# Patient Record
Sex: FEMALE | Race: WHITE | NOT HISPANIC OR LATINO | Employment: UNEMPLOYED | URBAN - METROPOLITAN AREA
[De-identification: names, ages, dates, MRNs, and addresses within clinical notes are randomized per-mention and may not be internally consistent; named-entity substitution may affect disease eponyms.]

---

## 2017-01-11 ENCOUNTER — GENERIC CONVERSION - ENCOUNTER (OUTPATIENT)
Dept: OTHER | Facility: OTHER | Age: 67
End: 2017-01-11

## 2017-01-27 ENCOUNTER — GENERIC CONVERSION - ENCOUNTER (OUTPATIENT)
Dept: OTHER | Facility: OTHER | Age: 67
End: 2017-01-27

## 2017-02-22 ENCOUNTER — LAB (OUTPATIENT)
Dept: LAB | Facility: HOSPITAL | Age: 67
End: 2017-02-22
Attending: INTERNAL MEDICINE
Payer: COMMERCIAL

## 2017-02-22 ENCOUNTER — TRANSCRIBE ORDERS (OUTPATIENT)
Dept: ADMINISTRATIVE | Facility: HOSPITAL | Age: 67
End: 2017-02-22

## 2017-02-22 ENCOUNTER — ALLSCRIPTS OFFICE VISIT (OUTPATIENT)
Dept: OTHER | Facility: OTHER | Age: 67
End: 2017-02-22

## 2017-02-22 DIAGNOSIS — R50.9 HYPERTHERMIA-INDUCED DEFECT: ICD-10-CM

## 2017-02-22 DIAGNOSIS — E51.9 OTHER AND UNSPECIFIED MANIFESTATIONS OF THIAMINE DEFICIENCY: ICD-10-CM

## 2017-02-22 DIAGNOSIS — E04.9 ENLARGEMENT OF THYROID: ICD-10-CM

## 2017-02-22 DIAGNOSIS — E78.5 HYPERLIPIDEMIA, UNSPECIFIED HYPERLIPIDEMIA TYPE: ICD-10-CM

## 2017-02-22 DIAGNOSIS — F11.20 UNCOMPLICATED OPIOID DEPENDENCE (HCC): ICD-10-CM

## 2017-02-22 DIAGNOSIS — E55.9 UNSPECIFIED VITAMIN D DEFICIENCY: Primary | ICD-10-CM

## 2017-02-22 DIAGNOSIS — D51.3 OTHER DIETARY VITAMIN B12 DEFICIENCY ANEMIA: ICD-10-CM

## 2017-02-22 DIAGNOSIS — R73.01 IMPAIRED FASTING GLUCOSE: ICD-10-CM

## 2017-02-22 DIAGNOSIS — D64.9 ANEMIA: ICD-10-CM

## 2017-02-22 DIAGNOSIS — G89.4 CHRONIC PAIN SYNDROME: ICD-10-CM

## 2017-02-22 DIAGNOSIS — D64.9 ANEMIA, UNSPECIFIED: ICD-10-CM

## 2017-02-22 DIAGNOSIS — R79.89 OTHER ABNORMAL BLOOD CHEMISTRY: ICD-10-CM

## 2017-02-22 DIAGNOSIS — E55.9 UNSPECIFIED VITAMIN D DEFICIENCY: ICD-10-CM

## 2017-02-22 DIAGNOSIS — R50.9 HYPERTHERMIA-INDUCED DEFECT: Primary | ICD-10-CM

## 2017-02-22 DIAGNOSIS — Z79.891 LONG TERM CURRENT USE OF OPIATE ANALGESIC: ICD-10-CM

## 2017-02-22 LAB
25(OH)D3 SERPL-MCNC: 29.5 NG/ML (ref 30–100)
ALBUMIN SERPL BCP-MCNC: 3.3 G/DL (ref 3.5–5)
ALP SERPL-CCNC: 96 U/L (ref 46–116)
ALT SERPL W P-5'-P-CCNC: 23 U/L (ref 12–78)
ANION GAP SERPL CALCULATED.3IONS-SCNC: 6 MMOL/L (ref 4–13)
AST SERPL W P-5'-P-CCNC: 19 U/L (ref 5–45)
BACTERIA UR QL AUTO: ABNORMAL /HPF
BASOPHILS # BLD AUTO: 0.02 THOUSANDS/ΜL (ref 0–0.1)
BASOPHILS NFR BLD AUTO: 0 % (ref 0–1)
BILIRUB DIRECT SERPL-MCNC: 0.05 MG/DL (ref 0–0.2)
BILIRUB SERPL-MCNC: 0.22 MG/DL (ref 0.2–1)
BILIRUB UR QL STRIP: NEGATIVE
BUN SERPL-MCNC: 15 MG/DL (ref 5–25)
CALCIUM SERPL-MCNC: 8.6 MG/DL (ref 8.3–10.1)
CAOX CRY URNS QL MICRO: ABNORMAL /HPF
CHLORIDE SERPL-SCNC: 106 MMOL/L (ref 100–108)
CHOLEST SERPL-MCNC: 193 MG/DL (ref 50–200)
CLARITY UR: CLEAR
CO2 SERPL-SCNC: 31 MMOL/L (ref 21–32)
COLOR UR: YELLOW
CREAT SERPL-MCNC: 0.84 MG/DL (ref 0.6–1.3)
CRP SERPL QL: <3 MG/L
EOSINOPHIL # BLD AUTO: 0.09 THOUSAND/ΜL (ref 0–0.61)
EOSINOPHIL NFR BLD AUTO: 2 % (ref 0–6)
ERYTHROCYTE [DISTWIDTH] IN BLOOD BY AUTOMATED COUNT: 16.1 % (ref 11.6–15.1)
ERYTHROCYTE [SEDIMENTATION RATE] IN BLOOD: 10 MM/HOUR (ref 0–20)
EST. AVERAGE GLUCOSE BLD GHB EST-MCNC: 108 MG/DL
FERRITIN SERPL-MCNC: 19 NG/ML (ref 8–388)
FOLATE SERPL-MCNC: 6.5 NG/ML (ref 3.1–17.5)
GFR SERPL CREATININE-BSD FRML MDRD: >60 ML/MIN/1.73SQ M
GLUCOSE SERPL-MCNC: 88 MG/DL (ref 65–140)
GLUCOSE UR STRIP-MCNC: NEGATIVE MG/DL
HBA1C MFR BLD: 5.4 % (ref 4.2–6.3)
HCT VFR BLD AUTO: 41.2 % (ref 34.8–46.1)
HDLC SERPL-MCNC: 93 MG/DL (ref 40–60)
HGB BLD-MCNC: 13.4 G/DL (ref 11.5–15.4)
HGB UR QL STRIP.AUTO: NORMAL
IRON SATN MFR SERPL: 18 %
IRON SERPL-MCNC: 67 UG/DL (ref 50–170)
KETONES UR STRIP-MCNC: NEGATIVE MG/DL
LDLC SERPL CALC-MCNC: 84 MG/DL (ref 0–100)
LEUKOCYTE ESTERASE UR QL STRIP: NEGATIVE
LYMPHOCYTES # BLD AUTO: 2.65 THOUSANDS/ΜL (ref 0.6–4.47)
LYMPHOCYTES NFR BLD AUTO: 48 % (ref 14–44)
MAGNESIUM SERPL-MCNC: 1.9 MG/DL (ref 1.6–2.6)
MCH RBC QN AUTO: 31.2 PG (ref 26.8–34.3)
MCHC RBC AUTO-ENTMCNC: 32.5 G/DL (ref 31.4–37.4)
MCV RBC AUTO: 96 FL (ref 82–98)
MONOCYTES # BLD AUTO: 0.41 THOUSAND/ΜL (ref 0.17–1.22)
MONOCYTES NFR BLD AUTO: 8 % (ref 4–12)
NEUTROPHILS # BLD AUTO: 2.32 THOUSANDS/ΜL (ref 1.85–7.62)
NEUTS SEG NFR BLD AUTO: 42 % (ref 43–75)
NITRITE UR QL STRIP: NEGATIVE
NON-SQ EPI CELLS URNS QL MICRO: ABNORMAL /HPF
NRBC BLD AUTO-RTO: 0 /100 WBCS
PH UR STRIP.AUTO: 5.5 [PH] (ref 4.5–8)
PLATELET # BLD AUTO: 258 THOUSANDS/UL (ref 149–390)
PMV BLD AUTO: 10.1 FL (ref 8.9–12.7)
POTASSIUM SERPL-SCNC: 4.2 MMOL/L (ref 3.5–5.3)
PROT SERPL-MCNC: 6.5 G/DL (ref 6.4–8.2)
PROT UR STRIP-MCNC: NEGATIVE MG/DL
RBC # BLD AUTO: 4.3 MILLION/UL (ref 3.81–5.12)
RBC #/AREA URNS AUTO: ABNORMAL /HPF
SODIUM SERPL-SCNC: 143 MMOL/L (ref 136–145)
SP GR UR STRIP.AUTO: >=1.03 (ref 1–1.03)
T4 FREE SERPL-MCNC: 0.95 NG/DL (ref 0.76–1.46)
TIBC SERPL-MCNC: 376 UG/DL (ref 250–450)
TRIGL SERPL-MCNC: 79 MG/DL
TSH SERPL DL<=0.05 MIU/L-ACNC: 5.04 UIU/ML (ref 0.36–3.74)
UROBILINOGEN UR QL STRIP.AUTO: 0.2 E.U./DL
VIT B12 SERPL-MCNC: 379 PG/ML (ref 100–900)
WBC # BLD AUTO: 5.49 THOUSAND/UL (ref 4.31–10.16)
WBC #/AREA URNS AUTO: ABNORMAL /HPF

## 2017-02-22 PROCEDURE — 87389 HIV-1 AG W/HIV-1&-2 AB AG IA: CPT

## 2017-02-22 PROCEDURE — 80061 LIPID PANEL: CPT

## 2017-02-22 PROCEDURE — 82607 VITAMIN B-12: CPT

## 2017-02-22 PROCEDURE — 83735 ASSAY OF MAGNESIUM: CPT

## 2017-02-22 PROCEDURE — 85652 RBC SED RATE AUTOMATED: CPT

## 2017-02-22 PROCEDURE — 82306 VITAMIN D 25 HYDROXY: CPT

## 2017-02-22 PROCEDURE — 86140 C-REACTIVE PROTEIN: CPT

## 2017-02-22 PROCEDURE — 82746 ASSAY OF FOLIC ACID SERUM: CPT

## 2017-02-22 PROCEDURE — 83540 ASSAY OF IRON: CPT

## 2017-02-22 PROCEDURE — 82728 ASSAY OF FERRITIN: CPT

## 2017-02-22 PROCEDURE — 84590 ASSAY OF VITAMIN A: CPT

## 2017-02-22 PROCEDURE — 84425 ASSAY OF VITAMIN B-1: CPT

## 2017-02-22 PROCEDURE — 83036 HEMOGLOBIN GLYCOSYLATED A1C: CPT

## 2017-02-22 PROCEDURE — 85025 COMPLETE CBC W/AUTO DIFF WBC: CPT

## 2017-02-22 PROCEDURE — 81001 URINALYSIS AUTO W/SCOPE: CPT | Performed by: INTERNAL MEDICINE

## 2017-02-22 PROCEDURE — 36415 COLL VENOUS BLD VENIPUNCTURE: CPT

## 2017-02-22 PROCEDURE — 84439 ASSAY OF FREE THYROXINE: CPT

## 2017-02-22 PROCEDURE — 83550 IRON BINDING TEST: CPT

## 2017-02-22 PROCEDURE — 84443 ASSAY THYROID STIM HORMONE: CPT

## 2017-02-22 PROCEDURE — 82248 BILIRUBIN DIRECT: CPT

## 2017-02-22 PROCEDURE — 86200 CCP ANTIBODY: CPT

## 2017-02-22 PROCEDURE — 80053 COMPREHEN METABOLIC PANEL: CPT

## 2017-02-24 LAB
CCP IGA+IGG SERPL IA-ACNC: 5 UNITS (ref 0–19)
HIV 1+2 AB+HIV1 P24 AG SERPL QL IA: NORMAL

## 2017-02-25 LAB
VIT A SERPL-MCNC: 73 UG/DL (ref 26–82)
VIT B1 BLD-SCNC: 105.4 NMOL/L (ref 66.5–200)

## 2017-03-01 ENCOUNTER — CONVERSION ENCOUNTER (OUTPATIENT)
Dept: MAMMOGRAPHY | Facility: CLINIC | Age: 67
End: 2017-03-01

## 2017-03-22 ENCOUNTER — GENERIC CONVERSION - ENCOUNTER (OUTPATIENT)
Dept: OTHER | Facility: OTHER | Age: 67
End: 2017-03-22

## 2017-04-10 ENCOUNTER — ALLSCRIPTS OFFICE VISIT (OUTPATIENT)
Dept: OTHER | Facility: OTHER | Age: 67
End: 2017-04-10

## 2017-04-14 ENCOUNTER — ALLSCRIPTS OFFICE VISIT (OUTPATIENT)
Dept: OTHER | Facility: OTHER | Age: 67
End: 2017-04-14

## 2017-04-14 DIAGNOSIS — F11.20 UNCOMPLICATED OPIOID DEPENDENCE (HCC): ICD-10-CM

## 2017-04-14 DIAGNOSIS — Z79.891 LONG TERM CURRENT USE OF OPIATE ANALGESIC: ICD-10-CM

## 2017-04-14 DIAGNOSIS — G89.4 CHRONIC PAIN SYNDROME: ICD-10-CM

## 2017-05-19 ENCOUNTER — ALLSCRIPTS OFFICE VISIT (OUTPATIENT)
Dept: OTHER | Facility: OTHER | Age: 67
End: 2017-05-19

## 2017-07-05 ENCOUNTER — ALLSCRIPTS OFFICE VISIT (OUTPATIENT)
Dept: OTHER | Facility: OTHER | Age: 67
End: 2017-07-05

## 2017-08-30 ENCOUNTER — ALLSCRIPTS OFFICE VISIT (OUTPATIENT)
Dept: OTHER | Facility: OTHER | Age: 67
End: 2017-08-30

## 2017-10-03 DIAGNOSIS — D50.9 IRON DEFICIENCY ANEMIA: ICD-10-CM

## 2017-10-25 ENCOUNTER — ALLSCRIPTS OFFICE VISIT (OUTPATIENT)
Dept: OTHER | Facility: OTHER | Age: 67
End: 2017-10-25

## 2017-10-25 NOTE — PROGRESS NOTES
Assessment  1  Pain syndrome, chronic (338 4) (G89 4)   2  Analgesic use (V58 69) (Z79 899)   3  Chronic cervical radiculopathy (723 4) (M54 12)   4  Chronic cervical pain (723 1,338 29) (M54 2,G89 29)   5  Chronic myofascial pain (729 1,338 29) (M79 1,G89 29)   6  Diabetic peripheral neuropathy (250 60,357 2) (E11 42)   7  Fibromyalgia (729 1) (M79 7)   8  Spondylosis of cervical region without myelopathy or radiculopathy (721 0) (M47 812)    Plan  Encounter for long-term opiate analgesic use, Opioid type dependence, continuous use,  Pain syndrome, chronic    · (1) DRUG ABUSE SCREEN, URINE ROUTINE; Status:Active; Requested for:94Ppv2304;    Perform:[facility]; Due:61Dyw6152; Ordered;For:Encounter for long-term opiate analgesic use, Opioid type dependence, continuous use, Pain syndrome, chronic; Ordered By:Maria Ines Soto;  Fibromyalgia    · Lyrica 100 MG Oral Capsule; TAKE 1 CAPSULE 3 TIMES DAILY   Rx By: Yanique Cruz; Dispense: 30 Days ; #:90 Capsule; Refill: 1;For: Fibromyalgia; LEXI = N; Print Rx  Other muscle spasm    · Baclofen 10 MG Oral Tablet; TAKE 1 TABLET BY MOUTH 3 TIMES A DAY   Rx By: Yanique Cruz; Dispense: 30 Days ; #:90 Tablet; Refill: 1;For: Other muscle spasm; LEXI = N; Verified Transmission to [id number]; Last Updated By: System, [employer]; 8/30/2017 9:29:46 AM  Pain syndrome, chronic    · Hydrocodone-Acetaminophen  MG Oral Tablet; TAKE 1 TABLET EVERY 4  TO 6 HOURS AS NEEDED FOR PAIN; Do Not Fill Before: 50XKO2274   Rx By: Yanique Cruz; Dispense: 30 Days ; #:130 Tablet; Refill: 0;For: Pain syndrome, chronic; LEXI = N; Print Rx   · Morphine Sulfate ER 15 MG Oral Tablet Extended Release; Take 1 tablet every  12 hours; Do Not Fill Before: 45PYP4846   Rx By: Yanique Cruz; Dispense: 30 Days ; #:60 Tablet Extended Release;  Refill: 0;For: Pain syndrome, chronic; LEXI = N; Print Rx   · Follow-up visit in 2 months Evaluation and Treatment  Follow-up with [facility] for med refills   Status: Hold For - Scheduling  Requested for: 42Knz3584   Ordered; For: Pain syndrome, chronic; Ordered By: Colleen Correa Performed:  Due: 35MNE0145    Discussion/Summary    The patient presents today for a follow-up office visit  The patient is currently being treated for cervicalgia, cervical spondylosis, chronic cervical radiculopathy, myofascial pain syndrome, diabetic peripheral neuropathy and fibromyalgia  The patient has been taking baclofen 10 mg 3 tablets daily, hydrocodone 10 mg she takes 4-5 tablets daily depending on her pain level, she also takes Lyrica 100 mg 3 tablets daily, and morphine extended release 15 mg 1 tablet twice per day  She states that the medications give her 50-60% relief of her pain symptoms and no side effects or issues  this time, I feel it is reasonable to continue her hydrocodone and her morphine for her today as her pain symptoms are stable with the medications  She was given a two-month supply of each medication with one month having a do not fill date of September 28, 2017  The patient did return to me a prescription for hydrocodone that was written on 5/19/2017 with a do not fill date of June 16, 2017  This was avoided and discarded in the office  The patient tells me that she would like to start weaning off of the morphine, she will try on her own to decrease to 1 tablet daily by her next office visit  I did tell her that if she has a prescription that she does not need to fill because she wasn't using the medication that she should return it to our office  The patient was agreeable verbalized understanding  will continue baclofen, and lyrica  Both were refilled today  PDMP was reviewed today and was appropriate  One of her morphine prescriptions from July 5, 2017 was not filled she tells me that this was a mistake she thought she had an extra and she handed the pharmacist to 1 that was written for her on May 19 instead of July 5  She threw the extra one in the garbage   I did discuss with her that from now on if she has an extra prescription she should bring her back to the office and I will discarded  Patient was agreeable verbalized understanding  urine drug screen was collected at today's office visit as part of our medication management protocol  The point of care testing results were appropriate for what was being prescribed  The specimen will be sent for confirmatory testing  The drug screen is medically necessary because the patient is either dependent on opioid medication or is being considered for opioid medication therapy and the results could impact ongoing or future treatment  The drug screen is to evaluate for the presence or absence of prescribed, non-prescribed, and/or illicit drugs/substances  will follow up in 2 months for medication refills  Patient is able to Self-Care  The treatment plan was reviewed with the patient/guardian  The patient/guardian understands and agrees with the treatment plan     There are risks associated with opiod medications, including dependence, addiction and tolerance  The patient understands and agrees to use these medications only as prescribed  Potential side effects of the medications include, but are not limited to, constipation, drowsiness, addiction, impaired judgment and risk of fatal overdose if not taken as prescribed  Sharing medications is a felony  At this point and time, the patient is showing no signs of addiction, abuse, diversion or suicidal ideation  Chief Complaint  1  Neck Pain  neck and right arm painknee and left hip pain      History of Present Illness  The patient presents today for a follow-up office visit  The patient is currently being treated for cervicalgia, cervical spondylosis, chronic cervical radiculopathy, myofascial pain syndrome, diabetic peripheral neuropathy and fibromyalgia   The patient has been taking baclofen 10 mg 3 tablets daily, hydrocodone 10 mg she takes 4-5 tablets daily depending on her pain level, she also takes Lyrica 100 mg 3 tablets daily, and morphine extended release 15 mg 1 tablet twice per day  She states that the medications give her 50-60% relief of her pain symptoms and no side effects or issues  the patient rates her pain 8/10, this is constant in nature most bothersome at night  She describes her pain as burning, sharp, throbbing, pressure like, shooting, and numbness  She localizes her pain to her neck and her right arm, she also has pain in her bilateral knees and feet  tells me that she did get aspiration pneumonia from her acid reflux she is on levothyroxine and ranitidine   have personally reviewed and/or updated the patient's past medical history, past surgical history, family history, social history, current medications, allergies, and vital signs today  Twin Lan presents with complaints of gradual onset of constant episodes of severe left posterior neck pain, described as sharp, burning and throbbing, radiating to the bilateral trapezius, right shoulder, right arm and right hand  On a scale of 1 to 10, the patient rates the pain as 8  Symptoms are unchanged  Review of Systems    Constitutional: no fever,-- no recent weight gain-- and-- no recent weight loss  Eyes: no double vision-- and-- no blurry vision  Cardiovascular: chest pain, but-- no palpitations-- and-- no lower extremity edema  Respiratory: no complaints of shortness of breath-- and-- no wheezing  Musculoskeletal: difficulty walking,-- joint stiffness,-- joint swelling hands, knees and feet  ,-- pain in extremity hand, feet and knees-- and-- decreased range of motion, but-- no muscle weakness-- and-- no limb swelling  Neurological: dizziness, but-- no difficulty swallowing,-- no memory loss,-- no loss of consciousness-- and-- no seizures  Gastrointestinal: nausea, but-- no vomiting,-- no constipation-- and-- no diarrhea     Genitourinary: no difficulty initiating urine stream,-- no genital pain-- and-- no frequent urination  Integumentary: no complaints of skin rash  Psychiatric: no depression  Endocrine: no excessive thirst,-- no adrenal disease,-- no hypothyroidism-- and-- no hyperthyroidism  Hematologic/Lymphatic: no tendency for easy bruising-- and-- no tendency for easy bleeding  Active Problems  1  Acute pain of left foot (729 5) (M79 672)   2  Analgesic use (V58 69) (Z79 899)   3  Anemia (285 9) (D64 9)   4  Anxiety disorder (300 00) (F41 9)   5  Chronic cervical pain (723 1,338 29) (M54 2,G89 29)   6  Chronic cervical radiculopathy (723 4) (M54 12)   7  Chronic myofascial pain (729 1,338 29) (M79 1,G89 29)   8  Diabetic peripheral neuropathy (250 60,357 2) (E11 42)   9  Encounter for long-term opiate analgesic use (V58 69) (Z79 891)   10  Fibromyalgia (729 1) (M79 7)   11  GERD (gastroesophageal reflux disease) (530 81) (K21 9)   12  H/O colonoscopy (V45 89) (Z98 890)   13  Headache (784 0) (R51)   14  Iron deficiency anemia (280 9) (D50 9)   15  Left hip pain (719 45) (M25 552)   16  Limb pain (729 5) (M79 609)   17  Lymphadenopathy (785 6) (R59 1)   18  MGUS (monoclonal gammopathy of unknown significance) (273 1) (D47 2)   19  Opioid type dependence, continuous use (304 01) (F11 20)   20  Other muscle spasm (728 85) (M62 838)   21  Pain syndrome, chronic (338 4) (G89 4)   22  Pain, upper back (724 5) (M54 9)   23  Right knee pain (719 46) (M25 561)   24  Spondylosis of cervical region without myelopathy or radiculopathy (721 0) (G31 146)    Past Medical History  1  History of Anxiety (300 00) (F41 9)   2  History of Arthritis (V13 4)   3  History of Asthma (493 90) (J45 909)   4  History of Depression (311) (F32 9)   5  History of Diabetes Mellitus (250 00)   6  History of endoscopy (V45 89) (Z98 890)   7  History of hyperlipidemia (V12 29) (Z86 39)   8  History of Malignant neoplasm without specification of site (199 1) (C80 1)    Surgical History  1   History of Gastric Bypass With Sarah-en-Y Short Limb    Family History  Mother    1  Family history of Hypertension (V17 49)  Father    2  Family history of Hypertension (V17 49)  Unknown    3  Family history of Hypertension (V17 49)  Family History    4  Family history of Diabetes Mellitus (V18 0)   5  Family history of Heart Disease (V17 49)   6  Family history of Hypertension (V17 49)   7  Family history of Stroke Syndrome (V17 1)   8  Family history of Thyroid Disorder (V18 19)    Social History   · Denied: History of Alcohol Use (History)   · Current Every Day Smoker (305 1)   · Tobacco use (305 1) (Z72 0)    Current Meds   1  Amitriptyline HCl - 10 MG Oral Tablet; Therapy: 40GWU0086 to Recorded   2  Baclofen 10 MG Oral Tablet; TAKE 1 TABLET BY MOUTH 3 TIMES A DAY; Therapy: 88LRJ1012 to (Evaluate:55Ieb9081)  Requested for: 92PMS4964; Last   Rx:86Egy6975 Ordered   3  Benadryl Allergy 25 MG Oral Tablet; Therapy: 75HDA4350 to Recorded   4  Hydrocodone-Acetaminophen  MG Oral Tablet; TAKE 1 TABLET EVERY 4 TO 6   HOURS AS NEEDED FOR PAIN; Last Rx:07Kgz3078 Ordered   5  Levothyroxine Sodium 150 MCG Oral Tablet; Therapy: (Recorded:87Kmy2963) to Recorded   6  Losartan Potassium 25 MG Oral Tablet; Therapy: 49SDH3660 to Recorded   7  Lyrica 100 MG Oral Capsule; TAKE 1 CAPSULE 3 TIMES DAILY; Therapy: 77EPQ3808 to (Evaluate:46Eex7385)  Requested for: 80KXF7041; Last   Rx:31Vjp4430 Ordered   8  Morphine Sulfate ER 15 MG Oral Tablet Extended Release; Take 1 tablet every 12 hours; Therapy: 13Kzq4816 to (Evaluate:99Frz8778); Last Rx:56Nan2192 Ordered   9  OneTouch Ultra Blue In [employer]; Therapy: 16UEV6718 to (Evaluate:13Jan2015) Recorded   10  Pantoprazole Sodium 40 MG Oral Tablet Delayed Release; Therapy: 64MVT2372 to (Evaluate:28Hyq8839) Recorded   11  RaNITidine HCl - 150 MG Oral Tablet; Therapy: 17YGS2041 to Recorded   12  Sucralfate 1 GM Oral Tablet;     Therapy: 47TPV2748 to Recorded   13  TraZODone HCl - 50 MG Oral Tablet; TAKE 1 TABLET AT BEDTIME AS NEEDED FOR    SLEEP; Therapy: 23TYO4432 to (Evaluate:04Aug2017) Recorded    Allergies  1  Cephalosporins   2  fentanyl   3  Paxil TABS   4  Penicillins   5  Statins   6  Sulfur   7  Wellbutrin TABS    Vitals  Vital Signs    Recorded: 30Aug2017 08:44AM   Heart Rate 52   Respiration 12   Systolic 814   Diastolic 58   Height 5 ft 4 in   Weight 154 lb    BMI Calculated 26 43   BSA Calculated 1 75   Pain Scale 8     Physical Exam    Constitutional   General appearance: Well developed, well nourished, alert, in no distress, non-toxic and no overt pain behavior  Eyes   Sclera: anicteric   HEENT   Hearing grossly intact  Pulmonary   Respiratory effort: Even and unlabored  Psychiatric   Mood and affect: Mood and affect appropriate  Neurologic   Cranial nerves: Cranial nerves II-XII grossly intact  Musculoskeletal   Gait and station: Abnormal  -- using single point cane to assist with ambulation  Cervical Spine examination demonstrates Cervical Spine:   Tenderness: right trapezius muscle-- and-- left trapezius muscle  Flexion was not restricted-- and-- was painless  Extension was restricted-- and-- was painful  Left lateral flexion was not restricted-- and-- was painless  Right lateral flexion was not restricted-- and-- was painless  Rotation to the left was not restricted-- and-- was painless  Rotation to the right was not restricted-- and-- was painless    hand strength was normal bilaterally  wrist strength was normal bilaterally  elbow strength was normal bilaterally  shoulder strength was normal bilaterally  Lumbar/Sacral Spine examination demonstrates Lumbosacral Spine: Foot and ankle strength was normal bilaterally  Knee strength was normal bilaterally  Hip strength was normal bilaterally  Evaluation of Muscle Stretch Reflexes on the right side demonstrates 1/4 Knee Jerk Reflex     Evaluation of Muscle Stretch Reflexes on the left side demonstrates 1/4 Knee Jerk Reflex  Results/Data  Procedure Flowsheet [id number] 09:10AM      Test Name Result Flag Reference   Urine Drug Screen Performed Date 30Aug2017       Results Free Text Form [facility]:   Results     Other  morophine and hydrocodone last taken 8/30 AM UDS today  Future Appointments    Date/Time Provider Specialty Site   10/17/2017 10:15 AM JASMIN Chau , DO, [facility] [address] [place] [address]     Signatures   Electronically signed by : Kimberly Brennan;  Aug 30 2017  9:39AM EST                       (Author)    Electronically signed by : Wero Berkowitz DO; Aug 30 2017 10:09AM EST

## 2017-10-26 NOTE — PROGRESS NOTES
Assessment  1  Pain syndrome, chronic (338 4) (G89 4)   2  Chronic cervical radiculopathy (723 4) (M54 12)   3  Chronic myofascial pain (729 1,338 29) (M79 1,G89 29)   4  Diabetic peripheral neuropathy (250 60,357 2) (E11 42)   5  Fibromyalgia (729 1) (M79 7)   6  Spondylosis of cervical region without myelopathy or radiculopathy (721 0) (M47 812)    Plan  Fibromyalgia    · Lyrica 100 MG Oral Capsule; TAKE 1 CAPSULE 3 TIMES DAILY   Rx By: Miah Ch; Dispense: 30 Days ; #:90 Capsule; Refill: 1;For: Fibromyalgia; LEXI = N; Print Rx  Other muscle spasm    · Baclofen 10 MG Oral Tablet; TAKE 1 TABLET BY MOUTH 3 TIMES A DAY   Rx By: Miah Ch; Dispense: 30 Days ; #:90 Tablet; Refill: 1;For: Other muscle spasm; LEXI = N; Verified Transmission to [id number]; Last Updated By: System, [employer]; 10/25/2017 10:26:19 AM  Pain syndrome, chronic    · Hydrocodone-Acetaminophen  MG Oral Tablet; TAKE 1 TABLET EVERY 4  TO 6 HOURS AS NEEDED FOR PAIN; Do Not Fill Before: [id number]   Rx By: Miah Ch; Dispense: 30 Days ; #:130 Tablet; Refill: 0;For: Pain syndrome, chronic; LEXI = N; Print Rx   · Morphine Sulfate ER 15 MG Oral Tablet Extended Release; Take 1 tablet every  12 hours; Do Not Fill Before: [id number]   Rx By: Miah Ch; Dispense: 30 Days ; #:60 Tablet Extended Release; Refill: 0;For: Pain syndrome, chronic; LEXI = N; Print Rx   · Follow-up visit in 2 months Evaluation and Treatment  Follow-up with ao for med refills   Status: Hold For - Scheduling  Requested for: 02JCV3130   Ordered; For: Pain syndrome, chronic; Ordered By: Miah Ch Performed:  Due: 65NPD0906    Discussion/Summary    The patient presents today for a follow-up office visit  The patient is currently being treated for cervicalgia, cervical spondylosis, chronic cervical radiculopathy, myofascial pain syndrome, diabetic peripheral neuropathy and fibromyalgia   The patient has been taking baclofen 10 mg 3 tablets daily, hydrocodone 10 mg she takes 4-5 tablets daily depending on her pain level, she also takes Lyrica 100 mg 3 tablets daily, and morphine extended release 15 mg 1 tablet twice per day  She has been trying to decrease to 1 morphine per day  She states that the medications give her 50-60% relief of her pain symptoms and no side effects or issues  this time, I feel it is reasonable to continue her hydrocodone and her morphine for her today as her pain symptoms are stable with the medications  She was given a two-month supply of each medication with one month having a do not fill date of November 23, 2017  PDMP was reviewed today and was appropriate  will follow up in 2 months for medication refills  Patient is able to Self-Care  The treatment plan was reviewed with the patient/guardian  The patient/guardian understands and agrees with the treatment plan     There are risks associated with opiod medications, including dependence, addiction and tolerance  The patient understands and agrees to use these medications only as prescribed  Potential side effects of the medications include, but are not limited to, constipation, drowsiness, addiction, impaired judgment and risk of fatal overdose if not taken as prescribed  Sharing medications is a felony  At this point and time, the patient is showing no signs of addiction, abuse, diversion or suicidal ideation  Chief Complaint  1  Pain  neck and right arm painknee and left hip pain      History of Present Illness  The patient presents today for a follow-up office visit  The patient is currently being treated for cervicalgia, cervical spondylosis, chronic cervical radiculopathy, myofascial pain syndrome, diabetic peripheral neuropathy and fibromyalgia   The patient has been taking baclofen 10 mg 3 tablets daily, hydrocodone 10 mg she takes 4-5 tablets daily depending on her pain level, she also takes Lyrica 100 mg 3 tablets daily, and morphine extended release 15 mg 1 tablet twice per day  She has been trying to decrease to 1 morphine per day  She states that the medications give her 50-60% relief of her pain symptoms and no side effects or issues  the patient rates her pain 7/10, this is constant in nature and described as burning, dull, aching, sharp, throbbing, shooting, numbness, and pins and needles to her hands bilaterally her knees bilaterally in her feet bilaterally  have personally reviewed and/or updated the patient's past medical history, past surgical history, family history, social history, current medications, allergies, and vital signs today  Dakota Mohamud presents with complaints of constant episodes of bilateral neck, bilateral shoulder, bilateral hand, bilateral knee, bilateral lower leg, bilateral ankle and bilateral foot pain, described as sharp, dull, aching and throbbing  On a scale of 1 to 10, the patient rates the pain as 7  Symptoms are unchanged  Review of Systems    Constitutional: no fever,-- no recent weight gain-- and-- no recent weight loss  Eyes: no double vision-- and-- no blurry vision  Cardiovascular: no chest pain,-- no palpitations-- and-- no lower extremity edema  Respiratory: shortness of breath, but-- no wheezing  Musculoskeletal: joint stiffness-- and-- decreased range of motion, but-- no difficulty walking,-- no muscle weakness,-- no joint swelling,-- no limb swelling-- and-- no pain in extremity  Neurological: no dizziness,-- no difficulty swallowing,-- no memory loss,-- no loss of consciousness-- and-- no seizures  Gastrointestinal: nausea, but-- no vomiting,-- no constipation-- and-- no diarrhea  Genitourinary: no difficulty initiating urine stream,-- no genital pain-- and-- no frequent urination  Integumentary: no complaints of skin rash  Psychiatric: no depression  Endocrine: no excessive thirst,-- no adrenal disease,-- no hypothyroidism-- and-- no hyperthyroidism     Hematologic/Lymphatic: no tendency for easy bruising-- and-- no tendency for easy bleeding  Active Problems  1  Acute pain of left foot (729 5) (M79 672)   2  Analgesic use (V58 69) (Z79 899)   3  Anemia (285 9) (D64 9)   4  Anxiety disorder (300 00) (F41 9)   5  Chronic cervical pain (723 1,338 29) (M54 2,G89 29)   6  Chronic cervical radiculopathy (723 4) (M54 12)   7  Chronic myofascial pain (729 1,338 29) (M79 1,G89 29)   8  Diabetic peripheral neuropathy (250 60,357 2) (E11 42)   9  Encounter for long-term opiate analgesic use (V58 69) (Z79 891)   10  Fibromyalgia (729 1) (M79 7)   11  GERD (gastroesophageal reflux disease) (530 81) (K21 9)   12  H/O colonoscopy (V45 89) (Z98 890)   13  Headache (784 0) (R51)   14  Iron deficiency anemia (280 9) (D50 9)   15  Left hip pain (719 45) (M25 552)   16  Limb pain (729 5) (M79 609)   17  Lymphadenopathy (785 6) (R59 1)   18  MGUS (monoclonal gammopathy of unknown significance) (273 1) (D47 2)   19  Opioid type dependence, continuous use (304 01) (F11 20)   20  Other muscle spasm (728 85) (M62 838)   21  Pain syndrome, chronic (338 4) (G89 4)   22  Pain, upper back (724 5) (M54 9)   23  Right knee pain (719 46) (M25 561)   24  Spondylosis of cervical region without myelopathy or radiculopathy (721 0) (K14 222)    Past Medical History  1  History of Anxiety (300 00) (F41 9)   2  History of Arthritis (V13 4)   3  History of Asthma (493 90) (J45 909)   4  History of Depression (311) (F32 9)   5  History of Diabetes Mellitus (250 00)   6  History of endoscopy (V45 89) (Z98 890)   7  History of hyperlipidemia (V12 29) (Z86 39)   8  History of Malignant neoplasm without specification of site (199 1) (C80 1)    Surgical History  1  History of Gastric Bypass With Sarah-en-Y Short Limb    Family History  Mother    1  Family history of Hypertension (V17 49)  Father    2  Family history of Hypertension (V17 49)  Unknown    3  Family history of Hypertension (V17 49)  Family History    4   Family history of Diabetes Mellitus (V18 0)   5  Family history of Heart Disease (V17 49)   6  Family history of Hypertension (V17 49)   7  Family history of Stroke Syndrome (V17 1)   8  Family history of Thyroid Disorder (V18 19)    Social History   · Denied: History of Alcohol Use (History)   · Current Every Day Smoker (305 1)   · Tobacco use (305 1) (Z72 0)    Current Meds   1  Amitriptyline HCl - 10 MG Oral Tablet; Therapy: 13SOV4287 to Recorded   2  Arnuity Ellipta 200 MCG/ACT Inhalation Aerosol Powder Breath Activated; Therapy: (Recorded:25Oct2017) to Recorded   3  Baclofen 10 MG Oral Tablet; TAKE 1 TABLET BY MOUTH 3 TIMES A DAY; Therapy: 49MWB7446 to [phone])  Requested for: 30Aug2017; Last   Rx:30Aug2017 Ordered   4  Benadryl Allergy 25 MG Oral Tablet; Therapy: 59IZU0302 to Recorded   5  Hydrocodone-Acetaminophen  MG Oral Tablet; TAKE 1 TABLET EVERY 4 TO 6   HOURS AS NEEDED FOR PAIN; Last Rx:30Aug2017 Ordered   6  Levothyroxine Sodium 150 MCG Oral Tablet; Therapy: (Recorded:34Mru9686) to Recorded   7  Losartan Potassium 25 MG Oral Tablet; Therapy: 16ITM2013 to Recorded   8  Lyrica 100 MG Oral Capsule; TAKE 1 CAPSULE 3 TIMES DAILY; Therapy: 20AXC1049 to [phone])  Requested for: 30Aug2017; Last   Rx:30Aug2017 Ordered   9  Morphine Sulfate ER 15 MG Oral Tablet Extended Release; Take 1 tablet every 12 hours; Therapy: 35Sij7791 to [phone]); Last Rx:30Aug2017 Ordered   10  OneTouch Ultra Blue In [employer]; Therapy: 90ODN0611 to (Evaluate:13Jan2015) Recorded   11  Pantoprazole Sodium 40 MG Oral Tablet Delayed Release; Therapy: 57RIZ1270 to (Evaluate:42Kzo2650) Recorded   12  RaNITidine HCl - 150 MG Oral Tablet; Therapy: 21JQH9424 to Recorded   13  Sucralfate 1 GM Oral Tablet; Therapy: 36VPH9979 to Recorded   14  TraZODone HCl - 50 MG Oral Tablet; TAKE 1 TABLET AT BEDTIME AS NEEDED FOR    SLEEP;     Therapy: 96UJR1725 to (Evaluate:54Fux7847) Recorded    Allergies  1  Cephalosporins   2  fentanyl   3  Paxil TABS   4  Penicillins   5  Statins   6  Sulfur   7  Wellbutrin TABS    Vitals  Vital Signs    Recorded: 92ZMN7659 10:03AM   Temperature 69 9 F   Systolic 300   Diastolic 60   Weight 988 lb 8 oz   BMI Calculated 26 86   BSA Calculated 1 76   Pain Scale 7     Physical Exam    Constitutional   General appearance: Well developed, well nourished, alert, in no distress, non-toxic and no overt pain behavior  Eyes   Sclera: anicteric   HEENT   Hearing grossly intact  Pulmonary   Respiratory effort: Even and unlabored  Psychiatric   Mood and affect: Mood and affect appropriate  Neurologic   Cranial nerves: Cranial nerves II-XII grossly intact  Musculoskeletal   Gait and station: Abnormal  -- using single point cane to assist with ambulation  Cervical Spine examination demonstrates Cervical Spine:   Tenderness: right trapezius muscle-- and-- left trapezius muscle  Flexion was not restricted-- and-- was painless  Extension was restricted-- and-- was painful  Left lateral flexion was not restricted-- and-- was painless  Right lateral flexion was not restricted-- and-- was painless  Rotation to the left was not restricted-- and-- was painless  Rotation to the right was not restricted-- and-- was painless    hand strength was normal bilaterally  wrist strength was normal bilaterally  elbow strength was normal bilaterally  shoulder strength was normal bilaterally  Lumbar/Sacral Spine examination demonstrates Lumbosacral Spine: Foot and ankle strength was normal bilaterally  Knee strength was normal bilaterally  Hip strength was normal bilaterally  Evaluation of Muscle Stretch Reflexes on the right side demonstrates 1/4 Knee Jerk Reflex  Evaluation of Muscle Stretch Reflexes on the left side demonstrates 1/4 Knee Jerk Reflex        Results/Data  * CT SOFT TISSUE NECK W CONTRAST 92Caa5383 12:21PM Carlos A Blanco     Test Name Result Flag Reference   CT SOFT TISSUE NECK W CONTRAST (Report)     CT NECK WITH CONTRAST     INDICATION: Fevers, headaches  Nodules on thyroid  Lungs  Evaluate lymphadenopathy  COMPARISON: None  TECHNIQUE: Contiguous 2 5 mm images were obtained through the neck after administration of intravenous contrast  In addition, sagittal and coronal reformatted images were submitted for interpretation  This examination, like all CT scans performed in    the [facility], was performed utilizing techniques to minimize radiation dose exposure, including the use of iterative reconstruction and automated exposure control  85 ml of iodinated contrast, Omnipaque 350, was injected    intravenously without immediate consequence  IMAGE QUALITY: Diagnostic  FINDINGS: Bilateral metallic markers are placed over the region of palpable concern in the submandibular regions bilaterally  No suspicious lymphadenopathy, mass or collection regions  The markers overlie normal appearing submandibular glands  VISUALIZED BRAIN PARENCHYMA: No acute intracranial pathology of the visualized brain parenchyma  VISUALIZED ORBITS AND PARANASAL SINUSES: Normal      NASAL CAVITY AND NASOPHARYNX: Normal      SUPRAHYOID NECK: Normal oral cavity, tongue base, tonsillar fossa and epiglottis  Prevertebral soft tissues are normal         INFRAHYOID NECK: Aryepiglottic folds, laryngeal tissues, and piriform sinuses are normal         THYROID GLAND: Heterogeneous thyroid gland with bilateral nodules  The largest nodule projects from the lower pole of the left thyroid gland medially, measuring approximately 1 5 x 1 4 cm on image 73, series 13     PAROTID AND SUBMANDIBULAR GLANDS: Normal      LYMPH NODES: No pathologic or enlarged adenopathy  VASCULAR STRUCTURES: There is a left-sided aortic arch with mild atherosclerotic basilar calcifications  There is an aberrant right subclavian artery, a developmental variant  Atherosclerotic calcifications noted  THORACIC INLET: Lung apices and upper mediastinum are unremarkable  BONY STRUCTURES: Mild spondylotic changes noted  IMPRESSION:       1  No suspicious mass, lymphadenopathy or collection adjacent to the metallic markers which overlie normal submandibular glands bilaterally  2  Heterogeneous thyroid gland with bilateral nodules including a 1 5 cm nodule projecting from the inferior margin of the left thyroid gland  Further characterization with thyroid ultrasound recommended if not recently performed  Workstation performed: [id number]     Signed by:    Zoë Francis MD   10/27/16     Future Appointments    Date/Time Provider Specialty Site   12/20/2017 09:00 AM [facility] 15     Signatures   Electronically signed by : [address]ly [address]; Oct 25 2017 10:30AM EST                       (Author)    Electronically signed by : Wayne Hayes DO; Oct 25 2017 10:36AM EST

## 2017-11-17 ENCOUNTER — GENERIC CONVERSION - ENCOUNTER (OUTPATIENT)
Dept: OTHER | Facility: OTHER | Age: 67
End: 2017-11-17

## 2017-12-20 ENCOUNTER — ALLSCRIPTS OFFICE VISIT (OUTPATIENT)
Dept: OTHER | Facility: OTHER | Age: 67
End: 2017-12-20

## 2017-12-21 NOTE — PROGRESS NOTES
Assessment   1  Pain syndrome, chronic (338 4) (G89 4)   2  Analgesic use (V58 69) (Z79 899)   3  Chronic cervical radiculopathy (723 4) (M54 12)   4  Chronic myofascial pain (729 1,338 29) (M79 1,G89 29)   5  Diabetic peripheral neuropathy (250 60,357 2) (E11 42)   6  Fibromyalgia (729 1) (M79 7)   7  Spondylosis of cervical region without myelopathy or radiculopathy (721 0) (M47 812)    Plan   Fibromyalgia    · Lyrica 100 MG Oral Capsule; TAKE 1 CAPSULE 3 TIMES DAILY   Rx By: Leann Hein; Dispense: 30 Days ; #:90 Capsule; Refill: 1;For: Fibromyalgia; LEXI = N; Print Rx  Other muscle spasm    · Baclofen 10 MG Oral Tablet; TAKE 1 TABLET BY MOUTH 3 TIMES A DAY   Rx By: Leann Hein; Dispense: 30 Days ; #:90 Tablet; Refill: 1;For: Other muscle spasm; LEXI = N; Verified Transmission to [id number]; Last Updated By: System, [employer]; 12/20/2017 9:15:27 AM  Pain syndrome, chronic    · Hydrocodone-Acetaminophen  MG Oral Tablet; TAKE 1 TABLET EVERY 4    TO 6 HOURS AS NEEDED FOR PAIN; Do Not Fill Before: 65TRQ9920   Rx By: Leann Hein; Dispense: 30 Days ; #:130 Tablet; Refill: 0;For: Pain syndrome, chronic; LEXI = N; Print Rx   · Morphine Sulfate ER 15 MG Oral Tablet Extended Release; take 1 tablet daily    prn; Do Not Fill Before: 41TLJ4200   Rx By: Leann Hein; Dispense: 30 Days ; #:30 Tablet Extended Release; Refill: 0;For: Pain syndrome, chronic; LEXI = N; Print Rx   · Follow-up visit in 2 months Evaluation and Treatment  Follow-up with [facility] for med refills     Status: Hold For - Scheduling  Requested for: 10Jzq0088   Ordered; For: Pain syndrome, chronic; Ordered By: Leann Hein Performed:  Due: 44HXP0557    Discussion/Summary      The patient presents today for a follow-up office visit  The patient is currently being treated for cervicalgia, cervical spondylosis, chronic cervical radiculopathy, myofascial pain syndrome, diabetic peripheral neuropathy and fibromyalgia   The patient has been taking baclofen 10 mg 3 tablets daily, hydrocodone 10 mg she takes 4-5 tablets daily depending on her pain level, she also takes Lyrica 100 mg 2- 3 tablets daily, and morphine extended release 15 mg, she has decreased to 1 tablet daily  She states that the medications give her 50% relief of her pain symptoms and no side effects or issues  this time, I feel it is reasonable to continue her hydrocodone and her morphine for her today as her pain symptoms are stable with the medications  She was given a two-month supply of each medication with one month having a do not fill date of January 18, 2018  Morphine was decreased to one tablet daily  PDMP was reviewed today and was appropriate   urine drug screen was collected at today's office visit as part of our medication management protocol  The point of care testing results were appropriate for what was being prescribed  The specimen will be sent for confirmatory testing  The drug screen is medically necessary because the patient is either dependent on opioid medication or is being considered for opioid medication therapy and the results could impact ongoing or future treatment  The drug screen is to evaluate for the presence or absence of prescribed, non-prescribed, and/or illicit drugs/substances  will follow up in 8 weeks for medication refills  Patient is able to Self-Care  The treatment plan was reviewed with the patient/guardian  The patient/guardian understands and agrees with the treatment plan         There are risks associated with opiod medications, including dependence, addiction and tolerance  The patient understands and agrees to use these medications only as prescribed  Potential side effects of the medications include, but are not limited to, constipation, drowsiness, addiction, impaired judgment and risk of fatal overdose if not taken as prescribed  Sharing medications is a felony   At this point and time, the patient is showing no signs of addiction, abuse, diversion or suicidal ideation  Chief Complaint   1  Pain  neck and right arm pain knee and left hip pain      History of Present Illness   The patient presents today for a follow-up office visit  The patient is currently being treated for cervicalgia, cervical spondylosis, chronic cervical radiculopathy, myofascial pain syndrome, diabetic peripheral neuropathy and fibromyalgia  The patient has been taking baclofen 10 mg 3 tablets daily, hydrocodone 10 mg she takes 4-5 tablets daily depending on her pain level, she also takes Lyrica 100 mg 2- 3 tablets daily, and morphine extended release 15 mg, she has decreased to 1 tablet daily  She states that the medications give her 50% relief of her pain symptoms and no side effects or issues  the patient rates her pain 7/10, this is constant in nature and described as burning, dull, aching, sharp, throbbing, cramping, pressure-like, shooting, numbness, and pins and needles  She localizes her pain to her head and neck she has radiation down her entire left arm  She also has pain in her bilateral knees and her bilateral feet  tells me that she recently tore her rotator cuff carrying some heavy bags from her car  Her family doctor is managing currently with exercises if the pain does not improved he will refer her to an orthopedic surgeon  have personally reviewed and/or updated the patient's past medical history, past surgical history, family history, social history, current medications, allergies, and vital signs today  Gonzales Bradley presents with complaints of constant episodes of moderate head, bilateral neck, bilateral upper back, left shoulder, left elbow, bilateral knee, right ankle and bilateral foot pain, described as sharp, dull, aching, burning, throbbing and cramping  On a scale of 1 to 10, the patient rates the pain as 7  Symptoms are unchanged        Review of Systems        Constitutional: no fever,-- no recent weight gain-- and-- no recent weight loss  Eyes: no double vision-- and-- no blurry vision  Cardiovascular: chest pain, but-- no palpitations-- and-- no lower extremity edema  Respiratory: shortness of breath, but-- no wheezing  Musculoskeletal: difficulty walking,-- joint stiffness,-- pain in extremity -- and-- decreased range of motion, but-- no muscle weakness,-- no joint swelling-- and-- no limb swelling  Neurological: dizziness, but-- no difficulty swallowing,-- no memory loss,-- no loss of consciousness-- and-- no seizures  Gastrointestinal: nausea, but-- no vomiting,-- no constipation-- and-- no diarrhea  Genitourinary: no difficulty initiating urine stream,-- no genital pain-- and-- no frequent urination  Integumentary: no complaints of skin rash  Psychiatric: no depression  Endocrine: no excessive thirst,-- no adrenal disease,-- no hypothyroidism-- and-- no hyperthyroidism  Hematologic/Lymphatic: no tendency for easy bruising-- and-- no tendency for easy bleeding  Active Problems   1  Acute pain of left foot (729 5) (M79 672)   2  Analgesic use (V58 69) (Z79 899)   3  Anemia (285 9) (D64 9)   4  Anxiety disorder (300 00) (F41 9)   5  Chronic cervical pain (723 1,338 29) (M54 2,G89 29)   6  Chronic cervical radiculopathy (723 4) (M54 12)   7  Chronic myofascial pain (729 1,338 29) (M79 1,G89 29)   8  Diabetic peripheral neuropathy (250 60,357 2) (E11 42)   9  Encounter for long-term opiate analgesic use (V58 69) (Z79 891)   10  Fibromyalgia (729 1) (M79 7)   11  GERD (gastroesophageal reflux disease) (530 81) (K21 9)   12  H/O colonoscopy (V45 89) (Z98 890)   13  Headache (784 0) (R51)   14  Iron deficiency anemia (280 9) (D50 9)   15  Left hip pain (719 45) (M25 552)   16  Limb pain (729 5) (M79 609)   17  Lymphadenopathy (785 6) (R59 1)   18  MGUS (monoclonal gammopathy of unknown significance) (273 1) (D47 2)   19   Opioid type dependence, continuous use (304 01) (F11 20)   20  Other muscle spasm (728 85) (M62 838)   21  Pain syndrome, chronic (338 4) (G89 4)   22  Pain, upper back (724 5) (M54 9)   23  Right knee pain (719 46) (M25 561)   24  Spondylosis of cervical region without myelopathy or radiculopathy (721 0) (C71 396)    Past Medical History   1  History of Anxiety (300 00) (F41 9)   2  History of Arthritis (V13 4)   3  History of Asthma (493 90) (J45 909)   4  History of Depression (311) (F32 9)   5  History of Diabetes Mellitus (250 00)   6  History of endoscopy (V45 89) (Z98 890)   7  History of hyperlipidemia (V12 29) (Z86 39)   8  History of Malignant neoplasm without specification of site (199 1) (C80 1)    Surgical History   1  History of Gastric Bypass With Sarah-en-Y Short Limb    Family History   Mother    1  Family history of Hypertension (V17 49)  Father    2  Family history of Hypertension (V17 49)  Unknown    3  Family history of Hypertension (V17 49)  Family History    4  Family history of Diabetes Mellitus (V18 0)   5  Family history of Heart Disease (V17 49)   6  Family history of Hypertension (V17 49)   7  Family history of Stroke Syndrome (V17 1)   8  Family history of Thyroid Disorder (V18 19)    Social History    · Denied: History of Alcohol Use (History)   · Current Every Day Smoker (305 1)   · Tobacco use (305 1) (Z72 0)    Current Meds    1  Amitriptyline HCl - 10 MG Oral Tablet; Therapy: 41LLD9888 to Recorded   2  Arnuity Ellipta 200 MCG/ACT Inhalation Aerosol Powder Breath Activated; Therapy: (Recorded:25Oct2017) to Recorded   3  Baclofen 10 MG Oral Tablet; TAKE 1 TABLET BY MOUTH 3 TIMES A DAY; Therapy: 65KIF0265 to (Evaluate:09Ymd5058)  Requested for: 25Oct2017; Last     PK:83EAI6928 Ordered   4  Benadryl Allergy 25 MG Oral Tablet; Therapy: 28SKU0658 to Recorded   5  Hydrocodone-Acetaminophen  MG Oral Tablet; TAKE 1 TABLET EVERY 4 TO 6     HOURS AS NEEDED FOR PAIN; Last Rx:25Oct2017 Ordered   6   Levothyroxine Sodium 150 MCG Oral Tablet; Therapy: (Recorded:39Ctm2851) to Recorded   7  Losartan Potassium 25 MG Oral Tablet; Therapy: 85PSH7515 to Recorded   8  Lyrica 100 MG Oral Capsule; TAKE 1 CAPSULE 3 TIMES DAILY; Therapy: 97AGQ3322 to (Evaluate:77Fjp8487)  Requested for: 25Oct2017; Last     Rx:25Oct2017 Ordered   9  Morphine Sulfate ER 15 MG Oral Tablet Extended Release; Take 1 tablet every 12 hours; Therapy: 71Qyr0766 to (Evaluate:62Mix7865); Last Rx:25Oct2017 Ordered   10  OneTouch Ultra Blue In [employer]; Therapy: 64LPV2068 to (Evaluate:13Jan2015) Recorded   11  Pantoprazole Sodium 40 MG Oral Tablet Delayed Release; Therapy: 92RZG6053 to (Evaluate:52Qdt0640) Recorded   12  RaNITidine HCl - 150 MG Oral Tablet; Therapy: 90ZPM9237 to Recorded   13  Sucralfate 1 GM Oral Tablet; Therapy: 70DWN7131 to Recorded   14  TraZODone HCl - 50 MG Oral Tablet; TAKE 1 TABLET AT BEDTIME AS NEEDED FOR      SLEEP; Therapy: 81KFX2638 to (Evaluate:74Jtd4465) Recorded   15  Ventolin 0 5 % NEBU; Therapy: (Recorded:42Vrd3446) to Recorded    Allergies   1  Cephalosporins   2  fentanyl   3  Paxil TABS   4  Penicillins   5  Statins   6  Sulfur   7  Wellbutrin TABS    Vitals   Vital Signs    Recorded: 20Dec2017 08:58AM   Temperature 98 6 F   Heart Rate 50   Systolic 349   Diastolic 62   Height 5 ft 4 in   Weight 152 lb    BMI Calculated 26 09   BSA Calculated 1 74   Pain Scale 7     Physical Exam        Constitutional      General appearance: Well developed, well nourished, alert, in no distress, non-toxic and no overt pain behavior  Eyes      Sclera: anicteric      HEENT      Hearing grossly intact  Pulmonary      Respiratory effort: Even and unlabored  Psychiatric      Mood and affect: Mood and affect appropriate  Neurologic      Cranial nerves: Cranial nerves II-XII grossly intact         Musculoskeletal      Gait and station: Abnormal  -- using single point cane to assist with ambulation  Cervical Spine examination demonstrates Cervical Spine:      Tenderness: right trapezius muscle-- and-- left trapezius muscle  Flexion was not restricted-- and-- was painless  Extension was restricted-- and-- was painful  Left lateral flexion was not restricted-- and-- was painless  Right lateral flexion was not restricted-- and-- was painless  Rotation to the left was not restricted-- and-- was painless  Rotation to the right was not restricted-- and-- was painless       hand strength was normal bilaterally  wrist strength was normal bilaterally  elbow strength was normal bilaterally  shoulder strength was normal bilaterally  Lumbar/Sacral Spine examination demonstrates Lumbosacral Spine: Foot and ankle strength was normal bilaterally  Knee strength was normal bilaterally  Hip strength was normal bilaterally  Evaluation of Muscle Stretch Reflexes on the right side demonstrates 1/4 Knee Jerk Reflex  Evaluation of Muscle Stretch Reflexes on the left side demonstrates 1/4 Knee Jerk Reflex  Results/Data   Results Free Text Form [facility]:    Results        Other  Sara- last night  - last night 12/19/2017 this am 12/20/2017        Signatures    Electronically signed by : Kimberly Gruber; Dec 20 2017  9:19AM EST                       (Author)     Electronically signed by : Vu Valdes DO; Dec 20 2017 11:34AM EST

## 2018-01-10 NOTE — MISCELLANEOUS
Message   Recorded as Task   Date: 11/28/2016 01:03 PM, Created By: Dejuan Rashid   Task Name: Call Back   Assigned To: Cassandra Richardson   Regarding Patient: Loreta Chavez, Status: In Progress   Comment:    Dejuan Rashid - 28 Nov 2016 1:03 PM     TASK CREATED  Caller: Self; [phone] (Home)  Pt calling for results of ct scans? Cassandra Richardson - 28 Nov 2016 1:12 PM     TASK IN PROGRESS   Cassandra Richardson - 28 Nov 2016 2:06 PM     TASK EDITED  ct scan results reviewed by Dr Kvng No  ct head normal  ct od abd requires surveillance in 3 months   message left for patient to call back to discuss results        Active Problems    1  Acute pain of left foot (729 5) (M79 672)   2  Analgesic use (V58 69) (Z79 899)   3  Anemia (285 9) (D64 9)   4  Anxiety disorder (300 00) (F41 9)   5  Chronic cervical pain (723 1,338 29) (M54 2,G89 29)   6  Chronic cervical radiculopathy (723 4) (M54 12)   7  Chronic myofascial pain (729 1,338 29) (M79 1,G89 29)   8  Diabetic peripheral neuropathy (250 60,357 2) (E11 42)   9  Encounter for long-term opiate analgesic use (V58 69) (Z79 891)   10  Fibromyalgia (729 1) (M79 7)   11  GERD (gastroesophageal reflux disease) (530 81) (K21 9)   12  H/O colonoscopy (V45 89) (Z98 890)   13  Headache (784 0) (R51)   14  Limb pain (729 5) (M79 609)   15  Lymphadenopathy (785 6) (R59 1)   16  MGUS (monoclonal gammopathy of unknown significance) (273 1) (D47 2)   17  Opioid type dependence, continuous use (304 01) (F11 20)   18  Other muscle spasm (728 85) (M62 838)   19  Pain syndrome, chronic (338 4) (G89 4)   20  Pain, upper back (724 5) (M54 9)   21  Spondylosis of cervical region without myelopathy or radiculopathy (721 0) (M47 812)    Current Meds   1  ALPRAZolam 0 5 MG Oral Tablet; Therapy: 27DVP9187 to (Evaluate:63Uum9865) Recorded   2  Baclofen 10 MG Oral Tablet; take 2 po hs  Requested for: 13VRV6194; Last   Rx:02Nov2016 Ordered   3  [employer] Vit D 5000 High-Potency 5000 UNIT CAPS;    Therapy: (Ani Vee) to Recorded   4  [employer] Vitamin B-12 100 MCG TABS; Therapy: (Ani Vee) to Recorded   5  Hydrocodone-Acetaminophen  MG Oral Tablet; TAKE 1 TABLET EVERY 6   HOURS AS NEEDED FOR PAIN; Last Rx:02Nov2016 Ordered   6  Ipratropium-Albuterol 0 5-2 5 (3) MG/3ML Inhalation Solution; Therapy: 73FYP6961 to (Evaluate:09Jul2015) Recorded   7  Levothyroxine Sodium 150 MCG Oral Tablet; Therapy: (Recorded:59Auf4174) to Recorded   8  Lyrica 75 MG Oral Capsule; TAKE 1 CAPSULE IN THE MORNING AND 2 BEFORE BED; Therapy: 50ANL0375 to (Evaluate:01Jan2017)  Requested for: 42YVA6872; Last   Rx:02Nov2016 Ordered   9  Morphine Sulfate ER 15 MG Oral Tablet Extended Release; Take 1 tablet every 12 hours; Therapy: 98Mln1317 to (Evaluate:01Jan2017); Last Rx:02Nov2016 Ordered   10  OneTouch Ultra Blue In [employer]; Therapy: 14RTU4890 to (Evaluate:13Jan2015) Recorded   11  Pantoprazole Sodium 40 MG Oral Tablet Delayed Release; Therapy: 05VAC8692 to (Evaluate:57Rgl4390) Recorded    Allergies    1  Cephalosporins   2  fentanyl   3  Paxil TABS   4  Penicillins   5  Statins   6  Sulfur   7   Wellbutrin TABS    Signatures   Electronically signed by : Rebekah Brewer, ; Nov 28 2016  2:06PM EST                       (Author)

## 2018-01-10 NOTE — PROGRESS NOTES
Assessment    1  Pain syndrome, chronic (338 4) (G89 4)   2  Chronic cervical radiculopathy (723 4) (M54 12)   3  Chronic cervical pain (723 1,338 29) (M54 2,G89 29)   4  Chronic myofascial pain (729 1,338 29) (M79 1,G89 29)   5  Diabetic peripheral neuropathy (250 60,357 2) (E11 42)   6  Fibromyalgia (729 1) (M79 7)   7  Spondylosis of cervical region without myelopathy or radiculopathy (721 0) (M47 812)    Plan   Fibromyalgia    · Lyrica 100 MG Oral Capsule; TAKE 1 CAPSULE 3 TIMES DAILY   Rx By: Kylah Singleton; Dispense: 30 Days ; #:90 Capsule; Refill: 1; For: Fibromyalgia; LEXI = N; Print Rx  Other muscle spasm    · Baclofen 10 MG Oral Tablet; TAKE 1 TABLET 3 TIMES DAILY   Rx By: Kylah Singleton; Dispense: 30 Days ; #:90 Tablet; Refill: 1; For: Other muscle spasm; LEXI = N; Verified Transmission to [id number]; Last Updated By: System, [employer]; 7/5/2017 9:56:41 AM  Pain syndrome, chronic    · Hydrocodone-Acetaminophen  MG Oral Tablet; TAKE 1 TABLET EVERY 4  TO 6 HOURS AS NEEDED FOR PAIN; Do Not Fill Before: 03Aug2017   Rx By: Kylah Singleton; Dispense: 30 Days ; #:130 Tablet; Refill: 0; For: Pain syndrome, chronic; LEXI = N; Print Rx   · Morphine Sulfate ER 15 MG Oral Tablet Extended Release; Take 1 tablet every  12 hours; Do Not Fill Before: 03Aug2017   Rx By: Kylah Singleton; Dispense: 30 Days ; #:60 Tablet Extended Release; Refill: 0; For: Pain syndrome, chronic; LEXI = N; Print Rx    Follow-up visit in 2 months Evaluation and Treatment  Follow-up with Ao for med refills  Status: Hold For - Scheduling  Requested for: 92XPC9192  Ordered; For: Pain syndrome, chronic;  Ordered By: Kylah Singleton  Performed:   Due: 31UAU1568     Discussion/Summary    The patient presents today for a follow-up office visit  The patient is currently being treated for cervicalgia, cervical spondylosis, chronic cervical radiculopathy, myofascial pain syndrome, diabetic peripheral neuropathy and fibromyalgia   The patient has been taking baclofen 10 mg 3 tablets daily, hydrocodone 10 mg she takes 4-5 tablets daily depending on her pain level, she also takes Lyrica 100 mg 3 tablets daily, and morphine extended release 15 mg 1 tablet twice per day  She states that the medications give her 50% relief of her pain symptoms and no side effects or issues  At this time, I feel it is reasonable to continue her hydrocodone and her morphine for her today as her pain symptoms are stable with the medications  She was given a two-month supply of each medication with one month having a do not fill date of August 3, 2017  We will continue baclofen, and lyrica  Both were refilled today  Karissa Carson was reviewed today and was appropriate    Patient will follow up in 2 months for medication refills  Patient is able to Self-Care  The treatment plan was reviewed with the patient/guardian  The patient/guardian understands and agrees with the treatment plan     There are risks associated with opiod medications, including dependence, addiction and tolerance  The patient understands and agrees to use these medications only as prescribed  Potential side effects of the medications include, but are not limited to, constipation, drowsiness, addiction, impaired judgment and risk of fatal overdose if not taken as prescribed  Sharing medications is a felony  At this point and time, the patient is showing no signs of addiction, abuse, diversion or suicidal ideation  Chief Complaint    1  Neck Pain  neck and right arm pain  right knee and left hip pain      History of Present Illness  The patient presents today for a follow-up office visit  The patient is currently being treated for cervicalgia, cervical spondylosis, chronic cervical radiculopathy, myofascial pain syndrome, diabetic peripheral neuropathy and fibromyalgia   The patient has been taking baclofen 10 mg 3 tablets daily, hydrocodone 10 mg she takes 4-5 tablets daily depending on her pain level, she also takes Lyrica 100 mg 3 tablets daily, and morphine extended release 15 mg 1 tablet twice per day  She states that the medications give her 50% relief of her pain symptoms and no side effects or issues  Currently the patient rates her pain 8/10, this is constant in nature and described as burning, sharp, throbbing, cramping, pressure like, shooting, numbness, and pins and needles  She localizes her pain to her neck and her right shoulder  She also has pain in her knees and her feet bilaterally    The patient tells me that she had a throat infection and she was placed on levothyroxine, and clindamycin which both are finished at this time  I have personally reviewed and/or updated the patient's past medical history, past surgical history, family history, social history, current medications, allergies, and vital signs today  Nan Paul presents with complaints of gradual onset of constant episodes of severe left posterior and left lateral neck pain, described as sharp, burning and throbbing, radiating to the bilateral trapezius, right shoulder, right arm and right hand  On a scale of 1 to 10, the patient rates the pain as 8  Symptoms are unchanged  Review of Systems    Constitutional: no fever, no recent weight gain and no recent weight loss  Eyes: no double vision and no blurry vision  Cardiovascular: no chest pain, no palpitations and no lower extremity edema  Respiratory: no complaints of shortness of breath and no wheezing  Musculoskeletal: difficulty walking, joint stiffness, joint swelling rightv knee fingers and ankles, limb swelling right thigh and pain in extremity fingers and feetand left hip, but no muscle weakness and no decreased range of motion  Neurological: dizziness, but no difficulty swallowing, no memory loss, no loss of consciousness and no seizures  Gastrointestinal: nausea and vomiting, but no constipation and no diarrhea     Genitourinary: no difficulty initiating urine stream, no genital pain and no frequent urination  Integumentary: no complaints of skin rash  Psychiatric: no depression  Endocrine: no excessive thirst, no adrenal disease, no hypothyroidism and no hyperthyroidism  Hematologic/Lymphatic: no tendency for easy bruising and no tendency for easy bleeding  Active Problems    1  Acute pain of left foot (729 5) (M79 672)   2  Analgesic use (V58 69) (Z79 899)   3  Anemia (285 9) (D64 9)   4  Anxiety disorder (300 00) (F41 9)   5  Chronic cervical pain (723 1,338 29) (M54 2,G89 29)   6  Chronic cervical radiculopathy (723 4) (M54 12)   7  Chronic myofascial pain (729 1,338 29) (M79 1,G89 29)   8  Diabetic peripheral neuropathy (250 60,357 2) (E11 42)   9  Encounter for long-term opiate analgesic use (V58 69) (Z79 891)   10  Fibromyalgia (729 1) (M79 7)   11  GERD (gastroesophageal reflux disease) (530 81) (K21 9)   12  H/O colonoscopy (V45 89) (Z98 890)   13  Headache (784 0) (R51)   14  Iron deficiency anemia (280 9) (D50 9)   15  Left hip pain (719 45) (M25 552)   16  Limb pain (729 5) (M79 609)   17  Lymphadenopathy (785 6) (R59 1)   18  MGUS (monoclonal gammopathy of unknown significance) (273 1) (D47 2)   19  Opioid type dependence, continuous use (304 01) (F11 20)   20  Other muscle spasm (728 85) (M62 838)   21  Pain syndrome, chronic (338 4) (G89 4)   22  Pain, upper back (724 5) (M54 9)   23  Right knee pain (719 46) (M25 561)   24  Spondylosis of cervical region without myelopathy or radiculopathy (721 0) (D41 532)    Past Medical History    1  History of Anxiety (300 00) (F41 9)   2  History of Arthritis (V13 4)   3  History of Asthma (493 90) (J45 909)   4  History of Depression (311) (F32 9)   5  History of Diabetes Mellitus (250 00)   6  History of endoscopy (V45 89) (Z98 890)   7  History of hyperlipidemia (V12 29) (Z86 39)   8   History of Malignant neoplasm without specification of site (199 1) (C80 1)    Surgical History    1  History of Gastric Bypass With Sarah-en-Y Short Limb    Family History  Mother    1  Family history of Hypertension (V17 49)  Father    2  Family history of Hypertension (V17 49)  Unknown    3  Family history of Hypertension (V17 49)  Family History    4  Family history of Diabetes Mellitus (V18 0)   5  Family history of Heart Disease (V17 49)   6  Family history of Hypertension (V17 49)   7  Family history of Stroke Syndrome (V17 1)   8  Family history of Thyroid Disorder (V18 19)    Social History    · Denied: History of Alcohol Use (History)   · Current Every Day Smoker (305 1)   · Tobacco use (305 1) (Z72 0)    Current Meds   1  Amitriptyline HCl - 10 MG Oral Tablet; Therapy: 58LZW9266 to Recorded   2  Baclofen 10 MG Oral Tablet; TAKE 1 TABLET 3 TIMES DAILY  Requested for: 81BJL1095;   Last Rx:02Cmb0053 Ordered   3  Benadryl Allergy 25 MG Oral Tablet; Therapy: 45JJG1189 to Recorded   4  Hydrocodone-Acetaminophen  MG Oral Tablet; TAKE 1 TABLET EVERY 4 TO 6   HOURS AS NEEDED FOR PAIN; Last Rx:69Lii9120 Ordered   5  Levothyroxine Sodium 150 MCG Oral Tablet; Therapy: (Recorded:92Haw7732) to Recorded   6  Losartan Potassium 25 MG Oral Tablet; Therapy: 20IGV8316 to Recorded   7  Lyrica 100 MG Oral Capsule; TAKE 1 CAPSULE 3 TIMES DAILY; Therapy: 88QTZ8799 to (Evaluate:95Gow0610)  Requested for: 99UAN2244; Last   Rx:12Tuz3630 Ordered   8  Morphine Sulfate ER 15 MG Oral Tablet Extended Release; Take 1 tablet every 12 hours; Therapy: 51Cmd0776 to (Evaluate:96Thi5411); Last Rx:74Ibj8433 Ordered   9  OneTouch Ultra Blue In [employer]; Therapy: 59UOL3829 to (Evaluate:13Jan2015) Recorded   10  Pantoprazole Sodium 40 MG Oral Tablet Delayed Release; Therapy: 96STU3438 to (Evaluate:03Xcg8034) Recorded   11  RaNITidine HCl - 150 MG Oral Tablet; Therapy: 78YEN4728 to Recorded   12  Sucralfate 1 GM Oral Tablet;     Therapy: 02PHP0072 to Recorded   13  TraZODone HCl - 50 MG Oral Tablet; TAKE 1 TABLET AT BEDTIME AS NEEDED FOR    SLEEP; Therapy: 72WTZ2153 to (Evaluate:64Vlu1988) Recorded    Allergies    1  Cephalosporins   2  fentanyl   3  Paxil TABS   4  Penicillins   5  Statins   6  Sulfur   7  Wellbutrin TABS    Vitals  Vital Signs    Recorded: 72ZQU2050 09:33AM   Heart Rate 64   Respiration 12   Systolic 703   Diastolic 62   Height 5 ft 4 in   Weight 151 lb    BMI Calculated 25 92   BSA Calculated 1 74   Pain Scale 8     Physical Exam    Constitutional   General appearance: Well developed, well nourished, alert, in no distress, non-toxic and no overt pain behavior  Eyes   Sclera: anicteric   HEENT   Hearing grossly intact  Pulmonary   Respiratory effort: Even and unlabored  Psychiatric   Mood and affect: Mood and affect appropriate  Neurologic   Cranial nerves: Cranial nerves II-XII grossly intact  Musculoskeletal   Gait and station: Abnormal   using single point cane to assist with ambulation  Cervical Spine examination demonstrates Cervical Spine:   Tenderness: right trapezius muscle and left trapezius muscle  Flexion was not restricted and was painless  Extension was restricted and was painful  Left lateral flexion was not restricted and was painless  Right lateral flexion was not restricted and was painless  Rotation to the left was not restricted and was painless  Rotation to the right was not restricted and was painless    hand strength was normal bilaterally  wrist strength was normal bilaterally  elbow strength was normal bilaterally  shoulder strength was normal bilaterally  Lumbar/Sacral Spine examination demonstrates Lumbosacral Spine: Foot and ankle strength was normal bilaterally  Knee strength was normal bilaterally  Hip strength was normal bilaterally  Evaluation of Muscle Stretch Reflexes on the right side demonstrates 1/4 Knee Jerk Reflex  Evaluation of Muscle Stretch Reflexes on the left side demonstrates 1/4 Knee Jerk Reflex  Results/Data  Results Free Text Form [facility]:   Results     Other  hydro codone last taken 7/04 PM   morphine last taken am today, 7/05        Future Appointments    Date/Time Provider Specialty Site   10/17/2017 10:15 AM JASMIN Lake , DO, [facility]   08/30/2017 08:30 AM CARTER [facility] [place] [id number]  Electronically signed by : Santos Cockayne, [address] ; Jul 5 2017  9:59AM EST                       (Author)

## 2018-01-10 NOTE — MISCELLANEOUS
Message   Recorded as Task   Date: 03/21/2017 08:59 AM, Created By: Mary Rodriguez   Task Name: Call Back   Assigned To: [id number] [address] clinical,Team   Regarding Patient: Amy Ayala, Status: Active   Carlo Nunez - 21 Mar 2017 8:59 AM     TASK CREATED  Please call Jewel Fennel from ([address] [place] [address]) [phone]  Stated she needs clarification on script that was written 02/22/17  Charlee Ramos - 21 Mar 2017 9:37 AM     TASK REPLIED TO: Previously Assigned To Esperanza Gómez  ****MICHAEL****    S/w Jewel Fennel at [facility] [employer]  Jewel Fennel states that the pt's Hydrocodone-Acetaminophen  mg written on 2/22/17 has a disp # of 130 tablets  However, the instructions state to take 1 tab Q6H PRN for pain  Jewel Fennel states that the pt  told her that AO knows that pt  sometimes takes up to 5 tabs daily depending on how severe her pain is, so she writes for a few extra pills  Did confirm w/ Jewel Fennel that it does states in AO's 2/22 OV note that the pt  sometimes takes up to 5 tabs daily depending on pain level  Sayra's concern is that since the instructions would cover up to 4 tabs in a 24 hour period, insurance may only cover 120 tabs  Jewel Fennel questioning if the instructions should be adjusted  Jewel Fennel was advised that pt  has been given the same rx instructions and disp # since November  Jewabebe Fenkiya confirmed that it does appear that pt  has been receiving the full 130 tablets, and states that at the last fill, the pharmacist made the rx for 32 days instead of 30  Jewel Fennel asked if it would be okay to adjust the number of days to 32 days so that pt  can get the full 130 tablets  Jewel Fennel was advised that should be fine, as long as pt  receives her 130 tablets   Jewel Fennel appreciative     ****   Esperanza Gómez - 21 Mar 2017 9:59 AM     TASK REASSIGNED: Previously Assigned To James Diaz - 21 Mar 2017 10:57 AM     TASK REASSIGNED: Previously Assigned To Dennis Chow - 21 Mar 2017 11:16 AM     TASK EDITED  Call received on WE triage line  Dominguez Moreau from [facility] stated that if they put in for 32 days so that the pt can receive the 130 tablets that the fill date per insurance can not be until 3/26  Dl Fair that I would try and contact the patient to see how many tablets she has left and go on from there  Attempted to reach out to pt on cell and home phone, c/b number and office hours provided  Pura Cueto - 22 Mar 2017 8:30 AM     TASK EDITED  Vahe received on WE triage line  Pt stated that she does have some hydrocodone-acetaminophen 10-325mg left, but will not have enough for a fill date of 3/26  Advised pt that I would speak with AO and  c/b with her recommendations and suggestions  ***May I contact the pharmacy at [facility] and change the instructions to one pill every 4-6 hours PRN for pain with a max of 5 pills per day? ***    **please advise thank you***   Pura Cueto - 22 Mar 2017 8:56 AM     TASK REASSIGNED: Previously Assigned To Zainab Yanes - 22 Mar 2017 9:09 AM     TASK EDITED  ***FYI***    S/w AO regarding above  Ok to change instructions to read as above  S/w Dominguez Moreau pharmacist at [facility] relayed new instructions for hydrocodone 10/325 one tab q4-6 hours prn pain with max of 5 pills per day  Dominguez Moreau stated that she will change the instructions and that she would inform the insurance company and that there should not be a problem with the patient getting the script filled before 3/26  Per Dominguez Moreau she would call us back if there is an issue  Jessica Soto - 22 Mar 2017 9:30 AM     TASK REPLIED TO: Previously Assigned To Colton Maurer  aware and agree ty        Active Problems    1  Acute pain of left foot (729 5) (M79 672)   2  Analgesic use (V58 69) (Z79 899)   3  Anemia (285 9) (D64 9)   4  Anxiety disorder (300 00) (F41 9)   5  Chronic cervical pain (723 1,338 29) (M54 2,G89 29)   6  Chronic cervical radiculopathy (723 4) (M54 12)   7   Chronic myofascial pain (729 1,338 29) (M79 1,G89 29)   8  Diabetic peripheral neuropathy (250 60,357 2) (E11 42)   9  Encounter for long-term opiate analgesic use (V58 69) (Z79 891)   10  Fibromyalgia (729 1) (M79 7)   11  GERD (gastroesophageal reflux disease) (530 81) (K21 9)   12  H/O colonoscopy (V45 89) (Z98 890)   13  Headache (784 0) (R51)   14  Left hip pain (719 45) (M25 552)   15  Limb pain (729 5) (M79 609)   16  Lymphadenopathy (785 6) (R59 1)   17  MGUS (monoclonal gammopathy of unknown significance) (273 1) (D47 2)   18  Opioid type dependence, continuous use (304 01) (F11 20)   19  Other muscle spasm (728 85) (M62 838)   20  Pain syndrome, chronic (338 4) (G89 4)   21  Pain, upper back (724 5) (M54 9)   22  Right knee pain (719 46) (M25 561)   23  Spondylosis of cervical region without myelopathy or radiculopathy (721 0) (M47 812)    Current Meds   1  Baclofen 10 MG Oral Tablet; TAKE 1 TABLET 3 TIMES DAILY  Requested for:   60Ecy8001; Last Rx:03Igx5769 Ordered   2  Hydrocodone-Acetaminophen  MG Oral Tablet; TAKE 1 TABLET EVERY 6   HOURS AS NEEDED FOR PAIN; Last Rx:20Qyc0512 Ordered   3  Levothyroxine Sodium 150 MCG Oral Tablet; Therapy: (Recorded:58Osd7349) to Recorded   4  Lyrica 100 MG Oral Capsule; TAKE 1 CAPSULE 3 TIMES DAILY; Therapy: 55BKD7302 to (Evaluate:12Dzi2113)  Requested for: 06Svi0476; Last   Rx:95Ffm6814 Ordered   5  Morphine Sulfate ER 15 MG Oral Tablet Extended Release; Take 1 tablet every 12 hours; Therapy: 48Gug5721 to (Evaluate:21Zje7837); Last Rx:73Nja6475 Ordered   6  OneTouch Ultra Blue In [employer]; Therapy: 19ZVT7445 to (Evaluate:13Jan2015) Recorded   7  Pantoprazole Sodium 40 MG Oral Tablet Delayed Release; Therapy: 06JHW6043 to (Evaluate:49Xab1570) Recorded    Allergies    1  Cephalosporins   2  fentanyl   3  Paxil TABS   4  Penicillins   5  Statins   6  Sulfur   7   Wellbutrin TABS    Signatures   Electronically signed by : Kingsley Lopez RN; Mar 22 2017  9:40AM EST (Author)

## 2018-01-11 NOTE — MISCELLANEOUS
Message   Recorded as Task   Date: 01/04/2017 10:20 AM, Created By: Leandra Narvaez   Task Name: Med Renewal Request   Assigned To: [id number] [address] clinical,Team   Regarding Patient: Lane Glass, Status: In Progress   Comment:    Pura Cueto - 04 Jan 2017 10:20 AM     TASK CREATED  Caller: Self; Renew Medication; [phone] (Home); [phone] (Work)  Call received on WE triage line  Pt stated at her last appt  on 12/30 with Gina Flores she was given scripts for her pain medication  Per pt 260Josefa Flores was going to send a rx for [facility]en [facility] [place], but when she went to pick it up it was not there  Advised pt that I would send this information to Gina Flores and the pharmacy should call her for  when ready  Advised pt that I would call back if there was an issue  Pleas advise than you  Pura Cueto - 04 Jan 2017 11:42 AM     TASK EDITED   Pura Cueto - 04 Jan 2017 11:47 AM     TASK EDITED  Vmmlom on WE triage line at [phone] on WE triage line  Pt stated that she called earlier about her baclofen rx to [facility] [employer]  Pt stated that she is calling now because she realized that she also wasn't given a script for her lyrica  Pt stated that she is usually handed this script, requesting that this also be [facility]  Please advise thank you  Belen Valentin - 04 Jan 2017 12:17 PM     TASK REPLIED TO: Previously Assigned To Belen Valentin                      yes, please call in refill of Lyrica to her pharmacy for same script and enough refills to last until her next appointment    thank you   Pura Cueto - 04 Jan 2017 4:01 PM     TASK EDITED  Called refill of lyrica 75mg tabs take one tab in AM and two before bed 30 days, 90 caps with [facility]  Pt f/u appt is on 2/22 with AO  Attempted to reach pt regarding same, lmom for c/b, ph # provided     Pura Cueto - 05 Jan 2017 1:41 PM     TASK EDITED  vmmlom at [facility] triage line at 1:34  Pt stated calling back from message she received on1/4  Attempted to call back on # provided as [phone] lvmm to call back  Pura Cueto - 05 Jan 2017 1:48 PM     TASK EDITED  [facility] who stated that they did receive the refill request called in on 1/4, however the pt pickled up her last rx on 12/23 so she can't pick this lyrica dose up until 1/19/17  Will inform pt of same  Pura Cueto - 06 Jan 2017 2:10 PM     TASK EDITED    ***FYI ***    Call received on WE triage line  Pt stated that she was calling back  Pt stated that she definately has enough lyrica left until she is supposed to  next script  Pt stated that she just usually leaves our office with a script for lyrica and was "nervous" when she didn't have one  Informed pt that she does have a script for lyrica with one refill available for her at her pharmacy and that will take her into March  Pt stated that her pharmacy also called to inform her of same  Pt aware that her f/u appt with AO is on 2/22 at [address] [place] [address] - 10 Robert 2017 12:59 PM     TASK REPLIED TO: Previously Assigned To Jessica Soto  aware [place]        Active Problems    1  Acute pain of left foot (729 5) (M79 672)   2  Analgesic use (V58 69) (Z79 899)   3  Anemia (285 9) (D64 9)   4  Anxiety disorder (300 00) (F41 9)   5  Chronic cervical pain (723 1,338 29) (M54 2,G89 29)   6  Chronic cervical radiculopathy (723 4) (M54 12)   7  Chronic myofascial pain (729 1,338 29) (M79 1,G89 29)   8  Diabetic peripheral neuropathy (250 60,357 2) (E11 42)   9  Encounter for long-term opiate analgesic use (V58 69) (Z79 891)   10  Fibromyalgia (729 1) (M79 7)   11  GERD (gastroesophageal reflux disease) (530 81) (K21 9)   12  H/O colonoscopy (V45 89) (Z98 890)   13  Headache (784 0) (R51)   14  Limb pain (729 5) (M79 609)   15  Lymphadenopathy (785 6) (R59 1)   16   MGUS (monoclonal gammopathy of unknown significance) (273 1) (D47 2)   17  Opioid type dependence, continuous use (304 01) (F11 20)   18  Other muscle spasm (728 85) (M62 838)   19  Pain syndrome, chronic (338 4) (G89 4)   20  Pain, upper back (724 5) (M54 9)   21  Spondylosis of cervical region without myelopathy or radiculopathy (721 0) (M47 812)    Current Meds   1  ALPRAZolam 0 5 MG Oral Tablet; Therapy: 29ABF5188 to (Evaluate:96Xuq1036) Recorded   2  Baclofen 10 MG Oral Tablet; take 2 po hs  Requested for: 65FIB0477; Last   RE:06IJE5648 Ordered   3  [facility] Vit D 5000 High-Potency 5000 UNIT CAPS; Therapy: (Harjinder Quesada) to Recorded   4  [facility] Vitamin B-12 100 MCG TABS; Therapy: (Harjinder Quesada) to Recorded   5  Hydrocodone-Acetaminophen  MG Oral Tablet; TAKE 1 TABLET EVERY 6   HOURS AS NEEDED FOR PAIN; Last Rx:46Obf9183 Ordered   6  Ipratropium-Albuterol 0 5-2 5 (3) MG/3ML Inhalation Solution; Therapy: 51PCM4556 to (Evaluate:63Jeq6968) Recorded   7  Levothyroxine Sodium 150 MCG Oral Tablet; Therapy: (Recorded:92Zzm8697) to Recorded   8  Lyrica 75 MG Oral Capsule; TAKE 1 CAPSULE IN THE MORNING AND 2 BEFORE BED; Therapy: 77NLY0027 to (Aleisha Jaquez)  Requested for: 71QFS4943 Recorded   9  Morphine Sulfate ER 15 MG Oral Tablet Extended Release; Take 1 tablet every 12 hours; Therapy: 55Rsc3984 to (Evaluate:31Yrb6663); Last Rx:86Bzx7997 Ordered   10  OneTouch Ultra Blue In [employer]; Therapy: 30INH5150 to (Evaluate:51Tve4723) Recorded   11  Pantoprazole Sodium 40 MG Oral Tablet Delayed Release; Therapy: 80BVC1122 to (Evaluate:94Dmp3550) Recorded    Allergies    1  Cephalosporins   2  fentanyl   3  Paxil TABS   4  Penicillins   5  Statins   6  Sulfur   7   Wellbutrin TABS    Signatures   Electronically signed by : Mario Matthews RN; Jan 11 2017 11:39AM EST                       (Author)

## 2018-01-11 NOTE — MISCELLANEOUS
Message   Recorded as Task   Date: 12/21/2016 01:26 PM, Created By: Wendy Hansen   Task Name: Med Renewal Request   Assigned To: [id number] [address] clinical,Team   Regarding Patient: Doe Luque, Status: In Progress   Comment:    Pura Cueto - 21 Dec 2016 1:26 PM     TASK CREATED  Caller: [facility] pharmacy, Care Coordinator; Renew Medication  VMMLOM on WE triage line from [facility] pharmacy requesting a refill for pt for baclofen 10mg 2 tabs at HS  Pt of AO  S/w pharmacy rep  from [facility] informed them that we do not accept refill requests from pharmacies and the pt must call to request the refill for themselves  Pharmacy rep verbalized understanding fo same and stated would call pt to inform them  Pura Cueto - 22 Dec 2016 1:08 PM     TASK IN PROGRESS        Active Problems    1  Acute pain of left foot (729 5) (M79 672)   2  Analgesic use (V58 69) (Z79 899)   3  Anemia (285 9) (D64 9)   4  Anxiety disorder (300 00) (F41 9)   5  Chronic cervical pain (723 1,338 29) (M54 2,G89 29)   6  Chronic cervical radiculopathy (723 4) (M54 12)   7  Chronic myofascial pain (729 1,338 29) (M79 1,G89 29)   8  Diabetic peripheral neuropathy (250 60,357 2) (E11 42)   9  Encounter for long-term opiate analgesic use (V58 69) (Z79 891)   10  Fibromyalgia (729 1) (M79 7)   11  GERD (gastroesophageal reflux disease) (530 81) (K21 9)   12  H/O colonoscopy (V45 89) (Z98 890)   13  Headache (784 0) (R51)   14  Limb pain (729 5) (M79 609)   15  Lymphadenopathy (785 6) (R59 1)   16  MGUS (monoclonal gammopathy of unknown significance) (273 1) (D47 2)   17  Opioid type dependence, continuous use (304 01) (F11 20)   18  Other muscle spasm (728 85) (M62 838)   19  Pain syndrome, chronic (338 4) (G89 4)   20  Pain, upper back (724 5) (M54 9)   21  Spondylosis of cervical region without myelopathy or radiculopathy (721 0) (M47 812)    Current Meds   1  ALPRAZolam 0 5 MG Oral Tablet; Therapy: 51TLF0869 to (Evaluate:89Xlb9973) Recorded   2   Baclofen 10 MG Oral Tablet; take 2 po hs  Requested for: 79FPJ2125; Last   Rx:02Nov2016 Ordered   3  [facility] Vit D 5000 High-Potency 5000 UNIT CAPS; Therapy: ([place]) to Recorded   4  [facility] Vitamin B-12 100 MCG TABS; Therapy: ([place]) to Recorded   5  Hydrocodone-Acetaminophen  MG Oral Tablet; TAKE 1 TABLET EVERY 6   HOURS AS NEEDED FOR PAIN; Last Rx:02Nov2016 Ordered   6  Ipratropium-Albuterol 0 5-2 5 (3) MG/3ML Inhalation Solution; Therapy: 38XGM8371 to (Evaluate:09Jul2015) Recorded   7  Levothyroxine Sodium 150 MCG Oral Tablet; Therapy: (Recorded:47Afo0817) to Recorded   8  Lyrica 75 MG Oral Capsule; TAKE 1 CAPSULE IN THE MORNING AND 2 BEFORE BED; Therapy: 48OGE4870 to (Evaluate:01Jan2017)  Requested for: 72BEF2548; Last   Rx:02Nov2016 Ordered   9  Morphine Sulfate ER 15 MG Oral Tablet Extended Release; Take 1 tablet every 12 hours; Therapy: 77Dhs5428 to (Evaluate:01Jan2017); Last Rx:02Nov2016 Ordered   10  OneTouch Ultra Blue In [employer]; Therapy: 66WOH4537 to (Evaluate:13Jan2015) Recorded   11  Pantoprazole Sodium 40 MG Oral Tablet Delayed Release; Therapy: 04GVB6393 to (Evaluate:19Jul2015) Recorded    Allergies    1  Cephalosporins   2  fentanyl   3  Paxil TABS   4  Penicillins   5  Statins   6  Sulfur   7   Wellbutrin TABS    Signatures   Electronically signed by : Cris Cueto RN; Dec 23 2016  3:32PM EST                       (Author)

## 2018-01-12 VITALS
DIASTOLIC BLOOD PRESSURE: 50 MMHG | HEIGHT: 64 IN | WEIGHT: 152 LBS | HEART RATE: 50 BPM | SYSTOLIC BLOOD PRESSURE: 110 MMHG | BODY MASS INDEX: 25.95 KG/M2 | RESPIRATION RATE: 12 BRPM

## 2018-01-12 VITALS — SYSTOLIC BLOOD PRESSURE: 122 MMHG | TEMPERATURE: 98.3 F | WEIGHT: 156.5 LBS | DIASTOLIC BLOOD PRESSURE: 60 MMHG

## 2018-01-13 VITALS
SYSTOLIC BLOOD PRESSURE: 118 MMHG | BODY MASS INDEX: 26.29 KG/M2 | WEIGHT: 154 LBS | DIASTOLIC BLOOD PRESSURE: 58 MMHG | RESPIRATION RATE: 12 BRPM | HEART RATE: 52 BPM | HEIGHT: 64 IN

## 2018-01-13 VITALS
HEIGHT: 64 IN | SYSTOLIC BLOOD PRESSURE: 104 MMHG | HEART RATE: 63 BPM | RESPIRATION RATE: 14 BRPM | BODY MASS INDEX: 24.92 KG/M2 | OXYGEN SATURATION: 94 % | DIASTOLIC BLOOD PRESSURE: 58 MMHG | TEMPERATURE: 96.9 F | WEIGHT: 146 LBS

## 2018-01-13 VITALS
RESPIRATION RATE: 12 BRPM | WEIGHT: 151 LBS | BODY MASS INDEX: 25.78 KG/M2 | SYSTOLIC BLOOD PRESSURE: 108 MMHG | HEART RATE: 64 BPM | HEIGHT: 64 IN | DIASTOLIC BLOOD PRESSURE: 62 MMHG

## 2018-01-14 VITALS
RESPIRATION RATE: 12 BRPM | DIASTOLIC BLOOD PRESSURE: 50 MMHG | SYSTOLIC BLOOD PRESSURE: 102 MMHG | HEART RATE: 48 BPM | HEIGHT: 64 IN | WEIGHT: 150 LBS | BODY MASS INDEX: 25.61 KG/M2

## 2018-01-14 VITALS
WEIGHT: 144 LBS | BODY MASS INDEX: 23.99 KG/M2 | SYSTOLIC BLOOD PRESSURE: 108 MMHG | HEART RATE: 56 BPM | HEIGHT: 65 IN | DIASTOLIC BLOOD PRESSURE: 42 MMHG

## 2018-01-15 NOTE — MISCELLANEOUS
Message   Recorded as Task   Date: 2017 09:40 AM, Created By: Arley Dillon   Task Name: Miscellaneous   Assigned To: [id number] [address] end clinical,Team   Regarding Patient: Anne Marie Nova, Status: Active   Comment:    Arley Dillon - 2017 9:40 AM     TASK CREATED  phone call from Elijah No with [facility] pharmacy questioning if she is able to fill her medication a day early since they are closed on   hydrocodone and morphine have a DNF date of 17  Please call Elijah No at [phone]  Irene Murillo - 2017 10:42 AM     TASK REASSIGNED: Previously Assigned To SPA Pierce Comber - 2017 10:47 AM     TASK EDITED  **WE pt **    Pt 's previous Vicodin and Morphine rx wrote on 10/25/17  Please advise  Masood Amaya - 2017 12:08 PM     TASK REPLIED TO: Previously Assigned To Ashley Oakland to be filled on  due to pharmacy closure  Please make sure Dr Jb Chance is aware of this after you speak to the pharmacy  Hannah Medina - 2017 1:42 PM     TASK EDITED  S/w Elijah No from [facility] in [employer] to change pt's Vicodin and Morphine DNF date to 17  Hannah Medina - 2017 1:45 PM     TASK EDITED  [facility] on home # listed to c/b, c/b# given  Oliva Keyes - 2017 3:30 PM     TASK EDITED  "Given to:             [address] [place] [address]    Reason: ROUTINE/OFFICE   Pt's Dr: Jocelyn Cm       For: OFFICE     2nd Call: NO        From: Norma Tolentino     Phone: [phone]   Ext:     Pt Name: Norma Tolentino    Pt : 1950     Message: Nikia Martinez - MAYBE [address]? "   Hannah Medina - 2017 3:34 PM     TASK EDITED  Informed pt  that her DNF was moved to  [facility]t [facility] being closed on  for the holiday  Pt  verbalzied undserstanding and appreciated the call  Active Problems    1  Acute pain of left foot (729 5) (M79 672)   2  Analgesic use (V58 69) (Z79 899)   3  Anemia (285 9) (D64 9)   4   Anxiety disorder (300 00) (F41 9)   5  Chronic cervical pain (723 1,338 29) (M54 2,G89 29)   6  Chronic cervical radiculopathy (723 4) (M54 12)   7  Chronic myofascial pain (729 1,338 29) (M79 1,G89 29)   8  Diabetic peripheral neuropathy (250 60,357 2) (E11 42)   9  Encounter for long-term opiate analgesic use (V58 69) (Z79 891)   10  Fibromyalgia (729 1) (M79 7)   11  GERD (gastroesophageal reflux disease) (530 81) (K21 9)   12  H/O colonoscopy (V45 89) (Z98 890)   13  Headache (784 0) (R51)   14  Iron deficiency anemia (280 9) (D50 9)   15  Left hip pain (719 45) (M25 552)   16  Limb pain (729 5) (M79 609)   17  Lymphadenopathy (785 6) (R59 1)   18  MGUS (monoclonal gammopathy of unknown significance) (273 1) (D47 2)   19  Opioid type dependence, continuous use (304 01) (F11 20)   20  Other muscle spasm (728 85) (M62 838)   21  Pain syndrome, chronic (338 4) (G89 4)   22  Pain, upper back (724 5) (M54 9)   23  Right knee pain (719 46) (M25 561)   24  Spondylosis of cervical region without myelopathy or radiculopathy (721 0) (M47 812)    Current Meds   1  Amitriptyline HCl - 10 MG Oral Tablet; Therapy: 21PXT1984 to Recorded   2  Arnuity Ellipta 200 MCG/ACT Inhalation Aerosol Powder Breath Activated; Therapy: (Recorded:2017) to Recorded   3  Baclofen 10 MG Oral Tablet; TAKE 1 TABLET BY MOUTH 3 TIMES A DAY; Therapy: 01UKA4815 to (Evaluate:77Yxt8577)  Requested for: 2017; Last   Y88AXS0641 Ordered   4  Benadryl Allergy 25 MG Oral Tablet; Therapy: 21YSG4891 to Recorded   5  Hydrocodone-Acetaminophen  MG Oral Tablet; TAKE 1 TABLET EVERY 4 TO 6   HOURS AS NEEDED FOR PAIN; Last Rx:2017 Ordered   6  Levothyroxine Sodium 150 MCG Oral Tablet; Therapy: (Recorded:92Ood7880) to Recorded   7  Losartan Potassium 25 MG Oral Tablet; Therapy: 04HVC9789 to Recorded   8  Lyrica 100 MG Oral Capsule; TAKE 1 CAPSULE 3 TIMES DAILY;    Therapy: 60ZKY9305 to (Evaluate:26Stf8382)  Requested for: 2017; Last Rx: 56NLD1964 Ordered   9  Morphine Sulfate ER 15 MG Oral Tablet Extended Release; Take 1 tablet every 12 hours; Therapy: 01Ulm7264 to (Evaluate:50Oed7630); Last Rx:25Oct2017 Ordered   10  OneTouch Ultra Blue In [employer]; Therapy: 97CEB2125 to (Evaluate:13Jan2015) Recorded   11  Pantoprazole Sodium 40 MG Oral Tablet Delayed Release; Therapy: 48JQJ2950 to (Evaluate:70Asd1746) Recorded   12  RaNITidine HCl - 150 MG Oral Tablet; Therapy: 12GJB0205 to Recorded   13  Sucralfate 1 GM Oral Tablet; Therapy: 16UQF7592 to Recorded   14  TraZODone HCl - 50 MG Oral Tablet; TAKE 1 TABLET AT BEDTIME AS NEEDED FOR    SLEEP; Therapy: 19PMD6490 to (Evaluate:83Uwg8987) Recorded    Allergies    1  Cephalosporins   2  fentanyl   3  Paxil TABS   4  Penicillins   5  Statins   6  Sulfur   7   Wellbutrin TABS    Signatures   Electronically signed by : Yamilka Freed, ; Nov 17 2017  3:34PM EST                       (Author)

## 2018-01-15 NOTE — MISCELLANEOUS
Message   Recorded as Task   Date: 12/21/2016 01:26 PM, Created By: Bharath Posada   Task Name: Med Renewal Request   Assigned To: [id number] [address] clinical,Team   Regarding Patient: Isra Byrne, Status: Active   Comment:    Pura Cueto - 21 Dec 2016 1:26 PM     TASK CREATED  Caller: [facility] pharmacy, Care Coordinator; Renew Medication  VMMLOM on WE triage line from [facility] pharmacy requesting a refill for pt for baclofen 10mg 2 tabs at HS  Pt of AO  S/w pharmacy rep  from [facility] informed them that we do not accept refill requests from pharmacies and the pt must call to request the refill for themselves  Pharmacy rep verbalized understanding fo same and stated would call pt to inform them  Active Problems    1  Acute pain of left foot (729 5) (M79 672)   2  Analgesic use (V58 69) (Z79 899)   3  Anemia (285 9) (D64 9)   4  Anxiety disorder (300 00) (F41 9)   5  Chronic cervical pain (723 1,338 29) (M54 2,G89 29)   6  Chronic cervical radiculopathy (723 4) (M54 12)   7  Chronic myofascial pain (729 1,338 29) (M79 1,G89 29)   8  Diabetic peripheral neuropathy (250 60,357 2) (E11 42)   9  Encounter for long-term opiate analgesic use (V58 69) (Z79 891)   10  Fibromyalgia (729 1) (M79 7)   11  GERD (gastroesophageal reflux disease) (530 81) (K21 9)   12  H/O colonoscopy (V45 89) (Z98 890)   13  Headache (784 0) (R51)   14  Limb pain (729 5) (M79 609)   15  Lymphadenopathy (785 6) (R59 1)   16  MGUS (monoclonal gammopathy of unknown significance) (273 1) (D47 2)   17  Opioid type dependence, continuous use (304 01) (F11 20)   18  Other muscle spasm (728 85) (M62 838)   19  Pain syndrome, chronic (338 4) (G89 4)   20  Pain, upper back (724 5) (M54 9)   21  Spondylosis of cervical region without myelopathy or radiculopathy (721 0) (M47 812)    Current Meds   1  ALPRAZolam 0 5 MG Oral Tablet; Therapy: 94JMF5458 to (Evaluate:87Unz1326) Recorded   2   Baclofen 10 MG Oral Tablet; take 2 po hs  Requested for: 94JES5556; Last   Rx:02Nov2016 Ordered   3  [facility]S Vit D 5000 High-Potency 5000 UNIT CAPS; Therapy: ([facility]) to Recorded   4  [facility]S Vitamin B-12 100 MCG TABS; Therapy: ([place]) to Recorded   5  Hydrocodone-Acetaminophen  MG Oral Tablet; TAKE 1 TABLET EVERY 6   HOURS AS NEEDED FOR PAIN; Last Rx:02Nov2016 Ordered   6  Ipratropium-Albuterol 0 5-2 5 (3) MG/3ML Inhalation Solution; Therapy: 08TAU1683 to (Evaluate:09Jul2015) Recorded   7  Levothyroxine Sodium 150 MCG Oral Tablet; Therapy: (Recorded:66Gsi4304) to Recorded   8  Lyrica 75 MG Oral Capsule; TAKE 1 CAPSULE IN THE MORNING AND 2 BEFORE BED; Therapy: 58BNL9572 to (Evaluate:01Jan2017)  Requested for: 28ANW0076; Last   Rx:02Nov2016 Ordered   9  Morphine Sulfate ER 15 MG Oral Tablet Extended Release; Take 1 tablet every 12 hours; Therapy: 75Sgq5336 to (Evaluate:01Jan2017); Last Rx:02Nov2016 Ordered   10  OneTouch Ultra Blue In [employer]; Therapy: 60PPA9423 to (Evaluate:13Jan2015) Recorded   11  Pantoprazole Sodium 40 MG Oral Tablet Delayed Release; Therapy: 11OTO2051 to (Evaluate:19Jul2015) Recorded    Allergies    1  Cephalosporins   2  fentanyl   3  Paxil TABS   4  Penicillins   5  Statins   6  Sulfur   7   Wellbutrin TABS    Signatures   Electronically signed by : Kristel James RN; Dec 22 2016  7:54AM EST                       (Author)

## 2018-01-16 NOTE — MISCELLANEOUS
Message   Recorded as Task   Date: 08/30/2016 08:52 AM, Created By: Natasha Lehman   Task Name: Medical Complaint Callback   Assigned To: [id number] [address] clinical,Team   Regarding Patient: Selin Stringer, Status: Active   Comment:    Natasha Lehman - 30 Aug 2016 8:52 AM     TASK CREATED  Caller: Self; Medical Complaint; [phone] (Home)  (WE pt)  FYI:Pt left vm at 8:10 that Ganesh told her if ever anything would change her pain then she should call and set up an appt  She has upcoming appt on 9/14, but will prob be having oral sx next wk for severe infection  S/W pt who believes her oral sx sched for 9/12 will prob be moved sooner to next wk but a date hasn't been set up possibly looking to do on 9/6/16  Pt's next ov is actual over due, her next med Rf is needed by 9/7/16, pt was given a 9 wk povs and not a 8 wk povs     I offered pt ov at We w/ DG for 9/7/16 atb 2:15  Pt asked for anything sooner w/ DG even willing to go to QT office to be seen  DG had no open ov at WE or QT sooner than 9/7/16  Told pt I would put a note in the computer to call if sooner appt became avail  Ganesh Escoto - 30 Aug 2016 10:19 AM     TASK REPLIED TO: Previously Assigned To [id number] [address] clinical,Team  Does Dr Saira Nathan have any f/u OV's sooner  I am swamped and can not keep double booking people  Other than 9/7/16 I am not sure what else I can do  Alanis Novak - 30 Aug 2016 3:34 PM     TASK EDITED  S/w pt, advised there is no sooner ov w/ DG in any office  pt's appt has been flagged to call if a sooner opening becomes available  Pt verbalized understanding and appreciation  Active Problems    1  Acute pain of left foot (729 5) (M79 672)   2  Analgesic use (V58 69) (Z79 899)   3  Anxiety disorder (300 00) (F41 9)   4  Chronic cervical pain (723 1,338 29) (M54 2,G89 29)   5  Chronic cervical radiculopathy (723 4) (M54 12)   6  Chronic myofascial pain (729 1,338 29) (M79 1,G38 29)   7   Diabetic peripheral neuropathy (250 60,357 2) (E11 42)   8  Fibromyalgia (729 1) (M79 7)   9  H/O colonoscopy (V45 89) (Z98 89)   10  Limb pain (729 5) (M79 609)   11  Opioid type dependence, continuous use (304 01) (F11 20)   12  Other muscle spasm (728 85) (M62 838)   13  Pain syndrome, chronic (338 4) (G89 4)   14  Pain, upper back (724 5) (M54 9)   15  Spondylosis of cervical region without myelopathy or radiculopathy (721 0) (M47 812)    Current Meds   1  ALPRAZolam 0 5 MG Oral Tablet; Therapy: 20AIM6049 to (Evaluate:10Yvp0168) Recorded   2  Baclofen 10 MG Oral Tablet; take 2 po hs  Requested for: 03NJI2986; Last Rx:07Lbk2236   Ordered   3  [facility]S Vit D 5000 High-Potency 5000 UNIT CAPS; Therapy: ([facility]abelle [facility]we) to Recorded   4  [facility]S Vitamin B-12 100 MCG TABS; Therapy: ([place]abelle Goad) to Recorded   5  Hydrocodone-Acetaminophen  MG Oral Tablet; TAKE 1 TABLET Every 6 hours   PRN pain; Last Rx:80Ovk9763 Ordered   6  Ipratropium-Albuterol 0 5-2 5 (3) MG/3ML Inhalation Solution; Therapy: 05QSQ8335 to (Evaluate:98Chp4642) Recorded   7  Lyrica 75 MG Oral Capsule; TAKE 1 CAPSULE IN THE MORNING AND 2 BEFORE BED; Therapy: 25STI0668 to (Evaluate:87Igl0133)  Requested for: 02AWK2194; Last   Rx:61Qth6822 Ordered   8  Morphine Sulfate ER 15 MG Oral Tablet Extended Release; Take 1 tablet every 12 hours; Therapy: 86Pyv0119 to (Evaluate:27Pbo1397); Last Rx:54Zfu3720 Ordered   9  OneTouch Ultra Blue In [employer]; Therapy: 26GRB1896 to (Evaluate:29Tqv6085) Recorded   10  Pantoprazole Sodium 40 MG Oral Tablet Delayed Release; Therapy: 68YOG7023 to (Evaluate:07Qaf8039) Recorded    Allergies    1  Cephalosporins   2  Paxil TABS   3  Statins   4  Sulfur   5   Wellbutrin TABS    Signatures   Electronically signed by : Marbella Aguilar, ; Aug 30 2016  3:34PM EST                       (Author)

## 2018-01-16 NOTE — MISCELLANEOUS
Message   Recorded as Task   Date: 01/27/2017 01:14 PM, Created By: Jacklyn Pretty   Task Name: Care Coordination   Assigned To: [id number] [address] end clinical,Team   Regarding Patient: Za Mas, Status: In Progress   Comment:    Pura Cueto - 27 Jan 2017 1:14 PM     TASK CREATED  Caller: Adele Pharmacist; Care Coordination  VMMLOM on WE triage line at 11:48 AM  Per Elba Wright they need a c/b clarifying how pt takes her hydrocodone  Pr Adele the pt stated that Diego Hussein told her she can take 4-5 tabs in a day  The reill date is not until 1/29, requesting a c/b to see if ok to refill today and if the pt is allowed to take 4-5 tabs a day to have that reflected on the next script  Pura Cueto - 27 Jan 2017 1:25 PM     TASK EDITED  Call back number for pharmacy left as [phone]   Pura Cueto - 27 Jan 2017 1:37 PM     TASK EDITED  ****pt of JE***      S/w pharmacist who was questioning if ok with JE that we refill the pt's hydrocodone two days before the fill date that the pharmacy has on file  RN stated that on our records the DNF date is for today  Per pharmacist he stated that yes besides the DNF date that is on the script that the Pharmacy also has their rules and the pt is totally out of medication today  Per pharmacist the pt stated that Diego Hussein told her that when she has severe pain she can sometimes take 4-5 times a day  Per pharmacist the pt has never given them problems with her medications before, she is a good customer who has always been compliant with the rules, but the pharmany has rules too  Per pharmacist they can't fill it today, but if JE or the covering physician gives the ok then at least this won't leave the pt without medication for two days  Per pharmacist if then after this time Diego Hussein could specify on the script that the pt can take 4-5 pills in a day the they would be able to fill early    RN avisegonzalez pharmacist that I would have to realy this information to FQ who is covering for Diego Hussein until he is back in the office on 2/1  Advised that I would c/b with suggestions for same  ***Pharmacist appreciative ****   Sander De La Paz - 27 Jan 2017 1:50 PM     TASK REPLIED TO: Previously Assigned To Sander He  ok to fill early   just forward to Dr Mitchel Ramirez to review on Wednesday   RomPura patel - 27 Jan 2017 2:22 PM     TASK EDITED  S/w Lonnie Bedoya, pharmacist, regarding above  Lonnie Bedoya stated that he would call the pt to let her know same, and just wanted to have JE be aware that if he  did tell the pt that she was able to take the tabs 4-5 a day that it should reflect that on the next script  RN advised Lonnie Bedoya that I would be relaying this information to Chet Angulo appreciative of same  Cece Funes - 27 Jan 2017 2:44 PM     TASK REPLIED TO: Previously Assigned To Cece Funes                      aware, but that's why we wrote her 130 tabs not 120  ok to refill early this time  ReyStephanie painter - 27 Jan 2017 3:05 PM     TASK IN PROGRESS        Active Problems    1  Acute pain of left foot (729 5) (M79 672)   2  Analgesic use (V58 69) (Z79 899)   3  Anemia (285 9) (D64 9)   4  Anxiety disorder (300 00) (F41 9)   5  Chronic cervical pain (723 1,338 29) (M54 2,G89 29)   6  Chronic cervical radiculopathy (723 4) (M54 12)   7  Chronic myofascial pain (729 1,338 29) (M79 1,G89 29)   8  Diabetic peripheral neuropathy (250 60,357 2) (E11 42)   9  Encounter for long-term opiate analgesic use (V58 69) (Z79 891)   10  Fibromyalgia (729 1) (M79 7)   11  GERD (gastroesophageal reflux disease) (530 81) (K21 9)   12  H/O colonoscopy (V45 89) (Z98 890)   13  Headache (784 0) (R51)   14  Limb pain (729 5) (M79 609)   15  Lymphadenopathy (785 6) (R59 1)   16  MGUS (monoclonal gammopathy of unknown significance) (273 1) (D47 2)   17  Opioid type dependence, continuous use (304 01) (F11 20)   18  Other muscle spasm (728 85) (M62 838)   19  Pain syndrome, chronic (338 4) (G89 4)   20  Pain, upper back (724 5) (M54 9)   21  Spondylosis of cervical region without myelopathy or radiculopathy (721 0) (M40 812)    Current Meds   1  ALPRAZolam 0 5 MG Oral Tablet; Therapy: 21LHM4006 to (Evaluate:91Fqv0104) Recorded   2  Baclofen 10 MG Oral Tablet; take 2 po hs  Requested for: 84VMG0057; Last   NM:86WSC9872 Ordered   3  [facility]S Vit D 5000 High-Potency 5000 UNIT CAPS; Therapy: ([facility]) to Recorded   4  [facility]S Vitamin B-12 100 MCG TABS; Therapy: ([facility]) to Recorded   5  Hydrocodone-Acetaminophen  MG Oral Tablet; TAKE 1 TABLET EVERY 6   HOURS AS NEEDED FOR PAIN; Last Rx:35Fll1809 Ordered   6  Ipratropium-Albuterol 0 5-2 5 (3) MG/3ML Inhalation Solution; Therapy: 33OQI3730 to (Evaluate:99Ygn2213) Recorded   7  Levothyroxine Sodium 150 MCG Oral Tablet; Therapy: (Recorded:08Lch0561) to Recorded   8  Lyrica 75 MG Oral Capsule; TAKE 1 CAPSULE IN THE MORNING AND 2 BEFORE BED; Therapy: 61PFL8384 to ([phone])  Requested for: 67UNG0157 Recorded   9  Morphine Sulfate ER 15 MG Oral Tablet Extended Release; Take 1 tablet every 12 hours; Therapy: 71Ltl6513 to (Evaluate:03Jqt9915); Last Rx:54Djd6918 Ordered   10  OneTouch Ultra Blue In [employer]; Therapy: 68YIY3248 to (Evaluate:13Jan2015) Recorded   11  Pantoprazole Sodium 40 MG Oral Tablet Delayed Release; Therapy: 32XCY4565 to (Evaluate:29Sef0770) Recorded    Allergies    1  Cephalosporins   2  fentanyl   3  Paxil TABS   4  Penicillins   5  Statins   6  Sulfur   7  Wellbutrin TABS    Signatures   Electronically signed by :  Meño Beaver, ; Jan 27 2017  3:05PM EST                       (Author)

## 2018-01-18 NOTE — MISCELLANEOUS
Message   Recorded as Task   Date: 10/03/2016 02:04 PM, Created By: Luis Soria   Task Name: Add Referring Physician   Assigned To: Hannah Contreras   Regarding Patient: Doe Luque, Status: Active   CommentOrlandjorge Velasco - 03 Oct 2016 2:04 PM     TASK CREATED  Please ad Dr Evelyne Hampton , ID at [facility] to the database and copy todays letter on this pt to him  VILMA Malone   FAXED 10/03/16 LETTER TO DR ELISABETH LABOY AND SENT REQUEST TO [facility] TO ADD  PHYSICIAN  Active Problems    1  Acute pain of left foot (729 5) (M79 672)   2  Analgesic use (V58 69) (Z79 899)   3  Anemia (285 9) (D64 9)   4  Anxiety disorder (300 00) (F41 9)   5  Chronic cervical pain (723 1,338 29) (M54 2,G89 29)   6  Chronic cervical radiculopathy (723 4) (M54 12)   7  Chronic myofascial pain (729 1,338 29) (M79 1,G89 29)   8  Diabetic peripheral neuropathy (250 60,357 2) (E11 42)   9  Encounter for long-term opiate analgesic use (V58 69) (Z79 891)   10  Fibromyalgia (729 1) (M79 7)   11  GERD (gastroesophageal reflux disease) (530 81) (K21 9)   12  H/O colonoscopy (V45 89) (Z98 890)   13  Limb pain (729 5) (M79 609)   14  Lymphadenopathy (785 6) (R59 1)   15  MGUS (monoclonal gammopathy of unknown significance) (273 1) (D47 2)   16  Opioid type dependence, continuous use (304 01) (F11 20)   17  Other muscle spasm (728 85) (M62 838)   18  Pain syndrome, chronic (338 4) (G89 4)   19  Pain, upper back (724 5) (M54 9)   20  Spondylosis of cervical region without myelopathy or radiculopathy (721 0) (M47 812)    Current Meds   1  ALPRAZolam 0 5 MG Oral Tablet; Therapy: 96DEP6687 to (Evaluate:52Htn7390) Recorded   2  Baclofen 10 MG Oral Tablet; take 2 po hs  Requested for: 21Ibi5689; Last   NQ:47LBS0258 Ordered   3  [facility]S Vit D 5000 High-Potency 5000 UNIT CAPS; Therapy: (Jaxson Boyd) to Recorded   4  [facility]S Vitamin B-12 100 MCG TABS; Therapy: (Jaxson Boyd) to Recorded   5  Hydrocodone-Acetaminophen  MG Oral Tablet;  Take 1 pill every 4-6 hours PRN   for pain, max of 5 per day; Last Rx:07Sep2016 Ordered   6  Ipratropium-Albuterol 0 5-2 5 (3) MG/3ML Inhalation Solution; Therapy: 02LHQ1962 to (Evaluate:09Jul2015) Recorded   7  Levothyroxine Sodium 150 MCG Oral Tablet; Therapy: (Recorded:07Sep2016) to Recorded   8  Lyrica 75 MG Oral Capsule; TAKE 1 CAPSULE IN THE MORNING AND 2 BEFORE BED; Therapy: 31PDR8519 to (Tye Gamino)  Requested for: 26SFU8551; Last   Rx:07Sep2016 Ordered   9  Morphine Sulfate ER 15 MG Oral Tablet Extended Release; Take 1 tablet every 12 hours; Therapy: 00Sgv5401 to (Evaluate:07Oct2016); Last Rx:07Sep2016 Ordered   10  OneTouch Ultra Blue In [employer]; Therapy: 44PAQ6013 to (Evaluate:13Jan2015) Recorded   11  Pantoprazole Sodium 40 MG Oral Tablet Delayed Release; Therapy: 05CDH4409 to (Evaluate:03Ubc3644) Recorded    Allergies    1  Cephalosporins   2  fentanyl   3  Paxil TABS   4  Penicillins   5  Statins   6  Sulfur   7   Wellbutrin TABS    Signatures   Electronically signed by : Juan Arroyo, ; Oct  4 2016  9:37AM EST                       (Author)

## 2018-01-18 NOTE — PROGRESS NOTES
Assessment    1  MGUS (monoclonal gammopathy of unknown significance) (273 1) (D47 2)   2  Anemia (285 9) (D64 9)   3  Headache (784 0) (R51)    Plan  Anemia    · Drink plenty of fluids ; Status:Complete;   Done: 81EUU6088   Ordered; Jaimie Logan; Ordered By:Theresa Mo;   · Follow-up visit in 3 months Evaluation and Treatment  Follow-up  Status: Hold For -  Scheduling  Requested for: 95WOI0979   Ordered; For: Anemia; Ordered By: Maia Cain Performed:  Due: 71XLU5847   · (1) CBC/PLT/DIFF; Status:Active; Requested for:2017;    Perform:[facility]; OUR:[id number]; Ordered; Jaimie Logan; Ordered By:Theresa Mo;   · (1) COMPREHENSIVE METABOLIC PANEL; Status:Active; Requested for:2017;    Perform:[facility]; [id number]; Ordered; Jaimie Logan; Ordered By:Theresa Mo;   · (1) FERRITIN; Status:Active; Requested for:2017;    Perform:[facility]; QVI:82HCW4106; Ordered; Jaimie Logan; Ordered By:Robert Mo;   · (1) IRON SATURATION %, TIBC; Status:Active; Requested for:2017;    Perform:[facility]; RNT:[id number]; Ordered; Jaimie Logan; Ordered By:Theresa Mo;  Headache    · CT HEAD W CONTRAST; Status:Need Information - Financial Authorization; Requested  for:2016;    Perform:[facility]; [id number];XNCZAXE;  For:Headache; Ordered By:Theresa Mo;  Pain syndrome, chronic    · CT CHEST W CONTRAST; Status:Need Information - Financial Authorization; Requested  for:2016;    Perform:[facility]; FHJ:[id number];[id number]; For:Pain syndrome, chronic; Ordered By:Theresa Mo;    Discussion/Summary  Discussion Summary:   In summary, this is a 17-year-old female history of anemia as well as multiple symptoms as outlined  Her line iron studies supported diagnosis of iron deficiency as a contributor to her anemia and partially some of her fatigue  This is attributable to  Status post bariatric surgery  Parenteral iron replacement is advised    I recommended Venofer 300 mg IV Ã3 doses  Follow-up in 3 months is requested  The etiology of the remainder of her symptoms is unclear  It's possible this represents some substrate deficiency is due to, status post bariatric surgery  Alternatively, one could consider the possibility of lymphoproliferative, autoimmune or other processes  She also has a history of MGUS  Free light chain ratio was only 1 8, near normal  I do not believe this represents sufficient evidence to suspect myeloma or other lymphoproliferative process  She was agreeable to a CAT scan of the head and chest to evaluate for further adenopathy or any explanation for her moderate to severe persistent and gradually worsening headaches  I reviewed the above with the patient  She voiced understanding and agreement  We made arrangements for parenteral iron  I asked her to call 2 or 3 days after she has a CAT scans done to review the results  The patient voiced understanding and agreement  Understands and agrees with treatment plan: The treatment plan was reviewed with the patient/guardian  The patient/guardian understands and agrees with the treatment plan   Counseling Documentation With Imm: The patient was counseled regarding diagnostic results, instructions for management, patient and family education, impressions  total time of encounter was 40 minutes  Chief Complaint  Chief Complaint Free Text Note Form: Evaluation regarding MGUS  History of Present Illness  HPI: November 2009-patient was evaluated regarding IgA monoclonal gammopathy  this was felt to represent IgA MGUS  2010- had gastric bypass  IgA level last measured was 2011 with a value of 191  Patient was lost to follow up thereafter  2015- started with low grade temps  Occasional spikes, with fatigue  Saw PCP , no obvious explanation  November Pneumonia, treated with abx at home  March 2016- [place] to have right otitis   Treated with abx  with improvement but recurred after abx complete  June 2016- started with headaches  Mostly cervical and occipital    Sent to ID  Repeat IgA stable  Slight depression of IgG at 521  Started to have some dental problems with breaking teeth  Dental XR's showed "infection," and was given abx  September 2016- had some teeth removed but didn't have any change in constitutional symptoms  Interval History: She is still having headaches everyday  She wakes up with a headache  She does get fevers almost everyday as well  No other complaints  Taking Ibuprofen 200-400mg with modest short lived benefit  Review of Systems  Complete-Female:   Constitutional: fever and recent 10-12 lbs over past 6 months  lb weight loss  Eyes: No complaints of eye pain, no red eyes, no eyesight problems, no discharge, no dry eyes, no itching of eyes  ENT: no complaints of earache, no loss of hearing, no nose bleeds, no nasal discharge, no sore throat, no hoarseness  Cardiovascular: No complaints of slow heart rate, no fast heart rate, no chest pain, no palpitations, no leg claudication, no lower extremity edema  Respiratory: No complaints of shortness of breath, no wheezing, no cough, no SOB on exertion, no orthopnea, no PND  Gastrointestinal: nausea and colonoscopy 2014- 2 polyps  2013- EGD ok by pt recollection  The patient presents with complaints of episodes of vomiting  Episodes have been occurring 1-3 times a week  Genitourinary: No complaints of dysuria, no incontinence, no pelvic pain, no dysmenorrhea, no vaginal discharge or bleeding  Musculoskeletal: No complaints of arthralgias, no myalgias, no joint swelling or stiffness, no limb pain or swelling  Integumentary: No complaints of skin rash or lesions, no itching, no skin wounds, no breast pain or lump  Neurological: No complaints of headache, no confusion, no convulsions, no numbness, no dizziness or fainting, no tingling, no limb weakness, no difficulty walking     Psychiatric: Not suicidal, no sleep disturbance, no anxiety or depression, no change in personality, no emotional problems  Endocrine: No complaints of proptosis, no hot flashes, no muscle weakness, no deepening of the voice, no feelings of weakness  The patient presents with complaints of a tendency for easy bruising years ago (mostly on the upper extremities  )  Active Problems    1  Acute pain of left foot (729 5) (M79 672)   2  Analgesic use (V58 69) (Z79 899)   3  Anemia (285 9) (D64 9)   4  Anxiety disorder (300 00) (F41 9)   5  Chronic cervical pain (723 1,338 29) (M54 2,G89 29)   6  Chronic cervical radiculopathy (723 4) (M54 12)   7  Chronic myofascial pain (729 1,338 29) (M79 1,G89 29)   8  Diabetic peripheral neuropathy (250 60,357 2) (E11 42)   9  Encounter for long-term opiate analgesic use (V58 69) (Z79 891)   10  Fibromyalgia (729 1) (M79 7)   11  GERD (gastroesophageal reflux disease) (530 81) (K21 9)   12  H/O colonoscopy (V45 89) (Z98 890)   13  Limb pain (729 5) (M79 609)   14  Lymphadenopathy (785 6) (R59 1)   15  MGUS (monoclonal gammopathy of unknown significance) (273 1) (D47 2)   16  Opioid type dependence, continuous use (304 01) (F11 20)   17  Other muscle spasm (728 85) (M62 838)   18  Pain syndrome, chronic (338 4) (G89 4)   19  Pain, upper back (724 5) (M54 9)   20  Spondylosis of cervical region without myelopathy or radiculopathy (721 0) (D13 773)    Past Medical History    1  History of Anxiety (300 00) (F41 9)   2  History of Arthritis (V13 4)   3  History of Asthma (493 90) (J45 909)   4  History of Depression (311) (F32 9)   5  History of Diabetes Mellitus (250 00)   6  History of endoscopy (V45 89) (Z98 890)   7  History of hyperlipidemia (V12 29) (Z86 39)   8  History of Malignant neoplasm without specification of site (199 1) (C80 1)    Surgical History    1  History of Gastric Bypass With Sarah-en-Y Short Limb  Surgical History Reviewed:    The surgical history was reviewed and updated today  Family History  Mother    1  Family history of Hypertension (V17 49)  Father    2  Family history of Hypertension (V17 49)  Unknown    3  Family history of Hypertension (V17 49)  Family History    4  Family history of Diabetes Mellitus (V18 0)   5  Family history of Heart Disease (V17 49)   6  Family history of Hypertension (V17 49)   7  Family history of Stroke Syndrome (V17 1)   8  Family history of Thyroid Disorder (V18 19)    Social History    · Denied: History of Alcohol Use (History)   · Current Every Day Smoker (305 1)   · Tobacco use (305 1) (Z72 0)  Social History Reviewed: The social history was reviewed and updated today  Current Meds   1  ALPRAZolam 0 5 MG Oral Tablet; Therapy: 24RWX3078 to (Evaluate:19Jul2015) Recorded   2  Baclofen 10 MG Oral Tablet; take 2 po hs  Requested for: 04ZQY5694; Last   Rx:02Nov2016 Ordered   3  [employer] Vit D 5000 High-Potency 5000 UNIT CAPS; Therapy: (0486 61 38 26) to Recorded   4  [employer] Vitamin B-12 100 MCG TABS; Therapy: (0486 61 38 26) to Recorded   5  Hydrocodone-Acetaminophen  MG Oral Tablet; TAKE 1 TABLET EVERY 6 HOURS   AS NEEDED FOR PAIN; Last Rx:02Nov2016 Ordered   6  Ipratropium-Albuterol 0 5-2 5 (3) MG/3ML Inhalation Solution; Therapy: 56KCZ4335 to (Evaluate:09Jul2015) Recorded   7  Levothyroxine Sodium 150 MCG Oral Tablet; Therapy: (Recorded:93Mth3643) to Recorded   8  Lyrica 75 MG Oral Capsule; TAKE 1 CAPSULE IN THE MORNING AND 2 BEFORE BED; Therapy: 19QNX3000 to (Evaluate:01Jan2017)  Requested for: 19GDL0439; Last   Rx:02Nov2016 Ordered   9  Morphine Sulfate ER 15 MG Oral Tablet Extended Release; Take 1 tablet every 12 hours; Therapy: 40Uso6384 to (Evaluate:01Jan2017); Last Rx:02Nov2016 Ordered   10  OneTouch Ultra Blue In [employer]; Therapy: 77CLG4456 to (Evaluate:13Jan2015) Recorded   11  Pantoprazole Sodium 40 MG Oral Tablet Delayed Release;     Therapy: 26WGB2090 to (Evaluate:19Jul2015) Recorded  Medication List Reviewed: The medication list was reviewed and updated today  Allergies    1  Cephalosporins   2  fentanyl   3  Paxil TABS   4  Penicillins   5  Statins   6  Sulfur   7  Wellbutrin TABS    Vitals  Vital Signs    Recorded: 41RZF0507 78:83OH   Systolic 658   Diastolic 68   Heart Rate 61   Respiration 14   Temperature 98 6 F   O2 Saturation 98   Height 5 ft 5 in   Weight 145 lb 2 oz   BMI Calculated 24 15   BSA Calculated 1 73     Physical Exam    Constitutional   General appearance: No acute distress, well appearing and well nourished  Eyes   Conjunctiva and lids: No swelling, erythema or discharge  Ears, Nose, Mouth, and Throat   External inspection of ears and nose: Normal     Oropharynx: Normal with no erythema, edema, exudate or lesions  Pulmonary   Auscultation of lungs: Clear to auscultation  Cardiovascular   Auscultation of heart: Normal rate and rhythm, normal S1 and S2, without murmurs  Examination of extremities for edema and/or varicosities: Normal     Abdomen   Abdomen: Non-tender, no masses  Liver and spleen: No hepatomegaly or splenomegaly  Lymphatic   Palpation of lymph nodes in neck: No lymphadenopathy  Musculoskeletal   Gait and station: Normal     Skin   Skin and subcutaneous tissue: Normal without rashes or lesions  Neurologic   Cranial nerves: Cranial nerves 2-12 intact  Psychiatric   Orientation to person, place, and time: Normal          Results/Data  * CT SOFT TISSUE NECK W CONTRAST 26Oct2016 12:21PM Latrice Greenlandic     Test Name Result Flag Reference   CT SOFT TISSUE NECK W CONTRAST (Report)     CT NECK WITH CONTRAST     INDICATION: Fevers, headaches  Nodules on thyroid  Lungs  Evaluate lymphadenopathy  COMPARISON: None  TECHNIQUE: Contiguous 2 5 mm images were obtained through the neck after administration of intravenous contrast  In addition, sagittal and coronal reformatted images were submitted for interpretation   This examination, like all CT scans performed in    the [facility], was performed utilizing techniques to minimize radiation dose exposure, including the use of iterative reconstruction and automated exposure control  85 ml of iodinated contrast, Omnipaque 350, was injected    intravenously without immediate consequence  IMAGE QUALITY: Diagnostic  FINDINGS: Bilateral metallic markers are placed over the region of palpable concern in the submandibular regions bilaterally  No suspicious lymphadenopathy, mass or collection regions  The markers overlie normal appearing submandibular glands  VISUALIZED BRAIN PARENCHYMA: No acute intracranial pathology of the visualized brain parenchyma  VISUALIZED ORBITS AND PARANASAL SINUSES: Normal      NASAL CAVITY AND NASOPHARYNX: Normal      SUPRAHYOID NECK: Normal oral cavity, tongue base, tonsillar fossa and epiglottis  Prevertebral soft tissues are normal         INFRAHYOID NECK: Aryepiglottic folds, laryngeal tissues, and piriform sinuses are normal         THYROID GLAND: Heterogeneous thyroid gland with bilateral nodules  The largest nodule projects from the lower pole of the left thyroid gland medially, measuring approximately 1 5 x 1 4 cm on image 73, series 13     PAROTID AND SUBMANDIBULAR GLANDS: Normal      LYMPH NODES: No pathologic or enlarged adenopathy  VASCULAR STRUCTURES: There is a left-sided aortic arch with mild atherosclerotic basilar calcifications  There is an aberrant right subclavian artery, a developmental variant  Atherosclerotic calcifications noted  THORACIC INLET: Lung apices and upper mediastinum are unremarkable  BONY STRUCTURES: Mild spondylotic changes noted  IMPRESSION:       1  No suspicious mass, lymphadenopathy or collection adjacent to the metallic markers which overlie normal submandibular glands bilaterally     2  Heterogeneous thyroid gland with bilateral nodules including a 1 5 cm nodule projecting from the inferior margin of the left thyroid gland  Further characterization with thyroid ultrasound recommended if not recently performed  Workstation performed: [id number]     Signed by: Caroline Lindsay MD   10/27/16     * CT ABDOMEN PELVIS W CONTRAST 26Oct2016 12:21PM Tl Xie     Test Name Result Flag Reference   CT ABDOMEN PELVIS W CONTRAST (Report)     CT ABDOMEN AND PELVIS WITH IV CONTRAST     INDICATION: Lymphadenopathy  Fevers, headaches  COMPARISON: CT scan of the abdomen and pelvis dated December 18, 2013  TECHNIQUE: CT examination of the abdomen and pelvis was performed after the administration of intravenous contrast  This examination, like all CT scans performed in the [facility], was performed utilizing techniques to minimize    radiation dose exposure, including the use of iterative reconstruction and automated exposure control  Axial, sagittal, and coronal reformatted images were submitted for interpretation  100 cc of intravenous Omnipaque 350 was administered for this    examination  Enteric contrast was not given  FINDINGS:     ABDOMEN     LOWER CHEST: No significant abnormalities identified in the lower chest      LIVER/BILIARY TREE: Unremarkable  GALLBLADDER: Surgically absent  SPLEEN: Unremarkable  PANCREAS: Unremarkable  ADRENAL GLANDS: Bilateral adrenal nodules are stable from multiple prior studies and consistent with adenomas  KIDNEYS/URETERS: There is a 7 mm exophytic cyst at the anterior mid to upper pole of the right kidney which is decreased in size from the prior exam  No hydronephrosis  STOMACH AND BOWEL: Postoperative changes of prior gastric bypass surgery are again noted  APPENDIX: No findings to suggest appendicitis  ABDOMINOPELVIC CAVITY: No ascites or free intraperitoneal air  No lymphadenopathy  VESSELS: Unremarkable for patient's age  PELVIS     REPRODUCTIVE ORGANS: Uterus is surgically absent   A 3 3 x 2 0 cm left ovarian cyst is stable from the prior exam      URINARY BLADDER: Unremarkable  ABDOMINAL WALL/INGUINAL REGIONS: Unremarkable  OSSEOUS STRUCTURES: No acute fracture or destructive osseous lesion  IMPRESSION:     No intra-abdominal or pelvic lymphadenopathy  No acute intra-abdominal abnormality  No free air or free fluid  Stable 3 3 x 2 0 cm left ovarian cyst        Workstation performed: [id number]     Signed by:   Rachna Quiroz MD   10/27/16     (1) CBC/PLT/DIFF 40IOI3969 10:49AM Alexis Rattler Order Number: [id number]     Test Name Result Flag Reference   WBC COUNT 5 96 Thousand/uL  4 31-10 16   RBC COUNT 3 69 Million/uL L 3 81-5 12   HEMOGLOBIN 9 9 g/dL L 11 5-15 4   HEMATOCRIT 31 9 % L 34 8-46  1   MCV 86 fL  82-98   MCH 26 8 pg  26 8-34 3   MCHC 31 0 g/dL L 31 4-37 4   RDW 17 1 % H 11 6-15 1   MPV 10 2 fL  8 9-12 7   PLATELET COUNT 131 Thousands/uL  149-390   nRBC AUTOMATED 0 /100 WBCs     NEUTROPHILS RELATIVE PERCENT 74 %  43-75   LYMPHOCYTES RELATIVE PERCENT 19 %  14-44   MONOCYTES RELATIVE PERCENT 6 %  4-12   EOSINOPHILS RELATIVE PERCENT 1 %  0-6   BASOPHILS RELATIVE PERCENT 0 %  0-1   NEUTROPHILS ABSOLUTE COUNT 4 38 Thousands/?L  1 85-7 62   LYMPHOCYTES ABSOLUTE COUNT 1 15 Thousands/?L  0 60-4 47   MONOCYTES ABSOLUTE COUNT 0 35 Thousand/?L  0 17-1 22   EOSINOPHILS ABSOLUTE COUNT 0 07 Thousand/?L  0 00-0 61   BASOPHILS ABSOLUTE COUNT 0 01 Thousands/?L  0 00-0 10   - Patient Instructions: This bloodwork is non-fasting  Please drink two glasses of water morning of bloodwork  - Patient Instructions: This bloodwork is non-fasting  Please drink two glasses of water morning of bloodwork  (1) LEONCIO SCREEN W/REFLEX TO TITER/PATTERN 24Oct2016 10:49AM Alexis Rattler Order Number: [id number]     Test Name Result Flag Reference   LEONCIO SCREEN   Negative  Negative     (1) C-REACTIVE PROTEIN 24Oct2016 10:49AM [facility] Order Number: [id number] Test Name Result Flag Reference   C-REACT PROTEIN 19 0 mg/L H <3 0   15     (1) SED RATE 60VQV3609 10:49AM Jessica Geneva Order Number: [id number]     Test Name Result Flag Reference   SED RATE 22 mm/hour H 0-20     (1) FREE LIGHT CHAINS, SERUM 24Oct2016 10:49AM Jessica Geneva Order Number: [id number]     Test Name Result Flag Reference   FREE KAPPA LIGHT CHAINS, SERUM 26 98 mg/L H 3 30 - 19 40   FREE LAMBDA LIGHT CHAINS, SERUM 14 45 mg/L  5 71 - 26 30   KAPPA/LAMBDA RATIO, SERUM 1 87 H 0 26 - 1 65   Performed at:  [address] [place] [address]  [id number]  : Isidra Gutiérrez MD, Phone:  [phone]     (1) CRYOGLOBULIN 52QZJ1067 10:49AM Jessica Geneva Order Number: [id number]     Test Name Result Flag Reference   CRYOGLOBULIN Comment  None detected   None Detected at 72 hours  This test was developed and its performance characteristics  determined by [facility]  It has not been cleared or approved  by the Food and Drug Administration  Performed at:  [address] [place] [address]  [id number]  : Isidra Gutiérrez MD, Phone:  [phone]     (1) [address] [place], [place] [id number] 10:49AM Jessica Geneva Order Number: [id number]     Test Name Result Flag Reference   COLD [address] [place] Negative  Neg <1:32   Performed at:  [facility] [address]  [id number]  : Isidra Gutiérrez MD, Phone:  [phone]     (1) IRON SATURATION %, TIBC 24Oct2016 10:49AM Jessica Geneva Order Number: [id number]     Test Name Result Flag Reference   IRON SATURATION 10 %     TOTAL IRON BINDING CAPACITY 365 ug/dL  250-450   IRON 35 ug/dL L    Patients treated with metal-binding drugs (ie  Deferoxamine) may have depressed iron values       (1) FERRITIN 24Oct2016 10:49AM Jessica Geneva Order Number: [id number]     Test Name Result Flag Reference   FERRITIN 8 ng/mL  5-352 (1) NEUTROPHIL CYTOPLASMIN ANTIBODY 92KVJ3227 10:49AM [facility] Order Number: [id number]     Test Name Result Flag Reference   CYTOPLAS-C ANCA <1:20 titer  Neg:<1:20   ATYPICAL ANCA <1:20 titer  Neg:<1:20   The atypical pANCA pattern has been observed in a significant  percentage of patients with ulcerative colitis, primary sclerosing  cholangitis and autoimmune hepatitis  MPO ANTIBODY <9 0 U/mL  0 0 - 9 0   HI-3 ANTIBODY <3 5 U/mL  0 0 - 3 5   P-ANCA <1:20 titer  Neg:<1:20   The presence of positive fluorescence exhibiting P-ANCA or C-ANCA  patterns alone is not specific for the diagnosis of Wegener's  Granulomatosis (WG) or microscopic polyangiitis  Decisions about  treatment should not be based solely on ANCA IFA results  The  International ANCA Group Consensus recommends follow up testing of  positive sera with both HI-3 and MPO-ANCA enzyme immunoassays  As  many as 5% serum samples are positive only by EIA  Ref  AM J Clin Pathol 1999;111:507-513      Performed at:  [address]  [id number]  : Gab Allen MD, Phone:  [phone]     Signatures   Electronically signed by : JASMIN Mirza DOM D ,DO; Nov 7 2016  3:19PM EST                       (Author)

## 2018-01-23 VITALS
SYSTOLIC BLOOD PRESSURE: 118 MMHG | HEART RATE: 50 BPM | BODY MASS INDEX: 25.95 KG/M2 | WEIGHT: 152 LBS | DIASTOLIC BLOOD PRESSURE: 62 MMHG | TEMPERATURE: 98.6 F | HEIGHT: 64 IN

## 2018-02-14 ENCOUNTER — TELEPHONE (OUTPATIENT)
Dept: PAIN MEDICINE | Facility: MEDICAL CENTER | Age: 68
End: 2018-02-14

## 2018-02-14 ENCOUNTER — OFFICE VISIT (OUTPATIENT)
Dept: PAIN MEDICINE | Facility: MEDICAL CENTER | Age: 68
End: 2018-02-14
Payer: COMMERCIAL

## 2018-02-14 VITALS
HEART RATE: 74 BPM | WEIGHT: 154.5 LBS | BODY MASS INDEX: 25.74 KG/M2 | SYSTOLIC BLOOD PRESSURE: 130 MMHG | HEIGHT: 65 IN | DIASTOLIC BLOOD PRESSURE: 64 MMHG

## 2018-02-14 DIAGNOSIS — M79.7 FIBROMYALGIA: ICD-10-CM

## 2018-02-14 DIAGNOSIS — E11.42 DIABETIC PERIPHERAL NEUROPATHY (HCC): ICD-10-CM

## 2018-02-14 DIAGNOSIS — G89.4 CHRONIC PAIN SYNDROME: Primary | ICD-10-CM

## 2018-02-14 DIAGNOSIS — M79.18 MYOFASCIAL PAIN SYNDROME: ICD-10-CM

## 2018-02-14 DIAGNOSIS — M47.812 SPONDYLOSIS OF CERVICAL REGION WITHOUT MYELOPATHY OR RADICULOPATHY: ICD-10-CM

## 2018-02-14 DIAGNOSIS — M54.12 CERVICAL RADICULOPATHY: ICD-10-CM

## 2018-02-14 PROCEDURE — 99214 OFFICE O/P EST MOD 30 MIN: CPT | Performed by: PHYSICAL MEDICINE & REHABILITATION

## 2018-02-14 RX ORDER — HYDROCODONE BITARTRATE AND ACETAMINOPHEN 10; 325 MG/1; MG/1
1 TABLET ORAL EVERY 6 HOURS PRN
Qty: 130 TABLET | Refills: 0 | Status: SHIPPED | OUTPATIENT
Start: 2018-03-12 | End: 2018-02-28 | Stop reason: SDUPTHER

## 2018-02-14 RX ORDER — HYDROCODONE BITARTRATE AND ACETAMINOPHEN 10; 325 MG/1; MG/1
1 TABLET ORAL EVERY 6 HOURS PRN
Qty: 130 TABLET | Refills: 0 | Status: SHIPPED | OUTPATIENT
Start: 2018-02-14 | End: 2018-02-14 | Stop reason: SDUPTHER

## 2018-02-14 RX ORDER — PREGABALIN 75 MG/1
75 CAPSULE ORAL 3 TIMES DAILY
Qty: 90 CAPSULE | Refills: 1 | Status: SHIPPED | OUTPATIENT
Start: 2018-02-14 | End: 2018-02-26 | Stop reason: SDUPTHER

## 2018-02-14 RX ORDER — BACLOFEN 10 MG/1
10 TABLET ORAL 3 TIMES DAILY
Qty: 90 TABLET | Refills: 1 | Status: SHIPPED | OUTPATIENT
Start: 2018-02-14 | End: 2018-04-09 | Stop reason: SDUPTHER

## 2018-02-14 NOTE — PROGRESS NOTES
Assessment:  1  Chronic pain syndrome    2  Cervical radiculopathy    3  Myofascial pain syndrome    4  Spondylosis of cervical region without myelopathy or radiculopathy    5  Fibromyalgia    6  Diabetic peripheral neuropathy ([facility] [address] )        Plan: At this time, the patient can continue with the hydrocodone and the Morphine as prescribed  She was given a 2 month supply of each medication with a do not fill date of March 12, 2018  Patient can also continue his Lyrica and baclofen as prescribed  Both of these medications were refilled today  While the patient was in the office today an opioid contract was thoroughly reviewed and signed by the patient  The patient was given adequate time to ask questions in regards to the contract and a signed copy was sent home for his/her records  There are risks associated with opioid medications, including dependence, addiction and tolerance  The patient understands and agrees to use these medications only as prescribed  Potential side effects of the medications include, but are not limited to, constipation, drowsiness, addiction, impaired judgment and risk of fatal overdose if not taken as prescribed  The patient was warned against driving while taking sedation medications  Sharing medications is a felony  At this point in time, the patient is showing no signs of addiction, abuse, diversion or suicidal ideation  [place] Prescription Drug Monitoring Program report was reviewed and was appropriate     Follow-up visit in 8 weeks for medication refills    My impressions and treatment recommendations were discussed in detail with the patient who verbalized understanding and had no further questions  Discharge instructions were provided  I personally saw and examined the patient and I agree with the above discussed plan of care  No orders of the defined types were placed in this encounter      New Medications Ordered This Visit   Medications    baclofen 10 mg tablet     Sig: Take 1 tablet (10 mg total) by mouth 3 (three) times a day     Dispense:  90 tablet     Refill:  1    HYDROcodone-acetaminophen (NORCO)  mg per tablet     Sig: Take 1 tablet by mouth every 6 (six) hours as needed for moderate pain Earliest Fill Date: 3/12/18 Max Daily Amount: 4 tablets     Dispense:  130 tablet     Refill:  0    Morphine Sulfate ER 15 MG T12A     Sig: Take 15 mg by mouth daily Earliest Fill Date: 3/12/18 Max Daily Amount: 15 mg     Dispense:  30 tablet     Refill:  0    pregabalin (LYRICA) 75 mg capsule     Sig: Take 1 capsule (75 mg total) by mouth 3 (three) times a day     Dispense:  90 capsule     Refill:  1       History of Present Illness:  Jose Hernandez is a 79 y o  female who presents for a follow up office visit in regards to Knee Pain (Hydrocodone this morning ); Leg Pain (Baclofen before bed ); Shoulder Pain (Morphine last night ); Hip Pain (Hydrocodone this morning ); Foot Pain (Baclofen last night before bed ); Headache (Ibuprofen ); and Neck Pain (Hydrocodone, baclofen, Morphine )  The patients current symptoms include neck and right arm pain, left low back pain bilateral knee and bilateral foot pain  She rates her pain 7/10, this is constant in nature most bothersome at night  She describes her pain as burning, sharp, throbbing, cramping, pressure-like, shooting, numbness, and pins and needles  Patient tells me that she recently took on a part-time job which is a mainly seated position, she does not have to lift anything  She has been working now for the past week    Current pain medications includes:  Hydrocodone 10 mg 4-5 tablets daily, morphine extended release 15 mg 1 tablet daily, Lyrica 100 mg 2-3 tablets daily, and baclofen 10 mg 3 tablets daily     The patient reports that this regimen is providing 40% pain relief  The patient is reporting no side effects from this pain medication regimen      Pain Contract Signed: February 14, 2018    Last UDS December 20, 2017     I have personally reviewed and/or updated the patient's past medical history, past surgical history, family history, social history, current medications, allergies, and vital signs today  Review of Systems   Respiratory: Negative for shortness of breath  Cardiovascular: Negative for chest pain  Gastrointestinal: Negative for constipation, diarrhea, nausea and vomiting  Musculoskeletal: Negative for arthralgias, gait problem, joint swelling and myalgias  Decreased ROM  Joint stiffness  Swelling in knees, hands and thighs  Pain in hands and feet     Skin: Negative for rash  Neurological: Negative for dizziness, seizures and weakness  Slight memory loss   All other systems reviewed and are negative        Patient Active Problem List   Diagnosis    Chronic pain syndrome    Cervical radiculopathy    Myofascial pain syndrome    Spondylosis of cervical region without myelopathy or radiculopathy    Fibromyalgia    Diabetic peripheral neuropathy ([facility] [address] )       Past Medical History:   Diagnosis Date    Anemia     Anxiety     hx of panic attacks (now under control)     Arthritis     Asthma     resolved (no problems in a decade)     Baker's cyst of knee     Bronchitis     Bunion, left foot     Cervical pain     Chronic GERD     Diabetic peripheral neuropathy ([facility] [address] )     Endometriosis     Fibromyalgia     Hyperlipidemia     Macular degeneration     Spondylosis        Past Surgical History:   Procedure Laterality Date    BACK SURGERY  1996    L5, S1- laser surgery fusion of c5 and c6     BREAST LUMPECTOMY Right     CHOLECYSTECTOMY  2004    FOOT SURGERY Left 2005    fusion     GASTRIC BYPASS  2010    Dr Lio Tavarez    just uterus     OVARIAN CYST REMOVAL  1975    PELVIC LAPAROSCOPY      ovaries (x10)     PLEURAL SCARIFICATION  1967    SHOULDER OPEN ROTATOR CUFF REPAIR Left 2008    TONSILLECTOMY      VAGINAL PROLAPSE REPAIR         Family History   Problem Relation Age of Onset    Hypertension Mother     Hypertension Father        Social History     Occupational History    Not on file  Social History Main Topics    Smoking status: Current Every Day Smoker     Packs/day: 1 50    Smokeless tobacco: Current User    Alcohol use 0 6 oz/week     1 Shots of liquor per week    Drug use: No    Sexual activity: Not on file       Current Outpatient Prescriptions on File Prior to Visit   Medication Sig    albuterol (PROVENTIL HFA,VENTOLIN HFA) 90 mcg/act inhaler Inhale 2 puffs every 6 (six) hours as needed for wheezing    ALPRAZolam (XANAX) 0 5 mg tablet Take 0 5 mg by mouth 2 (two) times a day as needed for anxiety    cyanocobalamin 100 MCG tablet Take 100 mcg by mouth daily    ergocalciferol (VITAMIN D2) 50,000 units Take 5,000 Units by mouth daily    levothyroxine 50 mcg tablet Take 50 mcg by mouth daily    pantoprazole (PROTONIX) 40 mg tablet Take 40 mg by mouth 2 (two) times a day    [DISCONTINUED] baclofen 10 mg tablet Take 10 mg by mouth 3 (three) times a day    [DISCONTINUED] HYDROcodone-acetaminophen (NORCO)  mg per tablet Take 1 tablet by mouth every 6 (six) hours as needed for moderate pain    [DISCONTINUED] MORPHINE SULFATE ER PO Take 15 mg by mouth    [DISCONTINUED] pregabalin (LYRICA) 75 mg capsule Take 75 mg by mouth 2 (two) times a day     No current facility-administered medications on file prior to visit          Allergies   Allergen Reactions    Cephalosporins Hives    Erythromycin     Fentanyl Itching     Occurred during surgery     Paxil [Paroxetine] Hives     After 2 weeks    Penicillins Itching    Pravastatin     Prednisolone     Rofecoxib Other (See Comments)     vioxx    Rosuvastatin     Statins Itching    Sulfa Antibiotics     Sulfacetamide     Sulfamethoxazole-Trimethoprim     Sulfur     Sulfur-Salicylic Acid [Salicylic Acid-Sulfur]      Unsure of reaction     Wellbutrin [Bupropion] Hives     After 2 weeks        Physical Exam:    /64   Pulse 74   Ht 5' 5" (1 651 m)   Wt 70 1 kg (154 lb 8 oz)   BMI 25 71 kg/m²     Constitutional:normal, well developed, well nourished, alert, in no distress and non-toxic and no overt pain behavior    Eyes:anicteric  HEENT:grossly intact  Neck:supple, symmetric, trachea midline and no masses   Pulmonary:even and unlabored  Cardiovascular:No edema or pitting edema present  Skin:Normal without rashes or lesions and well hydrated  Psychiatric:Mood and affect appropriate  Neurologic:Cranial Nerves II-XII grossly intact  Musculoskeletal:normal and ambulates with cane    Imaging

## 2018-02-22 ENCOUNTER — TELEPHONE (OUTPATIENT)
Dept: PAIN MEDICINE | Facility: MEDICAL CENTER | Age: 68
End: 2018-02-22

## 2018-02-22 NOTE — TELEPHONE ENCOUNTER
Troy Iglesias from [facility] called regarding pt's Lyrica 75mg and it's a decrease and pt was unaware  Please call [phone]

## 2018-02-22 NOTE — TELEPHONE ENCOUNTER
RN s/w Merlinda Blizzard the pharmacist from [facility]   Per Merlinda Blizzard the pt does have a current refill left on her old script of lyrica 100 mg one cap tid  Per pharmacist she will refill that script and destroy the script for 75 mg tid  RN advised Merlinda Blizzard that she would send this to AO who will be back in the office on 2/26 and can provide further recommendations      Merlinda Blizzard was appreciative of c/b and stated would call pt when ready for      -please advise on previous-

## 2018-02-22 NOTE — TELEPHONE ENCOUNTER
Pt of AO    -Please advise-    Per last sovs with AO, lyrica 75mg one cap tid was placed in chart, but lyrica 100mg one cap 2-3 times a day listed in AO's ov notes

## 2018-02-26 DIAGNOSIS — M54.12 CERVICAL RADICULOPATHY: ICD-10-CM

## 2018-02-26 RX ORDER — PREGABALIN 100 MG/1
100 CAPSULE ORAL 3 TIMES DAILY
Qty: 90 CAPSULE | Refills: 2 | Status: SHIPPED | OUTPATIENT
Start: 2018-02-26 | End: 2018-04-09 | Stop reason: SDUPTHER

## 2018-02-26 NOTE — TELEPHONE ENCOUNTER
LMOM on home # for pt to C/B, C/B # provided  Attempted to reach pt on mobile # and got message stating that # is not in service

## 2018-02-26 NOTE — TELEPHONE ENCOUNTER
Aware  No I will not rewrite the script  130 tablets is what she has been getting at every past appointment  She has 10 extra tablets for the month if she has to take 5 daily  That should only be if she is have Severe pain   Otherwise she should supplement with tylenol 1000mg TID or ibuprofen if she is able to take NSAID medication TID

## 2018-02-26 NOTE — TELEPHONE ENCOUNTER
Pt called stating she is aware that the office is holding the script for her until she can pick it up  Pt is also asking if a new script can be written for the Hydrocodone  States that it was written for 130 pills, 4x daily  Pt states that when she was in the office, she was told that she could take 4 or 5 a day  She is asking if the script can be rewritten to state that because if she ends up taking 5 a day, she will run our before the pharmacy will refill it  Pt is requesting a call back at [phone]

## 2018-02-26 NOTE — TELEPHONE ENCOUNTER
Aware   Patient can bring the script she has on Wednesday and I can void that one and write her one saying 4-5 daily

## 2018-02-26 NOTE — TELEPHONE ENCOUNTER
Aware, my fault, epic defaults to 75mg and I thought I changed that  It is now available to be picked up at the

## 2018-02-26 NOTE — TELEPHONE ENCOUNTER
S/W pt  Advised pt of the same  Pt stated last summer [facility] the [facility] and Bharti Mathews spoke to the office before  Pt stated her scripts are for 130 tablets and is not asking for any more pills  Pt stated the script needs to say to take 4 or 5 tablets daily as needed for a total of 130 pills  Pt stated it is the way the script is written and the march script would need to be fixed  Pt stated when it is written 4x daily as needed the pharmacy won't fill it to 2 1/2 days past the 30 days  When the script is written 4 or 5 tablets daily as needed the 10 extra pills are within the 30 day time frame  Please advise

## 2018-02-28 DIAGNOSIS — G89.4 CHRONIC PAIN SYNDROME: ICD-10-CM

## 2018-02-28 RX ORDER — HYDROCODONE BITARTRATE AND ACETAMINOPHEN 10; 325 MG/1; MG/1
TABLET ORAL
Qty: 130 TABLET | Refills: 0 | Status: SHIPPED | OUTPATIENT
Start: 2018-02-28 | End: 2018-04-09 | Stop reason: SDUPTHER

## 2018-02-28 NOTE — TELEPHONE ENCOUNTER
Patient's script will be at the front for pickup   Please make sure there is documentation of her returned RX

## 2018-03-01 NOTE — TELEPHONE ENCOUNTER
Late entry--    S/W Fernando Borges at  this am and made her aware pt needs to give [facility] her script to get the new one  She wrote a note with the new script

## 2018-03-08 ENCOUNTER — TELEPHONE (OUTPATIENT)
Dept: PAIN MEDICINE | Facility: CLINIC | Age: 68
End: 2018-03-08

## 2018-03-08 NOTE — TELEPHONE ENCOUNTER
Pt has not picked up script yet, script at  with note on it about pt bring other script to exchange it

## 2018-03-08 NOTE — TELEPHONE ENCOUNTER
RN attempted to reach pt on ph# provided  Per Ed who is listed on release of information in chart, pt is at work  Detailed message given to Ed stating that we will send this information to AO who will be in the office on 3/9 and we will get back to the pt or Ed at that time  Ed appreciative of c/b and stated will inform pt of same  ---please advise on above thank you--  Willing to let pt have new scripts without old scripts in return due to being able to track on PDMP?

## 2018-03-08 NOTE — TELEPHONE ENCOUNTER
---please advise thank you-- (pt of AO)  Would you be ok with pt picking up the new scripts without having the old scripts due to being able to track what is filled on the PDMP?

## 2018-03-08 NOTE — TELEPHONE ENCOUNTER
Pt received scripts from [place] on 2/14/18 - when she went to the pharmacy that stated the scripts were written wrong  Pt called in to explain what happened  She was informed that she has to turn the 2/14/18 scripts in so that new scripts can be written  She cannot find the 2/14/18 scripts anywhere  She believes they were thrown out  How do we procede?

## 2018-03-09 NOTE — TELEPHONE ENCOUNTER
Pt called stating that she found both scripts  The one for the Morphine is fine  She will take that to the pharmacy when she is due for a refill  However, the rx for the Hydrocodone was written incorrectly  It states max daily amount is 4 tablets, but it should read 4 to 5 tablets daily  Pt asking if a new rx can be sent to the pharmacy and she will drop the old one off  If not, pt is asking if she can drop off the script to the office this afternoon and  the new one  Please call pt back at work at [phone], and ask for John Wilder

## 2018-03-09 NOTE — TELEPHONE ENCOUNTER
2 open tasks for pt  Lm at work for pt to cb  There is a corrected script available for pickup in [place]  Pt needs to turn in incorrect script

## 2018-03-09 NOTE — TELEPHONE ENCOUNTER
Pt is calling back today saying that she did lose the scripts and would like a call back   Please call pt back at [phone]

## 2018-03-09 NOTE — TELEPHONE ENCOUNTER
I'm not really comfortable doing that only because she did know right away that we needed that script back  Per our opioid agreement we will not replace lost or stolen scripts  Since I was making an adjustment I would need that script returned

## 2018-03-12 ENCOUNTER — TELEPHONE (OUTPATIENT)
Dept: PAIN MEDICINE | Facility: MEDICAL CENTER | Age: 68
End: 2018-03-12

## 2018-03-12 NOTE — TELEPHONE ENCOUNTER
Jason high from [address] called questioning the hydrocodone script they received  Please call [phone]

## 2018-03-12 NOTE — TELEPHONE ENCOUNTER
--MICHAEL--    RN s/w Haroon Jerry from [facility], per Haroon Jerry she was aware that the pt had issues with her scipts that were first written and that AO wrote her a new script with the stipulation that she had to return her old scripts  RN let Haroon Jerry know that she may fill this script, she returned the original ones

## 2018-04-09 ENCOUNTER — OFFICE VISIT (OUTPATIENT)
Dept: PAIN MEDICINE | Facility: MEDICAL CENTER | Age: 68
End: 2018-04-09
Payer: COMMERCIAL

## 2018-04-09 VITALS — TEMPERATURE: 98.4 F | WEIGHT: 152 LBS | BODY MASS INDEX: 25.29 KG/M2

## 2018-04-09 DIAGNOSIS — G89.4 CHRONIC PAIN SYNDROME: ICD-10-CM

## 2018-04-09 DIAGNOSIS — M47.812 SPONDYLOSIS OF CERVICAL REGION WITHOUT MYELOPATHY OR RADICULOPATHY: ICD-10-CM

## 2018-04-09 DIAGNOSIS — M79.7 FIBROMYALGIA: ICD-10-CM

## 2018-04-09 DIAGNOSIS — M79.18 MYOFASCIAL PAIN SYNDROME: Primary | ICD-10-CM

## 2018-04-09 DIAGNOSIS — Z79.891 LONG-TERM CURRENT USE OF OPIATE ANALGESIC: ICD-10-CM

## 2018-04-09 DIAGNOSIS — M54.12 CERVICAL RADICULOPATHY: ICD-10-CM

## 2018-04-09 DIAGNOSIS — E11.42 DIABETIC PERIPHERAL NEUROPATHY (HCC): ICD-10-CM

## 2018-04-09 PROCEDURE — 99214 OFFICE O/P EST MOD 30 MIN: CPT | Performed by: NURSE PRACTITIONER

## 2018-04-09 RX ORDER — HYDROCODONE BITARTRATE AND ACETAMINOPHEN 10; 325 MG/1; MG/1
TABLET ORAL
Qty: 130 TABLET | Refills: 0 | Status: SHIPPED | OUTPATIENT
Start: 2018-05-07 | End: 2018-06-06 | Stop reason: SDUPTHER

## 2018-04-09 RX ORDER — HYDROCODONE BITARTRATE AND ACETAMINOPHEN 10; 325 MG/1; MG/1
TABLET ORAL
Qty: 130 TABLET | Refills: 0 | Status: SHIPPED | OUTPATIENT
Start: 2018-04-09 | End: 2018-04-09 | Stop reason: SDUPTHER

## 2018-04-09 RX ORDER — FOLIC ACID 1 MG/1
1 TABLET ORAL
COMMUNITY
End: 2018-06-06

## 2018-04-09 RX ORDER — DIPHENHYDRAMINE HCL 25 MG
50 CAPSULE ORAL DAILY
COMMUNITY
Start: 2017-07-05

## 2018-04-09 RX ORDER — AMITRIPTYLINE HYDROCHLORIDE 10 MG/1
TABLET, FILM COATED ORAL
COMMUNITY
Start: 2017-05-19 | End: 2018-06-06

## 2018-04-09 RX ORDER — AMLODIPINE BESYLATE 5 MG/1
TABLET ORAL
COMMUNITY
Start: 2017-02-14 | End: 2018-08-23 | Stop reason: SDUPTHER

## 2018-04-09 RX ORDER — AMLODIPINE BESYLATE 5 MG/1
5 TABLET ORAL DAILY
Refills: 1 | COMMUNITY
Start: 2018-04-04 | End: 2018-06-06

## 2018-04-09 RX ORDER — AMOXICILLIN 125 MG/5ML
POWDER, FOR SUSPENSION ORAL
COMMUNITY
Start: 2016-08-03 | End: 2018-06-06

## 2018-04-09 RX ORDER — BACLOFEN 10 MG/1
10 TABLET ORAL 3 TIMES DAILY
Qty: 90 TABLET | Refills: 0 | Status: SHIPPED | OUTPATIENT
Start: 2018-04-09 | End: 2018-06-06 | Stop reason: SDUPTHER

## 2018-04-09 RX ORDER — PREGABALIN 100 MG/1
100 CAPSULE ORAL 3 TIMES DAILY
Qty: 90 CAPSULE | Refills: 1 | Status: SHIPPED | OUTPATIENT
Start: 2018-04-09 | End: 2018-06-06 | Stop reason: SDUPTHER

## 2018-04-09 NOTE — PROGRESS NOTES
Pt is c/o shoulder pain  Assessment:  1  Myofascial pain syndrome    2  Cervical radiculopathy    3  Spondylosis of cervical region without myelopathy or radiculopathy    4  Chronic pain syndrome    5  Fibromyalgia    6  Diabetic peripheral neuropathy ([facility] [address] )    7  Long-term current use of opiate analgesic        Plan: At this time, the patient can continue with her current medication regimen  I will refill her hydrocodone for her today she was given a 2 month supply of the medication with 1 month having a do not fill date of May 7, 2018  With regards to the morphine extended release the patient tells me that she only takes this about 3 or 4 times a week  I did discuss with her that overall I feel would be the best option to get her off of this medication completely  Patient was agreeable and will begin to wean down to 2 tablets per week then 1 tablet then stop  This medication was not refilled for her today  There are risks associated with opioid medications, including dependence, addiction and tolerance  The patient understands and agrees to use these medications only as prescribed  Potential side effects of the medications include, but are not limited to, constipation, drowsiness, addiction, impaired judgment and risk of fatal overdose if not taken as prescribed  The patient was warned against driving while taking sedation medications  Sharing medications is a felony  At this point in time, the patient is showing no signs of addiction, abuse, diversion or suicidal ideation  A urine drug screen was collected at today's office visit as part of our medication management protocol  The point of care testing results were appropriate for what was being prescribed  The specimen will be sent for confirmatory testing   The drug screen is medically necessary because the patient is either dependent on opioid medication or is being considered for opioid medication therapy and the results could impact ongoing or future treatment  The drug screen is to evaluate for the presences or absence of prescribed, non-prescribed, and/or illicit drugs/substances  [place] Prescription Drug Monitoring Program report was reviewed and was appropriate       History of Present Illness: The patient is a 79 y o  female who presents for a follow up office visit in regards to Shoulder Pain  The patients current symptoms include neck and bilateral knee pain rated 7/10, this is constant in nature and described as burning, dull, aching, sharp, throbbing, shooting, and pins and needles  Current pain medications includes:  Hydrocodone 10 mg 4-5 tablets daily, morphine extended release 15 mg she takes 3 or 4 tablets per week she also uses Lyrica 100 mg 2-3 tablets daily, and baclofen 10 mg 3 times per day     The patient reports that this regimen is providing 40% pain relief  The patient is reporting no side effects from this pain medication regimen  I have personally reviewed and/or updated the patient's past medical history, past surgical history, family history, social history, current medications, allergies, and vital signs today  Review of Systems  Review of Systems   Respiratory: Negative for shortness of breath  Cardiovascular: Negative for chest pain  Gastrointestinal: Positive for nausea  Negative for constipation, diarrhea and vomiting  Genitourinary: Vaginal discharge: difficulty walking  Musculoskeletal: Negative for arthralgias, gait problem, joint swelling and myalgias  Decreased rom     Skin: Negative for rash  Neurological: Positive for dizziness  Negative for seizures and weakness  All other systems reviewed and are negative          Past Medical History:   Diagnosis Date    Anemia     Anxiety     hx of panic attacks (now under control)     Arthritis     Asthma     resolved (no problems in a decade)     Baker's cyst of knee     Bronchitis     Bunion, left foot     Cervical pain     Chronic GERD     Diabetic peripheral neuropathy (HCC)     Endometriosis     Fibromyalgia     Hyperlipidemia     Macular degeneration     Spondylosis        Past Surgical History:   Procedure Laterality Date    BACK SURGERY  1996    L5, S1- laser surgery fusion of c5 and c6     BREAST LUMPECTOMY Right     CHOLECYSTECTOMY  2004    FOOT SURGERY Left 2005    fusion    Leolaeddie Seller  2010    Dr Jazzmine Monaco    just uterus     OVARIAN CYST REMOVAL  1975    PELVIC LAPAROSCOPY      ovaries (x10)     PLEURAL SCARIFICATION  1967    SHOULDER OPEN ROTATOR CUFF REPAIR Left 2008    TONSILLECTOMY      VAGINAL PROLAPSE REPAIR         Family History   Problem Relation Age of Onset    Hypertension Mother     Hypertension Father        Social History     Occupational History    Not on file       Social History Main Topics    Smoking status: Current Every Day Smoker     Packs/day: 1 50    Smokeless tobacco: Current User    Alcohol use 0 6 oz/week     1 Shots of liquor per week    Drug use: No    Sexual activity: Not on file         Current Outpatient Prescriptions:     albuterol (PROVENTIL HFA,VENTOLIN HFA) 90 mcg/act inhaler, Inhale 2 puffs every 6 (six) hours as needed for wheezing, Disp: , Rfl:     amLODIPine (NORVASC) 5 mg tablet, TAKE 1 TABLET BY MOUTH EVERY DAY, Disp: , Rfl:     baclofen 10 mg tablet, Take 1 tablet (10 mg total) by mouth 3 (three) times a day, Disp: 90 tablet, Rfl: 0    cyanocobalamin 100 MCG tablet, Take 100 mcg by mouth daily, Disp: , Rfl:     diphenhydrAMINE (BENADRYL ALLERGY) 25 mg capsule, Take by mouth, Disp: , Rfl:     Fluticasone Furoate (ARNUITY ELLIPTA) 200 MCG/ACT AEPB, Inhale, Disp: , Rfl:     folic acid (FOLVITE) 1 mg tablet, Take 1 mg by mouth, Disp: , Rfl:     [START ON 5/7/2018] HYDROcodone-acetaminophen (NORCO)  mg per tablet, Take 4-5 tablets daily PRN, Disp: 130 tablet, Rfl: 0    levothyroxine 50 mcg tablet, Take 50 mcg by mouth daily, Disp: , Rfl:     pantoprazole (PROTONIX) 40 mg tablet, Take 40 mg by mouth 2 (two) times a day, Disp: , Rfl:     pregabalin (LYRICA) 100 mg capsule, Take 1 capsule (100 mg total) by mouth 3 (three) times a day, Disp: 90 capsule, Rfl: 1    ALPRAZolam (XANAX) 0 5 mg tablet, Take 0 5 mg by mouth 2 (two) times a day as needed for anxiety, Disp: , Rfl:     amitriptyline (ELAVIL) 10 mg tablet, Take by mouth, Disp: , Rfl:     amLODIPine (NORVASC) 5 mg tablet, Take 5 mg by mouth daily, Disp: , Rfl: 1    amoxicillin (AMOXIL) 125 mg/5 mL oral suspension, Reported on 3/1/2017, Disp: , Rfl:     Cyanocobalamin 1000 MCG/15ML LIQD, Take by mouth, Disp: , Rfl:     ergocalciferol (VITAMIN D2) 50,000 units, Take 5,000 Units by mouth daily, Disp: , Rfl:     Allergies   Allergen Reactions    Cephalosporins Hives    Erythromycin     Fentanyl Itching     Occurred during surgery     Paxil [Paroxetine] Hives     After 2 weeks    Penicillins Itching    Pravastatin     Prednisolone     Rofecoxib Other (See Comments)     vioxx    Rosuvastatin     Statins Itching    Sulfa Antibiotics     Sulfacetamide     Sulfamethoxazole-Trimethoprim     Sulfur     Sulfur-Salicylic Acid [Salicylic Acid-Sulfur]      Unsure of reaction     Wellbutrin [Bupropion] Hives     After 2 weeks        Physical Exam:    Temp 98 4 °F (36 9 °C)   Wt 68 9 kg (152 lb)   BMI 25 29 kg/m²     Constitutional:normal, well developed, well nourished, alert, in no distress and non-toxic and no overt pain behavior    Eyes:anicteric  HEENT:grossly intact  Neck:supple, symmetric, trachea midline and no masses   Pulmonary:even and unlabored  Cardiovascular:No edema or pitting edema present  Skin:Normal without rashes or lesions and well hydrated  Psychiatric:Mood and affect appropriate  Neurologic:Cranial Nerves II-XII grossly intact  Musculoskeletal:ambulates with cane    Imaging  No orders to display       No orders of the defined types were placed in this encounter

## 2018-04-27 LAB
25(OH)D3 SERPL-MCNC: 48 NG/ML (ref 30–100)
ALBUMIN SERPL-MCNC: 4.3 G/DL (ref 3.6–5.1)
ALBUMIN/GLOB SERPL: 1.9 (CALC) (ref 1–2.5)
ALP SERPL-CCNC: 112 U/L (ref 33–130)
ALT SERPL-CCNC: 9 U/L (ref 6–29)
APPEARANCE UR: ABNORMAL
AST SERPL-CCNC: 15 U/L (ref 10–35)
BACTERIA UR QL AUTO: ABNORMAL /HPF
BASOPHILS # BLD AUTO: 32 CELLS/UL (ref 0–200)
BASOPHILS NFR BLD AUTO: 0.5 %
BILIRUB DIRECT SERPL-MCNC: 0.1 MG/DL
BILIRUB INDIRECT SERPL-MCNC: 0.4 MG/DL (CALC) (ref 0.2–1.2)
BILIRUB SERPL-MCNC: 0.5 MG/DL (ref 0.2–1.2)
BILIRUB UR QL STRIP: NEGATIVE
BUN SERPL-MCNC: 13 MG/DL (ref 7–25)
BUN/CREAT SERPL: NORMAL (CALC) (ref 6–22)
CALCIUM SERPL-MCNC: 9.4 MG/DL (ref 8.6–10.4)
CHLORIDE SERPL-SCNC: 105 MMOL/L (ref 98–110)
CHOLEST SERPL-MCNC: 207 MG/DL
CHOLEST/HDLC SERPL: 2.6 (CALC)
CO2 SERPL-SCNC: 30 MMOL/L (ref 20–31)
COLOR UR: ABNORMAL
CREAT SERPL-MCNC: 0.9 MG/DL (ref 0.5–0.99)
EOSINOPHIL # BLD AUTO: 102 CELLS/UL (ref 15–500)
EOSINOPHIL NFR BLD AUTO: 1.6 %
ERYTHROCYTE [DISTWIDTH] IN BLOOD BY AUTOMATED COUNT: 13.2 % (ref 11–15)
GLOBULIN SER CALC-MCNC: 2.3 G/DL (CALC) (ref 1.9–3.7)
GLUCOSE SERPL-MCNC: 76 MG/DL (ref 65–99)
GLUCOSE UR QL STRIP: NEGATIVE
HBA1C MFR BLD: 5.1 % OF TOTAL HGB
HCT VFR BLD AUTO: 38.6 % (ref 35–45)
HDLC SERPL-MCNC: 81 MG/DL
HGB BLD-MCNC: 13 G/DL (ref 11.7–15.5)
HGB UR QL STRIP: ABNORMAL
HYALINE CASTS #/AREA URNS LPF: ABNORMAL /LPF
KETONES UR QL STRIP: NEGATIVE
LDLC SERPL CALC-MCNC: 108 MG/DL (CALC)
LEUKOCYTE ESTERASE UR QL STRIP: ABNORMAL
LYMPHOCYTES # BLD AUTO: 1702 CELLS/UL (ref 850–3900)
LYMPHOCYTES NFR BLD AUTO: 26.6 %
MAGNESIUM SERPL-MCNC: 2.2 MG/DL (ref 1.5–2.5)
MCH RBC QN AUTO: 33 PG (ref 27–33)
MCHC RBC AUTO-ENTMCNC: 33.7 G/DL (ref 32–36)
MCV RBC AUTO: 98 FL (ref 80–100)
MONOCYTES # BLD AUTO: 474 CELLS/UL (ref 200–950)
MONOCYTES NFR BLD AUTO: 7.4 %
NEUTROPHILS # BLD AUTO: 4090 CELLS/UL (ref 1500–7800)
NEUTROPHILS NFR BLD AUTO: 63.9 %
NITRITE UR QL STRIP: POSITIVE
NONHDLC SERPL-MCNC: 126 MG/DL (CALC)
PH UR STRIP: 5.5 [PH] (ref 5–8)
PLATELET # BLD AUTO: 273 THOUSAND/UL (ref 140–400)
PMV BLD REES-ECKER: 9.9 FL (ref 7.5–12.5)
POTASSIUM SERPL-SCNC: 4.2 MMOL/L (ref 3.5–5.3)
PROT SERPL-MCNC: 6.6 G/DL (ref 6.1–8.1)
PROT UR QL STRIP: ABNORMAL
RBC # BLD AUTO: 3.94 MILLION/UL (ref 3.8–5.1)
RBC #/AREA URNS HPF: ABNORMAL /HPF
SL AMB EGFR AFRICAN AMERICAN: 77 ML/MIN/1.73M2
SL AMB EGFR NON AFRICAN AMERICAN: 66 ML/MIN/1.73M2
SODIUM SERPL-SCNC: 141 MMOL/L (ref 135–146)
SP GR UR STRIP: 1.02 (ref 1–1.03)
SQUAMOUS #/AREA URNS HPF: ABNORMAL /HPF
T4 FREE SERPL-MCNC: 1.1 NG/DL (ref 0.8–1.8)
TRIGL SERPL-MCNC: 86 MG/DL
TSH SERPL-ACNC: 2.19 MIU/L (ref 0.4–4.5)
VIT B12 SERPL-MCNC: 179 PG/ML (ref 200–1100)
WBC # BLD AUTO: 6.4 THOUSAND/UL (ref 3.8–10.8)
WBC #/AREA URNS HPF: ABNORMAL /HPF

## 2018-05-29 DIAGNOSIS — M79.18 MYOFASCIAL PAIN SYNDROME: ICD-10-CM

## 2018-06-04 RX ORDER — BACLOFEN 10 MG/1
10 TABLET ORAL 3 TIMES DAILY
Qty: 90 TABLET | Refills: 0 | OUTPATIENT
Start: 2018-06-04

## 2018-06-06 ENCOUNTER — OFFICE VISIT (OUTPATIENT)
Dept: PAIN MEDICINE | Facility: MEDICAL CENTER | Age: 68
End: 2018-06-06
Payer: COMMERCIAL

## 2018-06-06 VITALS — SYSTOLIC BLOOD PRESSURE: 120 MMHG | BODY MASS INDEX: 25.63 KG/M2 | WEIGHT: 154 LBS | DIASTOLIC BLOOD PRESSURE: 70 MMHG

## 2018-06-06 DIAGNOSIS — M79.18 MYOFASCIAL PAIN SYNDROME: ICD-10-CM

## 2018-06-06 DIAGNOSIS — M47.812 SPONDYLOSIS OF CERVICAL REGION WITHOUT MYELOPATHY OR RADICULOPATHY: ICD-10-CM

## 2018-06-06 DIAGNOSIS — M54.12 CERVICAL RADICULOPATHY: Primary | ICD-10-CM

## 2018-06-06 DIAGNOSIS — M79.7 FIBROMYALGIA: ICD-10-CM

## 2018-06-06 DIAGNOSIS — G89.4 CHRONIC PAIN SYNDROME: ICD-10-CM

## 2018-06-06 PROCEDURE — 99214 OFFICE O/P EST MOD 30 MIN: CPT | Performed by: NURSE PRACTITIONER

## 2018-06-06 RX ORDER — BACLOFEN 10 MG/1
10 TABLET ORAL 3 TIMES DAILY
Qty: 90 TABLET | Refills: 1 | Status: SHIPPED | OUTPATIENT
Start: 2018-06-06 | End: 2018-07-30 | Stop reason: SDUPTHER

## 2018-06-06 RX ORDER — PREGABALIN 100 MG/1
100 CAPSULE ORAL 3 TIMES DAILY
Qty: 90 CAPSULE | Refills: 1 | Status: SHIPPED | OUTPATIENT
Start: 2018-06-06 | End: 2018-08-01 | Stop reason: SDUPTHER

## 2018-06-06 RX ORDER — HYDROCODONE BITARTRATE AND ACETAMINOPHEN 10; 325 MG/1; MG/1
TABLET ORAL
Qty: 130 TABLET | Refills: 0 | Status: SHIPPED | OUTPATIENT
Start: 2018-06-06 | End: 2018-06-06 | Stop reason: SDUPTHER

## 2018-06-06 RX ORDER — HYDROCODONE BITARTRATE AND ACETAMINOPHEN 10; 325 MG/1; MG/1
TABLET ORAL
Qty: 130 TABLET | Refills: 0 | Status: SHIPPED | OUTPATIENT
Start: 2018-07-04 | End: 2018-08-01 | Stop reason: SDUPTHER

## 2018-06-06 NOTE — PROGRESS NOTES
Pt is c/o generalized body pain  Assessment:  1  Cervical radiculopathy    2  Myofascial pain syndrome    3  Spondylosis of cervical region without myelopathy or radiculopathy    4  Chronic pain syndrome    5  Fibromyalgia        Plan:  Continue hydrocodone as prescribed she was given a 2 month supply with 1 month having a do not fill date of July 4, 2018  Continue with Lyrica as prescribed this was refilled today    Baclofen was also refilled today  Follow-up in 8 weeks for medication refills        There are risks associated with opioid medications, including dependence, addiction and tolerance  The patient understands and agrees to use these medications only as prescribed  Potential side effects of the medications include, but are not limited to, constipation, drowsiness, addiction, impaired judgment and risk of fatal overdose if not taken as prescribed  The patient was warned against driving while taking sedation medications  Sharing medications is a felony  At this point in time, the patient is showing no signs of addiction, abuse, diversion or suicidal ideation  [place] Pres[facility] Drug Monitoring Program report was reviewed and was appropriate       History of Present Illness: The patient is a 79 y o  female who presents for a follow up office visit in regards to Back Pain; Neck Pain; Knee Pain; and Foot Pain  The patients current symptoms include low back, bilateral knee and bilateral ankle pain  She rates her pain 7/10, this is constant in nature most bothersome at night  She describes her pain as burning, dull, aching, sharp, throbbing, pressure-like, shooting, numbness, and pins and needles  Current pain medications includes:  Hydrocodone 10/325 mg 1 tablet 4-5 times daily as needed  She also takes Lyrica 100 mg 2-3 tablets daily and baclofen 10 mg 3 times daily The patient reports that this regimen is providing 40-50 % pain relief    The patient is reporting no side effects from this pain medication regimen  Patient is currently taking levothyroxine seen and Prednisone for an upper respiratory a flare up after aspiration  I have personally reviewed and/or updated the patient's past medical history, past surgical history, family history, social history, current medications, allergies, and vital signs today  Review of Systems  Review of Systems   Respiratory: Negative for shortness of breath  Cardiovascular: Negative for chest pain  Gastrointestinal: Positive for nausea  Negative for constipation, diarrhea and vomiting  Musculoskeletal: Negative for arthralgias, gait problem, joint swelling and myalgias  Difficulty walking  Decreased rom  Joint stiffness   Skin: Negative for rash  Neurological: Positive for dizziness  Negative for seizures and weakness  All other systems reviewed and are negative          Past Medical History:   Diagnosis Date    Anemia     Anxiety     hx of panic attacks (now under control)     Arthritis     Asthma     resolved (no problems in a decade)     Baker's cyst of knee     Bronchitis     Bunion, left foot     Cervical pain     Chronic GERD     Diabetic peripheral neuropathy (HCC)     Endometriosis     Fibromyalgia     Hyperlipidemia     Macular degeneration     Spondylosis        Past Surgical History:   Procedure Laterality Date    BACK SURGERY  1996    L5, S1- laser surgery fusion of c5 and c6     BREAST LUMPECTOMY Right     CHOLECYSTECTOMY  2004    FOOT SURGERY Left 2005    fusion    Cheryle Mutmasood  2010    Dr Gan Real    just uterus     OVARIAN CYST REMOVAL  1975    PELVIC LAPAROSCOPY      ovaries (x10)     PLEURAL SCARIFICATION  1967    SHOULDER OPEN ROTATOR CUFF REPAIR Left 2008    TONSILLECTOMY      VAGINAL PROLAPSE REPAIR         Family History   Problem Relation Age of Onset    Hypertension Mother     Hypertension Father        Social History     Occupational History  Not on file       Social History Main Topics    Smoking status: Current Every Day Smoker     Packs/day: 1 50    Smokeless tobacco: Current User    Alcohol use 0 6 oz/week     1 Shots of liquor per week    Drug use: No    Sexual activity: Not on file         Current Outpatient Prescriptions:     albuterol (PROVENTIL HFA,VENTOLIN HFA) 90 mcg/act inhaler, Inhale 2 puffs every 6 (six) hours as needed for wheezing, Disp: , Rfl:     ALPRAZolam (XANAX) 0 5 mg tablet, Take 0 5 mg by mouth 2 (two) times a day as needed for anxiety, Disp: , Rfl:     amLODIPine (NORVASC) 5 mg tablet, TAKE 1 TABLET BY MOUTH EVERY DAY, Disp: , Rfl:     baclofen 10 mg tablet, Take 1 tablet (10 mg total) by mouth 3 (three) times a day, Disp: 90 tablet, Rfl: 1    cyanocobalamin 100 MCG tablet, Take 100 mcg by mouth daily, Disp: , Rfl:     Cyanocobalamin 1000 MCG/15ML LIQD, Take by mouth, Disp: , Rfl:     diphenhydrAMINE (BENADRYL ALLERGY) 25 mg capsule, Take by mouth, Disp: , Rfl:     Fluticasone Furoate (ARNUITY ELLIPTA) 200 MCG/ACT AEPB, Inhale, Disp: , Rfl:     [START ON 7/4/2018] HYDROcodone-acetaminophen (NORCO)  mg per tablet, Take 4-5 tablets daily PRN, Disp: 130 tablet, Rfl: 0    levothyroxine 50 mcg tablet, Take 50 mcg by mouth daily, Disp: , Rfl:     pantoprazole (PROTONIX) 40 mg tablet, Take 40 mg by mouth 2 (two) times a day, Disp: , Rfl:     pregabalin (LYRICA) 100 mg capsule, Take 1 capsule (100 mg total) by mouth 3 (three) times a day, Disp: 90 capsule, Rfl: 1    Allergies   Allergen Reactions    Cephalosporins Hives    Erythromycin     Fentanyl Itching     Occurred during surgery     Paxil [Paroxetine] Hives     After 2 weeks    Penicillins Itching    Pravastatin     Prednisolone     Rofecoxib Other (See Comments)     vioxx    Rosuvastatin     Statins Itching    Sulfa Antibiotics     Sulfacetamide     Sulfamethoxazole-Trimethoprim     Sulfur     Sulfur-Salicylic Acid [Salicylic Acid-Sulfur] Unsure of reaction     Wellbutrin [Bupropion] Hives     After 2 weeks        Physical Exam:    /70   Wt 69 9 kg (154 lb)   BMI 25 63 kg/m²     Constitutional:normal, well developed, well nourished, alert, in no distress and non-toxic and no overt pain behavior  Eyes:anicteric  HEENT:grossly intact  Neck:supple, symmetric, trachea midline and no masses   Pulmonary:even and unlabored  Cardiovascular:No edema or pitting edema present  Skin:Normal without rashes or lesions and well hydrated  Psychiatric:Mood and affect appropriate  Neurologic:Cranial Nerves II-XII grossly intact  Musculoskeletal:ambulates with cane    Imaging  No orders to display       No orders of the defined types were placed in this encounter

## 2018-06-07 ENCOUNTER — TELEPHONE (OUTPATIENT)
Dept: PAIN MEDICINE | Facility: MEDICAL CENTER | Age: 68
End: 2018-06-07

## 2018-06-07 NOTE — TELEPHONE ENCOUNTER
S/W Veronica Mcleod as [facility]   She wanted to clarify how far apart the pills of hydrocodone should be taken  The script states     take 4-5 tablets daily PRLIZA Mcleod is asking if it is okay to put on the script take 1 tablet 4 to 5 times a day as needed  Please advise

## 2018-06-07 NOTE — TELEPHONE ENCOUNTER
[facility] called regarding rx for Hydro[facility]  Instructions state to take 4 - 5 tablets daily as needed  They need a little more clarification on those instructions, such as how far apart they should be taken  Please call [facility] at [phone]

## 2018-06-07 NOTE — TELEPHONE ENCOUNTER
S/W Jolene Pinedo at [facility] and advised her of the same  She verbalized understanding

## 2018-06-07 NOTE — TELEPHONE ENCOUNTER
[facility] left voicemail at 10:50am regarding earlier phone call  States that they need clarification prior to being able to fill patient's medication  They requested a phone call back as soon as possible  Can be reached at [phone]

## 2018-06-12 LAB
APPEARANCE UR: CLEAR
BILIRUB UR QL STRIP: NEGATIVE
COLOR UR: YELLOW
FERRITIN SERPL-MCNC: 13 NG/ML (ref 20–288)
FOLATE SERPL-MCNC: 4.1 NG/ML
GLUCOSE UR QL STRIP: NEGATIVE
HGB UR QL STRIP: NEGATIVE
KETONES UR QL STRIP: NEGATIVE
LEUKOCYTE ESTERASE UR QL STRIP: NEGATIVE
NITRITE UR QL STRIP: NEGATIVE
PH UR STRIP: 6 [PH] (ref 5–8)
PROT UR QL STRIP: NEGATIVE
SP GR UR STRIP: 1.02 (ref 1–1.03)
VIT A SERPL-MCNC: 35 MCG/DL (ref 38–98)
VIT B1 BLD-SCNC: 86 NMOL/L (ref 78–185)
VIT B6 SERPL-MCNC: 2.9 NG/ML (ref 2.1–21.7)
ZINC SERPL-MCNC: 58 MCG/DL (ref 60–130)

## 2018-06-28 DIAGNOSIS — G47.00 INSOMNIA, UNSPECIFIED TYPE: Primary | ICD-10-CM

## 2018-06-28 RX ORDER — TRAZODONE HYDROCHLORIDE 50 MG/1
50 TABLET ORAL
COMMUNITY
Start: 2018-05-31 | End: 2018-07-12

## 2018-06-28 RX ORDER — TRAZODONE HYDROCHLORIDE 50 MG/1
50 TABLET ORAL
Qty: 30 TABLET | Refills: 1 | Status: SHIPPED | OUTPATIENT
Start: 2018-06-28 | End: 2018-09-26 | Stop reason: SDUPTHER

## 2018-06-30 DIAGNOSIS — K27.9 PUD (PEPTIC ULCER DISEASE): Primary | ICD-10-CM

## 2018-07-02 RX ORDER — SUCRALFATE 1 G/1
TABLET ORAL
Qty: 120 TABLET | Refills: 1 | Status: SHIPPED | OUTPATIENT
Start: 2018-07-02 | End: 2018-08-28 | Stop reason: SDUPTHER

## 2018-07-12 ENCOUNTER — OFFICE VISIT (OUTPATIENT)
Dept: FAMILY MEDICINE CLINIC | Facility: CLINIC | Age: 68
End: 2018-07-12
Payer: COMMERCIAL

## 2018-07-12 VITALS
HEIGHT: 66 IN | HEART RATE: 68 BPM | TEMPERATURE: 98.7 F | RESPIRATION RATE: 14 BRPM | DIASTOLIC BLOOD PRESSURE: 68 MMHG | BODY MASS INDEX: 25.26 KG/M2 | WEIGHT: 157.2 LBS | SYSTOLIC BLOOD PRESSURE: 126 MMHG | OXYGEN SATURATION: 96 %

## 2018-07-12 DIAGNOSIS — E16.2 HYPOGLYCEMIA: Primary | ICD-10-CM

## 2018-07-12 DIAGNOSIS — J43.9 PULMONARY EMPHYSEMA, UNSPECIFIED EMPHYSEMA TYPE (HCC): ICD-10-CM

## 2018-07-12 DIAGNOSIS — R79.89 LOW SERUM VITAMIN A: ICD-10-CM

## 2018-07-12 DIAGNOSIS — D51.8 VITAMIN B12 DEFICIENCY (DIETARY) ANEMIA: ICD-10-CM

## 2018-07-12 DIAGNOSIS — D50.8 OTHER IRON DEFICIENCY ANEMIA: ICD-10-CM

## 2018-07-12 DIAGNOSIS — E60 LOW ZINC LEVEL: ICD-10-CM

## 2018-07-12 DIAGNOSIS — E53.8 LOW FOLATE: ICD-10-CM

## 2018-07-12 DIAGNOSIS — R10.13 EPIGASTRIC PAIN: ICD-10-CM

## 2018-07-12 DIAGNOSIS — R00.2 PALPITATIONS: ICD-10-CM

## 2018-07-12 PROCEDURE — 99214 OFFICE O/P EST MOD 30 MIN: CPT | Performed by: INTERNAL MEDICINE

## 2018-07-12 PROCEDURE — 96372 THER/PROPH/DIAG INJ SC/IM: CPT | Performed by: INTERNAL MEDICINE

## 2018-07-12 RX ORDER — LANCING DEVICE/LANCETS
KIT MISCELLANEOUS 3 TIMES DAILY
COMMUNITY
Start: 2015-09-23

## 2018-07-12 RX ORDER — LOSARTAN POTASSIUM 25 MG/1
TABLET ORAL EVERY 24 HOURS
COMMUNITY
Start: 2018-02-20 | End: 2019-04-18 | Stop reason: SDUPTHER

## 2018-07-12 RX ORDER — ALBUTEROL SULFATE 2.5 MG/3ML
SOLUTION RESPIRATORY (INHALATION)
Refills: 1 | COMMUNITY
Start: 2018-06-08 | End: 2018-09-07

## 2018-07-12 RX ORDER — LANCETS
EACH MISCELLANEOUS
COMMUNITY

## 2018-07-12 RX ORDER — ALBUTEROL SULFATE 90 UG/1
AEROSOL, METERED RESPIRATORY (INHALATION)
COMMUNITY
Start: 2017-10-17

## 2018-07-12 RX ORDER — FEXOFENADINE HCL 180 MG/1
TABLET ORAL EVERY 24 HOURS
COMMUNITY
Start: 2017-08-04 | End: 2018-07-31 | Stop reason: ALTCHOICE

## 2018-07-12 RX ORDER — CYANOCOBALAMIN 1000 UG/ML
1000 INJECTION INTRAMUSCULAR; SUBCUTANEOUS
Status: SHIPPED | OUTPATIENT
Start: 2018-07-12 | End: 2019-07-07

## 2018-07-12 RX ADMIN — CYANOCOBALAMIN 1000 MCG: 1000 INJECTION INTRAMUSCULAR; SUBCUTANEOUS at 09:33

## 2018-07-12 NOTE — PROGRESS NOTES
Assessment/Plan:         Diagnoses and all orders for this visit:    Hypoglycemia : Cause ? ? :  Hypoglcemia Diet  US abd  Blood work  RTc in [facility] metabolic panel; Future  -     Hepatic function panel; Future  -     HEMOGLOBIN A1C W/ EAG ESTIMATION; Future  -     C-peptide; Future  -     Proinsulin; Future  -     Insulin, fasting; Future  -     Insulin antibody; Future  -     Lipase; Future    Palpitations : Most likely is due to Hypoglycemia ? ?  -     CBC and differential; Future    Pulmonary emphysema, unspecified emphysema type ([address] ): Stop smoking  Renew Inhalers : Breo 100 mcg and Spiriva    Epigastric pain : As Above  Blood work  RTc in [facility] with :  -     US abdomen complete; Future  -     Amylase; Future    Low serum vitamin A: Due to H/O gastric Bypass Surgery  Vit A Suplements    Low zinc level: As above  Zinc supplelments    Low folate: as above    Other iron deficiency anemia: Hematology consult for Iron Infussion    Vitamin B12 deficiency (dietary) anemia  -     cyanocobalamin injection 1,000 mcg; Inject 1 mL (1,000 mcg total) into a muscle every 30 (thirty) days     Other orders  -     albuterol (VENTOLIN HFA) 90 mcg/act inhaler; inhale 2 puff by inhalation route  every 4 - 6 hours as needed  -     Lancets Misc  (ACCU-CHEK FASTCLIX LANCET) KIT; 3 (three) times a day  -     albuterol (2 5 mg/3 mL) 0 083 % nebulizer solution; INHALE 1 VIAL VIA NEBULIZER 4 TIMES EVERY DAY  -     fexofenadine (ALLEGRA) 180 MG tablet; every 24 hours  -     losartan (COZAAR) 25 mg tablet; every 24 hours  -     MICROLET LANCETS MISC; Microlet Lancet  -     glucose blood test strip; test by Misc  (Non-Drug; Combo Route) route three times daily        Subjective:      Patient ID: Emmett Segal is a 76 y o  female  This is a [id number] [place] with multiple medical problems , is here today for regular Check Up and also Because of Episodes of HypoGlycemia and palpitations    Med List and recent Blood work reviewed w pt In Detail           The following portions of the patient's history were reviewed and updated as appropriate: allergies, current medications, past family history, past medical history, past social history, past surgical history and problem list     Review of Systems   Constitutional: Positive for fatigue  Negative for chills and fever  HENT: Negative for congestion, facial swelling, sore throat, trouble swallowing and voice change  Eyes: Negative for pain, discharge and visual disturbance  Respiratory: Negative for cough, shortness of breath and wheezing  Cardiovascular: Positive for palpitations  Negative for chest pain and leg swelling  Gastrointestinal: Positive for abdominal pain  Negative for blood in stool, constipation, diarrhea and nausea  Endocrine: Negative for polydipsia, polyphagia and polyuria  Hypoglycemia episodes      Genitourinary: Negative for difficulty urinating, hematuria and urgency  Musculoskeletal: Negative for arthralgias and myalgias  Skin: Negative for rash  Neurological: Negative for dizziness, tremors, weakness and headaches  Hematological: Negative for adenopathy  Does not bruise/bleed easily  Psychiatric/Behavioral: Negative for dysphoric mood, sleep disturbance and suicidal ideas  Objective:      /68 (BP Location: Left arm, Patient Position: Sitting, Cuff Size: Adult)   Pulse 68   Temp 98 7 °F (37 1 °C) (Oral)   Resp 14   Ht 5' 5 5" (1 664 m)   Wt 71 3 kg (157 lb 3 2 oz)   LMP  (LMP Unknown)   SpO2 96%   Breastfeeding? No   BMI 25 76 kg/m²          Physical Exam   Constitutional: She is oriented to person, place, and time  She appears well-nourished  No distress  HENT:   Head: Normocephalic  Mouth/Throat: Oropharynx is clear and moist  No oropharyngeal exudate  Eyes: Conjunctivae are normal  Pupils are equal, round, and reactive to light  No scleral icterus  Neck: Neck supple  No thyromegaly present     Cardiovascular: Normal rate, regular rhythm and normal heart sounds  No murmur heard  Pulmonary/Chest: Effort normal  No respiratory distress  She has wheezes  She has no rales  Abdominal: Soft  Bowel sounds are normal  She exhibits no distension  There is tenderness  There is no rebound and no guarding  Mild to Mod epigastric Tenderness   Musculoskeletal: She exhibits no edema  Lymphadenopathy:     She has no cervical adenopathy  Neurological: She is alert and oriented to person, place, and time  No cranial nerve deficit  Coordination normal    Pt uses a cane to support gait   Skin: No rash noted  No erythema  Psychiatric: She has a normal mood and affect

## 2018-07-17 ENCOUNTER — TELEPHONE (OUTPATIENT)
Dept: FAMILY MEDICINE CLINIC | Facility: CLINIC | Age: 68
End: 2018-07-17

## 2018-07-17 NOTE — TELEPHONE ENCOUNTER
L/m on patients cell regarding Chantix  Dr Martínez Alberto wants her to google it and read about the side effects first   Then she should decide if she wants this

## 2018-07-17 NOTE — TELEPHONE ENCOUNTER
L/m on patient's cell to google Chantix and see side effects per Dr Prince Hong  She should call back if she still wants this medicine

## 2018-07-19 DIAGNOSIS — K21.9 GASTROESOPHAGEAL REFLUX DISEASE WITHOUT ESOPHAGITIS: Primary | ICD-10-CM

## 2018-07-19 RX ORDER — RANITIDINE 150 MG/1
150 CAPSULE ORAL 2 TIMES DAILY
Qty: 60 CAPSULE | Refills: 3 | Status: SHIPPED | OUTPATIENT
Start: 2018-07-19 | End: 2018-12-11 | Stop reason: SDUPTHER

## 2018-07-19 RX ORDER — PANTOPRAZOLE SODIUM 40 MG/1
40 TABLET, DELAYED RELEASE ORAL DAILY
Qty: 30 TABLET | Refills: 2 | Status: SHIPPED | OUTPATIENT
Start: 2018-07-19 | End: 2019-01-22 | Stop reason: SDUPTHER

## 2018-07-30 DIAGNOSIS — M79.18 MYOFASCIAL PAIN SYNDROME: ICD-10-CM

## 2018-07-30 RX ORDER — BACLOFEN 10 MG/1
TABLET ORAL
Qty: 90 TABLET | Refills: 1 | Status: SHIPPED | OUTPATIENT
Start: 2018-07-30 | End: 2018-08-01 | Stop reason: SDUPTHER

## 2018-07-31 ENCOUNTER — OFFICE VISIT (OUTPATIENT)
Dept: HEMATOLOGY ONCOLOGY | Facility: CLINIC | Age: 68
End: 2018-07-31
Payer: COMMERCIAL

## 2018-07-31 VITALS
HEART RATE: 53 BPM | RESPIRATION RATE: 17 BRPM | TEMPERATURE: 96.9 F | BODY MASS INDEX: 25.79 KG/M2 | SYSTOLIC BLOOD PRESSURE: 128 MMHG | DIASTOLIC BLOOD PRESSURE: 64 MMHG | OXYGEN SATURATION: 96 % | HEIGHT: 65 IN | WEIGHT: 154.8 LBS

## 2018-07-31 DIAGNOSIS — K95.89 IRON DEFICIENCY ANEMIA FOLLOWING BARIATRIC SURGERY: Primary | ICD-10-CM

## 2018-07-31 DIAGNOSIS — D50.8 IRON DEFICIENCY ANEMIA FOLLOWING BARIATRIC SURGERY: Primary | ICD-10-CM

## 2018-07-31 DIAGNOSIS — D47.2 MGUS (MONOCLONAL GAMMOPATHY OF UNKNOWN SIGNIFICANCE): ICD-10-CM

## 2018-07-31 PROCEDURE — 99215 OFFICE O/P EST HI 40 MIN: CPT | Performed by: INTERNAL MEDICINE

## 2018-07-31 NOTE — LETTER
July 31, 2018     Mila Andrews, [address] [place] [address] [address] [address]    Patient: Jerardo Velásquez   YOB: 1950   Date of Visit: 7/31/2018       Dear Dr Oscar Blunt: Thank you for referring Karla Jeans to me for evaluation  Below are my notes for this consultation  If you have questions, please do not hesitate to call me  I look forward to following your patient along with you  Sincerely,        Shaka Parker DO        CC: No Recipients  Shaka Parker DO  7/31/2018  8:35 AM  Sign at close encounter  [address]  [address] [address] [phone]    Marlena Espino [id number]  07/31/18    Discussion:   In summary, this is a 80-year-old female history of iron deficiency anemia secondary to status post bariatric surgery  She also has B12 deficiency, currently under replacement by her PCP on a monthly basis  Parental iron replacement had been accomplished in November 2016  Subsequently, however, her values have declined  Ferritin may be an on reliable marker in her case as this could be artificially normalized as an acute phase reactant  She does have ongoing fevers of uncertain etiology  These have predominantly been attributed to recurrent intermittent sinusitis  She tells me that she has had several episodes of pneumonia last year  She will be getting a new bed in the near future which hopefully will help with her significant reflux symptoms  Fundoplication was discussed briefly  I proposed Feraheme 510 mg IV Q 3 months with monitoring of hemoglobin and iron status and adjustment accordingly  Lastly, she has a history of IgA MGUS  Repeat parameters will be requested prior to her next visit in 6 months  I discussed the above with the patient    The patient  voiced understanding and agreement   ______________________________________________________________________    Chief Complaint Patient presents with    Follow-up       HPI:   No history exists  Interval History:  Clinically stable  1 - Symptomatic but completely ambulatory    Review of Systems   Constitutional: Positive for fatigue  Negative for appetite change, diaphoresis and fever  HENT: Negative for sinus pain  Eyes: Negative for discharge  Respiratory: Positive for cough  Negative for shortness of breath  Cardiovascular: Negative for chest pain  Gastrointestinal: Negative for abdominal pain, constipation and diarrhea  Endocrine: Negative for cold intolerance  Genitourinary: Negative for difficulty urinating and hematuria  Musculoskeletal: Negative for joint swelling  Skin: Negative for rash  Allergic/Immunologic: Negative for environmental allergies  Neurological: Negative for dizziness and headaches  Hematological: Negative for adenopathy  Psychiatric/Behavioral: Negative for agitation         Past Medical History:   Diagnosis Date    Anemia     Anxiety     hx of panic attacks (now under control)     Arthritis     Asthma     resolved (no problems in a decade)     Baker's cyst of knee     Bronchitis     Bunion, left foot     Cervical pain     Chronic GERD     Diabetes mellitus, type 2 ([address] )     Diabetic peripheral neuropathy (HCC)     Endometriosis     Fibromyalgia     Hyperlipidemia     Hypertension     Lung nodules     Macular degeneration     Spondylosis      Patient Active Problem List   Diagnosis    Chronic pain syndrome    Cervical radiculopathy    Myofascial pain syndrome    Spondylosis of cervical region without myelopathy or radiculopathy    Fibromyalgia    Diabetic peripheral neuropathy (HCC)    Long-term current use of opiate analgesic    MGUS (monoclonal gammopathy of unknown significance)       Current Outpatient Prescriptions:     albuterol (2 5 mg/3 mL) 0 083 % nebulizer solution, INHALE 1 VIAL VIA NEBULIZER 4 TIMES EVERY DAY, Disp: , Rfl: 1    albuterol (PROVENTIL HFA,VENTOLIN HFA) 90 mcg/act inhaler, Inhale 2 puffs every 6 (six) hours as needed for wheezing, Disp: , Rfl:     albuterol (VENTOLIN HFA) 90 mcg/act inhaler, inhale 2 puff by inhalation route  every 4 - 6 hours as needed, Disp: , Rfl:     ALPRAZolam (XANAX) 0 5 mg tablet, Take 0 5 mg by mouth 2 (two) times a day as needed for anxiety, Disp: , Rfl:     amLODIPine (NORVASC) 5 mg tablet, TAKE 1 TABLET BY MOUTH EVERY DAY, Disp: , Rfl:     baclofen 10 mg tablet, TAKE 1 TABLET BY MOUTH THREE TIMES A DAY, Disp: 90 tablet, Rfl: 1    diphenhydrAMINE (BENADRYL ALLERGY) 25 mg capsule, Take by mouth, Disp: , Rfl:     glucose blood test strip, test by Misc  (Non-Drug; Combo Route) route three times daily, Disp: , Rfl:     HYDROcodone-acetaminophen (NORCO)  mg per tablet, Take 4-5 tablets daily PRN, Disp: 130 tablet, Rfl: 0    Lancets Misc  (ACCU-CHEK FASTCLIX LANCET) KIT, 3 (three) times a day, Disp: , Rfl:     levothyroxine 50 mcg tablet, Take 50 mcg by mouth daily, Disp: , Rfl:     losartan (COZAAR) 25 mg tablet, every 24 hours, Disp: , Rfl:     MICROLET LANCETS MISC, Microlet Lancet, Disp: , Rfl:     pantoprazole (PROTONIX) 40 mg tablet, Take 1 tablet (40 mg total) by mouth daily, Disp: 30 tablet, Rfl: 2    pregabalin (LYRICA) 100 mg capsule, Take 1 capsule (100 mg total) by mouth 3 (three) times a day, Disp: 90 capsule, Rfl: 1    ranitidine (ZANTAC) 150 MG capsule, Take 1 capsule (150 mg total) by mouth 2 (two) times a day, Disp: 60 capsule, Rfl: 3    sucralfate (CARAFATE) 1 g tablet, TAKE 1 TABLET BY ORAL ROUTE 4 TIMES EVERY DAY ON AN EMPTY STOMACH 1 HOUR BEFORE MEALS AND AT BEDTIME, Disp: 120 tablet, Rfl: 1    traZODone (DESYREL) 50 mg tablet, Take 1 tablet (50 mg total) by mouth daily at bedtime, Disp: 30 tablet, Rfl: 1    Current Facility-Administered Medications:     cyanocobalamin injection 1,000 mcg, 1,000 mcg, Intramuscular, Q30 Days, Khalida Schwarz MD, 1,000 mcg at 07/12/18 4022  Allergies   Allergen Reactions    Cephalosporins Hives    Erythromycin     Fentanyl Itching     Occurred during surgery     [place] Perforatum     Paxil [Paroxetine] Hives     After 2 weeks    Penicillins Itching    Pravastatin     Prednisolone     Rosuvastatin     Statins Itching    Sulfa Antibiotics     Sulfacetamide     Sulfamethoxazole-Trimethoprim     Sulfur     Sulfur-Salicylic Acid [Salicylic Acid-Sulfur]      Unsure of reaction     Wellbutrin [Bupropion] Hives     After 2 weeks      Past Surgical History:   Procedure Laterality Date    BACK SURGERY  1996    L5, S1- laser surgery fusion of c5 and c6     BREAST LUMPECTOMY Right     CHOLECYSTECTOMY  2004    FOOT SURGERY Left 2005    fusion     GASTRIC BYPASS  2010    Dr Edgar Araujo    just uterus     KNEE ARTHROSCOPY      LAMINECTOMY      OVARIAN CYST REMOVAL  1975    PELVIC LAPAROSCOPY      ovaries (x10)     PLEURAL SCARIFICATION  1967    SHOULDER OPEN ROTATOR CUFF REPAIR Left 2008    TONSILLECTOMY      VAGINAL PROLAPSE REPAIR       Social History     Objective:  Vitals:    07/31/18 0747   BP: 128/64   BP Location: Left arm   Patient Position: Sitting   Pulse: (!) 53   Resp: 17   Temp: (!) 96 9 °F (36 1 °C)   TempSrc: Tympanic   SpO2: 96%   Weight: 70 2 kg (154 lb 12 8 oz)   Height: 5' 5" (1 651 m)     Physical Exam   Constitutional: She is oriented to person, place, and time  She appears well-developed  HENT:   Head: Normocephalic  Eyes: Pupils are equal, round, and reactive to light  Neck: Neck supple  Cardiovascular: Normal rate  No murmur heard  Pulmonary/Chest: No respiratory distress  She has no wheezes  She has no rales  Abdominal: Soft  She exhibits no distension  There is no tenderness  There is no rebound  Musculoskeletal: She exhibits no edema  Lymphadenopathy:     She has no cervical adenopathy  Neurological: She is alert and oriented to person, place, and time   She displays normal reflexes  Skin: Skin is warm  No rash noted  Psychiatric: She has a normal mood and affect  Thought content normal          Labs: I personally reviewed the labs and imaging pertinent to this patient care  Magda Freed DO  7/31/2018  8:09 AM  Incomplete  [facility]  [address] [address] [phone]    Estuardo Nickerson [id number]  07/31/18    Discussion:   ***    I discussed the above with the patient  The patient *** voiced understanding and agreement   ______________________________________________________________________    Chief Complaint   Patient presents with    Follow-up       HPI:   No history exists         Interval History:  ***  {performance status:01192}    Review of Systems    Past Medical History:   Diagnosis Date    Anemia     Anxiety     hx of panic attacks (now under control)     Arthritis     Asthma     resolved (no problems in a decade)     Baker's cyst of knee     Bronchitis     Bunion, left foot     Cervical pain     Chronic GERD     Diabetes mellitus, type 2 (HCC)     Diabetic peripheral neuropathy (HCC)     Endometriosis     Fibromyalgia     Hyperlipidemia     Hypertension     Lung nodules     Macular degeneration     Spondylosis      Patient Active Problem List   Diagnosis    Chronic pain syndrome    Cervical radiculopathy    Myofascial pain syndrome    Spondylosis of cervical region without myelopathy or radiculopathy    Fibromyalgia    Diabetic peripheral neuropathy (HCC)    Long-term current use of opiate analgesic       Current Outpatient Prescriptions:     albuterol (2 5 mg/3 mL) 0 083 % nebulizer solution, INHALE 1 VIAL VIA NEBULIZER 4 TIMES EVERY DAY, Disp: , Rfl: 1    albuterol (PROVENTIL HFA,VENTOLIN HFA) 90 mcg/act inhaler, Inhale 2 puffs every 6 (six) hours as needed for wheezing, Disp: , Rfl:     albuterol (VENTOLIN HFA) 90 mcg/act inhaler, inhale 2 puff by inhalation route  every 4 - 6 hours as needed, Disp: , Rfl:     ALPRAZolam (XANAX) 0 5 mg tablet, Take 0 5 mg by mouth 2 (two) times a day as needed for anxiety, Disp: , Rfl:     amLODIPine (NORVASC) 5 mg tablet, TAKE 1 TABLET BY MOUTH EVERY DAY, Disp: , Rfl:     baclofen 10 mg tablet, TAKE 1 TABLET BY MOUTH THREE TIMES A DAY, Disp: 90 tablet, Rfl: 1    cyanocobalamin 100 MCG tablet, Take 100 mcg by mouth daily, Disp: , Rfl:     Cyanocobalamin 1000 MCG/15ML LIQD, Take by mouth, Disp: , Rfl:     diphenhydrAMINE (BENADRYL ALLERGY) 25 mg capsule, Take by mouth, Disp: , Rfl:     fexofenadine (ALLEGRA) 180 MG tablet, every 24 hours, Disp: , Rfl:     glucose blood test strip, test by Misc  (Non-Drug; Combo Route) route three times daily, Disp: , Rfl:     HYDROcodone-acetaminophen (NORCO)  mg per tablet, Take 4-5 tablets daily PRN, Disp: 130 tablet, Rfl: 0    Lancets Misc  (ACCU-CHEK FASTCLIX LANCET) KIT, 3 (three) times a day, Disp: , Rfl:     levothyroxine 50 mcg tablet, Take 50 mcg by mouth daily, Disp: , Rfl:     losartan (COZAAR) 25 mg tablet, every 24 hours, Disp: , Rfl:     MICROLET LANCETS MISC, Microlet Lancet, Disp: , Rfl:     pantoprazole (PROTONIX) 40 mg tablet, Take 1 tablet (40 mg total) by mouth daily, Disp: 30 tablet, Rfl: 2    pregabalin (LYRICA) 100 mg capsule, Take 1 capsule (100 mg total) by mouth 3 (three) times a day, Disp: 90 capsule, Rfl: 1    ranitidine (ZANTAC) 150 MG capsule, Take 1 capsule (150 mg total) by mouth 2 (two) times a day, Disp: 60 capsule, Rfl: 3    sucralfate (CARAFATE) 1 g tablet, TAKE 1 TABLET BY ORAL ROUTE 4 TIMES EVERY DAY ON AN EMPTY STOMACH 1 HOUR BEFORE MEALS AND AT BEDTIME, Disp: 120 tablet, Rfl: 1    traZODone (DESYREL) 50 mg tablet, Take 1 tablet (50 mg total) by mouth daily at bedtime, Disp: 30 tablet, Rfl: 1    Current Facility-Administered Medications:     cyanocobalamin injection 1,000 mcg, 1,000 mcg, Intramuscular, Q30 Days, Gianni Aguilar MD, 1,000 mcg at 07/12/18 9828  Allergies   Allergen Reactions    Cephalosporins Hives    Erythromycin     Fentanyl Itching     Occurred during surgery     [place] Perforatum     Paxil [Paroxetine] Hives     After 2 weeks    Penicillins Itching    Pravastatin     Prednisolone     Rosuvastatin     Statins Itching    Sulfa Antibiotics     Sulfacetamide     Sulfamethoxazole-Trimethoprim     Sulfur     Sulfur-Salicylic Acid [Salicylic Acid-Sulfur]      Unsure of reaction     Wellbutrin [Bupropion] Hives     After 2 weeks      Past Surgical History:   Procedure Laterality Date    BACK SURGERY  1996    L5, S1- laser surgery fusion of c5 and c6     BREAST LUMPECTOMY Right     CHOLECYSTECTOMY  2004    FOOT SURGERY Left 2005    fusion     GASTRIC BYPASS  2010    Dr Carlito Sears    just uterus     KNEE ARTHROSCOPY      LAMINECTOMY      OVARIAN CYST REMOVAL  1975    PELVIC LAPAROSCOPY      ovaries (x10)     PLEURAL SCARIFICATION  1967    SHOULDER OPEN ROTATOR CUFF REPAIR Left 2008    TONSILLECTOMY      VAGINAL PROLAPSE REPAIR       Social History     Objective:  Vitals:    07/31/18 0747   BP: 128/64   BP Location: Left arm   Patient Position: Sitting   Pulse: (!) 53   Resp: 17   Temp: (!) 96 9 °F (36 1 °C)   TempSrc: Tympanic   SpO2: 96%   Weight: 70 2 kg (154 lb 12 8 oz)   Height: 5' 5" (1 651 m)     Physical Exam      Labs: I personally reviewed the labs and imaging pertinent to this patient care

## 2018-07-31 NOTE — PROGRESS NOTES
[facility] HEMATOLOGY ONCOLOGY Gil Lowe  [address]  [address] [address] [phone]    Arthor Cranker [id number]  07/31/18    Discussion:   In summary, this is a 45-year-old female history of iron deficiency anemia secondary to status post bariatric surgery  She also has B12 deficiency, currently under replacement by her PCP on a monthly basis  Parental iron replacement had been accomplished in November 2016  Subsequently, however, her values have declined  Ferritin may be an on reliable marker in her case as this could be artificially normalized as an acute phase reactant  She does have ongoing fevers of uncertain etiology  These have predominantly been attributed to recurrent intermittent sinusitis  She tells me that she has had several episodes of pneumonia last year  She will be getting a new bed in the near future which hopefully will help with her significant reflux symptoms  Fundoplication was discussed briefly  I proposed Feraheme 510 mg IV Q 3 months with monitoring of hemoglobin and iron status and adjustment accordingly  Lastly, she has a history of IgA MGUS  Repeat parameters will be requested prior to her next visit in 6 months  I discussed the above with the patient  The patient  voiced understanding and agreement   ______________________________________________________________________    Chief Complaint   Patient presents with    Follow-up       HPI:   No history exists  Interval History:  Clinically stable  1 - Symptomatic but completely ambulatory    Review of Systems   Constitutional: Positive for fatigue  Negative for appetite change, diaphoresis and fever  HENT: Negative for sinus pain  Eyes: Negative for discharge  Respiratory: Positive for cough  Negative for shortness of breath  Cardiovascular: Negative for chest pain  Gastrointestinal: Negative for abdominal pain, constipation and diarrhea  Endocrine: Negative for cold intolerance  Genitourinary: Negative for difficulty urinating and hematuria  Musculoskeletal: Negative for joint swelling  Skin: Negative for rash  Allergic/Immunologic: Negative for environmental allergies  Neurological: Negative for dizziness and headaches  Hematological: Negative for adenopathy  Psychiatric/Behavioral: Negative for agitation         Past Medical History:   Diagnosis Date    Anemia     Anxiety     hx of panic attacks (now under control)     Arthritis     Asthma     resolved (no problems in a decade)     Baker's cyst of knee     Bronchitis     Bunion, left foot     Cervical pain     Chronic GERD     Diabetes mellitus, type 2 (HCC)     Diabetic peripheral neuropathy (HCC)     Endometriosis     Fibromyalgia     Hyperlipidemia     Hypertension     Lung nodules     Macular degeneration     Spondylosis      Patient Active Problem List   Diagnosis    Chronic pain syndrome    Cervical radiculopathy    Myofascial pain syndrome    Spondylosis of cervical region without myelopathy or radiculopathy    Fibromyalgia    Diabetic peripheral neuropathy (HCC)    Long-term current use of opiate analgesic    MGUS (monoclonal gammopathy of unknown significance)       Current Outpatient Prescriptions:     albuterol (2 5 mg/3 mL) 0 083 % nebulizer solution, INHALE 1 VIAL VIA NEBULIZER 4 TIMES EVERY DAY, Disp: , Rfl: 1    albuterol (PROVENTIL HFA,VENTOLIN HFA) 90 mcg/act inhaler, Inhale 2 puffs every 6 (six) hours as needed for wheezing, Disp: , Rfl:     albuterol (VENTOLIN HFA) 90 mcg/act inhaler, inhale 2 puff by inhalation route  every 4 - 6 hours as needed, Disp: , Rfl:     ALPRAZolam (XANAX) 0 5 mg tablet, Take 0 5 mg by mouth 2 (two) times a day as needed for anxiety, Disp: , Rfl:     amLODIPine (NORVASC) 5 mg tablet, TAKE 1 TABLET BY MOUTH EVERY DAY, Disp: , Rfl:     baclofen 10 mg tablet, TAKE 1 TABLET BY MOUTH THREE TIMES A DAY, Disp: 90 tablet, Rfl: 1    diphenhydrAMINE (BENADRYL ALLERGY) 25 mg capsule, Take by mouth, Disp: , Rfl:     glucose blood test strip, test by Misc  (Non-Drug; Combo Route) route three times daily, Disp: , Rfl:     HYDROcodone-acetaminophen (NORCO)  mg per tablet, Take 4-5 tablets daily PRN, Disp: 130 tablet, Rfl: 0    Lancets Misc  (ACCU-CHEK FASTCLIX LANCET) KIT, 3 (three) times a day, Disp: , Rfl:     levothyroxine 50 mcg tablet, Take 50 mcg by mouth daily, Disp: , Rfl:     losartan (COZAAR) 25 mg tablet, every 24 hours, Disp: , Rfl:     MICROLET LANCETS MISC, Microlet Lancet, Disp: , Rfl:     pantoprazole (PROTONIX) 40 mg tablet, Take 1 tablet (40 mg total) by mouth daily, Disp: 30 tablet, Rfl: 2    pregabalin (LYRICA) 100 mg capsule, Take 1 capsule (100 mg total) by mouth 3 (three) times a day, Disp: 90 capsule, Rfl: 1    ranitidine (ZANTAC) 150 MG capsule, Take 1 capsule (150 mg total) by mouth 2 (two) times a day, Disp: 60 capsule, Rfl: 3    sucralfate (CARAFATE) 1 g tablet, TAKE 1 TABLET BY ORAL ROUTE 4 TIMES EVERY DAY ON AN EMPTY STOMACH 1 HOUR BEFORE MEALS AND AT BEDTIME, Disp: 120 tablet, Rfl: 1    traZODone (DESYREL) 50 mg tablet, Take 1 tablet (50 mg total) by mouth daily at bedtime, Disp: 30 tablet, Rfl: 1    Current Facility-Administered Medications:     cyanocobalamin injection 1,000 mcg, 1,000 mcg, Intramuscular, Q30 Days, Aleksandar Edmondson MD, 1,000 mcg at 07/12/18 4999  Allergies   Allergen Reactions    Cephalosporins Hives    Erythromycin     Fentanyl Itching     Occurred during surgery     [place] Perforatum     Paxil [Paroxetine] Hives     After 2 weeks    Penicillins Itching    Pravastatin     Prednisolone     Rosuvastatin     Statins Itching    Sulfa Antibiotics     Sulfacetamide     Sulfamethoxazole-Trimethoprim     Sulfur     Sulfur-Salicylic Acid [Salicylic Acid-Sulfur]      Unsure of reaction     Wellbutrin [Bupropion] Hives     After 2 weeks      Past Surgical History:   Procedure Laterality Date    BACK SURGERY  1996    L5, S1- laser surgery fusion of c5 and c6     BREAST LUMPECTOMY Right     CHOLECYSTECTOMY  2004    FOOT SURGERY Left 2005    fusion     GASTRIC BYPASS  2010    Dr Leann jason uterus     KNEE ARTHROSCOPY      LAMINECTOMY      OVARIAN CYST REMOVAL  1975    PELVIC LAPAROSCOPY      ovaries (x10)     PLEURAL SCARIFICATION  1967    SHOULDER OPEN ROTATOR CUFF REPAIR Left 2008    TONSILLECTOMY      VAGINAL PROLAPSE REPAIR       Social History     Objective:  Vitals:    07/31/18 0747   BP: 128/64   BP Location: Left arm   Patient Position: Sitting   Pulse: (!) 53   Resp: 17   Temp: (!) 96 9 °F (36 1 °C)   TempSrc: Tympanic   SpO2: 96%   Weight: 70 2 kg (154 lb 12 8 oz)   Height: 5' 5" (1 651 m)     Physical Exam   Constitutional: She is oriented to person, place, and time  She appears well-developed  HENT:   Head: Normocephalic  Eyes: Pupils are equal, round, and reactive to light  Neck: Neck supple  Cardiovascular: Normal rate  No murmur heard  Pulmonary/Chest: No respiratory distress  She has no wheezes  She has no rales  Abdominal: Soft  She exhibits no distension  There is no tenderness  There is no rebound  Musculoskeletal: She exhibits no edema  Lymphadenopathy:     She has no cervical adenopathy  Neurological: She is alert and oriented to person, place, and time  She displays normal reflexes  Skin: Skin is warm  No rash noted  Psychiatric: She has a normal mood and affect  Thought content normal          Labs: I personally reviewed the labs and imaging pertinent to this patient care

## 2018-07-31 NOTE — LETTER
July 31, 2018     [facility], [address] [place] [address] [address] [address]    Patient: Arben Ordonez   YOB: 1950   Date of Visit: 7/31/2018       Dear Dr Mehnaz Calloway: Thank you for referring Scooter Rae to me for evaluation  Below are my notes for this consultation  If you have questions, please do not hesitate to call me  I look forward to following your patient along with you  Sincerely,        Bonnie Castaneda DO        CC: No Recipients  Bonnie Castaneda DO  7/31/2018  8:35 AM  Sign at close encounter  [address] [facility]  [address] [address] [phone]    [facility] [id number]  07/31/18    Discussion:   In summary, this is a 51-year-old female history of iron deficiency anemia secondary to status post bariatric surgery  She also has B12 deficiency, currently under replacement by her PCP on a monthly basis  Parental iron replacement had been accomplished in November 2016  Subsequently, however, her values have declined  Ferritin may be an on reliable marker in her case as this could be artificially normalized as an acute phase reactant  She does have ongoing fevers of uncertain etiology  These have predominantly been attributed to recurrent intermittent sinusitis  She tells me that she has had several episodes of pneumonia last year  She will be getting a new bed in the near future which hopefully will help with her significant reflux symptoms  Fundoplication was discussed briefly  I proposed Feraheme 510 mg IV Q 3 months with monitoring of hemoglobin and iron status and adjustment accordingly  Lastly, she has a history of IgA MGUS  Repeat parameters will be requested prior to her next visit in 6 months  I discussed the above with the patient    The patient  voiced understanding and agreement   ______________________________________________________________________    Chief Complaint Patient presents with    Follow-up       HPI:   No history exists  Interval History:  Clinically stable  1 - Symptomatic but completely ambulatory    Review of Systems   Constitutional: Positive for fatigue  Negative for appetite change, diaphoresis and fever  HENT: Negative for sinus pain  Eyes: Negative for discharge  Respiratory: Positive for cough  Negative for shortness of breath  Cardiovascular: Negative for chest pain  Gastrointestinal: Negative for abdominal pain, constipation and diarrhea  Endocrine: Negative for cold intolerance  Genitourinary: Negative for difficulty urinating and hematuria  Musculoskeletal: Negative for joint swelling  Skin: Negative for rash  Allergic/Immunologic: Negative for environmental allergies  Neurological: Negative for dizziness and headaches  Hematological: Negative for adenopathy  Psychiatric/Behavioral: Negative for agitation         Past Medical History:   Diagnosis Date    Anemia     Anxiety     hx of panic attacks (now under control)     Arthritis     Asthma     resolved (no problems in a decade)     Baker's cyst of knee     Bronchitis     Bunion, left foot     Cervical pain     Chronic GERD     Diabetes mellitus, type 2 ([address] )     Diabetic peripheral neuropathy (HCC)     Endometriosis     Fibromyalgia     Hyperlipidemia     Hypertension     Lung nodules     Macular degeneration     Spondylosis      Patient Active Problem List   Diagnosis    Chronic pain syndrome    Cervical radiculopathy    Myofascial pain syndrome    Spondylosis of cervical region without myelopathy or radiculopathy    Fibromyalgia    Diabetic peripheral neuropathy (HCC)    Long-term current use of opiate analgesic    MGUS (monoclonal gammopathy of unknown significance)       Current Outpatient Prescriptions:     albuterol (2 5 mg/3 mL) 0 083 % nebulizer solution, INHALE 1 VIAL VIA NEBULIZER 4 TIMES EVERY DAY, Disp: , Rfl: 1    albuterol (PROVENTIL HFA,VENTOLIN HFA) 90 mcg/act inhaler, Inhale 2 puffs every 6 (six) hours as needed for wheezing, Disp: , Rfl:     albuterol (VENTOLIN HFA) 90 mcg/act inhaler, inhale 2 puff by inhalation route  every 4 - 6 hours as needed, Disp: , Rfl:     ALPRAZolam (XANAX) 0 5 mg tablet, Take 0 5 mg by mouth 2 (two) times a day as needed for anxiety, Disp: , Rfl:     amLODIPine (NORVASC) 5 mg tablet, TAKE 1 TABLET BY MOUTH EVERY DAY, Disp: , Rfl:     baclofen 10 mg tablet, TAKE 1 TABLET BY MOUTH THREE TIMES A DAY, Disp: 90 tablet, Rfl: 1    diphenhydrAMINE (BENADRYL ALLERGY) 25 mg capsule, Take by mouth, Disp: , Rfl:     glucose blood test strip, test by Misc  (Non-Drug; Combo Route) route three times daily, Disp: , Rfl:     HYDROcodone-acetaminophen (NORCO)  mg per tablet, Take 4-5 tablets daily PRN, Disp: 130 tablet, Rfl: 0    Lancets Misc  (ACCU-CHEK FASTCLIX LANCET) KIT, 3 (three) times a day, Disp: , Rfl:     levothyroxine 50 mcg tablet, Take 50 mcg by mouth daily, Disp: , Rfl:     losartan (COZAAR) 25 mg tablet, every 24 hours, Disp: , Rfl:     MICROLET LANCETS MISC, Microlet Lancet, Disp: , Rfl:     pantoprazole (PROTONIX) 40 mg tablet, Take 1 tablet (40 mg total) by mouth daily, Disp: 30 tablet, Rfl: 2    pregabalin (LYRICA) 100 mg capsule, Take 1 capsule (100 mg total) by mouth 3 (three) times a day, Disp: 90 capsule, Rfl: 1    ranitidine (ZANTAC) 150 MG capsule, Take 1 capsule (150 mg total) by mouth 2 (two) times a day, Disp: 60 capsule, Rfl: 3    sucralfate (CARAFATE) 1 g tablet, TAKE 1 TABLET BY ORAL ROUTE 4 TIMES EVERY DAY ON AN EMPTY STOMACH 1 HOUR BEFORE MEALS AND AT BEDTIME, Disp: 120 tablet, Rfl: 1    traZODone (DESYREL) 50 mg tablet, Take 1 tablet (50 mg total) by mouth daily at bedtime, Disp: 30 tablet, Rfl: 1    Current Facility-Administered Medications:     cyanocobalamin injection 1,000 mcg, 1,000 mcg, Intramuscular, Q30 Days, Ermias Smalls MD, 1,000 mcg at 07/12/18 6429  Allergies   Allergen Reactions    Cephalosporins Hives    Erythromycin     Fentanyl Itching     Occurred during surgery     [place] Perforatum     Paxil [Paroxetine] Hives     After 2 weeks    Penicillins Itching    Pravastatin     Prednisolone     Rosuvastatin     Statins Itching    Sulfa Antibiotics     Sulfacetamide     Sulfamethoxazole-Trimethoprim     Sulfur     Sulfur-Salicylic Acid [Salicylic Acid-Sulfur]      Unsure of reaction     Wellbutrin [Bupropion] Hives     After 2 weeks      Past Surgical History:   Procedure Laterality Date    BACK SURGERY  1996    L5, S1- laser surgery fusion of c5 and c6     BREAST LUMPECTOMY Right     CHOLECYSTECTOMY  2004    FOOT SURGERY Left 2005    fusion     GASTRIC BYPASS  2010    Dr Vargas Dea    just uterus     KNEE ARTHROSCOPY      LAMINECTOMY      OVARIAN CYST REMOVAL  1975    PELVIC LAPAROSCOPY      ovaries (x10)     PLEURAL SCARIFICATION  1967    SHOULDER OPEN ROTATOR CUFF REPAIR Left 2008    TONSILLECTOMY      VAGINAL PROLAPSE REPAIR       Social History     Objective:  Vitals:    07/31/18 0747   BP: 128/64   BP Location: Left arm   Patient Position: Sitting   Pulse: (!) 53   Resp: 17   Temp: (!) 96 9 °F (36 1 °C)   TempSrc: Tympanic   SpO2: 96%   Weight: 70 2 kg (154 lb 12 8 oz)   Height: 5' 5" (1 651 m)     Physical Exam   Constitutional: She is oriented to person, place, and time  She appears well-developed  HENT:   Head: Normocephalic  Eyes: Pupils are equal, round, and reactive to light  Neck: Neck supple  Cardiovascular: Normal rate  No murmur heard  Pulmonary/Chest: No respiratory distress  She has no wheezes  She has no rales  Abdominal: Soft  She exhibits no distension  There is no tenderness  There is no rebound  Musculoskeletal: She exhibits no edema  Lymphadenopathy:     She has no cervical adenopathy  Neurological: She is alert and oriented to person, place, and time   She displays normal reflexes  Skin: Skin is warm  No rash noted  Psychiatric: She has a normal mood and affect  Thought content normal          Labs: I personally reviewed the labs and imaging pertinent to this patient care

## 2018-07-31 NOTE — LETTER
July 31, 2018     [facility], [address] [place] [address] [address] [address]    Patient: Nolan Knott   YOB: 1950   Date of Visit: 7/31/2018       Dear Dr oRsanne Matute: Thank you for referring Perla Villa to me for evaluation  Below are my notes for this consultation  If you have questions, please do not hesitate to call me  I look forward to following your patient along with you  Sincerely,        Terri Fairbanks DO        CC: No Recipients  Terri Fairbanks DO  7/31/2018  8:35 AM  Sign at close encounter  [address]  [address] [address] [phone]    [facility] [id number]  07/31/18    Discussion:   In summary, this is a 63-year-old female history of iron deficiency anemia secondary to status post bariatric surgery  She also has B12 deficiency, currently under replacement by her PCP on a monthly basis  Parental iron replacement had been accomplished in November 2016  Subsequently, however, her values have declined  Ferritin may be an on reliable marker in her case as this could be artificially normalized as an acute phase reactant  She does have ongoing fevers of uncertain etiology  These have predominantly been attributed to recurrent intermittent sinusitis  She tells me that she has had several episodes of pneumonia last year  She will be getting a new bed in the near future which hopefully will help with her significant reflux symptoms  Fundoplication was discussed briefly  I proposed Feraheme 510 mg IV Q 3 months with monitoring of hemoglobin and iron status and adjustment accordingly  Lastly, she has a history of IgA MGUS  Repeat parameters will be requested prior to her next visit in 6 months  I discussed the above with the patient    The patient  voiced understanding and agreement   ______________________________________________________________________    Chief Complaint Patient presents with    Follow-up       HPI:   No history exists  Interval History:  Clinically stable  1 - Symptomatic but completely ambulatory    Review of Systems   Constitutional: Positive for fatigue  Negative for appetite change, diaphoresis and fever  HENT: Negative for sinus pain  Eyes: Negative for discharge  Respiratory: Positive for cough  Negative for shortness of breath  Cardiovascular: Negative for chest pain  Gastrointestinal: Negative for abdominal pain, constipation and diarrhea  Endocrine: Negative for cold intolerance  Genitourinary: Negative for difficulty urinating and hematuria  Musculoskeletal: Negative for joint swelling  Skin: Negative for rash  Allergic/Immunologic: Negative for environmental allergies  Neurological: Negative for dizziness and headaches  Hematological: Negative for adenopathy  Psychiatric/Behavioral: Negative for agitation         Past Medical History:   Diagnosis Date    Anemia     Anxiety     hx of panic attacks (now under control)     Arthritis     Asthma     resolved (no problems in a decade)     Baker's cyst of knee     Bronchitis     Bunion, left foot     Cervical pain     Chronic GERD     Diabetes mellitus, type 2 ([address] )     Diabetic peripheral neuropathy (HCC)     Endometriosis     Fibromyalgia     Hyperlipidemia     Hypertension     Lung nodules     Macular degeneration     Spondylosis      Patient Active Problem List   Diagnosis    Chronic pain syndrome    Cervical radiculopathy    Myofascial pain syndrome    Spondylosis of cervical region without myelopathy or radiculopathy    Fibromyalgia    Diabetic peripheral neuropathy (HCC)    Long-term current use of opiate analgesic    MGUS (monoclonal gammopathy of unknown significance)       Current Outpatient Prescriptions:     albuterol (2 5 mg/3 mL) 0 083 % nebulizer solution, INHALE 1 VIAL VIA NEBULIZER 4 TIMES EVERY DAY, Disp: , Rfl: 1    albuterol (PROVENTIL HFA,VENTOLIN HFA) 90 mcg/act inhaler, Inhale 2 puffs every 6 (six) hours as needed for wheezing, Disp: , Rfl:     albuterol (VENTOLIN HFA) 90 mcg/act inhaler, inhale 2 puff by inhalation route  every 4 - 6 hours as needed, Disp: , Rfl:     ALPRAZolam (XANAX) 0 5 mg tablet, Take 0 5 mg by mouth 2 (two) times a day as needed for anxiety, Disp: , Rfl:     amLODIPine (NORVASC) 5 mg tablet, TAKE 1 TABLET BY MOUTH EVERY DAY, Disp: , Rfl:     baclofen 10 mg tablet, TAKE 1 TABLET BY MOUTH THREE TIMES A DAY, Disp: 90 tablet, Rfl: 1    diphenhydrAMINE (BENADRYL ALLERGY) 25 mg capsule, Take by mouth, Disp: , Rfl:     glucose blood test strip, test by Misc  (Non-Drug; Combo Route) route three times daily, Disp: , Rfl:     HYDROcodone-acetaminophen (NORCO)  mg per tablet, Take 4-5 tablets daily PRN, Disp: 130 tablet, Rfl: 0    Lancets Misc  (ACCU-CHEK FASTCLIX LANCET) KIT, 3 (three) times a day, Disp: , Rfl:     levothyroxine 50 mcg tablet, Take 50 mcg by mouth daily, Disp: , Rfl:     losartan (COZAAR) 25 mg tablet, every 24 hours, Disp: , Rfl:     MICROLET LANCETS MISC, Microlet Lancet, Disp: , Rfl:     pantoprazole (PROTONIX) 40 mg tablet, Take 1 tablet (40 mg total) by mouth daily, Disp: 30 tablet, Rfl: 2    pregabalin (LYRICA) 100 mg capsule, Take 1 capsule (100 mg total) by mouth 3 (three) times a day, Disp: 90 capsule, Rfl: 1    ranitidine (ZANTAC) 150 MG capsule, Take 1 capsule (150 mg total) by mouth 2 (two) times a day, Disp: 60 capsule, Rfl: 3    sucralfate (CARAFATE) 1 g tablet, TAKE 1 TABLET BY ORAL ROUTE 4 TIMES EVERY DAY ON AN EMPTY STOMACH 1 HOUR BEFORE MEALS AND AT BEDTIME, Disp: 120 tablet, Rfl: 1    traZODone (DESYREL) 50 mg tablet, Take 1 tablet (50 mg total) by mouth daily at bedtime, Disp: 30 tablet, Rfl: 1    Current Facility-Administered Medications:     cyanocobalamin injection 1,000 mcg, 1,000 mcg, Intramuscular, Q30 Days, Rossie Nyhan, MD, 1,000 mcg at 07/12/18 4318  Allergies   Allergen Reactions    Cephalosporins Hives    Erythromycin     Fentanyl Itching     Occurred during surgery     [place] Perforatum     Paxil [Paroxetine] Hives     After 2 weeks    Penicillins Itching    Pravastatin     Prednisolone     Rosuvastatin     Statins Itching    Sulfa Antibiotics     Sulfacetamide     Sulfamethoxazole-Trimethoprim     Sulfur     Sulfur-Salicylic Acid [Salicylic Acid-Sulfur]      Unsure of reaction     Wellbutrin [Bupropion] Hives     After 2 weeks      Past Surgical History:   Procedure Laterality Date    BACK SURGERY  1996    L5, S1- laser surgery fusion of c5 and c6     BREAST LUMPECTOMY Right     CHOLECYSTECTOMY  2004    FOOT SURGERY Left 2005    fusion     GASTRIC BYPASS  2010    Dr Laureano Meade    just uterus     KNEE ARTHROSCOPY      LAMINECTOMY      OVARIAN CYST REMOVAL  1975    PELVIC LAPAROSCOPY      ovaries (x10)     PLEURAL SCARIFICATION  1967    SHOULDER OPEN ROTATOR CUFF REPAIR Left 2008    TONSILLECTOMY      VAGINAL PROLAPSE REPAIR       Social History     Objective:  Vitals:    07/31/18 0747   BP: 128/64   BP Location: Left arm   Patient Position: Sitting   Pulse: (!) 53   Resp: 17   Temp: (!) 96 9 °F (36 1 °C)   TempSrc: Tympanic   SpO2: 96%   Weight: 70 2 kg (154 lb 12 8 oz)   Height: 5' 5" (1 651 m)     Physical Exam   Constitutional: She is oriented to person, place, and time  She appears well-developed  HENT:   Head: Normocephalic  Eyes: Pupils are equal, round, and reactive to light  Neck: Neck supple  Cardiovascular: Normal rate  No murmur heard  Pulmonary/Chest: No respiratory distress  She has no wheezes  She has no rales  Abdominal: Soft  She exhibits no distension  There is no tenderness  There is no rebound  Musculoskeletal: She exhibits no edema  Lymphadenopathy:     She has no cervical adenopathy  Neurological: She is alert and oriented to person, place, and time   She displays normal reflexes  Skin: Skin is warm  No rash noted  Psychiatric: She has a normal mood and affect  Thought content normal          Labs: I personally reviewed the labs and imaging pertinent to this patient care  Supa Spann DO  7/31/2018  8:09 AM  Incomplete  [facility] HEMATOLOGY ONCOLOGY Angelita Delvalle  [address] [place] [address] [address] [address] [phone]  [phone]    Awa Crocker [id number]  07/31/18    Discussion:   ***    I discussed the above with the patient  The patient *** voiced understanding and agreement   ______________________________________________________________________    Chief Complaint   Patient presents with    Follow-up       HPI:   No history exists         Interval History:  ***  {performance status:86625}    Review of Systems    Past Medical History:   Diagnosis Date    Anemia     Anxiety     hx of panic attacks (now under control)     Arthritis     Asthma     resolved (no problems in a decade)     Baker's cyst of knee     Bronchitis     Bunion, left foot     Cervical pain     Chronic GERD     Diabetes mellitus, type 2 (HCC)     Diabetic peripheral neuropathy (HCC)     Endometriosis     Fibromyalgia     Hyperlipidemia     Hypertension     Lung nodules     Macular degeneration     Spondylosis      Patient Active Problem List   Diagnosis    Chronic pain syndrome    Cervical radiculopathy    Myofascial pain syndrome    Spondylosis of cervical region without myelopathy or radiculopathy    Fibromyalgia    Diabetic peripheral neuropathy (HCC)    Long-term current use of opiate analgesic       Current Outpatient Prescriptions:     albuterol (2 5 mg/3 mL) 0 083 % nebulizer solution, INHALE 1 VIAL VIA NEBULIZER 4 TIMES EVERY DAY, Disp: , Rfl: 1    albuterol (PROVENTIL HFA,VENTOLIN HFA) 90 mcg/act inhaler, Inhale 2 puffs every 6 (six) hours as needed for wheezing, Disp: , Rfl:     albuterol (VENTOLIN HFA) 90 mcg/act inhaler, inhale 2 puff by inhalation route  every 4 - 6 hours as needed, Disp: , Rfl:     ALPRAZolam (XANAX) 0 5 mg tablet, Take 0 5 mg by mouth 2 (two) times a day as needed for anxiety, Disp: , Rfl:     amLODIPine (NORVASC) 5 mg tablet, TAKE 1 TABLET BY MOUTH EVERY DAY, Disp: , Rfl:     baclofen 10 mg tablet, TAKE 1 TABLET BY MOUTH THREE TIMES A DAY, Disp: 90 tablet, Rfl: 1    cyanocobalamin 100 MCG tablet, Take 100 mcg by mouth daily, Disp: , Rfl:     Cyanocobalamin 1000 MCG/15ML LIQD, Take by mouth, Disp: , Rfl:     diphenhydrAMINE (BENADRYL ALLERGY) 25 mg capsule, Take by mouth, Disp: , Rfl:     fexofenadine (ALLEGRA) 180 MG tablet, every 24 hours, Disp: , Rfl:     glucose blood test strip, test by Misc  (Non-Drug; Combo Route) route three times daily, Disp: , Rfl:     HYDROcodone-acetaminophen (NORCO)  mg per tablet, Take 4-5 tablets daily PRN, Disp: 130 tablet, Rfl: 0    Lancets Misc  (ACCU-CHEK FASTCLIX LANCET) KIT, 3 (three) times a day, Disp: , Rfl:     levothyroxine 50 mcg tablet, Take 50 mcg by mouth daily, Disp: , Rfl:     losartan (COZAAR) 25 mg tablet, every 24 hours, Disp: , Rfl:     MICROLET LANCETS MISC, Microlet Lancet, Disp: , Rfl:     pantoprazole (PROTONIX) 40 mg tablet, Take 1 tablet (40 mg total) by mouth daily, Disp: 30 tablet, Rfl: 2    pregabalin (LYRICA) 100 mg capsule, Take 1 capsule (100 mg total) by mouth 3 (three) times a day, Disp: 90 capsule, Rfl: 1    ranitidine (ZANTAC) 150 MG capsule, Take 1 capsule (150 mg total) by mouth 2 (two) times a day, Disp: 60 capsule, Rfl: 3    sucralfate (CARAFATE) 1 g tablet, TAKE 1 TABLET BY ORAL ROUTE 4 TIMES EVERY DAY ON AN EMPTY STOMACH 1 HOUR BEFORE MEALS AND AT BEDTIME, Disp: 120 tablet, Rfl: 1    traZODone (DESYREL) 50 mg tablet, Take 1 tablet (50 mg total) by mouth daily at bedtime, Disp: 30 tablet, Rfl: 1    Current Facility-Administered Medications:     cyanocobalamin injection 1,000 mcg, 1,000 mcg, Intramuscular, Q30 Days, Sammy Chang MD, 1,000 mcg at 07/12/18 0892  Allergies   Allergen Reactions    Cephalosporins Hives    Erythromycin     Fentanyl Itching     Occurred during surgery     [place] Perforatum     Paxil [Paroxetine] Hives     After 2 weeks    Penicillins Itching    Pravastatin     Prednisolone     Rosuvastatin     Statins Itching    Sulfa Antibiotics     Sulfacetamide     Sulfamethoxazole-Trimethoprim     Sulfur     Sulfur-Salicylic Acid [Salicylic Acid-Sulfur]      Unsure of reaction     Wellbutrin [Bupropion] Hives     After 2 weeks      Past Surgical History:   Procedure Laterality Date    BACK SURGERY  1996    L5, S1- laser surgery fusion of c5 and c6     BREAST LUMPECTOMY Right     CHOLECYSTECTOMY  2004    FOOT SURGERY Left 2005    fusion     GASTRIC BYPASS  2010    Dr Jason Borrero    just uterus     KNEE ARTHROSCOPY      LAMINECTOMY      OVARIAN CYST REMOVAL  1975    PELVIC LAPAROSCOPY      ovaries (x10)     PLEURAL SCARIFICATION  1967    SHOULDER OPEN ROTATOR CUFF REPAIR Left 2008    TONSILLECTOMY      VAGINAL PROLAPSE REPAIR       Social History     Objective:  Vitals:    07/31/18 0747   BP: 128/64   BP Location: Left arm   Patient Position: Sitting   Pulse: (!) 53   Resp: 17   Temp: (!) 96 9 °F (36 1 °C)   TempSrc: Tympanic   SpO2: 96%   Weight: 70 2 kg (154 lb 12 8 oz)   Height: 5' 5" (1 651 m)     Physical Exam      Labs: I personally reviewed the labs and imaging pertinent to this patient care

## 2018-08-01 ENCOUNTER — TELEPHONE (OUTPATIENT)
Dept: PAIN MEDICINE | Facility: MEDICAL CENTER | Age: 68
End: 2018-08-01

## 2018-08-01 ENCOUNTER — OFFICE VISIT (OUTPATIENT)
Dept: PAIN MEDICINE | Facility: MEDICAL CENTER | Age: 68
End: 2018-08-01
Payer: COMMERCIAL

## 2018-08-01 VITALS
HEART RATE: 64 BPM | HEIGHT: 65 IN | BODY MASS INDEX: 25.92 KG/M2 | WEIGHT: 155.6 LBS | SYSTOLIC BLOOD PRESSURE: 120 MMHG | RESPIRATION RATE: 12 BRPM | DIASTOLIC BLOOD PRESSURE: 50 MMHG

## 2018-08-01 DIAGNOSIS — M79.18 MYOFASCIAL PAIN SYNDROME: ICD-10-CM

## 2018-08-01 DIAGNOSIS — G89.4 CHRONIC PAIN SYNDROME: ICD-10-CM

## 2018-08-01 DIAGNOSIS — M47.812 SPONDYLOSIS OF CERVICAL REGION WITHOUT MYELOPATHY OR RADICULOPATHY: ICD-10-CM

## 2018-08-01 DIAGNOSIS — M54.12 CERVICAL RADICULOPATHY: Primary | ICD-10-CM

## 2018-08-01 DIAGNOSIS — M79.7 FIBROMYALGIA: ICD-10-CM

## 2018-08-01 PROCEDURE — 99214 OFFICE O/P EST MOD 30 MIN: CPT | Performed by: NURSE PRACTITIONER

## 2018-08-01 RX ORDER — PREGABALIN 100 MG/1
100 CAPSULE ORAL 3 TIMES DAILY
Qty: 90 CAPSULE | Refills: 1 | Status: SHIPPED | OUTPATIENT
Start: 2018-08-01 | End: 2018-09-26 | Stop reason: SDUPTHER

## 2018-08-01 RX ORDER — HYDROCODONE BITARTRATE AND ACETAMINOPHEN 10; 325 MG/1; MG/1
TABLET ORAL
Qty: 130 TABLET | Refills: 0 | Status: SHIPPED | OUTPATIENT
Start: 2018-08-01 | End: 2018-08-01 | Stop reason: SDUPTHER

## 2018-08-01 RX ORDER — HYDROCODONE BITARTRATE AND ACETAMINOPHEN 10; 325 MG/1; MG/1
TABLET ORAL
Qty: 130 TABLET | Refills: 0 | Status: SHIPPED | OUTPATIENT
Start: 2018-08-31 | End: 2018-09-26 | Stop reason: SDUPTHER

## 2018-08-01 RX ORDER — BACLOFEN 10 MG/1
10 TABLET ORAL 3 TIMES DAILY
Qty: 90 TABLET | Refills: 1 | Status: SHIPPED | OUTPATIENT
Start: 2018-08-01 | End: 2018-09-26 | Stop reason: SDUPTHER

## 2018-08-01 NOTE — PROGRESS NOTES
Assessment:  1  Cervical radiculopathy    2  Myofascial pain syndrome    3  Spondylosis of cervical region without myelopathy or radiculopathy    4  Chronic pain syndrome    5  Fibromyalgia        Plan:  Continue with hydrocodone as prescribed she was given a 2 month supply today with 1 month having a do not fill date of August 31, 2018  Lyrica and Baclofen were also refilled today  Follow-up in 8 weeks for medication refills      There are risks associated with opioid medications, including dependence, addiction and tolerance  The patient understands and agrees to use these medications only as prescribed  Potential side effects of the medications include, but are not limited to, constipation, drowsiness, addiction, impaired judgment and risk of fatal overdose if not taken as prescribed  The patient was warned against driving while taking sedation medications  Sharing medications is a felony  At this point in time, the patient is showing no signs of addiction, abuse, diversion or suicidal ideation  [place] Prescription Drug Monitoring Program report was reviewed and was appropriate         My impressions and treatment recommendations were discussed in detail with the patient who verbalized understanding and had no further questions  Discharge instructions were provided  I personally saw and examined the patient and I agree with the above discussed plan of care  No orders of the defined types were placed in this encounter      New Medications Ordered This Visit   Medications    baclofen 10 mg tablet     Sig: Take 1 tablet (10 mg total) by mouth 3 (three) times a day     Dispense:  90 tablet     Refill:  1    pregabalin (LYRICA) 100 mg capsule     Sig: Take 1 capsule (100 mg total) by mouth 3 (three) times a day     Dispense:  90 capsule     Refill:  1    HYDROcodone-acetaminophen (NORCO)  mg per tablet     Sig: Take 4-5 tablets daily PRN     Dispense:  130 tablet     Refill:  0 History of Present Illness:    Yoon Hinkle is a 76 y o  female who presents for a follow-up visit related to her neck and arm pain as well as her bilateral knee and leg pain  Today she rates her pain 7/10, this is constant in nature and described as burning, sharp, throbbing, cramping, pressure-like, shooting, numbness, and pins and needles  The patient takes hydrocodone 10/325 mg 1 tablet 4 times a day occasionally she does have to take 5 tablets daily that 50 tablets she takes in the middle of the night  She also takes Lyrica 100 mg 2 or 3 times a day as well as baclofen 10 mg 3 times a day  Her medication regimen provides her 40-50% relief and no side effects or issues  I have personally reviewed and/or updated the patient's past medical history, past surgical history, family history, social history, current medications, allergies, and vital signs today  Review of Systems:    Review of Systems   Respiratory: Negative for shortness of breath  Cardiovascular: Negative for chest pain  Gastrointestinal: Positive for nausea and vomiting  Negative for constipation and diarrhea  Musculoskeletal: Positive for back pain, gait problem, joint swelling and myalgias  Negative for arthralgias  Skin: Negative for rash  Neurological: Positive for dizziness and weakness  Negative for seizures  Psychiatric/Behavioral: Positive for decreased concentration  All other systems reviewed and are negative        Patient Active Problem List   Diagnosis    Chronic pain syndrome    Cervical radiculopathy    Myofascial pain syndrome    Spondylosis of cervical region without myelopathy or radiculopathy    Fibromyalgia    Diabetic peripheral neuropathy (HCC)    Long-term current use of opiate analgesic    MGUS (monoclonal gammopathy of unknown significance)    Iron deficiency anemia following bariatric surgery       Past Medical History:   Diagnosis Date    Anemia     Anxiety     hx of panic attacks (now under control)     Arthritis     Asthma     resolved (no problems in a decade)     Baker's cyst of knee     Bronchitis     Bunion, left foot     Cervical pain     Chronic GERD     Diabetes mellitus, type 2 (HCC)     Diabetic peripheral neuropathy (HCC)     Endometriosis     Fibromyalgia     Hyperlipidemia     Hypertension     Lung nodules     Macular degeneration     Spondylosis        Past Surgical History:   Procedure Laterality Date    BACK SURGERY  1996    L5, S1- laser surgery fusion of c5 and c6     BREAST LUMPECTOMY Right     CHOLECYSTECTOMY  2004    FOOT SURGERY Left 2005    fusion     GASTRIC BYPASS  2010    Dr Juan Jose Sylvester    just uterus     KNEE ARTHROSCOPY      LAMINECTOMY      OVARIAN CYST REMOVAL  1975    PELVIC LAPAROSCOPY      ovaries (x10)     PLEURAL SCARIFICATION  1967    SHOULDER OPEN ROTATOR CUFF REPAIR Left 2008    TONSILLECTOMY      VAGINAL PROLAPSE REPAIR         Family History   Problem Relation Age of Onset    Hypertension Mother     Lung cancer Mother     Hypertension Father     Heart disease Father     Heart attack Father        Social History     Occupational History    Not on file       Social History Main Topics    Smoking status: Current Every Day Smoker     Packs/day: 1 50     Years: 40 00    Smokeless tobacco: Current User    Alcohol use 0 6 oz/week     1 Shots of liquor per week    Drug use: No    Sexual activity: Not on file       Current Outpatient Prescriptions on File Prior to Visit   Medication Sig    albuterol (2 5 mg/3 mL) 0 083 % nebulizer solution INHALE 1 VIAL VIA NEBULIZER 4 TIMES EVERY DAY    albuterol (VENTOLIN HFA) 90 mcg/act inhaler inhale 2 puff by inhalation route  every 4 - 6 hours as needed    ALPRAZolam (XANAX) 0 5 mg tablet Take 0 5 mg by mouth 2 (two) times a day as needed for anxiety    amLODIPine (NORVASC) 5 mg tablet TAKE 1 TABLET BY MOUTH EVERY DAY    diphenhydrAMINE (BENADRYL ALLERGY) 25 mg capsule Take by mouth    levothyroxine 50 mcg tablet Take 50 mcg by mouth daily    losartan (COZAAR) 25 mg tablet every 24 hours    pantoprazole (PROTONIX) 40 mg tablet Take 1 tablet (40 mg total) by mouth daily    ranitidine (ZANTAC) 150 MG capsule Take 1 capsule (150 mg total) by mouth 2 (two) times a day    sucralfate (CARAFATE) 1 g tablet TAKE 1 TABLET BY ORAL ROUTE 4 TIMES EVERY DAY ON AN EMPTY STOMACH 1 HOUR BEFORE MEALS AND AT BEDTIME    traZODone (DESYREL) 50 mg tablet Take 1 tablet (50 mg total) by mouth daily at bedtime    [DISCONTINUED] baclofen 10 mg tablet TAKE 1 TABLET BY MOUTH THREE TIMES A DAY    [DISCONTINUED] HYDROcodone-acetaminophen (NORCO)  mg per tablet Take 4-5 tablets daily PRN    [DISCONTINUED] pregabalin (LYRICA) 100 mg capsule Take 1 capsule (100 mg total) by mouth 3 (three) times a day    glucose blood test strip test by Misc  (Non-Drug; Combo Route) route three times daily    Lancets Misc  (ACCU-CHEK FASTCLIX LANCET) KIT 3 (three) times a day    MICROLET LANCETS MISC Microlet Lancet    [DISCONTINUED] albuterol (PROVENTIL HFA,VENTOLIN HFA) 90 mcg/act inhaler Inhale 2 puffs every 6 (six) hours as needed for wheezing     Current Facility-Administered Medications on File Prior to Visit   Medication    cyanocobalamin injection 1,000 mcg       Allergies   Allergen Reactions    Cephalosporins Hives    Erythromycin     Fentanyl Itching     Occurred during surgery     [place] Perforatum     Paxil [Paroxetine] Hives     After 2 weeks    Penicillins Itching    Pravastatin     Prednisolone     Rosuvastatin     Statins Itching    Sulfa Antibiotics     Sulfacetamide     Sulfamethoxazole-Trimethoprim     Sulfur     Sulfur-Salicylic Acid [Salicylic Acid-Sulfur]      Unsure of reaction     Wellbutrin [Bupropion] Hives     After 2 weeks        Physical Exam:    /50   Pulse 64   Resp 12   Ht 5' 5" (1 651 m)   Wt 70 6 kg (155 lb 9 6 oz)   LMP (LMP Unknown)   BMI 25 89 kg/m²     Constitutional: normal, well developed, well nourished, alert, in no distress and non-toxic and no overt pain behavior    Eyes: anicteric  HEENT: grossly intact  Neck: supple, symmetric, trachea midline and no masses   Pulmonary:even and unlabored  Cardiovascular:No edema or pitting edema present  Skin:Normal without rashes or lesions and well hydrated  Psychiatric:Mood and affect appropriate  Neurologic:Cranial Nerves II-XII grossly intact  Musculoskeletal:ambulates with cane    Imaging

## 2018-08-01 NOTE — TELEPHONE ENCOUNTER
Aster Black from [facility] called stating they wont be able to fill Rx for Hydrocodone for patient until Friday (don't have it)   Stated patient would like to know is [id number]  C/b [phone] or c/b patient [phone]

## 2018-08-02 NOTE — TELEPHONE ENCOUNTER
Pt is calling asking if her script can be sent to [facility] in [place]   Please call pt back at [phone]

## 2018-08-02 NOTE — TELEPHONE ENCOUNTER
Pt is calling stating that nothing is wrong with the script it just needs to be faxed to a different pharmacy since the first pharmacy did not have it in stock   Pt would like the same script sent to [facility]

## 2018-08-02 NOTE — TELEPHONE ENCOUNTER
Patient called stating that the [facility] pharmacy is requesting a call back to make sure it is not being filled elsewhere

## 2018-08-02 NOTE — TELEPHONE ENCOUNTER
As per task from 6/7, [facility] called regarding the same  Is it okay if they put on the script to take 1 tablet 4 to 5 times a day as needed like before? Please advise

## 2018-08-02 NOTE — TELEPHONE ENCOUNTER
[facility] would like office to know that they now have e-acetaminophen ([facility] [facility]  mg per tablet in stock, they are about to fill rx and call pt   However, they need some details on Take 4-5 tablets daily PRN --  ie wait 1 hr in between, etc  Please call pharmacy ASAP

## 2018-08-02 NOTE — TELEPHONE ENCOUNTER
S/W Jolene Pinedo at [facility]  Advised her of previous tasks and that the script was only sent to them  She verbalized understanding

## 2018-08-02 NOTE — TELEPHONE ENCOUNTER
S/W Howard-pt's friend as per release of health information  Advised him [facility] will be filling [facility] and he will let the pt know

## 2018-08-02 NOTE — TELEPHONE ENCOUNTER
S/w the patient and she stated that she would like the script sent to [facility] in [facility]  When I checked the script, it looks like it would not be filled until 8/31? She stated she is out of medication and can't wait until tomorrow  She is aware that you are out of the office and JE cannot change or modify the order  Please advise   Thanks

## 2018-08-06 RX ORDER — SODIUM CHLORIDE 9 MG/ML
20 INJECTION, SOLUTION INTRAVENOUS ONCE
Status: COMPLETED | OUTPATIENT
Start: 2018-08-07 | End: 2018-08-07

## 2018-08-07 ENCOUNTER — HOSPITAL ENCOUNTER (OUTPATIENT)
Dept: INFUSION CENTER | Facility: HOSPITAL | Age: 68
Discharge: HOME | End: 2018-08-07
Payer: COMMERCIAL

## 2018-08-07 VITALS — RESPIRATION RATE: 18 BRPM | HEART RATE: 80 BPM | TEMPERATURE: 97.1 F

## 2018-08-07 PROCEDURE — 96365 THER/PROPH/DIAG IV INF INIT: CPT

## 2018-08-07 RX ADMIN — FERUMOXYTOL 510 MG: 510 INJECTION INTRAVENOUS at 08:10

## 2018-08-07 RX ADMIN — SODIUM CHLORIDE 20 ML/HR: 0.9 INJECTION, SOLUTION INTRAVENOUS at 07:51

## 2018-08-08 LAB
ALBUMIN SERPL-MCNC: 3.7 G/DL (ref 3.6–5.1)
ALBUMIN/GLOB SERPL: 1.6 (CALC) (ref 1–2.5)
ALP SERPL-CCNC: 112 U/L (ref 33–130)
ALT SERPL-CCNC: 11 U/L (ref 6–29)
AMYLASE SERPL-CCNC: 47 U/L (ref 21–101)
AST SERPL-CCNC: 19 U/L (ref 10–35)
BASOPHILS # BLD AUTO: 17 CELLS/UL (ref 0–200)
BASOPHILS NFR BLD AUTO: 0.3 %
BILIRUB DIRECT SERPL-MCNC: 0.1 MG/DL
BILIRUB INDIRECT SERPL-MCNC: 0.2 MG/DL (CALC) (ref 0.2–1.2)
BILIRUB SERPL-MCNC: 0.3 MG/DL (ref 0.2–1.2)
BUN SERPL-MCNC: 8 MG/DL (ref 7–25)
BUN/CREAT SERPL: NORMAL (CALC) (ref 6–22)
C PEPTIDE SERPL-MCNC: 1.54 NG/ML (ref 0.8–3.85)
CALCIUM SERPL-MCNC: 9.1 MG/DL (ref 8.6–10.4)
CHLORIDE SERPL-SCNC: 107 MMOL/L (ref 98–110)
CO2 SERPL-SCNC: 27 MMOL/L (ref 20–31)
CREAT SERPL-MCNC: 0.83 MG/DL (ref 0.5–0.99)
EOSINOPHIL # BLD AUTO: 87 CELLS/UL (ref 15–500)
EOSINOPHIL NFR BLD AUTO: 1.5 %
ERYTHROCYTE [DISTWIDTH] IN BLOOD BY AUTOMATED COUNT: 12.9 % (ref 11–15)
EST. AVERAGE GLUCOSE BLD GHB EST-MCNC: 103 (CALC)
EST. AVERAGE GLUCOSE BLD GHB EST-SCNC: 5.7 (CALC)
GLOBULIN SER CALC-MCNC: 2.3 G/DL (CALC) (ref 1.9–3.7)
GLUCOSE SERPL-MCNC: 71 MG/DL (ref 65–99)
HBA1C MFR BLD: 5.2 % OF TOTAL HGB
HCT VFR BLD AUTO: 38.3 % (ref 35–45)
HGB BLD-MCNC: 12.8 G/DL (ref 11.7–15.5)
INSULIN AB SER RIA-ACNC: <0.4 U/ML
INSULIN SERPL-ACNC: 4.7 UIU/ML (ref 2–19.6)
LIPASE SERPL-CCNC: 22 U/L (ref 7–60)
LYMPHOCYTES # BLD AUTO: 1554 CELLS/UL (ref 850–3900)
LYMPHOCYTES NFR BLD AUTO: 26.8 %
MCH RBC QN AUTO: 32.2 PG (ref 27–33)
MCHC RBC AUTO-ENTMCNC: 33.4 G/DL (ref 32–36)
MCV RBC AUTO: 96.2 FL (ref 80–100)
MONOCYTES # BLD AUTO: 435 CELLS/UL (ref 200–950)
MONOCYTES NFR BLD AUTO: 7.5 %
NEUTROPHILS # BLD AUTO: 3706 CELLS/UL (ref 1500–7800)
NEUTROPHILS NFR BLD AUTO: 63.9 %
PLATELET # BLD AUTO: 274 THOUSAND/UL (ref 140–400)
PMV BLD REES-ECKER: 9.6 FL (ref 7.5–12.5)
POTASSIUM SERPL-SCNC: 4.7 MMOL/L (ref 3.5–5.3)
PROINSULIN SERPL-SCNC: 16.1 PMOL/L
PROT SERPL-MCNC: 6 G/DL (ref 6.1–8.1)
RBC # BLD AUTO: 3.98 MILLION/UL (ref 3.8–5.1)
SL AMB EGFR AFRICAN AMERICAN: 84 ML/MIN/1.73M2
SL AMB EGFR NON AFRICAN AMERICAN: 72 ML/MIN/1.73M2
SODIUM SERPL-SCNC: 142 MMOL/L (ref 135–146)
WBC # BLD AUTO: 5.8 THOUSAND/UL (ref 3.8–10.8)

## 2018-08-21 DIAGNOSIS — E03.9 HYPOTHYROIDISM, UNSPECIFIED TYPE: Primary | ICD-10-CM

## 2018-08-21 RX ORDER — LEVOTHYROXINE SODIUM 0.03 MG/1
TABLET ORAL
Qty: 30 TABLET | Refills: 2 | Status: SHIPPED | OUTPATIENT
Start: 2018-08-21 | End: 2019-01-27 | Stop reason: SDUPTHER

## 2018-08-23 DIAGNOSIS — I10 ESSENTIAL HYPERTENSION: Primary | ICD-10-CM

## 2018-08-24 RX ORDER — AMLODIPINE BESYLATE 5 MG/1
TABLET ORAL
Qty: 90 TABLET | Refills: 1 | Status: SHIPPED | OUTPATIENT
Start: 2018-08-24 | End: 2018-11-21 | Stop reason: SDUPTHER

## 2018-08-27 ENCOUNTER — CLINICAL SUPPORT (OUTPATIENT)
Dept: FAMILY MEDICINE CLINIC | Facility: CLINIC | Age: 68
End: 2018-08-27
Payer: COMMERCIAL

## 2018-08-27 DIAGNOSIS — E53.8 VITAMIN B 12 DEFICIENCY: ICD-10-CM

## 2018-08-27 RX ADMIN — CYANOCOBALAMIN 1000 MCG: 1000 INJECTION INTRAMUSCULAR; SUBCUTANEOUS at 08:51

## 2018-08-28 DIAGNOSIS — K27.9 PUD (PEPTIC ULCER DISEASE): ICD-10-CM

## 2018-08-28 RX ORDER — SUCRALFATE 1 G/1
TABLET ORAL
Qty: 120 TABLET | Refills: 1 | Status: SHIPPED | OUTPATIENT
Start: 2018-08-28 | End: 2018-10-23 | Stop reason: SDUPTHER

## 2018-09-07 ENCOUNTER — OFFICE VISIT (OUTPATIENT)
Dept: FAMILY MEDICINE CLINIC | Facility: CLINIC | Age: 68
End: 2018-09-07
Payer: COMMERCIAL

## 2018-09-07 VITALS
SYSTOLIC BLOOD PRESSURE: 136 MMHG | HEIGHT: 64 IN | HEART RATE: 74 BPM | BODY MASS INDEX: 26.27 KG/M2 | DIASTOLIC BLOOD PRESSURE: 70 MMHG | OXYGEN SATURATION: 99 % | WEIGHT: 153.9 LBS | RESPIRATION RATE: 14 BRPM | TEMPERATURE: 97.9 F

## 2018-09-07 DIAGNOSIS — E03.9 HYPOTHYROIDISM, UNSPECIFIED TYPE: Primary | ICD-10-CM

## 2018-09-07 DIAGNOSIS — I10 ESSENTIAL HYPERTENSION: ICD-10-CM

## 2018-09-07 DIAGNOSIS — E53.8 LOW VITAMIN B12 LEVEL: ICD-10-CM

## 2018-09-07 PROCEDURE — 99214 OFFICE O/P EST MOD 30 MIN: CPT | Performed by: INTERNAL MEDICINE

## 2018-09-07 PROCEDURE — 3078F DIAST BP <80 MM HG: CPT | Performed by: INTERNAL MEDICINE

## 2018-09-07 PROCEDURE — 3008F BODY MASS INDEX DOCD: CPT | Performed by: INTERNAL MEDICINE

## 2018-09-07 PROCEDURE — 3075F SYST BP GE 130 - 139MM HG: CPT | Performed by: INTERNAL MEDICINE

## 2018-09-07 RX ORDER — ALBUTEROL SULFATE 90 UG/1
AEROSOL, METERED RESPIRATORY (INHALATION)
COMMUNITY
End: 2019-03-11 | Stop reason: ALTCHOICE

## 2018-09-07 NOTE — PROGRESS NOTES
Assessment/Plan:         Diagnoses and all orders for this visit:    Hypothyroidism, unspecified type: Stable Clinically  Continue same  RTC in 3mos w Blood work  -     Basic metabolic panel; Future  -     TSH, 3rd generation; Future  -     T4, free; Future    Essential hypertension : Stable  Continue same  RTC in 3mos  -     Basic metabolic panel; Future  -     TSH, 3rd generation; Future  -     T4, free; Future  -     Lipid panel; Future  -     CBC and differential; Future  -     Vitamin A; Future  -     Vitamin B1 (Thiamine), Serum/Plasma, LC/MS/MS; Future  -     Hepatic function panel; Future  -     UA (URINE) with reflex to Microscopic  -     Magnesium; Future    Low vitamin B12 level ; Stable  Continue same  RTC in 3mos w Blood work  -     Vitamin B1 (Thiamine), Serum/Plasma, LC/MS/MS; Future  -     Hepatic function panel; Future  -     Magnesium; Future    Other orders  -     albuterol (PROVENTIL HFA) 90 mcg/act inhaler; Ventolin HFA 90 mcg/actuation aerosol inhaler        Subjective:      Patient ID: Bethel Miner is a 76 y o  female  [address] 27 with H/o Hypothyroidism,HTN,  Is here for Regular check up    No new symptoms    Her fatigue is Improving after taking fe and Vit b12    Recent blood work and Med List reviewed  w pt in Detail            The following portions of the patient's history were reviewed and updated as appropriate: allergies, current medications, past family history, past medical history, past social history, past surgical history and problem list     Review of Systems   Constitutional: Positive for fatigue  Negative for chills and fever  HENT: Negative for congestion, facial swelling, sore throat, trouble swallowing and voice change  Eyes: Negative for pain, discharge and visual disturbance  Respiratory: Negative for cough, shortness of breath and wheezing  Cardiovascular: Negative for chest pain, palpitations and leg swelling     Gastrointestinal: Negative for abdominal pain, blood in stool, constipation, diarrhea and nausea  Endocrine: Negative for polydipsia, polyphagia and polyuria  Genitourinary: Negative for difficulty urinating, hematuria and urgency  Musculoskeletal: Negative for arthralgias and myalgias  Skin: Negative for rash  Neurological: Negative for dizziness, tremors, weakness and headaches  Hematological: Negative for adenopathy  Does not bruise/bleed easily  Psychiatric/Behavioral: Negative for dysphoric mood, sleep disturbance and suicidal ideas  Objective:      /70 (BP Location: Left arm, Patient Position: Sitting, Cuff Size: Standard)   Pulse 74   Temp 97 9 °F (36 6 °C) (Oral)   Resp 14   Ht 5' 4 2" (1 631 m)   Wt 69 8 kg (153 lb 14 4 oz)   LMP  (LMP Unknown)   SpO2 99%   BMI 26 25 kg/m²          Physical Exam   Constitutional: She is oriented to person, place, and time  She appears well-nourished  No distress  HENT:   Head: Normocephalic  Mouth/Throat: Oropharynx is clear and moist  No oropharyngeal exudate  Eyes: Conjunctivae are normal  Pupils are equal, round, and reactive to light  No scleral icterus  Neck: Neck supple  No thyromegaly present  Cardiovascular: Normal rate, regular rhythm and normal heart sounds  No murmur heard  Pulmonary/Chest: Effort normal and breath sounds normal  No respiratory distress  She has no wheezes  She has no rales  Abdominal: Soft  Bowel sounds are normal  She exhibits no distension  There is no tenderness  There is no rebound and no guarding  Musculoskeletal: She exhibits no edema or tenderness  Brace on Rt Knee   Lymphadenopathy:     She has no cervical adenopathy  Neurological: She is alert and oriented to person, place, and time  No cranial nerve deficit  Coordination normal    Pt uses a cane to support gait   Skin: No rash noted  No erythema  No pallor  Psychiatric: She has a normal mood and affect

## 2018-09-26 ENCOUNTER — OFFICE VISIT (OUTPATIENT)
Dept: PAIN MEDICINE | Facility: MEDICAL CENTER | Age: 68
End: 2018-09-26
Payer: COMMERCIAL

## 2018-09-26 VITALS
SYSTOLIC BLOOD PRESSURE: 128 MMHG | WEIGHT: 157 LBS | RESPIRATION RATE: 20 BRPM | BODY MASS INDEX: 26.8 KG/M2 | HEART RATE: 80 BPM | HEIGHT: 64 IN | DIASTOLIC BLOOD PRESSURE: 78 MMHG

## 2018-09-26 DIAGNOSIS — G47.00 INSOMNIA, UNSPECIFIED TYPE: ICD-10-CM

## 2018-09-26 DIAGNOSIS — M79.7 FIBROMYALGIA: ICD-10-CM

## 2018-09-26 DIAGNOSIS — M54.12 CERVICAL RADICULOPATHY: Primary | ICD-10-CM

## 2018-09-26 DIAGNOSIS — G89.4 CHRONIC PAIN SYNDROME: ICD-10-CM

## 2018-09-26 DIAGNOSIS — M47.812 SPONDYLOSIS OF CERVICAL REGION WITHOUT MYELOPATHY OR RADICULOPATHY: ICD-10-CM

## 2018-09-26 DIAGNOSIS — M79.18 MYOFASCIAL PAIN SYNDROME: ICD-10-CM

## 2018-09-26 PROCEDURE — 99214 OFFICE O/P EST MOD 30 MIN: CPT | Performed by: NURSE PRACTITIONER

## 2018-09-26 RX ORDER — PREGABALIN 100 MG/1
100 CAPSULE ORAL 3 TIMES DAILY
Qty: 90 CAPSULE | Refills: 1 | Status: SHIPPED | OUTPATIENT
Start: 2018-09-26 | End: 2018-11-19 | Stop reason: SDUPTHER

## 2018-09-26 RX ORDER — HYDROCODONE BITARTRATE AND ACETAMINOPHEN 10; 325 MG/1; MG/1
TABLET ORAL
Qty: 130 TABLET | Refills: 0 | Status: SHIPPED | OUTPATIENT
Start: 2018-09-26 | End: 2018-09-26 | Stop reason: SDUPTHER

## 2018-09-26 RX ORDER — BACLOFEN 10 MG/1
10 TABLET ORAL 3 TIMES DAILY
Qty: 90 TABLET | Refills: 1 | Status: SHIPPED | OUTPATIENT
Start: 2018-09-26 | End: 2018-11-19 | Stop reason: SDUPTHER

## 2018-09-26 RX ORDER — TRAZODONE HYDROCHLORIDE 50 MG/1
50 TABLET ORAL
Qty: 30 TABLET | Refills: 1 | Status: SHIPPED | OUTPATIENT
Start: 2018-09-26 | End: 2019-01-12 | Stop reason: SDUPTHER

## 2018-09-26 RX ORDER — HYDROCODONE BITARTRATE AND ACETAMINOPHEN 10; 325 MG/1; MG/1
TABLET ORAL
Qty: 130 TABLET | Refills: 0 | Status: SHIPPED | OUTPATIENT
Start: 2018-10-24 | End: 2018-11-19 | Stop reason: SDUPTHER

## 2018-09-26 NOTE — PROGRESS NOTES
Pt c/o back, foot and knee pain    Assessment:  1  Cervical radiculopathy    2  Myofascial pain syndrome    3  Spondylosis of cervical region without myelopathy or radiculopathy    4  Chronic pain syndrome    5  Fibromyalgia        Plan:  Continue with hydrocodone this was refilled today with a 2 month supply 1 month does have a do not fill date of October 24, 2018  Lyrica and Baclofen were also refilled  Follow-up in 8 weeks for medication refills        There are risks associated with opioid medications, including dependence, addiction and tolerance  The patient understands and agrees to use these medications only as prescribed  Potential side effects of the medications include, but are not limited to, constipation, drowsiness, addiction, impaired judgment and risk of fatal overdose if not taken as prescribed  The patient was warned against driving while taking sedation medications  Sharing medications is a felony  At this point in time, the patient is showing no signs of addiction, abuse, diversion or suicidal ideation  [place] Prescription Drug Monitoring Program report was reviewed and was appropriate         My impressions and treatment recommendations were discussed in detail with the patient who verbalized understanding and had no further questions  Discharge instructions were provided  I personally saw and examined the patient and I agree with the above discussed plan of care  No orders of the defined types were placed in this encounter      New Medications Ordered This Visit   Medications    baclofen 10 mg tablet     Sig: Take 1 tablet (10 mg total) by mouth 3 (three) times a day     Dispense:  90 tablet     Refill:  1    pregabalin (LYRICA) 100 mg capsule     Sig: Take 1 capsule (100 mg total) by mouth 3 (three) times a day     Dispense:  90 capsule     Refill:  1    HYDROcodone-acetaminophen (NORCO)  mg per tablet     Sig: Take 4-5 tablets daily PRN     Dispense:  130 tablet     Refill:  0       History of Present Illness:  Dimitrios Arias is a 76 y o  female who presents for a follow up office visit in regards to Back Pain; Foot Pain; and Knee Pain  The patients current symptoms include neck and low back pain as well as left knee and ankle pain rated 7/10 this is constant in nature and described as burning, dull, aching, sharp, cramping, numbness, and pins and needles  Patient tells me that she did have a fall about 3 weeks ago she missed a step and fell on to the sidewalk hitting her left knee and hip  Current pain medications includes:  Hydrocodone 10/325 mg 4-5 tablets daily, Lyrica 100 mg 3 times daily, and baclofen 10 mg 3 times daily  The patient reports that this regimen is providing 40-50% pain relief  The patient is reporting no side effects from this pain medication regimen  I have personally reviewed and/or updated the patient's past medical history, past surgical history, family history, social history, current medications, allergies, and vital signs today  Review of Systems   Respiratory: Negative for shortness of breath  Cardiovascular: Positive for chest pain  Gastrointestinal: Positive for nausea  Negative for constipation, diarrhea and vomiting  Musculoskeletal: Positive for gait problem and joint swelling  Negative for arthralgias and myalgias  Decreased ROM  Joint stiffness   Skin: Negative for rash  Neurological: Positive for dizziness  Negative for seizures and weakness  All other systems reviewed and are negative        Patient Active Problem List   Diagnosis    Chronic pain syndrome    Cervical radiculopathy    Myofascial pain syndrome    Spondylosis of cervical region without myelopathy or radiculopathy    Fibromyalgia    Diabetic peripheral neuropathy (HCC)    Long-term current use of opiate analgesic    MGUS (monoclonal gammopathy of unknown significance)    Iron deficiency anemia following bariatric surgery       Past Medical History:   Diagnosis Date    Anemia     Anxiety     hx of panic attacks (now under control)     Arthritis     Asthma     resolved (no problems in a decade)     Baker's cyst of knee     Bronchitis     Bunion, left foot     Cervical pain     Chronic GERD     Diabetes mellitus, type 2 (HCC)     Diabetic peripheral neuropathy (HCC)     Endometriosis     Fibromyalgia     Hyperlipidemia     Hypertension     Lung nodules     Macular degeneration     Spondylosis        Past Surgical History:   Procedure Laterality Date    BACK SURGERY  1996    L5, S1- laser surgery fusion of c5 and c6     BREAST LUMPECTOMY Right     CHOLECYSTECTOMY  2004    FOOT SURGERY Left 2005    fusion     GASTRIC BYPASS  2010    Dr Rossy jason uterus     KNEE ARTHROSCOPY      LAMINECTOMY      OVARIAN CYST REMOVAL  1975    PELVIC LAPAROSCOPY      ovaries (x10)     PLEURAL SCARIFICATION  1967    SHOULDER OPEN ROTATOR CUFF REPAIR Left 2008    TONSILLECTOMY      VAGINAL PROLAPSE REPAIR         Family History   Problem Relation Age of Onset    Hypertension Mother     Lung cancer Mother     Hypertension Father     Heart disease Father     Heart attack Father        Social History     Occupational History    Not on file       Social History Main Topics    Smoking status: Current Every Day Smoker     Packs/day: 1 50     Years: 40 00    Smokeless tobacco: Current User    Alcohol use 0 6 oz/week     1 Shots of liquor per week      Comment: rarely    Drug use: No    Sexual activity: Not on file       Current Outpatient Prescriptions on File Prior to Visit   Medication Sig    albuterol (PROVENTIL HFA) 90 mcg/act inhaler Ventolin HFA 90 mcg/actuation aerosol inhaler    albuterol (VENTOLIN HFA) 90 mcg/act inhaler inhale 2 puff by inhalation route  every 4 - 6 hours as needed    ALPRAZolam (XANAX) 0 5 mg tablet Take 0 5 mg by mouth 2 (two) times a day as needed for anxiety    amLODIPine (NORVASC) 5 mg tablet TAKE 1 TABLET BY ORAL ROUTE EVERY DAY    diphenhydrAMINE (BENADRYL ALLERGY) 25 mg capsule Take by mouth    glucose blood test strip test by Misc  (Non-Drug; Combo Route) route three times daily    Lancets [facility]  (ACCU-CHEK FASTCLIX LANCET) KIT 3 (three) times a day    levothyroxine 25 mcg tablet TAKE 1 TABLET BY MOUTH EVERY MORNING ON SATURDAY, SUNDAY, TUESDAY AND THURSDAY    levothyroxine 50 mcg tablet Take 50 mcg by mouth daily    losartan (COZAAR) 25 mg tablet every 24 hours    MICROLET LANCETS MIS Microlet Lancet    pantoprazole (PROTONIX) 40 mg tablet Take 1 tablet (40 mg total) by mouth daily    ranitidine (ZANTAC) 150 MG capsule Take 1 capsule (150 mg total) by mouth 2 (two) times a day    sucralfate (CARAFATE) 1 g tablet TAKE 1 TABLET BY ORAL ROUTE 4 TIMES EVERY DAY ON AN EMPTY STOMACH 1 HOUR BEFORE MEALS AND AT BEDTIME    [DISCONTINUED] baclofen 10 mg tablet Take 1 tablet (10 mg total) by mouth 3 (three) times a day    [DISCONTINUED] HYDROcodone-acetaminophen (NORCO)  mg per tablet Take 4-5 tablets daily PRN    [DISCONTINUED] pregabalin (LYRICA) 100 mg capsule Take 1 capsule (100 mg total) by mouth 3 (three) times a day    [DISCONTINUED] traZODone (DESYREL) 50 mg tablet Take 1 tablet (50 mg total) by mouth daily at bedtime     Current Facility-Administered Medications on File Prior to Visit   Medication    cyanocobalamin injection 1,000 mcg       Allergies   Allergen Reactions    Cephalosporins Hives    Erythromycin GI Intolerance    Fentanyl Itching     Occurred during surgery     Paxil [Paroxetine] Hives     After 2 weeks    Penicillins Itching    Pravastatin Myalgia    Prednisolone Itching    Rosuvastatin Myalgia    Statins Itching    Sulfa Antibiotics Diarrhea    Wellbutrin [Bupropion] Hives     After 2 weeks     [place] Perforatum Rash    Sulfamethoxazole-Trimethoprim Rash    Sulfur Rash    Sulfur-Salicylic Acid [Salicylic Acid-Sulfur] Rash       Physical Exam:    /78   Pulse 80   Resp 20   Ht 5' 4 2" (1 631 m)   Wt 71 2 kg (157 lb)   LMP  (LMP Unknown)   BMI 26 78 kg/m²     Constitutional:normal, well developed, well nourished, alert, in no distress and non-toxic and no overt pain behavior    Eyes:anicteric  HEENT:grossly intact  Neck:supple, symmetric, trachea midline and no masses   Pulmonary:even and unlabored  Cardiovascular:No edema or pitting edema present  Skin:Normal without rashes or lesions and well hydrated  Psychiatric:Mood and affect appropriate  Neurologic:Cranial Nerves II-XII grossly intact  Musculoskeletal:ambulates with cane    Imaging

## 2018-10-23 DIAGNOSIS — K27.9 PUD (PEPTIC ULCER DISEASE): ICD-10-CM

## 2018-10-23 RX ORDER — SUCRALFATE 1 G/1
TABLET ORAL
Qty: 120 TABLET | Refills: 1 | Status: SHIPPED | OUTPATIENT
Start: 2018-10-23 | End: 2018-12-17 | Stop reason: SDUPTHER

## 2018-11-07 ENCOUNTER — CLINICAL SUPPORT (OUTPATIENT)
Dept: FAMILY MEDICINE CLINIC | Facility: CLINIC | Age: 68
End: 2018-11-07
Payer: COMMERCIAL

## 2018-11-07 ENCOUNTER — TELEPHONE (OUTPATIENT)
Dept: HEMATOLOGY ONCOLOGY | Facility: CLINIC | Age: 68
End: 2018-11-07

## 2018-11-07 DIAGNOSIS — E53.9 VITAMIN B DEFICIENCY: ICD-10-CM

## 2018-11-07 PROCEDURE — 99212 OFFICE O/P EST SF 10 MIN: CPT

## 2018-11-07 PROCEDURE — 96372 THER/PROPH/DIAG INJ SC/IM: CPT

## 2018-11-07 RX ADMIN — CYANOCOBALAMIN 1000 MCG: 1000 INJECTION INTRAMUSCULAR; SUBCUTANEOUS at 09:14

## 2018-11-07 NOTE — TELEPHONE ENCOUNTER
[facility] called and got a bill for 80 00 for her iron Infusion  Can she get the other Infusion that she had before that was cheaper  Would she benefit the same from the other one  If need to have the same one she is getting now then she will continue  Please contact her [phone] cell   Her work number is [phone]

## 2018-11-19 ENCOUNTER — OFFICE VISIT (OUTPATIENT)
Dept: PAIN MEDICINE | Facility: MEDICAL CENTER | Age: 68
End: 2018-11-19
Payer: COMMERCIAL

## 2018-11-19 VITALS
HEART RATE: 60 BPM | BODY MASS INDEX: 26.8 KG/M2 | DIASTOLIC BLOOD PRESSURE: 50 MMHG | RESPIRATION RATE: 14 BRPM | WEIGHT: 157 LBS | HEIGHT: 64 IN | SYSTOLIC BLOOD PRESSURE: 108 MMHG

## 2018-11-19 DIAGNOSIS — Z79.891 LONG-TERM CURRENT USE OF OPIATE ANALGESIC: Primary | ICD-10-CM

## 2018-11-19 DIAGNOSIS — G89.4 CHRONIC PAIN SYNDROME: ICD-10-CM

## 2018-11-19 DIAGNOSIS — M47.812 SPONDYLOSIS OF CERVICAL REGION WITHOUT MYELOPATHY OR RADICULOPATHY: ICD-10-CM

## 2018-11-19 DIAGNOSIS — M54.12 CERVICAL RADICULOPATHY: ICD-10-CM

## 2018-11-19 DIAGNOSIS — F11.20 UNCOMPLICATED OPIOID DEPENDENCE (HCC): ICD-10-CM

## 2018-11-19 DIAGNOSIS — M79.18 MYOFASCIAL PAIN SYNDROME: ICD-10-CM

## 2018-11-19 PROCEDURE — 4040F PNEUMOC VAC/ADMIN/RCVD: CPT | Performed by: NURSE PRACTITIONER

## 2018-11-19 PROCEDURE — 80305 DRUG TEST PRSMV DIR OPT OBS: CPT | Performed by: NURSE PRACTITIONER

## 2018-11-19 PROCEDURE — 99214 OFFICE O/P EST MOD 30 MIN: CPT | Performed by: NURSE PRACTITIONER

## 2018-11-19 RX ORDER — BACLOFEN 10 MG/1
10 TABLET ORAL 3 TIMES DAILY
Qty: 90 TABLET | Refills: 1 | Status: SHIPPED | OUTPATIENT
Start: 2018-11-19 | End: 2019-01-14 | Stop reason: SDUPTHER

## 2018-11-19 RX ORDER — HYDROCODONE BITARTRATE AND ACETAMINOPHEN 10; 325 MG/1; MG/1
TABLET ORAL
Qty: 130 TABLET | Refills: 0 | Status: SHIPPED | OUTPATIENT
Start: 2018-12-17 | End: 2019-01-14 | Stop reason: SDUPTHER

## 2018-11-19 RX ORDER — HYDROCODONE BITARTRATE AND ACETAMINOPHEN 10; 325 MG/1; MG/1
TABLET ORAL
Qty: 130 TABLET | Refills: 0 | Status: SHIPPED | OUTPATIENT
Start: 2018-11-19 | End: 2018-11-19 | Stop reason: SDUPTHER

## 2018-11-19 RX ORDER — PREGABALIN 100 MG/1
100 CAPSULE ORAL 3 TIMES DAILY
Qty: 90 CAPSULE | Refills: 1 | Status: SHIPPED | OUTPATIENT
Start: 2018-11-19 | End: 2019-01-14 | Stop reason: SDUPTHER

## 2018-11-19 NOTE — PROGRESS NOTES
Assessment:  1  Long-term current use of opiate analgesic    2  Uncomplicated opioid dependence ([address] )    3  Chronic pain syndrome    4  Cervical radiculopathy    5  Myofascial pain syndrome    6  Spondylosis of cervical region without myelopathy or radiculopathy        Plan:  Continue with hydrocodone as prescribed this was refilled for the next 2 months  One month does have a do not fill date of December 17, 2018  Lyrica and Baclofen were also refilled today  Follow-up in 8 weeks for medication refills    There are risks associated with opioid medications, including dependence, addiction and tolerance  The patient understands and agrees to use these medications only as prescribed  Potential side effects of the medications include, but are not limited to, constipation, drowsiness, addiction, impaired judgment and risk of fatal overdose if not taken as prescribed  The patient was warned against driving while taking sedation medications  Sharing medications is a felony  At this point in time, the patient is showing no signs of addiction, abuse, diversion or suicidal ideation  A urine drug screen was collected at today's office visit as part of our medication management protocol  The point of care testing results were appropriate for what was being prescribed  The specimen will be sent for confirmatory testing  The drug screen is medically necessary because the patient is either dependent on opioid medication or is being considered for opioid medication therapy and the results could impact ongoing or future treatment  The drug screen is to evaluate for the presences or absence of prescribed, non-prescribed, and/or illicit drugs/substances  [place] Prescription Drug Monitoring Program report was reviewed and was appropriate         My impressions and treatment recommendations were discussed in detail with the patient who verbalized understanding and had no further questions    Discharge instructions were provided  I personally saw and examined the patient and I agree with the above discussed plan of care  Orders Placed This Encounter   Procedures    MM ALL_Prescribed Meds and Special Instructions     New Medications Ordered This Visit   Medications    baclofen 10 mg tablet     Sig: Take 1 tablet (10 mg total) by mouth 3 (three) times a day     Dispense:  90 tablet     Refill:  1    HYDROcodone-acetaminophen (NORCO)  mg per tablet     Sig: Take 4-5 tablets daily PRN     Dispense:  130 tablet     Refill:  0    pregabalin (LYRICA) 100 mg capsule     Sig: Take 1 capsule (100 mg total) by mouth 3 (three) times a day     Dispense:  90 capsule     Refill:  1       History of Present Illness:    Pierce Juarez is a 76 y o  female who presents for a follow-up related to her neck and arm pain as well as her bilateral knee pain  Today the patient rates her pain 7/10 this is constant in nature and described as burning, sharp, throbbing, cramping, shooting, numbness, and pins and needles  Patient continues to take hydrocodone 10/325 mg 4-5 tablets daily along with Lyrica 100 mg 3 times a day and baclofen 10 mg 3 times a day  She reports 40-50% relief without any side effects or issues  I have personally reviewed and/or updated the patient's past medical history, past surgical history, family history, social history, current medications, allergies, and vital signs today  Review of Systems:    Review of Systems   Respiratory: Negative for shortness of breath  Cardiovascular: Positive for leg swelling  Negative for chest pain  Gastrointestinal: Positive for nausea  Negative for constipation, diarrhea and vomiting  Musculoskeletal: Positive for arthralgias, gait problem and joint swelling  Negative for myalgias  Skin: Negative for rash  Neurological: Positive for dizziness  Negative for seizures and weakness  Psychiatric/Behavioral: Positive for decreased concentration     All other systems reviewed and are negative  Patient Active Problem List   Diagnosis    Chronic pain syndrome    Cervical radiculopathy    Myofascial pain syndrome    Spondylosis of cervical region without myelopathy or radiculopathy    Fibromyalgia    Diabetic peripheral neuropathy (HCC)    Long-term current use of opiate analgesic    MGUS (monoclonal gammopathy of unknown significance)    Iron deficiency anemia following bariatric surgery       Past Medical History:   Diagnosis Date    Anemia     Anxiety     hx of panic attacks (now under control)     Arthritis     Asthma     resolved (no problems in a decade)     Baker's cyst of knee     Bronchitis     Bunion, left foot     Cervical pain     Chronic GERD     Diabetes mellitus, type 2 (HCC)     Diabetic peripheral neuropathy (HCC)     Endometriosis     Fibromyalgia     Hyperlipidemia     Hypertension     Lung nodules     Macular degeneration     Spondylosis        Past Surgical History:   Procedure Laterality Date    BACK SURGERY  1996    L5, S1- laser surgery fusion of c5 and c6     BREAST LUMPECTOMY Right     CHOLECYSTECTOMY  2004    FOOT SURGERY Left 2005    fusion     GASTRIC BYPASS  2010    Dr Zimmerman [facility]    just uterus     KNEE ARTHROSCOPY      LAMINECTOMY      OVARIAN CYST REMOVAL  1975    PELVIC LAPAROSCOPY      ovaries (x10)     PLEURAL SCARIFICATION  1967    SHOULDER OPEN ROTATOR CUFF REPAIR Left 2008    TONSILLECTOMY      VAGINAL PROLAPSE REPAIR         Family History   Problem Relation Age of Onset    Hypertension Mother     Lung cancer Mother     Hypertension Father     Heart disease Father     Heart attack Father        Social History     Occupational History    Not on file       Social History Main Topics    Smoking status: Current Every Day Smoker     Packs/day: 1 50     Years: 40 00    Smokeless tobacco: Current User    Alcohol use 0 6 oz/week     1 Shots of liquor per week      Comment: rarely    Drug use: No    Sexual activity: Not on file       Current Outpatient Prescriptions on File Prior to Visit   Medication Sig    albuterol (PROVENTIL HFA) 90 mcg/act inhaler Ventolin HFA 90 mcg/actuation aerosol inhaler    albuterol (VENTOLIN HFA) 90 mcg/act inhaler inhale 2 puff by inhalation route  every 4 - 6 hours as needed    ALPRAZolam (XANAX) 0 5 mg tablet Take 0 5 mg by mouth 2 (two) times a day as needed for anxiety    amLODIPine (NORVASC) 5 mg tablet TAKE 1 TABLET BY ORAL ROUTE EVERY DAY    diphenhydrAMINE (BENADRYL ALLERGY) 25 mg capsule Take by mouth    levothyroxine 25 mcg tablet TAKE 1 TABLET BY MOUTH EVERY MORNING ON SATURDAY, SUNDAY, TUESDAY AND THURSDAY    levothyroxine 50 mcg tablet Take 50 mcg by mouth daily    losartan (COZAAR) 25 mg tablet every 24 hours    pantoprazole (PROTONIX) 40 mg tablet Take 1 tablet (40 mg total) by mouth daily    ranitidine (ZANTAC) 150 MG capsule Take 1 capsule (150 mg total) by mouth 2 (two) times a day    sucralfate (CARAFATE) 1 g tablet TAKE 1 TABLET BY ORAL ROUTE 4 TIMES EVERY DAY ON AN EMPTY STOMACH 1 HOUR BEFORE MEALS AND AT BEDTIME    traZODone (DESYREL) 50 mg tablet TAKE 1 TABLET (50 MG TOTAL) BY MOUTH DAILY AT BEDTIME    [DISCONTINUED] baclofen 10 mg tablet Take 1 tablet (10 mg total) by mouth 3 (three) times a day    [DISCONTINUED] HYDROcodone-acetaminophen (NORCO)  mg per tablet Take 4-5 tablets daily PRN    [DISCONTINUED] pregabalin (LYRICA) 100 mg capsule Take 1 capsule (100 mg total) by mouth 3 (three) times a day    glucose blood test strip test by Misc  (Non-Drug; Combo Route) route three times daily    Lancets Misc  (ACCU-CHEK FASTCLIX LANCET) KIT 3 (three) times a day    MICROLET LANCETS MISC Microlet Lancet     Current Facility-Administered Medications on File Prior to Visit   Medication    cyanocobalamin injection 1,000 mcg       Allergies   Allergen Reactions    Cephalosporins Hives    Erythromycin GI Intolerance  Fentanyl Itching     Occurred during surgery     Paxil [Paroxetine] Hives     After 2 weeks    Penicillins Itching    Pravastatin Myalgia    Prednisolone Itching    Statins Itching    Sulfa Antibiotics Diarrhea    Wellbutrin [Bupropion] Hives     After 2 weeks     Hypericum Perforatum Rash    Sulfur Rash       Physical Exam:    /50   Pulse 60   Resp 14   Ht 5' 4" (1 626 m)   Wt 71 2 kg (157 lb)   LMP  (LMP Unknown)   BMI 26 95 kg/m²     Constitutional: normal, well developed, well nourished, alert, in no distress and non-toxic and no overt pain behavior    Eyes: anicteric  HEENT: grossly intact  Neck: supple, symmetric, trachea midline and no masses   Pulmonary:even and unlabored  Cardiovascular:No edema or pitting edema present  Skin:Normal without rashes or lesions and well hydrated  Psychiatric:Mood and affect appropriate  Neurologic:Cranial Nerves II-XII grossly intact  Musculoskeletal:ambulates with cane              Hydrocodone  Last taken 11/19  AM   New UDS today          Imaging

## 2018-11-21 DIAGNOSIS — I10 ESSENTIAL HYPERTENSION: ICD-10-CM

## 2018-11-21 RX ORDER — AMLODIPINE BESYLATE 5 MG/1
5 TABLET ORAL DAILY
Qty: 90 TABLET | Refills: 1 | Status: SHIPPED | OUTPATIENT
Start: 2018-11-21 | End: 2018-11-26 | Stop reason: SDUPTHER

## 2018-11-26 DIAGNOSIS — I10 ESSENTIAL HYPERTENSION: ICD-10-CM

## 2018-11-26 RX ORDER — AMLODIPINE BESYLATE 5 MG/1
5 TABLET ORAL DAILY
Qty: 90 TABLET | Refills: 1 | Status: SHIPPED | OUTPATIENT
Start: 2018-11-26 | End: 2019-07-22 | Stop reason: SDUPTHER

## 2018-12-09 DIAGNOSIS — E03.9 HYPOTHYROIDISM, UNSPECIFIED TYPE: Primary | ICD-10-CM

## 2018-12-10 RX ORDER — LEVOTHYROXINE SODIUM 0.05 MG/1
TABLET ORAL
Qty: 30 TABLET | Refills: 2 | Status: SHIPPED | OUTPATIENT
Start: 2018-12-10 | End: 2019-06-14 | Stop reason: SDUPTHER

## 2018-12-11 DIAGNOSIS — K21.9 GASTROESOPHAGEAL REFLUX DISEASE WITHOUT ESOPHAGITIS: ICD-10-CM

## 2018-12-11 RX ORDER — RANITIDINE 150 MG/1
150 TABLET ORAL 2 TIMES DAILY
Qty: 60 TABLET | Refills: 3 | Status: SHIPPED | OUTPATIENT
Start: 2018-12-11 | End: 2019-04-25 | Stop reason: SDUPTHER

## 2018-12-17 DIAGNOSIS — K27.9 PUD (PEPTIC ULCER DISEASE): ICD-10-CM

## 2018-12-17 RX ORDER — SUCRALFATE 1 G/1
TABLET ORAL
Qty: 120 TABLET | Refills: 1 | Status: SHIPPED | OUTPATIENT
Start: 2018-12-17 | End: 2019-03-15 | Stop reason: SDUPTHER

## 2019-01-12 DIAGNOSIS — G47.00 INSOMNIA, UNSPECIFIED TYPE: ICD-10-CM

## 2019-01-14 ENCOUNTER — OFFICE VISIT (OUTPATIENT)
Dept: PAIN MEDICINE | Facility: MEDICAL CENTER | Age: 69
End: 2019-01-14
Payer: COMMERCIAL

## 2019-01-14 ENCOUNTER — TELEPHONE (OUTPATIENT)
Dept: PAIN MEDICINE | Facility: MEDICAL CENTER | Age: 69
End: 2019-01-14

## 2019-01-14 VITALS
HEART RATE: 52 BPM | DIASTOLIC BLOOD PRESSURE: 50 MMHG | RESPIRATION RATE: 16 BRPM | WEIGHT: 156.6 LBS | BODY MASS INDEX: 26.73 KG/M2 | SYSTOLIC BLOOD PRESSURE: 112 MMHG | HEIGHT: 64 IN

## 2019-01-14 DIAGNOSIS — G89.4 CHRONIC PAIN SYNDROME: Primary | ICD-10-CM

## 2019-01-14 DIAGNOSIS — M54.12 CERVICAL RADICULOPATHY: ICD-10-CM

## 2019-01-14 DIAGNOSIS — M47.812 SPONDYLOSIS OF CERVICAL REGION WITHOUT MYELOPATHY OR RADICULOPATHY: ICD-10-CM

## 2019-01-14 DIAGNOSIS — M79.18 MYOFASCIAL PAIN SYNDROME: ICD-10-CM

## 2019-01-14 PROCEDURE — 99214 OFFICE O/P EST MOD 30 MIN: CPT | Performed by: NURSE PRACTITIONER

## 2019-01-14 RX ORDER — BACLOFEN 10 MG/1
10 TABLET ORAL 3 TIMES DAILY
Qty: 90 TABLET | Refills: 1 | Status: SHIPPED | OUTPATIENT
Start: 2019-01-14 | End: 2019-03-11 | Stop reason: SDUPTHER

## 2019-01-14 RX ORDER — HYDROCODONE BITARTRATE AND ACETAMINOPHEN 10; 325 MG/1; MG/1
TABLET ORAL
Qty: 130 TABLET | Refills: 0 | Status: SHIPPED | OUTPATIENT
Start: 2019-01-14 | End: 2019-01-14 | Stop reason: SDUPTHER

## 2019-01-14 RX ORDER — PREGABALIN 100 MG/1
100 CAPSULE ORAL 3 TIMES DAILY
Qty: 90 CAPSULE | Refills: 1 | Status: SHIPPED | OUTPATIENT
Start: 2019-01-14 | End: 2019-03-11 | Stop reason: SDUPTHER

## 2019-01-14 RX ORDER — TRAZODONE HYDROCHLORIDE 50 MG/1
TABLET ORAL
Qty: 30 TABLET | Refills: 1 | Status: SHIPPED | OUTPATIENT
Start: 2019-01-14 | End: 2019-03-08 | Stop reason: SDUPTHER

## 2019-01-14 RX ORDER — HYDROCODONE BITARTRATE AND ACETAMINOPHEN 10; 325 MG/1; MG/1
TABLET ORAL
Qty: 130 TABLET | Refills: 0 | Status: SHIPPED | OUTPATIENT
Start: 2019-02-12 | End: 2019-03-11 | Stop reason: SDUPTHER

## 2019-01-14 NOTE — TELEPHONE ENCOUNTER
Script states Take 4-5 tablets daily PRN, [facility] may be asking to clarified the frequency  Please advise

## 2019-01-14 NOTE — TELEPHONE ENCOUNTER
S/W Carmen Shown at [id number] [facility] her of the same  Carmen Shown is asking if she can label the script as take 1 tablet 4-5 takes/day  S/W AO  Advised her of the above  She stated they can label it that way  S/W Carmen Shown and advised her of AO's response  She verbalized understanding

## 2019-01-14 NOTE — PROGRESS NOTES
Assessment:  1  Chronic pain syndrome    2  Spondylosis of cervical region without myelopathy or radiculopathy    3  Myofascial pain syndrome    4  Cervical radiculopathy        Plan:  Continue with hydrocodone as prescribed this was refilled for the next 8 weeks  One month does have a do not fill date of February 12, 2019  Continue with Lyrica and baclofen as prescribed both of these were refilled today  While the patient was in the office today an opioid contract was thoroughly reviewed and signed by the patient  The patient was given adequate time to ask questions in regards to the contract and a signed copy was sent home for his/her records  Follow-up visit in 8 weeks for medication refills      There are risks associated with opioid medications, including dependence, addiction and tolerance  The patient understands and agrees to use these medications only as prescribed  Potential side effects of the medications include, but are not limited to, constipation, drowsiness, addiction, impaired judgment and risk of fatal overdose if not taken as prescribed  The patient was warned against driving while taking sedation medications  Sharing medications is a felony  At this point in time, the patient is showing no signs of addiction, abuse, diversion or suicidal ideation  [place] Prescription Drug Monitoring Program report was reviewed and was appropriate         My impressions and treatment recommendations were discussed in detail with the patient who verbalized understanding and had no further questions  Discharge instructions were provided  I personally saw and examined the patient and I agree with the above discussed plan of care  No orders of the defined types were placed in this encounter      New Medications Ordered This Visit   Medications    baclofen 10 mg tablet     Sig: Take 1 tablet (10 mg total) by mouth 3 (three) times a day     Dispense:  90 tablet     Refill:  1    pregabalin (LYRICA) 100 mg capsule     Sig: Take 1 capsule (100 mg total) by mouth 3 (three) times a day     Dispense:  90 capsule     Refill:  1    HYDROcodone-acetaminophen (NORCO)  mg per tablet     Sig: Take 4-5 tablets daily PRN     Dispense:  130 tablet     Refill:  0       History of Present Illness:    Emanuel Paris is a 76 y o  female who presents for follow-up related to her chronic neck pain  Today she rates her pain 8/10 this is constant in nature and described as burning, sharp, throbbing, cramping, pressure-like, shooting, numbness, and pins and needles  Patient continues to use hydrocodone 10/325 mg 4-5 tablets daily, Lyrica 100 mg 3 times a day, and baclofen 10 mg 3 times a day with 40% relief and no side effects or issues  I have personally reviewed and/or updated the patient's past medical history, past surgical history, family history, social history, current medications, allergies, and vital signs today  Review of Systems:    Review of Systems   Respiratory: Negative for shortness of breath  Cardiovascular: Positive for chest pain  Gastrointestinal: Positive for nausea  Negative for constipation, diarrhea and vomiting  Musculoskeletal: Positive for gait problem, joint swelling and neck pain  Negative for arthralgias and myalgias  Skin: Negative for rash  Neurological: Positive for dizziness  Negative for seizures and weakness  Psychiatric/Behavioral: Positive for decreased concentration  All other systems reviewed and are negative        Patient Active Problem List   Diagnosis    Chronic pain syndrome    Cervical radiculopathy    Myofascial pain syndrome    Spondylosis of cervical region without myelopathy or radiculopathy    Fibromyalgia    Diabetic peripheral neuropathy (HCC)    Long-term current use of opiate analgesic    MGUS (monoclonal gammopathy of unknown significance)    Iron deficiency anemia following bariatric surgery       Past Medical History: Diagnosis Date    Anemia     Anxiety     hx of panic attacks (now under control)     Arthritis     Asthma     resolved (no problems in a decade)     Baker's cyst of knee     Bronchitis     Bunion, left foot     Cervical pain     Chronic GERD     Diabetes mellitus, type 2 (HCC)     Diabetic peripheral neuropathy (HCC)     Endometriosis     Fibromyalgia     Hyperlipidemia     Hypertension     Lung nodules     Macular degeneration     Spondylosis        Past Surgical History:   Procedure Laterality Date    BACK SURGERY  1996    L5, S1- laser surgery fusion of c5 and c6     BREAST LUMPECTOMY Right     CHOLECYSTECTOMY  2004    FOOT SURGERY Left 2005    fusion     GASTRIC BYPASS  2010    Dr Jael Ireland    just uterus     KNEE ARTHROSCOPY      LAMINECTOMY      OVARIAN CYST REMOVAL  1975    PELVIC LAPAROSCOPY      ovaries (x10)     PLEURAL SCARIFICATION  1967    SHOULDER OPEN ROTATOR CUFF REPAIR Left 2008    TONSILLECTOMY      VAGINAL PROLAPSE REPAIR         Family History   Problem Relation Age of Onset    Hypertension Mother     Lung cancer Mother     Hypertension Father     Heart disease Father     Heart attack Father        Social History     Occupational History    Not on file       Social History Main Topics    Smoking status: Current Every Day Smoker     Packs/day: 1 50     Years: 40 00    Smokeless tobacco: Current User    Alcohol use 0 6 oz/week     1 Shots of liquor per week      Comment: rarely    Drug use: No    Sexual activity: Not on file       Current Outpatient Prescriptions on File Prior to Visit   Medication Sig    albuterol (PROVENTIL HFA) 90 mcg/act inhaler Ventolin HFA 90 mcg/actuation aerosol inhaler    albuterol (VENTOLIN HFA) 90 mcg/act inhaler inhale 2 puff by inhalation route  every 4 - 6 hours as needed    ALPRAZolam (XANAX) 0 5 mg tablet Take 0 5 mg by mouth 2 (two) times a day as needed for anxiety    amLODIPine (NORVASC) 5 mg tablet Take 1 tablet (5 mg total) by mouth daily    diphenhydrAMINE (BENADRYL ALLERGY) 25 mg capsule Take by mouth    levothyroxine 25 mcg tablet TAKE 1 TABLET BY MOUTH EVERY MORNING ON SATURDAY, SUNDAY, TUESDAY AND THURSDAY    levothyroxine 50 mcg tablet TAKE 1 TABLET BY ORAL ROUTE EVERY MORNING ON MONDAY, WEDNESDAY AND FRIDAY    losartan (COZAAR) 25 mg tablet every 24 hours    pantoprazole (PROTONIX) 40 mg tablet Take 1 tablet (40 mg total) by mouth daily    ranitidine (ZANTAC) 150 mg tablet TAKE 1 CAPSULE (150 MG TOTAL) BY MOUTH 2 (TWO) TIMES A DAY    sucralfate (CARAFATE) 1 g tablet TAKE 1 TABLET BY ORAL ROUTE 4 TIMES EVERY DAY ON AN EMPTY STOMACH 1 HOUR BEFORE MEALS AND AT BEDTIME    traZODone (DESYREL) 50 mg tablet TAKE 1 TABLET (50 MG TOTAL) BY MOUTH DAILY AT BEDTIME    [DISCONTINUED] baclofen 10 mg tablet Take 1 tablet (10 mg total) by mouth 3 (three) times a day    [DISCONTINUED] HYDROcodone-acetaminophen (NORCO)  mg per tablet Take 4-5 tablets daily PRN    [DISCONTINUED] pregabalin (LYRICA) 100 mg capsule Take 1 capsule (100 mg total) by mouth 3 (three) times a day    glucose blood test strip test by Misc  (Non-Drug; Combo Route) route three times daily    Lancets Misc  (ACCU-CHEK FASTCLIX LANCET) KIT 3 (three) times a day    MICROLET LANCETS MISC Microlet Lancet     Current Facility-Administered Medications on File Prior to Visit   Medication    cyanocobalamin injection 1,000 mcg       Allergies   Allergen Reactions    Cephalosporins Hives    Erythromycin GI Intolerance    Fentanyl Itching     Occurred during surgery     Paxil [Paroxetine] Hives     After 2 weeks    Penicillins Itching    Pravastatin Myalgia    Prednisolone Itching    Statins Itching    Sulfa Antibiotics Diarrhea    Wellbutrin [Bupropion] Hives     After 2 weeks     Hypericum Perforatum Rash    Sulfur Rash           Hydrocodone last taken 1/14/19 AM             Physical Exam:    /50   Pulse (!) 52 Resp 16   Ht 5' 4" (1 626 m)   Wt 71 kg (156 lb 9 6 oz)   LMP  (LMP Unknown)   BMI 26 88 kg/m²     Constitutional: normal, well developed, well nourished, alert, in no distress and non-toxic and no overt pain behavior    Eyes: anicteric  HEENT: grossly intact  Neck: supple, symmetric, trachea midline and no masses   Pulmonary:even and unlabored  Cardiovascular:No edema or pitting edema present  Skin:Normal without rashes or lesions and well hydrated  Psychiatric:Mood and affect appropriate  Neurologic:Cranial Nerves II-XII grossly intact  Musculoskeletal:ambulates with cane    Imaging

## 2019-01-22 DIAGNOSIS — K21.9 GASTROESOPHAGEAL REFLUX DISEASE WITHOUT ESOPHAGITIS: ICD-10-CM

## 2019-01-22 RX ORDER — PANTOPRAZOLE SODIUM 40 MG/1
40 TABLET, DELAYED RELEASE ORAL DAILY
Qty: 30 TABLET | Refills: 3 | Status: SHIPPED | OUTPATIENT
Start: 2019-01-22 | End: 2019-04-15 | Stop reason: SDUPTHER

## 2019-01-24 ENCOUNTER — CLINICAL SUPPORT (OUTPATIENT)
Dept: FAMILY MEDICINE CLINIC | Facility: CLINIC | Age: 69
End: 2019-01-24
Payer: COMMERCIAL

## 2019-01-24 ENCOUNTER — TELEPHONE (OUTPATIENT)
Dept: HEMATOLOGY ONCOLOGY | Facility: CLINIC | Age: 69
End: 2019-01-24

## 2019-01-24 DIAGNOSIS — D51.3 OTHER DIETARY VITAMIN B12 DEFICIENCY ANEMIA: ICD-10-CM

## 2019-01-24 PROCEDURE — 96372 THER/PROPH/DIAG INJ SC/IM: CPT

## 2019-01-24 RX ADMIN — CYANOCOBALAMIN 1000 MCG: 1000 INJECTION INTRAMUSCULAR; SUBCUTANEOUS at 09:07

## 2019-01-24 NOTE — TELEPHONE ENCOUNTER
Patient states she received a call 2 days ago to remind her about infusion appointment, she thought these were going to be cancelled until she heard back about a change in the iron infusion she is getting due to a very high co pay  She states she spoke to someone a while ago, but hadn't heard anything back  She is scheduled for an appointment next week, and she would like to know if she needs to get another blood test done before then      Patient can be reached at [phone] or [phone]

## 2019-01-25 NOTE — TELEPHONE ENCOUNTER
Patient will be discussing future iron infusions with Dr Sebastián Vu at her appointment next week  She needs to see if Feraheme or Venofer will be prescribed  Patients out of pocket responsibility depends on drug and frequency    She will have lab work drawn prior to visit to check her iron stores

## 2019-01-27 DIAGNOSIS — E03.9 HYPOTHYROIDISM, UNSPECIFIED TYPE: ICD-10-CM

## 2019-01-27 LAB
BASOPHILS # BLD AUTO: 38 CELLS/UL (ref 0–200)
BASOPHILS NFR BLD AUTO: 0.8 %
EOSINOPHIL # BLD AUTO: 101 CELLS/UL (ref 15–500)
EOSINOPHIL NFR BLD AUTO: 2.1 %
ERYTHROCYTE [DISTWIDTH] IN BLOOD BY AUTOMATED COUNT: 12.4 % (ref 11–15)
HCT VFR BLD AUTO: 39.6 % (ref 35–45)
HGB BLD-MCNC: 13.2 G/DL (ref 11.7–15.5)
IRON SATN MFR SERPL: 35 % (CALC) (ref 11–50)
IRON SERPL-MCNC: 112 MCG/DL (ref 45–160)
LYMPHOCYTES # BLD AUTO: 1507 CELLS/UL (ref 850–3900)
LYMPHOCYTES NFR BLD AUTO: 31.4 %
MCH RBC QN AUTO: 32.7 PG (ref 27–33)
MCHC RBC AUTO-ENTMCNC: 33.3 G/DL (ref 32–36)
MCV RBC AUTO: 98 FL (ref 80–100)
MONOCYTES # BLD AUTO: 451 CELLS/UL (ref 200–950)
MONOCYTES NFR BLD AUTO: 9.4 %
NEUTROPHILS # BLD AUTO: 2702 CELLS/UL (ref 1500–7800)
NEUTROPHILS NFR BLD AUTO: 56.3 %
PLATELET # BLD AUTO: 230 THOUSAND/UL (ref 140–400)
PMV BLD REES-ECKER: 10 FL (ref 7.5–12.5)
RBC # BLD AUTO: 4.04 MILLION/UL (ref 3.8–5.1)
TIBC SERPL-MCNC: 320 MCG/DL (CALC) (ref 250–450)
WBC # BLD AUTO: 4.8 THOUSAND/UL (ref 3.8–10.8)

## 2019-01-28 RX ORDER — LEVOTHYROXINE SODIUM 0.03 MG/1
TABLET ORAL
Qty: 30 TABLET | Refills: 2 | Status: SHIPPED | OUTPATIENT
Start: 2019-01-28 | End: 2019-07-05 | Stop reason: SDUPTHER

## 2019-02-07 ENCOUNTER — OFFICE VISIT (OUTPATIENT)
Dept: FAMILY MEDICINE CLINIC | Facility: CLINIC | Age: 69
End: 2019-02-07
Payer: COMMERCIAL

## 2019-02-07 VITALS
WEIGHT: 157 LBS | OXYGEN SATURATION: 98 % | HEART RATE: 44 BPM | HEIGHT: 64 IN | RESPIRATION RATE: 14 BRPM | DIASTOLIC BLOOD PRESSURE: 60 MMHG | BODY MASS INDEX: 26.8 KG/M2 | SYSTOLIC BLOOD PRESSURE: 130 MMHG | TEMPERATURE: 98.2 F

## 2019-02-07 DIAGNOSIS — Z11.59 ENCOUNTER FOR HEPATITIS C SCREENING TEST FOR LOW RISK PATIENT: ICD-10-CM

## 2019-02-07 DIAGNOSIS — E11.9 DIABETIC EYE EXAM (HCC): ICD-10-CM

## 2019-02-07 DIAGNOSIS — Z23 NEED FOR PNEUMOCOCCAL VACCINATION: ICD-10-CM

## 2019-02-07 DIAGNOSIS — R00.1 SINUS BRADYCARDIA: ICD-10-CM

## 2019-02-07 DIAGNOSIS — R80.9 PROTEINURIA, UNSPECIFIED TYPE: ICD-10-CM

## 2019-02-07 DIAGNOSIS — J02.9 ACUTE PHARYNGITIS, UNSPECIFIED ETIOLOGY: ICD-10-CM

## 2019-02-07 DIAGNOSIS — I10 ESSENTIAL HYPERTENSION: ICD-10-CM

## 2019-02-07 DIAGNOSIS — E03.9 HYPOTHYROIDISM, UNSPECIFIED TYPE: ICD-10-CM

## 2019-02-07 DIAGNOSIS — Z23 NEED FOR ZOSTER VACCINATION: ICD-10-CM

## 2019-02-07 DIAGNOSIS — Z01.00 DIABETIC EYE EXAM (HCC): ICD-10-CM

## 2019-02-07 DIAGNOSIS — Z23 NEED FOR TDAP VACCINATION: ICD-10-CM

## 2019-02-07 DIAGNOSIS — Z00.01 ENCOUNTER FOR GENERAL ADULT MEDICAL EXAMINATION WITH ABNORMAL FINDINGS: Primary | ICD-10-CM

## 2019-02-07 DIAGNOSIS — R42 VERTIGO: ICD-10-CM

## 2019-02-07 DIAGNOSIS — Z12.11 SCREENING FOR COLON CANCER: ICD-10-CM

## 2019-02-07 DIAGNOSIS — R42 DIZZINESS: ICD-10-CM

## 2019-02-07 PROCEDURE — 96372 THER/PROPH/DIAG INJ SC/IM: CPT | Performed by: INTERNAL MEDICINE

## 2019-02-07 PROCEDURE — 99214 OFFICE O/P EST MOD 30 MIN: CPT | Performed by: INTERNAL MEDICINE

## 2019-02-07 PROCEDURE — 3078F DIAST BP <80 MM HG: CPT | Performed by: INTERNAL MEDICINE

## 2019-02-07 PROCEDURE — 93000 ELECTROCARDIOGRAM COMPLETE: CPT | Performed by: INTERNAL MEDICINE

## 2019-02-07 PROCEDURE — 3066F NEPHROPATHY DOC TX: CPT | Performed by: INTERNAL MEDICINE

## 2019-02-07 PROCEDURE — 1170F FXNL STATUS ASSESSED: CPT | Performed by: INTERNAL MEDICINE

## 2019-02-07 PROCEDURE — 90715 TDAP VACCINE 7 YRS/> IM: CPT

## 2019-02-07 PROCEDURE — 90471 IMMUNIZATION ADMIN: CPT

## 2019-02-07 PROCEDURE — 3075F SYST BP GE 130 - 139MM HG: CPT | Performed by: INTERNAL MEDICINE

## 2019-02-07 PROCEDURE — G0439 PPPS, SUBSEQ VISIT: HCPCS | Performed by: INTERNAL MEDICINE

## 2019-02-07 PROCEDURE — 90472 IMMUNIZATION ADMIN EACH ADD: CPT

## 2019-02-07 PROCEDURE — 1125F AMNT PAIN NOTED PAIN PRSNT: CPT | Performed by: INTERNAL MEDICINE

## 2019-02-07 PROCEDURE — 90750 HZV VACC RECOMBINANT IM: CPT

## 2019-02-07 RX ORDER — MECLIZINE HCL 12.5 MG/1
12.5 TABLET ORAL EVERY 12 HOURS PRN
Qty: 30 TABLET | Refills: 0 | Status: SHIPPED | OUTPATIENT
Start: 2019-02-07 | End: 2019-02-22 | Stop reason: SDUPTHER

## 2019-02-07 RX ORDER — LEVOFLOXACIN 500 MG/1
500 TABLET, FILM COATED ORAL EVERY 24 HOURS
Qty: 10 TABLET | Refills: 0 | Status: SHIPPED | OUTPATIENT
Start: 2019-02-07 | End: 2019-02-17

## 2019-02-07 NOTE — PROGRESS NOTES
Assessment and Plan:    Problem List Items Addressed This Visit     None      Visit Diagnoses     Screening for colon cancer    -  Primary    Encounter for hepatitis C screening test for low risk patient        Need for Tdap vaccination        Diabetic eye exam ([facility] [address] )        Proteinuria, unspecified type        Need for pneumococcal vaccination            Health Maintenance Due   Topic Date Due    Hepatitis C Screening  1950    Depression Screening PHQ  1950    Medicare Annual Wellness Visit (AWV)  1950    CRC Screening: Colonoscopy  1950    Diabetic Foot Exam  07/01/1960    DM Eye Exam  07/01/1960    URINE MICROALBUMIN  07/01/1960    DTaP,Tdap,and Td Vaccines (1 - Tdap) 07/01/1971    Lung Cancer Screening  07/01/2005    Fall Risk  07/01/2015    Urinary Incontinence Screening  07/01/2015    Pneumococcal PPSV23/PCV13 65+ Years / High and Highest Risk (2 of 2 - PPSV23) 01/24/2018    HEMOGLOBIN A1C  01/26/2019         HPI:  Darwin Rodriguez is a 76 y o  female here for her Subsequent Wellness Visit      Patient Active Problem List   Diagnosis    Chronic pain syndrome    Cervical radiculopathy    Myofascial pain syndrome    Spondylosis of cervical region without myelopathy or radiculopathy    Fibromyalgia    Diabetic peripheral neuropathy (HCC)    Long-term current use of opiate analgesic    MGUS (monoclonal gammopathy of unknown significance)    Iron deficiency anemia following bariatric surgery     Past Medical History:   Diagnosis Date    Anemia     Anxiety     hx of panic attacks (now under control)     Arthritis     Asthma     resolved (no problems in a decade)     Baker's cyst of knee     Bronchitis     Bunion, left foot     Cervical pain     Chronic GERD     Diabetes mellitus, type 2 ([facility] [address] )     Diabetic peripheral neuropathy ([facility] [address] )     Endometriosis     Fibromyalgia     Hyperlipidemia     Hypertension     Lung nodules     Macular degeneration     Spondylosis      Past Surgical History:   Procedure Laterality Date    BACK SURGERY  1996    L5, S1- laser surgery fusion of c5 and c6     BREAST LUMPECTOMY Right     CHOLECYSTECTOMY  2004    FOOT SURGERY Left 2005    fusion     GASTRIC BYPASS  2010    Dr Diana jason uterus     KNEE ARTHROSCOPY      LAMINECTOMY      OVARIAN CYST REMOVAL  1975    PELVIC LAPAROSCOPY      ovaries (x10)     PLEURAL SCARIFICATION  1967    SHOULDER OPEN ROTATOR CUFF REPAIR Left 2008    TONSILLECTOMY      VAGINAL PROLAPSE REPAIR       Family History   Problem Relation Age of Onset    Hypertension Mother     Lung cancer Mother     Hypertension Father     Heart disease Father     Heart attack Father      History   Smoking Status    Current Every Day Smoker    Packs/day: 1 50    Years: 40 00   Smokeless Tobacco    Current User     History   Alcohol Use    0 6 oz/week    1 Shots of liquor per week     Comment: rarely      History   Drug Use No       Current Outpatient Prescriptions   Medication Sig Dispense Refill    albuterol (PROVENTIL HFA) 90 mcg/act inhaler Ventolin HFA 90 mcg/actuation aerosol inhaler      albuterol (VENTOLIN HFA) 90 mcg/act inhaler inhale 2 puff by inhalation route  every 4 - 6 hours as needed      ALPRAZolam (XANAX) 0 5 mg tablet Take 0 5 mg by mouth 2 (two) times a day as needed for anxiety      amLODIPine (NORVASC) 5 mg tablet Take 1 tablet (5 mg total) by mouth daily 90 tablet 1    baclofen 10 mg tablet Take 1 tablet (10 mg total) by mouth 3 (three) times a day 90 tablet 1    diphenhydrAMINE (BENADRYL ALLERGY) 25 mg capsule Take by mouth      glucose blood test strip test by Misc  (Non-Drug; Combo Route) route three times daily      [START ON 2/12/2019] HYDROcodone-acetaminophen (NORCO)  mg per tablet Take 4-5 tablets daily  tablet 0    Lancets Misc  (ACCU-CHEK FASTCLIX LANCET) KIT 3 (three) times a day      levothyroxine 25 mcg tablet TAKE 1 TABLET BY MOUTH EVERY MORNING ON SATURDAY, SUNDAY, TUESDAY AND THURSDAY 30 tablet 2    levothyroxine 50 mcg tablet TAKE 1 TABLET BY ORAL ROUTE EVERY MORNING ON MONDAY, WEDNESDAY AND FRIDAY 30 tablet 2    losartan (COZAAR) 25 mg tablet every 24 hours      MICROLET LANCETS MISC Microlet Lancet      pantoprazole (PROTONIX) 40 mg tablet Take 1 tablet (40 mg total) by mouth daily 30 tablet 3    pregabalin (LYRICA) 100 mg capsule Take 1 capsule (100 mg total) by mouth 3 (three) times a day 90 capsule 1    ranitidine (ZANTAC) 150 mg tablet TAKE 1 CAPSULE (150 MG TOTAL) BY MOUTH 2 (TWO) TIMES A DAY 60 tablet 3    sucralfate (CARAFATE) 1 g tablet TAKE 1 TABLET BY ORAL ROUTE 4 TIMES EVERY DAY ON AN EMPTY STOMACH 1 HOUR BEFORE MEALS AND AT BEDTIME 120 tablet 1    traZODone (DESYREL) 50 mg tablet TAKE 1 TABLET BY ORAL ROUTE EVERY DAY AT BEDTIME AS NEEDED 30 tablet 1     Current Facility-Administered Medications   Medication Dose Route Frequency Provider Last Rate Last Dose    cyanocobalamin injection 1,000 mcg  1,000 mcg Intramuscular Q30 Days Paresh Gardiner MD   1,000 mcg at 01/24/19 8283     Allergies   Allergen Reactions    Cephalosporins Hives    Erythromycin GI Intolerance    Fentanyl Itching     Occurred during surgery     Paxil [Paroxetine] Hives     After 2 weeks    Penicillins Itching    Pravastatin Myalgia    Prednisolone Itching    Statins Itching    Sulfa Antibiotics Diarrhea    Wellbutrin [Bupropion] Hives     After 2 weeks     Hypericum Perforatum Rash    Sulfur Rash     Immunization History   Administered Date(s) Administered    Influenza 10/27/2018    Pneumococcal Conjugate 13-Valent 11/29/2017    Pneumococcal Polysaccharide PPV23 01/08/2013       Patient Care Team:  Paresh Gardiner MD as PCP - General (Internal Medicine)  Alexi Chávze, [address] [place] (Pain Medicine)  Zane Mendoza DO (Pain Medicine)  Abran Arguelles MD    Medicare Screening Tests and Risk Assessments:  Baylee Garner is here for her Subsequent Wellness visit  Health Risk Assessment:  Patient rates overall health as fair  Patient feels that their physical health rating is Same  Eyesight was rated as Slightly worse  Hearing was rated as Same  Patient feels that their emotional and mental health rating is Slightly worse  Pain experienced by patient in the last 7 days has been A lot  Patient's pain rating has been 8/10  Patient states that she has experienced no weight loss or gain in last 6 months  (Additional comments: Legs, feet, knees, arms, fingers, and neck pain  Arthritis )    Emotional/Mental Health:  Patient has been feeling nervous/anxious  PHQ-9 Depression Screening:    Frequency of the following problems over the past two weeks:      1  Little interest or pleasure in doing things: 2 - more than half the days      2  Feeling down, depressed, or hopeless: 2 - more than half the days      3  Trouble falling or staying asleep, or sleeping too much: 1 - several days      4  Feeling tired or having little energy: 1 - several days      5  Poor appetite or overeatin - several days      6  Feeling bad about yourself - or that you are a failure or have let yourself or your family down: 2 - more than half the days      7  Trouble concentrating on things, such as reading the newspaper or watching television: 1 - several days      8  Moving or speaking so slowly that other people could have noticed  Or the opposite - being so fidgety or restless that you have been moving around a lot more than usual: 0 - not at all      9  Thoughts that you would be better off dead, or of hurting yourself in some way: 0 - not at all  PHQ-2 Score: 4  PHQ-9 Score: 10    Broken Bones/Falls: Fall Risk Assessment:    In the past year, patient has experienced: No history of falling in past year          Bladder/Bowel:  Patient has leaked urine accidently in the last six months  Patient reports no loss of bowel control      Immunizations:  Patient has had a flu vaccination within the last year  Patient has received a pneumonia shot  Patient has not received a shingles shot  Patient has not received tetanus/diphtheria shot  Home Safety:  Patient has trouble with stairs inside or outside of their home  Patient currently reports that there are no safety hazards present in home, working smoke alarms, working carbon monoxide detectors  (Additional Comments: Because of arthritis sometimes patient have trouble with the stairs )    Preventative Screenings:   No breast cancer screening performed, no colon cancer screen completed, cholesterol screen completed, no glaucoma eye exam completed    Nutrition:  Current diet: Low Carb and Regular with servings of the following:    Medications:  Patient is currently taking over-the-counter supplements  List of OTC medications includes: Biotin and B12 sometimes  Patient is able to manage medications  Lifestyle Choices:  Patient reports current tobacco use  Patient reports no alcohol use  Patient drives a vehicle  Patient wears seat belt  Current level of exercise of physical activity described by patient as: active  Activities of Daily Living:  Can get out of bed by his or her self, able to dress self, able to make own meals, able to do own shopping, able to bathe self, can do own laundry/housekeeping, can manage own money, pay bills and track expenses    Previous Hospitalizations:  No hospitalization or ED visit in past 12 months        Advanced Directives:  Patient has not decided on power of   Patient has not completed advanced directive

## 2019-02-07 NOTE — PROGRESS NOTES
Assessment/Plan:         Diagnoses and all orders for this visit:    Encounter for general adult medical examination with abnormal findings ; done In detail  -     Mammo screening bilateral w cad; Future  -     Ambulatory referral to Gynecology; Future    Screening for colon cancer: Discussed w Pt    Encounter for hepatitis C screening test for low risk patient  -     Hepatitis panel, acute; Future    Need for Tdap vaccination  -     TDAP VACCINE GREATER THAN OR EQUAL TO 6YO IM      Proteinuria, unspecified type  -     Microalbumin / creatinine urine ratio    Need for pneumococcal vaccination: discussed w Pt    Essential hypertension: stable Continue same  RTC in 3mos wBlood work  -     POCT ECG    Hypothyroidism, unspecified type: Continue Synthroid  RTC in 3mos w Blood work    Vertigo: Cause ? ? R/O Cardiac Arrhythmia :  -     meclizine (ANTIVERT) 12 5 MG tablet; Take 1 tablet (12 5 mg total) by mouth every 12 (twelve) hours as needed for dizziness  -     Holter monitor - 48 hour; Future  -     Echo complete with contrast if indicated; Future  -     POCT ECG  Now : Sinus Bradycardia  -     Ambulatory referral to Cardiology; Future    Acute pharyngitis, unspecified etiology:  -     levofloxacin (LEVAQUIN) 500 mg tablet; Take 1 tablet (500 mg total) by mouth every 24 hours for 10 days    Need for zoster vaccination  -     Zoster Vaccine Recombinant IM    Sinus bradycardia  -     Ambulatory referral to Cardiology; Future    Other orders  -     Cancel: Ambulatory referral to Gastroenterology; Future  -     Cancel: TDAP VACCINE GREATER THAN OR EQUAL TO 6YO IM  -     Cancel: Ambulatory referral to Ophthalmology; Future  -     Cancel: PNEUMOCOCCAL POLYSACCHARIDE VACCINE 23-VALENT =>3YO SQ IM        Subjective:      Patient ID: Peg Can is a 76 y o  female  76 Y O lady is Here for AWV, and Regular check Up, she has few issues as per R O S  Med list reviewed w pt in detail    No new Blood work            The following portions of the patient's history were reviewed and updated as appropriate: allergies, current medications, past family history, past medical history, past social history, past surgical history and problem list     Review of Systems   Constitutional: Negative for chills, fatigue and fever  HENT: Positive for sinus pressure and sore throat  Negative for congestion, facial swelling, trouble swallowing and voice change  Eyes: Negative for pain, discharge and visual disturbance  Respiratory: Positive for cough  Negative for shortness of breath and wheezing  Cardiovascular: Negative for chest pain, palpitations and leg swelling  Gastrointestinal: Negative for abdominal pain, blood in stool, constipation, diarrhea and nausea  Endocrine: Negative for polydipsia, polyphagia and polyuria  Genitourinary: Negative for difficulty urinating, hematuria and urgency  Musculoskeletal: Negative for arthralgias and myalgias  Skin: Negative for rash  Neurological: Positive for dizziness  Negative for tremors, weakness and headaches  Hematological: Negative for adenopathy  Does not bruise/bleed easily  Psychiatric/Behavioral: Negative for dysphoric mood, sleep disturbance and suicidal ideas  Objective:      /60 (BP Location: Right arm, Patient Position: Standing, Cuff Size: Standard)   Pulse (!) 44   Temp 98 2 °F (36 8 °C) (Tympanic)   Resp 14   Ht 5' 4" (1 626 m)   Wt 71 2 kg (157 lb)   LMP  (LMP Unknown)   SpO2 98%   BMI 26 95 kg/m²          Physical Exam   Constitutional: She is oriented to person, place, and time  She appears well-nourished  No distress  HENT:   Head: Normocephalic  Mouth/Throat: Oropharynx is clear and moist  No oropharyngeal exudate  Acute sinusitis   Eyes: Pupils are equal, round, and reactive to light  Conjunctivae are normal  No scleral icterus  Neck: Neck supple  No thyromegaly present  Cardiovascular: Normal rate      Murmur heard   Pulmonary/Chest: Effort normal  No respiratory distress  She has wheezes  She has no rales  Abdominal: Soft  Bowel sounds are normal  She exhibits no distension  There is no tenderness  There is no rebound and no guarding  Musculoskeletal: She exhibits no edema or tenderness  Lymphadenopathy:     She has no cervical adenopathy  Neurological: She is alert and oriented to person, place, and time  No cranial nerve deficit  Skin: No rash noted  No erythema  Psychiatric: She has a normal mood and affect         Assessment and Plan:    Problem List Items Addressed This Visit     None      Visit Diagnoses     Encounter for general adult medical examination with abnormal findings    -  Primary    Relevant Orders    Mammo screening bilateral w cad    Ambulatory referral to Gynecology    Screening for colon cancer        Encounter for hepatitis C screening test for low risk patient        Relevant Orders    Hepatitis panel, acute    Need for Tdap vaccination        Relevant Orders    TDAP VACCINE GREATER THAN OR EQUAL TO 6YO IM (Completed)    Diabetic eye exam ([facility] [address] )        Proteinuria, unspecified type        Relevant Orders    Microalbumin / creatinine urine ratio    Need for pneumococcal vaccination        Essential hypertension        Relevant Orders    POCT ECG (Completed)    Hypothyroidism, unspecified type        Vertigo        Relevant Medications    meclizine (ANTIVERT) 12 5 MG tablet    Other Relevant Orders    Holter monitor - 48 hour    Echo complete with contrast if indicated    POCT ECG (Completed)    Dizziness        Relevant Orders    Holter monitor - 48 hour    Echo complete with contrast if indicated    POCT ECG (Completed)    Ambulatory referral to Cardiology    Acute pharyngitis, unspecified etiology        Relevant Medications    levofloxacin (LEVAQUIN) 500 mg tablet    Need for zoster vaccination        Relevant Orders    Zoster Vaccine Recombinant IM (Completed)    Sinus bradycardia Relevant Orders    Ambulatory referral to Cardiology        Health Maintenance Due   Topic Date Due    Hepatitis C Screening  1950    Medicare Annual Wellness Visit (AWV)  1950    CRC Screening: Colonoscopy  1950    Diabetic Foot Exam  07/01/1960    DM Eye Exam  07/01/1960    URINE MICROALBUMIN  07/01/1960    DTaP,Tdap,and Td Vaccines (1 - Tdap) 07/01/1971    Lung Cancer Screening  07/01/2005    Pneumococcal PPSV23/PCV13 65+ Years / High and Highest Risk (2 of 2 - PPSV23) 01/24/2018    HEMOGLOBIN A1C  01/26/2019         HPI:  Lety Stevens is a 76 y o  female here for her Subsequent Wellness Visit      Patient Active Problem List   Diagnosis    Chronic pain syndrome    Cervical radiculopathy    Myofascial pain syndrome    Spondylosis of cervical region without myelopathy or radiculopathy    Fibromyalgia    Diabetic peripheral neuropathy (HCC)    Long-term current use of opiate analgesic    MGUS (monoclonal gammopathy of unknown significance)    Iron deficiency anemia following bariatric surgery     Past Medical History:   Diagnosis Date    Anemia     Anxiety     hx of panic attacks (now under control)     Arthritis     Asthma     resolved (no problems in a decade)     Baker's cyst of knee     Bronchitis     Bunion, left foot     Cervical pain     Chronic GERD     Diabetes mellitus, type 2 ([address] )     Diabetic peripheral neuropathy ([address] )     Endometriosis     Fibromyalgia     Hyperlipidemia     Hypertension     Lung nodules     Macular degeneration     Spondylosis      Past Surgical History:   Procedure Laterality Date    BACK SURGERY  1996    L5, S1- laser surgery fusion of c5 and c6     BREAST LUMPECTOMY Right     CHOLECYSTECTOMY  2004    FOOT SURGERY Left 2005    fusion     GASTRIC BYPASS  2010    Dr Jael Ireland    just uterus     KNEE ARTHROSCOPY      LAMINECTOMY      OVARIAN CYST REMOVAL  1975    PELVIC LAPAROSCOPY ovaries (x10)     PLEURAL SCARIFICATION  1967    SHOULDER OPEN ROTATOR CUFF REPAIR Left 2008    TONSILLECTOMY      VAGINAL PROLAPSE REPAIR       Family History   Problem Relation Age of Onset    Hypertension Mother     Lung cancer Mother     Hypertension Father     Heart disease Father     Heart attack Father      History   Smoking Status    Current Every Day Smoker    Packs/day: 1 50    Years: 40 00   Smokeless Tobacco    Current User     History   Alcohol Use    0 6 oz/week    1 Shots of liquor per week     Comment: rarely      History   Drug Use No       Current Outpatient Prescriptions   Medication Sig Dispense Refill    albuterol (PROVENTIL HFA) 90 mcg/act inhaler Ventolin HFA 90 mcg/actuation aerosol inhaler      albuterol (VENTOLIN HFA) 90 mcg/act inhaler inhale 2 puff by inhalation route  every 4 - 6 hours as needed      ALPRAZolam (XANAX) 0 5 mg tablet Take 0 5 mg by mouth 2 (two) times a day as needed for anxiety      amLODIPine (NORVASC) 5 mg tablet Take 1 tablet (5 mg total) by mouth daily 90 tablet 1    baclofen 10 mg tablet Take 1 tablet (10 mg total) by mouth 3 (three) times a day 90 tablet 1    diphenhydrAMINE (BENADRYL ALLERGY) 25 mg capsule Take by mouth      glucose blood test strip test by Misc  (Non-Drug; Combo Route) route three times daily      [START ON 2/12/2019] HYDROcodone-acetaminophen (NORCO)  mg per tablet Take 4-5 tablets daily  tablet 0    Lancets Misc  (ACCU-CHEK FASTCLIX LANCET) KIT 3 (three) times a day      levofloxacin (LEVAQUIN) 500 mg tablet Take 1 tablet (500 mg total) by mouth every 24 hours for 10 days 10 tablet 0    levothyroxine 25 mcg tablet TAKE 1 TABLET BY MOUTH EVERY MORNING ON SATURDAY, SUNDAY, TUESDAY AND THURSDAY 30 tablet 2    levothyroxine 50 mcg tablet TAKE 1 TABLET BY ORAL ROUTE EVERY MORNING ON MONDAY, WEDNESDAY AND FRIDAY 30 tablet 2    losartan (COZAAR) 25 mg tablet every 24 hours      meclizine (ANTIVERT) 12 5 MG tablet Take 1 tablet (12 5 mg total) by mouth every 12 (twelve) hours as needed for dizziness 30 tablet 0    MICROLET LANCETS MISC Microlet Lancet      pantoprazole (PROTONIX) 40 mg tablet Take 1 tablet (40 mg total) by mouth daily 30 tablet 3    pregabalin (LYRICA) 100 mg capsule Take 1 capsule (100 mg total) by mouth 3 (three) times a day 90 capsule 1    ranitidine (ZANTAC) 150 mg tablet TAKE 1 CAPSULE (150 MG TOTAL) BY MOUTH 2 (TWO) TIMES A DAY 60 tablet 3    sucralfate (CARAFATE) 1 g tablet TAKE 1 TABLET BY ORAL ROUTE 4 TIMES EVERY DAY ON AN EMPTY STOMACH 1 HOUR BEFORE MEALS AND AT BEDTIME 120 tablet 1    traZODone (DESYREL) 50 mg tablet TAKE 1 TABLET BY ORAL ROUTE EVERY DAY AT BEDTIME AS NEEDED 30 tablet 1     Current Facility-Administered Medications   Medication Dose Route Frequency Provider Last Rate Last Dose    cyanocobalamin injection 1,000 mcg  1,000 mcg Intramuscular Q30 Days Natanael Watson MD   1,000 mcg at 01/24/19 7011     Allergies   Allergen Reactions    Cephalosporins Hives    Erythromycin GI Intolerance    Fentanyl Itching     Occurred during surgery     Paxil [Paroxetine] Hives     After 2 weeks    Penicillins Itching    Pravastatin Myalgia    Prednisolone Itching    Statins Itching    Sulfa Antibiotics Diarrhea    Wellbutrin [Bupropion] Hives     After 2 weeks     Hypericum Perforatum Rash    Sulfur Rash     Immunization History   Administered Date(s) Administered    Influenza 10/27/2018    Pneumococcal Conjugate 13-Valent 11/29/2017    Pneumococcal Polysaccharide PPV23 01/08/2013    Tdap 02/07/2019    Zoster Vaccine Recombinant 02/07/2019       Patient Care Team:  Natanael Watson MD as PCP - General (Internal Medicine)  Danyelle Ibrahim, [address] [place] (Pain Medicine)  Beverley Arteaga DO (Pain Medicine)  Jd Lassiter MD    Medicare Screening Tests and Risk Assessments:  Simran Lazo is here for her Subsequent Wellness visit    Last Medicare Wellness visit information reviewed, patient interviewed and updates made to the record today  Broken Bones/Falls: Fall Risk Assessment:    In the past year, patient has experienced: visual disturbance    Preventative Screening/Counseling:      Cardiovascular:      General: Risks and Benefits Discussed          Diabetes:      General: Risks and Benefits Discussed          Colorectal Cancer:      General: Risks and Benefits Discussed          Breast Cancer:      General: Risks and Benefits Discussed          Cervical Cancer:      General: Risks and Benefits Discussed          Osteoporosis:      General: Risks and Benefits Discussed          AAA:      General: Risks and Benefits Discussed          Glaucoma:      General: Risks and Benefits Discussed          HIV:      General: Risks and Benefits Discussed          Hepatitis C:      General: Risks and Benefits Discussed        Advanced Directives:   Patient has no living will for healthcare, does not have durable POA for healthcare, patient does not have an advanced directive  Information on ACP and/or AD not provided  No 5 wishes given  No end of life assessment reviewed with patient  Provider does not agree with end of life deisions  Immunizations:      Pneumococcal: Risks & Benefits Discussed      Shingrix: Risks & Benefits Discussed      TDAP: Risks & Benefits Discussed      Other Preventative Counseling (Non-Medicare):   Fall Prevention and Sunscreen use

## 2019-02-08 LAB
ALBUMIN SERPL-MCNC: 4.1 G/DL (ref 3.6–5.1)
ALBUMIN/GLOB SERPL: 2 (CALC) (ref 1–2.5)
ALP SERPL-CCNC: 87 U/L (ref 33–130)
ALT SERPL-CCNC: 11 U/L (ref 6–29)
AST SERPL-CCNC: 19 U/L (ref 10–35)
BASOPHILS # BLD AUTO: 21 CELLS/UL (ref 0–200)
BASOPHILS NFR BLD AUTO: 0.5 %
BILIRUB DIRECT SERPL-MCNC: 0.1 MG/DL
BILIRUB INDIRECT SERPL-MCNC: 0.3 MG/DL (CALC) (ref 0.2–1.2)
BILIRUB SERPL-MCNC: 0.4 MG/DL (ref 0.2–1.2)
BUN SERPL-MCNC: 11 MG/DL (ref 7–25)
BUN/CREAT SERPL: NORMAL (CALC) (ref 6–22)
CALCIUM SERPL-MCNC: 9 MG/DL (ref 8.6–10.4)
CHLORIDE SERPL-SCNC: 106 MMOL/L (ref 98–110)
CHOLEST SERPL-MCNC: 216 MG/DL
CHOLEST/HDLC SERPL: 2.6 (CALC)
CO2 SERPL-SCNC: 29 MMOL/L (ref 20–32)
CREAT SERPL-MCNC: 0.81 MG/DL (ref 0.5–0.99)
EOSINOPHIL # BLD AUTO: 80 CELLS/UL (ref 15–500)
EOSINOPHIL NFR BLD AUTO: 1.9 %
ERYTHROCYTE [DISTWIDTH] IN BLOOD BY AUTOMATED COUNT: 12.6 % (ref 11–15)
GLOBULIN SER CALC-MCNC: 2.1 G/DL (CALC) (ref 1.9–3.7)
GLUCOSE SERPL-MCNC: 74 MG/DL (ref 65–99)
HAV IGM SERPL QL IA: NORMAL
HBA1C MFR BLD: 5.1 % OF TOTAL HGB
HBV CORE IGM SERPL QL IA: NORMAL
HBV SURFACE AG SERPL QL IA: NORMAL
HCT VFR BLD AUTO: 39.8 % (ref 35–45)
HCV AB S/CO SERPL IA: 0.01
HCV AB SERPL QL IA: NORMAL
HDLC SERPL-MCNC: 84 MG/DL
HGB BLD-MCNC: 13.2 G/DL (ref 11.7–15.5)
LDLC SERPL CALC-MCNC: 111 MG/DL (CALC)
LYMPHOCYTES # BLD AUTO: 1281 CELLS/UL (ref 850–3900)
LYMPHOCYTES NFR BLD AUTO: 30.5 %
MAGNESIUM SERPL-MCNC: 2 MG/DL (ref 1.5–2.5)
MCH RBC QN AUTO: 32.4 PG (ref 27–33)
MCHC RBC AUTO-ENTMCNC: 33.2 G/DL (ref 32–36)
MCV RBC AUTO: 97.8 FL (ref 80–100)
MONOCYTES # BLD AUTO: 315 CELLS/UL (ref 200–950)
MONOCYTES NFR BLD AUTO: 7.5 %
NEUTROPHILS # BLD AUTO: 2503 CELLS/UL (ref 1500–7800)
NEUTROPHILS NFR BLD AUTO: 59.6 %
NONHDLC SERPL-MCNC: 132 MG/DL (CALC)
PLATELET # BLD AUTO: 223 THOUSAND/UL (ref 140–400)
PMV BLD REES-ECKER: 9.9 FL (ref 7.5–12.5)
POTASSIUM SERPL-SCNC: 4.1 MMOL/L (ref 3.5–5.3)
PROT SERPL-MCNC: 6.2 G/DL (ref 6.1–8.1)
RBC # BLD AUTO: 4.07 MILLION/UL (ref 3.8–5.1)
SL AMB EGFR AFRICAN AMERICAN: 86 ML/MIN/1.73M2
SL AMB EGFR NON AFRICAN AMERICAN: 75 ML/MIN/1.73M2
SODIUM SERPL-SCNC: 141 MMOL/L (ref 135–146)
T4 FREE SERPL-MCNC: 1.1 NG/DL (ref 0.8–1.8)
TRIGL SERPL-MCNC: 100 MG/DL
TSH SERPL-ACNC: 1.3 MIU/L (ref 0.4–4.5)
WBC # BLD AUTO: 4.2 THOUSAND/UL (ref 3.8–10.8)

## 2019-02-11 LAB
ALBUMIN SERPL ELPH-MCNC: 3.8 G/DL (ref 3.8–4.8)
ALPHA1 GLOB SERPL ELPH-MCNC: 0.3 G/DL (ref 0.2–0.3)
ALPHA2 GLOB SERPL ELPH-MCNC: 0.8 G/DL (ref 0.5–0.9)
BETA1 GLOB SERPL ELPH-MCNC: 0.4 G/DL (ref 0.4–0.6)
BETA2 GLOB SERPL ELPH-MCNC: 0.3 G/DL (ref 0.2–0.5)
GAMMA GLOB SERPL ELPH-MCNC: 0.7 G/DL (ref 0.8–1.7)
IGA SERPL-MCNC: 222 MG/DL (ref 81–463)
IGG SERPL-MCNC: 569 MG/DL (ref 694–1618)
IGM SERPL-MCNC: 128 MG/DL (ref 48–271)
KAPPA LC FREE SER-MCNC: 26.6 MG/L (ref 3.3–19.4)
KAPPA LC FREE/LAMBDA FREE SER: 1.72 {RATIO} (ref 0.26–1.65)
LAMBDA LC FREE SERPL-MCNC: 15.5 MG/L (ref 5.7–26.3)
M PROTEIN 1 SERPL ELPH-MCNC: 0.1 G/DL
PROT SERPL-MCNC: 6.3 G/DL (ref 6.1–8.1)

## 2019-02-11 NOTE — TELEPHONE ENCOUNTER
RN s w Mack Dubon from [facility] and gave AO's permission to fill Norco today  RN also left a detailed VMMOM as per REZA on pt's chart  C/b number office hours and location provided if needed

## 2019-02-11 NOTE — TELEPHONE ENCOUNTER
Patient  [phone]  Dr Taty Doty    Patient is calling in asking if you would approve for her medication to be filled today instead tomorrow, due to the weather  She is requesting HYDROcodone-acetaminophen (NORCO)  mg  She has 4 pills left  Please send or call pharmacy on file if this is ok for her to refill today 2/11/19  Patient does not want to go outside tomorrow if she doesn't have to she would like to stay safe

## 2019-02-19 ENCOUNTER — OFFICE VISIT (OUTPATIENT)
Dept: HEMATOLOGY ONCOLOGY | Facility: CLINIC | Age: 69
End: 2019-02-19
Payer: COMMERCIAL

## 2019-02-19 VITALS
WEIGHT: 160.8 LBS | SYSTOLIC BLOOD PRESSURE: 102 MMHG | TEMPERATURE: 97.1 F | OXYGEN SATURATION: 93 % | RESPIRATION RATE: 16 BRPM | HEART RATE: 47 BPM | HEIGHT: 64 IN | DIASTOLIC BLOOD PRESSURE: 62 MMHG | BODY MASS INDEX: 27.45 KG/M2

## 2019-02-19 DIAGNOSIS — D47.2 MGUS (MONOCLONAL GAMMOPATHY OF UNKNOWN SIGNIFICANCE): ICD-10-CM

## 2019-02-19 DIAGNOSIS — K91.2 POSTSURGICAL MALABSORPTION: ICD-10-CM

## 2019-02-19 DIAGNOSIS — D50.8 IRON DEFICIENCY ANEMIA FOLLOWING BARIATRIC SURGERY: Primary | ICD-10-CM

## 2019-02-19 DIAGNOSIS — K95.89 IRON DEFICIENCY ANEMIA FOLLOWING BARIATRIC SURGERY: Primary | ICD-10-CM

## 2019-02-19 PROCEDURE — 99215 OFFICE O/P EST HI 40 MIN: CPT | Performed by: PHYSICIAN ASSISTANT

## 2019-02-19 NOTE — PROGRESS NOTES
[facility][1] [facility]AT[facility] [place]  [address] [place] [address]  [address] [address] [phone]  Hematology Ambulatory [address] [facility], 1950, [id number]  2/19/2019    Assessment/Plan:  1  Iron deficiency anemia following bariatric surgery  Patient has had iron deficiency secondary to bariatric surgery  Patient has been on 2 different types of IV iron including Venofer and more recently Feraheme  Patient notes Hali Phelan is more expensive however her numbers seem to be better than what they were on the Venofer  At this time, the patient would like to continue with every 6 month IV Feraheme infusions  I believe this is acceptable given that the patient's iron saturation is 35%  Regimen:  Feraheme 510 mg IV, Day 1  Every 6 months    I have asked the patient to follow up in 6 months which will be approximately custodial before her next Feraheme dose  At that time we could see how well her iron is holding  If necessary, we could increased frequency to every 3 months  - Iron Saturation %; Future  - Ferritin; Future  - Iron; Future  - TIBC; Future  - Methylmalonic acid, serum; Future  - CBC and differential; Future  - Comprehensive metabolic panel; Future    2  Postsurgical malabsorption  Patient has a postsurgical malabsorption secondary to bariatric surgery  Patient has had iron issues in the past however I a m  Also going to check for B12 deficiency  This will be reviewed with patient at follow-up in 6 months  - Iron Saturation %; Future  - Methylmalonic acid, serum; Future    3  MGUS (monoclonal gammopathy of unknown significance)  I reviewed patient's blood work for MGUS  Patient undergoes blood work at a year interval   Free light chain ratio remains the same to improved from her last interval checked  Patient will follow up with blood work in a year as previously outlined by Dr Dajuan Allen    This will be ordered at her follow-up 6 month appointment for iron deficiency anemia  4   Shortness of breath on exertion, bradycardia  This is a known issue that the patient has sought attention through her primary care provider  I explained to the patient that if the shortness of breath worsen she is to go to the emergency room for further workup  I also advised the patient that with the symptoms she is experiencing to call the cardiologist see if her appointments can be moved up  Patient voiced agreement to the above recommendations  The patient is scheduled for follow-up in approximately 6 months with Dr Andry Espinal  Patient voiced agreement and understanding to the above  Patient knows to call the Hematology/Oncology office with any questions and concerns regarding the above  Carefully review your medication list in verify the list is accurate and up-to-date  Please call the hematologic/oncology office if there medications missing from the less, medications on the list that your not currently taking or if there is a dosage or instruction that is different from higher taking medication  Barrier(s) to care: None  The patient is able to self care   ------------------------------------------------------------------------------------------------------    Chief Complaint   Patient presents with    Follow-up     anemia     History of present illness: This is a 66-year-old female with past medical history of bariatric surgery and subsequent iron deficiency anemia, B12 deficiency and history of IgA MGUS  Patient presently was undergoing Feraheme infusions 4 times a year  However due to the patient's availability only went once in the last 6 months  Patient is now here to review blood work  Interval history:  Patient notes that overall she does not feel well  Patient is having issues with bradycardia  Patient is in the middle of having this workup completed as an outpatient  Patient has echocardiogram and Holter monitor scheduled    Patient notes that she has fatigue  Patient's hemoglobin is improved  And unlikely to be contributing to the above fatigue  Review of Systems   Constitutional: Positive for fatigue  Negative for appetite change, fever and unexpected weight change  HENT: Negative for nosebleeds  Respiratory: Positive for shortness of breath (With exertion)  Negative for cough and choking  Negative hemoptysis  Cardiovascular: Negative for chest pain, palpitations and leg swelling  Gastrointestinal: Negative  Negative for abdominal distention, abdominal pain, anal bleeding, blood in stool, constipation, diarrhea, nausea and vomiting  Endocrine: Negative  Negative for cold intolerance  Genitourinary: Negative  Negative for hematuria, menstrual problem, vaginal bleeding, vaginal discharge and vaginal pain  Musculoskeletal: Negative  Negative for arthralgias, myalgias, neck pain and neck stiffness  Skin: Negative  Negative for color change, pallor and rash  Allergic/Immunologic: Negative  Negative for immunocompromised state  Neurological: Negative  Negative for weakness and headaches  Hematological: Negative for adenopathy  Does not bruise/bleed easily  All other systems reviewed and are negative        Patient Active Problem List   Diagnosis    Chronic pain syndrome    Cervical radiculopathy    Myofascial pain syndrome    Spondylosis of cervical region without myelopathy or radiculopathy    Fibromyalgia    Diabetic peripheral neuropathy (HCC)    Long-term current use of opiate analgesic    MGUS (monoclonal gammopathy of unknown significance)    Iron deficiency anemia following bariatric surgery       Past Medical History:   Diagnosis Date    Anemia     Anxiety     hx of panic attacks (now under control)     Arthritis     Asthma     resolved (no problems in a decade)     Baker's cyst of knee     Bronchitis     Bunion, left foot     Cervical pain     Chronic GERD     Diabetes mellitus, type 2 ([facility] )     Diabetic peripheral neuropathy ([facility] )     Endometriosis     Fibromyalgia     Hyperlipidemia     Hypertension     Lung nodules     Macular degeneration     Spondylosis        Past Surgical History:   Procedure Laterality Date    BACK SURGERY  1996    L5, S1- laser surgery fusion of c5 and c6     BREAST LUMPECTOMY Right     CHOLECYSTECTOMY  2004    FOOT SURGERY Left 2005    fusion     GASTRIC BYPASS  2010    Dr Aida Lainez    just uterus     KNEE ARTHROSCOPY      LAMINECTOMY      OVARIAN CYST REMOVAL  1975    PELVIC LAPAROSCOPY      ovaries (x10)     PLEURAL SCARIFICATION  1967    SHOULDER OPEN ROTATOR CUFF REPAIR Left 2008    TONSILLECTOMY      VAGINAL PROLAPSE REPAIR         Family History   Problem Relation Age of Onset    Hypertension Mother     Lung cancer Mother     Hypertension Father     Heart disease Father     Heart attack Father        Social History     Socioeconomic History    Marital status:      Spouse name: Not on file    Number of children: Not on file    Years of education: Not on file    Highest education level: Not on file   Occupational History    Not on file   Social Needs    Financial resource strain: Not on file    Food insecurity:     Worry: Not on file     Inability: Not on file    Transportation needs:     Medical: Not on file     Non-medical: Not on file   Tobacco Use    Smoking status: Current Every Day Smoker     Packs/day: 1 50     Years: 40 00     Pack years: 60 00    Smokeless tobacco: Current User   Substance and Sexual Activity    Alcohol use:  Yes     Alcohol/week: 0 6 oz     Types: 1 Shots of liquor per week     Comment: rarely    Drug use: No    Sexual activity: Not on file   Lifestyle    Physical activity:     Days per week: Not on file     Minutes per session: Not on file    Stress: Not on file   Relationships    Social connections:     Talks on phone: Not on file     Gets together: Not on file Attends Synagogue service: Not on file     Active member of club or organization: Not on file     Attends meetings of clubs or organizations: Not on file     Relationship status: Not on file    Intimate partner violence:     Fear of current or ex partner: Not on file     Emotionally abused: Not on file     Physically abused: Not on file     Forced sexual activity: Not on file   Other Topics Concern    Not on file   Social History Narrative    Not on file         Current Outpatient Medications:     albuterol (PROVENTIL HFA) 90 mcg/act inhaler, Ventolin HFA 90 mcg/actuation aerosol inhaler, Disp: , Rfl:     albuterol (VENTOLIN HFA) 90 mcg/act inhaler, inhale 2 puff by inhalation route  every 4 - 6 hours as needed, Disp: , Rfl:     ALPRAZolam (XANAX) 0 5 mg tablet, Take 0 5 mg by mouth 2 (two) times a day as needed for anxiety, Disp: , Rfl:     amLODIPine (NORVASC) 5 mg tablet, Take 1 tablet (5 mg total) by mouth daily, Disp: 90 tablet, Rfl: 1    baclofen 10 mg tablet, Take 1 tablet (10 mg total) by mouth 3 (three) times a day, Disp: 90 tablet, Rfl: 1    diphenhydrAMINE (BENADRYL ALLERGY) 25 mg capsule, Take by mouth, Disp: , Rfl:     glucose blood test strip, test by Misc  (Non-Drug; Combo Route) route three times daily, Disp: , Rfl:     HYDROcodone-acetaminophen (NORCO)  mg per tablet, Take 4-5 tablets daily PRN, Disp: 130 tablet, Rfl: 0    Lancets Misc  (ACCU-CHEK FASTCLIX LANCET) KIT, 3 (three) times a day, Disp: , Rfl:     levothyroxine 25 mcg tablet, TAKE 1 TABLET BY MOUTH EVERY MORNING ON SATURDAY, SUNDAY, TUESDAY AND THURSDAY, Disp: 30 tablet, Rfl: 2    levothyroxine 50 mcg tablet, TAKE 1 TABLET BY ORAL ROUTE EVERY MORNING ON MONDAY, WEDNESDAY AND FRIDAY, Disp: 30 tablet, Rfl: 2    losartan (COZAAR) 25 mg tablet, every 24 hours, Disp: , Rfl:     meclizine (ANTIVERT) 12 5 MG tablet, Take 1 tablet (12 5 mg total) by mouth every 12 (twelve) hours as needed for dizziness, Disp: 30 tablet, Rfl: 0    MICROLET LANCETS MISC, Microlet Lancet, Disp: , Rfl:     pantoprazole (PROTONIX) 40 mg tablet, Take 1 tablet (40 mg total) by mouth daily, Disp: 30 tablet, Rfl: 3    pregabalin (LYRICA) 100 mg capsule, Take 1 capsule (100 mg total) by mouth 3 (three) times a day, Disp: 90 capsule, Rfl: 1    ranitidine (ZANTAC) 150 mg tablet, TAKE 1 CAPSULE (150 MG TOTAL) BY MOUTH 2 (TWO) TIMES A DAY, Disp: 60 tablet, Rfl: 3    sucralfate (CARAFATE) 1 g tablet, TAKE 1 TABLET BY ORAL ROUTE 4 TIMES EVERY DAY ON AN EMPTY STOMACH 1 HOUR BEFORE MEALS AND AT BEDTIME, Disp: 120 tablet, Rfl: 1    traZODone (DESYREL) 50 mg tablet, TAKE 1 TABLET BY ORAL ROUTE EVERY DAY AT BEDTIME AS NEEDED, Disp: 30 tablet, Rfl: 1    Current Facility-Administered Medications:     cyanocobalamin injection 1,000 mcg, 1,000 mcg, Intramuscular, Q30 Days, Lindsey Vela MD, 1,000 mcg at 01/24/19 0907    Allergies   Allergen Reactions    Cephalosporins Hives    Erythromycin GI Intolerance    Fentanyl Itching     Occurred during surgery     Paxil [Paroxetine] Hives     After 2 weeks    Penicillins Itching    Pravastatin Myalgia    Prednisolone Itching    Statins Itching    Sulfa Antibiotics Diarrhea    Wellbutrin [Bupropion] Hives     After 2 weeks     Hypericum Perforatum Rash    Sulfur Rash       Objective:  /62   Pulse (!) 47   Temp (!) 97 1 °F (36 2 °C) (Tympanic)   Resp 16   Ht 5' 4" (1 626 m)   Wt 72 9 kg (160 lb 12 8 oz)   LMP  (LMP Unknown)   SpO2 93%   BMI 27 60 kg/m²    Physical Exam   Constitutional: She is oriented to person, place, and time  She appears well-developed and well-nourished  No distress  HENT:   Head: Normocephalic and atraumatic  Eyes: Pupils are equal, round, and reactive to light  No scleral icterus  Cardiovascular: Bradycardia present  No murmur heard  Pulmonary/Chest: Effort normal  No respiratory distress  Musculoskeletal: She exhibits no edema     Neurological: She is alert and oriented to person, place, and time  Skin: Skin is warm  No rash noted  No pallor  Psychiatric: She has a normal mood and affect  Thought content normal        Result Review  Labs:  Orders Only on 02/07/2019   Component Date Value Ref Range Status    Total Cholesterol 02/07/2019 216* <200 mg/dL Final    HDL 02/07/2019 84  >50 mg/dL Final    Triglycerides 02/07/2019 100  <150 mg/dL Final    LDL Direct 02/07/2019 111* mg/dL (calc) Final    Comment: Reference range: <100     Desirable range <100 mg/dL for primary prevention;    <70 mg/dL for patients with CHD or diabetic patients   with > or = 2 CHD risk factors  LDL-C is now calculated using the Jacob-Ventura   calculation, which is a validated novel method providing   better accuracy than the Friedewald equation in the   estimation of LDL-C  Jovana Fierro  [place] [id number];329(86): [phone]   (http://Tagorize/faq/SQY303)      Chol HDLC Ratio 02/07/2019 2 6  <5 0 (calc) Final    Non-HDL Cholesterol 02/07/2019 132* <130 mg/dL (calc) Final    Comment: For patients with diabetes plus 1 major ASCVD risk   factor, treating to a non-HDL-C goal of <100 mg/dL   (LDL-C of <70 mg/dL) is considered a therapeutic   option   Magnesium, Serum 02/07/2019 2 0  1 5 - 2 5 mg/dL Final    Glucose, Random 02/07/2019 74  65 - 99 mg/dL Final    Comment:               Fasting reference interval         BUN 02/07/2019 11  7 - 25 mg/dL Final    Creatinine 02/07/2019 0 81  0 50 - 0 99 mg/dL Final    Comment: For patients >52years of age, the reference limit  for Creatinine is approximately 13% higher for people  identified as -American           eGFR Non  02/07/2019 75  > OR = 60 mL/min/1 73m2 Final    eGFR  02/07/2019 86  > OR = 60 mL/min/1 73m2 Final    SL AMB BUN/CREATININE RATIO 20/44/7232 NOT APPLICABLE  6 - 22 (calc) Final    Sodium 02/07/2019 141  135 - 146 mmol/L Final    Potassium 02/07/2019 4 1  3 5 - 5 3 mmol/L Final    Chloride 02/07/2019 106  98 - 110 mmol/L Final    CO2 02/07/2019 29  20 - 32 mmol/L Final    SL AMB CALCIUM 02/07/2019 9 0  8 6 - 10 4 mg/dL Final    Protein, Total 02/07/2019 6 2  6 1 - 8 1 g/dL Final    Albumin 02/07/2019 4 1  3 6 - 5 1 g/dL Final    Globulin 02/07/2019 2 1  1 9 - 3 7 g/dL (calc) Final    Albumin/Globulin Ratio 02/07/2019 2 0  1 0 - 2 5 (calc) Final    TOTAL BILIRUBIN 02/07/2019 0 4  0 2 - 1 2 mg/dL Final    Bilirubin, Direct 02/07/2019 0 1  < OR = 0 2 mg/dL Final    Bilirubin, Indirect 02/07/2019 0 3  0 2 - 1 2 mg/dL (calc) Final    Alkaline Phosphatase 02/07/2019 87  33 - 130 U/L Final    AST 02/07/2019 19  10 - 35 U/L Final    ALT 02/07/2019 11  6 - 29 U/L Final    White Blood Cell Count 02/07/2019 4 2  3 8 - 10 8 Thousand/uL Final    Red Blood Cell Count 02/07/2019 4 07  3 80 - 5 10 Million/uL Final    Hemoglobin 02/07/2019 13 2  11 7 - 15 5 g/dL Final    HCT 02/07/2019 39 8  35 0 - 45 0 % Final    MCV 02/07/2019 97 8  80 0 - 100 0 fL Final    MCH 02/07/2019 32 4  27 0 - 33 0 pg Final    MCHC 02/07/2019 33 2  32 0 - 36 0 g/dL Final    RDW 02/07/2019 12 6  11 0 - 15 0 % Final    Platelet Count 19/96/6622 223  140 - 400 Thousand/uL Final    SL AMB MPV 02/07/2019 9 9  7 5 - 12 5 fL Final    Neutrophils (Absolute) 02/07/2019 2,503  1,500 - 7,800 cells/uL Final    Lymphocytes (Absolute) 02/07/2019 1,281  850 - 3,900 cells/uL Final    Monocytes (Absolute) 02/07/2019 315  200 - 950 cells/uL Final    Eosinophils (Absolute) 02/07/2019 80  15 - 500 cells/uL Final    Basophils ABS 02/07/2019 21  0 - 200 cells/uL Final    Neutrophils 02/07/2019 59 6  % Final    Lymphocytes 02/07/2019 30 5  % Final    Monocytes 02/07/2019 7 5  % Final    Eosinophils 02/07/2019 1 9  % Final    Basophils PCT 02/07/2019 0 5  % Final    Hep A Ab, IgM 02/07/2019 NON-REACTIVE  NON-REACTIVE Final    HBsAg Screen 02/07/2019 NON-REACTIVE  NON-REACTIVE Final    Hep B Core Ab, IgM 02/07/2019 NON-REACTIVE  NON-REACTIVE Final    HEP C AB 02/07/2019 NON-REACTIVE  NON-REACTIVE Final    Signal to Cut-Off 02/07/2019 0 01  <1 00 Final    Free t4 02/07/2019 1 1  0 8 - 1 8 ng/dL Final    TSH 02/07/2019 1 30  0 40 - 4 50 mIU/L Final    Hemoglobin A1C 02/07/2019 5 1  <5 7 % of total Hgb Final    Comment: For the purpose of screening for the presence of  diabetes:     <5 7%       Consistent with the absence of diabetes  5 7-6 4%    Consistent with increased risk for diabetes              (prediabetes)  > or =6 5%  Consistent with diabetes     This assay result is consistent with a decreased risk  of diabetes  Currently, no consensus exists regarding use of  hemoglobin A1c for diagnosis of diabetes in children  According to American Diabetes Association (ADA)  guidelines, hemoglobin A1c <7 0% represents optimal  control in non-pregnant diabetic patients  Different  metrics may apply to specific patient populations  Standards of Medical Care in Diabetes(ADA)  Orders Only on 02/07/2019   Component Date Value Ref Range Status    Protein, Total 02/07/2019 6 3  6 1 - 8 1 g/dL Final    Albumin, Electrophoresis 02/07/2019 3 8  3 8 - 4 8 g/dL Final    Alpha-1 Globulin 02/07/2019 0 3  0 2 - 0 3 g/dL Final    Alpha-2 Globulin 02/07/2019 0 8  0 5 - 0 9 g/dL Final    Beta 1 Globulin 02/07/2019 0 4  0 4 - 0 6 g/dL Final    Beta 2 Globulin 02/07/2019 0 3  0 2 - 0 5 g/dL Final    Gamma Globulin 02/07/2019 0 7* 0 8 - 1 7 g/dL Final    Abnormal Protein Band 1 02/07/2019 0 1* NONE DETECTED g/dL Final    Interpretation 02/07/2019    Final    Comment:    Evaluation reveals a restricted band (M-spike)   migrating in the gamma globulin region  Consider   immunofixation analysis if indicated           Ig [place] Free Light Chain 02/07/2019 26 6* 3 3 - 19 4 mg/L Final    Ig Lambda Free Light Chain 02/07/2019 15 5  5 7 - 26 3 mg/L Final    Kappa/Lambda FluidC Ratio 02/07/2019 1 72* 0 26 - 1 65 Final    Comment: Free kappa/lambda ratio in serum of normal individuals  is 0 26-1 65  Excess production of free kappa or lambda  chains can alter this ratio  Monoclonal free light  chains are found in serum of patients with multiple  myeloma, Waldenstrom's macroglobulinemia, mu-heavy chain  disease, primary amyloidosis, light chain deposition  disease, monoclonal gammopathy of undetermined  significance, and lymphoproliferative disorders  Measurement of free light chain concentration in serum  is useful for diagnosis, prognosis, monitoring disease  activity and following response to therapy of these  disorders   IgA 02/07/2019 222  81 - 463 mg/dL Final    IgG 02/07/2019 569* 694 - 1,618 mg/dL Final    IGM 02/07/2019 128  48 - 271 mg/dL Final       Imaging:     Please note: This report has been generated by a voice recognition software system  Therefore there may be syntax, spelling, and/or grammatical errors  Please call if you have any questions

## 2019-02-22 DIAGNOSIS — R42 VERTIGO: ICD-10-CM

## 2019-02-22 RX ORDER — MECLIZINE HCL 12.5 MG/1
12.5 TABLET ORAL EVERY 12 HOURS PRN
Qty: 30 TABLET | Refills: 0 | Status: SHIPPED | OUTPATIENT
Start: 2019-02-22 | End: 2019-03-21 | Stop reason: SDUPTHER

## 2019-02-22 NOTE — TELEPHONE ENCOUNTER
Meclizine is helping pt stay in balance and reduced dizziness tremendously  Pt has been taking it twice a day  Needs a refill because she only got 30 pills enough for 14 days and is done with the las pill now

## 2019-02-26 ENCOUNTER — HOSPITAL ENCOUNTER (OUTPATIENT)
Dept: NON INVASIVE DIAGNOSTICS | Facility: HOSPITAL | Age: 69
Discharge: HOME | End: 2019-02-26
Payer: COMMERCIAL

## 2019-02-26 DIAGNOSIS — R42 VERTIGO: ICD-10-CM

## 2019-02-26 DIAGNOSIS — R42 DIZZINESS: ICD-10-CM

## 2019-02-26 PROCEDURE — 93306 TTE W/DOPPLER COMPLETE: CPT

## 2019-02-26 PROCEDURE — 93306 TTE W/DOPPLER COMPLETE: CPT | Performed by: INTERNAL MEDICINE

## 2019-02-26 PROCEDURE — 93226 XTRNL ECG REC<48 HR SCAN A/R: CPT

## 2019-02-26 PROCEDURE — 93225 XTRNL ECG REC<48 HRS REC: CPT

## 2019-03-05 PROCEDURE — 93227 XTRNL ECG REC<48 HR R&I: CPT | Performed by: INTERNAL MEDICINE

## 2019-03-07 ENCOUNTER — DOCUMENTATION (OUTPATIENT)
Dept: FAMILY MEDICINE CLINIC | Facility: CLINIC | Age: 69
End: 2019-03-07

## 2019-03-08 DIAGNOSIS — G47.00 INSOMNIA, UNSPECIFIED TYPE: ICD-10-CM

## 2019-03-08 RX ORDER — TRAZODONE HYDROCHLORIDE 50 MG/1
TABLET ORAL
Qty: 30 TABLET | Refills: 1 | Status: SHIPPED | OUTPATIENT
Start: 2019-03-08 | End: 2019-05-03 | Stop reason: SDUPTHER

## 2019-03-11 ENCOUNTER — OFFICE VISIT (OUTPATIENT)
Dept: PAIN MEDICINE | Facility: MEDICAL CENTER | Age: 69
End: 2019-03-11
Payer: COMMERCIAL

## 2019-03-11 VITALS
RESPIRATION RATE: 14 BRPM | DIASTOLIC BLOOD PRESSURE: 52 MMHG | HEART RATE: 64 BPM | BODY MASS INDEX: 27.31 KG/M2 | HEIGHT: 64 IN | WEIGHT: 160 LBS | SYSTOLIC BLOOD PRESSURE: 112 MMHG

## 2019-03-11 DIAGNOSIS — F11.20 UNCOMPLICATED OPIOID DEPENDENCE (HCC): Primary | ICD-10-CM

## 2019-03-11 DIAGNOSIS — M79.18 MYOFASCIAL PAIN SYNDROME: ICD-10-CM

## 2019-03-11 DIAGNOSIS — G89.4 CHRONIC PAIN SYNDROME: ICD-10-CM

## 2019-03-11 DIAGNOSIS — Z79.891 LONG-TERM CURRENT USE OF OPIATE ANALGESIC: ICD-10-CM

## 2019-03-11 DIAGNOSIS — M47.812 SPONDYLOSIS OF CERVICAL REGION WITHOUT MYELOPATHY OR RADICULOPATHY: ICD-10-CM

## 2019-03-11 DIAGNOSIS — M54.12 CERVICAL RADICULOPATHY: ICD-10-CM

## 2019-03-11 PROCEDURE — 99214 OFFICE O/P EST MOD 30 MIN: CPT | Performed by: NURSE PRACTITIONER

## 2019-03-11 PROCEDURE — 80305 DRUG TEST PRSMV DIR OPT OBS: CPT | Performed by: NURSE PRACTITIONER

## 2019-03-11 RX ORDER — HYDROCODONE BITARTRATE AND ACETAMINOPHEN 10; 325 MG/1; MG/1
TABLET ORAL
Qty: 130 TABLET | Refills: 0 | Status: SHIPPED | OUTPATIENT
Start: 2019-04-09 | End: 2019-05-06 | Stop reason: SDUPTHER

## 2019-03-11 RX ORDER — PREGABALIN 100 MG/1
100 CAPSULE ORAL 3 TIMES DAILY
Qty: 90 CAPSULE | Refills: 1 | Status: SHIPPED | OUTPATIENT
Start: 2019-03-11 | End: 2019-05-06 | Stop reason: SDUPTHER

## 2019-03-11 RX ORDER — BACLOFEN 10 MG/1
10 TABLET ORAL 3 TIMES DAILY
Qty: 90 TABLET | Refills: 1 | Status: SHIPPED | OUTPATIENT
Start: 2019-03-11 | End: 2019-05-06 | Stop reason: SDUPTHER

## 2019-03-11 RX ORDER — HYDROCODONE BITARTRATE AND ACETAMINOPHEN 10; 325 MG/1; MG/1
TABLET ORAL
Qty: 130 TABLET | Refills: 0 | Status: SHIPPED | OUTPATIENT
Start: 2019-03-11 | End: 2019-03-11 | Stop reason: SDUPTHER

## 2019-03-11 NOTE — PROGRESS NOTES
Assessment  1  Uncomplicated opioid dependence ([facility] [address] )    2  Long-term current use of opiate analgesic    3  Chronic pain syndrome    4  Spondylosis of cervical region without myelopathy or radiculopathy    5  Myofascial pain syndrome    6  Cervical radiculopathy        Plan  Continue hydrocodone as prescribed she was provided with a 2 month supply of the medication with 1 month having a do not fill date of April 9, 2019  Continue with Lyrica and Baclofen both these medications were refilled today  Follow-up visit in 8 weeks for medication refills        There are risks associated with opioid medications, including dependence, addiction and tolerance  The patient understands and agrees to use these medications only as prescribed  Potential side effects of the medications include, but are not limited to, constipation, drowsiness, addiction, impaired judgment and risk of fatal overdose if not taken as prescribed  The patient was warned against driving while taking sedation medications  Sharing medications is a felony  At this point in time, the patient is showing no signs of addiction, abuse, diversion or suicidal ideation  A urine drug screen was collected at today's office visit as part of our medication management protocol  The point of care testing results were appropriate for what was being prescribed  The specimen will be sent for confirmatory testing  The drug screen is medically necessary because the patient is either dependent on opioid medication or is being considered for opioid medication therapy and the results could impact ongoing or future treatment  The drug screen is to evaluate for the presences or absence of prescribed, non-prescribed, and/or illicit drugs/substances      [place] Prescription Drug Monitoring Program report was reviewed and was appropriate         My impressions and treatment recommendations were discussed in detail with the patient who verbalized understanding and had no further questions  Discharge instructions were provided  I personally saw and examined the patient and I agree with the above discussed plan of care  Orders Placed This Encounter   Procedures    MM ALL_Prescribed Meds and Special Instructions     New Medications Ordered This Visit   Medications    baclofen 10 mg tablet     Sig: Take 1 tablet (10 mg total) by mouth 3 (three) times a day     Dispense:  90 tablet     Refill:  1    pregabalin (LYRICA) 100 mg capsule     Sig: Take 1 capsule (100 mg total) by mouth 3 (three) times a day     Dispense:  90 capsule     Refill:  1    HYDROcodone-acetaminophen (NORCO)  mg per tablet     Sig: Take 4-5 tablets daily PRN     Dispense:  130 tablet     Refill:  0       History of Present Illness  Lety Stevens is a 76 y o  female presents for follow-up related to her chronic neck pain  Today she rates her pain 7/10 this is constant in nature and described as burning, dull, aching, sharp, throbbing, pressure-like, shooting, numbness, and pins and needles  Patient reports that the winter has increased her pain symptoms she is hoping that when the weather changes and becomes warmer her pain symptoms will improve as they did last summer  Patient continues to take hydrocodone 10/325 mg 4-5 tablets daily along with Lyrica 100 mg 3 times a day and baclofen 10 mg 3 times a day  Her medication regimen provides her 40% relief without any side effects or issues  Patient tells me that they are in the process of checking her for bradycardia  She states this is something she has had chronically  she does have a cardiology appointment at the end of March  I have personally reviewed and/or updated the patient's past medical history, past surgical history, family history, social history, current medications, allergies, and vital signs today  Review of Systems   Respiratory: Positive for shortness of breath  Cardiovascular: Positive for chest pain  Gastrointestinal: Positive for nausea  Negative for constipation, diarrhea and vomiting  Musculoskeletal: Positive for joint swelling (Knee, thigh and feet)  Negative for arthralgias, gait problem and myalgias  Skin: Negative for rash  Neurological: Positive for dizziness  Negative for seizures and weakness  Psychiatric/Behavioral: Positive for decreased concentration  All other systems reviewed and are negative        Patient Active Problem List   Diagnosis    Chronic pain syndrome    Cervical radiculopathy    Myofascial pain syndrome    Spondylosis of cervical region without myelopathy or radiculopathy    Fibromyalgia    Diabetic peripheral neuropathy (HCC)    Long-term current use of opiate analgesic    MGUS (monoclonal gammopathy of unknown significance)    Iron deficiency anemia following bariatric surgery       Past Medical History:   Diagnosis Date    Anemia     Anxiety     hx of panic attacks (now under control)     Arthritis     Asthma     resolved (no problems in a decade)     Baker's cyst of knee     Bronchitis     Bunion, left foot     Cervical pain     Chronic GERD     Diabetes mellitus, type 2 ([address] )     Diabetic peripheral neuropathy ([facility] [address] )     Endometriosis     Fibromyalgia     Hyperlipidemia     Hypertension     Lung nodules     Macular degeneration     Spondylosis        Past Surgical History:   Procedure Laterality Date    BACK SURGERY  1996    L5, S1- laser surgery fusion of c5 and c6     BREAST LUMPECTOMY Right     CHOLECYSTECTOMY  2004    FOOT SURGERY Left 2005    fusion     GASTRIC BYPASS  2010    Dr Aleisha Templeton    just uterus     KNEE ARTHROSCOPY      LAMINECTOMY      OVARIAN CYST REMOVAL  1975    PELVIC LAPAROSCOPY      ovaries (x10)     PLEURAL SCARIFICATION  1967    SHOULDER OPEN ROTATOR CUFF REPAIR Left 2008    TONSILLECTOMY      VAGINAL PROLAPSE REPAIR         Family History   Problem Relation Age of Onset    Hypertension Mother     Lung cancer Mother     Hypertension Father     Heart disease Father     Heart attack Father        Social History     Occupational History    Not on file   Tobacco Use    Smoking status: Current Every Day Smoker     Packs/day: 1 50     Years: 40 00     Pack years: 60 00    Smokeless tobacco: Current User   Substance and Sexual Activity    Alcohol use:  Yes     Alcohol/week: 0 6 oz     Types: 1 Shots of liquor per week     Comment: rarely    Drug use: No    Sexual activity: Not on file       Current Outpatient Medications on File Prior to Visit   Medication Sig    albuterol (VENTOLIN HFA) 90 mcg/act inhaler inhale 2 puff by inhalation route  every 4 - 6 hours as needed    ALPRAZolam (XANAX) 0 5 mg tablet Take 0 5 mg by mouth 2 (two) times a day as needed for anxiety    amLODIPine (NORVASC) 5 mg tablet Take 1 tablet (5 mg total) by mouth daily    diphenhydrAMINE (BENADRYL ALLERGY) 25 mg capsule Take by mouth    levothyroxine 25 mcg tablet TAKE 1 TABLET BY MOUTH EVERY MORNING ON SATURDAY, SUNDAY, TUESDAY AND THURSDAY    levothyroxine 50 mcg tablet TAKE 1 TABLET BY ORAL ROUTE EVERY MORNING ON MONDAY, WEDNESDAY AND FRIDAY    losartan (COZAAR) 25 mg tablet every 24 hours    meclizine (ANTIVERT) 12 5 MG tablet Take 1 tablet (12 5 mg total) by mouth every 12 (twelve) hours as needed for dizziness    pantoprazole (PROTONIX) 40 mg tablet Take 1 tablet (40 mg total) by mouth daily    ranitidine (ZANTAC) 150 mg tablet TAKE 1 CAPSULE (150 MG TOTAL) BY MOUTH 2 (TWO) TIMES A DAY    sucralfate (CARAFATE) 1 g tablet TAKE 1 TABLET BY ORAL ROUTE 4 TIMES EVERY DAY ON AN EMPTY STOMACH 1 HOUR BEFORE MEALS AND AT BEDTIME    traZODone (DESYREL) 50 mg tablet TAKE 1 TABLET BY ORAL ROUTE EVERY DAY AT BEDTIME AS NEEDED    [DISCONTINUED] baclofen 10 mg tablet Take 1 tablet (10 mg total) by mouth 3 (three) times a day    [DISCONTINUED] HYDROcodone-acetaminophen (NORCO)  mg per tablet Take 4-5 tablets daily PRN    [DISCONTINUED] pregabalin (LYRICA) 100 mg capsule Take 1 capsule (100 mg total) by mouth 3 (three) times a day    glucose blood test strip test by Misc  (Non-Drug; Combo Route) route three times daily    Lancets Misc  (ACCU-CHEK FASTCLIX LANCET) KIT 3 (three) times a day    MICROLET LANCETS MISC Microlet Lancet    [DISCONTINUED] albuterol (PROVENTIL HFA) 90 mcg/act inhaler Ventolin HFA 90 mcg/actuation aerosol inhaler     Current Facility-Administered Medications on File Prior to Visit   Medication    cyanocobalamin injection 1,000 mcg       Allergies   Allergen Reactions    Cephalosporins Hives    Erythromycin GI Intolerance    Fentanyl Itching     Occurred during surgery     Paxil [Paroxetine] Hives     After 2 weeks    Penicillins Itching    Pravastatin Myalgia    Prednisolone Itching    Statins Itching    Sulfa Antibiotics Diarrhea    Wellbutrin [Bupropion] Hives     After 2 weeks     Hypericum Perforatum Rash    Sulfur Rash           Hydrocodone last taken 3/11 AM   New UDS today  Physical Exam    /52   Pulse 64   Resp 14   Ht 5' 4" (1 626 m)   Wt 72 6 kg (160 lb)   LMP  (LMP Unknown)   BMI 27 46 kg/m²     Constitutional: normal, well developed, well nourished, alert, in no distress and non-toxic and no overt pain behavior    Eyes: anicteric  HEENT: grossly intact  Neck: supple, symmetric, trachea midline and no masses   Pulmonary:even and unlabored  Cardiovascular:No edema or pitting edema present  Skin:Normal without rashes or lesions and well hydrated  Psychiatric:Mood and affect appropriate  Neurologic:Cranial Nerves II-XII grossly intact  Musculoskeletal:ambulates with cane    Imaging

## 2019-03-15 DIAGNOSIS — K27.9 PUD (PEPTIC ULCER DISEASE): ICD-10-CM

## 2019-03-15 RX ORDER — SUCRALFATE 1 G/1
TABLET ORAL
Qty: 120 TABLET | Refills: 1 | Status: SHIPPED | OUTPATIENT
Start: 2019-03-15 | End: 2019-05-10 | Stop reason: SDUPTHER

## 2019-03-21 ENCOUNTER — CLINICAL SUPPORT (OUTPATIENT)
Dept: FAMILY MEDICINE CLINIC | Facility: CLINIC | Age: 69
End: 2019-03-21
Payer: COMMERCIAL

## 2019-03-21 ENCOUNTER — TELEPHONE (OUTPATIENT)
Dept: FAMILY MEDICINE CLINIC | Facility: CLINIC | Age: 69
End: 2019-03-21

## 2019-03-21 DIAGNOSIS — R42 VERTIGO: ICD-10-CM

## 2019-03-21 DIAGNOSIS — D51.3 OTHER DIETARY VITAMIN B12 DEFICIENCY ANEMIA: Primary | ICD-10-CM

## 2019-03-21 PROCEDURE — 96372 THER/PROPH/DIAG INJ SC/IM: CPT

## 2019-03-21 RX ORDER — CYANOCOBALAMIN 1000 UG/ML
1000 INJECTION INTRAMUSCULAR; SUBCUTANEOUS ONCE
Status: COMPLETED | OUTPATIENT
Start: 2019-03-21 | End: 2019-03-21

## 2019-03-21 RX ADMIN — CYANOCOBALAMIN 1000 MCG: 1000 INJECTION INTRAMUSCULAR; SUBCUTANEOUS at 16:42

## 2019-03-21 NOTE — TELEPHONE ENCOUNTER
You last gave her Levaquin 500 mg for 5 days, but her symptoms came back again, she needs a 10 day course of Levaquin    Her fever is back, swollen lymph nodes, all her usual symptoms came back even worse

## 2019-03-22 DIAGNOSIS — R42 VERTIGO: ICD-10-CM

## 2019-03-22 RX ORDER — MECLIZINE HCL 12.5 MG/1
12.5 TABLET ORAL EVERY 12 HOURS PRN
Qty: 30 TABLET | Refills: 1 | Status: SHIPPED | OUTPATIENT
Start: 2019-03-22 | End: 2019-04-23 | Stop reason: SDUPTHER

## 2019-03-22 RX ORDER — MECLIZINE HCL 12.5 MG/1
12.5 TABLET ORAL EVERY 12 HOURS PRN
Qty: 30 TABLET | Refills: 0 | Status: SHIPPED | OUTPATIENT
Start: 2019-03-22 | End: 2019-03-22 | Stop reason: SDUPTHER

## 2019-03-28 ENCOUNTER — CONSULT (OUTPATIENT)
Dept: CARDIOLOGY CLINIC | Facility: CLINIC | Age: 69
End: 2019-03-28
Payer: COMMERCIAL

## 2019-03-28 VITALS
DIASTOLIC BLOOD PRESSURE: 84 MMHG | WEIGHT: 158.2 LBS | SYSTOLIC BLOOD PRESSURE: 124 MMHG | HEART RATE: 52 BPM | BODY MASS INDEX: 27.01 KG/M2 | HEIGHT: 64 IN

## 2019-03-28 DIAGNOSIS — R42 DIZZINESS: ICD-10-CM

## 2019-03-28 DIAGNOSIS — R00.1 SINUS BRADYCARDIA: Primary | ICD-10-CM

## 2019-03-28 PROCEDURE — 99204 OFFICE O/P NEW MOD 45 MIN: CPT | Performed by: INTERNAL MEDICINE

## 2019-03-28 PROCEDURE — 93000 ELECTROCARDIOGRAM COMPLETE: CPT | Performed by: INTERNAL MEDICINE

## 2019-03-28 NOTE — PROGRESS NOTES
Cardiology Consultation     Mc Number  [id number]  1950  HEART & VASCULAR Charlene Mart  [facility]  [address] [place] [address] [address]    1  Sinus bradycardia  Ambulatory referral to Cardiology    POCT ECG   2   Dizziness  Ambulatory referral to Cardiology       History of present illness    The patient has been referred to me by [facility] for evaluation of bradycardia  In addition the patient complains of exertional intolerance, postural lightheadedness, and significant fatigue    Episodes have been going on for quite some time and at least worsening over the last 2 years  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[employer]  She has to work for 5 hours a day  Once she reaches home she feels so fatigued that she cannot do anything  She prepares food over 30 minutes and thereafter can hardly do anything else    She informs prior  physician office visit where her heart rate is in the 30s and 40s  She has curtailed her activity and does everything very slowly    The patient is not complaining of anginal like chest pain or chest pressure  There is no worsening orthopnea, paroxysmal nocturnal dyspnea  There is no leg swelling    Patient does not get palpitation   There is no history of presyncope or syncope  There is rare history of transient  lightheadedness  There is significant exertional intolerance    There is history of snoring at night  There is history of morning fatigability  There is occasional history of daytime sleepiness        Patient Active Problem List   Diagnosis    Chronic pain syndrome    Cervical radiculopathy    Myofascial pain syndrome    Spondylosis of cervical region without myelopathy or radiculopathy    Fibromyalgia    Diabetic peripheral neuropathy ([address] )    Long-term current use of opiate analgesic    MGUS (monoclonal gammopathy of unknown significance)    Iron deficiency anemia following bariatric surgery     Past Medical History:   Diagnosis Date    Anemia     Anxiety     hx of panic attacks (now under control)     Arthritis     Asthma     resolved (no problems in a decade)     Baker's cyst of knee     Bronchitis     Bunion, left foot     Cervical pain     Chronic GERD     Diabetes mellitus, type 2 ([facility] [address] )     Diabetic peripheral neuropathy ([facility] )     Endometriosis     Fibromyalgia     Hyperlipidemia     Hypertension     Lung nodules     Macular degeneration     Spondylosis      Social History     Socioeconomic History    Marital status:      Spouse name: Not on file    Number of children: Not on file    Years of education: Not on file    Highest education level: Not on file   Occupational History    Not on file   Social Needs    Financial resource strain: Not on file    Food insecurity:     Worry: Not on file     Inability: Not on file    Transportation needs:     Medical: Not on file     Non-medical: Not on file   Tobacco Use    Smoking status: Current Every Day Smoker     Packs/day: 1 50     Years: 40 00     Pack years: 60 00    Smokeless tobacco: Current User   Substance and Sexual Activity    Alcohol use:  Yes     Alcohol/week: 0 6 oz     Types: 1 Shots of liquor per week     Comment: rarely    Drug use: No    Sexual activity: Not on file   Lifestyle    Physical activity:     Days per week: Not on file     Minutes per session: Not on file    Stress: Not on file   Relationships    Social connections:     Talks on phone: Not on file     Gets together: Not on file     Attends M[employer] service: Not on file     Active member of club or organization: Not on file     Attends meetings of clubs or organizations: Not on file     Relationship status: Not on file    Intimate partner violence:     Fear of current or ex partner: Not on file     Emotionally abused: Not on file     Physically abused: Not on file     Forced sexual activity: Not on file   Other Topics Concern    Not on file Social History Narrative    Not on file      Family History   Problem Relation Age of Onset    Hypertension Mother     Lung cancer Mother     Hypertension Father     Heart disease Father     Heart attack Father      Past Surgical History:   Procedure Laterality Date    BACK SURGERY  1996    L5, S1- laser surgery fusion of c5 and c6     BREAST LUMPECTOMY Right     CHOLECYSTECTOMY  2004    FOOT SURGERY Left 2005    fusion     GASTRIC BYPASS  2010    Dr Aleisha Templeton    just uterus     KNEE ARTHROSCOPY      LAMINECTOMY      OVARIAN CYST REMOVAL  1975    PELVIC LAPAROSCOPY      ovaries (x10)     PLEURAL SCARIFICATION  1967    SHOULDER OPEN ROTATOR CUFF REPAIR Left 2008    TONSILLECTOMY      VAGINAL PROLAPSE REPAIR         Current Outpatient Medications:     albuterol (VENTOLIN HFA) 90 mcg/act inhaler, inhale 2 puff by inhalation route  every 4 - 6 hours as needed, Disp: , Rfl:     ALPRAZolam (XANAX) 0 5 mg tablet, Take 0 5 mg by mouth 2 (two) times a day as needed for anxiety, Disp: , Rfl:     amLODIPine (NORVASC) 5 mg tablet, Take 1 tablet (5 mg total) by mouth daily, Disp: 90 tablet, Rfl: 1    baclofen 10 mg tablet, Take 1 tablet (10 mg total) by mouth 3 (three) times a day, Disp: 90 tablet, Rfl: 1    diphenhydrAMINE (BENADRYL ALLERGY) 25 mg capsule, Take by mouth, Disp: , Rfl:     glucose blood test strip, test by Misc  (Non-Drug; Combo Route) route three times daily, Disp: , Rfl:     [START ON 4/9/2019] HYDROcodone-acetaminophen (NORCO)  mg per tablet, Take 4-5 tablets daily PRN, Disp: 130 tablet, Rfl: 0    Lancets Misc  (ACCU-CHEK FASTCLIX LANCET) KIT, 3 (three) times a day, Disp: , Rfl:     levothyroxine 25 mcg tablet, TAKE 1 TABLET BY MOUTH EVERY MORNING ON SATURDAY, SUNDAY, TUESDAY AND THURSDAY, Disp: 30 tablet, Rfl: 2    levothyroxine 50 mcg tablet, TAKE 1 TABLET BY ORAL ROUTE EVERY MORNING ON MONDAY, WEDNESDAY AND FRIDAY, Disp: 30 tablet, Rfl: 2   losartan (COZAAR) 25 mg tablet, every 24 hours, Disp: , Rfl:     meclizine (ANTIVERT) 12 5 MG tablet, Take 1 tablet (12 5 mg total) by mouth every 12 (twelve) hours as needed for dizziness, Disp: 30 tablet, Rfl: 1    MICROLET LANCETS MISC, Microlet Lancet, Disp: , Rfl:     pantoprazole (PROTONIX) 40 mg tablet, Take 1 tablet (40 mg total) by mouth daily, Disp: 30 tablet, Rfl: 3    pregabalin (LYRICA) 100 mg capsule, Take 1 capsule (100 mg total) by mouth 3 (three) times a day, Disp: 90 capsule, Rfl: 1    ranitidine (ZANTAC) 150 mg tablet, TAKE 1 CAPSULE (150 MG TOTAL) BY MOUTH 2 (TWO) TIMES A DAY, Disp: 60 tablet, Rfl: 3    sucralfate (CARAFATE) 1 g tablet, TAKE 1 TABLET BY ORAL ROUTE 4 TIMES EVERY DAY ON AN EMPTY STOMACH 1 HOUR BEFORE MEALS AND AT BEDTIME, Disp: 120 tablet, Rfl: 1    traZODone (DESYREL) 50 mg tablet, TAKE 1 TABLET BY ORAL ROUTE EVERY DAY AT BEDTIME AS NEEDED, Disp: 30 tablet, Rfl: 1    Current Facility-Administered Medications:     cyanocobalamin injection 1,000 mcg, 1,000 mcg, Intramuscular, Q30 Days, Paresh Gardiner MD, 1,000 mcg at 01/24/19 4956  Allergies   Allergen Reactions    Cephalosporins Hives    Erythromycin GI Intolerance    Fentanyl Itching     Occurred during surgery     Paxil [Paroxetine] Hives     After 2 weeks    Penicillins Itching    Pravastatin Myalgia    Prednisolone Itching    Statins Itching    Sulfa Antibiotics Diarrhea    Wellbutrin [Bupropion] Hives     After 2 weeks     Hypericum Perforatum Rash    Sulfur Rash     Vitals:    03/28/19 0816   BP: 124/84   BP Location: Left arm   Patient Position: Sitting   Cuff Size: Large   Pulse: (!) 52   Weight: 71 8 kg (158 lb 3 2 oz)   Height: 5' 4" (1 626 m)       Labs:  Lab Results   Component Value Date    K 4 1 02/07/2019     02/07/2019    CO2 29 02/07/2019    BUN 11 02/07/2019    CREATININE 0 84 02/22/2017    CALCIUM 9 0 02/07/2019     No results found for: CKTOTAL, CKMB, CKMBINDEX, TROPONINI  Lab Results Component Value Date    WBC 4 2 02/07/2019    WBC 5 49 02/22/2017    HGB 13 2 02/07/2019    HGB 13 4 02/22/2017    HCT 39 8 02/07/2019    HCT 41 2 02/22/2017    MCV 97 8 02/07/2019    MCV 96 02/22/2017     02/07/2019     02/22/2017     Lab Results   Component Value Date    TRIG 100 02/07/2019    HDL 84 02/07/2019     Imaging: No results found  Review of Systems:  Review of Systems   All other systems reviewed and are negative  as described in my history of present illness        Physical Exam:  Physical Exam   Constitutional: She is oriented to person, place, and time  She appears well-developed and well-nourished  No distress  Not in any distress at the current time   HENT:   Head: Normocephalic and atraumatic  Right Ear: External ear normal    Left Ear: External ear normal    Nose: Nose normal    Mouth/Throat: Uvula is midline and mucous membranes are normal    Eyes: Pupils are equal, round, and reactive to light  Conjunctivae, EOM and lids are normal  Left eye exhibits no discharge  No scleral icterus  No pallor  No cyanosis  No icterus   Neck: Trachea normal and normal range of motion  Neck supple  No JVD present  Carotid bruit is not present  No thyromegaly present  No jugular lymphadenopathy   Cardiovascular: Regular rhythm, S1 normal, S2 normal, normal heart sounds, intact distal pulses and normal pulses  Bradycardia present  PMI is not displaced  Exam reveals no gallop, no S3, no S4 and no friction rub  No murmur heard  Pulmonary/Chest: Effort normal and breath sounds normal  No accessory muscle usage  No respiratory distress  She has no decreased breath sounds  She has no wheezes  She has no rhonchi  She has no rales  She exhibits no tenderness  Abdominal: Soft  Normal appearance and bowel sounds are normal  She exhibits no distension and no mass  There is no splenomegaly or hepatomegaly  There is no tenderness  Musculoskeletal: Normal range of motion   She exhibits no edema, tenderness or deformity  Has a brace on the right leg  Has some swelling of the right ankle -chronic   Patient has screws  on the same ankle from an injury in 2005   Lymphadenopathy:     She has no cervical adenopathy  Neurological: She is alert and oriented to person, place, and time  Facial symmetry is retained  Extraocular movements are retained  Head neck tongue and palate movement are retained and symmetric   Skin: Skin is intact  No abrasion, no lesion and no rash noted  No erythema  Nails show no clubbing  Psychiatric: She has a normal mood and affect  Her speech is normal and behavior is normal  Judgment and thought content normal             Discussion/Summary:    1  Exertional intolerance  Dizziness  Orthostatic lightheadedness  Fatigue      The patient is complaining of symptoms for at least the last 2 years  There has been progressive decline in physical activity  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  After 5 hours of work when she is back home, she is so fatigued as unwilling to move around    She reports multiple hospital or physician office visits, where heart rate was in the 40s  She recently underwent a Holter, where  heart rate did not go beyond 60s even when working    Patient does show evidence of sinus node disease and chronotropic incompetence  However in addition she also has orthostatic lightheadedness      My recommendation for the patient are as follows:  - modified walking stress test, to look for chronotropic competence  The patient will need to use the hand rail during the same   A modified walking protocol to look for appropriate rise of sinus rate is needed      - tilt table study   To look for any significant vagal episode          2   Chest pain  The chest pain is not suggestive of angina  She has had it for close to 5 years  It is episode a week  There is no obvious precipitating factor  Patient can walk and the pain does not worsen  Pain usually is resolved by 5-10 minutes

## 2019-03-28 NOTE — LETTER
March 28, 2019     Micaela Gutierrez, [address]     Patient: Jose Hernandez   YOB: 1950   Date of Visit: 3/28/2019       Dear Dr Miguel Ángel Shell: Thank you for referring Ben Tucker to me for evaluation  Below are my notes for this consultation  If you have questions, please do not hesitate to call me  I look forward to following your patient along with you  Sincerely,        Cortes Goncalves MD        CC: Ishmael Ramirez MD  3/28/2019  9:13 AM  Sign at close encounter                                             Cardiology Consultation     Jose Hernandez  [id number]  1950  [address]    1  Sinus bradycardia  Ambulatory referral to Cardiology    POCT ECG   2   Dizziness  Ambulatory referral to Cardiology       History of present illness    The patient has been referred to me by [facility] for evaluation of bradycardia  In addition the patient complains of exertional intolerance, postural lightheadedness, and significant fatigue    Episodes have been going on for quite some time and at least worsening over the last 2 years  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[employer]  She has to work for 5 hours a day  Once she reaches home she feels so fatigued that she cannot do anything  She prepares food over 30 minutes and thereafter can hardly do anything else    She informs prior  physician office visit where her heart rate is in the 30s and 40s  She has curtailed her activity and does everything very slowly    The patient is not complaining of anginal like chest pain or chest pressure  There is no worsening orthopnea, paroxysmal nocturnal dyspnea  There is no leg swelling    Patient does not get palpitation   There is no history of presyncope or syncope  There is rare history of transient  lightheadedness  There is significant exertional intolerance    There is history of snoring at night  There is history of morning fatigability  There is occasional history of daytime sleepiness        Patient Active Problem List   Diagnosis    Chronic pain syndrome    Cervical radiculopathy    Myofascial pain syndrome    Spondylosis of cervical region without myelopathy or radiculopathy    Fibromyalgia    Diabetic peripheral neuropathy (HCC)    Long-term current use of opiate analgesic    MGUS (monoclonal gammopathy of unknown significance)    Iron deficiency anemia following bariatric surgery     Past Medical History:   Diagnosis Date    Anemia     Anxiety     hx of panic attacks (now under control)     Arthritis     Asthma     resolved (no problems in a decade)     Baker's cyst of knee     Bronchitis     Bunion, left foot     Cervical pain     Chronic GERD     Diabetes mellitus, type 2 ([facility] [address] [facility]     Diabetic peripheral neuropathy ([facility] [address] )     Endometriosis     Fibromyalgia     Hyperlipidemia     Hypertension     Lung nodules     Macular degeneration     Spondylosis      Social History     Socioeconomic History    Marital status:      Spouse name: Not on file    Number of children: Not on file    Years of education: Not on file    Highest education level: Not on file   Occupational History    Not on file   Social Needs    Financial resource strain: Not on file    Food insecurity:     Worry: Not on file     Inability: Not on file    Transportation needs:     Medical: Not on file     Non-medical: Not on file   Tobacco Use    Smoking status: Current Every Day Smoker     Packs/day: 1 50     Years: 40 00     Pack years: 60 00    Smokeless tobacco: Current User   Substance and Sexual Activity    Alcohol use:  Yes     Alcohol/week: 0 6 oz     Types: 1 Shots of liquor per week     Comment: rarely    Drug use: No    Sexual activity: Not on file   Lifestyle    Physical activity:     Days per week: Not on file     Minutes per session: Not on file    Stress: Not on file   Relationships    Social connections:     Talks on phone: Not on file     Gets together: Not on file     Attends Congregation service: Not on file     Active member of club or organization: Not on file     Attends meetings of clubs or organizations: Not on file     Relationship status: Not on file    Intimate partner violence:     Fear of current or ex partner: Not on file     Emotionally abused: Not on file     Physically abused: Not on file     Forced sexual activity: Not on file   Other Topics Concern    Not on file   Social History Narrative    Not on file      Family History   Problem Relation Age of Onset    Hypertension Mother     Lung cancer Mother     Hypertension Father     Heart disease Father     Heart attack Father      Past Surgical History:   Procedure Laterality Date    BACK SURGERY  1996    L5, S1- laser surgery fusion of c5 and c6     BREAST LUMPECTOMY Right     CHOLECYSTECTOMY  2004    FOOT SURGERY Left 2005    fusion     GASTRIC BYPASS  2010    Dr Katie James    just uterus     KNEE ARTHROSCOPY      LAMINECTOMY      OVARIAN CYST REMOVAL  1975    PELVIC LAPAROSCOPY      ovaries (x10)     PLEURAL [address] Left 2008    TONSILLECTOMY      VAGINAL PROLAPSE REPAIR         Current Outpatient Medications:     albuterol (VENTOLIN HFA) 90 mcg/act inhaler, inhale 2 puff by inhalation route  every 4 - 6 hours as needed, Disp: , Rfl:     ALPRAZolam (XANAX) 0 5 mg tablet, Take 0 5 mg by mouth 2 (two) times a day as needed for anxiety, Disp: , Rfl:     amLODIPine (NORVASC) 5 mg tablet, Take 1 tablet (5 mg total) by mouth daily, Disp: 90 tablet, Rfl: 1    baclofen 10 mg tablet, Take 1 tablet (10 mg total) by mouth 3 (three) times a day, Disp: 90 tablet, Rfl: 1    diphenhydrAMINE (BENADRYL ALLERGY) 25 mg capsule, Take by mouth, Disp: , Rfl:     glucose blood test strip, test by Misc  (Non-Drug; Combo Route) route three times daily, Disp: , Rfl:     [START ON 4/9/2019] HYDROcodone-acetaminophen (NORCO)  mg per tablet, Take 4-5 tablets daily PRN, Disp: 130 tablet, Rfl: 0    Lancets Misc  (ACCU-CHEK FASTCLIX LANCET) KIT, 3 (three) times a day, Disp: , Rfl:     levothyroxine 25 mcg tablet, TAKE 1 TABLET BY MOUTH EVERY MORNING ON SATURDAY, SUNDAY, TUESDAY AND THURSDAY, Disp: 30 tablet, Rfl: 2    levothyroxine 50 mcg tablet, TAKE 1 TABLET BY ORAL ROUTE EVERY MORNING ON MONDAY, WEDNESDAY AND FRIDAY, Disp: 30 tablet, Rfl: 2    losartan (COZAAR) 25 mg tablet, every 24 hours, Disp: , Rfl:     meclizine (ANTIVERT) 12 5 MG tablet, Take 1 tablet (12 5 mg total) by mouth every 12 (twelve) hours as needed for dizziness, Disp: 30 tablet, Rfl: 1    MICROLET LANCETS MISC, Microlet Lancet, Disp: , Rfl:     pantoprazole (PROTONIX) 40 mg tablet, Take 1 tablet (40 mg total) by mouth daily, Disp: 30 tablet, Rfl: 3    pregabalin (LYRICA) 100 mg capsule, Take 1 capsule (100 mg total) by mouth 3 (three) times a day, Disp: 90 capsule, Rfl: 1    ranitidine (ZANTAC) 150 mg tablet, TAKE 1 CAPSULE (150 MG TOTAL) BY MOUTH 2 (TWO) TIMES A DAY, Disp: 60 tablet, Rfl: 3    sucralfate (CARAFATE) 1 g tablet, TAKE 1 TABLET BY ORAL ROUTE 4 TIMES EVERY DAY ON AN EMPTY STOMACH 1 HOUR BEFORE MEALS AND AT BEDTIME, Disp: 120 tablet, Rfl: 1    traZODone (DESYREL) 50 mg tablet, TAKE 1 TABLET BY ORAL ROUTE EVERY DAY AT BEDTIME AS NEEDED, Disp: 30 tablet, Rfl: 1    Current Facility-Administered Medications:     cyanocobalamin injection 1,000 mcg, 1,000 mcg, Intramuscular, Q30 Days, Aleksandar Cornell MD, 1,000 mcg at 01/24/19 8809  Allergies   Allergen Reactions    Cephalosporins Hives    Erythromycin GI Intolerance    Fentanyl Itching     Occurred during surgery     Paxil [Paroxetine] Hives     After 2 weeks    Penicillins Itching    Pravastatin Myalgia    Prednisolone Itching    Statins Itching    Sulfa Antibiotics Diarrhea    Wellbutrin [Bupropion] Hives     After 2 weeks     Hypericum Perforatum Rash    Sulfur Rash     Vitals:    03/28/19 0816   BP: 124/84   BP Location: Left arm   Patient Position: Sitting   Cuff Size: Large   Pulse: (!) 52   Weight: 71 8 kg (158 lb 3 2 oz)   Height: 5' 4" (1 626 m)       Labs:  Lab Results   Component Value Date    K 4 1 02/07/2019     02/07/2019    CO2 29 02/07/2019    BUN 11 02/07/2019    CREATININE 0 84 02/22/2017    CALCIUM 9 0 02/07/2019     No results found for: CKTOTAL, CKMB, CKMBINDEX, TROPONINI  Lab Results   Component Value Date    WBC 4 2 02/07/2019    WBC 5 49 02/22/2017    HGB 13 2 02/07/2019    HGB 13 4 02/22/2017    HCT 39 8 02/07/2019    HCT 41 2 02/22/2017    MCV 97 8 02/07/2019    MCV 96 02/22/2017     02/07/2019     02/22/2017     Lab Results   Component Value Date    TRIG 100 02/07/2019    HDL 84 02/07/2019     Imaging: No results found  Review of Systems:  Review of Systems   All other systems reviewed and are negative  as described in my history of present illness        Physical Exam:  Physical Exam   Constitutional: She is oriented to person, place, and time  She appears well-developed and well-nourished  No distress  Not in any distress at the current time   HENT:   Head: Normocephalic and atraumatic  Right Ear: External ear normal    Left Ear: External ear normal    Nose: Nose normal    Mouth/Throat: Uvula is midline and mucous membranes are normal    Eyes: Pupils are equal, round, and reactive to light  Conjunctivae, EOM and lids are normal  Left eye exhibits no discharge  No scleral icterus  No pallor  No cyanosis  No icterus   Neck: Trachea normal and normal range of motion  Neck supple  No JVD present  Carotid bruit is not present  No thyromegaly present  No jugular lymphadenopathy   Cardiovascular: Regular rhythm, S1 normal, S2 normal, normal heart sounds, intact distal pulses and normal pulses  Bradycardia present  PMI is not displaced   Exam reveals no gallop, no S3, no S4 and no friction rub  No murmur heard  Pulmonary/Chest: Effort normal and breath sounds normal  No accessory muscle usage  No respiratory distress  She has no decreased breath sounds  She has no wheezes  She has no rhonchi  She has no rales  She exhibits no tenderness  Abdominal: Soft  Normal appearance and bowel sounds are normal  She exhibits no distension and no mass  There is no splenomegaly or hepatomegaly  There is no tenderness  Musculoskeletal: Normal range of motion  She exhibits no edema, tenderness or deformity  Has a brace on the right leg  Has some swelling of the right ankle -chronic   Patient has screws  on the same ankle from an injury in 2005   Lymphadenopathy:     She has no cervical adenopathy  Neurological: She is alert and oriented to person, place, and time  Facial symmetry is retained  Extraocular movements are retained  Head neck tongue and palate movement are retained and symmetric   Skin: Skin is intact  No abrasion, no lesion and no rash noted  No erythema  Nails show no clubbing  Psychiatric: She has a normal mood and affect  Her speech is normal and behavior is normal  Judgment and thought content normal             Discussion/Summary:    1   Exertional intolerance  Dizziness  Orthostatic lightheadedness  Fatigue      The patient is complaining of symptoms for at least the last 2 years  There has been progressive decline in physical activity  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  After 5 hours of work when she is back home, she is so fatigued as unwilling to move around    She reports multiple hospital or physician office visits, where heart rate was in the 40s  She recently underwent a Holter, where  heart rate did not go beyond 60s even when working    Patient does show evidence of sinus node disease and chronotropic incompetence  However in addition she also has orthostatic lightheadedness      My recommendation for the patient are as follows:  - modified walking stress test, to look for chronotropic competence  The patient will need to use the hand rail during the same   A modified walking protocol to look for appropriate rise of sinus rate is needed      - tilt table study   To look for any significant vagal episode          2   Chest pain  The chest pain is not suggestive of angina  She has had it for close to 5 years  It is episode a week  There is no obvious precipitating factor  Patient can walk and the pain does not worsen  Pain usually is resolved by 5-10 minutes

## 2019-04-09 ENCOUNTER — HOSPITAL ENCOUNTER (OUTPATIENT)
Dept: NON INVASIVE DIAGNOSTICS | Facility: HOSPITAL | Age: 69
Discharge: HOME | End: 2019-04-09
Payer: COMMERCIAL

## 2019-04-09 DIAGNOSIS — R42 DIZZINESS: ICD-10-CM

## 2019-04-09 DIAGNOSIS — R00.1 SINUS BRADYCARDIA: ICD-10-CM

## 2019-04-09 PROCEDURE — 93016 CV STRESS TEST SUPVJ ONLY: CPT | Performed by: INTERNAL MEDICINE

## 2019-04-09 PROCEDURE — 93017 CV STRESS TEST TRACING ONLY: CPT

## 2019-04-09 PROCEDURE — 93018 CV STRESS TEST I&R ONLY: CPT | Performed by: INTERNAL MEDICINE

## 2019-04-12 ENCOUNTER — TELEPHONE (OUTPATIENT)
Dept: CARDIOLOGY CLINIC | Facility: CLINIC | Age: 69
End: 2019-04-12

## 2019-04-12 LAB
CHEST PAIN STATEMENT: NORMAL
MAX DIASTOLIC BP: 50 MMHG
MAX HEART RATE: 118 BPM
MAX PREDICTED HEART RATE: 152 BPM
MAX. SYSTOLIC BP: 200 MMHG
PROTOCOL NAME: NORMAL
TARGET HR FORMULA: NORMAL
TIME IN EXERCISE PHASE: NORMAL

## 2019-04-15 DIAGNOSIS — K21.9 GASTROESOPHAGEAL REFLUX DISEASE WITHOUT ESOPHAGITIS: ICD-10-CM

## 2019-04-15 RX ORDER — PANTOPRAZOLE SODIUM 40 MG/1
40 TABLET, DELAYED RELEASE ORAL DAILY
Qty: 90 TABLET | Refills: 1 | Status: SHIPPED | OUTPATIENT
Start: 2019-04-15 | End: 2020-01-07

## 2019-04-17 ENCOUNTER — HOSPITAL ENCOUNTER (OUTPATIENT)
Dept: NON INVASIVE DIAGNOSTICS | Facility: HOSPITAL | Age: 69
Discharge: HOME | End: 2019-04-17
Payer: COMMERCIAL

## 2019-04-17 DIAGNOSIS — R00.1 SINUS BRADYCARDIA: ICD-10-CM

## 2019-04-17 DIAGNOSIS — R42 DIZZINESS: ICD-10-CM

## 2019-04-17 PROCEDURE — 93660 TILT TABLE EVALUATION: CPT

## 2019-04-17 RX ORDER — SODIUM CHLORIDE 9 MG/ML
20 INJECTION, SOLUTION INTRAVENOUS ONCE
Status: COMPLETED | OUTPATIENT
Start: 2019-04-18 | End: 2019-04-18

## 2019-04-18 ENCOUNTER — HOSPITAL ENCOUNTER (OUTPATIENT)
Dept: INFUSION CENTER | Facility: HOSPITAL | Age: 69
Discharge: HOME | End: 2019-04-18
Payer: COMMERCIAL

## 2019-04-18 VITALS
HEART RATE: 55 BPM | DIASTOLIC BLOOD PRESSURE: 61 MMHG | RESPIRATION RATE: 20 BRPM | TEMPERATURE: 97.9 F | SYSTOLIC BLOOD PRESSURE: 127 MMHG

## 2019-04-18 DIAGNOSIS — I10 ESSENTIAL HYPERTENSION: Primary | ICD-10-CM

## 2019-04-18 PROCEDURE — 96365 THER/PROPH/DIAG IV INF INIT: CPT

## 2019-04-18 RX ORDER — LOSARTAN POTASSIUM 25 MG/1
TABLET ORAL
Qty: 30 TABLET | Refills: 0 | Status: SHIPPED | OUTPATIENT
Start: 2019-04-18 | End: 2019-06-15 | Stop reason: SDUPTHER

## 2019-04-18 RX ADMIN — FERUMOXYTOL 510 MG: 510 INJECTION INTRAVENOUS at 09:17

## 2019-04-18 RX ADMIN — SODIUM CHLORIDE 20 ML/HR: 0.9 INJECTION, SOLUTION INTRAVENOUS at 08:35

## 2019-04-22 DIAGNOSIS — R42 VERTIGO: ICD-10-CM

## 2019-04-22 RX ORDER — MECLIZINE HCL 12.5 MG/1
12.5 TABLET ORAL EVERY 12 HOURS PRN
Qty: 30 TABLET | Refills: 0 | Status: SHIPPED | OUTPATIENT
Start: 2019-04-22 | End: 2019-07-11

## 2019-04-23 ENCOUNTER — TELEPHONE (OUTPATIENT)
Dept: CARDIOLOGY CLINIC | Facility: CLINIC | Age: 69
End: 2019-04-23

## 2019-04-23 DIAGNOSIS — R42 VERTIGO: ICD-10-CM

## 2019-04-23 RX ORDER — MECLIZINE HCL 12.5 MG/1
12.5 TABLET ORAL EVERY 12 HOURS PRN
Qty: 30 TABLET | Refills: 1 | Status: SHIPPED | OUTPATIENT
Start: 2019-04-23 | End: 2019-05-02 | Stop reason: SDUPTHER

## 2019-04-25 DIAGNOSIS — K21.9 GASTROESOPHAGEAL REFLUX DISEASE WITHOUT ESOPHAGITIS: ICD-10-CM

## 2019-04-25 RX ORDER — RANITIDINE 150 MG/1
150 TABLET ORAL 2 TIMES DAILY
Qty: 60 TABLET | Refills: 3 | Status: SHIPPED | OUTPATIENT
Start: 2019-04-25 | End: 2019-07-12 | Stop reason: SDUPTHER

## 2019-04-26 PROCEDURE — 93660 TILT TABLE EVALUATION: CPT | Performed by: INTERNAL MEDICINE

## 2019-05-02 ENCOUNTER — OFFICE VISIT (OUTPATIENT)
Dept: FAMILY MEDICINE CLINIC | Facility: CLINIC | Age: 69
End: 2019-05-02
Payer: COMMERCIAL

## 2019-05-02 VITALS
TEMPERATURE: 97.8 F | WEIGHT: 148.9 LBS | RESPIRATION RATE: 16 BRPM | SYSTOLIC BLOOD PRESSURE: 128 MMHG | HEIGHT: 64 IN | OXYGEN SATURATION: 98 % | BODY MASS INDEX: 25.42 KG/M2 | HEART RATE: 64 BPM | DIASTOLIC BLOOD PRESSURE: 64 MMHG

## 2019-05-02 DIAGNOSIS — K21.9 GASTROESOPHAGEAL REFLUX DISEASE WITHOUT ESOPHAGITIS: ICD-10-CM

## 2019-05-02 DIAGNOSIS — L98.9 SKIN LESIONS: ICD-10-CM

## 2019-05-02 DIAGNOSIS — E53.9 VITAMIN B DEFICIENCY: ICD-10-CM

## 2019-05-02 DIAGNOSIS — Z01.00 EYE EXAM, ROUTINE: ICD-10-CM

## 2019-05-02 DIAGNOSIS — F17.210 CIGARETTE SMOKER: ICD-10-CM

## 2019-05-02 DIAGNOSIS — Z12.9 CANCER SCREENING: ICD-10-CM

## 2019-05-02 DIAGNOSIS — Z00.00 NORMAL FOOT EXAM: ICD-10-CM

## 2019-05-02 DIAGNOSIS — Z12.31 SCREENING MAMMOGRAM, ENCOUNTER FOR: ICD-10-CM

## 2019-05-02 DIAGNOSIS — Z23 NEED FOR PNEUMOCOCCAL VACCINATION: ICD-10-CM

## 2019-05-02 DIAGNOSIS — Z12.11 SCREENING FOR COLON CANCER: ICD-10-CM

## 2019-05-02 DIAGNOSIS — R22.1 LUMP IN NECK: Primary | ICD-10-CM

## 2019-05-02 DIAGNOSIS — R53.83 FATIGUE, UNSPECIFIED TYPE: ICD-10-CM

## 2019-05-02 PROCEDURE — 96372 THER/PROPH/DIAG INJ SC/IM: CPT | Performed by: INTERNAL MEDICINE

## 2019-05-02 PROCEDURE — G0009 ADMIN PNEUMOCOCCAL VACCINE: HCPCS

## 2019-05-02 PROCEDURE — 99214 OFFICE O/P EST MOD 30 MIN: CPT | Performed by: INTERNAL MEDICINE

## 2019-05-02 PROCEDURE — 90732 PPSV23 VACC 2 YRS+ SUBQ/IM: CPT

## 2019-05-02 RX ORDER — CYANOCOBALAMIN 1000 UG/ML
1000 INJECTION INTRAMUSCULAR; SUBCUTANEOUS
Status: SHIPPED | OUTPATIENT
Start: 2019-05-02

## 2019-05-02 RX ADMIN — CYANOCOBALAMIN 1000 MCG: 1000 INJECTION INTRAMUSCULAR; SUBCUTANEOUS at 09:22

## 2019-05-03 DIAGNOSIS — G47.00 INSOMNIA, UNSPECIFIED TYPE: ICD-10-CM

## 2019-05-03 RX ORDER — TRAZODONE HYDROCHLORIDE 50 MG/1
TABLET ORAL
Qty: 30 TABLET | Refills: 1 | Status: SHIPPED | OUTPATIENT
Start: 2019-05-03 | End: 2019-06-25 | Stop reason: SDUPTHER

## 2019-05-06 ENCOUNTER — OFFICE VISIT (OUTPATIENT)
Dept: PAIN MEDICINE | Facility: MEDICAL CENTER | Age: 69
End: 2019-05-06
Payer: COMMERCIAL

## 2019-05-06 VITALS
RESPIRATION RATE: 14 BRPM | HEIGHT: 64 IN | DIASTOLIC BLOOD PRESSURE: 48 MMHG | SYSTOLIC BLOOD PRESSURE: 108 MMHG | WEIGHT: 158 LBS | BODY MASS INDEX: 26.98 KG/M2 | HEART RATE: 64 BPM

## 2019-05-06 DIAGNOSIS — Z79.891 LONG-TERM CURRENT USE OF OPIATE ANALGESIC: ICD-10-CM

## 2019-05-06 DIAGNOSIS — F11.20 UNCOMPLICATED OPIOID DEPENDENCE (HCC): Primary | ICD-10-CM

## 2019-05-06 DIAGNOSIS — M47.812 SPONDYLOSIS OF CERVICAL REGION WITHOUT MYELOPATHY OR RADICULOPATHY: ICD-10-CM

## 2019-05-06 DIAGNOSIS — M54.12 CERVICAL RADICULOPATHY: ICD-10-CM

## 2019-05-06 DIAGNOSIS — G89.4 CHRONIC PAIN SYNDROME: ICD-10-CM

## 2019-05-06 DIAGNOSIS — M79.18 MYOFASCIAL PAIN SYNDROME: ICD-10-CM

## 2019-05-06 PROCEDURE — 99214 OFFICE O/P EST MOD 30 MIN: CPT | Performed by: NURSE PRACTITIONER

## 2019-05-06 RX ORDER — PREGABALIN 100 MG/1
100 CAPSULE ORAL 3 TIMES DAILY
Qty: 90 CAPSULE | Refills: 1 | Status: SHIPPED | OUTPATIENT
Start: 2019-05-06 | End: 2019-07-01 | Stop reason: SDUPTHER

## 2019-05-06 RX ORDER — HYDROCODONE BITARTRATE AND ACETAMINOPHEN 10; 325 MG/1; MG/1
TABLET ORAL
Qty: 130 TABLET | Refills: 0 | Status: SHIPPED | OUTPATIENT
Start: 2019-06-04 | End: 2019-07-01 | Stop reason: SDUPTHER

## 2019-05-06 RX ORDER — HYDROCODONE BITARTRATE AND ACETAMINOPHEN 10; 325 MG/1; MG/1
TABLET ORAL
Qty: 130 TABLET | Refills: 0 | Status: SHIPPED | OUTPATIENT
Start: 2019-05-06 | End: 2019-05-06 | Stop reason: SDUPTHER

## 2019-05-06 RX ORDER — BACLOFEN 10 MG/1
10 TABLET ORAL 3 TIMES DAILY
Qty: 90 TABLET | Refills: 1 | Status: SHIPPED | OUTPATIENT
Start: 2019-05-06 | End: 2019-07-01 | Stop reason: SDUPTHER

## 2019-05-10 DIAGNOSIS — K27.9 PUD (PEPTIC ULCER DISEASE): ICD-10-CM

## 2019-05-10 RX ORDER — SUCRALFATE 1 G/1
TABLET ORAL
Qty: 120 TABLET | Refills: 1 | Status: SHIPPED | OUTPATIENT
Start: 2019-05-10 | End: 2019-07-08 | Stop reason: SDUPTHER

## 2019-05-20 DIAGNOSIS — R42 LIGHT HEADEDNESS: Primary | ICD-10-CM

## 2019-05-20 DIAGNOSIS — E16.2 HYPOGLYCEMIA: ICD-10-CM

## 2019-05-30 DIAGNOSIS — IMO0002 TYPE II DIABETES MELLITUS WITH MANIFESTATIONS, UNCONTROLLED: Primary | ICD-10-CM

## 2019-06-14 DIAGNOSIS — E03.9 HYPOTHYROIDISM, UNSPECIFIED TYPE: ICD-10-CM

## 2019-06-14 RX ORDER — LEVOTHYROXINE SODIUM 0.05 MG/1
TABLET ORAL
Qty: 30 TABLET | Refills: 2 | Status: SHIPPED | OUTPATIENT
Start: 2019-06-14 | End: 2019-12-23 | Stop reason: SDUPTHER

## 2019-06-15 DIAGNOSIS — I10 ESSENTIAL HYPERTENSION: ICD-10-CM

## 2019-06-16 PROCEDURE — 4010F ACE/ARB THERAPY RXD/TAKEN: CPT | Performed by: INTERNAL MEDICINE

## 2019-06-16 RX ORDER — LOSARTAN POTASSIUM 25 MG/1
TABLET ORAL
Qty: 30 TABLET | Refills: 0 | Status: SHIPPED | OUTPATIENT
Start: 2019-06-16 | End: 2019-07-08 | Stop reason: SDUPTHER

## 2019-06-19 ENCOUNTER — CLINICAL SUPPORT (OUTPATIENT)
Dept: FAMILY MEDICINE CLINIC | Facility: CLINIC | Age: 69
End: 2019-06-19
Payer: COMMERCIAL

## 2019-06-19 DIAGNOSIS — E53.9 VITAMIN B DEFICIENCY: ICD-10-CM

## 2019-06-19 PROCEDURE — 96372 THER/PROPH/DIAG INJ SC/IM: CPT

## 2019-06-19 RX ADMIN — CYANOCOBALAMIN 1000 MCG: 1000 INJECTION INTRAMUSCULAR; SUBCUTANEOUS at 08:44

## 2019-06-20 ENCOUNTER — OFFICE VISIT (OUTPATIENT)
Dept: FAMILY MEDICINE CLINIC | Facility: CLINIC | Age: 69
End: 2019-06-20
Payer: COMMERCIAL

## 2019-06-20 VITALS
TEMPERATURE: 98 F | OXYGEN SATURATION: 97 % | RESPIRATION RATE: 14 BRPM | WEIGHT: 159 LBS | DIASTOLIC BLOOD PRESSURE: 64 MMHG | HEART RATE: 66 BPM | BODY MASS INDEX: 27.14 KG/M2 | HEIGHT: 64 IN | SYSTOLIC BLOOD PRESSURE: 120 MMHG

## 2019-06-20 DIAGNOSIS — R32 URINARY INCONTINENCE, UNSPECIFIED TYPE: ICD-10-CM

## 2019-06-20 DIAGNOSIS — J06.9 ACUTE URI: ICD-10-CM

## 2019-06-20 DIAGNOSIS — R53.83 FATIGUE, UNSPECIFIED TYPE: ICD-10-CM

## 2019-06-20 DIAGNOSIS — M79.672 FOOT PAIN, BILATERAL: Primary | ICD-10-CM

## 2019-06-20 DIAGNOSIS — E66.9 OBESITY (BMI 30-39.9): ICD-10-CM

## 2019-06-20 DIAGNOSIS — M79.671 FOOT PAIN, BILATERAL: Primary | ICD-10-CM

## 2019-06-20 PROCEDURE — 99213 OFFICE O/P EST LOW 20 MIN: CPT | Performed by: INTERNAL MEDICINE

## 2019-06-20 RX ORDER — LEVOFLOXACIN 500 MG/1
500 TABLET, FILM COATED ORAL EVERY 24 HOURS
Qty: 10 TABLET | Refills: 0 | Status: SHIPPED | OUTPATIENT
Start: 2019-06-20 | End: 2019-06-30

## 2019-06-20 RX ORDER — BENZONATATE 100 MG/1
100 CAPSULE ORAL 3 TIMES DAILY PRN
Qty: 30 CAPSULE | Refills: 0 | Status: SHIPPED | OUTPATIENT
Start: 2019-06-20 | End: 2019-07-11 | Stop reason: SDUPTHER

## 2019-06-25 DIAGNOSIS — G47.00 INSOMNIA, UNSPECIFIED TYPE: ICD-10-CM

## 2019-06-25 RX ORDER — TRAZODONE HYDROCHLORIDE 50 MG/1
TABLET ORAL
Qty: 30 TABLET | Refills: 1 | Status: SHIPPED | OUTPATIENT
Start: 2019-06-25 | End: 2019-07-18 | Stop reason: SDUPTHER

## 2019-06-26 DIAGNOSIS — R42 VERTIGO: ICD-10-CM

## 2019-06-26 RX ORDER — MECLIZINE HCL 12.5 MG/1
12.5 TABLET ORAL EVERY 12 HOURS PRN
Qty: 30 TABLET | Refills: 1 | Status: SHIPPED | OUTPATIENT
Start: 2019-06-26 | End: 2019-09-18 | Stop reason: SDUPTHER

## 2019-07-01 ENCOUNTER — OFFICE VISIT (OUTPATIENT)
Dept: PAIN MEDICINE | Facility: MEDICAL CENTER | Age: 69
End: 2019-07-01
Payer: COMMERCIAL

## 2019-07-01 VITALS
WEIGHT: 159.8 LBS | HEART RATE: 57 BPM | SYSTOLIC BLOOD PRESSURE: 109 MMHG | BODY MASS INDEX: 26.62 KG/M2 | HEIGHT: 65 IN | DIASTOLIC BLOOD PRESSURE: 65 MMHG

## 2019-07-01 DIAGNOSIS — M47.812 SPONDYLOSIS OF CERVICAL REGION WITHOUT MYELOPATHY OR RADICULOPATHY: ICD-10-CM

## 2019-07-01 DIAGNOSIS — M79.18 MYOFASCIAL PAIN SYNDROME: ICD-10-CM

## 2019-07-01 DIAGNOSIS — G89.4 CHRONIC PAIN SYNDROME: ICD-10-CM

## 2019-07-01 DIAGNOSIS — Z79.891 LONG-TERM CURRENT USE OF OPIATE ANALGESIC: ICD-10-CM

## 2019-07-01 DIAGNOSIS — F11.20 UNCOMPLICATED OPIOID DEPENDENCE (HCC): ICD-10-CM

## 2019-07-01 DIAGNOSIS — M54.12 CERVICAL RADICULOPATHY: Primary | ICD-10-CM

## 2019-07-01 PROCEDURE — 80305 DRUG TEST PRSMV DIR OPT OBS: CPT | Performed by: NURSE PRACTITIONER

## 2019-07-01 PROCEDURE — 99214 OFFICE O/P EST MOD 30 MIN: CPT | Performed by: NURSE PRACTITIONER

## 2019-07-01 RX ORDER — PREGABALIN 100 MG/1
100 CAPSULE ORAL 3 TIMES DAILY
Qty: 90 CAPSULE | Refills: 1 | Status: SHIPPED | OUTPATIENT
Start: 2019-07-01 | End: 2019-08-26 | Stop reason: SDUPTHER

## 2019-07-01 RX ORDER — BACLOFEN 10 MG/1
10 TABLET ORAL 3 TIMES DAILY
Qty: 90 TABLET | Refills: 1 | Status: SHIPPED | OUTPATIENT
Start: 2019-07-01 | End: 2019-08-26 | Stop reason: SDUPTHER

## 2019-07-01 RX ORDER — HYDROCODONE BITARTRATE AND ACETAMINOPHEN 10; 325 MG/1; MG/1
TABLET ORAL
Qty: 130 TABLET | Refills: 0 | Status: SHIPPED | OUTPATIENT
Start: 2019-07-31 | End: 2019-07-29 | Stop reason: SDUPTHER

## 2019-07-01 RX ORDER — HYDROCODONE BITARTRATE AND ACETAMINOPHEN 10; 325 MG/1; MG/1
TABLET ORAL
Qty: 130 TABLET | Refills: 0 | Status: SHIPPED | OUTPATIENT
Start: 2019-07-01 | End: 2019-07-01 | Stop reason: SDUPTHER

## 2019-07-01 NOTE — PROGRESS NOTES
Assessment  1  Cervical radiculopathy    2  Myofascial pain syndrome    3  Spondylosis of cervical region without myelopathy or radiculopathy    4  Chronic pain syndrome        Plan  At this time we will continue the hydrocodone as prescribed she was given a 2 month supply of the medication with 1 month having a do not fill date of July 31, 2019  Continue with Lyrica and Baclofen these were also refilled  Follow-up visit in 8 weeks for medication refills        There are risks associated with opioid medications, including dependence, addiction and tolerance  The patient understands and agrees to use these medications only as prescribed  Potential side effects of the medications include, but are not limited to, constipation, drowsiness, addiction, impaired judgment and risk of fatal overdose if not taken as prescribed  The patient was warned against driving while taking sedation medications  Sharing medications is a felony  At this point in time, the patient is showing no signs of addiction, abuse, diversion or suicidal ideation  A urine drug screen was collected at today's office visit as part of our medication management protocol  The point of care testing results were appropriate for what was being prescribed  The specimen will be sent for confirmatory testing  The drug screen is medically necessary because the patient is either dependent on opioid medication or is being considered for opioid medication therapy and the results could impact ongoing or future treatment  The drug screen is to evaluate for the presences or absence of prescribed, non-prescribed, and/or illicit drugs/substances  [place] Prescription Drug Monitoring Program report was reviewed and was appropriate         My impressions and treatment recommendations were discussed in detail with the patient who verbalized understanding and had no further questions  Discharge instructions were provided   I personally saw and examined the patient and I agree with the above discussed plan of care  No orders of the defined types were placed in this encounter  New Medications Ordered This Visit   Medications    baclofen 10 mg tablet     Sig: Take 1 tablet (10 mg total) by mouth 3 (three) times a day     Dispense:  90 tablet     Refill:  1    pregabalin (LYRICA) 100 mg capsule     Sig: Take 1 capsule (100 mg total) by mouth 3 (three) times a day     Dispense:  90 capsule     Refill:  1    HYDROcodone-acetaminophen (NORCO)  mg per tablet     Sig: Take 4-5 tablets daily PRN     Dispense:  130 tablet     Refill:  0       History of Present Illness    Cristian Moreau is a 71 y o  female presents for follow-up related to her neck pain  Today she rates her pain symptoms 7/10 this is constant in nature and described as burning, dull, aching, sharp, throbbing, cramping, pressure-like, shooting, numbness, and pins and needles  Patient has been taking hydrocodone 10/325 mg she takes 4-5 tablets daily depending on the severity of her pain symptoms  She also takes Lyrica 100 mg 3 times a day and baclofen 10 mg 3 times a day  Her medications provide 40% relief without any side effects or issues  Patient is again experiencing swelling glands she has been started on benzonatate as well as levothyroxine  She has also had 2 Iron treatment since from January  Patient tells me she will be seeing Cardiology on Wednesday of this week July 3, 2019 for her low heart rate  I have personally reviewed and/or updated the patient's past medical history, past surgical history, family history, social history, current medications, allergies, and vital signs today  Review of Systems   Cardiovascular: Positive for chest pain  Musculoskeletal: Positive for gait problem, joint swelling and myalgias  Neurological: Positive for dizziness  Psychiatric/Behavioral: Positive for decreased concentration     All other systems reviewed and are negative        Patient Active Problem List   Diagnosis    Chronic pain syndrome    Cervical radiculopathy    Myofascial pain syndrome    Spondylosis of cervical region without myelopathy or radiculopathy    Fibromyalgia    Diabetic peripheral neuropathy (HCC)    Long-term current use of opiate analgesic    MGUS (monoclonal gammopathy of unknown significance)    Iron deficiency anemia following bariatric surgery    Light headedness       Past Medical History:   Diagnosis Date    Anemia     Anxiety     hx of panic attacks (now under control)     Arthritis     Asthma     resolved (no problems in a decade)     Baker's cyst of knee     Bronchitis     Bunion, left foot     Cervical pain     Chronic GERD     Diabetes mellitus, type 2 (HCC)     Diabetic peripheral neuropathy (HCC)     Endometriosis     Fibromyalgia     Hyperlipidemia     Hypertension     Lung nodules     Macular degeneration     Spondylosis        Past Surgical History:   Procedure Laterality Date    BACK SURGERY  1996    L5, S1- laser surgery fusion of c5 and c6     BREAST LUMPECTOMY Right     CHOLECYSTECTOMY  2004    FOOT SURGERY Left 2005    fusion     GASTRIC BYPASS  2010    Dr Lu Garza    just uterus     KNEE ARTHROSCOPY      LAMINECTOMY      OVARIAN CYST REMOVAL  1975    PELVIC LAPAROSCOPY      ovaries (x10)     PLEURAL SCARIFICATION  1967    SHOULDER OPEN ROTATOR CUFF REPAIR Left 2008    TONSILLECTOMY      VAGINAL PROLAPSE REPAIR         Family History   Problem Relation Age of Onset    Hypertension Mother     Lung cancer Mother     Hypertension Father     Heart disease Father     Heart attack Father        Social History     Occupational History    Not on file   Tobacco Use    Smoking status: Current Every Day Smoker     Packs/day: 0 50     Years: 40 00     Pack years: 20 00    Smokeless tobacco: Current User   Substance and Sexual Activity    Alcohol use: Not Currently Alcohol/week: 1 0 standard drinks     Types: 1 Shots of liquor per week     Comment: rarely    Drug use: No    Sexual activity: Not on file       Current Outpatient Medications on File Prior to Visit   Medication Sig    albuterol (VENTOLIN HFA) 90 mcg/act inhaler inhale 2 puff by inhalation route  every 4 - 6 hours as needed    ALPRAZolam (XANAX) 0 5 mg tablet Take 0 5 mg by mouth 2 (two) times a day as needed for anxiety    amLODIPine (NORVASC) 5 mg tablet Take 1 tablet (5 mg total) by mouth daily    benzonatate (TESSALON PERLES) 100 mg capsule Take 1 capsule (100 mg total) by mouth 3 (three) times a day as needed for cough With food    diphenhydrAMINE (BENADRYL ALLERGY) 25 mg capsule Take by mouth    glucose blood test strip 1 each by Other route daily Use as instructed  once daily only ( one touch ultra test strips)    Lancets Misc  (ACCU-CHEK FASTCLIX LANCET) KIT 3 (three) times a day    levothyroxine 25 mcg tablet TAKE 1 TABLET BY MOUTH EVERY MORNING ON SATURDAY, SUNDAY, TUESDAY AND THURSDAY    levothyroxine 50 mcg tablet TAKE 1 TABLET BY ORAL ROUTE EVERY MORNING ON MONDAY, WEDNESDAY AND FRIDAY    losartan (COZAAR) 25 mg tablet TAKE 1 TABLET BY MOUTH EVERY DAY    meclizine (ANTIVERT) 12 5 MG tablet TAKE 1 TABLET (12 5 MG TOTAL) BY MOUTH EVERY 12 (TWELVE) HOURS AS NEEDED FOR DIZZINESS    meclizine (ANTIVERT) 12 5 MG tablet TAKE 1 TABLET (12 5 MG TOTAL) BY MOUTH EVERY 12 (TWELVE) HOURS AS NEEDED FOR DIZZINESS    MICROLET LANCETS MISC Microlet Lancet    pantoprazole (PROTONIX) 40 mg tablet Take 1 tablet (40 mg total) by mouth daily    ranitidine (ZANTAC) 150 mg tablet TAKE 1 CAPSULE (150 MG TOTAL) BY MOUTH 2 (TWO) TIMES A DAY    sucralfate (CARAFATE) 1 g tablet TAKE 1 TABLET BY ORAL ROUTE 4 TIMES EVERY DAY ON AN EMPTY STOMACH 1 HOUR BEFORE MEALS AND AT BEDTIME    traZODone (DESYREL) 50 mg tablet TAKE 1 TABLET BY ORAL ROUTE EVERY DAY AT BEDTIME AS NEEDED    [DISCONTINUED] baclofen 10 mg tablet Take 1 tablet (10 mg total) by mouth 3 (three) times a day    [DISCONTINUED] HYDROcodone-acetaminophen (NORCO)  mg per tablet Take 4-5 tablets daily PRN    [DISCONTINUED] pregabalin (LYRICA) 100 mg capsule Take 1 capsule (100 mg total) by mouth 3 (three) times a day    [] levofloxacin (LEVAQUIN) 500 mg tablet Take 1 tablet (500 mg total) by mouth every 24 hours for 10 days With food     Current Facility-Administered Medications on File Prior to Visit   Medication    cyanocobalamin injection 1,000 mcg    cyanocobalamin injection 1,000 mcg       Allergies   Allergen Reactions    Cephalosporins Hives    Erythromycin GI Intolerance    Fentanyl Itching     Occurred during surgery     Paxil [Paroxetine] Hives     After 2 weeks    Penicillins Itching    Pravastatin Myalgia    Prednisolone Itching    Statins Itching    Sulfa Antibiotics Diarrhea    Wellbutrin [Bupropion] Hives     After 2 weeks     Hypericum Perforatum Rash    Sulfur Rash       Physical Exam    /65   Pulse 57   Ht 5' 5" (1 651 m)   Wt 72 5 kg (159 lb 12 8 oz)   LMP  (LMP Unknown)   BMI 26 59 kg/m²     Constitutional: normal, well developed, well nourished, alert, in no distress and non-toxic and no overt pain behavior    Eyes: anicteric  HEENT: grossly intact  Neck: supple, symmetric, trachea midline and no masses   Pulmonary:even and unlabored  Cardiovascular:No edema or pitting edema present  Skin:Normal without rashes or lesions and well hydrated  Psychiatric:Mood and affect appropriate  Neurologic:Cranial Nerves II-XII grossly intact  Musculoskeletal:normal    Imaging

## 2019-07-01 NOTE — PATIENT INSTRUCTIONS
Opioid Pain Management   AMBULATORY CARE:   An opioid  is a type of medicine used to treat pain  Examples of opioids are oxycodone, morphine, fentanyl, or codeine  Take opioid medicines as directed, for the condition it is prescribed:  Common problems that may occur when you do not take opioid medicines as directed include the following:  · Health problems  may occur  You may have trouble breathing  You may also develop liver or kidney damage, or stomach bleeding  Any of these health problems can become life-threatening  · Opioid dependence  means your body needs the opioid medicine to keep it from going through withdrawal      · Opioid tolerance  means the opioid does not control pain as well as it used to  You need higher doses of the opioid to get pain relief  · Opioid addiction  means you are not able to control the use of the opioid medicine  You use it when you do not have pain  You crave the opioid medicine  Call 911 or have someone call 911 for any of the following:   · You are breathing slower than normal, or you have trouble breathing  · You cannot be awakened  · You have a seizure  Seek care immediately if:   · Your heart is beating slower than usual     · Your heart feels like it is jumping or fluttering  · You are so sleepy that you cannot stay awake  · You have severe muscle pain or weakness  · You see or hear things that are not real   Contact your healthcare provider if:   · You are too dizzy to stand up  · Your pain gets worse or you have new pain  · You cannot do your usual activities because of side effects from the opioid  · You are constipated or have abdominal pain  · You have questions or concerns about your condition or care  Opioid safety measures:   · Take your medicine as directed  Ask if you need more information on how to take your medicine correctly  Follow up with your healthcare provider regularly  You may need to have your dose adjusted   Do not use opioid medicine if you are pregnant or breastfeeding  · Give your healthcare provider a list of all your medicines  Include any over-the-counter medicines, vitamins, and herbs  It can be dangerous to take opioids with certain other medicines, such as antihistamines  · Keep opioid medicine in a safe place  Store your opioid medicine in a locked cabinet to keep it away from children and others  · Do not drink alcohol while you use opioids  Alcohol use with an opioid medicine can make you sleepy and slow your breathing rate  You may stop breathing completely  · Do not drive or operate heavy machinery after you take opioid medicine  Opioid medicine can make you drowsy and make it hard to concentrate  You may injure yourself or others if you drive or operate heavy machinery while taking your medicine  · Drink fluids and eat high-fiber foods  Fluids and fiber will help prevent constipation  Ask your healthcare provider what fluids are right for you and how much you should drink  Also ask for a list of foods that contain fiber  Follow up with your healthcare provider as directed: You may need to have your dose adjusted  You may be referred to a pain specialist  Write down your questions so you remember to ask them during your visits  © 2017 [employer] Information is for End User's use only and may not be sold, redistributed or otherwise used for commercial purposes  All illustrations and images included in CareNotes® are the copyrighted property of [employer]  or Hipolito Joshi  The above information is an  only  It is not intended as medical advice for individual conditions or treatments  Talk to your doctor, nurse or pharmacist before following any medical regimen to see if it is safe and effective for you

## 2019-07-03 ENCOUNTER — OFFICE VISIT (OUTPATIENT)
Dept: CARDIOLOGY CLINIC | Facility: CLINIC | Age: 69
End: 2019-07-03
Payer: COMMERCIAL

## 2019-07-03 VITALS
SYSTOLIC BLOOD PRESSURE: 140 MMHG | WEIGHT: 161.4 LBS | HEIGHT: 64 IN | HEART RATE: 50 BPM | RESPIRATION RATE: 16 BRPM | DIASTOLIC BLOOD PRESSURE: 82 MMHG | BODY MASS INDEX: 27.55 KG/M2

## 2019-07-03 DIAGNOSIS — R00.1 SINUS BRADYCARDIA: Primary | ICD-10-CM

## 2019-07-03 PROCEDURE — 93000 ELECTROCARDIOGRAM COMPLETE: CPT | Performed by: INTERNAL MEDICINE

## 2019-07-03 PROCEDURE — 4040F PNEUMOC VAC/ADMIN/RCVD: CPT | Performed by: INTERNAL MEDICINE

## 2019-07-03 PROCEDURE — 99213 OFFICE O/P EST LOW 20 MIN: CPT | Performed by: INTERNAL MEDICINE

## 2019-07-03 NOTE — PROGRESS NOTES
Cardiology Follow Up    Cristian Moreau  1950  [id number]  [address]  [address] [facility]  [phone]    1  Sinus bradycardia  POCT ECG       HPI:   Cristian Moreau is a 71 y o  female who presents to the office today for a 3 month follow up  Patient has a history of Bradycardia   Today her rate is 50 bpm       Her symptoms are very similar to her last visit    Interval History:   The patient has been referred to me by [facility] for evaluation of bradycardia  In addition the patient complains of exertional intolerance, postural lightheadedness, and significant fatigue     Episodes have been going on for quite some time and at least worsening over the last 2 years  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[employer]  She has to work for 5 hours a day  Once she reaches home she feels so fatigued that she cannot do anything  She prepares food over 30 minutes and thereafter can hardly do anything else     She informs prior  physician office visit where her heart rate is in the 30s and 40s  She has curtailed her activity and does everything very slowly     The patient is not complaining of anginal like chest pain or chest pressure  There is no worsening orthopnea, paroxysmal nocturnal dyspnea  There is no leg swelling     Patient does not get palpitation   There is no history of presyncope or syncope  There is rare history of transient  lightheadedness  There is significant exertional intolerance     There is history of snoring at night  There is history of morning fatigability  There is occasional history of daytime sleepiness         Patient Active Problem List   Diagnosis    Chronic pain syndrome    Cervical radiculopathy    Myofascial pain syndrome    Spondylosis of cervical region without myelopathy or radiculopathy    Fibromyalgia    Diabetic peripheral neuropathy ([facility] [address] [facility]    Long-term current use of opiate analgesic    MGUS (monoclonal gammopathy of unknown significance)    Iron deficiency anemia following bariatric surgery    Light headedness    Uncomplicated opioid dependence ([facility] )     Past Medical History:   Diagnosis Date    Anemia     Anxiety     hx of panic attacks (now under control)     Arthritis     Asthma     resolved (no problems in a decade)     Baker's cyst of knee     Bronchitis     Bunion, left foot     Cervical pain     Chronic GERD     Diabetes mellitus, type 2 ([facility] )     Diabetic peripheral neuropathy ([facility] )     Endometriosis     Fibromyalgia     Hyperlipidemia     Hypertension     Lung nodules     Macular degeneration     Spondylosis      Social History     Socioeconomic History    Marital status:      Spouse name: Not on file    Number of children: Not on file    Years of education: Not on file    Highest education level: Not on file   Occupational History    Not on file   Social Needs    Financial resource strain: Not on file    Food insecurity:     Worry: Not on file     Inability: Not on file    Transportation needs:     Medical: Not on file     Non-medical: Not on file   Tobacco Use    Smoking status: Current Every Day Smoker     Packs/day: 0 50     Years: 40 00     Pack years: 20 00    Smokeless tobacco: Current User    Tobacco comment: e cigarette   Substance and Sexual Activity    Alcohol use: Not Currently     Alcohol/week: 1 0 standard drinks     Types: 1 Shots of liquor per week     Comment: rarely    Drug use: No    Sexual activity: Not on file   Lifestyle    Physical activity:     Days per week: Not on file     Minutes per session: Not on file    Stress: Not on file   Relationships    Social connections:     Talks on phone: Not on file     Gets together: Not on file     Attends Gnosticism service: Not on file     Active member of club or organization: Not on file     Attends meetings of clubs or organizations: Not on file Relationship status: Not on file    Intimate partner violence:     Fear of current or ex partner: Not on file     Emotionally abused: Not on file     Physically abused: Not on file     Forced sexual activity: Not on file   Other Topics Concern    Not on file   Social History Narrative    Not on file      Family History   Problem Relation Age of Onset    Hypertension Mother     Lung cancer Mother     Hypertension Father     Heart disease Father     Heart attack Father      Past Surgical History:   Procedure Laterality Date    BACK SURGERY  1996    L5, S1- laser surgery fusion of c5 and c6     BREAST LUMPECTOMY Right     CHOLECYSTECTOMY  2004    FOOT SURGERY Left 2005    fusion    Lawson Cogan  2010    Dr Herminio Chavarria    just uterus     KNEE ARTHROSCOPY      LAMINECTOMY      OVARIAN CYST REMOVAL  1975    PELVIC LAPAROSCOPY      ovaries (x10)     PLEURAL SCARIFICATION  1967    SHOULDER OPEN ROTATOR CUFF REPAIR Left 2008    TONSILLECTOMY      VAGINAL PROLAPSE REPAIR         Current Outpatient Medications:     albuterol (VENTOLIN HFA) 90 mcg/act inhaler, inhale 2 puff by inhalation route  every 4 - 6 hours as needed, Disp: , Rfl:     amLODIPine (NORVASC) 5 mg tablet, Take 1 tablet (5 mg total) by mouth daily, Disp: 90 tablet, Rfl: 1    baclofen 10 mg tablet, Take 1 tablet (10 mg total) by mouth 3 (three) times a day, Disp: 90 tablet, Rfl: 1    diphenhydrAMINE (BENADRYL ALLERGY) 25 mg capsule, Take by mouth, Disp: , Rfl:     glucose blood test strip, 1 each by Other route daily Use as instructed  once daily only ( one touch ultra test strips), Disp: 25 each, Rfl: 5    [START ON 7/31/2019] HYDROcodone-acetaminophen (NORCO)  mg per tablet, Take 4-5 tablets daily PRN, Disp: 130 tablet, Rfl: 0    Lancets Misc  (ACCU-CHEK FASTCLIX LANCET) KIT, 3 (three) times a day, Disp: , Rfl:     levothyroxine 25 mcg tablet, TAKE 1 TABLET BY MOUTH EVERY MORNING ON SATURDAY, SUNDAY, TUESDAY AND THURSDAY, Disp: 30 tablet, Rfl: 2    levothyroxine 50 mcg tablet, TAKE 1 TABLET BY ORAL ROUTE EVERY MORNING ON MONDAY, WEDNESDAY AND FRIDAY, Disp: 30 tablet, Rfl: 2    losartan (COZAAR) 25 mg tablet, TAKE 1 TABLET BY MOUTH EVERY DAY, Disp: 30 tablet, Rfl: 0    meclizine (ANTIVERT) 12 5 MG tablet, TAKE 1 TABLET (12 5 MG TOTAL) BY MOUTH EVERY 12 (TWELVE) HOURS AS NEEDED FOR DIZZINESS, Disp: 30 tablet, Rfl: 0    meclizine (ANTIVERT) 12 5 MG tablet, TAKE 1 TABLET (12 5 MG TOTAL) BY MOUTH EVERY 12 (TWELVE) HOURS AS NEEDED FOR DIZZINESS, Disp: 30 tablet, Rfl: 1    MICROLET LANCETS MISC, Microlet Lancet, Disp: , Rfl:     pantoprazole (PROTONIX) 40 mg tablet, Take 1 tablet (40 mg total) by mouth daily, Disp: 90 tablet, Rfl: 1    pregabalin (LYRICA) 100 mg capsule, Take 1 capsule (100 mg total) by mouth 3 (three) times a day, Disp: 90 capsule, Rfl: 1    ranitidine (ZANTAC) 150 mg tablet, TAKE 1 CAPSULE (150 MG TOTAL) BY MOUTH 2 (TWO) TIMES A DAY, Disp: 60 tablet, Rfl: 3    sucralfate (CARAFATE) 1 g tablet, TAKE 1 TABLET BY ORAL ROUTE 4 TIMES EVERY DAY ON AN EMPTY STOMACH 1 HOUR BEFORE MEALS AND AT BEDTIME, Disp: 120 tablet, Rfl: 1    traZODone (DESYREL) 50 mg tablet, TAKE 1 TABLET BY ORAL ROUTE EVERY DAY AT BEDTIME AS NEEDED, Disp: 30 tablet, Rfl: 1    ALPRAZolam (XANAX) 0 5 mg tablet, Take 0 5 mg by mouth 2 (two) times a day as needed for anxiety, Disp: , Rfl:     benzonatate (TESSALON PERLES) 100 mg capsule, Take 1 capsule (100 mg total) by mouth 3 (three) times a day as needed for cough With food (Patient not taking: Reported on 7/3/2019), Disp: 30 capsule, Rfl: 0    Current Facility-Administered Medications:     cyanocobalamin injection 1,000 mcg, 1,000 mcg, Intramuscular, Q30 Days, Aleksandar Edmondson MD, 1,000 mcg at 06/19/19 0844    cyanocobalamin injection 1,000 mcg, 1,000 mcg, Intramuscular, Q30 Days, Rossie Nyhan, MD, 1,000 mcg at 05/02/19 6279  Allergies   Allergen Reactions    Cephalosporins Hives    Erythromycin GI Intolerance    Fentanyl Itching     Occurred during surgery     Paxil [Paroxetine] Hives     After 2 weeks    Penicillins Itching    Pravastatin Myalgia    Prednisolone Itching    Statins Itching    Sulfa Antibiotics Diarrhea    Wellbutrin [Bupropion] Hives     After 2 weeks     Hypericum Perforatum Rash    Sulfur Rash       Labs:  Lab Results   Component Value Date    K 4 1 02/07/2019     02/07/2019    CO2 29 02/07/2019    BUN 11 02/07/2019    CREATININE 0 81 02/07/2019    CREATININE 0 84 02/22/2017    CALCIUM 9 0 02/07/2019     No results found for: CKTOTAL, CKMB, CKMBINDEX, TROPONINI  Lab Results   Component Value Date    WBC 4 2 02/07/2019    WBC 5 49 02/22/2017    HGB 13 2 02/07/2019    HGB 13 4 02/22/2017    HCT 39 8 02/07/2019    HCT 41 2 02/22/2017    MCV 97 8 02/07/2019    MCV 96 02/22/2017     02/07/2019     02/22/2017     Lab Results   Component Value Date    TRIG 100 02/07/2019    HDL 84 02/07/2019         Imaging: All the studies were reviewed by myself    Tilt table 4/17/2019  Interpretation  No POTS  No orthostatic hypotension        Stress test, Exercise 4/9/2019  IMPRESSIONS: 1  Submaximal stress test with maximum heart rate only 77% of predicted heart rate  This sub maximal heart rate reduces the sensitivity of this test   2  Hypertensive response to exercise  3  Negative for symptoms of exertional angina pectoris or electrocardiographic changes of ischemia at the heart rate achieved  Diagnostic sensitivity was limited by submaximal stress  48 HR Holter monitor 2/26/2019  IMPRESSION:  1  The patient had predominantly sinus rhythm  2  Heart rate varied from 38 bpm to 107 bpm   The patients average heart rate was 55 bpm     3  The patient had a holter monitor tracing done for 47 hours and 59 minutes  4  The patient had 7 single ventricular ectopic activity versus Vibha Phenomenon    5  The patient had 382 or 0 2% supraventricular ectopy  6  There were 5 atrial runs with the longest being 11 beats and the fastest being 135 beats per minute  7  There were 12 atrial pairs and 9 episodes of atrial bigeminy and 9 episodes of atrial trigeminy  8  The longest R/R interval was 2 0 seconds  9  No other major arrhythmia was noted  10  The diary made 3 comments of left arm pain and left-sided chest pain  During each episode there was a PAC but no ST change  There were other times in which patient had PACs and made no comment making it unlikely that there was a relationship between the chest pain and the PAC  Echo 2/26/2019  SUMMARY:  1  Normal sinus rhythm  2  Excellent technical quality     LEFT VENTRICLE:  Normal left ventricular systolic function, EF estimated 55-60 percent  Normal left ventricular cavity size  Normal left ventricular wall thickness  Normal left ventricular wall motion  Normal left ventricular diastolic function and filling  Pressures  Review of Systems:  Review of Systems   All other systems reviewed and are negative  As described in my history of present illness        Physical Exam:  Physical Exam   Constitutional: She is oriented to person, place, and time  She appears well-developed and well-nourished  No distress  Not in any distress at the current time   HENT:   Head: Normocephalic and atraumatic  Right Ear: External ear normal    Left Ear: External ear normal    Nose: Nose normal    Mouth/Throat: Uvula is midline and mucous membranes are normal    Posterior pharynx is crowded   Eyes: Pupils are equal, round, and reactive to light  Conjunctivae, EOM and lids are normal  No scleral icterus  No pallor  No cyanosis  No icterus   Neck: Trachea normal and normal range of motion  Neck supple  No JVD present  Carotid bruit is not present  No thyromegaly present     No jugular lymphadenopathy   Cardiovascular: Normal rate, regular rhythm, S1 normal, S2 normal, normal heart sounds, intact distal pulses and normal pulses  PMI is not displaced  Exam reveals no gallop, no S3, no S4 and no friction rub  No murmur heard  Pulmonary/Chest: Effort normal and breath sounds normal  No accessory muscle usage  No respiratory distress  She has no decreased breath sounds  She has no wheezes  She has no rhonchi  She has no rales  She exhibits no tenderness  Abdominal: Soft  Normal appearance and bowel sounds are normal  She exhibits no distension and no mass  There is no splenomegaly or hepatomegaly  There is no tenderness  Musculoskeletal: Normal range of motion  She exhibits no edema, tenderness or deformity  Lymphadenopathy:     She has no cervical adenopathy  Neurological: She is alert and oriented to person, place, and time  Facial symmetry is retained  Extraocular movements are retained  Head neck tongue and palate movement are retained and symmetric   Skin: Skin is intact  No abrasion, no lesion and no rash noted  No erythema  Nails show no clubbing  Psychiatric: She has a normal mood and affect  Her speech is normal and behavior is normal  Thought content normal    Vitals reviewed  Discussion/Summary:    1  Exertional intolerance  Dizziness  Orthostatic lightheadedness  Fatigue     The patient is complaining of symptoms for at least the last 2 years  There has been progressive decline in physical activity  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  After 5 hours of work when she is back home, she is so fatigued as unwilling to move around     She reports multiple hospital or physician office visits, where heart rate was in the 40s      A summary of her testing is as follows  Till study -  there is no evidence of POTS, vasovagal syncope  Holter study -  Average heart rate is 55, minimum heart rate during sleeping is 38, no pauses greater than 2 seconds    At the current time she does not meet criteria for a pacemaker  She is advised to follow up with any new worsening symptoms           2   Chest pain  The chest pain is not suggestive of angina  She has had it for close to 5 years  It is episode a week  There is no obvious precipitating factor  Patient can walk and the pain does not worsen  Pain usually is resolved by 5-10 minutes

## 2019-07-03 NOTE — LETTER
July 4, 2019     Olman Butler, [address] [facility] [address]    Patient: Chaparrita Funes   YOB: 1950   Date of Visit: 7/3/2019       Dear Dr Noble Medico: Thank you for referring Anurag Martell to me for evaluation  Below are my notes for this consultation  If you have questions, please do not hesitate to call me  I look forward to following your patient along with you  Sincerely,        Valentín Abel MD        CC: Enrique Templeton MD  7/4/2019  2:29 PM  Sign at close encounter                                             Cardiology Follow Up    Chaparrita Funes  1950  [id number]  [address]  [facility] [address]  [phone]  [phone]    5  Sinus bradycardia  POCT ECG       HPI:   Chaparrita Funes is a 71 y o  female who presents to the office today for a 3 month follow up  Patient has a history of Bradycardia   Today her rate is 50 bpm       Her symptoms are very similar to her last visit    Interval History:   The patient has been referred to me by [facility] for evaluation of bradycardia  In addition the patient complains of exertional intolerance, postural lightheadedness, and significant fatigue     Episodes have been going on for quite some time and at least worsening over the last 2 years  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[employer]  She has to work for 5 hours a day  Once she reaches home she feels so fatigued that she cannot do anything  She prepares food over 30 minutes and thereafter can hardly do anything else     She informs prior  physician office visit where her heart rate is in the 30s and 40s  She has curtailed her activity and does everything very slowly     The patient is not complaining of anginal like chest pain or chest pressure  There is no worsening orthopnea, paroxysmal nocturnal dyspnea  There is no leg swelling     Patient does not get palpitation   There is no history of presyncope or syncope  There is rare history of transient  lightheadedness  There is significant exertional intolerance     There is history of snoring at night  There is history of morning fatigability  There is occasional history of daytime sleepiness         Patient Active Problem List   Diagnosis    Chronic pain syndrome    Cervical radiculopathy    Myofascial pain syndrome    Spondylosis of cervical region without myelopathy or radiculopathy    Fibromyalgia    Diabetic peripheral neuropathy (HCC)    Long-term current use of opiate analgesic    MGUS (monoclonal gammopathy of unknown significance)    Iron deficiency anemia following bariatric surgery    Light headedness    Uncomplicated opioid dependence ([facility] )     Past Medical History:   Diagnosis Date    Anemia     Anxiety     hx of panic attacks (now under control)     Arthritis     Asthma     resolved (no problems in a decade)     Baker's cyst of knee     Bronchitis     Bunion, left foot     Cervical pain     Chronic GERD     Diabetes mellitus, type 2 ([facility] )     Diabetic peripheral neuropathy ([facility] )     Endometriosis     Fibromyalgia     Hyperlipidemia     Hypertension     Lung nodules     Macular degeneration     Spondylosis      Social History     Socioeconomic History    Marital status:      Spouse name: Not on file    Number of children: Not on file    Years of education: Not on file    Highest education level: Not on file   Occupational History    Not on file   Social Needs    Financial resource strain: Not on file    Food insecurity:     Worry: Not on file     Inability: Not on file    Transportation needs:     Medical: Not on file     Non-medical: Not on file   Tobacco Use    Smoking status: Current Every Day Smoker     Packs/day: 0 50     Years: 40 00     Pack years: 20 00    Smokeless tobacco: Current User    Tobacco comment: e cigarette   Substance and Sexual Activity    Alcohol use: Not Currently     Alcohol/week: 1 0 standard drinks     Types: 1 Shots of liquor per week     Comment: rarely    Drug use: No    Sexual activity: Not on file   Lifestyle    Physical activity:     Days per week: Not on file     Minutes per session: Not on file    Stress: Not on file   Relationships    Social connections:     Talks on phone: Not on file     Gets together: Not on file     Attends Mandaen service: Not on file     Active member of club or organization: Not on file     Attends meetings of clubs or organizations: Not on file     Relationship status: Not on file    Intimate partner violence:     Fear of current or ex partner: Not on file     Emotionally abused: Not on file     Physically abused: Not on file     Forced sexual activity: Not on file   Other Topics Concern    Not on file   Social History Narrative    Not on file      Family History   Problem Relation Age of Onset    Hypertension Mother     Lung cancer Mother     Hypertension Father     Heart disease Father     Heart attack Father      Past Surgical History:   Procedure Laterality Date    BACK SURGERY  1996    L5, S1- laser surgery fusion of c5 and c6     BREAST LUMPECTOMY Right     CHOLECYSTECTOMY  2004    FOOT SURGERY Left 2005    fusion     GASTRIC BYPASS  2010    Dr Jason Borrero    just uterus     KNEE ARTHROSCOPY      LAMINECTOMY      OVARIAN CYST REMOVAL  1975    PELVIC LAPAROSCOPY      ovaries (x10)     PLEURAL [address] Left 2008    TONSILLECTOMY      VAGINAL PROLAPSE REPAIR         Current Outpatient Medications:     albuterol (VENTOLIN HFA) 90 mcg/act inhaler, inhale 2 puff by inhalation route  every 4 - 6 hours as needed, Disp: , Rfl:     amLODIPine (NORVASC) 5 mg tablet, Take 1 tablet (5 mg total) by mouth daily, Disp: 90 tablet, Rfl: 1    baclofen 10 mg tablet, Take 1 tablet (10 mg total) by mouth 3 (three) times a day, Disp: 90 tablet, Rfl: 1    diphenhydrAMINE (BENADRYL ALLERGY) 25 mg capsule, Take by mouth, Disp: , Rfl:     glucose blood test strip, 1 each by Other route daily Use as instructed  once daily only ( one touch ultra test strips), Disp: 25 each, Rfl: 5    [START ON 7/31/2019] HYDROcodone-acetaminophen (NORCO)  mg per tablet, Take 4-5 tablets daily PRN, Disp: 130 tablet, Rfl: 0    Lancets Misc  (ACCU-CHEK FASTCLIX LANCET) KIT, 3 (three) times a day, Disp: , Rfl:     levothyroxine 25 mcg tablet, TAKE 1 TABLET BY MOUTH EVERY MORNING ON SATURDAY, SUNDAY, TUESDAY AND THURSDAY, Disp: 30 tablet, Rfl: 2    levothyroxine 50 mcg tablet, TAKE 1 TABLET BY ORAL ROUTE EVERY MORNING ON MONDAY, WEDNESDAY AND FRIDAY, Disp: 30 tablet, Rfl: 2    losartan (COZAAR) 25 mg tablet, TAKE 1 TABLET BY MOUTH EVERY DAY, Disp: 30 tablet, Rfl: 0    meclizine (ANTIVERT) 12 5 MG tablet, TAKE 1 TABLET (12 5 MG TOTAL) BY MOUTH EVERY 12 (TWELVE) HOURS AS NEEDED FOR DIZZINESS, Disp: 30 tablet, Rfl: 0    meclizine (ANTIVERT) 12 5 MG tablet, TAKE 1 TABLET (12 5 MG TOTAL) BY MOUTH EVERY 12 (TWELVE) HOURS AS NEEDED FOR DIZZINESS, Disp: 30 tablet, Rfl: 1    MICROLET LANCETS MISC, Microlet Lancet, Disp: , Rfl:     pantoprazole (PROTONIX) 40 mg tablet, Take 1 tablet (40 mg total) by mouth daily, Disp: 90 tablet, Rfl: 1    pregabalin (LYRICA) 100 mg capsule, Take 1 capsule (100 mg total) by mouth 3 (three) times a day, Disp: 90 capsule, Rfl: 1    ranitidine (ZANTAC) 150 mg tablet, TAKE 1 CAPSULE (150 MG TOTAL) BY MOUTH 2 (TWO) TIMES A DAY, Disp: 60 tablet, Rfl: 3    sucralfate (CARAFATE) 1 g tablet, TAKE 1 TABLET BY ORAL ROUTE 4 TIMES EVERY DAY ON AN EMPTY STOMACH 1 HOUR BEFORE MEALS AND AT BEDTIME, Disp: 120 tablet, Rfl: 1    traZODone (DESYREL) 50 mg tablet, TAKE 1 TABLET BY ORAL ROUTE EVERY DAY AT BEDTIME AS NEEDED, Disp: 30 tablet, Rfl: 1    ALPRAZolam (XANAX) 0 5 mg tablet, Take 0 5 mg by mouth 2 (two) times a day as needed for anxiety, Disp: , Rfl:     benzonatate (TESSALON PERLES) 100 mg capsule, Take 1 capsule (100 mg total) by mouth 3 (three) times a day as needed for cough With food (Patient not taking: Reported on 7/3/2019), Disp: 30 capsule, Rfl: 0    Current Facility-Administered Medications:     cyanocobalamin injection 1,000 mcg, 1,000 mcg, Intramuscular, Q30 Days, Aleksandar Edmondson MD, 1,000 mcg at 06/19/19 0844    cyanocobalamin injection 1,000 mcg, 1,000 mcg, Intramuscular, Q30 Days, Jarred Malloy MD, 1,000 mcg at 05/02/19 4976  Allergies   Allergen Reactions    Cephalosporins Hives    Erythromycin GI Intolerance    Fentanyl Itching     Occurred during surgery     Paxil [Paroxetine] Hives     After 2 weeks    Penicillins Itching    Pravastatin Myalgia    Prednisolone Itching    Statins Itching    Sulfa Antibiotics Diarrhea    Wellbutrin [Bupropion] Hives     After 2 weeks     Hypericum Perforatum Rash    Sulfur Rash       Labs:  Lab Results   Component Value Date    K 4 1 02/07/2019     02/07/2019    CO2 29 02/07/2019    BUN 11 02/07/2019    CREATININE 0 81 02/07/2019    CREATININE 0 84 02/22/2017    CALCIUM 9 0 02/07/2019     No results found for: CKTOTAL, CKMB, CKMBINDEX, TROPONINI  Lab Results   Component Value Date    WBC 4 2 02/07/2019    WBC 5 49 02/22/2017    HGB 13 2 02/07/2019    HGB 13 4 02/22/2017    HCT 39 8 02/07/2019    HCT 41 2 02/22/2017    MCV 97 8 02/07/2019    MCV 96 02/22/2017     02/07/2019     02/22/2017     Lab Results   Component Value Date    TRIG 100 02/07/2019    HDL 84 02/07/2019         Imaging: All the studies were reviewed by myself    Tilt table 4/17/2019  Interpretation  No POTS  No orthostatic hypotension        Stress test, Exercise 4/9/2019  IMPRESSIONS: 1  Submaximal stress test with maximum heart rate only 77% of predicted heart rate  This sub maximal heart rate reduces the sensitivity of this test   2  Hypertensive response to exercise  3   Negative for symptoms of exertional angina pectoris or electrocardiographic changes of ischemia at the heart rate achieved  Diagnostic sensitivity was limited by submaximal stress  48 HR Holter monitor 2/26/2019  IMPRESSION:  1  The patient had predominantly sinus rhythm  2  Heart rate varied from 38 bpm to 107 bpm   The patients average heart rate was 55 bpm     3  The patient had a holter monitor tracing done for 47 hours and 59 minutes  4  The patient had 7 single ventricular ectopic activity versus Vibha Phenomenon  5  The patient had 382 or 0 2% supraventricular ectopy  6  There were 5 atrial runs with the longest being 11 beats and the fastest being 135 beats per minute  7  There were 12 atrial pairs and 9 episodes of atrial bigeminy and 9 episodes of atrial trigeminy  8  The longest R/R interval was 2 0 seconds  9  No other major arrhythmia was noted  10  The diary made 3 comments of left arm pain and left-sided chest pain  During each episode there was a PAC but no ST change  There were other times in which patient had PACs and made no comment making it unlikely that there was a relationship between the chest pain and the PAC  Echo 2/26/2019  SUMMARY:  1  Normal sinus rhythm  2  Excellent technical quality     LEFT VENTRICLE:  Normal left ventricular systolic function, EF estimated 55-60 percent  Normal left ventricular cavity size  Normal left ventricular wall thickness  Normal left ventricular wall motion  Normal left ventricular diastolic function and filling  Pressures  Review of Systems:  Review of Systems   All other systems reviewed and are negative  As described in my history of present illness        Physical Exam:  Physical Exam   Constitutional: She is oriented to person, place, and time  She appears well-developed and well-nourished  No distress  Not in any distress at the current time   HENT:   Head: Normocephalic and atraumatic     Right Ear: External ear normal    Left Ear: External ear normal    Nose: Nose normal    Mouth/Throat: Uvula is midline and mucous membranes are normal    Posterior pharynx is crowded   Eyes: Pupils are equal, round, and reactive to light  Conjunctivae, EOM and lids are normal  No scleral icterus  No pallor  No cyanosis  No icterus   Neck: Trachea normal and normal range of motion  Neck supple  No JVD present  Carotid bruit is not present  No thyromegaly present  No jugular lymphadenopathy   Cardiovascular: Normal rate, regular rhythm, S1 normal, S2 normal, normal heart sounds, intact distal pulses and normal pulses  PMI is not displaced  Exam reveals no gallop, no S3, no S4 and no friction rub  No murmur heard  Pulmonary/Chest: Effort normal and breath sounds normal  No accessory muscle usage  No respiratory distress  She has no decreased breath sounds  She has no wheezes  She has no rhonchi  She has no rales  She exhibits no tenderness  Abdominal: Soft  Normal appearance and bowel sounds are normal  She exhibits no distension and no mass  There is no splenomegaly or hepatomegaly  There is no tenderness  Musculoskeletal: Normal range of motion  She exhibits no edema, tenderness or deformity  Lymphadenopathy:     She has no cervical adenopathy  Neurological: She is alert and oriented to person, place, and time  Facial symmetry is retained  Extraocular movements are retained  Head neck tongue and palate movement are retained and symmetric   Skin: Skin is intact  No abrasion, no lesion and no rash noted  No erythema  Nails show no clubbing  Psychiatric: She has a normal mood and affect  Her speech is normal and behavior is normal  Thought content normal    Vitals reviewed  Discussion/Summary:    1   Exertional intolerance  Dizziness  Orthostatic lightheadedness  Fatigue     The patient is complaining of symptoms for at least the last 2 years  There has been progressive decline in physical activity  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  After 5 hours of work when she is back home, she is so fatigued as unwilling to move around     She reports multiple hospital or physician office visits, where heart rate was in the 40s      A summary of her testing is as follows  Till study -  there is no evidence of POTS, vasovagal syncope  Holter study -  Average heart rate is 55, minimum heart rate during sleeping is 38, no pauses greater than 2 seconds    At the current time she does not meet criteria for a pacemaker  She is advised to follow up with any new worsening symptoms           2   Chest pain  The chest pain is not suggestive of angina  She has had it for close to 5 years  It is episode a week  There is no obvious precipitating factor  Patient can walk and the pain does not worsen  Pain usually is resolved by 5-10 minutes

## 2019-07-05 ENCOUNTER — TELEPHONE (OUTPATIENT)
Dept: PAIN MEDICINE | Facility: MEDICAL CENTER | Age: 69
End: 2019-07-05

## 2019-07-05 DIAGNOSIS — E03.9 HYPOTHYROIDISM, UNSPECIFIED TYPE: ICD-10-CM

## 2019-07-05 RX ORDER — LEVOTHYROXINE SODIUM 0.03 MG/1
TABLET ORAL
Qty: 30 TABLET | Refills: 2 | Status: SHIPPED | OUTPATIENT
Start: 2019-07-05 | End: 2019-12-09 | Stop reason: SDUPTHER

## 2019-07-08 ENCOUNTER — TELEPHONE (OUTPATIENT)
Dept: RADIOLOGY | Facility: MEDICAL CENTER | Age: 69
End: 2019-07-08

## 2019-07-08 ENCOUNTER — TELEPHONE (OUTPATIENT)
Dept: PAIN MEDICINE | Facility: MEDICAL CENTER | Age: 69
End: 2019-07-08

## 2019-07-08 DIAGNOSIS — K27.9 PUD (PEPTIC ULCER DISEASE): ICD-10-CM

## 2019-07-08 DIAGNOSIS — I10 ESSENTIAL HYPERTENSION: ICD-10-CM

## 2019-07-08 NOTE — TELEPHONE ENCOUNTER
Attempted to reach patient on home/cell  LVMOM with CB#, [place]          ----- Message from [address] sent at 7/8/2019  8:13 AM EDT -----  Please let patient know that her UDS was positive for alcohol   Kindly remind her that per her opioid contract she can not consume alcohol with her hydrocodone TY

## 2019-07-08 NOTE — TELEPHONE ENCOUNTER
Two task open on patient  SW patient, reviewed results of UDS,  along with opioid contract   Patient verbalizes understanding  Patient states she did have a glass of wine the night before

## 2019-07-09 RX ORDER — SUCRALFATE 1 G/1
TABLET ORAL
Qty: 120 TABLET | Refills: 1 | Status: SHIPPED | OUTPATIENT
Start: 2019-07-09 | End: 2019-08-01 | Stop reason: SDUPTHER

## 2019-07-09 RX ORDER — LOSARTAN POTASSIUM 25 MG/1
TABLET ORAL
Qty: 30 TABLET | Refills: 0 | Status: SHIPPED | OUTPATIENT
Start: 2019-07-09 | End: 2019-07-22 | Stop reason: SDUPTHER

## 2019-07-11 ENCOUNTER — OFFICE VISIT (OUTPATIENT)
Dept: FAMILY MEDICINE CLINIC | Facility: CLINIC | Age: 69
End: 2019-07-11
Payer: COMMERCIAL

## 2019-07-11 VITALS
WEIGHT: 161.2 LBS | DIASTOLIC BLOOD PRESSURE: 84 MMHG | HEIGHT: 64 IN | RESPIRATION RATE: 15 BRPM | TEMPERATURE: 97.9 F | SYSTOLIC BLOOD PRESSURE: 136 MMHG | HEART RATE: 64 BPM | OXYGEN SATURATION: 97 % | BODY MASS INDEX: 27.52 KG/M2

## 2019-07-11 DIAGNOSIS — E11.42 DIABETIC PERIPHERAL NEUROPATHY (HCC): ICD-10-CM

## 2019-07-11 DIAGNOSIS — K21.9 GASTROESOPHAGEAL REFLUX DISEASE WITHOUT ESOPHAGITIS: ICD-10-CM

## 2019-07-11 DIAGNOSIS — E66.9 OBESITY (BMI 30-39.9): ICD-10-CM

## 2019-07-11 DIAGNOSIS — I10 ESSENTIAL HYPERTENSION: Primary | ICD-10-CM

## 2019-07-11 DIAGNOSIS — E03.9 HYPOTHYROIDISM, UNSPECIFIED TYPE: ICD-10-CM

## 2019-07-11 LAB — SL AMB POCT HEMOGLOBIN AIC: 5.3 (ref ?–6.5)

## 2019-07-11 PROCEDURE — 99213 OFFICE O/P EST LOW 20 MIN: CPT | Performed by: INTERNAL MEDICINE

## 2019-07-11 PROCEDURE — 3075F SYST BP GE 130 - 139MM HG: CPT | Performed by: INTERNAL MEDICINE

## 2019-07-11 PROCEDURE — 83036 HEMOGLOBIN GLYCOSYLATED A1C: CPT | Performed by: INTERNAL MEDICINE

## 2019-07-11 NOTE — PROGRESS NOTES
Assessment/Plan:         Diagnoses and all orders for this visit:    Essential hypertension; stable  Continue same  RTC in 2-3 mos w Blood work    Diabetic peripheral neuropathy ([address] )  -     POCT hemoglobin A1c    Hypothyroidism, unspecified type; Continue same  RTC in 2-3 mos w Blood work    Gastroesophageal reflux disease without esophagitis; Life style mod  Continue same  RTC in 2-3 mos w Blood work    Obesity (BMI 30-39 9); life style mod        Subjective:      Patient ID: Chaparrita Funes is a 71 y o  female  [address] is here for regular check up, she has NOT done any tests Since last Visit ? ? She still smokes, No New Symptoms, Med List reviewed w pt in detail    The following portions of the patient's history were reviewed and updated as appropriate: allergies, current medications, past family history, past medical history, past social history, past surgical history and problem list     Review of Systems   Constitutional: Negative for chills, fatigue and fever  HENT: Negative for congestion, facial swelling, sore throat, trouble swallowing and voice change  Eyes: Negative for pain, discharge and visual disturbance  Respiratory: Negative for cough, shortness of breath and wheezing  Cardiovascular: Negative for chest pain, palpitations and leg swelling  Gastrointestinal: Negative for abdominal pain, blood in stool, constipation, diarrhea and nausea  Endocrine: Negative for polydipsia, polyphagia and polyuria  Genitourinary: Negative for difficulty urinating, hematuria and urgency  Musculoskeletal: Negative for arthralgias and myalgias  Skin: Negative for rash  Neurological: Negative for dizziness, tremors, weakness and headaches  Hematological: Negative for adenopathy  Does not bruise/bleed easily  Psychiatric/Behavioral: Negative for dysphoric mood, sleep disturbance and suicidal ideas           Objective:      /84 (BP Location: Left arm, Patient Position: Sitting, Cuff Size: Standard)   Pulse 64   Temp 97 9 °F (36 6 °C) (Oral)   Resp 15   Ht 5' 4" (1 626 m)   Wt 73 1 kg (161 lb 3 2 oz)   LMP  (LMP Unknown)   SpO2 97%   BMI 27 67 kg/m²          Physical Exam   Constitutional: She is oriented to person, place, and time  She appears well-nourished  No distress  HENT:   Head: Normocephalic  Mouth/Throat: Oropharynx is clear and moist  No oropharyngeal exudate  Eyes: Pupils are equal, round, and reactive to light  Conjunctivae are normal  No scleral icterus  Neck: Neck supple  No thyromegaly present  Cardiovascular: Normal rate and regular rhythm  Murmur heard  Pulmonary/Chest: Effort normal and breath sounds normal  No respiratory distress  She has no wheezes  She has no rales  Abdominal: Soft  Bowel sounds are normal  She exhibits no distension  There is no tenderness  There is no rebound and no guarding  Musculoskeletal: She exhibits tenderness  She exhibits no edema  Lymphadenopathy:     She has no cervical adenopathy  Neurological: She is alert and oriented to person, place, and time  Pt uses a cane to support gait   Skin: No erythema  Psychiatric: She has a normal mood and affect

## 2019-07-12 DIAGNOSIS — K21.9 GASTROESOPHAGEAL REFLUX DISEASE WITHOUT ESOPHAGITIS: ICD-10-CM

## 2019-07-12 RX ORDER — RANITIDINE 150 MG/1
150 TABLET ORAL 2 TIMES DAILY
Qty: 60 TABLET | Refills: 3 | Status: SHIPPED | OUTPATIENT
Start: 2019-07-12 | End: 2019-12-08 | Stop reason: SDUPTHER

## 2019-07-18 ENCOUNTER — TELEPHONE (OUTPATIENT)
Dept: HEMATOLOGY ONCOLOGY | Facility: CLINIC | Age: 69
End: 2019-07-18

## 2019-07-18 DIAGNOSIS — G47.00 INSOMNIA, UNSPECIFIED TYPE: ICD-10-CM

## 2019-07-18 RX ORDER — TRAZODONE HYDROCHLORIDE 50 MG/1
TABLET ORAL
Qty: 30 TABLET | Refills: 1 | Status: SHIPPED | OUTPATIENT
Start: 2019-07-18 | End: 2019-08-11 | Stop reason: SDUPTHER

## 2019-07-22 DIAGNOSIS — I10 ESSENTIAL HYPERTENSION: ICD-10-CM

## 2019-07-22 RX ORDER — LOSARTAN POTASSIUM 25 MG/1
TABLET ORAL
Qty: 30 TABLET | Refills: 0 | Status: SHIPPED | OUTPATIENT
Start: 2019-07-22 | End: 2019-07-23 | Stop reason: SDUPTHER

## 2019-07-22 RX ORDER — AMLODIPINE BESYLATE 5 MG/1
5 TABLET ORAL DAILY
Qty: 90 TABLET | Refills: 1 | Status: SHIPPED | OUTPATIENT
Start: 2019-07-22 | End: 2020-09-19

## 2019-07-23 DIAGNOSIS — I10 ESSENTIAL HYPERTENSION: ICD-10-CM

## 2019-07-23 PROCEDURE — 4010F ACE/ARB THERAPY RXD/TAKEN: CPT | Performed by: INTERNAL MEDICINE

## 2019-07-23 RX ORDER — LOSARTAN POTASSIUM 25 MG/1
25 TABLET ORAL DAILY
Qty: 30 TABLET | Refills: 3 | Status: SHIPPED | OUTPATIENT
Start: 2019-07-23 | End: 2019-11-02 | Stop reason: SDUPTHER

## 2019-07-24 NOTE — TELEPHONE ENCOUNTER
Pharmacy called stating that they spoke to pt and she was asking when her script for Norco would be filled  Pharmacist advise Fill date of 7/31 and pt seemed confused with this information   pharmacist ask if a call can be given to pt to see if all her questions about script would be taken care of      Pt can be reached at [phone]

## 2019-07-26 NOTE — TELEPHONE ENCOUNTER
RN attempted to reach pt regarding previous  Male answered phone and took message to call back between 8-4 Monday thru Friday and ph# provided

## 2019-07-29 DIAGNOSIS — G89.4 CHRONIC PAIN SYNDROME: ICD-10-CM

## 2019-07-29 RX ORDER — HYDROCODONE BITARTRATE AND ACETAMINOPHEN 10; 325 MG/1; MG/1
TABLET ORAL
Qty: 130 TABLET | Refills: 0 | Status: SHIPPED | OUTPATIENT
Start: 2019-07-29 | End: 2019-08-26 | Stop reason: SDUPTHER

## 2019-07-29 NOTE — TELEPHONE ENCOUNTER
RN s/w Lyubov Quintero pharmacist at [address] [place] [address]   Per Lyubov Quintero she judge received the script and cx the old script and will call the pt in approx 20 minutes to let her know she can pick it up

## 2019-07-29 NOTE — TELEPHONE ENCOUNTER
RN s/w Genita Grief from [facility] in [place]  Per Genita Grief can AO send a new script with fill date of today since the previous one has a DNF date of 7/31 Genita Grief cannot even bring it up in the system  --please advise--  Re send script for Norco with fill date of today? When she receives new script she can cx other script

## 2019-07-29 NOTE — TELEPHONE ENCOUNTER
S/w pt, she said she is usually given a Norco rx every 28 days  She said she is not able to get next rx filled until 7/31 and she is leaving town tomorrow afternoon for her sister's sx so she is asking if she can get it filled one day early  RN informed pt that Eva Arevalo would have to agree and RN would have to contact pharmacy to advise early fill   PMED checked, last rx filled 7/1 #130

## 2019-08-01 DIAGNOSIS — K27.9 PUD (PEPTIC ULCER DISEASE): ICD-10-CM

## 2019-08-02 RX ORDER — SUCRALFATE 1 G/1
TABLET ORAL
Qty: 120 TABLET | Refills: 1 | Status: SHIPPED | OUTPATIENT
Start: 2019-08-02 | End: 2019-09-01 | Stop reason: SDUPTHER

## 2019-08-11 DIAGNOSIS — G47.00 INSOMNIA, UNSPECIFIED TYPE: ICD-10-CM

## 2019-08-12 RX ORDER — TRAZODONE HYDROCHLORIDE 50 MG/1
TABLET ORAL
Qty: 30 TABLET | Refills: 1 | Status: SHIPPED | OUTPATIENT
Start: 2019-08-12 | End: 2019-12-06 | Stop reason: SDUPTHER

## 2019-08-26 ENCOUNTER — OFFICE VISIT (OUTPATIENT)
Dept: PAIN MEDICINE | Facility: MEDICAL CENTER | Age: 69
End: 2019-08-26
Payer: COMMERCIAL

## 2019-08-26 VITALS
HEART RATE: 60 BPM | DIASTOLIC BLOOD PRESSURE: 56 MMHG | BODY MASS INDEX: 26.99 KG/M2 | RESPIRATION RATE: 14 BRPM | WEIGHT: 162 LBS | SYSTOLIC BLOOD PRESSURE: 120 MMHG | HEIGHT: 65 IN

## 2019-08-26 DIAGNOSIS — M54.12 CERVICAL RADICULOPATHY: Primary | ICD-10-CM

## 2019-08-26 DIAGNOSIS — M79.18 MYOFASCIAL PAIN SYNDROME: ICD-10-CM

## 2019-08-26 DIAGNOSIS — G89.4 CHRONIC PAIN SYNDROME: ICD-10-CM

## 2019-08-26 DIAGNOSIS — M47.812 SPONDYLOSIS OF CERVICAL REGION WITHOUT MYELOPATHY OR RADICULOPATHY: ICD-10-CM

## 2019-08-26 PROCEDURE — 99214 OFFICE O/P EST MOD 30 MIN: CPT | Performed by: NURSE PRACTITIONER

## 2019-08-26 RX ORDER — HYDROCODONE BITARTRATE AND ACETAMINOPHEN 10; 325 MG/1; MG/1
TABLET ORAL
Qty: 130 TABLET | Refills: 0 | Status: SHIPPED | OUTPATIENT
Start: 2019-09-24 | End: 2019-09-23 | Stop reason: SDUPTHER

## 2019-08-26 RX ORDER — HYDROCODONE BITARTRATE AND ACETAMINOPHEN 10; 325 MG/1; MG/1
TABLET ORAL
Qty: 130 TABLET | Refills: 0 | Status: SHIPPED | OUTPATIENT
Start: 2019-08-26 | End: 2019-08-26 | Stop reason: SDUPTHER

## 2019-08-26 RX ORDER — PREGABALIN 100 MG/1
100 CAPSULE ORAL 3 TIMES DAILY
Qty: 90 CAPSULE | Refills: 1 | Status: SHIPPED | OUTPATIENT
Start: 2019-08-26 | End: 2019-10-21 | Stop reason: SDUPTHER

## 2019-08-26 RX ORDER — BACLOFEN 10 MG/1
10 TABLET ORAL 3 TIMES DAILY
Qty: 90 TABLET | Refills: 1 | Status: SHIPPED | OUTPATIENT
Start: 2019-08-26 | End: 2019-10-21 | Stop reason: SDUPTHER

## 2019-08-26 NOTE — PATIENT INSTRUCTIONS
Opioid Pain Management   AMBULATORY CARE:   An opioid  is a type of medicine used to treat pain  Examples of opioids are oxycodone, morphine, fentanyl, or codeine  Take opioid medicines as directed, for the condition it is prescribed:  Common problems that may occur when you do not take opioid medicines as directed include the following:  · Health problems  may occur  You may have trouble breathing  You may also develop liver or kidney damage, or stomach bleeding  Any of these health problems can become life-threatening  · Opioid dependence  means your body needs the opioid medicine to keep it from going through withdrawal      · Opioid tolerance  means the opioid does not control pain as well as it used to  You need higher doses of the opioid to get pain relief  · Opioid addiction  means you are not able to control the use of the opioid medicine  You use it when you do not have pain  You crave the opioid medicine  Call 911 or have someone call 911 for any of the following:   · You are breathing slower than normal, or you have trouble breathing  · You cannot be awakened  · You have a seizure  Seek care immediately if:   · Your heart is beating slower than usual     · Your heart feels like it is jumping or fluttering  · You are so sleepy that you cannot stay awake  · You have severe muscle pain or weakness  · You see or hear things that are not real   Contact your healthcare provider if:   · You are too dizzy to stand up  · Your pain gets worse or you have new pain  · You cannot do your usual activities because of side effects from the opioid  · You are constipated or have abdominal pain  · You have questions or concerns about your condition or care  Opioid safety measures:   · Take your medicine as directed  Ask if you need more information on how to take your medicine correctly  Follow up with your healthcare provider regularly  You may need to have your dose adjusted   Do not use opioid medicine if you are pregnant or breastfeeding  · Give your healthcare provider a list of all your medicines  Include any over-the-counter medicines, vitamins, and herbs  It can be dangerous to take opioids with certain other medicines, such as antihistamines  · Keep opioid medicine in a safe place  Store your opioid medicine in a locked cabinet to keep it away from children and others  · Do not drink alcohol while you use opioids  Alcohol use with an opioid medicine can make you sleepy and slow your breathing rate  You may stop breathing completely  · Do not drive or operate heavy machinery after you take opioid medicine  Opioid medicine can make you drowsy and make it hard to concentrate  You may injure yourself or others if you drive or operate heavy machinery while taking your medicine  · Drink fluids and eat high-fiber foods  Fluids and fiber will help prevent constipation  Ask your healthcare provider what fluids are right for you and how much you should drink  Also ask for a list of foods that contain fiber  Follow up with your healthcare provider as directed: You may need to have your dose adjusted  You may be referred to a pain specialist  Write down your questions so you remember to ask them during your visits  © 2017 [employer] Information is for End User's use only and may not be sold, redistributed or otherwise used for commercial purposes  All illustrations and images included in CareNotes® are the copyrighted property of [employer]  or Hipolito Joshi  The above information is an  only  It is not intended as medical advice for individual conditions or treatments  Talk to your doctor, nurse or pharmacist before following any medical regimen to see if it is safe and effective for you

## 2019-08-26 NOTE — PROGRESS NOTES
Assessment  1  Cervical radiculopathy    2  Myofascial pain syndrome    3  Spondylosis of cervical region without myelopathy or radiculopathy    4  Chronic pain syndrome        Plan  At this time we will continue with the hydrocodone as prescribed she was given a 2 month supply the medication with 1 month having a do not fill date of September 24, 2019  Continue with Lyrica and Baclofen, both of these were refilled today  Patient to follow up in 8 weeks for medication refills      There are risks associated with opioid medications, including dependence, addiction and tolerance  The patient understands and agrees to use these medications only as prescribed  Potential side effects of the medications include, but are not limited to, constipation, drowsiness, addiction, impaired judgment and risk of fatal overdose if not taken as prescribed  The patient was warned against driving while taking sedation medications  Sharing medications is a felony  At this point in time, the patient is showing no signs of addiction, abuse, diversion or suicidal ideation  [facility] Prescription Drug Monitoring Program report was reviewed and was appropriate         My impressions and treatment recommendations were discussed in detail with the patient who verbalized understanding and had no further questions  Discharge instructions were provided  I personally saw and examined the patient and I agree with the above discussed plan of care  No orders of the defined types were placed in this encounter      New Medications Ordered This Visit   Medications    baclofen 10 mg tablet     Sig: Take 1 tablet (10 mg total) by mouth 3 (three) times a day     Dispense:  90 tablet     Refill:  1    pregabalin (LYRICA) 100 mg capsule     Sig: Take 1 capsule (100 mg total) by mouth 3 (three) times a day     Dispense:  90 capsule     Refill:  1    HYDROcodone-acetaminophen (NORCO)  mg per tablet     Sig: Take 4-5 tablets daily PRN     Dispense:  130 tablet     Refill:  0           Oxy codone last taken 8/26 AM  Last UDS 7/01        History of Present Illness    Mario Borrego is a 71 y o  female presents for follow-up related to her chronic neck and left arm pain as well as her low back and bilateral leg pain  Today she rates her pain 7/10 this is constant in nature and described as burning, dull, aching to sharp, throbbing, pressure-like, shooting, numbness, and pins and needles  Patient continues to take hydrocodone 10/325 mg 4-5 tablets daily, Lyrica 100 mg 3 times daily, and baclofen 10 mg 3 times per day  Her medication regimen provides 40% relief without any side effects or issues  I have personally reviewed and/or updated the patient's past medical history, past surgical history, family history, social history, current medications, allergies, and vital signs today  Review of Systems   Respiratory: Negative for shortness of breath  Cardiovascular: Positive for chest pain  Gastrointestinal: Positive for nausea  Negative for constipation, diarrhea and vomiting  Musculoskeletal: Positive for back pain (left hip), gait problem, joint swelling (hands , ankles, knees and thighs) and neck pain (left lateral)  Negative for arthralgias and myalgias  Skin: Negative for rash  Neurological: Positive for dizziness  Negative for seizures and weakness  All other systems reviewed and are negative        Patient Active Problem List   Diagnosis    Chronic pain syndrome    Cervical radiculopathy    Myofascial pain syndrome    Spondylosis of cervical region without myelopathy or radiculopathy    Fibromyalgia    Diabetic peripheral neuropathy (HCC)    Long-term current use of opiate analgesic    MGUS (monoclonal gammopathy of unknown significance)    Iron deficiency anemia following bariatric surgery    Light headedness    Uncomplicated opioid dependence ([facility] [address] )       Past Medical History:   Diagnosis Date    Anemia  Anxiety     hx of panic attacks (now under control)     Arthritis     Asthma     resolved (no problems in a decade)     Baker's cyst of knee     Bronchitis     Bunion, left foot     Cervical pain     Chronic GERD     Diabetes mellitus, type 2 (HCC)     Diabetic peripheral neuropathy (HCC)     Endometriosis     Fibromyalgia     Hyperlipidemia     Hypertension     Lung nodules     Macular degeneration     Spondylosis        Past Surgical History:   Procedure Laterality Date    BACK SURGERY  1996    L5, S1- laser surgery fusion of c5 and c6     BREAST LUMPECTOMY Right     CHOLECYSTECTOMY  2004    FOOT SURGERY Left 2005    fusion     GASTRIC BYPASS  2010    Dr Sherron Hanan    just uterus     KNEE ARTHROSCOPY      LAMINECTOMY      OVARIAN CYST REMOVAL  1975    PELVIC LAPAROSCOPY      ovaries (x10)     PLEURAL SCARIFICATION  1967    SHOULDER OPEN ROTATOR CUFF REPAIR Left 2008    TONSILLECTOMY      VAGINAL PROLAPSE REPAIR         Family History   Problem Relation Age of Onset    Hypertension Mother     Lung cancer Mother     Hypertension Father     Heart disease Father     Heart attack Father        Social History     Occupational History    Not on file   Tobacco Use    Smoking status: Current Every Day Smoker     Packs/day: 0 50     Years: 40 00     Pack years: 20 00    Smokeless tobacco: Current User   Substance and Sexual Activity    Alcohol use: Not Currently     Alcohol/week: 1 0 standard drinks     Types: 1 Shots of liquor per week     Comment: rarely    Drug use: No    Sexual activity: Not on file       Current Outpatient Medications on File Prior to Visit   Medication Sig    albuterol (VENTOLIN HFA) 90 mcg/act inhaler inhale 2 puff by inhalation route  every 4 - 6 hours as needed    amLODIPine (NORVASC) 5 mg tablet Take 1 tablet (5 mg total) by mouth daily    diphenhydrAMINE (BENADRYL ALLERGY) 25 mg capsule Take by mouth    levothyroxine 25 mcg tablet TAKE 1 TABLET BY MOUTH EVERY MORNING ON SATURDAY, SUNDAY, TUESDAY AND THURSDAY    levothyroxine 50 mcg tablet TAKE 1 TABLET BY ORAL ROUTE EVERY MORNING ON MONDAY, WEDNESDAY AND FRIDAY    losartan (COZAAR) 25 mg tablet Take 1 tablet (25 mg total) by mouth daily    meclizine (ANTIVERT) 12 5 MG tablet TAKE 1 TABLET (12 5 MG TOTAL) BY MOUTH EVERY 12 (TWELVE) HOURS AS NEEDED FOR DIZZINESS    pantoprazole (PROTONIX) 40 mg tablet Take 1 tablet (40 mg total) by mouth daily    ranitidine (ZANTAC) 150 mg tablet TAKE 1 CAPSULE (150 MG TOTAL) BY MOUTH 2 (TWO) TIMES A DAY    sucralfate (CARAFATE) 1 g tablet TAKE 1 TABLET BY ORAL ROUTE 4 TIMES EVERY DAY ON AN EMPTY STOMACH 1 HOUR BEFORE MEALS AND AT BEDTIME    traZODone (DESYREL) 50 mg tablet TAKE 1 TABLET BY ORAL ROUTE EVERY DAY AT BEDTIME AS NEEDED    [DISCONTINUED] baclofen 10 mg tablet Take 1 tablet (10 mg total) by mouth 3 (three) times a day    [DISCONTINUED] HYDROcodone-acetaminophen (NORCO)  mg per tablet Take 4-5 tablets daily PRN    [DISCONTINUED] pregabalin (LYRICA) 100 mg capsule Take 1 capsule (100 mg total) by mouth 3 (three) times a day    glucose blood test strip 1 each by Other route daily Use as instructed  once daily only ( one touch ultra test strips) (Patient not taking: Reported on 8/26/2019)    Lancets Misc  (ACCU-CHEK FASTCLIX LANCET) KIT 3 (three) times a day    MICROLET LANCETS MISC Microlet Lancet    [DISCONTINUED] ALPRAZolam (XANAX) 0 5 mg tablet Take 0 5 mg by mouth 2 (two) times a day as needed for anxiety     Current Facility-Administered Medications on File Prior to Visit   Medication    cyanocobalamin injection 1,000 mcg       Allergies   Allergen Reactions    Cephalosporins Hives    Erythromycin GI Intolerance    Fentanyl Itching     Occurred during surgery     Paxil [Paroxetine] Hives     After 2 weeks    Penicillins Itching    Pravastatin Myalgia    Prednisolone Itching    Statins Itching    Sulfa Antibiotics Diarrhea    Wellbutrin [Bupropion] Hives     After 2 weeks     Hypericum Perforatum Rash    Sulfur Rash       Physical Exam    /56   Pulse 60   Resp 14   Ht 5' 5" (1 651 m)   Wt 73 5 kg (162 lb)   LMP  (LMP Unknown)   BMI 26 96 kg/m²     Constitutional: normal, well developed, well nourished, alert, in no distress and non-toxic and no overt pain behavior    Eyes: anicteric  HEENT: grossly intact  Neck: supple, symmetric, trachea midline and no masses   Pulmonary:even and unlabored  Cardiovascular:No edema or pitting edema present  Skin:Normal without rashes or lesions and well hydrated  Psychiatric:Mood and affect appropriate  Neurologic:Cranial Nerves II-XII grossly intact  Musculoskeletal:ambulates with cane    Imaging

## 2019-09-01 DIAGNOSIS — K27.9 PUD (PEPTIC ULCER DISEASE): ICD-10-CM

## 2019-09-02 RX ORDER — SUCRALFATE 1 G/1
TABLET ORAL
Qty: 120 TABLET | Refills: 1 | Status: SHIPPED | OUTPATIENT
Start: 2019-09-02 | End: 2019-09-27 | Stop reason: SDUPTHER

## 2019-09-10 ENCOUNTER — TRANSCRIBE ORDERS (OUTPATIENT)
Dept: ADMINISTRATIVE | Facility: HOSPITAL | Age: 69
End: 2019-09-10

## 2019-09-10 DIAGNOSIS — F17.200 SMOKER: ICD-10-CM

## 2019-09-10 DIAGNOSIS — R10.9 ABDOMINAL PAIN, UNSPECIFIED ABDOMINAL LOCATION: Primary | ICD-10-CM

## 2019-09-10 DIAGNOSIS — R22.1 LOCALIZED SWELLING, MASS AND LUMP, NECK: ICD-10-CM

## 2019-09-13 ENCOUNTER — HOSPITAL ENCOUNTER (OUTPATIENT)
Dept: ULTRASOUND IMAGING | Facility: HOSPITAL | Age: 69
Discharge: HOME | End: 2019-09-13
Payer: COMMERCIAL

## 2019-09-13 DIAGNOSIS — R10.13 EPIGASTRIC PAIN: ICD-10-CM

## 2019-09-13 LAB
ALBUMIN SERPL-MCNC: 3.7 G/DL (ref 3.6–5.1)
ALBUMIN SERPL-MCNC: 3.7 G/DL (ref 3.6–5.1)
ALBUMIN/GLOB SERPL: 1.5 (CALC) (ref 1–2.5)
ALBUMIN/GLOB SERPL: 1.6 (CALC) (ref 1–2.5)
ALP SERPL-CCNC: 89 U/L (ref 33–130)
ALP SERPL-CCNC: 91 U/L (ref 33–130)
ALT SERPL-CCNC: 8 U/L (ref 6–29)
ALT SERPL-CCNC: 9 U/L (ref 6–29)
APPEARANCE UR: ABNORMAL
AST SERPL-CCNC: 14 U/L (ref 10–35)
AST SERPL-CCNC: 15 U/L (ref 10–35)
BACTERIA UR QL AUTO: ABNORMAL /HPF
BASOPHILS # BLD AUTO: 20 CELLS/UL (ref 0–200)
BASOPHILS NFR BLD AUTO: 0.5 %
BILIRUB DIRECT SERPL-MCNC: 0.1 MG/DL
BILIRUB INDIRECT SERPL-MCNC: 0.3 MG/DL (CALC) (ref 0.2–1.2)
BILIRUB SERPL-MCNC: 0.3 MG/DL (ref 0.2–1.2)
BILIRUB SERPL-MCNC: 0.4 MG/DL (ref 0.2–1.2)
BILIRUB UR QL STRIP: NEGATIVE
BUN SERPL-MCNC: 9 MG/DL (ref 7–25)
BUN/CREAT SERPL: NORMAL (CALC) (ref 6–22)
CALCIUM SERPL-MCNC: 8.9 MG/DL (ref 8.6–10.4)
CAOX CRY #/AREA URNS HPF: ABNORMAL /HPF
CHLORIDE SERPL-SCNC: 108 MMOL/L (ref 98–110)
CHOLEST SERPL-MCNC: 213 MG/DL
CHOLEST/HDLC SERPL: 3.1 (CALC)
CO2 SERPL-SCNC: 28 MMOL/L (ref 20–32)
COLOR UR: ABNORMAL
CREAT SERPL-MCNC: 0.92 MG/DL (ref 0.5–0.99)
EOSINOPHIL # BLD AUTO: 90 CELLS/UL (ref 15–500)
EOSINOPHIL NFR BLD AUTO: 2.3 %
ERYTHROCYTE [DISTWIDTH] IN BLOOD BY AUTOMATED COUNT: 12.6 % (ref 11–15)
FERRITIN SERPL-MCNC: 70 NG/ML (ref 16–288)
GLOBULIN SER CALC-MCNC: 2.3 G/DL (CALC) (ref 1.9–3.7)
GLOBULIN SER CALC-MCNC: 2.4 G/DL (CALC) (ref 1.9–3.7)
GLUCOSE SERPL-MCNC: 69 MG/DL (ref 65–99)
GLUCOSE UR QL STRIP: NEGATIVE
HBA1C MFR BLD: 5.1 % OF TOTAL HGB
HCT VFR BLD AUTO: 39 % (ref 35–45)
HDLC SERPL-MCNC: 68 MG/DL
HGB BLD-MCNC: 12.9 G/DL (ref 11.7–15.5)
HGB UR QL STRIP: NEGATIVE
HYALINE CASTS #/AREA URNS LPF: ABNORMAL /LPF
IRON SATN MFR SERPL: 37 % (CALC) (ref 16–45)
IRON SERPL-MCNC: 102 MCG/DL (ref 45–160)
KETONES UR QL STRIP: NEGATIVE
LDLC SERPL CALC-MCNC: 124 MG/DL (CALC)
LEUKOCYTE ESTERASE UR QL STRIP: ABNORMAL
LYMPHOCYTES # BLD AUTO: 932 CELLS/UL (ref 850–3900)
LYMPHOCYTES NFR BLD AUTO: 23.9 %
MCH RBC QN AUTO: 32.8 PG (ref 27–33)
MCHC RBC AUTO-ENTMCNC: 33.1 G/DL (ref 32–36)
MCV RBC AUTO: 99.2 FL (ref 80–100)
METHYLMALONATE SERPL-SCNC: 116 NMOL/L (ref 87–318)
MONOCYTES # BLD AUTO: 316 CELLS/UL (ref 200–950)
MONOCYTES NFR BLD AUTO: 8.1 %
NEUTROPHILS # BLD AUTO: 2543 CELLS/UL (ref 1500–7800)
NEUTROPHILS NFR BLD AUTO: 65.2 %
NITRITE UR QL STRIP: NEGATIVE
NONHDLC SERPL-MCNC: 145 MG/DL (CALC)
PH UR STRIP: 6 [PH] (ref 5–8)
PLATELET # BLD AUTO: 221 THOUSAND/UL (ref 140–400)
PMV BLD REES-ECKER: 9.6 FL (ref 7.5–12.5)
POTASSIUM SERPL-SCNC: 3.8 MMOL/L (ref 3.5–5.3)
PROT SERPL-MCNC: 6 G/DL (ref 6.1–8.1)
PROT SERPL-MCNC: 6.1 G/DL (ref 6.1–8.1)
PROT UR QL STRIP: NEGATIVE
RBC # BLD AUTO: 3.93 MILLION/UL (ref 3.8–5.1)
RBC #/AREA URNS HPF: ABNORMAL /HPF
SL AMB EGFR AFRICAN AMERICAN: 74 ML/MIN/1.73M2
SL AMB EGFR NON AFRICAN AMERICAN: 64 ML/MIN/1.73M2
SODIUM SERPL-SCNC: 141 MMOL/L (ref 135–146)
SP GR UR STRIP: 1.01 (ref 1–1.03)
SQUAMOUS #/AREA URNS HPF: ABNORMAL /HPF
T4 FREE SERPL-MCNC: 1 NG/DL (ref 0.8–1.8)
THYROGLOB AB SERPL-ACNC: <1 IU/ML
THYROPEROXIDASE AB SERPL-ACNC: <1 IU/ML
TIBC SERPL-MCNC: 275 MCG/DL (CALC) (ref 250–450)
TRIGL SERPL-MCNC: 99 MG/DL
TSH SERPL-ACNC: 2.5 MIU/L (ref 0.4–4.5)
VIT C SERPL-MCNC: <0.1 MG/DL (ref 0.3–2.7)
WBC # BLD AUTO: 3.9 THOUSAND/UL (ref 3.8–10.8)
WBC #/AREA URNS HPF: ABNORMAL /HPF
ZINC SERPL-MCNC: 52 MCG/DL (ref 60–130)

## 2019-09-13 PROCEDURE — 76700 US EXAM ABDOM COMPLETE: CPT

## 2019-09-18 DIAGNOSIS — R42 VERTIGO: ICD-10-CM

## 2019-09-18 RX ORDER — MECLIZINE HCL 12.5 MG/1
12.5 TABLET ORAL EVERY 12 HOURS PRN
Qty: 30 TABLET | Refills: 1 | Status: SHIPPED | OUTPATIENT
Start: 2019-09-18 | End: 2019-11-19 | Stop reason: SDUPTHER

## 2019-09-20 ENCOUNTER — TELEPHONE (OUTPATIENT)
Dept: HEMATOLOGY ONCOLOGY | Facility: CLINIC | Age: 69
End: 2019-09-20

## 2019-09-20 NOTE — TELEPHONE ENCOUNTER
Tej called in to [address] [place] [address] appointment for 09/26/2019, I verified date,time and location

## 2019-09-23 ENCOUNTER — TELEPHONE (OUTPATIENT)
Dept: PAIN MEDICINE | Facility: MEDICAL CENTER | Age: 69
End: 2019-09-23

## 2019-09-23 DIAGNOSIS — G89.4 CHRONIC PAIN SYNDROME: ICD-10-CM

## 2019-09-23 RX ORDER — HYDROCODONE BITARTRATE AND ACETAMINOPHEN 10; 325 MG/1; MG/1
TABLET ORAL
Qty: 130 TABLET | Refills: 0 | Status: SHIPPED | OUTPATIENT
Start: 2019-09-23 | End: 2019-10-21 | Stop reason: SDUPTHER

## 2019-09-23 NOTE — TELEPHONE ENCOUNTER
[facility] called and said the pt would like her norco 10/325 mg today instead of tomorrow  The pharmacy needs a new script sent in for that and pharmacy said they will destroy the tomorrow script  Needs the ok for this      [facility] can be reached at [phone]

## 2019-09-23 NOTE — TELEPHONE ENCOUNTER
S/W Jose Simmons at [id number] [facility] her of the same and she stated she will destroy the other script  LMOM for pt to C/B, C/B # provided

## 2019-09-26 ENCOUNTER — OFFICE VISIT (OUTPATIENT)
Dept: HEMATOLOGY ONCOLOGY | Facility: CLINIC | Age: 69
End: 2019-09-26
Payer: COMMERCIAL

## 2019-09-26 VITALS
SYSTOLIC BLOOD PRESSURE: 120 MMHG | BODY MASS INDEX: 27.16 KG/M2 | HEIGHT: 65 IN | TEMPERATURE: 98.7 F | HEART RATE: 68 BPM | RESPIRATION RATE: 14 BRPM | DIASTOLIC BLOOD PRESSURE: 60 MMHG | WEIGHT: 163 LBS

## 2019-09-26 DIAGNOSIS — D50.8 IRON DEFICIENCY ANEMIA FOLLOWING BARIATRIC SURGERY: Primary | ICD-10-CM

## 2019-09-26 DIAGNOSIS — K91.2 POSTSURGICAL MALABSORPTION: ICD-10-CM

## 2019-09-26 DIAGNOSIS — K95.89 IRON DEFICIENCY ANEMIA FOLLOWING BARIATRIC SURGERY: Primary | ICD-10-CM

## 2019-09-26 DIAGNOSIS — D47.2 MGUS (MONOCLONAL GAMMOPATHY OF UNKNOWN SIGNIFICANCE): ICD-10-CM

## 2019-09-26 DIAGNOSIS — M06.9 RHEUMATOID ARTHRITIS OF HAND, UNSPECIFIED LATERALITY, UNSPECIFIED RHEUMATOID FACTOR PRESENCE: ICD-10-CM

## 2019-09-26 DIAGNOSIS — Z12.31 SCREENING MAMMOGRAM, ENCOUNTER FOR: ICD-10-CM

## 2019-09-26 PROCEDURE — 99215 OFFICE O/P EST HI 40 MIN: CPT | Performed by: PHYSICIAN ASSISTANT

## 2019-09-26 RX ORDER — SODIUM CHLORIDE 9 MG/ML
20 INJECTION, SOLUTION INTRAVENOUS ONCE
Status: CANCELLED | OUTPATIENT
Start: 2019-10-17

## 2019-09-26 NOTE — PROGRESS NOTES
[address] [facility] HEMATOLOGY ONCOLOGY SPECIALISTS [facility]  [address] [address]  [facility] [address] [phone]  Hematology Ambulatory [address], 1950, [id number]  9/26/2019    Assessment/Plan:    1  Iron deficiency anemia following bariatric surgery  This is a 66-year-old female with past medical history of iron deficiency secondary to bariatric surgery  Patient's iron saturation is presently at 37% with a robust ferritin over 100 ng/dL  Patient will continue with the regimen outlined below  Next infusion would be due in October 2019  Regimen:  Feraheme 510 mg IV, day 1  Cycle length = 168 days    - CBC and differential; Future  - Comprehensive metabolic panel; Future  - Iron Saturation %; Future  - Ferritin; Future  - TIBC; Future  - Iron; Future    2  Postsurgical malabsorption  Patient had gastric bypass surgery in 2010  Patient patient has been receiving B12 injections every month at the primary care office  She will continue this practice  MMA is acceptable  - CBC and differential; Future  - Comprehensive metabolic panel; Future  - Iron Saturation %; Future  - Ferritin; Future  - TIBC; Future  - Iron; Future  - Methylmalonic acid, serum; Future    3  MGUS (monoclonal gammopathy of unknown significance)  IgA MGUS was found in November 2009  Patient has been under observation since  Patient has blood work completed every year  Blood work was ordered for next follow-up in 6 months which would be her yearly check in     - IgG, IgA, IgM; Future  - Immunoglobulin free LT chains blood; Future  - Protein electrophoresis, serum; Future    4  Screening mammogram, encounter for  Patient has family history of breast cancer including male breast cancer  Patient's last screening mammography was approximately 2 years ago  I have placed an order for the patient to go now  - Mammo screening bilateral w cad; Future    5   Rheumatoid arthritis of hand, unspecified laterality, unspecified rheumatoid factor presence ([facility] [address] )  In 2015 patient started with low-grade temperatures  This has been ongoing for the past several years  Interestingly, patient was diagnosed in the past with rheumatoid arthritis  Unspecified rheumatoid factor presents however patient recalls that more recent workup was negative  Considering the patient has not followed up with Rheumatology, I made recommendation for consultation  We discussed disease processes such as familial Mediterranean fever  Rheumatology is the best place for evaluations like this   (patient's ancestry is quite complex and very broad  However she does have relatives from [facility]  )    - Ambulatory referral to Rheumatology; Future      The patient is scheduled for follow-up in approximately 6 months with Dr Gerry Means  Patient voiced agreement and understanding to the above  Patient knows to call the Hematology/Oncology office with any questions and concerns regarding the above  Carefully review your medication list in verify the list is accurate and up-to-date  Please call the hematologic/oncology office if there medications missing from the less, medications on the list that your not currently taking or if there is a dosage or instruction that is different from higher taking medication  I have spent 30 minutes with Patient and family today in which greater than 50% of this time was spent in counseling/coordination of care regarding Diagnostic results, Prognosis, Risks and benefits of tx options and Impressions  Barrier(s) to care: None  The patient is able to self care   ------------------------------------------------------------------------------------------------------    Chief Complaint   Patient presents with    Follow-up     History of present illness: This is a 63-year-old female with past medical history of bariatric surgery and subsequent iron deficiency anemia, B12 deficiency and history of IgA MGUS diagnosed in 2019     MGUS history:  Patient's last IgA measured level was in 2011 and was lost to follow-up until October 2016  IgA level 2/2019 = 222  FLC ratio=1 72    MARIJA history:  Patient was undergoing Feraheme infusions 4 times a year in 2016  However due to the patient's availability only went once in the last 6 months  Since transtion to 6 month intervals, iron has been stable  Patient returns for routine follow-up  Interval history:  Patient tells me that overall she feels okay  Patient does state that she continues to have fever issues which had originated in 2015  Patient has normal temperature runs a bit low so patient notes that once a month she ends up with a temperature that is slightly higher than normal with headaches  This last about 10 days before completely resolving  Patient also notes that occasionally she has throat pain associated with this  Patient has CT scan scheduled for soft tissues of the neck to investigate next flare  Review of Systems   Constitutional: Negative for appetite change, fatigue, fever and unexpected weight change  HENT: Negative for nosebleeds  Respiratory: Negative for cough, choking and shortness of breath  Negative hemoptysis  Cardiovascular: Negative for chest pain, palpitations and leg swelling  Gastrointestinal: Negative  Negative for abdominal distention, abdominal pain, anal bleeding, blood in stool, constipation, diarrhea, nausea and vomiting  Endocrine: Negative  Negative for cold intolerance  Genitourinary: Negative  Negative for hematuria, menstrual problem, vaginal bleeding, vaginal discharge and vaginal pain  Musculoskeletal: Negative  Negative for arthralgias, myalgias, neck pain and neck stiffness  Skin: Negative  Negative for color change, pallor and rash  Allergic/Immunologic: Negative  Negative for immunocompromised state  Neurological: Negative  Negative for weakness and headaches     Hematological: Negative for adenopathy  Does not bruise/bleed easily  All other systems reviewed and are negative        Patient Active Problem List   Diagnosis    Chronic pain syndrome    Cervical radiculopathy    Myofascial pain syndrome    Spondylosis of cervical region without myelopathy or radiculopathy    Fibromyalgia    Diabetic peripheral neuropathy (HCC)    Long-term current use of opiate analgesic    MGUS (monoclonal gammopathy of unknown significance)    Iron deficiency anemia following bariatric surgery    Light headedness    Uncomplicated opioid dependence ([facility] [address] )       Past Medical History:   Diagnosis Date    Anemia     Anxiety     hx of panic attacks (now under control)     Arthritis     Asthma     resolved (no problems in a decade)     Baker's cyst of knee     Bronchitis     Bunion, left foot     Cervical pain     Chronic GERD     Diabetes mellitus, type 2 (HCC)     Diabetic peripheral neuropathy (HCC)     Endometriosis     Fibromyalgia     Hyperlipidemia     Hypertension     Lung nodules     Macular degeneration     Spondylosis        Past Surgical History:   Procedure Laterality Date    BACK SURGERY  1996    L5, S1- laser surgery fusion of c5 and c6     BREAST LUMPECTOMY Right     CHOLECYSTECTOMY  2004    FOOT SURGERY Left 2005    fusion     GASTRIC BYPASS  2010    Dr Bonnita Ganser    just uterus     KNEE ARTHROSCOPY      LAMINECTOMY      OVARIAN CYST REMOVAL  1975    PELVIC LAPAROSCOPY      ovaries (x10)     PLEURAL SCARIFICATION  1967    SHOULDER OPEN ROTATOR CUFF REPAIR Left 2008    TONSILLECTOMY      VAGINAL PROLAPSE REPAIR         Family History   Problem Relation Age of Onset    Hypertension Mother     Lung cancer Mother     Hypertension Father     Heart disease Father     Heart attack Father        Social History     Socioeconomic History    Marital status:      Spouse name: None    Number of children: None    Years of education: None    Highest education level: None   Occupational History    None   Social Needs    Financial resource strain: None    Food insecurity:     Worry: None     Inability: None    Transportation needs:     Medical: None     Non-medical: None   Tobacco Use    Smoking status: Current Every Day Smoker     Packs/day: 0 50     Years: 40 00     Pack years: 20 00    Smokeless tobacco: Current User   Substance and Sexual Activity    Alcohol use: Not Currently     Alcohol/week: 1 0 standard drinks     Types: 1 Shots of liquor per week     Comment: rarely    Drug use: No    Sexual activity: None   Lifestyle    Physical activity:     Days per week: None     Minutes per session: None    Stress: None   Relationships    Social connections:     Talks on phone: None     Gets together: None     Attends Muslim service: None     Active member of club or organization: None     Attends meetings of clubs or organizations: None     Relationship status: None    Intimate partner violence:     Fear of current or ex partner: None     Emotionally abused: None     Physically abused: None     Forced sexual activity: None   Other Topics Concern    None   Social History Narrative    None         Current Outpatient Medications:     albuterol (VENTOLIN HFA) 90 mcg/act inhaler, inhale 2 puff by inhalation route  every 4 - 6 hours as needed, Disp: , Rfl:     amLODIPine (NORVASC) 5 mg tablet, Take 1 tablet (5 mg total) by mouth daily, Disp: 90 tablet, Rfl: 1    baclofen 10 mg tablet, Take 1 tablet (10 mg total) by mouth 3 (three) times a day, Disp: 90 tablet, Rfl: 1    diphenhydrAMINE (BENADRYL ALLERGY) 25 mg capsule, Take by mouth, Disp: , Rfl:     glucose blood test strip, 1 each by Other route daily Use as instructed  once daily only ( one touch ultra test strips) (Patient not taking: Reported on 8/26/2019), Disp: 25 each, Rfl: 5    HYDROcodone-acetaminophen (NORCO)  mg per tablet, Take 4-5 tablets daily PRN, Disp: 130 tablet, Rfl: 0    Lancets Misc  (ACCU-CHEK FASTCLIX LANCET) KIT, 3 (three) times a day, Disp: , Rfl:     levothyroxine 25 mcg tablet, TAKE 1 TABLET BY MOUTH EVERY MORNING ON SATURDAY, SUNDAY, TUESDAY AND THURSDAY, Disp: 30 tablet, Rfl: 2    levothyroxine 50 mcg tablet, TAKE 1 TABLET BY ORAL ROUTE EVERY MORNING ON MONDAY, WEDNESDAY AND FRIDAY, Disp: 30 tablet, Rfl: 2    losartan (COZAAR) 25 mg tablet, Take 1 tablet (25 mg total) by mouth daily, Disp: 30 tablet, Rfl: 3    meclizine (ANTIVERT) 12 5 MG tablet, Take 1 tablet (12 5 mg total) by mouth every 12 (twelve) hours as needed for dizziness, Disp: 30 tablet, Rfl: 1    MICROLET LANCETS MISC, Microlet Lancet, Disp: , Rfl:     pantoprazole (PROTONIX) 40 mg tablet, Take 1 tablet (40 mg total) by mouth daily, Disp: 90 tablet, Rfl: 1    pregabalin (LYRICA) 100 mg capsule, Take 1 capsule (100 mg total) by mouth 3 (three) times a day, Disp: 90 capsule, Rfl: 1    ranitidine (ZANTAC) 150 mg tablet, TAKE 1 CAPSULE (150 MG TOTAL) BY MOUTH 2 (TWO) TIMES A DAY, Disp: 60 tablet, Rfl: 3    sucralfate (CARAFATE) 1 g tablet, TAKE 1 TABLET BY ORAL ROUTE 4 TIMES EVERY DAY ON AN EMPTY STOMACH 1 HOUR BEFORE MEALS AND AT BEDTIME, Disp: 120 tablet, Rfl: 1    traZODone (DESYREL) 50 mg tablet, TAKE 1 TABLET BY ORAL ROUTE EVERY DAY AT BEDTIME AS NEEDED, Disp: 30 tablet, Rfl: 1    Current Facility-Administered Medications:     cyanocobalamin injection 1,000 mcg, 1,000 mcg, Intramuscular, Q30 Days, Khalida Schwarz MD, 1,000 mcg at 05/02/19 4431    Allergies   Allergen Reactions    Cephalosporins Hives    Erythromycin GI Intolerance    Fentanyl Itching     Occurred during surgery     Paxil [Paroxetine] Hives     After 2 weeks    Penicillins Itching    Pravastatin Myalgia    Prednisolone Itching    Statins Itching    Sulfa Antibiotics Diarrhea    Wellbutrin [Bupropion] Hives     After 2 weeks     Hypericum Perforatum Rash    Sulfur Rash       Objective:  /60 (BP Location: Left arm, Patient Position: Sitting, Cuff Size: Standard)   Pulse 68   Temp 98 7 °F (37 1 °C)   Resp 14   Ht 5' 5" (1 651 m)   Wt 73 9 kg (163 lb)   LMP  (LMP Unknown)   BMI 27 12 kg/m²    Physical Exam   Constitutional: She is oriented to person, place, and time  She appears well-developed and well-nourished  No distress  HENT:   Head: Normocephalic and atraumatic  Mouth/Throat: Oropharynx is clear and moist  No oropharyngeal exudate  Upper and lower dentures   Eyes: Pupils are equal, round, and reactive to light  EOM are normal  No scleral icterus  Neck: Normal range of motion  Cardiovascular: Normal rate and regular rhythm  No murmur heard  Pulmonary/Chest: Effort normal and breath sounds normal  No respiratory distress  Abdominal: Soft  Bowel sounds are normal  She exhibits no distension  There is no tenderness  Musculoskeletal: Normal range of motion  She exhibits no edema  Lymphadenopathy:     She has no cervical adenopathy  Neurological: She is alert and oriented to person, place, and time  No cranial nerve deficit  Skin: Skin is warm  No rash noted  No pallor  Psychiatric: She has a normal mood and affect  Thought content normal        Result Review  Labs:  Orders Only on 09/10/2019   Component Date Value Ref Range Status    Total Cholesterol 09/10/2019 213* <200 mg/dL Final    HDL 09/10/2019 68  >50 mg/dL Final    Triglycerides 09/10/2019 99  <150 mg/dL Final    LDL Direct 09/10/2019 124* mg/dL (calc) Final    Comment: Reference range: <100     Desirable range <100 mg/dL for primary prevention;    <70 mg/dL for patients with CHD or diabetic patients   with > or = 2 CHD risk factors  LDL-C is now calculated using the Jacob-Ventura   calculation, which is a validated novel method providing   better accuracy than the Friedewald equation in the   estimation of LDL-C  Stan Reiding  1046;847(05): 3855-4820   (http://[employer]/faq/NZR089)     Sandeep Casey Chol HDLC Ratio 09/10/2019 3 1  <5 0 (calc) Final    Non-HDL Cholesterol 09/10/2019 145* <130 mg/dL (calc) Final    Comment: For patients with diabetes plus 1 major ASCVD risk   factor, treating to a non-HDL-C goal of <100 mg/dL   (LDL-C of <70 mg/dL) is considered a therapeutic   option   Thyroglobulin Ab 09/10/2019 <1  < or = 1 IU/mL Final    Thyroid Peroxidase Antibodies 09/10/2019 <1  <9 IU/mL Final    Protein, Total 09/10/2019 6 0* 6 1 - 8 1 g/dL Final    Albumin 09/10/2019 3 7  3 6 - 5 1 g/dL Final    Globulin 09/10/2019 2 3  1 9 - 3 7 g/dL (calc) Final    Albumin/Globulin Ratio 09/10/2019 1 6  1 0 - 2 5 (calc) Final    TOTAL BILIRUBIN 09/10/2019 0 4  0 2 - 1 2 mg/dL Final    Bilirubin, Direct 09/10/2019 0 1  < OR = 0 2 mg/dL Final    Bilirubin, Indirect 09/10/2019 0 3  0 2 - 1 2 mg/dL (calc) Final    Alkaline Phosphatase 09/10/2019 89  33 - 130 U/L Final    AST 09/10/2019 14  10 - 35 U/L Final    ALT 09/10/2019 8  6 - 29 U/L Final    Zinc 09/10/2019 52* 60 - 130 mcg/dL Final    Comment: This test was developed and its analytical performance  characteristics have been determined by [address] [place] [address], [address] [place] [address]  It has  not been cleared or approved by the [place]  Food and Drug  Administration  This assay has been validated pursuant  to the CLIA regulations and is used for clinical  purposes   Free t4 09/10/2019 1 0  0 8 - 1 8 ng/dL Final    TSH 09/10/2019 2 50  0 40 - 4 50 mIU/L Final    Hemoglobin A1C 09/10/2019 5 1  <5 7 % of total Hgb Final    Comment: For the purpose of screening for the presence of  diabetes:     <5 7%       Consistent with the absence of diabetes  5 7-6 4%    Consistent with increased risk for diabetes              (prediabetes)  > or =6 5%  Consistent with diabetes     This assay result is consistent with a decreased risk  of diabetes       Currently, no consensus exists regarding use of  hemoglobin A1c for diagnosis of diabetes in children  According to American Diabetes Association (ADA)  guidelines, hemoglobin A1c <7 0% represents optimal  control in non-pregnant diabetic patients  Different  metrics may apply to specific patient populations  Standards of Medical Care in Diabetes(ADA)   Vitamin C 09/10/2019 <0 1* 0 3 - 2 7 mg/dL Final    Comment: This test was developed and its analytical performance  characteristics have been determined by [facility]  It has not been cleared or approved by the [facility] and Drug Administration  This assay has been validated   pursuant to the CLIA regulations and is used for clinical   purposes        Color UA 09/10/2019 DARK YELLOW  YELLOW Final    Urine Appearance 09/10/2019 CLOUDY* CLEAR Final    Specific Gravity 09/10/2019 1 015  1 001 - 1 035 Final    Ph 09/10/2019 6 0  5 0 - 8 0 Final    Glucose, Urine 09/10/2019 NEGATIVE  NEGATIVE Final    Bilirubin, Urine 09/10/2019 NEGATIVE  NEGATIVE Final    Ketone, Urine 09/10/2019 NEGATIVE  NEGATIVE Final    Blood, Urine 09/10/2019 NEGATIVE  NEGATIVE Final    Protein, Urine 09/10/2019 NEGATIVE  NEGATIVE Final    SL AMB NITRITES URINE, QUAL  09/10/2019 NEGATIVE  NEGATIVE Final    Leukocyte Esterase 09/10/2019 1+* NEGATIVE Final    SL AMB WBC, URINE 09/10/2019 10-20* < OR = 5 /HPF Final    RBC, Urine 09/10/2019 3-10* < OR = 2 /HPF Final    Squamous Epithelial Cells 09/10/2019 10-20* < OR = 5 /HPF Final    Bacteria, UA 09/10/2019 FEW* NONE SEEN /HPF Final    Calcium Oxalate Crystals 09/10/2019 FEW  NONE OR FEW /HPF Final    Hyaline Casts 09/10/2019 NONE SEEN  NONE SEEN /LPF Final   Orders Only on 09/10/2019   Component Date Value Ref Range Status    Iron, Serum 09/10/2019 102  45 - 160 mcg/dL Final    Total Iron Binding Capacity (TIBC) 09/10/2019 275  250 - 450 mcg/dL (calc) Final    Iron Saturation 09/10/2019 37  16 - 45 % (calc) Final    Glucose, Random 09/10/2019 69  65 - 99 mg/dL Final Comment:               Fasting reference interval         BUN 09/10/2019 9  7 - 25 mg/dL Final    Creatinine 09/10/2019 0 92  0 50 - 0 99 mg/dL Final    Comment: For patients >52years of age, the reference limit  for Creatinine is approximately 13% higher for people  identified as -American   eGFR Non  09/10/2019 64  > OR = 60 mL/min/1 73m2 Final    eGFR  09/10/2019 74  > OR = 60 mL/min/1 73m2 Final    SL AMB BUN/CREATININE RATIO 76/07/9912 NOT APPLICABLE  6 - 22 (calc) Final    Sodium 09/10/2019 141  135 - 146 mmol/L Final    Potassium 09/10/2019 3 8  3 5 - 5 3 mmol/L Final    Chloride 09/10/2019 108  98 - 110 mmol/L Final    CO2 09/10/2019 28  20 - 32 mmol/L Final    SL AMB CALCIUM 09/10/2019 8 9  8 6 - 10 4 mg/dL Final    Protein, Total 09/10/2019 6 1  6 1 - 8 1 g/dL Final    Albumin 09/10/2019 3 7  3 6 - 5 1 g/dL Final    Globulin 09/10/2019 2 4  1 9 - 3 7 g/dL (calc) Final    Albumin/Globulin Ratio 09/10/2019 1 5  1 0 - 2 5 (calc) Final    TOTAL BILIRUBIN 09/10/2019 0 3  0 2 - 1 2 mg/dL Final    Alkaline Phosphatase 09/10/2019 91  33 - 130 U/L Final    AST 09/10/2019 15  10 - 35 U/L Final    ALT 09/10/2019 9  6 - 29 U/L Final    Methylmalonic Acid, S 09/10/2019 116  87 - 318 nmol/L Final    Comment: This test was developed and its analytical performance  characteristics have been determined by [address] [place] [address], [address] [place] [address]  It has  not been cleared or approved by the [place]  Food and Drug  Administration  This assay has been validated pursuant  to the CLIA regulations and is used for clinical  purposes           White Blood Cell Count 09/10/2019 3 9  3 8 - 10 8 Thousand/uL Final    Red Blood Cell Count 09/10/2019 3 93  3 80 - 5 10 Million/uL Final    Hemoglobin 09/10/2019 12 9  11 7 - 15 5 g/dL Final    HCT 09/10/2019 39 0  35 0 - 45 0 % Final    MCV 09/10/2019 99 2  80 0 - 100 0 fL Final    MCH 09/10/2019 32 8  27 0 - 33 0 pg Final    MCHC 09/10/2019 33 1  32 0 - 36 0 g/dL Final    RDW 09/10/2019 12 6  11 0 - 15 0 % Final    Platelet Count 67/87/4448 221  140 - 400 Thousand/uL Final    SL AMB MPV 09/10/2019 9 6  7 5 - 12 5 fL Final    Neutrophils (Absolute) 09/10/2019 2,543  1,500 - 7,800 cells/uL Final    Lymphocytes (Absolute) 09/10/2019 932  850 - 3,900 cells/uL Final    Monocytes (Absolute) 09/10/2019 316  200 - 950 cells/uL Final    Eosinophils (Absolute) 09/10/2019 90  15 - 500 cells/uL Final    Basophils ABS 09/10/2019 20  0 - 200 cells/uL Final    Neutrophils 09/10/2019 65 2  % Final    Lymphocytes 09/10/2019 23 9  % Final    Monocytes 09/10/2019 8 1  % Final    Eosinophils 09/10/2019 2 3  % Final    Basophils PCT 09/10/2019 0 5  % Final    Ferritin 09/10/2019 70  16 - 288 ng/mL Final     Please note: This report has been generated by a voice recognition software system  Therefore there may be syntax, spelling, and/or grammatical errors  Please call if you have any questions

## 2019-09-26 NOTE — LETTER
September 26, 2019     Hugo Latham, [address] [facility] [address]    Patient: Mindi Chavez   YOB: 1950   Date of Visit: 9/26/2019       Dear Dr Miguel Kasper: Thank you for referring Art Stephan to me for evaluation  Below are my notes for this consultation  If you have questions, please do not hesitate to call me  I look forward to following your patient along with you  Sincerely,        James Greene PA-C        CC: No Recipients  James Greene PA-C  9/26/2019 10:01 AM  Sign at close encounter  Kaylin [address]pp[address]   [phone]  Hematology Ambulatory [address] [facility], 1950, [id number]  9/26/2019    Assessment/Plan:    1  Iron deficiency anemia following bariatric surgery  This is a 22-year-old female with past medical history of iron deficiency secondary to bariatric surgery  Patient's iron saturation is presently at 37% with a robust ferritin over 100 ng/dL  Patient will continue with the regimen outlined below  Next infusion would be due in October 2019  Regimen:  Feraheme 510 mg IV, day 1  Cycle length = 168 days    - CBC and differential; Future  - Comprehensive metabolic panel; Future  - Iron Saturation %; Future  - Ferritin; Future  - TIBC; Future  - Iron; Future    2  Postsurgical malabsorption  Patient had gastric bypass surgery in 2010  Patient patient has been receiving B12 injections every month at the primary care office  She will continue this practice  MMA is acceptable  - CBC and differential; Future  - Comprehensive metabolic panel; Future  - Iron Saturation %; Future  - Ferritin; Future  - TIBC; Future  - Iron; Future  - Methylmalonic acid, serum; Future    3  MGUS (monoclonal gammopathy of unknown significance)  IgA MGUS was found in November 2009  Patient has been under observation since  Patient has blood work completed every year  Blood work was ordered for next follow-up in 6 months which would be her yearly check in     - IgG, IgA, IgM; Future  - Immunoglobulin free LT chains blood; Future  - Protein electrophoresis, serum; Future    4  Screening mammogram, encounter for  Patient has family history of breast cancer including male breast cancer  Patient's last screening mammography was approximately 2 years ago  I have placed an order for the patient to go now  - Mammo screening bilateral w cad; Future    5  Rheumatoid arthritis of hand, unspecified laterality, unspecified rheumatoid factor presence ([facility] )  In 2015 patient started with low-grade temperatures  This has been ongoing for the past several years  Interestingly, patient was diagnosed in the past with rheumatoid arthritis  Unspecified rheumatoid factor presents however patient recalls that more recent workup was negative  Considering the patient has not followed up with Rheumatology, I made recommendation for consultation  We discussed disease processes such as familial Mediterranean fever  Rheumatology is the best place for evaluations like this   (patient's ancestry is quite complex and very broad  However she does have relatives from [facility]  )    - Ambulatory referral to Rheumatology; Future      The patient is scheduled for follow-up in approximately 6 months with Dr Gerry Means  Patient voiced agreement and understanding to the above  Patient knows to call the Hematology/Oncology office with any questions and concerns regarding the above  Carefully review your medication list in verify the list is accurate and up-to-date  Please call the hematologic/oncology office if there medications missing from the less, medications on the list that your not currently taking or if there is a dosage or instruction that is different from higher taking medication      I have spent 30 minutes with Patient and family today in which greater than 50% of this time was spent in counseling/coordination of care regarding Diagnostic results, Prognosis, Risks and benefits of tx options and Impressions  Barrier(s) to care: None  The patient is able to self care   ------------------------------------------------------------------------------------------------------    Chief Complaint   Patient presents with    Follow-up     History of present illness: This is a 68-year-old female with past medical history of bariatric surgery and subsequent iron deficiency anemia, B12 deficiency and history of IgA MGUS diagnosed in 2019       MGUS history:  Patient's last IgA measured level was in 2011 and was lost to follow-up until October 2016  IgA level 2/2019 = 222  FLC ratio=1 72    MARIJA history:  Patient was undergoing Feraheme infusions 4 times a year in 2016  However due to the patient's availability only went once in the last 6 months  Since transtion to 6 month intervals, iron has been stable  Patient returns for routine follow-up  Interval history:  Patient tells me that overall she feels okay  Patient does state that she continues to have fever issues which had originated in 2015  Patient has normal temperature runs a bit low so patient notes that once a month she ends up with a temperature that is slightly higher than normal with headaches  This last about 10 days before completely resolving  Patient also notes that occasionally she has throat pain associated with this  Patient has CT scan scheduled for soft tissues of the neck to investigate next flare  Review of Systems   Constitutional: Negative for appetite change, fatigue, fever and unexpected weight change  HENT: Negative for nosebleeds  Respiratory: Negative for cough, choking and shortness of breath  Negative hemoptysis  Cardiovascular: Negative for chest pain, palpitations and leg swelling  Gastrointestinal: Negative    Negative for abdominal distention, abdominal pain, anal bleeding, blood in stool, constipation, diarrhea, nausea and vomiting  Endocrine: Negative  Negative for cold intolerance  Genitourinary: Negative  Negative for hematuria, menstrual problem, vaginal bleeding, vaginal discharge and vaginal pain  Musculoskeletal: Negative  Negative for arthralgias, myalgias, neck pain and neck stiffness  Skin: Negative  Negative for color change, pallor and rash  Allergic/Immunologic: Negative  Negative for immunocompromised state  Neurological: Negative  Negative for weakness and headaches  Hematological: Negative for adenopathy  Does not bruise/bleed easily  All other systems reviewed and are negative        Patient Active Problem List   Diagnosis    Chronic pain syndrome    Cervical radiculopathy    Myofascial pain syndrome    Spondylosis of cervical region without myelopathy or radiculopathy    Fibromyalgia    Diabetic peripheral neuropathy (HCC)    Long-term current use of opiate analgesic    MGUS (monoclonal gammopathy of unknown significance)    Iron deficiency anemia following bariatric surgery    Light headedness    Uncomplicated opioid dependence ([facility] [address] )       Past Medical History:   Diagnosis Date    Anemia     Anxiety     hx of panic attacks (now under control)     Arthritis     Asthma     resolved (no problems in a decade)     Baker's cyst of knee     Bronchitis     Bunion, left foot     Cervical pain     Chronic GERD     Diabetes mellitus, type 2 ([address] )     Diabetic peripheral neuropathy ([address] )     Endometriosis     Fibromyalgia     Hyperlipidemia     Hypertension     Lung nodules     Macular degeneration     Spondylosis        Past Surgical History:   Procedure Laterality Date    BACK SURGERY  1996    L5, S1- laser surgery fusion of c5 and c6     BREAST LUMPECTOMY Right     CHOLECYSTECTOMY  2004    FOOT SURGERY Left 2005    fusion     GASTRIC BYPASS  2010    Dr Debo Tan    just uterus     KNEE ARTHROSCOPY      LAMINECTOMY      OVARIAN CYST REMOVAL  1975    PELVIC LAPAROSCOPY      ovaries (x10)     PLEURAL SCARIFICATION  1967    SHOULDER OPEN ROTATOR CUFF REPAIR Left 2008    TONSILLECTOMY      VAGINAL PROLAPSE REPAIR         Family History   Problem Relation Age of Onset    Hypertension Mother     Lung cancer Mother     Hypertension Father     Heart disease Father     Heart attack Father        Social History     Socioeconomic History    Marital status:      Spouse name: None    Number of children: None    Years of education: None    Highest education level: None   Occupational History    None   Social Needs    Financial resource strain: None    Food insecurity:     Worry: None     Inability: None    Transportation needs:     Medical: None     Non-medical: None   Tobacco Use    Smoking status: Current Every Day Smoker     Packs/day: 0 50     Years: 40 00     Pack years: 20 00    Smokeless tobacco: Current User   Substance and Sexual Activity    Alcohol use: Not Currently     Alcohol/week: 1 0 standard drinks     Types: 1 Shots of liquor per week     Comment: rarely    Drug use: No    Sexual activity: None   Lifestyle    Physical activity:     Days per week: None     Minutes per session: None    Stress: None   Relationships    Social connections:     Talks on phone: None     Gets together: None     Attends [employer]al service: None     Active member of club or organization: None     Attends meetings of clubs or organizations: None     Relationship status: None    Intimate partner violence:     Fear of current or ex partner: None     Emotionally abused: None     Physically abused: None     Forced sexual activity: None   Other Topics Concern    None   Social History Narrative    None         Current Outpatient Medications:     albuterol (VENTOLIN HFA) 90 mcg/act inhaler, inhale 2 puff by inhalation route  every 4 - 6 hours as needed, Disp: , Rfl:     amLODIPine (NORVASC) 5 mg tablet, Take 1 tablet (5 mg total) by mouth daily, Disp: 90 tablet, Rfl: 1    baclofen 10 mg tablet, Take 1 tablet (10 mg total) by mouth 3 (three) times a day, Disp: 90 tablet, Rfl: 1    diphenhydrAMINE (BENADRYL ALLERGY) 25 mg capsule, Take by mouth, Disp: , Rfl:     glucose blood test strip, 1 each by Other route daily Use as instructed  once daily only ( one touch ultra test strips) (Patient not taking: Reported on 8/26/2019), Disp: 25 each, Rfl: 5    HYDROcodone-acetaminophen (NORCO)  mg per tablet, Take 4-5 tablets daily PRN, Disp: 130 tablet, Rfl: 0    Lancets Misc  (ACCU-CHEK FASTCLIX LANCET) KIT, 3 (three) times a day, Disp: , Rfl:     levothyroxine 25 mcg tablet, TAKE 1 TABLET BY MOUTH EVERY MORNING ON SATURDAY, SUNDAY, TUESDAY AND THURSDAY, Disp: 30 tablet, Rfl: 2    levothyroxine 50 mcg tablet, TAKE 1 TABLET BY ORAL ROUTE EVERY MORNING ON MONDAY, WEDNESDAY AND FRIDAY, Disp: 30 tablet, Rfl: 2    losartan (COZAAR) 25 mg tablet, Take 1 tablet (25 mg total) by mouth daily, Disp: 30 tablet, Rfl: 3    meclizine (ANTIVERT) 12 5 MG tablet, Take 1 tablet (12 5 mg total) by mouth every 12 (twelve) hours as needed for dizziness, Disp: 30 tablet, Rfl: 1    MICROLET LANCETS MISC, Microlet Lancet, Disp: , Rfl:     pantoprazole (PROTONIX) 40 mg tablet, Take 1 tablet (40 mg total) by mouth daily, Disp: 90 tablet, Rfl: 1    pregabalin (LYRICA) 100 mg capsule, Take 1 capsule (100 mg total) by mouth 3 (three) times a day, Disp: 90 capsule, Rfl: 1    ranitidine (ZANTAC) 150 mg tablet, TAKE 1 CAPSULE (150 MG TOTAL) BY MOUTH 2 (TWO) TIMES A DAY, Disp: 60 tablet, Rfl: 3    sucralfate (CARAFATE) 1 g tablet, TAKE 1 TABLET BY ORAL ROUTE 4 TIMES EVERY DAY ON AN EMPTY STOMACH 1 HOUR BEFORE MEALS AND AT BEDTIME, Disp: 120 tablet, Rfl: 1    traZODone (DESYREL) 50 mg tablet, TAKE 1 TABLET BY ORAL ROUTE EVERY DAY AT BEDTIME AS NEEDED, Disp: 30 tablet, Rfl: 1    Current Facility-Administered Medications:     cyanocobalamin injection 1,000 mcg, 1,000 mcg, Intramuscular, Q30 Days, Daisy Moran MD, 1,000 mcg at 05/02/19 6738    Allergies   Allergen Reactions    Cephalosporins Hives    Erythromycin GI Intolerance    Fentanyl Itching     Occurred during surgery     Paxil [Paroxetine] Hives     After 2 weeks    Penicillins Itching    Pravastatin Myalgia    Prednisolone Itching    Statins Itching    Sulfa Antibiotics Diarrhea    Wellbutrin [Bupropion] Hives     After 2 weeks     Hypericum Perforatum Rash    Sulfur Rash       Objective:  /60 (BP Location: Left arm, Patient Position: Sitting, Cuff Size: Standard)   Pulse 68   Temp 98 7 °F (37 1 °C)   Resp 14   Ht 5' 5" (1 651 m)   Wt 73 9 kg (163 lb)   LMP  (LMP Unknown)   BMI 27 12 kg/m²     Physical Exam   Constitutional: She is oriented to person, place, and time  She appears well-developed and well-nourished  No distress  HENT:   Head: Normocephalic and atraumatic  Mouth/Throat: Oropharynx is clear and moist  No oropharyngeal exudate  Upper and lower dentures   Eyes: Pupils are equal, round, and reactive to light  EOM are normal  No scleral icterus  Neck: Normal range of motion  Cardiovascular: Normal rate and regular rhythm  No murmur heard  Pulmonary/Chest: Effort normal and breath sounds normal  No respiratory distress  Abdominal: Soft  Bowel sounds are normal  She exhibits no distension  There is no tenderness  Musculoskeletal: Normal range of motion  She exhibits no edema  Lymphadenopathy:     She has no cervical adenopathy  Neurological: She is alert and oriented to person, place, and time  No cranial nerve deficit  Skin: Skin is warm  No rash noted  No pallor  Psychiatric: She has a normal mood and affect   Thought content normal        Result Review  Labs:  Orders Only on 09/10/2019   Component Date Value Ref Range Status    Total Cholesterol 09/10/2019 213* <200 mg/dL Final    HDL 09/10/2019 68  >50 mg/dL Final    Triglycerides 09/10/2019 99  <150 mg/dL Final    LDL Direct 09/10/2019 124* mg/dL (calc) Final    Comment: Reference range: <100     Desirable range <100 mg/dL for primary prevention;    <70 mg/dL for patients with CHD or diabetic patients   with > or = 2 CHD risk factors  LDL-C is now calculated using the Jacob-Ventura   calculation, which is a validated novel method providing   better accuracy than the Friedewald equation in the   estimation of LDL-C  Inderjit Cantrell  [place] [id number];276(54): [phone]   (http://[employer]/faq/AYX730)      Chol HDLC Ratio 09/10/2019 3 1  <5 0 (calc) Final    Non-HDL Cholesterol 09/10/2019 145* <130 mg/dL (calc) Final    Comment: For patients with diabetes plus 1 major ASCVD risk   factor, treating to a non-HDL-C goal of <100 mg/dL   (LDL-C of <70 mg/dL) is considered a therapeutic   option   Thyroglobulin Ab 09/10/2019 <1  < or = 1 IU/mL Final    Thyroid Peroxidase Antibodies 09/10/2019 <1  <9 IU/mL Final    Protein, Total 09/10/2019 6 0* 6 1 - 8 1 g/dL Final    Albumin 09/10/2019 3 7  3 6 - 5 1 g/dL Final    Globulin 09/10/2019 2 3  1 9 - 3 7 g/dL (calc) Final    Albumin/Globulin Ratio 09/10/2019 1 6  1 0 - 2 5 (calc) Final    TOTAL BILIRUBIN 09/10/2019 0 4  0 2 - 1 2 mg/dL Final    Bilirubin, Direct 09/10/2019 0 1  < OR = 0 2 mg/dL Final    Bilirubin, Indirect 09/10/2019 0 3  0 2 - 1 2 mg/dL (calc) Final    Alkaline Phosphatase 09/10/2019 89  33 - 130 U/L Final    AST 09/10/2019 14  10 - 35 U/L Final    ALT 09/10/2019 8  6 - 29 U/L Final    Zinc 09/10/2019 52* 60 - 130 mcg/dL Final    Comment: This test was developed and its analytical performance  characteristics have been determined by [address] [place] [address]  It has  not been cleared or approved by the [place]  Food and Drug  Administration  This assay has been validated pursuant  to the CLIA regulations and is used for clinical  purposes           Free t4 09/10/2019 1 0  0 8 - 1 8 ng/dL Final    TSH 09/10/2019 2 50  0 40 - 4 50 mIU/L Final    Hemoglobin A1C 09/10/2019 5 1  <5 7 % of total Hgb Final    Comment: For the purpose of screening for the presence of  diabetes:     <5 7%       Consistent with the absence of diabetes  5 7-6 4%    Consistent with increased risk for diabetes              (prediabetes)  > or =6 5%  Consistent with diabetes     This assay result is consistent with a decreased risk  of diabetes  Currently, no consensus exists regarding use of  hemoglobin A1c for diagnosis of diabetes in children  According to American Diabetes Association (ADA)  guidelines, hemoglobin A1c <7 0% represents optimal  control in non-pregnant diabetic patients  Different  metrics may apply to specific patient populations  Standards of Medical Care in Diabetes(ADA)   Vitamin C 09/10/2019 <0 1* 0 3 - 2 7 mg/dL Final    Comment: This test was developed and its analytical performance  characteristics have been determined by [facility]  It has not been cleared or approved by the [employer] and Drug Administration  This assay has been validated   pursuant to the CLIA regulations and is used for clinical   purposes        Color UA 09/10/2019 DARK YELLOW  YELLOW Final    Urine Appearance 09/10/2019 CLOUDY* CLEAR Final    Specific Gravity 09/10/2019 1 015  1 001 - 1 035 Final    Ph 09/10/2019 6 0  5 0 - 8 0 Final    Glucose, Urine 09/10/2019 NEGATIVE  NEGATIVE Final    Bilirubin, Urine 09/10/2019 NEGATIVE  NEGATIVE Final    Ketone, Urine 09/10/2019 NEGATIVE  NEGATIVE Final    Blood, Urine 09/10/2019 NEGATIVE  NEGATIVE Final    Protein, Urine 09/10/2019 NEGATIVE  NEGATIVE Final    SL AMB NITRITES URINE, QUAL  09/10/2019 NEGATIVE  NEGATIVE Final    Leukocyte Esterase 09/10/2019 1+* NEGATIVE Final    SL AMB WBC, URINE 09/10/2019 10-20* < OR = 5 /HPF Final    RBC, Urine 09/10/2019 3-10* < OR = 2 /HPF Final    Squamous Epithelial Cells 09/10/2019 10-20* < OR = 5 /HPF Final    Bacteria, UA 09/10/2019 FEW* NONE SEEN /HPF Final    Calcium Oxalate Crystals 09/10/2019 FEW  NONE OR FEW /HPF Final    Hyaline Casts 09/10/2019 NONE SEEN  NONE SEEN /LPF Final   Orders Only on 09/10/2019   Component Date Value Ref Range Status    Iron, Serum 09/10/2019 102  45 - 160 mcg/dL Final    Total Iron Binding Capacity (TIBC) 09/10/2019 275  250 - 450 mcg/dL (calc) Final    Iron Saturation 09/10/2019 37  16 - 45 % (calc) Final    Glucose, Random 09/10/2019 69  65 - 99 mg/dL Final    Comment:               Fasting reference interval         BUN 09/10/2019 9  7 - 25 mg/dL Final    Creatinine 09/10/2019 0 92  0 50 - 0 99 mg/dL Final    Comment: For patients >52years of age, the reference limit  for Creatinine is approximately 13% higher for people  identified as -American   eGFR Non  09/10/2019 64  > OR = 60 mL/min/1 73m2 Final    eGFR  09/10/2019 74  > OR = 60 mL/min/1 73m2 Final    SL AMB BUN/CREATININE RATIO 68/78/1710 NOT APPLICABLE  6 - 22 (calc) Final    Sodium 09/10/2019 141  135 - 146 mmol/L Final    Potassium 09/10/2019 3 8  3 5 - 5 3 mmol/L Final    Chloride 09/10/2019 108  98 - 110 mmol/L Final    CO2 09/10/2019 28  20 - 32 mmol/L Final    SL AMB CALCIUM 09/10/2019 8 9  8 6 - 10 4 mg/dL Final    Protein, Total 09/10/2019 6 1  6 1 - 8 1 g/dL Final    Albumin 09/10/2019 3 7  3 6 - 5 1 g/dL Final    Globulin 09/10/2019 2 4  1 9 - 3 7 g/dL (calc) Final    Albumin/Globulin Ratio 09/10/2019 1 5  1 0 - 2 5 (calc) Final    TOTAL BILIRUBIN 09/10/2019 0 3  0 2 - 1 2 mg/dL Final    Alkaline Phosphatase 09/10/2019 91  33 - 130 U/L Final    AST 09/10/2019 15  10 - 35 U/L Final    ALT 09/10/2019 9  6 - 29 U/L Final    Methylmalonic Acid, S 09/10/2019 116  87 - 318 nmol/L Final    Comment:  This test was developed and its analytical performance  characteristics have been determined by [employer] [address], [place]  It has  not been cleared or approved by the [place]  Food and Drug  Administration  This assay has been validated pursuant  to the CLIA regulations and is used for clinical  purposes   White Blood Cell Count 09/10/2019 3 9  3 8 - 10 8 Thousand/uL Final    Red Blood Cell Count 09/10/2019 3 93  3 80 - 5 10 Million/uL Final    Hemoglobin 09/10/2019 12 9  11 7 - 15 5 g/dL Final    HCT 09/10/2019 39 0  35 0 - 45 0 % Final    MCV 09/10/2019 99 2  80 0 - 100 0 fL Final    MCH 09/10/2019 32 8  27 0 - 33 0 pg Final    MCHC 09/10/2019 33 1  32 0 - 36 0 g/dL Final    RDW 09/10/2019 12 6  11 0 - 15 0 % Final    Platelet Count 66/94/6433 221  140 - 400 Thousand/uL Final    SL AMB MPV 09/10/2019 9 6  7 5 - 12 5 fL Final    Neutrophils (Absolute) 09/10/2019 2,543  1,500 - 7,800 cells/uL Final    Lymphocytes (Absolute) 09/10/2019 932  850 - 3,900 cells/uL Final    Monocytes (Absolute) 09/10/2019 316  200 - 950 cells/uL Final    Eosinophils (Absolute) 09/10/2019 90  15 - 500 cells/uL Final    Basophils ABS 09/10/2019 20  0 - 200 cells/uL Final    Neutrophils 09/10/2019 65 2  % Final    Lymphocytes 09/10/2019 23 9  % Final    Monocytes 09/10/2019 8 1  % Final    Eosinophils 09/10/2019 2 3  % Final    Basophils PCT 09/10/2019 0 5  % Final    Ferritin 09/10/2019 70  16 - 288 ng/mL Final     Please note: This report has been generated by a voice recognition software system  Therefore there may be syntax, spelling, and/or grammatical errors  Please call if you have any questions

## 2019-09-27 DIAGNOSIS — K27.9 PUD (PEPTIC ULCER DISEASE): ICD-10-CM

## 2019-09-29 RX ORDER — SUCRALFATE 1 G/1
TABLET ORAL
Qty: 120 TABLET | Refills: 1 | Status: SHIPPED | OUTPATIENT
Start: 2019-09-29 | End: 2019-11-08 | Stop reason: SDUPTHER

## 2019-10-11 ENCOUNTER — HOSPITAL ENCOUNTER (OUTPATIENT)
Dept: CT IMAGING | Facility: HOSPITAL | Age: 69
Discharge: HOME | End: 2019-10-11
Payer: COMMERCIAL

## 2019-10-11 DIAGNOSIS — R22.1 LOCALIZED SWELLING, MASS AND LUMP, NECK: ICD-10-CM

## 2019-10-11 DIAGNOSIS — F17.200 SMOKER: ICD-10-CM

## 2019-10-11 PROCEDURE — 70491 CT SOFT TISSUE NECK W/DYE: CPT

## 2019-10-11 PROCEDURE — G0297 LDCT FOR LUNG CA SCREEN: HCPCS

## 2019-10-11 RX ADMIN — IOHEXOL 85 ML: 350 INJECTION, SOLUTION INTRAVENOUS at 15:24

## 2019-10-12 ENCOUNTER — TELEPHONE (OUTPATIENT)
Dept: OTHER | Facility: OTHER | Age: 69
End: 2019-10-12

## 2019-10-12 NOTE — TELEPHONE ENCOUNTER
Chace Helms 1950  CONFIDENTIALTY NOTICE: This fax transmission is intended only for the addressee  It contains information that is legally privileged,  confidential or otherwise protected from use or disclosure  If you are not the intended recipient, you are strictly prohibited from reviewing,  disclosing, copying using or disseminating any of this information or taking any action in reliance on or regarding this information  If you have  received this fax in error, please notify us immediately by telephone so that we can arrange for its return to us  Page:   Call Id: [id number]  Health Call  Standard Call Report  Health Call  Patient Name: Chace Helms  Gender: Female  : 1950  Age: 71 Y 3 M 11 D  Return Phone  Number: [phone] (Home)  Address: [address] [place] [address]  City/State/Zip: [address] [place] [address]  Practice Name: [address] [place] [address] Charged:  Physician:  [address] [place] [address] Name:  Relationship To  Patient: Self  Return Phone Number: [phone] (Home)  Presenting Problem: "I think I have a UTI "  Service Type: Triage  Charged Service 1: Ramona Almendarez U  38  Name and  Number:  Nurse Assessment  Nurse: Shira Brown RN, Danielle Date/Time: 10/12/2019 11:28:52  AM  Type of assessment required:  ---General (Adult or Child)  Duration of Current S/S  ---started last evening  Location/Radiation  ---  Temperature (F) and route:  ---97 9- forehead  Symptom Specific Meds (Dose/Time):  ---None  Other S/S  ---urinary burning, frequency,urgency, large amounts of urine, No blood noted, lower  pelvic discomfort, no back pain  Pain Scale on scale of 1-10, 10 being the worst:  ---slight discomfort  Symptom progression:  ---worse  Intake and Output  ---WNL/frequency  Last Exam/Treatment:  Chace Helms 1950  CONFIDENTIALTY NOTICE: This fax transmission is intended only for the addressee  It contains information that is legally privileged,  confidential or otherwise protected from use or disclosure   If you are not the intended recipient, you are strictly prohibited from reviewing,  disclosing, copying using or disseminating any of this information or taking any action in reliance on or regarding this information  If you have  received this fax in error, please notify us immediately by telephone so that we can arrange for its return to us  Page: 2 of 2  Call Id: [id number]  Nurse Assessment  ---9/26/2019 - office visit with Hemotogist  Protocols  Protocol Title Nurse Date/Time  Urination Pain - Female Shira Brown RN, Rufus Murrell 10/12/2019 11:35:26 AM  Question Caller Affirmed  Disp  Time Disposition Final User  10/12/2019 11:37:52 AM See Physician within [facility], Rufus Murrell  10/12/2019 11:38:07 AM RN Triaged Yes Shira Brown RN, [address] Advice Given Per Protocol  SEE PHYSICIAN WITHIN 24 HOURS: * IF OFFICE WILL BE OPEN: You need to be seen within the next 24 hours  Call your doctor  when the office opens, and make an appointment  REASSURANCE: This could be an urinary tract infection  You should see your PCP  to be examined and tested  FLUIDS: Drink extra fluids  Drink 8-10 glasses of liquids a day (Reason: to produce a dilute, non-irritating  urine)  CAUTION - FLUIDS: * Increased fluid intake may be contraindicated in adults with renal failure or heart failure  * You can  discuss this with your doctor  CRANBERRY JUICE: * Some people think that drinking cranberry juice may help in fighting urinary  tract infections  However, there is no good research that has ever proved this  * Dosage Cranberry Juice Cocktail: 8 oz (240 ml) twice  a day  * Dosage 100% Cranberry Juice: 1 oz (30 ml) twice a day  CAUTION - CRANBERRY JUICE: * Do not drink more than 16 oz  (480 ml) of cranberry juice cocktail per day (Reason: too much cranberry juice can also be irritating to the bladder)  * There have been  a couple cases reported of interactions between cranberry juice and Coumadin (warfarin)   In these cases the INR level increased for a  period of days while the person was drinking cranberry juice  The INR is a test that is used to determine if a person is taking the right  amount of Coumadin  At higher INR levels there is an increased risk of bleeding  * Remember, antibiotics are needed to treat a urine  infection! CARE ADVICE given per Urination Pain - Female (Adult) guideline  CALL BACK IF: * Fever or back pain occurs * You  become worse  WARM SALINE SITZ BATHS TO REDUCE PAIN: Sit in a warm saline bath for 20 minutes to cleanse the area and to  reduce pain  Add 2 oz  of table salt or baking soda to a tub of water  Caller Understands: Yes  Caller Disagree/Comply: Comply  PreDisposition: Unsure  Comments  User: Opal Lim RN Date/Time: 10/12/2019 11:40:27 AM  I explained to this patient that she needs to be seen first, before any antibiotic is started  I referred her to [facility] on [address] [place] [address]

## 2019-10-15 DIAGNOSIS — N39.0 URINARY TRACT INFECTION WITHOUT HEMATURIA, SITE UNSPECIFIED: Primary | ICD-10-CM

## 2019-10-15 DIAGNOSIS — E04.1 THYROID NODULE: ICD-10-CM

## 2019-10-15 DIAGNOSIS — R93.89 ABNORMAL CT SCAN, NECK: Primary | ICD-10-CM

## 2019-10-15 RX ORDER — LEVOFLOXACIN 250 MG/1
250 TABLET ORAL DAILY
Qty: 7 TABLET | Refills: 0 | Status: SHIPPED | OUTPATIENT
Start: 2019-10-15 | End: 2019-10-22

## 2019-10-21 ENCOUNTER — OFFICE VISIT (OUTPATIENT)
Dept: PAIN MEDICINE | Facility: MEDICAL CENTER | Age: 69
End: 2019-10-21
Payer: COMMERCIAL

## 2019-10-21 ENCOUNTER — TELEPHONE (OUTPATIENT)
Dept: FAMILY MEDICINE CLINIC | Facility: CLINIC | Age: 69
End: 2019-10-21

## 2019-10-21 VITALS
WEIGHT: 162.8 LBS | SYSTOLIC BLOOD PRESSURE: 110 MMHG | RESPIRATION RATE: 16 BRPM | HEART RATE: 68 BPM | DIASTOLIC BLOOD PRESSURE: 54 MMHG | HEIGHT: 65 IN | BODY MASS INDEX: 27.12 KG/M2

## 2019-10-21 DIAGNOSIS — M79.18 MYOFASCIAL PAIN SYNDROME: ICD-10-CM

## 2019-10-21 DIAGNOSIS — M47.812 SPONDYLOSIS OF CERVICAL REGION WITHOUT MYELOPATHY OR RADICULOPATHY: ICD-10-CM

## 2019-10-21 DIAGNOSIS — Z79.891 LONG-TERM CURRENT USE OF OPIATE ANALGESIC: Primary | ICD-10-CM

## 2019-10-21 DIAGNOSIS — M54.12 CERVICAL RADICULOPATHY: ICD-10-CM

## 2019-10-21 DIAGNOSIS — G89.4 CHRONIC PAIN SYNDROME: ICD-10-CM

## 2019-10-21 DIAGNOSIS — F11.20 UNCOMPLICATED OPIOID DEPENDENCE (HCC): ICD-10-CM

## 2019-10-21 PROCEDURE — 99214 OFFICE O/P EST MOD 30 MIN: CPT | Performed by: NURSE PRACTITIONER

## 2019-10-21 RX ORDER — BACLOFEN 10 MG/1
10 TABLET ORAL 3 TIMES DAILY
Qty: 90 TABLET | Refills: 0 | Status: SHIPPED | OUTPATIENT
Start: 2019-10-21 | End: 2019-12-16 | Stop reason: SDUPTHER

## 2019-10-21 RX ORDER — HYDROCODONE BITARTRATE AND ACETAMINOPHEN 10; 325 MG/1; MG/1
TABLET ORAL
Qty: 130 TABLET | Refills: 0 | Status: SHIPPED | OUTPATIENT
Start: 2019-11-19 | End: 2019-11-18 | Stop reason: SDUPTHER

## 2019-10-21 RX ORDER — PREGABALIN 100 MG/1
100 CAPSULE ORAL 3 TIMES DAILY
Qty: 90 CAPSULE | Refills: 0 | Status: SHIPPED | OUTPATIENT
Start: 2019-10-21 | End: 2019-12-16 | Stop reason: SDUPTHER

## 2019-10-21 RX ORDER — HYDROCODONE BITARTRATE AND ACETAMINOPHEN 10; 325 MG/1; MG/1
TABLET ORAL
Qty: 130 TABLET | Refills: 0 | Status: SHIPPED | OUTPATIENT
Start: 2019-10-21 | End: 2019-10-21 | Stop reason: SDUPTHER

## 2019-10-21 NOTE — PATIENT INSTRUCTIONS
Opioid Pain Management   AMBULATORY CARE:   An opioid  is a type of medicine used to treat pain  Examples of opioids are oxycodone, morphine, fentanyl, or codeine  Take opioid medicines as directed, for the condition it is prescribed:  Common problems that may occur when you do not take opioid medicines as directed include the following:  · Health problems  may occur  You may have trouble breathing  You may also develop liver or kidney damage, or stomach bleeding  Any of these health problems can become life-threatening  · Opioid dependence  means your body needs the opioid medicine to keep it from going through withdrawal      · Opioid tolerance  means the opioid does not control pain as well as it used to  You need higher doses of the opioid to get pain relief  · Opioid addiction  means you are not able to control the use of the opioid medicine  You use it when you do not have pain  You crave the opioid medicine  Call 911 or have someone call 911 for any of the following:   · You are breathing slower than normal, or you have trouble breathing  · You cannot be awakened  · You have a seizure  Seek care immediately if:   · Your heart is beating slower than usual     · Your heart feels like it is jumping or fluttering  · You are so sleepy that you cannot stay awake  · You have severe muscle pain or weakness  · You see or hear things that are not real   Contact your healthcare provider if:   · You are too dizzy to stand up  · Your pain gets worse or you have new pain  · You cannot do your usual activities because of side effects from the opioid  · You are constipated or have abdominal pain  · You have questions or concerns about your condition or care  Opioid safety measures:   · Take your medicine as directed  Ask if you need more information on how to take your medicine correctly  Follow up with your healthcare provider regularly  You may need to have your dose adjusted   Do not use opioid medicine if you are pregnant or breastfeeding  · Give your healthcare provider a list of all your medicines  Include any over-the-counter medicines, vitamins, and herbs  It can be dangerous to take opioids with certain other medicines, such as antihistamines  · Keep opioid medicine in a safe place  Store your opioid medicine in a locked cabinet to keep it away from children and others  · Do not drink alcohol while you use opioids  Alcohol use with an opioid medicine can make you sleepy and slow your breathing rate  You may stop breathing completely  · Do not drive or operate heavy machinery after you take opioid medicine  Opioid medicine can make you drowsy and make it hard to concentrate  You may injure yourself or others if you drive or operate heavy machinery while taking your medicine  · Drink fluids and eat high-fiber foods  Fluids and fiber will help prevent constipation  Ask your healthcare provider what fluids are right for you and how much you should drink  Also ask for a list of foods that contain fiber  Follow up with your healthcare provider as directed: You may need to have your dose adjusted  You may be referred to a pain specialist  Write down your questions so you remember to ask them during your visits  © 2017 [employer] Information is for End User's use only and may not be sold, redistributed or otherwise used for commercial purposes  All illustrations and images included in CareNotes® are the copyrighted property of [employer]  or Hipolito Joshi  The above information is an  only  It is not intended as medical advice for individual conditions or treatments  Talk to your doctor, nurse or pharmacist before following any medical regimen to see if it is safe and effective for you

## 2019-10-21 NOTE — PROGRESS NOTES
Assessment  1  Long-term current use of opiate analgesic    2  Uncomplicated opioid dependence ([address] )    3  Chronic pain syndrome    4  Cervical radiculopathy    5  Myofascial pain syndrome    6  Spondylosis of cervical region without myelopathy or radiculopathy        Plan  Continue with hydrocodone as prescribed she was given a 2 month supply of medication with 1 month having a do not fill date of November 19, 2019  Continue with Lyrica and baclofen, both were refilled  Follow-up visit in 8 weeks for medication refills    An oral drug screen swab was collected at today's office visit  The swab will be sent for confirmatory testing  The drug screen is medically necessary because the patient is either dependent on opioid medication or is being considered for opioid medication therapy and the results could impact ongoing or future treatment  The drug screen is to evaluate for the presences or absence of prescribed, non-prescribed, and/or illicit drugs/substances  There are risks associated with opioid medications, including dependence, addiction and tolerance  The patient understands and agrees to use these medications only as prescribed  Potential side effects of the medications include, but are not limited to, constipation, drowsiness, addiction, impaired judgment and risk of fatal overdose if not taken as prescribed  The patient was warned against driving while taking sedation medications  Sharing medications is a felony  At this point in time, the patient is showing no signs of addiction, abuse, diversion or suicidal ideation  [place] Prescription Drug Monitoring Program report was reviewed and was appropriate         My impressions and treatment recommendations were discussed in detail with the patient who verbalized understanding and had no further questions  Discharge instructions were provided   I personally saw and examined the patient and I agree with the above discussed plan of care     Orders Placed This Encounter   Procedures    MM ALL_Prescribed Meds and Special Instructions     New Medications Ordered This Visit   Medications    baclofen 10 mg tablet     Sig: Take 1 tablet (10 mg total) by mouth 3 (three) times a day     Dispense:  90 tablet     Refill:  0    pregabalin (LYRICA) 100 mg capsule     Sig: Take 1 capsule (100 mg total) by mouth 3 (three) times a day     Dispense:  90 capsule     Refill:  0    HYDROcodone-acetaminophen (NORCO)  mg per tablet     Sig: Take 4-5 tablets daily PRN     Dispense:  130 tablet     Refill:  0           Hydrocodone last taken 10/21 AM  New ODS today  History of Present Illness    Dimitrios Quigley is a 71 y o  female presents for follow-up related to her chronic neck and arm pain as well as her low back and leg pain  Today she rates her pain 7/10 this is constant described as burning, dull, aching, sharp, throbbing, cramping, shooting, numbness, and pins and needles  Patient continues to take hydrocodone 10/325 mg 1 tablet 4-5 times daily along with Lyrica 100 mg 3 times daily, and baclofen 10 mg 3 times daily  Her medication regimen provides 40% relief without any side effects or issues  Patient is currently taking antibiotics for a urinary tract infection  I have personally reviewed and/or updated the patient's past medical history, past surgical history, family history, social history, current medications, allergies, and vital signs today  Review of Systems   Respiratory: Negative for shortness of breath  Cardiovascular: Negative for chest pain  Gastrointestinal: Positive for nausea  Negative for constipation, diarrhea and vomiting  Musculoskeletal: Positive for gait problem, joint swelling (knees), myalgias (hands and feet) and neck pain  Negative for arthralgias  Skin: Negative for rash  Neurological: Positive for dizziness  Negative for seizures and weakness     All other systems reviewed and are negative        Patient Active Problem List   Diagnosis    Chronic pain syndrome    Cervical radiculopathy    Myofascial pain syndrome    Spondylosis of cervical region without myelopathy or radiculopathy    Fibromyalgia    Diabetic peripheral neuropathy (HCC)    Long-term current use of opiate analgesic    MGUS (monoclonal gammopathy of unknown significance)    Iron deficiency anemia following bariatric surgery    Light headedness    Uncomplicated opioid dependence ([facility] [address] )       Past Medical History:   Diagnosis Date    Anemia     Anxiety     hx of panic attacks (now under control)     Arthritis     Asthma     resolved (no problems in a decade)     Baker's cyst of knee     Bronchitis     Bunion, left foot     Cervical pain     Chronic GERD     Diabetes mellitus, type 2 (HCC)     Diabetic peripheral neuropathy (HCC)     Endometriosis     Fibromyalgia     Hyperlipidemia     Hypertension     Lung nodules     Macular degeneration     Spondylosis        Past Surgical History:   Procedure Laterality Date    BACK SURGERY  1996    L5, S1- laser surgery fusion of c5 and c6     BREAST LUMPECTOMY Right     CHOLECYSTECTOMY  2004    FOOT SURGERY Left 2005    fusion     GASTRIC BYPASS  2010    Dr Arellano Marking    just uterus     KNEE ARTHROSCOPY      LAMINECTOMY      OVARIAN CYST REMOVAL  1975    PELVIC LAPAROSCOPY      ovaries (x10)     PLEURAL SCARIFICATION  1967    SHOULDER OPEN ROTATOR CUFF REPAIR Left 2008    TONSILLECTOMY      VAGINAL PROLAPSE REPAIR         Family History   Problem Relation Age of Onset    Hypertension Mother     Lung cancer Mother     Hypertension Father     Heart disease Father     Heart attack Father        Social History     Occupational History    Not on file   Tobacco Use    Smoking status: Current Every Day Smoker     Packs/day: 0 50     Years: 40 00     Pack years: 20 00    Smokeless tobacco: Current User   Substance and Sexual Activity    Alcohol use: Not Currently     Alcohol/week: 1 0 standard drinks     Types: 1 Shots of liquor per week     Comment: rarely    Drug use: No    Sexual activity: Not on file       Current Outpatient Medications on File Prior to Visit   Medication Sig    albuterol (VENTOLIN HFA) 90 mcg/act inhaler inhale 2 puff by inhalation route  every 4 - 6 hours as needed    amLODIPine (NORVASC) 5 mg tablet Take 1 tablet (5 mg total) by mouth daily    diphenhydrAMINE (BENADRYL ALLERGY) 25 mg capsule Take by mouth    levofloxacin (LEVAQUIN) 250 mg tablet Take 1 tablet (250 mg total) by mouth daily for 7 days With food/Lunch    levothyroxine 25 mcg tablet TAKE 1 TABLET BY MOUTH EVERY MORNING ON SATURDAY, SUNDAY, TUESDAY AND THURSDAY    levothyroxine 50 mcg tablet TAKE 1 TABLET BY ORAL ROUTE EVERY MORNING ON MONDAY, WEDNESDAY AND FRIDAY    losartan (COZAAR) 25 mg tablet Take 1 tablet (25 mg total) by mouth daily    meclizine (ANTIVERT) 12 5 MG tablet Take 1 tablet (12 5 mg total) by mouth every 12 (twelve) hours as needed for dizziness    pantoprazole (PROTONIX) 40 mg tablet Take 1 tablet (40 mg total) by mouth daily    ranitidine (ZANTAC) 150 mg tablet TAKE 1 CAPSULE (150 MG TOTAL) BY MOUTH 2 (TWO) TIMES A DAY    sucralfate (CARAFATE) 1 g tablet TAKE 1 TABLET BY ORAL ROUTE 4 TIMES EVERY DAY ON AN EMPTY STOMACH 1 HOUR BEFORE MEALS AND AT BEDTIME    traZODone (DESYREL) 50 mg tablet TAKE 1 TABLET BY ORAL ROUTE EVERY DAY AT BEDTIME AS NEEDED    [DISCONTINUED] baclofen 10 mg tablet Take 1 tablet (10 mg total) by mouth 3 (three) times a day    [DISCONTINUED] HYDROcodone-acetaminophen (NORCO)  mg per tablet Take 4-5 tablets daily PRN    [DISCONTINUED] pregabalin (LYRICA) 100 mg capsule Take 1 capsule (100 mg total) by mouth 3 (three) times a day    glucose blood test strip 1 each by Other route daily Use as instructed  once daily only ( one touch ultra test strips) (Patient not taking: Reported on 8/26/2019)    Lancets Misc  (ACCU-CHEK FASTCLIX LANCET) KIT 3 (three) times a day    MICROLET LANCETS MISC Microlet Lancet     Current Facility-Administered Medications on File Prior to Visit   Medication    cyanocobalamin injection 1,000 mcg       Allergies   Allergen Reactions    Cephalosporins Hives    Erythromycin GI Intolerance    Fentanyl Itching     Occurred during surgery     Paxil [Paroxetine] Hives     After 2 weeks    Penicillins Itching    Pravastatin Myalgia    Prednisolone Itching    Statins Itching    Sulfa Antibiotics Diarrhea    Wellbutrin [Bupropion] Hives     After 2 weeks     Hypericum Perforatum Rash       Physical Exam    /54   Pulse 68   Resp 16   Ht 5' 5" (1 651 m)   Wt 73 8 kg (162 lb 12 8 oz)   LMP  (LMP Unknown)   BMI 27 09 kg/m²     Constitutional: normal, well developed, well nourished, alert, in no distress and non-toxic and no overt pain behavior    Eyes: anicteric  HEENT: grossly intact  Neck: supple, symmetric, trachea midline and no masses   Pulmonary:even and unlabored  Cardiovascular:No edema or pitting edema present  Skin:Normal without rashes or lesions and well hydrated  Psychiatric:Mood and affect appropriate  Neurologic:Cranial Nerves II-XII grossly intact  Musculoskeletal:ambulates with cane    Imaging

## 2019-10-24 ENCOUNTER — HOSPITAL ENCOUNTER (OUTPATIENT)
Dept: ULTRASOUND IMAGING | Facility: HOSPITAL | Age: 69
Discharge: HOME | End: 2019-10-24
Payer: COMMERCIAL

## 2019-10-24 DIAGNOSIS — E04.1 THYROID NODULE: ICD-10-CM

## 2019-10-24 PROCEDURE — 76536 US EXAM OF HEAD AND NECK: CPT

## 2019-10-31 ENCOUNTER — TELEPHONE (OUTPATIENT)
Dept: FAMILY MEDICINE CLINIC | Facility: CLINIC | Age: 69
End: 2019-10-31

## 2019-10-31 DIAGNOSIS — K21.9 GASTROESOPHAGEAL REFLUX DISEASE WITHOUT ESOPHAGITIS: ICD-10-CM

## 2019-10-31 RX ORDER — PANTOPRAZOLE SODIUM 40 MG/1
40 TABLET, DELAYED RELEASE ORAL DAILY
Qty: 90 TABLET | Refills: 1 | Status: CANCELLED | OUTPATIENT
Start: 2019-10-31

## 2019-11-02 DIAGNOSIS — I10 ESSENTIAL HYPERTENSION: ICD-10-CM

## 2019-11-03 RX ORDER — LOSARTAN POTASSIUM 25 MG/1
TABLET ORAL
Qty: 90 TABLET | Refills: 1 | Status: SHIPPED | OUTPATIENT
Start: 2019-11-03 | End: 2020-03-15

## 2019-11-08 DIAGNOSIS — K27.9 PUD (PEPTIC ULCER DISEASE): ICD-10-CM

## 2019-11-09 RX ORDER — SUCRALFATE 1 G/1
TABLET ORAL
Qty: 120 TABLET | Refills: 1 | Status: SHIPPED | OUTPATIENT
Start: 2019-11-09 | End: 2020-01-13

## 2019-11-18 ENCOUNTER — TELEPHONE (OUTPATIENT)
Dept: PAIN MEDICINE | Facility: MEDICAL CENTER | Age: 69
End: 2019-11-18

## 2019-11-18 DIAGNOSIS — G89.4 CHRONIC PAIN SYNDROME: ICD-10-CM

## 2019-11-18 RX ORDER — HYDROCODONE BITARTRATE AND ACETAMINOPHEN 10; 325 MG/1; MG/1
TABLET ORAL
Qty: 130 TABLET | Refills: 0 | Status: SHIPPED | OUTPATIENT
Start: 2019-11-19 | End: 2019-12-16 | Stop reason: SDUPTHER

## 2019-11-18 NOTE — TELEPHONE ENCOUNTER
S/w pharmacist at [facility], she said that the pt is requesting her Norco to be filled today, due tomorrow  She said pt last filled 10/21/19 but if pt is taking 5 times a day would only have a 26 day supply  She said if we are going to ok an early fill of today they would need a brand new rx  Will call pt

## 2019-11-18 NOTE — TELEPHONE ENCOUNTER
S/w pt, she said she has been taking the Norco 5 times a day consistently and has been only lasting 28 days with her rx  Pt said she only has 2 tabs left and was hoping to pick it up on the way home from work today at 1  **would need a whole new rx

## 2019-11-18 NOTE — TELEPHONE ENCOUNTER
Pt called and stated her pharmacy will not release her medication until tomorrow  c  Pt would like to know if it can be dated for today so she can pick it up and not have to run late tomorrow    HYDROcodone-acetaminophen (NORCO)  mg per tablet [    The date states 11/19/2019      Pt can be reached at [phone]

## 2019-11-19 DIAGNOSIS — R42 VERTIGO: ICD-10-CM

## 2019-11-19 RX ORDER — MECLIZINE HCL 12.5 MG/1
12.5 TABLET ORAL EVERY 12 HOURS PRN
Qty: 30 TABLET | Refills: 1 | Status: SHIPPED | OUTPATIENT
Start: 2019-11-19 | End: 2019-12-18 | Stop reason: SDUPTHER

## 2019-11-21 ENCOUNTER — CLINICAL SUPPORT (OUTPATIENT)
Dept: FAMILY MEDICINE CLINIC | Facility: CLINIC | Age: 69
End: 2019-11-21
Payer: COMMERCIAL

## 2019-11-21 DIAGNOSIS — Z01.00 DIABETIC EYE EXAM (HCC): Primary | ICD-10-CM

## 2019-11-21 DIAGNOSIS — E53.9 VITAMIN B DEFICIENCY: ICD-10-CM

## 2019-11-21 DIAGNOSIS — E11.9 DIABETIC EYE EXAM (HCC): Primary | ICD-10-CM

## 2019-11-21 PROCEDURE — 96372 THER/PROPH/DIAG INJ SC/IM: CPT

## 2019-11-21 RX ADMIN — CYANOCOBALAMIN 1000 MCG: 1000 INJECTION INTRAMUSCULAR; SUBCUTANEOUS at 08:33

## 2019-12-06 DIAGNOSIS — G47.00 INSOMNIA, UNSPECIFIED TYPE: ICD-10-CM

## 2019-12-06 RX ORDER — TRAZODONE HYDROCHLORIDE 50 MG/1
TABLET ORAL
Qty: 30 TABLET | Refills: 1 | Status: SHIPPED | OUTPATIENT
Start: 2019-12-06 | End: 2020-01-28

## 2019-12-08 DIAGNOSIS — K21.9 GASTROESOPHAGEAL REFLUX DISEASE WITHOUT ESOPHAGITIS: ICD-10-CM

## 2019-12-09 DIAGNOSIS — E03.9 HYPOTHYROIDISM, UNSPECIFIED TYPE: ICD-10-CM

## 2019-12-09 RX ORDER — LEVOTHYROXINE SODIUM 0.03 MG/1
TABLET ORAL
Qty: 30 TABLET | Refills: 2 | Status: SHIPPED | OUTPATIENT
Start: 2019-12-09 | End: 2020-05-06

## 2019-12-09 RX ORDER — RANITIDINE 150 MG/1
150 TABLET ORAL 2 TIMES DAILY
Qty: 180 TABLET | Refills: 1 | Status: SHIPPED | OUTPATIENT
Start: 2019-12-09 | End: 2019-12-18

## 2019-12-11 NOTE — MISCELLANEOUS
Message   Recorded as Task   Date: 10/03/2016 02:22 PM, Created By: Linette Schneider   Task Name: Call Back   Assigned To: Roxy Obando   Regarding Patient: Laurence Soni, Status: Active   [facility] 03 Oct 2016 2:22 PM     TASK CREATED  Caller: Self; Other; [phone] (Home); [phone] (Work)  PT STATED THAT HER PCP WANTED TO DO A BX OF THE NODULES ON HER THYROID BUT THE INSURANCE REFUSED TO COVER IT  THE PT WANTED TO KNOW IF DR Bales 38 THAT SHE GET IT DONE    reviewed that per Dr Maldonado Durand patient can hold on having biopsy and wait to see what CT scan shows - reviewed with patient      Active Problems    1  Acute pain of left foot (729 5) (M79 672)   2  Analgesic use (V58 69) (Z79 899)   3  Anemia (285 9) (D64 9)   4  Anxiety disorder (300 00) (F41 9)   5  Chronic cervical pain (723 1,338 29) (M54 2,G89 29)   6  Chronic cervical radiculopathy (723 4) (M54 12)   7  Chronic myofascial pain (729 1,338 29) (M79 1,G89 29)   8  Diabetic peripheral neuropathy (250 60,357 2) (E11 42)   9  Encounter for long-term opiate analgesic use (V58 69) (Z79 891)   10  Fibromyalgia (729 1) (M79 7)   11  GERD (gastroesophageal reflux disease) (530 81) (K21 9)   12  H/O colonoscopy (V45 89) (Z98 890)   13  Limb pain (729 5) (M79 609)   14  Lymphadenopathy (785 6) (R59 1)   15  MGUS (monoclonal gammopathy of unknown significance) (273 1) (D47 2)   16  Opioid type dependence, continuous use (304 01) (F11 20)   17  Other muscle spasm (728 85) (M62 838)   18  Pain syndrome, chronic (338 4) (G89 4)   19  Pain, upper back (724 5) (M54 9)   20  Spondylosis of cervical region without myelopathy or radiculopathy (721 0) (M47 812)    Current Meds   1  ALPRAZolam 0 5 MG Oral Tablet; Therapy: 19BCF2671 to (Evaluate:57Emh0425) Recorded   2  Baclofen 10 MG Oral Tablet; take 2 po hs  Requested for: 47Meu4805; Last   XE:[id number] Ordered   3   [employer] Vit D 5000 High-Potency 5000 UNIT CAPS; Therapy: (Heide Dee) to Recorded   4  [employer] Vitamin B-12 100 MCG TABS; Therapy: (Heide Dee) to Recorded   5  Hydrocodone-Acetaminophen  MG Oral Tablet; Take 1 pill every 4-6 hours PRN   for pain, max of 5 per day; Last Rx:07Sep2016 Ordered   6  Ipratropium-Albuterol 0 5-2 5 (3) MG/3ML Inhalation Solution; Therapy: 44PDS5342 to (Evaluate:09Jul2015) Recorded   7  Levothyroxine Sodium 150 MCG Oral Tablet; Therapy: (Recorded:07Sep2016) to Recorded   8  Lyrica 75 MG Oral Capsule; TAKE 1 CAPSULE IN THE MORNING AND 2 BEFORE BED; Therapy: 29KAC0528 to (Stephanie Cotter)  Requested for: 82QFA4737; Last   Rx:07Sep2016 Ordered   9  Morphine Sulfate ER 15 MG Oral Tablet Extended Release; Take 1 tablet every 12 hours; Therapy: 97Efa7534 to (Evaluate:07Oct2016); Last Rx:07Sep2016 Ordered   10  OneTouch Ultra Blue In [employer]; Therapy: 59HBD7843 to (Evaluate:13Jan2015) Recorded   11  Pantoprazole Sodium 40 MG Oral Tablet Delayed Release; Therapy: 53CGI7786 to (Evaluate:19Jul2015) Recorded    Allergies    1  Cephalosporins   2  fentanyl   3  Paxil TABS   4  Penicillins   5  Statins   6  Sulfur   7   Wellbutrin TABS    Signatures   Electronically signed by : Nara Randhawa, ; Oct  3 2016  4:01PM EST                       (Author) Full Thickness Lip Wedge Repair (Flap) Text: Given the location of the defect and the proximity to free margins a full thickness wedge repair was deemed most appropriate.  Using a sterile surgical marker, the appropriate repair was drawn incorporating the defect and placing the expected incisions perpendicular to the vermilion border.  The vermilion border was also meticulously outlined to ensure appropriate reapproximation during the repair.  The area thus outlined was incised through and through with a #15 scalpel blade.  The muscularis and dermis were reaproximated with deep sutures following hemostasis. Care was taken to realign the vermilion border before proceeding with the superficial closure.  Once the vermilion was realigned the superfical and mucosal closure was finished.

## 2019-12-16 ENCOUNTER — OFFICE VISIT (OUTPATIENT)
Dept: PAIN MEDICINE | Facility: MEDICAL CENTER | Age: 69
End: 2019-12-16
Payer: COMMERCIAL

## 2019-12-16 VITALS
HEART RATE: 64 BPM | DIASTOLIC BLOOD PRESSURE: 62 MMHG | HEIGHT: 65 IN | BODY MASS INDEX: 27.82 KG/M2 | RESPIRATION RATE: 14 BRPM | SYSTOLIC BLOOD PRESSURE: 138 MMHG | WEIGHT: 167 LBS

## 2019-12-16 DIAGNOSIS — G89.4 CHRONIC PAIN SYNDROME: ICD-10-CM

## 2019-12-16 DIAGNOSIS — M54.12 CERVICAL RADICULOPATHY: ICD-10-CM

## 2019-12-16 DIAGNOSIS — M47.812 SPONDYLOSIS OF CERVICAL REGION WITHOUT MYELOPATHY OR RADICULOPATHY: Primary | ICD-10-CM

## 2019-12-16 DIAGNOSIS — M79.18 MYOFASCIAL PAIN SYNDROME: ICD-10-CM

## 2019-12-16 PROCEDURE — 99214 OFFICE O/P EST MOD 30 MIN: CPT | Performed by: NURSE PRACTITIONER

## 2019-12-16 RX ORDER — PREGABALIN 100 MG/1
100 CAPSULE ORAL 3 TIMES DAILY
Qty: 90 CAPSULE | Refills: 1 | Status: SHIPPED | OUTPATIENT
Start: 2019-12-16 | End: 2020-01-27 | Stop reason: SDUPTHER

## 2019-12-16 RX ORDER — HYDROCODONE BITARTRATE AND ACETAMINOPHEN 10; 325 MG/1; MG/1
TABLET ORAL
Qty: 130 TABLET | Refills: 0 | Status: SHIPPED | OUTPATIENT
Start: 2020-01-13 | End: 2020-02-10 | Stop reason: SDUPTHER

## 2019-12-16 RX ORDER — HYDROCODONE BITARTRATE AND ACETAMINOPHEN 10; 325 MG/1; MG/1
TABLET ORAL
Qty: 130 TABLET | Refills: 0 | Status: SHIPPED | OUTPATIENT
Start: 2019-12-16 | End: 2019-12-16 | Stop reason: SDUPTHER

## 2019-12-16 RX ORDER — BACLOFEN 10 MG/1
10 TABLET ORAL 3 TIMES DAILY
Qty: 90 TABLET | Refills: 1 | Status: SHIPPED | OUTPATIENT
Start: 2019-12-16 | End: 2020-02-10 | Stop reason: SDUPTHER

## 2019-12-16 NOTE — PATIENT INSTRUCTIONS
Opioid Pain Management   AMBULATORY CARE:   An opioid  is a type of medicine used to treat pain  Examples of opioids are oxycodone, morphine, fentanyl, or codeine  Take opioid medicines as directed, for the condition it is prescribed:  Common problems that may occur when you do not take opioid medicines as directed include the following:  · Health problems  may occur  You may have trouble breathing  You may also develop liver or kidney damage, or stomach bleeding  Any of these health problems can become life-threatening  · Opioid dependence  means your body needs the opioid medicine to keep it from going through withdrawal      · Opioid tolerance  means the opioid does not control pain as well as it used to  You need higher doses of the opioid to get pain relief  · Opioid addiction  means you are not able to control the use of the opioid medicine  You use it when you do not have pain  You crave the opioid medicine  Call 911 or have someone call 911 for any of the following:   · You are breathing slower than normal, or you have trouble breathing  · You cannot be awakened  · You have a seizure  Seek care immediately if:   · Your heart is beating slower than usual     · Your heart feels like it is jumping or fluttering  · You are so sleepy that you cannot stay awake  · You have severe muscle pain or weakness  · You see or hear things that are not real   Contact your healthcare provider if:   · You are too dizzy to stand up  · Your pain gets worse or you have new pain  · You cannot do your usual activities because of side effects from the opioid  · You are constipated or have abdominal pain  · You have questions or concerns about your condition or care  Opioid safety measures:   · Take your medicine as directed  Ask if you need more information on how to take your medicine correctly  Follow up with your healthcare provider regularly  You may need to have your dose adjusted   Do not use opioid medicine if you are pregnant or breastfeeding  · Give your healthcare provider a list of all your medicines  Include any over-the-counter medicines, vitamins, and herbs  It can be dangerous to take opioids with certain other medicines, such as antihistamines  · Keep opioid medicine in a safe place  Store your opioid medicine in a locked cabinet to keep it away from children and others  · Do not drink alcohol while you use opioids  Alcohol use with an opioid medicine can make you sleepy and slow your breathing rate  You may stop breathing completely  · Do not drive or operate heavy machinery after you take opioid medicine  Opioid medicine can make you drowsy and make it hard to concentrate  You may injure yourself or others if you drive or operate heavy machinery while taking your medicine  · Drink fluids and eat high-fiber foods  Fluids and fiber will help prevent constipation  Ask your healthcare provider what fluids are right for you and how much you should drink  Also ask for a list of foods that contain fiber  Follow up with your healthcare provider as directed: You may need to have your dose adjusted  You may be referred to a pain specialist  Write down your questions so you remember to ask them during your visits  © 2017 [employer] Information is for End User's use only and may not be sold, redistributed or otherwise used for commercial purposes  All illustrations and images included in CareNotes® are the copyrighted property of [employer]  or Hipolito Joshi  The above information is an  only  It is not intended as medical advice for individual conditions or treatments  Talk to your doctor, nurse or pharmacist before following any medical regimen to see if it is safe and effective for you

## 2019-12-16 NOTE — PROGRESS NOTES
Assessment  1  Spondylosis of cervical region without myelopathy or radiculopathy    2  Myofascial pain syndrome    3  Cervical radiculopathy    4  Chronic pain syndrome        Plan  At this time we will continue with the patient's hydrocodone as prescribed  She was given a 2 month supply of the medication with 1 month having a do not fill date of January 13, 2020  Continue with Lyrica and Baclofen these were also refilled today  Follow-up visit in 8 weeks for medication refills      There are risks associated with opioid medications, including dependence, addiction and tolerance  The patient understands and agrees to use these medications only as prescribed  Potential side effects of the medications include, but are not limited to, constipation, drowsiness, addiction, impaired judgment and risk of fatal overdose if not taken as prescribed  The patient was warned against driving while taking sedation medications  Sharing medications is a felony  At this point in time, the patient is showing no signs of addiction, abuse, diversion or suicidal ideation  [facility] Prescription Drug Monitoring Program report was reviewed and was appropriate         My impressions and treatment recommendations were discussed in detail with the patient who verbalized understanding and had no further questions  Discharge instructions were provided  I personally saw and examined the patient and I agree with the above discussed plan of care  No orders of the defined types were placed in this encounter      New Medications Ordered This Visit   Medications    baclofen 10 mg tablet     Sig: Take 1 tablet (10 mg total) by mouth 3 (three) times a day     Dispense:  90 tablet     Refill:  1    pregabalin (LYRICA) 100 mg capsule     Sig: Take 1 capsule (100 mg total) by mouth 3 (three) times a day     Dispense:  90 capsule     Refill:  1    HYDROcodone-acetaminophen (NORCO)  mg per tablet     Sig: Take 4-5 tablets daily PRN     Dispense:  130 tablet     Refill:  0     Hydrocodone acetaminophen last taken 12/16 AM        History of Present Illness    Ardien Valdes is a 71 y o  female presents for follow-up related to her chronic neck and arm pain as well as her low back and bilateral leg pains  Today she rates her pain 7/10 this is constant and bothersome throughout the day  She describes the pain as burning, dull, aching, sharp, throbbing, and cramping  Patient is also experiencing a lot of knee pain she does see an orthopedic for this already she is putting off having to do a total knee replacement  Patient takes hydrocodone 10/325 mg 4-5 tablets daily along with Lyrica 100 mg 3 times a day and baclofen 10 mg 3 times per day  Her medications provide 40% relief without any side effects or issues  I have personally reviewed and/or updated the patient's past medical history, past surgical history, family history, social history, current medications, allergies, and vital signs today  Review of Systems   Respiratory: Negative for shortness of breath  Cardiovascular: Positive for chest pain  Gastrointestinal: Positive for nausea  Negative for constipation, diarrhea and vomiting  Musculoskeletal: Positive for gait problem, joint swelling (hand knee and thigh, feet and ankles) and myalgias (fingers and feet)  Negative for arthralgias  Skin: Negative for rash  Neurological: Positive for dizziness  Negative for seizures and weakness  All other systems reviewed and are negative        Patient Active Problem List   Diagnosis    Chronic pain syndrome    Cervical radiculopathy    Myofascial pain syndrome    Spondylosis of cervical region without myelopathy or radiculopathy    Fibromyalgia    Diabetic peripheral neuropathy (HCC)    Long-term current use of opiate analgesic    MGUS (monoclonal gammopathy of unknown significance)    Iron deficiency anemia following bariatric surgery    Light headedness  Uncomplicated opioid dependence ([facility] [address] )       Past Medical History:   Diagnosis Date    Anemia     Anxiety     hx of panic attacks (now under control)     Arthritis     Asthma     resolved (no problems in a decade)     Baker's cyst of knee     Bronchitis     Bunion, left foot     Cervical pain     Chronic GERD     Diabetes mellitus, type 2 (HCC)     Diabetic peripheral neuropathy (HCC)     Endometriosis     Fibromyalgia     Hyperlipidemia     Hypertension     Lung nodules     Macular degeneration     Spondylosis        Past Surgical History:   Procedure Laterality Date    BACK SURGERY  1996    L5, S1- laser surgery fusion of c5 and c6     BREAST LUMPECTOMY Right     CHOLECYSTECTOMY  2004    FOOT SURGERY Left 2005    fusion     GASTRIC BYPASS  2010    Dr Alon Guerrero    just uterus     KNEE ARTHROSCOPY      LAMINECTOMY      OVARIAN CYST REMOVAL  1975    PELVIC LAPAROSCOPY      ovaries (x10)     PLEURAL SCARIFICATION  1967    SHOULDER OPEN ROTATOR CUFF REPAIR Left 2008    TONSILLECTOMY      VAGINAL PROLAPSE REPAIR         Family History   Problem Relation Age of Onset    Hypertension Mother     Lung cancer Mother     Hypertension Father     Heart disease Father     Heart attack Father        Social History     Occupational History    Not on file   Tobacco Use    Smoking status: Current Every Day Smoker     Packs/day: 0 50     Years: 40 00     Pack years: 20 00    Smokeless tobacco: Current User   Substance and Sexual Activity    Alcohol use: Not Currently     Alcohol/week: 1 0 standard drinks     Types: 1 Shots of liquor per week     Comment: rarely    Drug use: No    Sexual activity: Not on file       Current Outpatient Medications on File Prior to Visit   Medication Sig    albuterol (VENTOLIN HFA) 90 mcg/act inhaler inhale 2 puff by inhalation route  every 4 - 6 hours as needed    amLODIPine (NORVASC) 5 mg tablet Take 1 tablet (5 mg total) by mouth daily    diphenhydrAMINE (BENADRYL ALLERGY) 25 mg capsule Take by mouth    levothyroxine 25 mcg tablet TAKE 1 TABLET BY MOUTH EVERY MORNING ON SATURDAY, SUNDAY, TUESDAY AND THURSDAY    levothyroxine 50 mcg tablet TAKE 1 TABLET BY ORAL ROUTE EVERY MORNING ON MONDAY, WEDNESDAY AND FRIDAY    losartan (COZAAR) 25 mg tablet TAKE 1 TABLET BY MOUTH EVERY DAY    meclizine (ANTIVERT) 12 5 MG tablet Take 1 tablet (12 5 mg total) by mouth every 12 (twelve) hours as needed for dizziness    pantoprazole (PROTONIX) 40 mg tablet Take 1 tablet (40 mg total) by mouth daily (Patient taking differently: Take 40 mg by mouth 2 (two) times a day )    ranitidine (ZANTAC) 150 mg tablet TAKE 1 CAPSULE (150 MG TOTAL) BY MOUTH 2 (TWO) TIMES A DAY    sucralfate (CARAFATE) 1 g tablet TAKE 1 TABLET BY ORAL ROUTE 4 TIMES EVERY DAY ON AN EMPTY STOMACH 1 HOUR BEFORE MEALS AND AT BEDTIME    traZODone (DESYREL) 50 mg tablet TAKE 1 TABLET BY ORAL ROUTE EVERY DAY AT BEDTIME AS NEEDED    [DISCONTINUED] baclofen 10 mg tablet Take 1 tablet (10 mg total) by mouth 3 (three) times a day    [DISCONTINUED] HYDROcodone-acetaminophen (NORCO)  mg per tablet Take 4-5 tablets daily PRN    [DISCONTINUED] pregabalin (LYRICA) 100 mg capsule Take 1 capsule (100 mg total) by mouth 3 (three) times a day    Lancets Misc  (ACCU-CHEK FASTCLIX LANCET) KIT 3 (three) times a day    MICROLET LANCETS MISC Microlet Lancet     Current Facility-Administered Medications on File Prior to Visit   Medication    cyanocobalamin injection 1,000 mcg       Allergies   Allergen Reactions    Cephalosporins Hives    Erythromycin GI Intolerance    Fentanyl Itching     Occurred during surgery     Paxil [Paroxetine] Hives     After 2 weeks    Penicillins Itching    Pravastatin Myalgia    Prednisolone Itching    Statins Itching    Sulfa Antibiotics Diarrhea    Wellbutrin [Bupropion] Hives     After 2 weeks     Marzena's Wort Rash       Physical Exam    /62 Pulse 64   Resp 14   Ht 5' 5" (1 651 m)   Wt 75 8 kg (167 lb)   LMP  (LMP Unknown)   BMI 27 79 kg/m²     Constitutional: normal, well developed, well nourished, alert, in no distress and non-toxic and no overt pain behavior    Eyes: anicteric  HEENT: grossly intact  Neck: supple, symmetric, trachea midline and no masses   Pulmonary:even and unlabored  Cardiovascular:No edema or pitting edema present  Skin:Normal without rashes or lesions and well hydrated  Psychiatric:Mood and affect appropriate  Neurologic:Cranial Nerves II-XII grossly intact  Musculoskeletal:ambulates with cane    Imaging

## 2019-12-18 DIAGNOSIS — R42 VERTIGO: ICD-10-CM

## 2019-12-18 DIAGNOSIS — K21.9 GASTROESOPHAGEAL REFLUX DISEASE WITHOUT ESOPHAGITIS: ICD-10-CM

## 2019-12-18 RX ORDER — MECLIZINE HCL 12.5 MG/1
12.5 TABLET ORAL EVERY 12 HOURS PRN
Qty: 30 TABLET | Refills: 1 | Status: SHIPPED | OUTPATIENT
Start: 2019-12-18 | End: 2020-01-07 | Stop reason: SDUPTHER

## 2019-12-18 RX ORDER — RANITIDINE 150 MG/1
150 TABLET ORAL 2 TIMES DAILY
Qty: 180 TABLET | Refills: 1 | Status: SHIPPED | OUTPATIENT
Start: 2019-12-18 | End: 2020-03-31

## 2019-12-19 ENCOUNTER — CLINICAL SUPPORT (OUTPATIENT)
Dept: FAMILY MEDICINE CLINIC | Facility: CLINIC | Age: 69
End: 2019-12-19
Payer: COMMERCIAL

## 2019-12-19 DIAGNOSIS — E53.9 VITAMIN B DEFICIENCY: ICD-10-CM

## 2019-12-19 PROCEDURE — 96372 THER/PROPH/DIAG INJ SC/IM: CPT

## 2019-12-19 RX ADMIN — CYANOCOBALAMIN 1000 MCG: 1000 INJECTION INTRAMUSCULAR; SUBCUTANEOUS at 09:25

## 2019-12-23 DIAGNOSIS — E03.9 HYPOTHYROIDISM, UNSPECIFIED TYPE: ICD-10-CM

## 2019-12-23 RX ORDER — LEVOTHYROXINE SODIUM 0.05 MG/1
TABLET ORAL
Qty: 30 TABLET | Refills: 2 | Status: SHIPPED | OUTPATIENT
Start: 2019-12-23 | End: 2020-07-19

## 2020-01-07 DIAGNOSIS — K21.9 GASTROESOPHAGEAL REFLUX DISEASE WITHOUT ESOPHAGITIS: ICD-10-CM

## 2020-01-07 DIAGNOSIS — R42 VERTIGO: ICD-10-CM

## 2020-01-07 DIAGNOSIS — M79.18 MYOFASCIAL PAIN SYNDROME: ICD-10-CM

## 2020-01-07 NOTE — TELEPHONE ENCOUNTER
Patient states per Dr Zainab Sims patient is supposed to be taking 40mg twice a day, and they did the prior auth because her insurance paid for it before

## 2020-01-08 RX ORDER — MECLIZINE HCL 12.5 MG/1
12.5 TABLET ORAL EVERY 12 HOURS PRN
Qty: 30 TABLET | Refills: 1 | Status: SHIPPED | OUTPATIENT
Start: 2020-01-08 | End: 2020-08-27 | Stop reason: HOSPADM

## 2020-01-08 RX ORDER — PANTOPRAZOLE SODIUM 40 MG/1
40 TABLET, DELAYED RELEASE ORAL 2 TIMES DAILY
Qty: 60 TABLET | Refills: 3 | Status: SHIPPED | OUTPATIENT
Start: 2020-01-08 | End: 2020-04-27

## 2020-01-08 RX ORDER — BACLOFEN 10 MG/1
TABLET ORAL
Qty: 90 TABLET | Refills: 0 | OUTPATIENT
Start: 2020-01-08

## 2020-01-13 ENCOUNTER — TELEPHONE (OUTPATIENT)
Dept: PAIN MEDICINE | Facility: MEDICAL CENTER | Age: 70
End: 2020-01-13

## 2020-01-13 DIAGNOSIS — K27.9 PUD (PEPTIC ULCER DISEASE): ICD-10-CM

## 2020-01-13 RX ORDER — SUCRALFATE 1 G/1
TABLET ORAL
Qty: 120 TABLET | Refills: 0 | Status: SHIPPED | OUTPATIENT
Start: 2020-01-13 | End: 2020-02-12

## 2020-01-13 NOTE — TELEPHONE ENCOUNTER
Pharmacy is calling requesting a prior auth for pt's Hydrocodone      Insurance company:[employer]  Member [address] phone: [phone]

## 2020-01-16 ENCOUNTER — OFFICE VISIT (OUTPATIENT)
Dept: FAMILY MEDICINE CLINIC | Facility: CLINIC | Age: 70
End: 2020-01-16
Payer: COMMERCIAL

## 2020-01-16 VITALS
HEIGHT: 65 IN | SYSTOLIC BLOOD PRESSURE: 138 MMHG | RESPIRATION RATE: 14 BRPM | WEIGHT: 165.6 LBS | BODY MASS INDEX: 27.59 KG/M2 | TEMPERATURE: 98.2 F | DIASTOLIC BLOOD PRESSURE: 72 MMHG | HEART RATE: 58 BPM | OXYGEN SATURATION: 96 %

## 2020-01-16 DIAGNOSIS — E11.42 TYPE 2 DIABETES MELLITUS WITH DIABETIC POLYNEUROPATHY, WITHOUT LONG-TERM CURRENT USE OF INSULIN (HCC): ICD-10-CM

## 2020-01-16 DIAGNOSIS — J43.9 PULMONARY EMPHYSEMA, UNSPECIFIED EMPHYSEMA TYPE (HCC): ICD-10-CM

## 2020-01-16 DIAGNOSIS — E78.5 HYPERLIPIDEMIA ASSOCIATED WITH TYPE 2 DIABETES MELLITUS (HCC): ICD-10-CM

## 2020-01-16 DIAGNOSIS — Z12.11 SCREENING FOR COLON CANCER: ICD-10-CM

## 2020-01-16 DIAGNOSIS — F11.20 UNCOMPLICATED OPIOID DEPENDENCE (HCC): ICD-10-CM

## 2020-01-16 DIAGNOSIS — K21.00 GASTROESOPHAGEAL REFLUX DISEASE WITH ESOPHAGITIS: ICD-10-CM

## 2020-01-16 DIAGNOSIS — Z98.84 BARIATRIC SURGERY STATUS: ICD-10-CM

## 2020-01-16 DIAGNOSIS — Z12.31 SCREENING MAMMOGRAM, ENCOUNTER FOR: ICD-10-CM

## 2020-01-16 DIAGNOSIS — E04.2 MULTIPLE THYROID NODULES: ICD-10-CM

## 2020-01-16 DIAGNOSIS — F17.210 CIGARETTE SMOKER: ICD-10-CM

## 2020-01-16 DIAGNOSIS — E11.69 HYPERLIPIDEMIA ASSOCIATED WITH TYPE 2 DIABETES MELLITUS (HCC): ICD-10-CM

## 2020-01-16 DIAGNOSIS — M06.9 RHEUMATOID ARTHRITIS OF HAND, UNSPECIFIED LATERALITY, UNSPECIFIED RHEUMATOID FACTOR PRESENCE: ICD-10-CM

## 2020-01-16 DIAGNOSIS — R91.8 LUNG NODULES: Primary | ICD-10-CM

## 2020-01-16 PROCEDURE — 99214 OFFICE O/P EST MOD 30 MIN: CPT | Performed by: INTERNAL MEDICINE

## 2020-01-16 PROCEDURE — 3008F BODY MASS INDEX DOCD: CPT | Performed by: INTERNAL MEDICINE

## 2020-01-16 PROCEDURE — 96372 THER/PROPH/DIAG INJ SC/IM: CPT | Performed by: INTERNAL MEDICINE

## 2020-01-16 RX ORDER — CYANOCOBALAMIN 1000 UG/ML
1000 INJECTION INTRAMUSCULAR; SUBCUTANEOUS
Status: SHIPPED | OUTPATIENT
Start: 2020-01-16

## 2020-01-16 RX ADMIN — CYANOCOBALAMIN 1000 MCG: 1000 INJECTION INTRAMUSCULAR; SUBCUTANEOUS at 09:16

## 2020-01-16 NOTE — PROGRESS NOTES
Assessment/Plan:         Diagnoses and all orders for this visit:    Lung nodules; stop smoking  Repeat low Dose Ct Lungs Yearly    Multiple thyroid nodules; continue Synthroid  Repeat US Thyroid in 1 year  RTC in 3mos w ;  -     Thyroid Antibodies Panel; Future  -     T4, free; Future  -     TSH, 3rd generation; Future    Screening mammogram, encounter for  -     Mammo screening bilateral w 3d & cad; Future    Gastroesophageal reflux disease with esophagitis;her GERD is Not well controlled with protonix BID and Zantac BID and Carafate  -     Ambulatory referral to Gastroenterology; Future    Screening for colon cancer  -     Ambulatory referral to Gastroenterology; Future    Cigarette smoker; advised to quit      Pulmonary emphysema, unspecified emphysema type ([facility] [address] ); stop smoking  Use Inhalers    Type 2 diabetes mellitus with diabetic polyneuropathy, without long-term current use of insulin ([facility] ); Life style Mod  RTC in 3mos w :  -     Comprehensive metabolic panel; Future  -     CBC and differential; Future  -     Magnesium; Future  -     UA (URINE) with reflex to Scope    Hyperlipidemia associated with type 2 diabetes mellitus ([email or web address] ); Life style Mod  RTC in 3mos w :  -     Lipid panel; Future    Bariatric surgery status; RTC in 3mos w :-     cyanocobalamin injection 1,000 mcg  -     Vitamin A; Future  -     Vitamin E; Future  -     Vitamin B12; Future  -     Vitamin B6; Future  -     Vitamin B1 (Thiamine), Serum/Plasma, LC/MS/MS; Future                        Subjective:      Patient ID: Adrien Valdes is a 71 y o  female  71 y o lady is here for regular check up, she feels Ok, No new symptoms, No recent Blood work, med List reviewed w pt in Detail          The following portions of the patient's history were reviewed and updated as appropriate: allergies, current medications, past family history, past medical history, past social history, past surgical history and problem list     Review of Systems Constitutional: Positive for fatigue  Negative for chills and fever  HENT: Negative for congestion, facial swelling, sore throat, trouble swallowing and voice change  Eyes: Negative for pain, discharge and visual disturbance  Respiratory: Positive for cough  Negative for shortness of breath and wheezing  Cardiovascular: Negative for chest pain, palpitations and leg swelling  Gastrointestinal: Negative for abdominal pain, blood in stool, constipation, diarrhea and nausea  Endocrine: Negative for polydipsia, polyphagia and polyuria  Genitourinary: Negative for difficulty urinating, hematuria and urgency  Musculoskeletal: Negative for arthralgias and myalgias  Skin: Negative for rash  Neurological: Negative for dizziness, tremors, weakness and headaches  Hematological: Negative for adenopathy  Does not bruise/bleed easily  Psychiatric/Behavioral: Negative for dysphoric mood, sleep disturbance and suicidal ideas  Objective:      /72 (BP Location: Left arm, Patient Position: Sitting, Cuff Size: Standard)   Pulse 58   Temp 98 2 °F (36 8 °C) (Tympanic)   Resp 14   Ht 5' 5" (1 651 m)   Wt 75 1 kg (165 lb 9 6 oz)   LMP  (LMP Unknown)   SpO2 96%   BMI 27 56 kg/m²          Physical Exam   Constitutional: She is oriented to person, place, and time  She appears well-nourished  No distress  HENT:   Head: Normocephalic  Mouth/Throat: Oropharynx is clear and moist  No oropharyngeal exudate  Eyes: Pupils are equal, round, and reactive to light  Conjunctivae are normal  No scleral icterus  Neck: Neck supple  No thyromegaly present  Cardiovascular: Normal rate and regular rhythm  Murmur heard  Pulmonary/Chest: Effort normal and breath sounds normal  No respiratory distress  She has no wheezes  She has no rales  Abdominal: Soft  Bowel sounds are normal  She exhibits no distension  There is no tenderness  There is no rebound and no guarding     Musculoskeletal: She exhibits no edema or tenderness  Lymphadenopathy:     She has no cervical adenopathy  Neurological: She is alert and oriented to person, place, and time  No cranial nerve deficit  Coordination normal    Pt uses a cane to support gait      Skin: No rash noted  No erythema  Psychiatric: She has a normal mood and affect

## 2020-01-23 ENCOUNTER — TELEPHONE (OUTPATIENT)
Dept: PAIN MEDICINE | Facility: MEDICAL CENTER | Age: 70
End: 2020-01-23

## 2020-01-23 NOTE — TELEPHONE ENCOUNTER
Laz Argue from [facility] says that pt states Lyrica generic wasn't working for her, pt wants to go back to the Lyrica brand name   Her secondary insurance will cover it but it requires prior auth and MAGAL[employer]    Auth Ph# [id number]  [employer] ID# [id number]

## 2020-01-27 DIAGNOSIS — M54.12 CERVICAL RADICULOPATHY: ICD-10-CM

## 2020-01-27 RX ORDER — PREGABALIN 100 MG/1
100 CAPSULE ORAL 3 TIMES DAILY
Qty: 90 CAPSULE | Refills: 1 | Status: SHIPPED | OUTPATIENT
Start: 2020-01-27 | End: 2020-02-10 | Stop reason: SDUPTHER

## 2020-01-27 NOTE — TELEPHONE ENCOUNTER
Medication approved through [employer] until 1/2021   Pharmacist said a new prescription must be sent stating brand necessary

## 2020-01-28 DIAGNOSIS — G47.00 INSOMNIA, UNSPECIFIED TYPE: ICD-10-CM

## 2020-01-28 RX ORDER — TRAZODONE HYDROCHLORIDE 50 MG/1
TABLET ORAL
Qty: 30 TABLET | Refills: 0 | Status: SHIPPED | OUTPATIENT
Start: 2020-01-28 | End: 2020-02-24

## 2020-02-10 ENCOUNTER — TELEPHONE (OUTPATIENT)
Dept: PAIN MEDICINE | Facility: MEDICAL CENTER | Age: 70
End: 2020-02-10

## 2020-02-10 ENCOUNTER — OFFICE VISIT (OUTPATIENT)
Dept: PAIN MEDICINE | Facility: MEDICAL CENTER | Age: 70
End: 2020-02-10
Payer: COMMERCIAL

## 2020-02-10 VITALS
DIASTOLIC BLOOD PRESSURE: 56 MMHG | HEIGHT: 65 IN | RESPIRATION RATE: 16 BRPM | HEART RATE: 64 BPM | SYSTOLIC BLOOD PRESSURE: 122 MMHG | BODY MASS INDEX: 27.66 KG/M2 | WEIGHT: 166 LBS

## 2020-02-10 DIAGNOSIS — G89.4 CHRONIC PAIN SYNDROME: Primary | ICD-10-CM

## 2020-02-10 DIAGNOSIS — F11.20 UNCOMPLICATED OPIOID DEPENDENCE (HCC): ICD-10-CM

## 2020-02-10 DIAGNOSIS — M47.812 SPONDYLOSIS OF CERVICAL REGION WITHOUT MYELOPATHY OR RADICULOPATHY: ICD-10-CM

## 2020-02-10 DIAGNOSIS — M79.18 MYOFASCIAL PAIN SYNDROME: ICD-10-CM

## 2020-02-10 DIAGNOSIS — M54.12 CERVICAL RADICULOPATHY: ICD-10-CM

## 2020-02-10 DIAGNOSIS — Z79.891 LONG-TERM CURRENT USE OF OPIATE ANALGESIC: ICD-10-CM

## 2020-02-10 PROCEDURE — 1160F RVW MEDS BY RX/DR IN RCRD: CPT | Performed by: NURSE PRACTITIONER

## 2020-02-10 PROCEDURE — 3074F SYST BP LT 130 MM HG: CPT | Performed by: NURSE PRACTITIONER

## 2020-02-10 PROCEDURE — 4004F PT TOBACCO SCREEN RCVD TLK: CPT | Performed by: NURSE PRACTITIONER

## 2020-02-10 PROCEDURE — 3008F BODY MASS INDEX DOCD: CPT | Performed by: NURSE PRACTITIONER

## 2020-02-10 PROCEDURE — 99214 OFFICE O/P EST MOD 30 MIN: CPT | Performed by: NURSE PRACTITIONER

## 2020-02-10 PROCEDURE — 80305 DRUG TEST PRSMV DIR OPT OBS: CPT | Performed by: NURSE PRACTITIONER

## 2020-02-10 PROCEDURE — 4040F PNEUMOC VAC/ADMIN/RCVD: CPT | Performed by: NURSE PRACTITIONER

## 2020-02-10 PROCEDURE — 3078F DIAST BP <80 MM HG: CPT | Performed by: NURSE PRACTITIONER

## 2020-02-10 RX ORDER — PREGABALIN 100 MG/1
100 CAPSULE ORAL 3 TIMES DAILY
Qty: 90 CAPSULE | Refills: 1 | Status: SHIPPED | OUTPATIENT
Start: 2020-02-10 | End: 2020-04-06 | Stop reason: SDUPTHER

## 2020-02-10 RX ORDER — HYDROCODONE BITARTRATE AND ACETAMINOPHEN 10; 325 MG/1; MG/1
TABLET ORAL
Qty: 130 TABLET | Refills: 0 | Status: SHIPPED | OUTPATIENT
Start: 2020-02-10 | End: 2020-02-19 | Stop reason: SDUPTHER

## 2020-02-10 RX ORDER — HYDROCODONE BITARTRATE AND ACETAMINOPHEN 10; 325 MG/1; MG/1
TABLET ORAL
Qty: 130 TABLET | Refills: 0 | Status: SHIPPED | OUTPATIENT
Start: 2020-03-08 | End: 2020-02-10 | Stop reason: SDUPTHER

## 2020-02-10 RX ORDER — BACLOFEN 10 MG/1
10 TABLET ORAL 3 TIMES DAILY
Qty: 90 TABLET | Refills: 1 | Status: SHIPPED | OUTPATIENT
Start: 2020-02-10 | End: 2020-02-10 | Stop reason: SDUPTHER

## 2020-02-10 RX ORDER — BACLOFEN 10 MG/1
10 TABLET ORAL 3 TIMES DAILY
Qty: 90 TABLET | Refills: 1 | Status: SHIPPED | OUTPATIENT
Start: 2020-02-10 | End: 2020-04-30 | Stop reason: SDUPTHER

## 2020-02-10 RX ORDER — HYDROCODONE BITARTRATE AND ACETAMINOPHEN 10; 325 MG/1; MG/1
TABLET ORAL
Qty: 130 TABLET | Refills: 0 | Status: SHIPPED | OUTPATIENT
Start: 2020-02-10 | End: 2020-02-10 | Stop reason: SDUPTHER

## 2020-02-10 NOTE — PATIENT INSTRUCTIONS
Opioid Pain Management   AMBULATORY CARE:   An opioid  is a type of medicine used to treat pain  Examples of opioids are oxycodone, morphine, fentanyl, or codeine  Take opioid medicines as directed, for the condition it is prescribed:  Common problems that may occur when you do not take opioid medicines as directed include the following:  · Health problems  may occur  You may have trouble breathing  You may also develop liver or kidney damage, or stomach bleeding  Any of these health problems can become life-threatening  · Opioid dependence  means your body needs the opioid medicine to keep it from going through withdrawal      · Opioid tolerance  means the opioid does not control pain as well as it used to  You need higher doses of the opioid to get pain relief  · Opioid addiction  means you are not able to control the use of the opioid medicine  You use it when you do not have pain  You crave the opioid medicine  Call 911 or have someone call 911 for any of the following:   · You are breathing slower than normal, or you have trouble breathing  · You cannot be awakened  · You have a seizure  Seek care immediately if:   · Your heart is beating slower than usual     · Your heart feels like it is jumping or fluttering  · You are so sleepy that you cannot stay awake  · You have severe muscle pain or weakness  · You see or hear things that are not real   Contact your healthcare provider if:   · You are too dizzy to stand up  · Your pain gets worse or you have new pain  · You cannot do your usual activities because of side effects from the opioid  · You are constipated or have abdominal pain  · You have questions or concerns about your condition or care  Opioid safety measures:   · Take your medicine as directed  Ask if you need more information on how to take your medicine correctly  Follow up with your healthcare provider regularly  You may need to have your dose adjusted   Do not use opioid medicine if you are pregnant or breastfeeding  · Give your healthcare provider a list of all your medicines  Include any over-the-counter medicines, vitamins, and herbs  It can be dangerous to take opioids with certain other medicines, such as antihistamines  · Keep opioid medicine in a safe place  Store your opioid medicine in a locked cabinet to keep it away from children and others  · Do not drink alcohol while you use opioids  Alcohol use with an opioid medicine can make you sleepy and slow your breathing rate  You may stop breathing completely  · Do not drive or operate heavy machinery after you take opioid medicine  Opioid medicine can make you drowsy and make it hard to concentrate  You may injure yourself or others if you drive or operate heavy machinery while taking your medicine  · Drink fluids and eat high-fiber foods  Fluids and fiber will help prevent constipation  Ask your healthcare provider what fluids are right for you and how much you should drink  Also ask for a list of foods that contain fiber  Follow up with your healthcare provider as directed: You may need to have your dose adjusted  You may be referred to a pain specialist  Write down your questions so you remember to ask them during your visits  © 2017 [employer] Information is for End User's use only and may not be sold, redistributed or otherwise used for commercial purposes  All illustrations and images included in CareNotes® are the copyrighted property of [employer] A [employer] , Inc  or Hipolito Joshi  The above information is an  only  It is not intended as medical advice for individual conditions or treatments  Talk to your doctor, nurse or pharmacist before following any medical regimen to see if it is safe and effective for you

## 2020-02-10 NOTE — TELEPHONE ENCOUNTER
[facility] says [facility]  Is this what AO wants her to take or patches?  [phone]

## 2020-02-10 NOTE — TELEPHONE ENCOUNTER
RN s/w pt regarding previous  Per pt she went to [facility] after seeing AO today and they did give her a script for steroid pills and wanted to know if this is what AO meant  RN advised that she should get script filled and take them for the five days that are prescribed and to not take any nsaids while taking the steroids      Per pt she only takes the medication that AO prescribes for her for pain     --FYI--  RN told pt to start the steroids that were prescribed by [facility]

## 2020-02-10 NOTE — PROGRESS NOTES
Assessment  1  Chronic pain syndrome    2  Long-term current use of opiate analgesic    3  Uncomplicated opioid dependence (HCC)    4  Cervical radiculopathy    5  Myofascial pain syndrome    6  Spondylosis of cervical region without myelopathy or radiculopathy        Plan  At this time we will continue with the patient's hydrocodone as prescribed  She was given a 2 month supply of the medication with one month having a do not fill date of march 8, 2020  Continue with baclofen and lyrica, both were refilled today  While the patient was in the office today an opioid contract was thoroughly reviewed and signed by the patient  The patient was given adequate time to ask questions in regards to the contract and a signed copy was sent home for his/her records  Follow up in 8 weeks for medication refills  There are risks associated with opioid medications, including dependence, addiction and tolerance  The patient understands and agrees to use these medications only as prescribed  Potential side effects of the medications include, but are not limited to, constipation, drowsiness, addiction, impaired judgment and risk of fatal overdose if not taken as prescribed  The patient was warned against driving while taking sedation medications  Sharing medications is a felony  At this point in time, the patient is showing no signs of addiction, abuse, diversion or suicidal ideation  A urine drug screen was collected at today's office visit as part of our medication management protocol  The point of care testing results were appropriate for what was being prescribed  The specimen will be sent for confirmatory testing  The drug screen is medically necessary because the patient is either dependent on opioid medication or is being considered for opioid medication therapy and the results could impact ongoing or future treatment   The drug screen is to evaluate for the presences or absence of prescribed, non-prescribed, and/or illicit drugs/substances  [place] Prescription Drug Monitoring Program report was reviewed and was appropriate         My impressions and treatment recommendations were discussed in detail with the patient who verbalized understanding and had no further questions  Discharge instructions were provided  I personally saw and examined the patient and I agree with the above discussed plan of care      Orders Placed This Encounter   Procedures    MM ALL_Prescribed Meds and Special Instructions    MM DT_Alprazolam Definitive Test    MM DT_Amphetamine Definitive Test    MM DT_Aripiprazole Definitive Test    MM DT_Bath Salts Definitive Test    MM DT_Buprenorphine Definitive Test    MM DT_Butalbital Definitive Test    MM DT_Clonazepam Definitive Test    MM DT_Clozapine Definitive Test    MM DT_Cocaine Definitive Test    MM DT_Codeine Definitive Test    MM DT_Desipramine Definitive Test    MM DT_Dextromethorphan Definitive Test    MM Diazepam Definitive Test    MM DT_Ethyl Glucuronide/Ethyl Sulfate Definitive Test    MM DT_Fentanyl Definitive Test    MM DT_Heroin Definitive Test    MM DT_Hydrocodone Definitive Test    MM DT_Hydromorphone Definitive Test    MM DT_Kratom Definitive Test    MM DT_Levorphanol Definitive Test    MM Lorazepam Definitive Test    MM DT_MDMA Definitive Test    MM DT_Meperidine Definitive Test    MM DT_Methadone Definitive Test    MM DT_Methamphetamine Definitive Test    MM DT_Methylphenidate Definitive Test    MM DT_Morphine Definitive Test    MM DT_Olanzapine Definitive Test    MM DT_Oxazepam Definitive Test    MM DT_Oxycodone Definitive Test    MM DT_Oxymorphone Definitive Test    MM DT_Phenobarbital Definitive Test    MM DT_Phentermine Definitive Test    MM DT_Secobarbital Definitive Test    MM DT_Spice Definitive Test    MM DT_Tapentadol Definitive Test    MM DT_Temazapam Definitive Test    MM DT_THC Definitive Test    MM DT_Tramadol Definitive Test    MM DT_Perform Validity Testing     New Medications Ordered This Visit   Medications    pregabalin (LYRICA) 100 mg capsule     Sig: Take 1 capsule (100 mg total) by mouth 3 (three) times a day     Dispense:  90 capsule     Refill:  1     Brand necessary, no generic   baclofen 10 mg tablet     Sig: Take 1 tablet (10 mg total) by mouth 3 (three) times a day     Dispense:  90 tablet     Refill:  1    HYDROcodone-acetaminophen (NORCO)  mg per tablet     Sig: Take 4-5 tablets daily PRN     Dispense:  130 tablet     Refill:  0     Hydrocodone last taken 2/10/2020  New UDS and Agreement today        History of Present Illness    Nelsy Garcia is a 71 y o  female presents for follow up related to her chronic neck and low back pain  Today she rates her pain 9/10  This is constant and described as burning, dull, aching, sharp, throbbing, cramping, pressure-like, shooting, numbness, and pins and needles  Patient continues to take Lyrica 100 mg 3 times a day along with hydrocodone 10/325 mg 4-5 times daily as needed and baclofen 10 mg 3 times per day  She does report 30% relief without any side effects or issues from her medication regimen  Patient did have a fall at work on December 20, 2019 she tells me that they were under construction and she tripped over a loose tile  Ever since this visit she has been having an increase in her low back and bilateral knee pain  She has also been having worsening bilateral shoulder pain with the right side being the worst   This is a workmen's comp case so she has been having to see their providers until 3 months has past   She tells me that they have not really done anything for her except send her to physical therapy  She does feel that she has a new radiating right leg pain from her low back to her foot she also feels tingling  She tells me that no imaging has been completed yet      Patient has been using a TENS unit which she got from physical therapy  I have personally reviewed and/or updated the patient's past medical history, past surgical history, family history, social history, current medications, allergies, and vital signs today  Review of Systems   Respiratory: Negative for shortness of breath  Cardiovascular: Negative for chest pain  Gastrointestinal: Negative for constipation, diarrhea, nausea and vomiting  Musculoskeletal: Positive for back pain (hands and feet), gait problem, joint swelling (knees, thighs and ankles), myalgias, neck pain and neck stiffness  Negative for arthralgias  Skin: Negative for rash  Neurological: Negative for dizziness, seizures and weakness  All other systems reviewed and are negative        Patient Active Problem List   Diagnosis    Chronic pain syndrome    Cervical radiculopathy    Myofascial pain syndrome    Spondylosis of cervical region without myelopathy or radiculopathy    Fibromyalgia    Diabetic peripheral neuropathy (HCC)    Long-term current use of opiate analgesic    MGUS (monoclonal gammopathy of unknown significance)    Iron deficiency anemia following bariatric surgery    Light headedness    Uncomplicated opioid dependence ([address] )    Rheumatoid arthritis of hand ([address] )    Pulmonary emphysema ([address] )       Past Medical History:   Diagnosis Date    Anemia     Anxiety     hx of panic attacks (now under control)     Arthritis     Asthma     resolved (no problems in a decade)     Baker's cyst of knee     Bronchitis     Bunion, left foot     Cervical pain     Chronic GERD     Chronic pain disorder     Depression     Diabetes mellitus, type 2 (HCC)     Diabetic peripheral neuropathy (HCC)     Endometriosis     Fibromyalgia     Hyperlipidemia     Hypertension     Joint pain     Low back pain     Lung nodules     Macular degeneration     Pulmonary emphysema ([address] ) 1/16/2020    Spondylosis        Past Surgical History:   Procedure Laterality Date    BACK SURGERY 1996    L5, S1- laser surgery fusion of c5 and c6     BREAST LUMPECTOMY Right     CHOLECYSTECTOMY  2004    FOOT SURGERY Left 2005    fusion     GASTRIC BYPASS  2010    Dr Arellano Marking    just uterus     KNEE ARTHROSCOPY      LAMINECTOMY      OVARIAN CYST REMOVAL  1975    PELVIC LAPAROSCOPY      ovaries (x10)     PLEURAL SCARIFICATION  1967    SHOULDER OPEN ROTATOR CUFF REPAIR Left 2008    TONSILLECTOMY      VAGINAL PROLAPSE REPAIR         Family History   Problem Relation Age of Onset    Hypertension Mother     Lung cancer Mother     Hypertension Father     Heart disease Father     Heart attack Father        Social History     Occupational History    Not on file   Tobacco Use    Smoking status: Current Every Day Smoker     Packs/day: 0 50     Years: 40 00     Pack years: 20 00    Smokeless tobacco: Current User   Substance and Sexual Activity    Alcohol use: Not Currently     Alcohol/week: 1 0 standard drinks     Types: 1 Shots of liquor per week     Comment: rarely    Drug use: No    Sexual activity: Not Currently       Current Outpatient Medications on File Prior to Visit   Medication Sig    albuterol (VENTOLIN HFA) 90 mcg/act inhaler inhale 2 puff by inhalation route  every 4 - 6 hours as needed    amLODIPine (NORVASC) 5 mg tablet Take 1 tablet (5 mg total) by mouth daily    diphenhydrAMINE (BENADRYL ALLERGY) 25 mg capsule Take by mouth    levothyroxine 25 mcg tablet TAKE 1 TABLET BY MOUTH EVERY MORNING ON SATURDAY, SUNDAY, TUESDAY AND THURSDAY    levothyroxine 50 mcg tablet TAKE 1 TABLET BY ORAL ROUTE EVERY MORNING ON MONDAY, WEDNESDAY AND FRIDAY    losartan (COZAAR) 25 mg tablet TAKE 1 TABLET BY MOUTH EVERY DAY    meclizine (ANTIVERT) 12 5 MG tablet Take 1 tablet (12 5 mg total) by mouth every 12 (twelve) hours as needed for dizziness    pantoprazole (PROTONIX) 40 mg tablet Take 1 tablet (40 mg total) by mouth 2 (two) times a day    ranitidine (ZANTAC) 150 mg tablet Take 1 tablet (150 mg total) by mouth 2 (two) times a day    sucralfate (CARAFATE) 1 g tablet TAKE 1 TABLET BY ORAL ROUTE 4 TIMES EVERY DAY ON AN EMPTY STOMACH 1 HOUR BEFORE MEALS AND AT BEDTIME    traZODone (DESYREL) 50 mg tablet TAKE 1 TABLET BY ORAL ROUTE EVERY DAY AT BEDTIME AS NEEDED    [DISCONTINUED] baclofen 10 mg tablet Take 1 tablet (10 mg total) by mouth 3 (three) times a day    [DISCONTINUED] HYDROcodone-acetaminophen (NORCO)  mg per tablet Take 4-5 tablets daily PRN    [DISCONTINUED] pregabalin (LYRICA) 100 mg capsule Take 1 capsule (100 mg total) by mouth 3 (three) times a day    Lancets Misc  (ACCU-CHEK FASTCLIX LANCET) KIT 3 (three) times a day    MICROLET LANCETS MISC Microlet Lancet     Current Facility-Administered Medications on File Prior to Visit   Medication    cyanocobalamin injection 1,000 mcg    cyanocobalamin injection 1,000 mcg       Allergies   Allergen Reactions    Cephalosporins Hives    Erythromycin GI Intolerance    Fentanyl Itching     Occurred during surgery     Paxil [Paroxetine] Hives     After 2 weeks    Penicillins Itching    Pravastatin Myalgia    Prednisolone Itching    Statins Itching    Sulfa Antibiotics Diarrhea    Wellbutrin [Bupropion] Hives     After 2 weeks     Marznea's Wort Rash       Physical Exam    /56   Pulse 64   Resp 16   Ht 5' 5" (1 651 m)   Wt 75 3 kg (166 lb)   LMP  (LMP Unknown)   BMI 27 62 kg/m²     Constitutional: normal, well developed, well nourished, alert, in no distress and non-toxic and no overt pain behavior    Eyes: anicteric  HEENT: grossly intact  Neck: supple, symmetric, trachea midline and no masses   Pulmonary:even and unlabored  Cardiovascular:No edema or pitting edema present  Skin:Normal without rashes or lesions and well hydrated  Psychiatric:Mood and affect appropriate  Neurologic:Cranial Nerves II-XII grossly intact  Musculoskeletal:ambulates with cane    Imaging

## 2020-02-12 DIAGNOSIS — K27.9 PUD (PEPTIC ULCER DISEASE): ICD-10-CM

## 2020-02-12 RX ORDER — SUCRALFATE 1 G/1
TABLET ORAL
Qty: 120 TABLET | Refills: 0 | Status: SHIPPED | OUTPATIENT
Start: 2020-02-12 | End: 2020-03-09

## 2020-02-13 ENCOUNTER — OFFICE VISIT (OUTPATIENT)
Dept: FAMILY MEDICINE CLINIC | Facility: CLINIC | Age: 70
End: 2020-02-13
Payer: OTHER MISCELLANEOUS

## 2020-02-13 VITALS
DIASTOLIC BLOOD PRESSURE: 54 MMHG | HEIGHT: 65 IN | WEIGHT: 165.6 LBS | SYSTOLIC BLOOD PRESSURE: 124 MMHG | RESPIRATION RATE: 14 BRPM | OXYGEN SATURATION: 98 % | TEMPERATURE: 98.2 F | HEART RATE: 68 BPM | BODY MASS INDEX: 27.59 KG/M2

## 2020-02-13 DIAGNOSIS — M25.562 ACUTE PAIN OF BOTH KNEES: ICD-10-CM

## 2020-02-13 DIAGNOSIS — M25.561 ACUTE PAIN OF BOTH KNEES: ICD-10-CM

## 2020-02-13 DIAGNOSIS — M25.511 ACUTE PAIN OF BOTH SHOULDERS: Primary | ICD-10-CM

## 2020-02-13 DIAGNOSIS — M54.16 LUMBAR RADICULOPATHY: ICD-10-CM

## 2020-02-13 DIAGNOSIS — M25.512 ACUTE PAIN OF BOTH SHOULDERS: Primary | ICD-10-CM

## 2020-02-13 DIAGNOSIS — M54.41 ACUTE MIDLINE LOW BACK PAIN WITH RIGHT-SIDED SCIATICA: ICD-10-CM

## 2020-02-13 DIAGNOSIS — G57.91 NEUROPATHY OF RIGHT LOWER EXTREMITY: ICD-10-CM
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
## 2020-02-13 DIAGNOSIS — M54.6 ACUTE MIDLINE THORACIC BACK PAIN: ICD-10-CM

## 2020-02-13 PROCEDURE — 3008F BODY MASS INDEX DOCD: CPT | Performed by: INTERNAL MEDICINE

## 2020-02-13 PROCEDURE — 4040F PNEUMOC VAC/ADMIN/RCVD: CPT | Performed by: INTERNAL MEDICINE

## 2020-02-13 PROCEDURE — 4004F PT TOBACCO SCREEN RCVD TLK: CPT | Performed by: INTERNAL MEDICINE

## 2020-02-13 PROCEDURE — 1160F RVW MEDS BY RX/DR IN RCRD: CPT | Performed by: INTERNAL MEDICINE

## 2020-02-13 PROCEDURE — 3074F SYST BP LT 130 MM HG: CPT | Performed by: INTERNAL MEDICINE

## 2020-02-13 PROCEDURE — 99214 OFFICE O/P EST MOD 30 MIN: CPT | Performed by: INTERNAL MEDICINE

## 2020-02-13 PROCEDURE — 3078F DIAST BP <80 MM HG: CPT | Performed by: INTERNAL MEDICINE

## 2020-02-13 NOTE — PROGRESS NOTES
Assessment/Plan:         Diagnoses and all orders for this visit:    Acute pain of both shoulders; Due to work Injury  Stay off work  STAT ;  -     XR shoulder 2+ vw right; Future  -     XR shoulder 2+ vw left; Future  Continue [place] and Lyrica  RTC in 4-6 weeks    Acute pain of both knees; due to work injury  Stay off work  STAt ;  -     XR knee 4+ vw left injury; Future  -     XR knee 4+ vw right injury; Future    Acute midline thoracic back pain; Due to work Injury  As above    Iron Rahman -     XR spine thoracic 3 vw; Future    Acute midline low back pain with right-sided sciatica; due to work injury  -     XR spine lumbar 2 or 3 views injury; Future  -     XR hip/pelv 2-3 vws left if performed; Future    Lumbar radiculopathy; has gotten a lot worse Due to work injury  As above     -     XR spine lumbar 2 or 3 views injury; Future  -     EMG 2 limb lower extremity; Future    Neuropathy of right lower extremity; Due to work injury  -     EMG 2 limb lower extremity; Future  RTc in 4-6 weeks    Work related injury; which has caused the above Issues    Subjective:      Patient ID: Thor Randall is a 71 y o  female  71 Y O lady is here for FU on Work injury, since the accident at work she has been suffering of Multiple issues, as per R O S ,   She was seen by Urgent care yet no Improvements,    The following portions of the patient's history were reviewed and updated as appropriate: allergies, current medications, past family history, past medical history, past social history, past surgical history and problem list     Review of Systems   Constitutional: Negative for chills, fatigue and fever  HENT: Negative for congestion, facial swelling, sore throat, trouble swallowing and voice change  Eyes: Negative for pain, discharge and visual disturbance  Respiratory: Negative for cough, shortness of breath and wheezing  Cardiovascular: Negative for chest pain, palpitations and leg swelling  Gastrointestinal: Negative for abdominal pain, blood in stool, constipation, diarrhea and nausea  Endocrine: Negative for polydipsia, polyphagia and polyuria  Genitourinary: Negative for difficulty urinating, hematuria and urgency  Musculoskeletal: Positive for back pain and neck pain  Negative for arthralgias and myalgias  Skin: Negative for rash  Neurological: Positive for numbness  Negative for dizziness, tremors, weakness and headaches  Hematological: Negative for adenopathy  Does not bruise/bleed easily  Psychiatric/Behavioral: Negative for dysphoric mood, sleep disturbance and suicidal ideas  Objective:      /54 (BP Location: Left arm, Patient Position: Sitting, Cuff Size: Standard)   Pulse 68   Temp 98 2 °F (36 8 °C) (Probe)   Resp 14   Ht 5' 5" (1 651 m)   Wt 75 1 kg (165 lb 9 6 oz)   LMP  (LMP Unknown)   SpO2 98%   BMI 27 56 kg/m²          Physical Exam   Constitutional: She is oriented to person, place, and time  She appears well-nourished  No distress  HENT:   Head: Normocephalic  Mouth/Throat: Oropharynx is clear and moist  No oropharyngeal exudate  Eyes: Pupils are equal, round, and reactive to light  Conjunctivae are normal  No scleral icterus  Neck: Neck supple  No thyromegaly present  Cardiovascular: Normal rate, regular rhythm and normal heart sounds  No murmur heard  Pulmonary/Chest: Effort normal and breath sounds normal  No respiratory distress  She has no wheezes  She has no rales  Abdominal: Soft  Bowel sounds are normal  She exhibits no distension  There is no tenderness  There is no rebound and no guarding  Musculoskeletal: She exhibits tenderness  She exhibits no edema  Increased Bilt knee pain and Bilt shoulder pain   Lymphadenopathy:     She has no cervical adenopathy  Neurological: She is alert and oriented to person, place, and time     Increased lower and Mid Back pain  Increased Lumbar radiculopathy and Right lower ext Neuropathy  Increased Mid back pain and Muscle spasm     Psychiatric: She has a normal mood and affect

## 2020-02-13 NOTE — LETTER
February 13, 2020     Patient: Matty Wade   YOB: 1950   Date of Visit: 2/13/2020       To Whom it May Concern:    Julisa Clark is under my professional care  She was seen in my office on 2/13/2020  She is to be off work pending re-evaluation on 03/30/2020 at 9:90 am      If you have any questions or concerns, please don't hesitate to call           Sincerely,          Ej Hernandez MD        CC:

## 2020-02-14 ENCOUNTER — DOCUMENTATION (OUTPATIENT)
Dept: FAMILY MEDICINE CLINIC | Facility: CLINIC | Age: 70
End: 2020-02-14

## 2020-02-17 ENCOUNTER — TELEPHONE (OUTPATIENT)
Dept: PAIN MEDICINE | Facility: MEDICAL CENTER | Age: 70
End: 2020-02-17

## 2020-02-17 NOTE — TELEPHONE ENCOUNTER
Pharmacy called to update correct [facility] [address], [place]     Please update      Best contact # for [phone]

## 2020-02-18 ENCOUNTER — TELEPHONE (OUTPATIENT)
Dept: HEMATOLOGY ONCOLOGY | Facility: CLINIC | Age: 70
End: 2020-02-18

## 2020-02-18 NOTE — TELEPHONE ENCOUNTER
LVM informing Beatriz Rachel that her appt on 3/26/2020 with MONSTER Palacios at the [facility] location needs to be r/s due to [address] [place] [address] not seeing patients at the [facility] location for the time being  Beatriz Ramos new appt is set for 3/27/2020 at 1:30pm with MONSTER Castellanos Cera at the [facility] location  If Beatriz Ramos should call back and this new appt date and time does not work for her schedule, please r/s her to a more accommodating appt  Please and thank you

## 2020-02-19 ENCOUNTER — TRANSCRIBE ORDERS (OUTPATIENT)
Dept: ADMINISTRATIVE | Facility: HOSPITAL | Age: 70
End: 2020-02-19

## 2020-02-19 ENCOUNTER — HOSPITAL ENCOUNTER (OUTPATIENT)
Dept: RADIOLOGY | Facility: HOSPITAL | Age: 70
Discharge: HOME | End: 2020-02-19
Payer: OTHER MISCELLANEOUS

## 2020-02-19 DIAGNOSIS — M25.511 ACUTE PAIN OF BOTH SHOULDERS: ICD-10-CM

## 2020-02-19 DIAGNOSIS — M25.512 ACUTE PAIN OF BOTH SHOULDERS: ICD-10-CM

## 2020-02-19 DIAGNOSIS — M54.41 ACUTE MIDLINE LOW BACK PAIN WITH RIGHT-SIDED SCIATICA: ICD-10-CM

## 2020-02-19 DIAGNOSIS — G89.4 CHRONIC PAIN SYNDROME: ICD-10-CM

## 2020-02-19 DIAGNOSIS — M25.562 ACUTE PAIN OF BOTH KNEES: ICD-10-CM

## 2020-02-19 DIAGNOSIS — G89.4 CHRONIC PAIN SYNDROME: Primary | ICD-10-CM

## 2020-02-19 DIAGNOSIS — M25.562 LEFT KNEE PAIN, UNSPECIFIED CHRONICITY: Primary | ICD-10-CM

## 2020-02-19 DIAGNOSIS — M25.562 LEFT KNEE PAIN, UNSPECIFIED CHRONICITY: ICD-10-CM

## 2020-02-19 DIAGNOSIS — M54.16 LUMBAR RADICULOPATHY: ICD-10-CM

## 2020-02-19 DIAGNOSIS — M54.6 ACUTE MIDLINE THORACIC BACK PAIN: ICD-10-CM

## 2020-02-19 DIAGNOSIS — M25.561 ACUTE PAIN OF BOTH KNEES: ICD-10-CM

## 2020-02-19 LAB
6MAM UR QL CFM: NEGATIVE NG/ML
7AMINOCLONAZEPAM UR QL CFM: NEGATIVE NG/ML
A-OH ALPRAZ UR QL CFM: NEGATIVE NG/ML
ACCEPTABLE CREAT UR QL: NORMAL MG/DL
ACCEPTIBLE SP GR UR QL: NORMAL
AMITRIP UR QL CFM: ABNORMAL NG/ML
AMPHET UR QL CFM: NEGATIVE NG/ML
BUPRENORPHINE UR QL CFM: NEGATIVE NG/ML
BUTALBITAL UR QL CFM: NEGATIVE NG/ML
BZE UR QL CFM: NEGATIVE NG/ML
CARISOPRODOL UR QL CFM: NEGATIVE NG/ML
CODEINE UR QL CFM: NEGATIVE NG/ML
DESIPRAMINE UR QL CFM: NEGATIVE NG/ML
EDDP UR QL CFM: NEGATIVE NG/ML
ETHYL GLUCURONIDE UR QL CFM: NEGATIVE NG/ML
ETHYL SULFATE UR QL SCN: NEGATIVE NG/ML
FENTANYL UR QL CFM: NEGATIVE NG/ML
GLIADIN IGG SER IA-ACNC: NEGATIVE NG/ML
HYDROCODONE UR CFM-MCNC: 2025.78 NG/ML
HYDROMORPHONE UR CFM-MCNC: 92.14 NG/ML
IMIPRAMINE UR QL CFM: NEGATIVE NG/ML
LORAZEPAM UR QL CFM: NEGATIVE NG/ML
MDMA UR QL CFM: NEGATIVE NG/ML
ME-PHENIDATE UR QL CFM: NEGATIVE NG/ML
MEPROBAMATE UR QL CFM: NEGATIVE NG/ML
METHADONE UR QL CFM: NEGATIVE NG/ML
METHAMPHET UR QL CFM: NEGATIVE NG/ML
MORPHINE UR QL CFM: NEGATIVE NG/ML
NITRITE UR QL: NORMAL UG/ML
NORBUPRENORPHINE UR QL CFM: NEGATIVE NG/ML
NORDIAZEPAM UR QL CFM: NEGATIVE NG/ML
NORFENTANYL UR QL CFM: NEGATIVE NG/ML
NORHYDROCODONE UR CFM-MCNC: 1615.27 NG/ML
NOROXYCODONE UR QL CFM: NEGATIVE NG/ML
NORTRIP UR QL CFM: ABNORMAL NG/ML
OXAZEPAM UR QL CFM: NEGATIVE NG/ML
OXYCODONE UR QL CFM: NEGATIVE NG/ML
OXYMORPHONE UR QL CFM: NEGATIVE NG/ML
PCP UR QL CFM: NEGATIVE NG/ML
PHENOBARB UR QL CFM: NEGATIVE NG/ML
SECOBARBITAL UR QL CFM: NEGATIVE NG/ML
SL AMB 3-METHYL-FENTANYL QUANTIFICATION: NORMAL NG/ML
SL AMB 4-ANPP QUANTIFICATION: NORMAL NG/ML
SL AMB 4-FIBF QUANTIFICATION: NORMAL NG/ML
SL AMB 7-OH-MITRAGYNINE (KRATOM ALKALOID) QUANTIFICATION: NEGATIVE NG/ML
SL AMB ACETYL FENTANYL QUANTIFICATION: NORMAL NG/ML
SL AMB ACETYL NORFENTANYL QUANTIFICATION: NORMAL NG/ML
SL AMB ACRYL FENTANYL QUANTIFICATION: NORMAL NG/ML
SL AMB BUTRYL FENTANYL QUANTIFICATION: NORMAL NG/ML
SL AMB CARFENTANIL QUANTIFICATION: NORMAL NG/ML
SL AMB CTHC (MARIJUANA METABOLITE) QUANTIFICATION: NEGATIVE NG/ML
SL AMB CYCLOPROPYL FENTANYL QUANTIFICATION: NORMAL NG/ML
SL AMB FURANYL FENTANYL QUANTIFICATION: NORMAL NG/ML
SL AMB METHOXYACETYL FENTANYL QUANTIFICATION: NORMAL NG/ML
SL AMB N-DESMETHYL U-47700 QUANTIFICATION: NORMAL NG/ML
SL AMB N-DESMETHYL-TRAMADOL QUANTIFICATION: NEGATIVE NG/ML
SL AMB RITALINIC ACID QUANTIFICATION: NEGATIVE NG/ML
SL AMB U-47700 QUANTIFICATION: NORMAL NG/ML
SPECIMEN PH ACCEPTABLE UR: NORMAL
TAPENTADOL UR QL CFM: NEGATIVE NG/ML
TEMAZEPAM UR QL CFM: NEGATIVE NG/ML
TRAMADOL UR QL CFM: NEGATIVE NG/ML
URATE/CREAT 24H UR: NEGATIVE NG/ML

## 2020-02-19 PROCEDURE — 72100 X-RAY EXAM L-S SPINE 2/3 VWS: CPT

## 2020-02-19 PROCEDURE — 72072 X-RAY EXAM THORAC SPINE 3VWS: CPT

## 2020-02-19 PROCEDURE — 73030 X-RAY EXAM OF SHOULDER: CPT

## 2020-02-19 PROCEDURE — 73502 X-RAY EXAM HIP UNI 2-3 VIEWS: CPT

## 2020-02-19 PROCEDURE — 73564 X-RAY EXAM KNEE 4 OR MORE: CPT

## 2020-02-19 RX ORDER — HYDROCODONE BITARTRATE AND ACETAMINOPHEN 10; 325 MG/1; MG/1
TABLET ORAL
Qty: 130 TABLET | Refills: 0 | Status: SHIPPED | OUTPATIENT
Start: 2020-03-08 | End: 2020-04-06 | Stop reason: SDUPTHER

## 2020-02-19 NOTE — TELEPHONE ENCOUNTER
RN attempted to reach pt regarding previous  Marsa Daub, man who answered pt's phone took a message for pt to call back when she arrives home

## 2020-02-19 NOTE — TELEPHONE ENCOUNTER
[facility] Brennon called stating that the script for [facility] for next month need to be sent to [facility] on [address] [facility] [address] [facility] [address] [facility] and does not want to go there next month for her script

## 2020-02-24 DIAGNOSIS — G47.00 INSOMNIA, UNSPECIFIED TYPE: ICD-10-CM

## 2020-02-24 RX ORDER — TRAZODONE HYDROCHLORIDE 50 MG/1
TABLET ORAL
Qty: 30 TABLET | Refills: 0 | Status: SHIPPED | OUTPATIENT
Start: 2020-02-24 | End: 2020-03-15

## 2020-03-02 ENCOUNTER — TELEPHONE (OUTPATIENT)
Dept: PAIN MEDICINE | Facility: MEDICAL CENTER | Age: 70
End: 2020-03-02

## 2020-03-02 NOTE — TELEPHONE ENCOUNTER
[address] [facility]  [place]     Patient is calling in stating that the pregabalin (LYRICA) 100 mg capsule  Is requesting for a prior auth  Also Osvaldo Robert will fax over the paperwork for this        Medicare parts D: [phone]

## 2020-03-05 NOTE — TELEPHONE ENCOUNTER
Roxy from [employer] called with update on medication Lyrica prior authorization  She states it has been aproved valid from 1/1/20 - 3/3/21  Any questions, call her at # [phone]

## 2020-03-09 DIAGNOSIS — K27.9 PUD (PEPTIC ULCER DISEASE): ICD-10-CM
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
## 2020-03-09 DIAGNOSIS — M25.561 ACUTE PAIN OF RIGHT KNEE: Primary | ICD-10-CM

## 2020-03-09 RX ORDER — SUCRALFATE 1 G/1
TABLET ORAL
Qty: 120 TABLET | Refills: 0 | Status: SHIPPED | OUTPATIENT
Start: 2020-03-09 | End: 2020-04-07

## 2020-03-09 NOTE — TELEPHONE ENCOUNTER
Call from [facility] [employer]  Prior auth needed for HYDROcodone-acetaminophen [facility])  mg per tablet   Needs medical exception from provider      [facility] insurance member ID# [id number]  PHONE # [phone]

## 2020-03-10 ENCOUNTER — HOSPITAL ENCOUNTER (OUTPATIENT)
Dept: RADIOLOGY | Facility: HOSPITAL | Age: 70
Discharge: HOME | End: 2020-03-10
Payer: OTHER MISCELLANEOUS

## 2020-03-10 ENCOUNTER — TELEPHONE (OUTPATIENT)
Dept: FAMILY MEDICINE CLINIC | Facility: CLINIC | Age: 70
End: 2020-03-10

## 2020-03-10 DIAGNOSIS — M25.561 ACUTE PAIN OF RIGHT KNEE: ICD-10-CM
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
## 2020-03-10 PROCEDURE — 73502 X-RAY EXAM HIP UNI 2-3 VIEWS: CPT

## 2020-03-10 PROCEDURE — 73564 X-RAY EXAM KNEE 4 OR MORE: CPT

## 2020-03-10 PROCEDURE — 73552 X-RAY EXAM OF FEMUR 2/>: CPT

## 2020-03-10 NOTE — TELEPHONE ENCOUNTER
I spoke with [employer]  There is already an authorization approved through July 2020   They did say they have a maximum of 100 pills per 30 days with approval unless patient had a specific underlying condition where then we would be able to do the medical exception  I informed the pharmacist she said she would contact the patient

## 2020-03-14 DIAGNOSIS — G47.00 INSOMNIA, UNSPECIFIED TYPE: ICD-10-CM

## 2020-03-14 DIAGNOSIS — I10 ESSENTIAL HYPERTENSION: ICD-10-CM

## 2020-03-15 PROCEDURE — 4010F ACE/ARB THERAPY RXD/TAKEN: CPT | Performed by: INTERNAL MEDICINE

## 2020-03-15 RX ORDER — LOSARTAN POTASSIUM 25 MG/1
TABLET ORAL
Qty: 90 TABLET | Refills: 1 | Status: SHIPPED | OUTPATIENT
Start: 2020-03-15 | End: 2020-10-09

## 2020-03-15 RX ORDER — TRAZODONE HYDROCHLORIDE 50 MG/1
TABLET ORAL
Qty: 30 TABLET | Refills: 0 | Status: SHIPPED | OUTPATIENT
Start: 2020-03-15 | End: 2020-04-11

## 2020-03-26 ENCOUNTER — TELEPHONE (OUTPATIENT)
Dept: HEMATOLOGY ONCOLOGY | Facility: MEDICAL CENTER | Age: 70
End: 2020-03-26

## 2020-03-26 ENCOUNTER — TELEPHONE (OUTPATIENT)
Dept: HEMATOLOGY ONCOLOGY | Facility: CLINIC | Age: 70
End: 2020-03-26

## 2020-03-26 NOTE — TELEPHONE ENCOUNTER
Inbound Calls to Reschedule/Cancel Appointments   Patient Details:  Ines Marc  1950  [id number]     Who is calling? patient    Date of original appointment 03/27/2020   Visit Type Follow up    Who is the Provider and Location? [facility]   Is the patient on active treatment? Chemo? Radiation? No    Was the patient Rescheduled or Canceled? Cancelling due to patient not going to lab    Reason for rescheduling or cancelation? Please see above    Next Steps:    [facility]: After completing the above information, please route to the appropriate Care Team for review  Care Team: Please review and reach out to the patient if you have any changes

## 2020-03-26 NOTE — TELEPHONE ENCOUNTER
Called patient and left  regarding her appt with MONSTER Edward on 3/27/2020 at 1:30pm  Called to see if patient could get her lab work done before her appt tomorrow  Asked for her to give us a call back at [phone]

## 2020-03-26 NOTE — TELEPHONE ENCOUNTER
Called patient and left vm to see if she would like to r/s her appt on 3/27  Stated to give us a call back at [phone]

## 2020-03-31 ENCOUNTER — OFFICE VISIT (OUTPATIENT)
Dept: FAMILY MEDICINE CLINIC | Facility: CLINIC | Age: 70
End: 2020-03-31
Payer: COMMERCIAL

## 2020-03-31 ENCOUNTER — OFFICE VISIT (OUTPATIENT)
Dept: FAMILY MEDICINE CLINIC | Facility: CLINIC | Age: 70
End: 2020-03-31
Payer: OTHER MISCELLANEOUS

## 2020-03-31 VITALS
BODY MASS INDEX: 27.32 KG/M2 | DIASTOLIC BLOOD PRESSURE: 72 MMHG | SYSTOLIC BLOOD PRESSURE: 136 MMHG | OXYGEN SATURATION: 98 % | HEIGHT: 65 IN | HEART RATE: 84 BPM | RESPIRATION RATE: 14 BRPM | WEIGHT: 164 LBS | TEMPERATURE: 97.6 F

## 2020-03-31 VITALS
DIASTOLIC BLOOD PRESSURE: 72 MMHG | HEART RATE: 84 BPM | RESPIRATION RATE: 14 BRPM | SYSTOLIC BLOOD PRESSURE: 136 MMHG | OXYGEN SATURATION: 98 % | HEIGHT: 65 IN | TEMPERATURE: 97.6 F | BODY MASS INDEX: 27.32 KG/M2 | WEIGHT: 164 LBS
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
## 2020-03-31 DIAGNOSIS — K21.9 GASTROESOPHAGEAL REFLUX DISEASE WITHOUT ESOPHAGITIS: ICD-10-CM

## 2020-03-31 DIAGNOSIS — E06.3 HYPOTHYROIDISM DUE TO HASHIMOTO'S THYROIDITIS: ICD-10-CM

## 2020-03-31 DIAGNOSIS — F17.210 CIGARETTE SMOKER: ICD-10-CM

## 2020-03-31 DIAGNOSIS — E03.8 HYPOTHYROIDISM DUE TO HASHIMOTO'S THYROIDITIS: ICD-10-CM

## 2020-03-31 DIAGNOSIS — Z00.01 ENCOUNTER FOR GENERAL ADULT MEDICAL EXAMINATION WITH ABNORMAL FINDINGS: Primary | ICD-10-CM

## 2020-03-31 DIAGNOSIS — M25.561 ACUTE PAIN OF RIGHT KNEE: ICD-10-CM

## 2020-03-31 DIAGNOSIS — IMO0002 TYPE II DIABETES MELLITUS WITH MANIFESTATIONS, UNCONTROLLED: ICD-10-CM

## 2020-03-31 DIAGNOSIS — M54.12 CERVICAL RADICULOPATHY: Primary | ICD-10-CM

## 2020-03-31 DIAGNOSIS — M54.16 LUMBAR RADICULOPATHY: ICD-10-CM

## 2020-03-31 DIAGNOSIS — D35.00 ADRENAL ADENOMA, UNSPECIFIED LATERALITY: ICD-10-CM

## 2020-03-31 PROCEDURE — 3008F BODY MASS INDEX DOCD: CPT | Performed by: INTERNAL MEDICINE

## 2020-03-31 PROCEDURE — 1170F FXNL STATUS ASSESSED: CPT | Performed by: INTERNAL MEDICINE

## 2020-03-31 PROCEDURE — 1160F RVW MEDS BY RX/DR IN RCRD: CPT | Performed by: INTERNAL MEDICINE

## 2020-03-31 PROCEDURE — 4004F PT TOBACCO SCREEN RCVD TLK: CPT | Performed by: INTERNAL MEDICINE

## 2020-03-31 PROCEDURE — 99214 OFFICE O/P EST MOD 30 MIN: CPT | Performed by: INTERNAL MEDICINE

## 2020-03-31 PROCEDURE — 3078F DIAST BP <80 MM HG: CPT | Performed by: INTERNAL MEDICINE

## 2020-03-31 PROCEDURE — 3075F SYST BP GE 130 - 139MM HG: CPT | Performed by: INTERNAL MEDICINE

## 2020-03-31 PROCEDURE — 4040F PNEUMOC VAC/ADMIN/RCVD: CPT | Performed by: INTERNAL MEDICINE

## 2020-03-31 PROCEDURE — 1125F AMNT PAIN NOTED PAIN PRSNT: CPT | Performed by: INTERNAL MEDICINE

## 2020-03-31 PROCEDURE — G0439 PPPS, SUBSEQ VISIT: HCPCS | Performed by: INTERNAL MEDICINE

## 2020-03-31 RX ORDER — FAMOTIDINE 20 MG/1
20 TABLET, FILM COATED ORAL 2 TIMES DAILY
Qty: 60 TABLET | Refills: 3 | Status: SHIPPED | OUTPATIENT
Start: 2020-03-31 | End: 2020-04-21

## 2020-03-31 NOTE — PATIENT INSTRUCTIONS
Medicare Preventive Visit Patient Instructions  Thank you for completing your Welcome to Medicare Visit or Medicare Annual Wellness Visit today  Your next wellness visit will be due in one year (3/31/2021)  The screening/preventive services that you may require over the next 5-10 years are detailed below  Some tests may not apply to you based off risk factors and/or age  Screening tests ordered at today's visit but not completed yet may show as past due  Also, please note that scanned in results may not display below  Preventive Screenings:  Service Recommendations Previous Testing/Comments   Colorectal Cancer Screening  * Colonoscopy    * Fecal Occult Blood Test (FOBT)/Fecal Immunochemical Test (FIT)  * Fecal DNA/Cologuard Test  * Flexible Sigmoidoscopy Age: 54-65 years old   Colonoscopy: every 10 years (may be performed more frequently if at higher risk)  OR  FOBT/FIT: every 1 year  OR  Cologuard: every 3 years  OR  Sigmoidoscopy: every 5 years  Screening may be recommended earlier than age 48 if at higher risk for colorectal cancer  Also, an individualized decision between you and your healthcare provider will decide whether screening between the ages of 74-80 would be appropriate  Colonoscopy: 06/11/2014  FOBT/FIT: Not on file  Cologuard: Not on file  Sigmoidoscopy: Not on file    Screening Current     Breast Cancer Screening Age: 36 years old  Frequency: every 1-2 years  Not required if history of left and right mastectomy Mammogram: 03/01/2017       Cervical Cancer Screening Between the ages of 21-29, pap smear recommended once every 3 years  Between the ages of 33-67, can perform pap smear with HPV co-testing every 5 years     Recommendations may differ for women with a history of total hysterectomy, cervical cancer, or abnormal pap smears in past  Pap Smear: Not on file    Screening Not Indicated   Hepatitis C Screening Once for adults born between 1945 and 1965  More frequently in patients at high risk for Hepatitis C Hep C Antibody: 02/07/2019    Screening Current   Diabetes Screening 1-2 times per year if you're at risk for diabetes or have pre-diabetes Fasting glucose: No results in last 5 years   A1C: 5 1 % of total Hgb    Screening Not Indicated  History Diabetes   Cholesterol Screening Once every 5 years if you don't have a lipid disorder  May order more often based on risk factors  Lipid panel: 09/10/2019    Screening Current     Other Preventive Screenings Covered by Medicare:  1  Abdominal Aortic Aneurysm (AAA) Screening: covered once if your at risk  You're considered to be at risk if you have a family history of AAA  2  Lung Cancer Screening: covers low dose CT scan once per year if you meet all of the following conditions: (1) Age 50-69; (2) No signs or symptoms of lung cancer; (3) Current smoker or have quit smoking within the last 15 years; (4) You have a tobacco smoking history of at least 30 pack years (packs per day multiplied by number of years you smoked); (5) You get a written order from a healthcare provider  3  Glaucoma Screening: covered annually if you're considered high risk: (1) You have diabetes OR (2) Family history of glaucoma OR (3)  aged 48 and older OR (3)  American aged 72 and older  3  Osteoporosis Screening: covered every 2 years if you meet one of the following conditions: (1) You're estrogen deficient and at risk for osteoporosis based off medical history and other findings; (2) Have a vertebral abnormality; (3) On glucocorticoid therapy for more than 3 months; (4) Have primary hyperparathyroidism; (5) On osteoporosis medications and need to assess response to drug therapy  · Last bone density test (DXA Scan): Not on file  5  HIV Screening: covered annually if you're between the age of 12-76  Also covered annually if you are younger than 13 and older than 72 with risk factors for HIV infection   For pregnant patients, it is covered up to 3 times per pregnancy  Immunizations:  Immunization Recommendations   Influenza Vaccine Annual influenza vaccination during flu season is recommended for all persons aged >= 6 months who do not have contraindications   Pneumococcal Vaccine (Prevnar and Pneumovax)  * Prevnar = PCV13  * Pneumovax = PPSV23   Adults 25-60 years old: 1-3 doses may be recommended based on certain risk factors  Adults 72 years old: Prevnar (PCV13) vaccine recommended followed by Pneumovax (PPSV23) vaccine  If already received PPSV23 since turning 65, then PCV13 recommended at least one year after PPSV23 dose  Hepatitis B Vaccine 3 dose series if at intermediate or high risk (ex: diabetes, end stage renal disease, liver disease)   Tetanus (Td) Vaccine - COST NOT COVERED BY MEDICARE PART B Following completion of primary series, a booster dose should be given every 10 years to maintain immunity against tetanus  Td may also be given as tetanus wound prophylaxis  Tdap Vaccine - COST NOT COVERED BY MEDICARE PART B Recommended at least once for all adults  For pregnant patients, recommended with each pregnancy  Shingles Vaccine (Shingrix) - COST NOT COVERED BY MEDICARE PART B  2 shot series recommended in those aged 48 and above     Health Maintenance Due:      Topic Date Due    CRC Screening: Colonoscopy  06/11/2024    Hepatitis C Screening  Completed     Immunizations Due:  There are no preventive care reminders to display for this patient  Advance Directives   What are advance directives? Advance directives are legal documents that state your wishes and plans for medical care  These plans are made ahead of time in case you lose your ability to make decisions for yourself  Advance directives can apply to any medical decision, such as the treatments you want, and if you want to donate organs  What are the types of advance directives? There are many types of advance directives, and each state has rules about how to use them   You may choose a combination of any of the following:  · Living will: This is a written record of the treatment you want  You can also choose which treatments you do not want, which to limit, and which to stop at a certain time  This includes surgery, medicine, IV fluid, and tube feedings  · Durable power of  for healthcare [facility]): This is a written record that states who you want to make healthcare choices for you when you are unable to make them for yourself  This person, called a proxy, is usually a family member or a friend  You may choose more than 1 proxy  · Do not resuscitate (DNR) order:  A DNR order is used in case your heart stops beating or you stop breathing  It is a request not to have certain forms of treatment, such as CPR  A DNR order may be included in other types of advance directives  · Medical directive: This covers the care that you want if you are in a coma, near death, or unable to make decisions for yourself  You can list the treatments you want for each condition  Treatment may include pain medicine, surgery, blood transfusions, dialysis, IV or tube feedings, and a ventilator (breathing machine)  · Values history: This document has questions about your views, beliefs, and how you feel and think about life  This information can help others choose the care that you would choose  Why are advance directives important? An advance directive helps you control your care  Although spoken wishes may be used, it is better to have your wishes written down  Spoken wishes can be misunderstood, or not followed  Treatments may be given even if you do not want them  An advance directive may make it easier for your family to make difficult choices about your care  Cigarette Smoking and Your Health   Risks to your health if you smoke:  Nicotine and other chemicals found in tobacco damage every cell in your body   Even if you are a light smoker, you have an increased risk for cancer, heart disease, and lung disease  If you are pregnant or have diabetes, smoking increases your risk for complications  Benefits to your health if you stop smoking:   · You decrease respiratory symptoms such as coughing, wheezing, and shortness of breath  · You reduce your risk for cancers of the lung, mouth, throat, kidney, bladder, pancreas, stomach, and cervix  If you already have cancer, you increase the benefits of chemotherapy  You also reduce your risk for cancer returning or a second cancer from developing  · You reduce your risk for heart disease, blood clots, heart attack, and stroke  · You reduce your risk for lung infections, and diseases such as pneumonia, asthma, chronic bronchitis, and emphysema  · Your circulation improves  More oxygen can be delivered to your body  If you have diabetes, you lower your risk for complications, such as kidney, artery, and eye diseases  You also lower your risk for nerve damage  Nerve damage can lead to amputations, poor vision, and blindness  · You improve your body's ability to heal and to fight infections  For more information and support to stop smoking:   · [place]  Phone: 9- 664 - 230-5891  Web Address: [employer]  How to Quit Using Smokeless Tobacco   Why it is important to stop using smokeless tobacco:  Smokeless tobacco comes in many forms  Examples include chew, snuff, dip, dissolvable tobacco, and snus  All smokeless tobacco products contain nicotine and may contain as much nicotine as 3 cigarettes  You may be physically dependent on nicotine  You may also be emotionally addicted to it  The cravings can be strong, but it is important to quit using smokeless tobacco  You will improve your health and decrease your cancer, stroke, and heart attack risk  Mouth sores and tooth problems will also improve when you quit   You can benefit from quitting no matter how long you have used smokeless tobacco    Prepare to stop using smokeless tobacco:  Nicotine is a highly addictive drug  Withdrawal symptoms can happen when you stop and make it hard to quit  The following can help keep you on track:  · Set a quit date  · Tell friends, family, and coworkers that you plan to quit  · Remove all smokeless tobacco products from your home, car, and workplace  Manage weight gain after you quit:  Nicotine can affect your metabolism  You may gain a few pounds after you quit  The following can help you control your weight:  · Eat healthy foods  · Drink water before, during, and between meals  · Exercise as directed  Weight Management   Why it is important to manage your weight:  Being overweight increases your risk of health conditions such as heart disease, high blood pressure, type 2 diabetes, and certain types of cancer  It can also increase your risk for osteoarthritis, sleep apnea, and other respiratory problems  Aim for a slow, steady weight loss  Even a small amount of weight loss can lower your risk of health problems  How to lose weight safely:  A safe and healthy way to lose weight is to eat fewer calories and get regular exercise  You can lose up about 1 pound a week by decreasing the number of calories you eat by 500 calories each day  Healthy meal plan for weight management:  A healthy meal plan includes a variety of foods, contains fewer calories, and helps you stay healthy  A healthy meal plan includes the following:  · Eat whole-grain foods more often  A healthy meal plan should contain fiber  Fiber is the part of grains, fruits, and vegetables that is not broken down by your body  Whole-grain foods are healthy and provide extra fiber in your diet  Some examples of whole-grain foods are whole-wheat breads and pastas, oatmeal, brown rice, and bulgur  · Eat a variety of vegetables every day  Include dark, leafy greens such as spinach, kale, pat greens, and mustard greens   Eat yellow and orange vegetables such as carrots, sweet potatoes, and winter squash  · Eat a variety of fruits every day  Choose fresh or canned fruit (canned in its own juice or light syrup) instead of juice  Fruit juice has very little or no fiber  · Eat low-fat dairy foods  Drink fat-free (skim) milk or 1% milk  Eat fat-free yogurt and low-fat cottage cheese  Try low-fat cheeses such as mozzarella and other reduced-fat cheeses  · Choose meat and other protein foods that are low in fat  Choose beans or other legumes such as split peas or lentils  Choose fish, skinless poultry (chicken or turkey), or lean cuts of red meat (beef or pork)  Before you cook meat or poultry, cut off any visible fat  · Use less fat and oil  Try baking foods instead of frying them  Add less fat, such as margarine, sour cream, regular salad dressing and mayonnaise to foods  Eat fewer high-fat foods  Some examples of high-fat foods include french fries, doughnuts, ice cream, and cakes  · Eat fewer sweets  Limit foods and drinks that are high in sugar  This includes candy, cookies, regular soda, and sweetened drinks  Exercise:  Exercise at least 30 minutes per day on most days of the week  Some examples of exercise include walking, biking, dancing, and swimming  You can also fit in more physical activity by taking the stairs instead of the elevator or parking farther away from stores  Ask your healthcare provider about the best exercise plan for you  © Copyright [employer] 2018 Information is for End User's use only and may not be sold, redistributed or otherwise used for commercial purposes   All illustrations and images included in CareNotes® are the copyrighted property of A D A M , Inc  or [address] [place] [address] [employer]

## 2020-03-31 NOTE — LETTER
March 31, 2020     Patient: Molly Fernández   YOB: 1950   Date of Visit: 3/31/2020       To Whom it May Concern:    Carly Prado is under my professional care  She was seen in my office on 3/31/2020  She should remain off work for six weeks pending re-evaluation on Wednesday, May 6, 2020  If you have any questions or concerns, please don't hesitate to call           Sincerely,                Arcadio Medina MD        CC: No Recipients

## 2020-03-31 NOTE — PROGRESS NOTES
Assessment/Plan:         Diagnoses and all orders for this visit:    Cervical radiculopathy; stay off work  Continue Lyrica  Continue Hydrocodone  FU w Pain mangt  RTc in 6 weeks    Lumbar radiculopathy; stay off work  As above     RTC in 6 weeks    Acute pain of right knee; as above  MRI right knee  consider Orthopedic consult    Work related injury; which caused the above issues to get worse    Other orders  -     Cancel: POCT hemoglobin A1c        Subjective:      Patient ID: Bruno Murillo is a 71 y o  female  71 Y O lady is here for FU on Work injury, right knee and Cevical and Lumbar radiculopathy got worse since the work injury         The following portions of the patient's history were reviewed and updated as appropriate: allergies, current medications, past family history, past medical history, past social history, past surgical history and problem list     Review of Systems   Constitutional: Negative for chills, fatigue and fever  HENT: Negative for congestion, facial swelling, sore throat, trouble swallowing and voice change  Eyes: Negative for pain, discharge and visual disturbance  Respiratory: Negative for cough, shortness of breath and wheezing  Cardiovascular: Negative for chest pain, palpitations and leg swelling  Gastrointestinal: Negative for abdominal pain, blood in stool, constipation, diarrhea and nausea  Endocrine: Negative for polydipsia, polyphagia and polyuria  Genitourinary: Negative for difficulty urinating, hematuria and urgency  Musculoskeletal: Positive for back pain, joint swelling, neck pain and neck stiffness  Negative for arthralgias and myalgias  Skin: Negative for rash  Neurological: Positive for numbness  Negative for dizziness, tremors, weakness and headaches  Hematological: Negative for adenopathy  Does not bruise/bleed easily  Psychiatric/Behavioral: Negative for dysphoric mood, sleep disturbance and suicidal ideas           Objective:      BP 136/72 (BP Location: Left arm, Patient Position: Sitting, Cuff Size: Standard)   Pulse 84   Temp 97 6 °F (36 4 °C) (Tympanic)   Resp 14   Ht 5' 5" (1 651 m)   Wt 74 4 kg (164 lb)   LMP  (LMP Unknown)   SpO2 98%   BMI 27 29 kg/m²          Physical Exam   Constitutional: She is oriented to person, place, and time  She appears well-nourished  No distress  HENT:   Head: Normocephalic  Mouth/Throat: Oropharynx is clear and moist  No oropharyngeal exudate  Eyes: Pupils are equal, round, and reactive to light  Conjunctivae are normal  No scleral icterus  Neck: Neck supple  No thyromegaly present  Cardiovascular: Normal rate, regular rhythm and normal heart sounds  No murmur heard  Pulmonary/Chest: Effort normal and breath sounds normal  No respiratory distress  She has no wheezes  She has no rales  Abdominal: Soft  Bowel sounds are normal  She exhibits no distension  There is no tenderness  There is no rebound and no guarding  Musculoskeletal: She exhibits tenderness  She exhibits no edema  Right knee pain swelling and weakness since work injury   Lymphadenopathy:     She has no cervical adenopathy  Neurological: She is alert and oriented to person, place, and time  No cranial nerve deficit  Cervical radiculopathy  Lumbar radiculopathy   Skin: No rash noted  No erythema  Psychiatric: She has a normal mood and affect

## 2020-03-31 NOTE — PROGRESS NOTES
Assessment/Plan:         Diagnoses and all orders for this visit:    Encounter for general adult medical examination with abnormal findings : Done in detail    Gastroesophageal reflux disease without esophagitis; life style mod, stop zantac, Start :  -     famotidine (PEPCID) 20 mg tablet; Take 1 tablet (20 mg total) by mouth 2 (two) times a day  Continue protonix  RTC in 3mos w Blood  Work    Type II diabetes mellitus with manifestations, uncontrolled ([facility] [address] ); Life style mod  RTC in 3mos w Blood work    Hypothyroidism due to Hashimoto's thyroiditis; continue Synthroid  RTC in 3 mos w blood  work    Cigarette smoker; advised to quit smoking  Yearly Low dose ct chest    Adrenal adenoma, unspecified laterality; stable  benign        Subjective:      Patient ID: Casper Xiong is a 71 y o  female  71 Y O lady is here for AWV and Regular check up, she still smokes, she feels ok except her knee and back and neck pain due to work injury, recent blood work and med list reviewed w pt in detail    The following portions of the patient's history were reviewed and updated as appropriate: allergies, current medications, past family history, past social history, past surgical history and problem list     Review of Systems   Constitutional: Negative for chills, fatigue and fever  HENT: Negative for congestion, facial swelling, sore throat, trouble swallowing and voice change  Eyes: Negative for pain, discharge and visual disturbance  Respiratory: Negative for cough, shortness of breath and wheezing  Cardiovascular: Negative for chest pain, palpitations and leg swelling  Gastrointestinal: Negative for abdominal pain, blood in stool, constipation, diarrhea and nausea  Endocrine: Negative for polydipsia, polyphagia and polyuria  Genitourinary: Negative for difficulty urinating, hematuria and urgency  Musculoskeletal: Positive for back pain  Negative for arthralgias and myalgias     Skin: Negative for rash    Neurological: Positive for numbness  Negative for dizziness, tremors, weakness and headaches  Hematological: Negative for adenopathy  Does not bruise/bleed easily  Psychiatric/Behavioral: Negative for dysphoric mood, sleep disturbance and suicidal ideas  Objective:      /72 (BP Location: Left arm, Patient Position: Sitting, Cuff Size: Standard)   Pulse 84   Temp 97 6 °F (36 4 °C) (Tympanic)   Resp 14   Ht 5' 5" (1 651 m)   Wt 74 4 kg (164 lb)   LMP  (LMP Unknown)   SpO2 98%   BMI 27 29 kg/m²          Physical Exam   Constitutional: She is oriented to person, place, and time  She appears well-nourished  No distress  HENT:   Head: Normocephalic  Mouth/Throat: Oropharynx is clear and moist  No oropharyngeal exudate  Eyes: Pupils are equal, round, and reactive to light  Conjunctivae are normal  No scleral icterus  Neck: Neck supple  Thyromegaly present  Cardiovascular: Normal rate and regular rhythm  Murmur heard  Pulmonary/Chest: Effort normal and breath sounds normal  No respiratory distress  She has no wheezes  She has no rales  Abdominal: Soft  Bowel sounds are normal  She exhibits no distension  There is no tenderness  There is no rebound and no guarding  Musculoskeletal: She exhibits no edema  Lymphadenopathy:     She has no cervical adenopathy  Neurological: She is alert and oriented to person, place, and time  No cranial nerve deficit  Skin: No rash noted  No erythema  Psychiatric: She has a normal mood and affect

## 2020-03-31 NOTE — PROGRESS NOTES
Assessment and Plan:     Problem List Items Addressed This Visit     None           Preventive health issues were discussed with patient, and age appropriate screening tests were ordered as noted in patient's After Visit Summary  Personalized health advice and appropriate referrals for health education or preventive services given if needed, as noted in patient's After Visit Summary       History of Present Illness:     Patient presents for Medicare Annual Wellness visit    Patient Care Team:  Madhavi Crocker MD as PCP - General (Internal Medicine)  DO Rasta Fagan (Pain Medicine)  Mel Gonzalez DO (Pain Medicine)  Penelope Barajas MD     Problem List:     Patient Active Problem List   Diagnosis    Chronic pain syndrome    Cervical radiculopathy    Myofascial pain syndrome    Spondylosis of cervical region without myelopathy or radiculopathy    Fibromyalgia    Diabetic peripheral neuropathy ([address] )    Long-term current use of opiate analgesic    MGUS (monoclonal gammopathy of unknown significance)    Iron deficiency anemia following bariatric surgery    Light headedness    Uncomplicated opioid dependence ([address] )    Rheumatoid arthritis of hand ([address] )    Pulmonary emphysema ([address] )      Past Medical and Surgical History:     Past Medical History:   Diagnosis Date    Anemia     Anxiety     hx of panic attacks (now under control)     Arthritis     Asthma     resolved (no problems in a decade)     Baker's cyst of knee     Bronchitis     Bunion, left foot     Cervical pain     Chronic GERD     Chronic pain disorder     Depression     Diabetes mellitus, type 2 ([address] )     Diabetic peripheral neuropathy ([address] )     Endometriosis     Fibromyalgia     Hyperlipidemia     Hypertension     Joint pain     Low back pain     Lung nodules     Macular degeneration     Pulmonary emphysema ([address] ) 1/16/2020    Spondylosis      Past Surgical History:   Procedure Laterality Date    BACK SURGERY 1996    L5, S1- laser surgery fusion of c5 and c6     BREAST LUMPECTOMY Right     CHOLECYSTECTOMY  2004    FOOT SURGERY Left 2005    fusion     GASTRIC BYPASS  2010    Dr Loyda Leiva    just uterus     KNEE ARTHROSCOPY      LAMINECTOMY      OVARIAN CYST REMOVAL  1975    PELVIC LAPAROSCOPY      ovaries (x10)     PLEURAL SCARIFICATION  1967    SHOULDER OPEN ROTATOR CUFF REPAIR Left 2008    TONSILLECTOMY      VAGINAL PROLAPSE REPAIR        Family History:     Family History   Problem Relation Age of Onset    Hypertension Mother     Lung cancer Mother     Hypertension Father     Heart disease Father     Heart attack Father       Social History:        Social History     Socioeconomic History    Marital status:      Spouse name: None    Number of children: None    Years of education: None    Highest education level: None   Occupational History    None   Social Needs    Financial resource strain: None    Food insecurity:     Worry: None     Inability: None    Transportation needs:     Medical: None     Non-medical: None   Tobacco Use    Smoking status: Current Every Day Smoker     Packs/day: 0 50     Years: 40 00     Pack years: 20 00    Smokeless tobacco: Current User   Substance and Sexual Activity    Alcohol use: Not Currently     Alcohol/week: 1 0 standard drinks     Types: 1 Shots of liquor per week     Comment: rarely    Drug use: No    Sexual activity: Not Currently   Lifestyle    Physical activity:     Days per week: None     Minutes per session: None    Stress: None   Relationships    Social connections:     Talks on phone: None     Gets together: None     Attends Mosque service: None     Active member of club or organization: None     Attends meetings of clubs or organizations: None     Relationship status: None    Intimate partner violence:     Fear of current or ex partner: None     Emotionally abused: None     Physically abused: None     Forced sexual activity: None   Other Topics Concern    None   Social History Narrative    None      Medications and Allergies:     Current Outpatient Medications   Medication Sig Dispense Refill    albuterol (VENTOLIN HFA) 90 mcg/act inhaler inhale 2 puff by inhalation route  every 4 - 6 hours as needed      amLODIPine (NORVASC) 5 mg tablet Take 1 tablet (5 mg total) by mouth daily 90 tablet 1    baclofen 10 mg tablet Take 1 tablet (10 mg total) by mouth 3 (three) times a day 90 tablet 1    diphenhydrAMINE (BENADRYL ALLERGY) 25 mg capsule Take by mouth      HYDROcodone-acetaminophen (NORCO)  mg per tablet Take 4-5 tablets daily  tablet 0    Lancets Misc  (ACCU-CHEK FASTCLIX LANCET) KIT 3 (three) times a day      levothyroxine 25 mcg tablet TAKE 1 TABLET BY MOUTH EVERY MORNING ON SATURDAY, SUNDAY, TUESDAY AND THURSDAY 30 tablet 2    levothyroxine 50 mcg tablet TAKE 1 TABLET BY ORAL ROUTE EVERY MORNING ON MONDAY, WEDNESDAY AND FRIDAY 30 tablet 2    losartan (COZAAR) 25 mg tablet TAKE 1 TABLET BY MOUTH EVERY DAY 90 tablet 1    meclizine (ANTIVERT) 12 5 MG tablet Take 1 tablet (12 5 mg total) by mouth every 12 (twelve) hours as needed for dizziness 30 tablet 1    MICROLET LANCETS MISC Microlet Lancet      pantoprazole (PROTONIX) 40 mg tablet Take 1 tablet (40 mg total) by mouth 2 (two) times a day 60 tablet 3    pregabalin (LYRICA) 100 mg capsule Take 1 capsule (100 mg total) by mouth 3 (three) times a day 90 capsule 1    sucralfate (CARAFATE) 1 g tablet TAKE 1 TABLET BY MOUTH 4 TIMES EVERY DAY ON AN EMPTY STOMACH 1 HOUR BEFORE MEALS AND AT BEDTIME 120 tablet 0    traZODone (DESYREL) 50 mg tablet TAKE 1 TABLET BY MOUTH EVERY DAY AT BEDTIME AS NEEDED 30 tablet 0     Current Facility-Administered Medications   Medication Dose Route Frequency Provider Last Rate Last Dose    cyanocobalamin injection 1,000 mcg  1,000 mcg Intramuscular Q30 Days Nga Cheng MD   1,000 mcg at 12/19/19 3534    cyanocobalamin injection 1,000 mcg  1,000 mcg Intramuscular Q30 Days Neeru Mcmahon MD   1,000 mcg at 01/16/20 8437     Allergies   Allergen Reactions    Cephalosporins Hives    Erythromycin GI Intolerance    Fentanyl Itching     Occurred during surgery     Paxil [Paroxetine] Hives     After 2 weeks    Penicillins Itching    Pravastatin Myalgia    Prednisolone Itching    Statins Itching    Sulfa Antibiotics Diarrhea    Wellbutrin [Bupropion] Hives     After 2 weeks     Marzena's Wort Rash      Immunizations:     Immunization History   Administered Date(s) Administered    INFLUENZA 10/27/2018    Influenza Split High Dose Preservative Free IM 10/27/2018, 11/11/2019    Pneumococcal Conjugate 13-Valent 11/29/2017    Pneumococcal Polysaccharide PPV23 01/08/2013, 05/02/2019    Tdap 02/07/2019    Zoster Vaccine Recombinant 02/07/2019      Health Maintenance:         Topic Date Due    CRC Screening: Colonoscopy  06/11/2024    Hepatitis C Screening  Completed     There are no preventive care reminders to display for this patient  Medicare Health Risk Assessment:     /72 (BP Location: Left arm, Patient Position: Sitting, Cuff Size: Standard)   Pulse 84   Temp 97 6 °F (36 4 °C) (Tympanic)   Resp 14   Ht 5' 5" (1 651 m)   Wt 74 4 kg (164 lb)   LMP  (LMP Unknown)   SpO2 98%   BMI 27 29 kg/m²      Melissa Johnson is here for her Subsequent Wellness visit  Last Medicare Wellness visit information reviewed, patient interviewed, no change since last AWV  Health Risk Assessment:   Patient rates overall health as fair  Patient feels that their physical health rating is same  Eyesight was rated as same  Hearing was rated as same  Patient feels that their emotional and mental health rating is same  Pain experienced in the last 7 days has been some  Patient's pain rating has been 4/10  Patient states that she has experienced no weight loss or gain in last 6 months  Fall Risk Screening:    In the past year, patient has experienced: no history of falling in past year      Urinary Incontinence Screening:   Patient has not leaked urine accidently in the last six months  Home Safety:  Patient does not have trouble with stairs inside or outside of their home  Patient has working smoke alarms and has working carbon monoxide detector  Home safety hazards include: none  Nutrition:   Current diet is Regular  Medications:   Patient is currently taking over-the-counter supplements  OTC medications include: see medication list  Patient is able to manage medications  Activities of Daily Living (ADLs)/Instrumental Activities of Daily Living (IADLs):   Walk and transfer into and out of bed and chair?: Yes  Dress and groom yourself?: Yes    Bathe or shower yourself?: Yes    Feed yourself?  Yes  Do your laundry/housekeeping?: Yes  Manage your money, pay your bills and track your expenses?: Yes  Make your own meals?: Yes    Do your own shopping?: Yes    Previous Hospitalizations:   Any hospitalizations or ED visits within the last 12 months?: No      Advance Care Planning:   Living will: No    Durable POA for healthcare: No    Advanced directive counseling given: No    Five wishes given: Yes    End of Life Decisions reviewed with patient: No      PREVENTIVE SCREENINGS      Cardiovascular Screening:    General: Screening Current and Risks and Benefits Discussed      Diabetes Screening:     General: Screening Not Indicated, History Diabetes, Risks and Benefits Discussed and Screening Current      Colorectal Cancer Screening:     General: Screening Current      Breast Cancer Screening:     General: Risks and Benefits Discussed and Screening Current      Cervical Cancer Screening:    General: Screening Not Indicated and Risks and Benefits Discussed      Osteoporosis Screening:    General: Risks and Benefits Discussed      Abdominal Aortic Aneurysm (AAA) Screening:        General: Risks and Benefits Discussed      Lung Cancer Screening:     General: Screening Not Indicated, Risks and Benefits Discussed and Screening Current      Hepatitis C Screening:    General: Screening Current and Risks and Benefits Discussed    Other Counseling Topics:   Car/seat belt/driving safety, skin self-exam and calcium and vitamin D intake and regular weightbearing exercise         Teddy Centeno MD

## 2020-04-06 ENCOUNTER — TELEMEDICINE (OUTPATIENT)
Dept: PAIN MEDICINE | Facility: MEDICAL CENTER | Age: 70
End: 2020-04-06
Payer: COMMERCIAL

## 2020-04-06 DIAGNOSIS — F11.20 UNCOMPLICATED OPIOID DEPENDENCE (HCC): ICD-10-CM

## 2020-04-06 DIAGNOSIS — M54.41 CHRONIC BILATERAL LOW BACK PAIN WITH RIGHT-SIDED SCIATICA: ICD-10-CM

## 2020-04-06 DIAGNOSIS — M54.12 CERVICAL RADICULOPATHY: ICD-10-CM

## 2020-04-06 DIAGNOSIS — Z79.891 LONG-TERM CURRENT USE OF OPIATE ANALGESIC: ICD-10-CM

## 2020-04-06 DIAGNOSIS — M47.812 SPONDYLOSIS OF CERVICAL REGION WITHOUT MYELOPATHY OR RADICULOPATHY: ICD-10-CM

## 2020-04-06 DIAGNOSIS — M79.18 MYOFASCIAL PAIN SYNDROME: ICD-10-CM

## 2020-04-06 DIAGNOSIS — G89.29 CHRONIC BILATERAL LOW BACK PAIN WITH RIGHT-SIDED SCIATICA: ICD-10-CM

## 2020-04-06 DIAGNOSIS — G89.4 CHRONIC PAIN SYNDROME: Primary | ICD-10-CM

## 2020-04-06 PROCEDURE — 99212 OFFICE O/P EST SF 10 MIN: CPT | Performed by: PHYSICAL MEDICINE & REHABILITATION

## 2020-04-06 RX ORDER — PREGABALIN 100 MG/1
100 CAPSULE ORAL 3 TIMES DAILY
Qty: 90 CAPSULE | Refills: 2 | Status: SHIPPED | OUTPATIENT
Start: 2020-04-06 | End: 2020-04-30 | Stop reason: SDUPTHER

## 2020-04-06 RX ORDER — HYDROCODONE BITARTRATE AND ACETAMINOPHEN 10; 325 MG/1; MG/1
TABLET ORAL
Qty: 130 TABLET | Refills: 0 | Status: SHIPPED | OUTPATIENT
Start: 2020-04-06 | End: 2020-04-30

## 2020-04-07 DIAGNOSIS — K27.9 PUD (PEPTIC ULCER DISEASE): ICD-10-CM

## 2020-04-07 RX ORDER — SUCRALFATE 1 G/1
TABLET ORAL
Qty: 120 TABLET | Refills: 0 | Status: SHIPPED | OUTPATIENT
Start: 2020-04-07 | End: 2020-05-07

## 2020-04-11 DIAGNOSIS — G47.00 INSOMNIA, UNSPECIFIED TYPE: ICD-10-CM

## 2020-04-11 RX ORDER — TRAZODONE HYDROCHLORIDE 50 MG/1
TABLET ORAL
Qty: 30 TABLET | Refills: 0 | Status: SHIPPED | OUTPATIENT
Start: 2020-04-11 | End: 2020-05-07

## 2020-04-21 ENCOUNTER — TELEPHONE (OUTPATIENT)
Dept: FAMILY MEDICINE CLINIC | Facility: CLINIC | Age: 70
End: 2020-04-21

## 2020-04-21 DIAGNOSIS — M25.561 ACUTE PAIN OF RIGHT KNEE: Primary | ICD-10-CM
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
## 2020-04-21 DIAGNOSIS — K27.9 PUD (PEPTIC ULCER DISEASE): Primary | ICD-10-CM

## 2020-04-21 RX ORDER — CIMETIDINE 400 MG/1
400 TABLET, FILM COATED ORAL 2 TIMES DAILY
Qty: 60 TABLET | Refills: 2 | Status: SHIPPED | OUTPATIENT
Start: 2020-04-21 | End: 2020-05-06

## 2020-04-23 ENCOUNTER — TELEPHONE (OUTPATIENT)
Dept: FAMILY MEDICINE CLINIC | Facility: CLINIC | Age: 70
End: 2020-04-23

## 2020-04-27 DIAGNOSIS — K21.9 GASTROESOPHAGEAL REFLUX DISEASE WITHOUT ESOPHAGITIS: ICD-10-CM

## 2020-04-27 RX ORDER — PANTOPRAZOLE SODIUM 40 MG/1
TABLET, DELAYED RELEASE ORAL
Qty: 180 TABLET | Refills: 1 | Status: SHIPPED | OUTPATIENT
Start: 2020-04-27 | End: 2020-09-08 | Stop reason: SDUPTHER

## 2020-04-30 ENCOUNTER — TELEPHONE (OUTPATIENT)
Dept: FAMILY MEDICINE CLINIC | Facility: CLINIC | Age: 70
End: 2020-04-30

## 2020-04-30 ENCOUNTER — TELEMEDICINE (OUTPATIENT)
Dept: PAIN MEDICINE | Facility: MEDICAL CENTER | Age: 70
End: 2020-04-30
Payer: OTHER MISCELLANEOUS

## 2020-04-30 VITALS — BODY MASS INDEX: 27.32 KG/M2 | WEIGHT: 164 LBS | HEIGHT: 65 IN

## 2020-04-30 DIAGNOSIS — M79.18 MYOFASCIAL PAIN SYNDROME: ICD-10-CM

## 2020-04-30 DIAGNOSIS — M79.7 FIBROMYALGIA: ICD-10-CM

## 2020-04-30 DIAGNOSIS — M54.16 LUMBAR RADICULITIS: Primary | ICD-10-CM

## 2020-04-30 DIAGNOSIS — M54.12 CERVICAL RADICULOPATHY: ICD-10-CM

## 2020-04-30 DIAGNOSIS — M54.41 CHRONIC BILATERAL LOW BACK PAIN WITH RIGHT-SIDED SCIATICA: ICD-10-CM

## 2020-04-30 DIAGNOSIS — G89.4 CHRONIC PAIN SYNDROME: ICD-10-CM

## 2020-04-30 DIAGNOSIS — G89.29 CHRONIC BILATERAL LOW BACK PAIN WITH RIGHT-SIDED SCIATICA: ICD-10-CM

## 2020-04-30 DIAGNOSIS — F11.20 UNCOMPLICATED OPIOID DEPENDENCE (HCC): ICD-10-CM

## 2020-04-30 DIAGNOSIS — Z79.891 LONG-TERM CURRENT USE OF OPIATE ANALGESIC: ICD-10-CM

## 2020-04-30 PROCEDURE — 1160F RVW MEDS BY RX/DR IN RCRD: CPT | Performed by: PHYSICAL MEDICINE & REHABILITATION

## 2020-04-30 PROCEDURE — 99214 OFFICE O/P EST MOD 30 MIN: CPT | Performed by: PHYSICAL MEDICINE & REHABILITATION

## 2020-04-30 RX ORDER — PREGABALIN 100 MG/1
100 CAPSULE ORAL 3 TIMES DAILY
Qty: 90 CAPSULE | Refills: 2 | Status: SHIPPED | OUTPATIENT
Start: 2020-04-30 | End: 2020-05-28 | Stop reason: SDUPTHER

## 2020-04-30 RX ORDER — HYDROCODONE BITARTRATE AND ACETAMINOPHEN 10; 325 MG/1; MG/1
TABLET ORAL
Qty: 130 TABLET | Refills: 0 | Status: SHIPPED | OUTPATIENT
Start: 2020-04-30 | End: 2020-05-28 | Stop reason: SDUPTHER

## 2020-04-30 RX ORDER — BACLOFEN 10 MG/1
10 TABLET ORAL 3 TIMES DAILY
Qty: 90 TABLET | Refills: 1 | Status: SHIPPED | OUTPATIENT
Start: 2020-04-30 | End: 2020-05-29 | Stop reason: SDUPTHER

## 2020-05-06 ENCOUNTER — OFFICE VISIT (OUTPATIENT)
Dept: FAMILY MEDICINE CLINIC | Facility: CLINIC | Age: 70
End: 2020-05-06
Payer: OTHER MISCELLANEOUS

## 2020-05-06 ENCOUNTER — OFFICE VISIT (OUTPATIENT)
Dept: FAMILY MEDICINE CLINIC | Facility: CLINIC | Age: 70
End: 2020-05-06
Payer: COMMERCIAL

## 2020-05-06 VITALS
HEIGHT: 65 IN | OXYGEN SATURATION: 98 % | BODY MASS INDEX: 27.66 KG/M2 | DIASTOLIC BLOOD PRESSURE: 70 MMHG | SYSTOLIC BLOOD PRESSURE: 134 MMHG | HEART RATE: 80 BPM | RESPIRATION RATE: 14 BRPM | WEIGHT: 166 LBS | TEMPERATURE: 98.2 F

## 2020-05-06 VITALS
HEART RATE: 80 BPM | BODY MASS INDEX: 27.66 KG/M2 | HEIGHT: 65 IN | RESPIRATION RATE: 14 BRPM | DIASTOLIC BLOOD PRESSURE: 70 MMHG | SYSTOLIC BLOOD PRESSURE: 134 MMHG | WEIGHT: 166 LBS | OXYGEN SATURATION: 98 % | TEMPERATURE: 98.2 F

## 2020-05-06 DIAGNOSIS — E78.5 HYPERLIPIDEMIA ASSOCIATED WITH TYPE 2 DIABETES MELLITUS (HCC): ICD-10-CM

## 2020-05-06 DIAGNOSIS — J30.9 ALLERGIC RHINITIS, UNSPECIFIED SEASONALITY, UNSPECIFIED TRIGGER: ICD-10-CM

## 2020-05-06 DIAGNOSIS — E11.69 HYPERLIPIDEMIA ASSOCIATED WITH TYPE 2 DIABETES MELLITUS (HCC): ICD-10-CM

## 2020-05-06 DIAGNOSIS — IMO0002 TYPE II DIABETES MELLITUS WITH MANIFESTATIONS, UNCONTROLLED: Primary | ICD-10-CM

## 2020-05-06 DIAGNOSIS — E03.9 HYPOTHYROIDISM, UNSPECIFIED TYPE: ICD-10-CM

## 2020-05-06 DIAGNOSIS — M25.512 ACUTE PAIN OF LEFT SHOULDER: Primary | ICD-10-CM

## 2020-05-06 DIAGNOSIS — Z12.31 SCREENING MAMMOGRAM, ENCOUNTER FOR: ICD-10-CM

## 2020-05-06 DIAGNOSIS — E06.3 HYPOTHYROIDISM DUE TO HASHIMOTO'S THYROIDITIS: ICD-10-CM

## 2020-05-06 DIAGNOSIS — M54.16 LUMBAR RADICULOPATHY: ICD-10-CM
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
## 2020-05-06 DIAGNOSIS — E03.8 HYPOTHYROIDISM DUE TO HASHIMOTO'S THYROIDITIS: ICD-10-CM

## 2020-05-06 DIAGNOSIS — K21.9 GASTROESOPHAGEAL REFLUX DISEASE WITHOUT ESOPHAGITIS: ICD-10-CM

## 2020-05-06 DIAGNOSIS — S82.101D CLOSED FRACTURE OF PROXIMAL END OF RIGHT TIBIA WITH ROUTINE HEALING, UNSPECIFIED FRACTURE MORPHOLOGY, SUBSEQUENT ENCOUNTER: ICD-10-CM

## 2020-05-06 DIAGNOSIS — M54.12 CERVICAL RADICULOPATHY: ICD-10-CM

## 2020-05-06 PROCEDURE — 99214 OFFICE O/P EST MOD 30 MIN: CPT | Performed by: INTERNAL MEDICINE

## 2020-05-06 PROCEDURE — 4004F PT TOBACCO SCREEN RCVD TLK: CPT | Performed by: INTERNAL MEDICINE

## 2020-05-06 PROCEDURE — 3008F BODY MASS INDEX DOCD: CPT | Performed by: INTERNAL MEDICINE

## 2020-05-06 PROCEDURE — 1160F RVW MEDS BY RX/DR IN RCRD: CPT | Performed by: INTERNAL MEDICINE

## 2020-05-06 PROCEDURE — 4040F PNEUMOC VAC/ADMIN/RCVD: CPT | Performed by: INTERNAL MEDICINE

## 2020-05-06 PROCEDURE — 3075F SYST BP GE 130 - 139MM HG: CPT | Performed by: INTERNAL MEDICINE

## 2020-05-06 PROCEDURE — 3078F DIAST BP <80 MM HG: CPT | Performed by: INTERNAL MEDICINE

## 2020-05-06 RX ORDER — CETIRIZINE HYDROCHLORIDE 10 MG/1
10 TABLET, CHEWABLE ORAL DAILY
Qty: 30 TABLET | Refills: 3 | Status: SHIPPED | OUTPATIENT
Start: 2020-05-06 | End: 2020-05-06

## 2020-05-06 RX ORDER — LEVOTHYROXINE SODIUM 0.03 MG/1
TABLET ORAL
Qty: 30 TABLET | Refills: 2 | Status: SHIPPED | OUTPATIENT
Start: 2020-05-06 | End: 2020-10-19

## 2020-05-06 RX ORDER — CETIRIZINE HYDROCHLORIDE 10 MG/1
10 TABLET, CHEWABLE ORAL DAILY
Qty: 30 TABLET | Refills: 3 | Status: SHIPPED | OUTPATIENT
Start: 2020-05-06 | End: 2020-05-07

## 2020-05-07 ENCOUNTER — TELEPHONE (OUTPATIENT)
Dept: PAIN MEDICINE | Facility: MEDICAL CENTER | Age: 70
End: 2020-05-07

## 2020-05-07 DIAGNOSIS — J30.9 ALLERGIC RHINITIS, UNSPECIFIED SEASONALITY, UNSPECIFIED TRIGGER: Primary | ICD-10-CM

## 2020-05-07 DIAGNOSIS — G47.00 INSOMNIA, UNSPECIFIED TYPE: ICD-10-CM

## 2020-05-07 DIAGNOSIS — K27.9 PUD (PEPTIC ULCER DISEASE): ICD-10-CM

## 2020-05-07 RX ORDER — CETIRIZINE HYDROCHLORIDE 10 MG/1
5 TABLET ORAL DAILY
Qty: 30 TABLET | Refills: 3 | Status: SHIPPED | OUTPATIENT
Start: 2020-05-07 | End: 2020-10-30

## 2020-05-07 RX ORDER — TRAZODONE HYDROCHLORIDE 50 MG/1
TABLET ORAL
Qty: 30 TABLET | Refills: 0 | Status: SHIPPED | OUTPATIENT
Start: 2020-05-07 | End: 2020-05-30

## 2020-05-07 RX ORDER — SUCRALFATE 1 G/1
TABLET ORAL
Qty: 120 TABLET | Refills: 0 | Status: SHIPPED | OUTPATIENT
Start: 2020-05-07 | End: 2020-05-31

## 2020-05-08 ENCOUNTER — TELEPHONE (OUTPATIENT)
Dept: FAMILY MEDICINE CLINIC | Facility: CLINIC | Age: 70
End: 2020-05-08

## 2020-05-11 ENCOUNTER — OFFICE VISIT (OUTPATIENT)
Dept: OBGYN CLINIC | Facility: OTHER | Age: 70
End: 2020-05-11
Payer: OTHER MISCELLANEOUS

## 2020-05-11 VITALS
SYSTOLIC BLOOD PRESSURE: 128 MMHG | HEART RATE: 48 BPM | DIASTOLIC BLOOD PRESSURE: 80 MMHG | WEIGHT: 163 LBS | BODY MASS INDEX: 27.12 KG/M2

## 2020-05-11 DIAGNOSIS — G89.29 CHRONIC LEFT SHOULDER PAIN: ICD-10-CM

## 2020-05-11 DIAGNOSIS — M25.512 CHRONIC LEFT SHOULDER PAIN: ICD-10-CM

## 2020-05-11 DIAGNOSIS — G89.29 CHRONIC MIDLINE LOW BACK PAIN WITH RIGHT-SIDED SCIATICA: ICD-10-CM

## 2020-05-11 DIAGNOSIS — M11.262 PSEUDOGOUT OF LEFT KNEE: ICD-10-CM

## 2020-05-11 DIAGNOSIS — M54.41 CHRONIC MIDLINE LOW BACK PAIN WITH RIGHT-SIDED SCIATICA: ICD-10-CM

## 2020-05-11 DIAGNOSIS — M17.11 PRIMARY OSTEOARTHRITIS OF RIGHT KNEE: Primary | ICD-10-CM

## 2020-05-11 PROCEDURE — 99244 OFF/OP CNSLTJ NEW/EST MOD 40: CPT | Performed by: ORTHOPAEDIC SURGERY

## 2020-05-11 PROCEDURE — 20610 DRAIN/INJ JOINT/BURSA W/O US: CPT | Performed by: ORTHOPAEDIC SURGERY

## 2020-05-11 RX ORDER — TRIAMCINOLONE ACETONIDE 40 MG/ML
20 INJECTION, SUSPENSION INTRA-ARTICULAR; INTRAMUSCULAR
Status: COMPLETED | OUTPATIENT
Start: 2020-05-11 | End: 2020-05-11

## 2020-05-11 RX ORDER — LIDOCAINE HYDROCHLORIDE 10 MG/ML
4 INJECTION, SOLUTION INFILTRATION; PERINEURAL
Status: COMPLETED | OUTPATIENT
Start: 2020-05-11 | End: 2020-05-11

## 2020-05-11 RX ADMIN — LIDOCAINE HYDROCHLORIDE 4 ML: 10 INJECTION, SOLUTION INFILTRATION; PERINEURAL at 14:26

## 2020-05-11 RX ADMIN — TRIAMCINOLONE ACETONIDE 20 MG: 40 INJECTION, SUSPENSION INTRA-ARTICULAR; INTRAMUSCULAR at 14:26

## 2020-05-12 ENCOUNTER — HOSPITAL ENCOUNTER (OUTPATIENT)
Dept: RADIOLOGY | Age: 70
Discharge: HOME | End: 2020-05-12
Payer: OTHER MISCELLANEOUS

## 2020-05-12 ENCOUNTER — TELEPHONE (OUTPATIENT)
Dept: OBGYN CLINIC | Facility: HOSPITAL | Age: 70
End: 2020-05-12

## 2020-05-12 DIAGNOSIS — M54.16 LUMBAR RADICULITIS: ICD-10-CM

## 2020-05-12 PROCEDURE — 72148 MRI LUMBAR SPINE W/O DYE: CPT

## 2020-05-14 ENCOUNTER — TELEPHONE (OUTPATIENT)
Dept: PAIN MEDICINE | Facility: CLINIC | Age: 70
End: 2020-05-14

## 2020-05-27 ENCOUNTER — TELEPHONE (OUTPATIENT)
Dept: PAIN MEDICINE | Facility: MEDICAL CENTER | Age: 70
End: 2020-05-27

## 2020-05-28 ENCOUNTER — OFFICE VISIT (OUTPATIENT)
Dept: PAIN MEDICINE | Facility: MEDICAL CENTER | Age: 70
End: 2020-05-28
Payer: COMMERCIAL

## 2020-05-28 VITALS
RESPIRATION RATE: 14 BRPM | WEIGHT: 14 LBS | HEIGHT: 65 IN | DIASTOLIC BLOOD PRESSURE: 64 MMHG | SYSTOLIC BLOOD PRESSURE: 108 MMHG | HEART RATE: 54 BPM | TEMPERATURE: 98.4 F | BODY MASS INDEX: 2.33 KG/M2

## 2020-05-28 DIAGNOSIS — M54.41 CHRONIC BILATERAL LOW BACK PAIN WITH RIGHT-SIDED SCIATICA: ICD-10-CM

## 2020-05-28 DIAGNOSIS — E11.42 DIABETIC PERIPHERAL NEUROPATHY (HCC): ICD-10-CM

## 2020-05-28 DIAGNOSIS — F11.20 UNCOMPLICATED OPIOID DEPENDENCE (HCC): ICD-10-CM

## 2020-05-28 DIAGNOSIS — G89.4 CHRONIC PAIN SYNDROME: ICD-10-CM

## 2020-05-28 DIAGNOSIS — G89.29 CHRONIC BILATERAL LOW BACK PAIN WITH RIGHT-SIDED SCIATICA: ICD-10-CM

## 2020-05-28 DIAGNOSIS — Z79.891 LONG-TERM CURRENT USE OF OPIATE ANALGESIC: Primary | ICD-10-CM

## 2020-05-28 DIAGNOSIS — M54.12 CERVICAL RADICULOPATHY: ICD-10-CM

## 2020-05-28 PROCEDURE — 4004F PT TOBACCO SCREEN RCVD TLK: CPT | Performed by: PHYSICAL MEDICINE & REHABILITATION

## 2020-05-28 PROCEDURE — 99214 OFFICE O/P EST MOD 30 MIN: CPT | Performed by: PHYSICAL MEDICINE & REHABILITATION

## 2020-05-28 PROCEDURE — 3008F BODY MASS INDEX DOCD: CPT | Performed by: PHYSICAL MEDICINE & REHABILITATION

## 2020-05-28 PROCEDURE — 4040F PNEUMOC VAC/ADMIN/RCVD: CPT | Performed by: PHYSICAL MEDICINE & REHABILITATION

## 2020-05-28 PROCEDURE — 1160F RVW MEDS BY RX/DR IN RCRD: CPT | Performed by: PHYSICAL MEDICINE & REHABILITATION

## 2020-05-28 PROCEDURE — 80305 DRUG TEST PRSMV DIR OPT OBS: CPT | Performed by: PHYSICAL MEDICINE & REHABILITATION

## 2020-05-28 RX ORDER — HYDROCODONE BITARTRATE AND ACETAMINOPHEN 10; 325 MG/1; MG/1
TABLET ORAL
Qty: 130 TABLET | Refills: 0 | Status: SHIPPED | OUTPATIENT
Start: 2020-06-25 | End: 2020-07-22 | Stop reason: SDUPTHER

## 2020-05-28 RX ORDER — PREGABALIN 100 MG/1
100 CAPSULE ORAL 3 TIMES DAILY
Qty: 270 CAPSULE | Refills: 0 | Status: SHIPPED | OUTPATIENT
Start: 2020-05-28 | End: 2020-06-29 | Stop reason: SDUPTHER

## 2020-05-28 RX ORDER — HYDROCODONE BITARTRATE AND ACETAMINOPHEN 10; 325 MG/1; MG/1
TABLET ORAL
Qty: 130 TABLET | Refills: 0 | Status: SHIPPED | OUTPATIENT
Start: 2020-05-28 | End: 2020-05-28 | Stop reason: SDUPTHER

## 2020-05-29 ENCOUNTER — OFFICE VISIT (OUTPATIENT)
Dept: PAIN MEDICINE | Facility: MEDICAL CENTER | Age: 70
End: 2020-05-29
Payer: OTHER MISCELLANEOUS

## 2020-05-29 ENCOUNTER — TELEPHONE (OUTPATIENT)
Dept: PAIN MEDICINE | Facility: MEDICAL CENTER | Age: 70
End: 2020-05-29

## 2020-05-29 VITALS
DIASTOLIC BLOOD PRESSURE: 66 MMHG | RESPIRATION RATE: 14 BRPM | BODY MASS INDEX: 27.32 KG/M2 | WEIGHT: 164 LBS | TEMPERATURE: 98.5 F | HEART RATE: 58 BPM | HEIGHT: 65 IN | SYSTOLIC BLOOD PRESSURE: 129 MMHG

## 2020-05-29 DIAGNOSIS — M54.16 LUMBAR RADICULITIS: ICD-10-CM

## 2020-05-29 DIAGNOSIS — M48.061 SPINAL STENOSIS OF LUMBAR REGION WITHOUT NEUROGENIC CLAUDICATION: ICD-10-CM

## 2020-05-29 DIAGNOSIS — S16.1XXD STRAIN OF NECK MUSCLE, SUBSEQUENT ENCOUNTER: ICD-10-CM

## 2020-05-29 DIAGNOSIS — M54.12 CERVICAL RADICULOPATHY: Primary | ICD-10-CM

## 2020-05-29 DIAGNOSIS — M79.18 MYOFASCIAL PAIN SYNDROME: ICD-10-CM

## 2020-05-29 PROCEDURE — 4004F PT TOBACCO SCREEN RCVD TLK: CPT | Performed by: PHYSICAL MEDICINE & REHABILITATION

## 2020-05-29 PROCEDURE — 3008F BODY MASS INDEX DOCD: CPT | Performed by: PHYSICAL MEDICINE & REHABILITATION

## 2020-05-29 PROCEDURE — 4040F PNEUMOC VAC/ADMIN/RCVD: CPT | Performed by: PHYSICAL MEDICINE & REHABILITATION

## 2020-05-29 PROCEDURE — 99214 OFFICE O/P EST MOD 30 MIN: CPT | Performed by: PHYSICAL MEDICINE & REHABILITATION

## 2020-05-29 PROCEDURE — 1160F RVW MEDS BY RX/DR IN RCRD: CPT | Performed by: PHYSICAL MEDICINE & REHABILITATION

## 2020-05-29 RX ORDER — BACLOFEN 10 MG/1
10 TABLET ORAL 3 TIMES DAILY
Qty: 270 TABLET | Refills: 0 | Status: SHIPPED | OUTPATIENT
Start: 2020-05-29 | End: 2020-06-29 | Stop reason: SDUPTHER

## 2020-05-30 DIAGNOSIS — G47.00 INSOMNIA, UNSPECIFIED TYPE: ICD-10-CM

## 2020-05-30 DIAGNOSIS — K27.9 PUD (PEPTIC ULCER DISEASE): ICD-10-CM

## 2020-05-30 LAB
6MAM UR QL CFM: NEGATIVE NG/ML
7AMINOCLONAZEPAM UR QL CFM: NEGATIVE NG/ML
A-OH ALPRAZ UR QL CFM: NEGATIVE NG/ML
ACCEPTABLE CREAT UR QL: NORMAL MG/DL
ACCEPTIBLE SP GR UR QL: NORMAL
AMPHET UR QL CFM: NEGATIVE NG/ML
AMPHET UR QL CFM: NEGATIVE NG/ML
BUPRENORPHINE UR QL CFM: NEGATIVE NG/ML
BUTALBITAL UR QL CFM: NEGATIVE NG/ML
BZE UR QL CFM: NEGATIVE NG/ML
CODEINE UR QL CFM: NEGATIVE NG/ML
DESIPRAMINE UR QL CFM: NEGATIVE NG/ML
EDDP UR QL CFM: NEGATIVE NG/ML
ETHYL GLUCURONIDE UR QL CFM: NEGATIVE NG/ML
ETHYL SULFATE UR QL SCN: NEGATIVE NG/ML
FENTANYL UR QL CFM: NEGATIVE NG/ML
GLIADIN IGG SER IA-ACNC: NEGATIVE NG/ML
GLUCOSE 30M P 50 G LAC PO SERPL-MCNC: NEGATIVE NG/ML
HYDROCODONE UR CFM-MCNC: 1647.5 NG/ML
HYDROCODONE UR CFM-MCNC: 1647.5 NG/ML
HYDROMORPHONE UR CFM-MCNC: 181.84 NG/ML
LORAZEPAM UR QL CFM: NEGATIVE NG/ML
MDMA UR QL CFM: NEGATIVE NG/ML
ME-PHENIDATE UR QL CFM: NEGATIVE NG/ML
MEPERIDINE UR QL CFM: NEGATIVE NG/ML
MEPHEDRONE UR QL CFM: NEGATIVE NG/ML
METHADONE UR QL CFM: NEGATIVE NG/ML
METHAMPHET UR QL CFM: NEGATIVE NG/ML
MORPHINE UR QL CFM: NEGATIVE NG/ML
MORPHINE UR QL CFM: NEGATIVE NG/ML
NITRITE UR QL: NORMAL UG/ML
NORBUPRENORPHINE UR QL CFM: NEGATIVE NG/ML
NORDIAZEPAM UR QL CFM: NEGATIVE NG/ML
NORFENTANYL UR QL CFM: NEGATIVE NG/ML
NORHYDROCODONE UR CFM-MCNC: 2547.63 NG/ML
NORHYDROCODONE UR CFM-MCNC: 2547.63 NG/ML
NORMEPERIDINE UR QL CFM: NEGATIVE NG/ML
NOROXYCODONE UR QL CFM: NEGATIVE NG/ML
OLANZAPINE QUANTIFICATION: NEGATIVE NG/ML
OPC-3373 QUANTIFICATION: NEGATIVE
OXAZEPAM UR QL CFM: NEGATIVE NG/ML
OXYCODONE UR QL CFM: NEGATIVE NG/ML
OXYMORPHONE UR QL CFM: NEGATIVE NG/ML
OXYMORPHONE UR QL CFM: NEGATIVE NG/ML
PHENOBARB UR QL CFM: NEGATIVE NG/ML
RESULT ALL_PRESCRIBED MEDS AND SPECIAL INSTRUCTIONS: NORMAL
SECOBARBITAL UR QL CFM: NEGATIVE NG/ML
SL AMB 3-METHYL-FENTANYL QUANTIFICATION: NORMAL NG/ML
SL AMB 4-ANPP QUANTIFICATION: NORMAL NG/ML
SL AMB 4-FIBF QUANTIFICATION: NORMAL NG/ML
SL AMB 5F-ADB-M7 METABOLITE QUANTIFICATION: NEGATIVE NG/ML
SL AMB 7-OH-MITRAGYNINE (KRATOM ALKALOID) QUANTIFICATION: NEGATIVE NG/ML
SL AMB AB-FUBINACA-M3 METABOLITE QUANTIFICATION: NEGATIVE NG/ML
SL AMB ACETYL FENTANYL QUANTIFICATION: NORMAL NG/ML
SL AMB ACETYL NORFENTANYL QUANTIFICATION: NORMAL NG/ML
SL AMB ACRYL FENTANYL QUANTIFICATION: NORMAL NG/ML
SL AMB BATH SALTS: NEGATIVE NG/ML
SL AMB BUTRYL FENTANYL QUANTIFICATION: NORMAL NG/ML
SL AMB CARFENTANIL QUANTIFICATION: NORMAL NG/ML
SL AMB CLOZAPINE QUANTIFICATION: NEGATIVE NG/ML
SL AMB CTHC (MARIJUANA METABOLITE) QUANTIFICATION: NEGATIVE NG/ML
SL AMB CYCLOPROPYL FENTANYL QUANTIFICATION: NORMAL NG/ML
SL AMB DEXTROMETHORPHAN QUANTIFICATION: NEGATIVE NG/ML
SL AMB DEXTRORPHAN (DEXTROMETHORPHAN METABOLITE) QUANT: NEGATIVE NG/ML
SL AMB DEXTRORPHAN (DEXTROMETHORPHAN METABOLITE) QUANT: NEGATIVE NG/ML
SL AMB FURANYL FENTANYL QUANTIFICATION: NORMAL NG/ML
SL AMB JWH018 METABOLITE QUANTIFICATION: NEGATIVE NG/ML
SL AMB JWH073 METABOLITE QUANTIFICATION: NEGATIVE NG/ML
SL AMB MDMB-FUBINACA-M1 METABOLITE QUANTIFICATION: NEGATIVE NG/ML
SL AMB METHOXYACETYL FENTANYL QUANTIFICATION: NORMAL NG/ML
SL AMB METHYLONE QUANTIFICATION: NEGATIVE NG/ML
SL AMB N-DESMETHYL U-47700 QUANTIFICATION: NORMAL NG/ML
SL AMB N-DESMETHYL-TRAMADOL QUANTIFICATION: NEGATIVE NG/ML
SL AMB N-DESMETHYLCLOZAPINE QUANTIFICATION: NEGATIVE NG/ML
SL AMB PHENTERMINE QUANTIFICATION: NEGATIVE NG/ML
SL AMB RCS4 METABOLITE QUANTIFICATION: NEGATIVE NG/ML
SL AMB RITALINIC ACID QUANTIFICATION: NEGATIVE NG/ML
SL AMB U-47700 QUANTIFICATION: NORMAL NG/ML
SPECIMEN DRAWN SERPL: NEGATIVE NG/ML
SPECIMEN PH ACCEPTABLE UR: NORMAL
TAPENTADOL UR QL CFM: NEGATIVE NG/ML
TEMAZEPAM UR QL CFM: NEGATIVE NG/ML
TEMAZEPAM UR QL CFM: NEGATIVE NG/ML
TRAMADOL UR QL CFM: NEGATIVE NG/ML
URATE/CREAT 24H UR: NEGATIVE NG/ML

## 2020-05-30 RX ORDER — TRAZODONE HYDROCHLORIDE 50 MG/1
TABLET ORAL
Qty: 30 TABLET | Refills: 0 | Status: SHIPPED | OUTPATIENT
Start: 2020-05-30 | End: 2020-06-10

## 2020-05-31 RX ORDER — SUCRALFATE 1 G/1
TABLET ORAL
Qty: 120 TABLET | Refills: 0 | Status: SHIPPED | OUTPATIENT
Start: 2020-05-31 | End: 2020-06-10

## 2020-06-01 ENCOUNTER — HOSPITAL ENCOUNTER (OUTPATIENT)
Dept: RADIOLOGY | Facility: MEDICAL CENTER | Age: 70
Discharge: HOME | End: 2020-06-01
Attending: PHYSICAL MEDICINE & REHABILITATION | Admitting: PHYSICAL MEDICINE & REHABILITATION
Payer: OTHER MISCELLANEOUS

## 2020-06-01 VITALS
RESPIRATION RATE: 20 BRPM | OXYGEN SATURATION: 97 % | HEART RATE: 51 BPM | SYSTOLIC BLOOD PRESSURE: 155 MMHG | TEMPERATURE: 98.5 F | DIASTOLIC BLOOD PRESSURE: 70 MMHG

## 2020-06-01 DIAGNOSIS — M54.16 LUMBAR RADICULITIS: ICD-10-CM

## 2020-06-01 PROCEDURE — 64483 NJX AA&/STRD TFRM EPI L/S 1: CPT | Performed by: PHYSICAL MEDICINE & REHABILITATION

## 2020-06-01 RX ORDER — PAPAVERINE HCL 150 MG
20 CAPSULE, EXTENDED RELEASE ORAL ONCE
Status: COMPLETED | OUTPATIENT
Start: 2020-06-01 | End: 2020-06-01

## 2020-06-01 RX ORDER — LIDOCAINE HYDROCHLORIDE 10 MG/ML
5 INJECTION, SOLUTION EPIDURAL; INFILTRATION; INTRACAUDAL; PERINEURAL ONCE
Status: COMPLETED | OUTPATIENT
Start: 2020-06-01 | End: 2020-06-01

## 2020-06-01 RX ADMIN — LIDOCAINE HYDROCHLORIDE 2.5 ML: 10 INJECTION, SOLUTION EPIDURAL; INFILTRATION; INTRACAUDAL; PERINEURAL at 11:55

## 2020-06-01 RX ADMIN — IOHEXOL 2 ML: 300 INJECTION, SOLUTION INTRAVENOUS at 11:57

## 2020-06-01 RX ADMIN — DEXAMETHASONE SODIUM PHOSPHATE 15 MG: 10 INJECTION, SOLUTION INTRAMUSCULAR; INTRAVENOUS at 11:58

## 2020-06-08 ENCOUNTER — TELEPHONE (OUTPATIENT)
Dept: PAIN MEDICINE | Facility: MEDICAL CENTER | Age: 70
End: 2020-06-08

## 2020-06-08 ENCOUNTER — TELEPHONE (OUTPATIENT)
Dept: PAIN MEDICINE | Facility: CLINIC | Age: 70
End: 2020-06-08

## 2020-06-08 NOTE — TELEPHONE ENCOUNTER
She reports 20% improvement post inj   Pain level 4/10 (sitting) 7/10 (walking)   She said she felt really bad after the injection  Patient said the pain comes and goes depends on what she's doing   Aware takes up to 2wks for full effect

## 2020-06-10 DIAGNOSIS — G47.00 INSOMNIA, UNSPECIFIED TYPE: ICD-10-CM

## 2020-06-10 DIAGNOSIS — K27.9 PUD (PEPTIC ULCER DISEASE): ICD-10-CM

## 2020-06-10 RX ORDER — TRAZODONE HYDROCHLORIDE 50 MG/1
TABLET ORAL
Qty: 90 TABLET | Refills: 1 | Status: SHIPPED | OUTPATIENT
Start: 2020-06-10 | End: 2020-07-17

## 2020-06-10 RX ORDER — SUCRALFATE 1 G/1
TABLET ORAL
Qty: 360 TABLET | Refills: 1 | Status: SHIPPED | OUTPATIENT
Start: 2020-06-10 | End: 2020-07-16

## 2020-06-29 ENCOUNTER — OFFICE VISIT (OUTPATIENT)
Dept: PAIN MEDICINE | Facility: MEDICAL CENTER | Age: 70
End: 2020-06-29
Payer: OTHER MISCELLANEOUS

## 2020-06-29 VITALS
RESPIRATION RATE: 16 BRPM | TEMPERATURE: 98.3 F | BODY MASS INDEX: 27.32 KG/M2 | HEIGHT: 65 IN | DIASTOLIC BLOOD PRESSURE: 70 MMHG | WEIGHT: 164 LBS | HEART RATE: 60 BPM | SYSTOLIC BLOOD PRESSURE: 122 MMHG

## 2020-06-29 DIAGNOSIS — G89.29 CHRONIC BILATERAL LOW BACK PAIN WITH RIGHT-SIDED SCIATICA: ICD-10-CM

## 2020-06-29 DIAGNOSIS — M54.42 CHRONIC BILATERAL LOW BACK PAIN WITH BILATERAL SCIATICA: Primary | ICD-10-CM

## 2020-06-29 DIAGNOSIS — M54.16 LUMBAR RADICULITIS: ICD-10-CM

## 2020-06-29 DIAGNOSIS — M79.18 MYOFASCIAL PAIN SYNDROME: ICD-10-CM

## 2020-06-29 DIAGNOSIS — M54.41 CHRONIC BILATERAL LOW BACK PAIN WITH RIGHT-SIDED SCIATICA: ICD-10-CM

## 2020-06-29 DIAGNOSIS — G89.29 CHRONIC BILATERAL LOW BACK PAIN WITH BILATERAL SCIATICA: Primary | ICD-10-CM

## 2020-06-29 DIAGNOSIS — M54.41 CHRONIC BILATERAL LOW BACK PAIN WITH BILATERAL SCIATICA: Primary | ICD-10-CM

## 2020-06-29 PROCEDURE — 4004F PT TOBACCO SCREEN RCVD TLK: CPT | Performed by: NURSE PRACTITIONER

## 2020-06-29 PROCEDURE — 1160F RVW MEDS BY RX/DR IN RCRD: CPT | Performed by: NURSE PRACTITIONER

## 2020-06-29 PROCEDURE — 4040F PNEUMOC VAC/ADMIN/RCVD: CPT | Performed by: NURSE PRACTITIONER

## 2020-06-29 PROCEDURE — 99213 OFFICE O/P EST LOW 20 MIN: CPT | Performed by: NURSE PRACTITIONER

## 2020-06-29 PROCEDURE — 3008F BODY MASS INDEX DOCD: CPT | Performed by: NURSE PRACTITIONER

## 2020-06-29 RX ORDER — METHYLPREDNISOLONE 4 MG/1
TABLET ORAL
Qty: 1 EACH | Refills: 0 | Status: SHIPPED | OUTPATIENT
Start: 2020-06-29 | End: 2020-07-22

## 2020-06-29 RX ORDER — BLOOD SUGAR DIAGNOSTIC
STRIP MISCELLANEOUS
COMMUNITY
Start: 2020-05-26 | End: 2021-03-03

## 2020-06-29 RX ORDER — BACLOFEN 10 MG/1
10 TABLET ORAL 3 TIMES DAILY
Qty: 270 TABLET | Refills: 0 | Status: SHIPPED | OUTPATIENT
Start: 2020-06-29 | End: 2020-08-12 | Stop reason: SDUPTHER

## 2020-06-29 RX ORDER — PREGABALIN 150 MG/1
150 CAPSULE ORAL 3 TIMES DAILY
Qty: 270 CAPSULE | Refills: 0 | Status: SHIPPED | OUTPATIENT
Start: 2020-06-29 | End: 2020-08-12 | Stop reason: SDUPTHER

## 2020-06-29 NOTE — PROGRESS NOTES
Assessment:       1  Chronic pain of right knee    2  Primary osteoarthritis of right knee          Plan:        Clinical and radiographic imaging discussed with patient today - right knee pain attributed to primary osteoarthritis and meniscal injury with a concurrent work related injury in December, 2019  At this time, patient has experienced limited relief from recent cortisone injection  I offered viscosupplementation injection under ultrasound guidance as another option  I also counseled to consider knee replacement at this point given the degree of osteoarthritis and suspected meniscal injury, but she declined  In addition, I have ordered a replacement lateral  knee brace and she will follow up after viscosupplementation approval             Subjective:     Patient ID: Hui Castro is a 71 y o  female  Chief Complaint:  Follow up right knee pain    HPI  55-year-old female here today in regards to internal referral from orthopedic surgeon, Dr Yann Ledezma, for follow-up evaluation of bilateral knee pain, right greater than left  Patient reports she has had right knee pain for years that worsened after a work related injury on 12/20/2019 where she tripped and impacted her left knee while twisting her right knee  She was previously seen by Dr Yann Ledezma on 05/11/2020 in which she had a cortisone injection of her right knee  She reports experiencing relief for about 1 month before returning  Pain located in her general right knee  Described as sharp/aching, 7-9/10, without radiation to toes  She uses a right lateral knee  brace she has had since 2010 as her knee 'willy out" if she does not wear it  Pain is present at night which sometimes wakes her up  Right knee radiographs reviewed from 03/10/2020 which note moderate tricompartmental osteoarthritis without acute osseous abnormality       Social History     Occupational History    Not on file   Tobacco Use    Smoking status: Current Every Day Smoker     Packs/day: 0 50     Years: 40 00     Pack years: 20 00    Smokeless tobacco: Current User   Substance and Sexual Activity    Alcohol use: Not Currently     Alcohol/week: 1 0 standard drinks     Types: 1 Shots of liquor per week     Comment: rarely    Drug use: No    Sexual activity: Not Currently     Partners: Male      Review of Systems   Constitutional: Negative for chills, fatigue, fever and unexpected weight change  HENT: Negative for hearing loss, nosebleeds and sore throat  Eyes: Negative for pain, redness and visual disturbance  Respiratory: Negative for cough, shortness of breath and wheezing  Cardiovascular: Negative for chest pain, palpitations and leg swelling  Gastrointestinal: Negative for abdominal pain, nausea and vomiting  Musculoskeletal:        As per HPI   Skin: Negative for rash and wound  Neurological:        As per HPI           Objective:     Ortho ExamPhysical Exam   Constitutional: She appears well-developed and well-nourished  No distress  HENT:   Head: Normocephalic and atraumatic  Right Ear: External ear normal    Left Ear: External ear normal    Eyes: Conjunctivae are normal    Neck: Neck supple  Cardiovascular: Normal rate  Pulmonary/Chest: Effort normal and breath sounds normal  No respiratory distress  Abdominal: Soft  Neurological: She is alert  Skin: Skin is warm and dry  Psychiatric: Her behavior is normal    Nursing note and vitals reviewed  Right Knee Exam:  On inspection, there is no joint effusion, erythema  She has generalized tenderness along tibiofemoral joint line  In regards range of motion, extension -10 degrees, flexion 90  Sensation intact  Flexion and extension strength 5/5  There is slight varus deformity of knee during active knee extension with patellar crepitus  I have personally reviewed pertinent films in PACS  I interviewed, took the history and examined the patient    I discussed the case with the Resident and reviewed the Residents note , prescribed medications, and orders placed  I supervised the Resident and I agree with the Resident management plan as it was presented to me  I was present in the clinic and examined the patient      Daiana Dumont MD 07/08/20

## 2020-06-30 ENCOUNTER — OFFICE VISIT (OUTPATIENT)
Dept: OBGYN CLINIC | Facility: OTHER | Age: 70
End: 2020-06-30
Payer: OTHER MISCELLANEOUS

## 2020-06-30 VITALS — WEIGHT: 166 LBS | BODY MASS INDEX: 27.66 KG/M2 | HEIGHT: 65 IN

## 2020-06-30 DIAGNOSIS — M17.11 PRIMARY OSTEOARTHRITIS OF RIGHT KNEE: ICD-10-CM

## 2020-06-30 DIAGNOSIS — G89.29 CHRONIC PAIN OF RIGHT KNEE: Primary | ICD-10-CM

## 2020-06-30 DIAGNOSIS — M25.561 CHRONIC PAIN OF RIGHT KNEE: Primary | ICD-10-CM

## 2020-06-30 PROCEDURE — 4040F PNEUMOC VAC/ADMIN/RCVD: CPT | Performed by: ORTHOPAEDIC SURGERY

## 2020-06-30 PROCEDURE — 3008F BODY MASS INDEX DOCD: CPT | Performed by: ORTHOPAEDIC SURGERY

## 2020-06-30 PROCEDURE — 99213 OFFICE O/P EST LOW 20 MIN: CPT | Performed by: ORTHOPAEDIC SURGERY

## 2020-06-30 PROCEDURE — 4004F PT TOBACCO SCREEN RCVD TLK: CPT | Performed by: ORTHOPAEDIC SURGERY

## 2020-06-30 PROCEDURE — 1160F RVW MEDS BY RX/DR IN RCRD: CPT | Performed by: ORTHOPAEDIC SURGERY

## 2020-07-02 ENCOUNTER — TELEPHONE (OUTPATIENT)
Dept: PAIN MEDICINE | Facility: MEDICAL CENTER | Age: 70
End: 2020-07-02

## 2020-07-02 NOTE — TELEPHONE ENCOUNTER
RN s/w pt regarding previous and to David juarez from [facility]  Per [place] the pt will have the lyrica sent through her personal insurance this time and at her next appt on 7/29 the pt will have [facility] send her office note to MARY/Mirella  Pt aware of same  Pt has two appts one on 7/22 and one on 7/29

## 2020-07-02 NOTE — TELEPHONE ENCOUNTER
Patient   [phone]  Reina Li    Patient is calling in stating that her [facility] denied her medication  They said that they do not have information as to why it should go through [facility]  Please send correct information to them as to why she needs the medication  Which is because she has a pinched nerve        Alie Manus: [phone] (worker comp rep)

## 2020-07-08 ENCOUNTER — OFFICE VISIT (OUTPATIENT)
Dept: FAMILY MEDICINE CLINIC | Facility: CLINIC | Age: 70
End: 2020-07-08
Payer: OTHER MISCELLANEOUS

## 2020-07-08 ENCOUNTER — OFFICE VISIT (OUTPATIENT)
Dept: FAMILY MEDICINE CLINIC | Facility: CLINIC | Age: 70
End: 2020-07-08
Payer: COMMERCIAL

## 2020-07-08 VITALS
BODY MASS INDEX: 27.52 KG/M2 | RESPIRATION RATE: 14 BRPM | DIASTOLIC BLOOD PRESSURE: 78 MMHG | OXYGEN SATURATION: 98 % | HEIGHT: 65 IN | SYSTOLIC BLOOD PRESSURE: 130 MMHG | TEMPERATURE: 98.2 F | HEART RATE: 58 BPM | WEIGHT: 165.2 LBS

## 2020-07-08 VITALS
DIASTOLIC BLOOD PRESSURE: 78 MMHG | SYSTOLIC BLOOD PRESSURE: 130 MMHG | HEART RATE: 58 BPM | BODY MASS INDEX: 27.49 KG/M2 | RESPIRATION RATE: 14 BRPM | TEMPERATURE: 98.2 F | WEIGHT: 165 LBS | OXYGEN SATURATION: 98 % | HEIGHT: 65 IN

## 2020-07-08 DIAGNOSIS — M54.16 LUMBAR RADICULITIS: ICD-10-CM

## 2020-07-08 DIAGNOSIS — E03.8 HYPOTHYROIDISM DUE TO HASHIMOTO'S THYROIDITIS: ICD-10-CM

## 2020-07-08 DIAGNOSIS — E11.69 HYPERLIPIDEMIA ASSOCIATED WITH TYPE 2 DIABETES MELLITUS (HCC): ICD-10-CM

## 2020-07-08 DIAGNOSIS — E78.5 HYPERLIPIDEMIA ASSOCIATED WITH TYPE 2 DIABETES MELLITUS (HCC): ICD-10-CM
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
## 2020-07-08 DIAGNOSIS — IMO0002 TYPE II DIABETES MELLITUS WITH MANIFESTATIONS, UNCONTROLLED: Primary | ICD-10-CM

## 2020-07-08 DIAGNOSIS — M54.41 CHRONIC BILATERAL LOW BACK PAIN WITH BILATERAL SCIATICA: Primary | ICD-10-CM

## 2020-07-08 DIAGNOSIS — G89.29 CHRONIC BILATERAL LOW BACK PAIN WITH BILATERAL SCIATICA: Primary | ICD-10-CM

## 2020-07-08 DIAGNOSIS — M25.561 ACUTE PAIN OF RIGHT KNEE: ICD-10-CM

## 2020-07-08 DIAGNOSIS — M54.42 CHRONIC BILATERAL LOW BACK PAIN WITH BILATERAL SCIATICA: Primary | ICD-10-CM

## 2020-07-08 DIAGNOSIS — E06.3 HYPOTHYROIDISM DUE TO HASHIMOTO'S THYROIDITIS: ICD-10-CM

## 2020-07-08 DIAGNOSIS — E53.9 VITAMIN B DEFICIENCY: ICD-10-CM

## 2020-07-08 PROCEDURE — 4004F PT TOBACCO SCREEN RCVD TLK: CPT | Performed by: INTERNAL MEDICINE

## 2020-07-08 PROCEDURE — 1160F RVW MEDS BY RX/DR IN RCRD: CPT | Performed by: INTERNAL MEDICINE

## 2020-07-08 PROCEDURE — 1100F PTFALLS ASSESS-DOCD GE2>/YR: CPT | Performed by: INTERNAL MEDICINE

## 2020-07-08 PROCEDURE — 3288F FALL RISK ASSESSMENT DOCD: CPT | Performed by: INTERNAL MEDICINE

## 2020-07-08 PROCEDURE — 99214 OFFICE O/P EST MOD 30 MIN: CPT | Performed by: INTERNAL MEDICINE

## 2020-07-08 PROCEDURE — 3075F SYST BP GE 130 - 139MM HG: CPT | Performed by: INTERNAL MEDICINE

## 2020-07-08 PROCEDURE — 3008F BODY MASS INDEX DOCD: CPT | Performed by: INTERNAL MEDICINE

## 2020-07-08 PROCEDURE — 3078F DIAST BP <80 MM HG: CPT | Performed by: INTERNAL MEDICINE

## 2020-07-08 PROCEDURE — 4040F PNEUMOC VAC/ADMIN/RCVD: CPT | Performed by: INTERNAL MEDICINE

## 2020-07-08 PROCEDURE — 96372 THER/PROPH/DIAG INJ SC/IM: CPT | Performed by: INTERNAL MEDICINE

## 2020-07-08 RX ADMIN — CYANOCOBALAMIN 1000 MCG: 1000 INJECTION INTRAMUSCULAR; SUBCUTANEOUS at 10:36

## 2020-07-08 NOTE — PROGRESS NOTES
Assessment/Plan:         Diagnoses and all orders for this visit:    Type II diabetes mellitus with manifestations, uncontrolled ([facility] [address] ); life style mod  RTC in 1-2 mos w Blood work    Vitamin B deficiency; vit B 15 Im Now    Hypothyroidism due to Hashimoto's thyroiditis; continue synthroid 50/25 mcg  RTC in 1-2 mos w Blood work    Hyperlipidemia associated with type 2 diabetes mellitus ([facility] [address] ); Life style mod  RTc in 1-2 mos w Blood work    Other orders  -     Cancel: POCT hemoglobin A1c  -     LYRICA 100 MG capsule; Take 100 mg by mouth 3 (three) times a day        Subjective:      Patient ID: Jose Coker is a 79 y o  female  79 Y O lady is here for Regular check up, she has No blood work for a long time, med list reviewed  w pt in detail  She still smokes,  The following portions of the patient's history were reviewed and updated as appropriate: allergies, current medications, past family history, past social history, past surgical history and problem list     Review of Systems   Constitutional: Negative for chills, fatigue and fever  HENT: Negative for congestion, facial swelling, sore throat, trouble swallowing and voice change  Eyes: Negative for pain, discharge and visual disturbance  Respiratory: Negative for cough, shortness of breath and wheezing  Cardiovascular: Negative for chest pain, palpitations and leg swelling  Gastrointestinal: Negative for abdominal pain, blood in stool, constipation, diarrhea and nausea  Endocrine: Negative for polydipsia, polyphagia and polyuria  Genitourinary: Negative for difficulty urinating, hematuria and urgency  Musculoskeletal: Negative for arthralgias and myalgias  Skin: Negative for rash  Neurological: Negative for dizziness, tremors, weakness and headaches  Hematological: Negative for adenopathy  Does not bruise/bleed easily  Psychiatric/Behavioral: Negative for dysphoric mood, sleep disturbance and suicidal ideas  Objective:      /78 (BP Location: Left arm, Patient Position: Sitting, Cuff Size: Standard)   Pulse 58   Temp 98 2 °F (36 8 °C) (Probe)   Resp 14   Ht 5' 5" (1 651 m)   Wt 74 9 kg (165 lb 3 2 oz)   LMP  (LMP Unknown)   SpO2 98%   BMI 27 49 kg/m²          Physical Exam   Constitutional: She is oriented to person, place, and time  She appears well-nourished  No distress  HENT:   Head: Normocephalic  Mouth/Throat: Oropharynx is clear and moist  No oropharyngeal exudate  Eyes: Pupils are equal, round, and reactive to light  Conjunctivae are normal  No scleral icterus  Neck: Neck supple  Thyromegaly present  Cardiovascular: Normal rate and regular rhythm  Murmur heard  Pulmonary/Chest: Effort normal and breath sounds normal  No respiratory distress  She has no wheezes  She has no rales  Abdominal: Soft  Bowel sounds are normal  She exhibits no distension  There is no tenderness  There is no rebound and no guarding  Musculoskeletal: She exhibits tenderness  She exhibits no edema  Lymphadenopathy:     She has no cervical adenopathy  Neurological: She is alert and oriented to person, place, and time  No cranial nerve deficit  Pt uses a cane to support gait   Skin: No rash noted  No erythema  Psychiatric: She has a normal mood and affect  BMI Counseling: Body mass index is 27 49 kg/m²  The BMI is above normal  Nutrition recommendations include reducing portion sizes and decreasing overall calorie intake

## 2020-07-08 NOTE — LETTER
July 8, 2020     Patient: Brianda Goldberg   YOB: 1950   Date of Visit: 7/8/2020       To Whom it May Concern:    Shannon Lisa is under my professional care  She was seen in my office on 7/8/2020  She is to be off work pending re: evaluation on 09/08/2020  If you have any questions or concerns, please don't hesitate to call           Sincerely,          Mireya Barakat MD        CC:

## 2020-07-08 NOTE — PROGRESS NOTES
Assessment/Plan:         Diagnoses and all orders for this visit:    Chronic bilateral low back pain with bilateral sciatic worseing due to work injury  Continue same  Stay off work  FU w pain mangt  RTc in 2mos    Lumbar radiculitis; due to work injury  As above    Acute pain of right knee; due to work injury  FU w Orthopedic  Continue same  Stay off work  RTC in [employer]    Work related injury; which caused multiple injuries    Subjective:      Patient ID: Emanuel gale a 79 y o  female  79 Y O lady is here for Re check on work injury, she has been FU w pain Medicine receiving nerve block, lumbar and Cervical areas, also seeing orthopedic re right knee,   The following portions of the patient's history were reviewed and updated as appropriate: allergies, current medications, past family history, past medical history, past social history, past surgical history and problem list     Review of Systems   Constitutional: Negative for chills, fatigue and fever  HENT: Negative for congestion, facial swelling, sore throat, trouble swallowing and voice change  Eyes: Negative for pain, discharge and visual disturbance  Respiratory: Negative for cough, shortness of breath and wheezing  Cardiovascular: Negative for chest pain, palpitations and leg swelling  Gastrointestinal: Negative for abdominal pain, blood in stool, constipation, diarrhea and nausea  Endocrine: Negative for polydipsia, polyphagia and polyuria  Genitourinary: Negative for difficulty urinating, hematuria and urgency  Musculoskeletal: Positive for back pain and joint swelling  Negative for arthralgias and myalgias  Skin: Negative for rash  Neurological: Negative for dizziness, tremors, weakness and headaches  Hematological: Negative for adenopathy  Does not bruise/bleed easily  Psychiatric/Behavioral: Negative for dysphoric mood, sleep disturbance and suicidal ideas           Objective:      /78 (BP Location: Left arm, Patient Position: Sitting, Cuff Size: Standard)   Pulse 58   Temp 98 2 °F (36 8 °C) (Probe)   Resp 14   Ht 5' 5" (1 651 m)   Wt 74 8 kg (165 lb)   LMP  (LMP Unknown)   SpO2 98%   BMI 27 46 kg/m²          Physical Exam   Constitutional: She is oriented to person, place, and time  She appears well-nourished  No distress  HENT:   Head: Normocephalic  Mouth/Throat: Oropharynx is clear and moist  No oropharyngeal exudate  Eyes: Pupils are equal, round, and reactive to light  Conjunctivae are normal  No scleral icterus  Neck: Neck supple  No thyromegaly present  Cardiovascular: Normal rate, regular rhythm and normal heart sounds  No murmur heard  Pulmonary/Chest: Effort normal and breath sounds normal  No respiratory distress  She has no wheezes  She has no rales  Abdominal: Soft  Bowel sounds are normal  She exhibits no distension  There is no tenderness  There is no rebound and no guarding  Musculoskeletal: She exhibits tenderness  She exhibits no edema  Swelling right knee   Lymphadenopathy:     She has no cervical adenopathy  Neurological: She is alert and oriented to person, place, and time  No cranial nerve deficit  Skin: No rash noted  No erythema  Psychiatric: She has a normal mood and affect

## 2020-07-08 NOTE — PATIENT INSTRUCTIONS
Low Fat Diet   AMBULATORY CARE:   A low-fat diet  is an eating plan that is low in total fat, unhealthy fat, and cholesterol  You may need to follow a low-fat diet if you have trouble digesting or absorbing fat  You may also need to follow this diet if you have high cholesterol  You can also lower your cholesterol by increasing the amount of fiber in your diet  Soluble fiber is a type of fiber that helps to decrease cholesterol levels  Different types of fat in food:   · Limit unhealthy fats  A diet that is high in cholesterol, saturated fat, and trans fat may cause unhealthy cholesterol levels  Unhealthy cholesterol levels increase your risk of heart disease  ¨ Cholesterol:  Limit intake of cholesterol to less than 200 mg per day  Cholesterol is found in meat, eggs, and dairy  ¨ Saturated fat:  Limit saturated fat to less than 7% of your total daily calories  Ask your dietitian how many calories you need each day  Saturated fat is found in butter, cheese, ice cream, whole milk, and palm oil  Saturated fat is also found in meat, such as beef, pork, chicken skin, and processed meats  Processed meats include sausage, hot dogs, and bologna  ¨ Trans fat:  Avoid trans fat as much as possible  Trans fat is used in fried and baked foods  Foods that say trans fat free on the label may still have up to 0 5 grams of trans fat per serving  · Include healthy fats  Replace foods that are high in saturated and trans fat with foods high in healthy fats  This may help to decrease high cholesterol levels  ¨ Monounsaturated fats: These are found in avocados, nuts, and vegetable oils, such as olive, canola, and sunflower oil  ¨ Polyunsaturated fats: These can be found in vegetable oils, such as soybean or corn oil  Omega-3 fats can help to decrease the risk of heart disease  Omega-3 fats are found in fish, such as salmon, herring, trout, and tuna   Omega-3 fats can also be found in plant foods, such as walnuts, flaxseed, soybeans, and canola oil    Foods to limit or avoid:   · Grains:      ¨ Snacks that are made with partially hydrogenated oils, such as chips, regular crackers, and butter-flavored popcorn    ¨ High-fat baked goods, such as biscuits, croissants, doughnuts, pies, cookies, and pastries    · Dairy:      ¨ Whole milk, 2% milk, and yogurt and ice cream made with whole milk    ¨ Half and half creamer, heavy cream, and whipping cream    ¨ Cheese, cream cheese, and sour cream    · Meats and proteins:      ¨ High-fat cuts of meat (T-bone steak, regular hamburger, and ribs)    ¨ Fried meat, poultry (turkey and chicken), and fish    ¨ Poultry (chicken and turkey) with skin    ¨ Cold cuts (salami or bologna), hot dogs, casas, and sausage    ¨ Whole eggs and egg yolks    · Vegetables and fruits with added fat:      ¨ Fried vegetables or vegetables in butter or high-fat sauces, such as cream or cheese sauces    ¨ Fried fruit or fruit served with butter or cream    · Fats:      ¨ Butter, stick margarine, and shortening    ¨ Coconut, palm oil, and palm kernel oil  Foods to include:   · Grains:      ¨ Whole-grain breads, cereals, pasta, and brown rice    ¨ Low-fat crackers and pretzels    · Vegetables and fruits:      ¨ Fresh, frozen, or canned vegetables (no salt or low-sodium)    ¨ Fresh, frozen, dried, or canned fruit (canned in light syrup or fruit juice)    ¨ Avocado    · Low-fat dairy products:      ¨ Nonfat (skim) or 1% milk    ¨ Nonfat or low-fat cheese, yogurt, and cottage cheese    · Meats and proteins:      ¨ Chicken or turkey with no skin    ¨ Baked or broiled fish    ¨ Lean beef and pork (loin, round, extra lean hamburger)    ¨ Beans and peas, unsalted nuts, soy products    ¨ Egg whites and substitutes    ¨ Seeds and nuts    · Fats:      ¨ Unsaturated oil, such as canola, olive, peanut, soybean, or sunflower oil    ¨ Soft or liquid margarine and vegetable oil spread    ¨ Low-fat salad dressing  Other ways to decrease fat:   · Read food labels before you buy foods  Choose foods that have less than 30% of calories from fat  Choose low-fat or fat-free dairy products  Remember that fat free does not mean calorie free  These foods still contain calories, and too many calories can lead to weight gain  · Trim fat from meat and avoid fried food  Trim all visible fat from meat before you cook it  Remove the skin from poultry  Do not yip meat, fish, or poultry  Bake, roast, boil, or broil these foods instead  Avoid fried foods  Eat a baked potato instead of [place] fries  Steam vegetables instead of sautéing them in butter  · Add less fat to foods  Use imitation casas bits on salads and baked potatoes instead of regular casas bits  Use fat-free or low-fat salad dressings instead of regular dressings  Use low-fat or nonfat butter-flavored topping instead of regular butter or margarine on popcorn and other foods  Ways to decrease fat in recipes:  Replace high-fat ingredients with low-fat or nonfat ones  This may cause baked goods to be drier than usual  You may need to use nonfat cooking spray on pans to prevent food from sticking  You also may need to change the amount of other ingredients, such as water, in the recipe  Try the following:  · Use low-fat or light margarine instead of regular margarine or shortening  · Use lean ground turkey breast or chicken, or lean ground beef (less than 5% fat) instead of hamburger  · Add 1 teaspoon of canola oil to 8 ounces of skim milk instead of using cream or half and half  · Use grated zucchini, carrots, or apples in breads instead of coconut  · Use blenderized, low-fat cottage cheese, plain tofu, or low-fat ricotta cheese instead of cream cheese  · Use 1 egg white and 1 teaspoon of canola oil, or use ¼ cup (2 ounces) of fat-free egg substitute instead of a whole egg       · Replace half of the oil that is called for in a recipe with applesauce when you bake  Use 3 tablespoons of cocoa powder and 1 tablespoon of canola oil instead of a square of baking chocolate  How to increase fiber:  Eat enough high-fiber foods to get 20 to 30 grams of fiber every day  Slowly increase your fiber intake to avoid stomach cramps, gas, and other problems  · Eat 3 ounces of whole-grain foods each day  An ounce is about 1 slice of bread  Eat whole-grain breads, such as whole-wheat bread  Whole wheat, whole-wheat flour, or other whole grains should be listed as the first ingredient on the food label  Replace white flour with whole-grain flour or use half of each in recipes  Whole-grain flour is heavier than white flour, so you may have to add more yeast or baking powder  · Eat a high-fiber cereal for breakfast   Oatmeal is a good source of soluble fiber  Look for cereals that have bran or fiber in the name  Choose whole-grain products, such as brown rice, barley, and whole-wheat pasta  · Eat more beans, peas, and lentils  For example, add beans to soups or salads  Eat at least 5 cups of fruits and vegetables each day  Eat fruits and vegetables with the peel because the peel is high in fiber  © 2017 [employer] Information is for End User's use only and may not be sold, redistributed or otherwise used for commercial purposes  All illustrations and images included in CareNotes® are the copyrighted property of A D A M [employer] Inc  or Hipolito Joshi  The above information is an  only  It is not intended as medical advice for individual conditions or treatments  Talk to your doctor, nurse or pharmacist before following any medical regimen to see if it is safe and effective for you  Heart Healthy Diet   AMBULATORY CARE:   A heart healthy diet  is an eating plan low in total fat, unhealthy fats, and sodium (salt)  A heart healthy diet helps decrease your risk for heart disease and stroke   Limit the amount of fat you eat to 25% to 35% of your total daily calories  Limit sodium to less than 2,300 mg each day  Healthy fats:  Healthy fats can help improve cholesterol levels  The risk for heart disease is decreased when cholesterol levels are normal  Choose healthy fats, such as the following:  · Unsaturated fat  is found in foods such as soybean, canola, olive, corn, and safflower oils  It is also found in soft tub margarine that is made with liquid vegetable oil  · Omega-3 fat  is found in certain fish, such as salmon, tuna, and trout, and in walnuts and flaxseed  Unhealthy fats:  Unhealthy fats can cause unhealthy cholesterol levels in your blood and increase your risk of heart disease  Limit unhealthy fats, such as the following:  · Cholesterol  is found in animal foods, such as eggs and lobster, and in dairy products made from whole milk  Limit cholesterol to less than 300 milligrams (mg) each day  You may need to limit cholesterol to 200 mg each day if you have heart disease  · Saturated fat  is found in meats, such as casas and hamburger  It is also found in chicken or turkey skin, whole milk, and butter  Limit saturated fat to less than 7% of your total daily calories  Limit saturated fat to less than 6% if you have heart disease or are at increased risk for it  · Trans fat  is found in packaged foods, such as potato chips and cookies  It is also in hard margarine, some fried foods, and shortening  Avoid trans fats as much as possible    Heart healthy foods and drinks to include:  Ask your dietitian or healthcare provider how many servings to have from each of the following food groups:  · Grains:      ¨ Whole-wheat breads, cereals, and pastas, and brown rice    ¨ Low-fat, low-sodium crackers and chips    · Vegetables:      ¨ Broccoli, green beans, green peas, and spinach    ¨ Collards, kale, and lima beans    ¨ Carrots, sweet potatoes, tomatoes, and peppers    ¨ Canned vegetables with no salt added    · Fruits:      ¨ Bananas, peaches, pears, and pineapple    ¨ Grapes, raisins, and dates    ¨ Oranges, tangerines, grapefruit, orange juice, and grapefruit juice    ¨ Apricots, mangoes, melons, and papaya    ¨ Raspberries and strawberries    ¨ Canned fruit with no added sugar    · Low-fat dairy products:      ¨ Nonfat (skim) milk, 1% milk, and low-fat almond, cashew, or soy milks fortified with calcium    ¨ Low-fat cheese, regular or frozen yogurt, and cottage cheese    · Meats and proteins , such as lean cuts of beef and pork (loin, leg, round), skinless chicken and turkey, legumes, soy products, egg whites, and nuts  Foods and drinks to limit or avoid:  Ask your dietitian or healthcare provider about these and other foods that are high in unhealthy fat, sodium, and sugar:  · Snack or packaged foods , such as frozen dinners, cookies, macaroni and cheese, and cereals with more than 300 mg of sodium per serving    · Canned or dry mixes  for cakes, soups, sauces, or gravies    · Vegetables with added sodium , such as instant potatoes, vegetables with added sauces, or regular canned vegetables    · Other foods high in sodium , such as ketchup, barbecue sauce, salad dressing, pickles, olives, soy sauce, and miso    · High-fat dairy foods  such as whole or 2% milk, cream cheese, or sour cream, and cheeses     · High-fat protein foods  such as high-fat cuts of beef (T-bone steaks, ribs), chicken or turkey with skin, and organ meats, such as liver    · Cured or smoked meats , such as hot dogs, casas, and sausage    · Unhealthy fats and oils , such as butter, stick margarine, shortening, and cooking oils such as coconut or palm oil    · Food and drinks high in sugar , such as soft drinks (soda), sports drinks, sweetened tea, candy, cake, cookies, pies, and doughnuts  Other diet guidelines to follow:   · Eat more foods containing omega-3 fats  Eat fish high in omega-3 fats at least 2 times a week  · Limit alcohol    Too much alcohol can damage your heart and raise your blood pressure  Women should limit alcohol to 1 drink a day  Men should limit alcohol to 2 drinks a day  A drink of alcohol is 12 ounces of beer, 5 ounces of wine, or 1½ ounces of liquor  · Choose low-sodium foods  High-sodium foods can lead to high blood pressure  Add little or no salt to food you prepare  Use herbs and spices in place of salt  · Eat more fiber  to help lower cholesterol levels  Eat at least 5 servings of fruits and vegetables each day  Eat 3 ounces of whole-grain foods each day  Legumes (beans) are also a good source of fiber  Lifestyle guidelines:   · Do not smoke  Nicotine and other chemicals in cigarettes and cigars can cause lung and heart damage  Ask your healthcare provider for information if you currently smoke and need help to quit  E-cigarettes or smokeless tobacco still contain nicotine  Talk to your healthcare provider before you use these products  · Exercise regularly  to help you maintain a healthy weight and improve your blood pressure and cholesterol levels  Ask your healthcare provider about the best exercise plan for you  Do not start an exercise program without asking your healthcare provider  Follow up with your healthcare provider as directed:  Write down your questions so you remember to ask them during your visits  © 2017 [facility] Information is for End User's use only and may not be sold, redistributed or otherwise used for commercial purposes  All illustrations and images included in CareNotes® are the copyrighted property of [employer]  or Hipolito Joshi  The above information is an  only  It is not intended as medical advice for individual conditions or treatments  Talk to your doctor, nurse or pharmacist before following any medical regimen to see if it is safe and effective for you  Calorie Counting Diet   WHAT YOU NEED TO KNOW:   What is a calorie counting diet?   It is a meal plan based on counting calories each day to reach a healthy body weight  You will need to eat fewer calories if you are trying to lose weight  Weight loss may decrease your risk for certain health problems or improve your health if you have health problems  Some of these health problems include heart disease, high blood pressure, and diabetes  What foods should I avoid? Your dietitian will tell you if you need to avoid certain foods based on your body weight and health condition  You may need to avoid high-fat foods if you are at risk for or have heart disease  You may need to eat fewer foods from the breads and starches food group if you have diabetes  How many calories are in foods? The following is a list of foods and drinks with the approximate number of calories in each  Check the food label to find the exact number of calories  A dietitian can tell you how many calories you should have from each food group each day    · Carbohydrate:      ¨ ½ of a 3-inch bagel, 1 slice of bread, or ½ of a hamburger bun or hot dog bun (80)    ¨ 1 (8-inch) flour tortilla or ½ cup of cooked rice (100)    ¨ 1 (6-inch) corn tortilla (80)    ¨ 1 (6-inch) pancake or 1 cup of bran flakes cereal (110)    ¨ ½ cup of cooked cereal (80)    ¨ ½ cup of cooked pasta (85)    ¨ 1 ounce of pretzels (100)    ¨ 3 cups of air-popped popcorn without butter or oil (80)    · Dairy:      ¨ 1 cup of skim or 1% milk (90)    ¨ 1 cup of 2% milk (120)    ¨ 1 cup of whole milk (160)    ¨ 1 cup of 2% chocolate milk (220)    ¨ 1 ounce of low-fat cheese with 3 grams of fat per ounce (70)    ¨ 1 ounce of cheddar cheese (114)    ¨ ½ cup of 1% fat cottage cheese (80)    ¨ 1 cup of plain or sugar-free, fat-free yogurt (90)    · Protein foods:      ¨ 3 ounces of fish (not breaded or fried) (95)    ¨ 3 ounces of breaded, fried fish (195)    ¨ ¾ cup of tuna canned in water (105)    ¨ 3 ounces of chicken breast without skin (105)    ¨ 1 fried chicken breast with skin (350)    ¨ ¼ cup of fat free egg substitute (40)    ¨ 1 large egg (75)    ¨ 3 ounces of lean beef or pork (165)    ¨ 3 ounces of fried pork chop or ham (185)    ¨ ½ cup of cooked dried beans, such as kidney, anderson, lentils, or navy (115)    ¨ 3 ounces of bologna or lunch meat (225)    ¨ 2 links of breakfast sausage (140)    · Vegetables:      ¨ ½ cup of sliced mushrooms (10)    ¨ 1 cup of salad greens, such as lettuce, spinach, or abram (15)    ¨ ½ cup of steamed asparagus (20)    ¨ ½ cup of cooked summer squash, zucchini squash, or green or wax beans (25)    ¨ 1 cup of broccoli or cauliflower florets, or 1 medium tomato (25)    ¨ 1 large raw carrot or ½ cup of cooked carrots (40)    ¨ ? of a medium cucumber or 1 stalk of celery (5)    ¨ 1 small baked potato (160)    ¨ 1 cup of breaded, fried vegetables (230)    · Fruit:      ¨ 1 (6-inch) banana (55)     ¨ ½ of a 4-inch grapefruit (55)    ¨ 15 grapes (60)    ¨ 1 medium orange or apple (70)    ¨ 1 large peach (65)    ¨ 1 cup of fresh pineapple chunks (75)    ¨ 1 cup of melon cubes (50)    ¨ 1¼ cups of whole strawberries (45)    ¨ ½ cup of fruit canned in juice (55)    ¨ ½ cup of fruit canned in heavy syrup (110)    ¨ ?  cup of raisins (130)    ¨ ½ cup of unsweetened fruit juice (60)    ¨ ½ cup of grape, cranberry, or prune juice (90)    · Fat:      ¨ 10 peanuts or 2 teaspoons of peanut butter (55)    ¨ 2 tablespoons of avocado or 1 tablespoon of regular salad dressing (45)    ¨ 2 slices of casas (90)    ¨ 1 teaspoon of oil, such as safflower, canola, corn, or olive oil (45)    ¨ 2 teaspoons of low-fat margarine, or 1 tablespoon of low-fat mayonnaise (50)    ¨ 1 teaspoon of regular margarine (40)    ¨ 1 tablespoon of regular mayonnaise (135)    ¨ 1 tablespoon of cream cheese or 2 tablespoons of low-fat cream cheese (45)    ¨ 2 tablespoons of vegetable shortening (215)    · Dessert and sweets:      ¨ 8 animal crackers or 5 vanilla wafers (80)    ¨ 1 frozen fruit juice bar (80)    ¨ ½ cup of ice milk or low-fat frozen yogurt (90)    ¨ ½ cup of sherbet or sorbet (125)    ¨ ½ cup of sugar-free pudding or custard (60)    ¨ ½ cup of ice cream (140)    ¨ ½ cup of pudding or custard (175)    ¨ 1 (2-inch) square chocolate brownie (185)    · Combination foods:      ¨ Bean burrito made with an 8-inch tortilla, without cheese (275)    ¨ Chicken breast sandwich with lettuce and tomato (325)    ¨ 1 cup of chicken noodle soup (60)    ¨ 1 beef taco (175)    ¨ Regular hamburger with lettuce and tomato (310)    ¨ Regular cheeseburger with lettuce and tomato (410)     ¨ ¼ of a 12-inch cheese pizza (280)    ¨ Fried fish sandwich with lettuce and tomato (425)    ¨ Hot dog and bun (275)    ¨ 1½ cups of macaroni and cheese (310)    ¨ Taco salad with a fried tortilla shell (870)    · Low-calorie foods:      ¨ 1 tablespoon of ketchup or 1 tablespoon of fat free sour cream (15)    ¨ 1 teaspoon of mustard (5)    ¨ ¼ cup of salsa (20)    ¨ 1 large dill pickle (15)    ¨ 1 tablespoon of fat free salad dressing (10)    ¨ 2 teaspoons of low-sugar, light jam or jelly, or 1 tablespoon of sugar-free syrup (15)    ¨ 1 sugar-free popsicle (15)    ¨ 1 cup of club soda, seltzer water, or diet soda (0)  CARE AGREEMENT:   You have the right to help plan your care  Discuss treatment options with your caregivers to decide what care you want to receive  You always have the right to refuse treatment  The above information is an  only  It is not intended as medical advice for individual conditions or treatments  Talk to your doctor, nurse or pharmacist before following any medical regimen to see if it is safe and effective for you  © 2017 [facility] Information is for End User's use only and may not be sold, redistributed or otherwise used for commercial purposes   All illustrations and images included in CareNotes® are the copyrighted property of [employer]  or [employer]

## 2020-07-10 ENCOUNTER — TELEPHONE (OUTPATIENT)
Dept: OBGYN CLINIC | Facility: HOSPITAL | Age: 70
End: 2020-07-10

## 2020-07-10 NOTE — TELEPHONE ENCOUNTER
Kindly follow-up with this patient with regards to the status for  knee brace and her gel injections

## 2020-07-10 NOTE — TELEPHONE ENCOUNTER
Patient Dr Jhoan Sanford  Re: Gel injections + knee brace  # [phone]    Patient would like to know the next steps in order for her to obtain knee brace ordered by Dr Jhoan Sanford     ----------------------------------------------  (MSG Sent in referall)Patient called for status update on injections   Patient was educated on the process and would like a call once denied/approved to discuss delivery dates

## 2020-07-13 ENCOUNTER — TELEPHONE (OUTPATIENT)
Dept: OBGYN CLINIC | Facility: HOSPITAL | Age: 70
End: 2020-07-13

## 2020-07-13 NOTE — TELEPHONE ENCOUNTER
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 and after 90 days the patient does not need Prior auth any longer  This medications can be B&B and they do not have any preferred mediations    Called and LVM for pt      TO BE COMPLETED BY CENTRAL AUTH TEAM:     Physician: Dr Eduardo Mercado    Medication: Euflexxa     Number of Injections in Series (Appointments scheduled 1 week apart from one another): 3    Schedule after this date: upon availability     Billing Info: Buy and Bill/Specialty Pharmacy-Patient Supply<<<<BUY AND BILL     Appointment Message Line: RIGHT KNEE [facility] #1,2,3 [address]   (please copy and paste appointment message line when scheduling appointment)

## 2020-07-13 NOTE — TELEPHONE ENCOUNTER
Called and left a message for Kyra at [employer] which is the [employer]       Called and LVM for patient with status

## 2020-07-15 ENCOUNTER — TELEPHONE (OUTPATIENT)
Dept: PAIN MEDICINE | Facility: MEDICAL CENTER | Age: 70
End: 2020-07-15

## 2020-07-15 ENCOUNTER — TELEPHONE (OUTPATIENT)
Dept: OBGYN CLINIC | Facility: HOSPITAL | Age: 70
End: 2020-07-15

## 2020-07-15 NOTE — TELEPHONE ENCOUNTER
Patient sees Dr Juana Talbert     Patient called in to  her injections and she stated that she was to get a call regarding her measurements for a new brace        [phone]

## 2020-07-15 NOTE — TELEPHONE ENCOUNTER
Patient is calling to make you aware she found a doctor who will do EMG she has a scheduled appt for the end of July they need the following:    Dr Elba Vázquez  [address]   [facility] [place] [address]    Phone #[phone]  Fax #[phone]    He needs script for EMG   Office notes and demographics faxed over please  Thank you

## 2020-07-16 DIAGNOSIS — K27.9 PUD (PEPTIC ULCER DISEASE): ICD-10-CM

## 2020-07-16 RX ORDER — SUCRALFATE 1 G/1
TABLET ORAL
Qty: 120 TABLET | Refills: 0 | Status: SHIPPED | OUTPATIENT
Start: 2020-07-16 | End: 2020-08-13

## 2020-07-16 NOTE — TELEPHONE ENCOUNTER
Informed pharmacist I was not aware that she needed a prior authorization  Prior auth was sent Urgent

## 2020-07-16 NOTE — TELEPHONE ENCOUNTER
[facility] called stating they have not heard anything about the pt Lyrica for the prior auth  Please call [facility] and give them a up date      [facility] -[phone]

## 2020-07-16 NOTE — TELEPHONE ENCOUNTER
Lm for patient  Pharmacist said patient is unaware that the medication is suppose to go through her regular insurance  I was calling to let patient know what previous message stated  I did sent the prior auth through [employer] and [employer]

## 2020-07-16 NOTE — TELEPHONE ENCOUNTER
L/M for return call  Mailed release for signature and will forward when received  Anel Currie, can you take care of the script ?

## 2020-07-17 ENCOUNTER — TELEPHONE (OUTPATIENT)
Dept: PAIN MEDICINE | Facility: CLINIC | Age: 70
End: 2020-07-17

## 2020-07-17 DIAGNOSIS — G47.00 INSOMNIA, UNSPECIFIED TYPE: ICD-10-CM

## 2020-07-17 RX ORDER — TRAZODONE HYDROCHLORIDE 50 MG/1
TABLET ORAL
Qty: 30 TABLET | Refills: 0 | Status: SHIPPED | OUTPATIENT
Start: 2020-07-17 | End: 2020-08-17

## 2020-07-19 DIAGNOSIS — E03.9 HYPOTHYROIDISM, UNSPECIFIED TYPE: ICD-10-CM

## 2020-07-19 RX ORDER — LEVOTHYROXINE SODIUM 0.05 MG/1
TABLET ORAL
Qty: 30 TABLET | Refills: 2 | Status: SHIPPED | OUTPATIENT
Start: 2020-07-19 | End: 2021-06-06

## 2020-07-20 NOTE — PROGRESS NOTES
No diagnosis found  No orders of the defined types were placed in this encounter  Imaging Studies (I personally reviewed images in PACS and report):    IMPRESSION:  ***      Repeat X-ray next visit:   ***      No follow-ups on file  There are no Patient Instructions on file for this visit          CHIEF COMPLAINT:  ***    HPI:  Augustine Araujo is a 79 y o  female  who presents for       Visit 7/20/2020 :  ***          Review of Systems      Following history reviewed and update:    Past Medical History:   Diagnosis Date    Anemia     Anxiety     hx of panic attacks (now under control)     Arthritis     Asthma     resolved (no problems in a decade)     Baker's cyst of knee     Bronchitis     Bunion, left foot     Cervical pain     Chronic GERD     Chronic pain disorder     Depression     Diabetes mellitus, type 2 [facility] [address] [facility]     Diabetic peripheral neuropathy ([facility] [address] [facility]     Endometriosis     Fibromyalgia     Hyperlipidemia     Hypertension     Joint pain     Low back pain     Lung nodules     Macular degeneration     Pulmonary emphysema ([facility] [address] ) 1/16/2020    Spondylosis      Past Surgical History:   Procedure Laterality Date    BACK SURGERY  1996    L5, S1- laser surgery fusion of c5 and c6     BREAST LUMPECTOMY Right     CHOLECYSTECTOMY  2004    FOOT SURGERY Left 2005    fusion     GASTRIC BYPASS  2010    Dr Sherene Baumgarten    just uterus     KNEE ARTHROSCOPY      LAMINECTOMY      ORTHOPEDIC SURGERY      OVARIAN CYST REMOVAL  1975    PELVIC LAPAROSCOPY      ovaries (x10)     PLEURAL SCARIFICATION  1967    SHOULDER OPEN ROTATOR CUFF REPAIR Left 2008    TONSILLECTOMY      VAGINAL PROLAPSE REPAIR       Social History   Social History     Substance and Sexual Activity   Alcohol Use Not Currently    Alcohol/week: 1 0 standard drinks    Types: 1 Shots of liquor per week    Comment: rarely     Social History     Substance and Sexual Activity   Drug Use No     Social History     Tobacco Use   Smoking Status Current Every Day Smoker    Packs/day: 0 50    Years: 40 00    Pack years: 20 00   Smokeless Tobacco Current User     Family History   Problem Relation Age of Onset    Hypertension Mother     Lung cancer Mother     Hypertension Father     Heart disease Father     Heart attack Father      Allergies   Allergen Reactions    Cephalosporins Hives    Erythromycin GI Intolerance    Fentanyl Itching     Occurred during surgery     Paxil [Paroxetine] Hives     After 2 weeks    Penicillins Itching    Pravastatin Myalgia    Prednisolone Itching    Statins Itching    Sulfa Antibiotics Diarrhea    Wellbutrin [Bupropion] Hives     After 2 weeks     Marzena's Wort Rash          Physical Exam  LMP  (LMP Unknown)     Constitutional:  see vital signs  Gen: well-developed, normocephalic/atraumatic, well-groomed  Eyes: No inflammation or discharge of conjunctiva or lids; sclera clear   Pharynx: no inflammation, lesion, or mass of lips  Neck: supple, no masses, non-distended  MSK: no inflammation, lesion, mass, or clubbing of nails and digits except for other than mentioned below  SKIN: no visible rashes or skin lesions  Pulmonary/Chest: Effort normal  No respiratory distress     NEURO: cranial nerves grossly intact  PSYCH:  Alert and oriented to person, place, and time; recent and remote memory intact; mood normal, no depression, anxiety, or agitation, judgment and insight good and intact     Ortho Exam      Procedures

## 2020-07-20 NOTE — TELEPHONE ENCOUNTER
Patient returned my call   She said Luwanna Najjar has to go through W/C   She said all I need to do is send the last office note to [place] at fax number provided below

## 2020-07-21 ENCOUNTER — OFFICE VISIT (OUTPATIENT)
Dept: OBGYN CLINIC | Facility: OTHER | Age: 70
End: 2020-07-21
Payer: OTHER MISCELLANEOUS

## 2020-07-21 VITALS — HEIGHT: 65 IN | WEIGHT: 165 LBS | BODY MASS INDEX: 27.49 KG/M2

## 2020-07-21 DIAGNOSIS — M17.11 PRIMARY OSTEOARTHRITIS OF RIGHT KNEE: ICD-10-CM

## 2020-07-21 DIAGNOSIS — M25.512 LEFT SHOULDER PAIN, UNSPECIFIED CHRONICITY: Primary | ICD-10-CM

## 2020-07-21 DIAGNOSIS — M75.102 ROTATOR CUFF SYNDROME, LEFT: ICD-10-CM

## 2020-07-21 PROCEDURE — 4004F PT TOBACCO SCREEN RCVD TLK: CPT | Performed by: ORTHOPAEDIC SURGERY

## 2020-07-21 PROCEDURE — 99214 OFFICE O/P EST MOD 30 MIN: CPT | Performed by: ORTHOPAEDIC SURGERY

## 2020-07-21 PROCEDURE — 3008F BODY MASS INDEX DOCD: CPT | Performed by: ORTHOPAEDIC SURGERY

## 2020-07-21 PROCEDURE — 4040F PNEUMOC VAC/ADMIN/RCVD: CPT | Performed by: ORTHOPAEDIC SURGERY

## 2020-07-21 PROCEDURE — 20610 DRAIN/INJ JOINT/BURSA W/O US: CPT | Performed by: ORTHOPAEDIC SURGERY

## 2020-07-21 PROCEDURE — 1160F RVW MEDS BY RX/DR IN RCRD: CPT | Performed by: ORTHOPAEDIC SURGERY

## 2020-07-21 RX ORDER — HYALURONATE SODIUM 10 MG/ML
20 SYRINGE (ML) INTRAARTICULAR
Status: COMPLETED | OUTPATIENT
Start: 2020-07-21 | End: 2020-07-21

## 2020-07-21 RX ADMIN — Medication 20 MG: at 11:15

## 2020-07-21 NOTE — PROGRESS NOTES
Assessment:       1  Left shoulder pain, unspecified chronicity    2  Rotator cuff syndrome, left    3  Primary osteoarthritis of right knee          Plan:        I explained my current clinical findings and reviewed radiological findings with John Wilder today with regards to her left shoulder  Per the patient, her left shoulder pain and weakness where sudden after her work related injury in December  She has already had a history of previous left rotator cuff surgery and clinical exam today reveals weakness of the left rotator cuff along with pain  Hence, I will request MRI of the left shoulder to exclude any retear of her left rotator cuff  I will see her back in the office following her left shoulder MRI  With regards to her right knee osteoarthritis she received her 1st dose of viscosupplementation injection and will be reviewed in 1 week for her 2nd does in this regard  Subjective:     Patient ID: Reyes Galindo is a 79 y o  female  Chief Complaint:  Right knee viscosupplementation injection and left shoulder pain    HPI    John Wilder is a 44-year-old lady who is here today to receive the 1st dose of right knee intra-articular Euflexxa viscosupplementation injection  She has a history of right knee osteoarthritis  However, she also complains of left shoulder pain today  She has a background history of left shoulder rotator cuff surgery done several years ago  However she reports that her left shoulder pain started after a work related injury sustained in December 2019 when she tripped and fell down  Today she reports that during the fall she tried to break the fall with her left hand which caused sudden pain of her left shoulder  Prior to this instance she reports doing well with regards to her left shoulder    She did have a plain radiograph of the left shoulder performed on 2/19/2020 which did not reveal any acute osseous injury but did reveal some mild osteoarthritis of the glenohumeral joint and anchoring screws along the humeral head  She describes her left shoulder pain is aching of moderate severity and mostly present on the lateral aspect and made worse with forward flexion, abduction or reaching behind her back as well as sleeping on the affected side  She also reports some generalized weakness of her left shoulder secondary to pain  Denies any tingling or numbness down her left upper extremity at this time  Social History     Occupational History    Not on file   Tobacco Use    Smoking status: Current Every Day Smoker     Packs/day: 0 50     Years: 40 00     Pack years: 20 00    Smokeless tobacco: Current User   Substance and Sexual Activity    Alcohol use: Not Currently     Alcohol/week: 1 0 standard drinks     Types: 1 Shots of liquor per week     Comment: rarely    Drug use: No    Sexual activity: Not Currently     Partners: Male      Review of Systems   Constitutional: Negative  HENT: Negative  Eyes: Negative  Respiratory: Negative  Cardiovascular: Negative  Gastrointestinal: Negative  Endocrine: Negative  Genitourinary: Negative  Skin: Negative  Allergic/Immunologic: Negative  Neurological: Negative  Hematological: Negative  Psychiatric/Behavioral: Negative  Objective:     Ortho ExamPhysical Exam   Constitutional: She is oriented to person, place, and time  She appears well-developed and well-nourished  HENT:   Head: Normocephalic and atraumatic  Eyes: Pupils are equal, round, and reactive to light  Conjunctivae are normal    Cardiovascular: Normal rate and regular rhythm  Pulmonary/Chest: Effort normal  No respiratory distress  Neurological: She is alert and oriented to person, place, and time  No cranial nerve deficit  Skin: Skin is warm and dry  No erythema  Psychiatric: She has a normal mood and affect  Her behavior is normal  Judgment and thought content normal    Nursing note and vitals reviewed          Left shoulder exam:  No shoulder asymmetry or atrophy noted  There is some tenderness to palpation over the subacromial bursa and the supraspinatus  Active range of motion is forward flexion to 70 degrees, abduction to 70 degrees, internal rotation is at the level of L3-L4 and external rotation in abduction is 30 degrees compared to 70 degrees on the contralateral side  Normal cross adduction  Rotator cuff strength is subscapularis 5/5, supraspinatus 4/5 with pain, infraspinatus 4/5, teres minor 5/5  Equivocal Reeves's, negative speed's and negative Yergason's  No anterior apprehension  Positive empty can test   Positive Hernandez  Positive Neer's  I have personally reviewed pertinent films in PACS and my interpretation is As noted in the HPI      Large joint arthrocentesis: R knee  Date/Time: 7/21/2020 11:15 AM  Consent given by: patient  Site marked: site marked  Timeout: Immediately prior to procedure a time out was called to verify the correct patient, procedure, equipment, support staff and site/side marked as required   Supporting Documentation  Indications: pain   Procedure Details  Location: knee - R knee  Preparation: Patient was prepped and draped in the usual sterile fashion  Needle size: 22 G  Ultrasound guidance: no  Approach: anterolateral  Medications administered: 20 mg Sodium Hyaluronate 20 MG/2ML    Patient tolerance: patient tolerated the procedure well with no immediate complications  Dressing:  Sterile dressing applied

## 2020-07-22 ENCOUNTER — OFFICE VISIT (OUTPATIENT)
Dept: PAIN MEDICINE | Facility: MEDICAL CENTER | Age: 70
End: 2020-07-22
Payer: COMMERCIAL

## 2020-07-22 VITALS
RESPIRATION RATE: 18 BRPM | HEIGHT: 65 IN | WEIGHT: 166 LBS | SYSTOLIC BLOOD PRESSURE: 151 MMHG | DIASTOLIC BLOOD PRESSURE: 70 MMHG | BODY MASS INDEX: 27.66 KG/M2 | TEMPERATURE: 98.7 F | HEART RATE: 57 BPM

## 2020-07-22 DIAGNOSIS — G89.4 CHRONIC PAIN SYNDROME: ICD-10-CM

## 2020-07-22 DIAGNOSIS — M47.812 SPONDYLOSIS OF CERVICAL REGION WITHOUT MYELOPATHY OR RADICULOPATHY: ICD-10-CM

## 2020-07-22 DIAGNOSIS — M54.12 CERVICAL RADICULOPATHY: Primary | ICD-10-CM

## 2020-07-22 PROCEDURE — 4040F PNEUMOC VAC/ADMIN/RCVD: CPT | Performed by: NURSE PRACTITIONER

## 2020-07-22 PROCEDURE — 4004F PT TOBACCO SCREEN RCVD TLK: CPT | Performed by: NURSE PRACTITIONER

## 2020-07-22 PROCEDURE — 99214 OFFICE O/P EST MOD 30 MIN: CPT | Performed by: NURSE PRACTITIONER

## 2020-07-22 PROCEDURE — 1160F RVW MEDS BY RX/DR IN RCRD: CPT | Performed by: NURSE PRACTITIONER

## 2020-07-22 PROCEDURE — 3077F SYST BP >= 140 MM HG: CPT | Performed by: NURSE PRACTITIONER

## 2020-07-22 PROCEDURE — 3008F BODY MASS INDEX DOCD: CPT | Performed by: NURSE PRACTITIONER

## 2020-07-22 PROCEDURE — 3078F DIAST BP <80 MM HG: CPT | Performed by: NURSE PRACTITIONER

## 2020-07-22 RX ORDER — HYDROCODONE BITARTRATE AND ACETAMINOPHEN 10; 325 MG/1; MG/1
TABLET ORAL
Qty: 130 TABLET | Refills: 0 | Status: SHIPPED | OUTPATIENT
Start: 2020-08-20 | End: 2020-09-16 | Stop reason: SDUPTHER

## 2020-07-22 RX ORDER — HYDROCODONE BITARTRATE AND ACETAMINOPHEN 10; 325 MG/1; MG/1
TABLET ORAL
Qty: 130 TABLET | Refills: 0 | Status: SHIPPED | OUTPATIENT
Start: 2020-07-22 | End: 2020-07-22 | Stop reason: SDUPTHER

## 2020-07-22 NOTE — PROGRESS NOTES
Assessment:  1  Cervical radiculopathy    2  Spondylosis of cervical region without myelopathy or radiculopathy    3  Chronic pain syndrome        Plan:  Continue with hydrocodone as prescribed she was given a 2 month supply the medication with 1 month having a do not fill date of August 20, 2020  Continue with baclofen she does not require refill  Patient will return in 8 weeks for further medication refills   [address] Monitoring Program report was reviewed and was appropriate         There are risks associated with opioid medications, including dependence, addiction and tolerance  The patient understands and agrees to use these medications only as prescribed  Potential side effects of the medications include, but are not limited to, constipation, drowsiness, addiction, impaired judgment and risk of fatal overdose if not taken as prescribed  The patient was warned against driving while taking sedation medications  Sharing medications is a felony  At this point in time, the patient is showing no signs of addiction, abuse, diversion or suicidal ideation  Hydrocodone last taken 7/22 AM        History of Present Illness: The patient is a 79 y o  female last seen on May 28, 2020 who presents for a follow up office visit in regards to chronic pain secondary to cervical radiculopathy  The patient currently reports neck and arm pain rated 7/10 this is constant described as burning, sharp, throbbing, pressure-like, shooting, numbness, and pins and needles  Patient continues to take baclofen along with hydrocodone 10/325 mg 1 tablet 4-5 times daily    She reports 40% relief and no side effects or issues      Current Facility-Administered Medications:  cyanocobalamin 1,000 mcg Intramuscular Q30 Days Kristina Velazquez MD   cyanocobalamin 1,000 mcg Intramuscular Q30 Days Kristina Velazquez MD     Pain Contract Signed: 2/10/2020  Last Urine Drug Screen: 5/30/2020    I have personally reviewed and/or updated the patient's past medical history, past surgical history, family history, social history, current medications, allergies, and vital signs today  Review of Systems:    Review of Systems   Respiratory: Negative for shortness of breath  Cardiovascular: Negative for chest pain  Gastrointestinal: Negative for constipation, diarrhea, nausea and vomiting  Musculoskeletal: Positive for back pain (fingers, feet and ankles), gait problem, joint swelling and neck pain (to the fingers)  Negative for arthralgias and myalgias  Skin: Negative for rash  Neurological: Positive for numbness (fingers)  Negative for dizziness, seizures and weakness  All other systems reviewed and are negative          Past Medical History:   Diagnosis Date    Anemia     Anxiety     hx of panic attacks (now under control)     Arthritis     Asthma     resolved (no problems in a decade)     Baker's cyst of knee     Bronchitis     Bunion, left foot     Cervical pain     Chronic GERD     Chronic pain disorder     Depression     Diabetes mellitus, type 2 ([facility])     Di[facility] peripheral neuropathy ([facility] [address] [facility]     Endometriosis     Fibromyalgia     Hyperlipidemia     Hypertension     Joint pain     Low back pain     Lung nodules     Macular degeneration     Pulmonary emphysema ([facility] [address] ) 1/16/2020    Spondylosis        Past Surgical History:   Procedure Laterality Date    BACK SURGERY  1996    L5, S1- laser surgery fusion of c5 and c6     BREAST LUMPECTOMY Right     CHOLECYSTECTOMY  2004    FOOT SURGERY Left 2005    fusion     GASTRIC BYPASS  2010    Dr Zimmerman Neighbours    just uterus     KNEE ARTHROSCOPY      LAMINECTOMY      ORTHOPEDIC SURGERY      OVARIAN CYST REMOVAL  1975    PELVIC LAPAROSCOPY      ovaries (x10)     PLEURAL SCARIFICATION  1967    SHOULDER OPEN ROTATOR CUFF REPAIR Left 2008    TONSILLECTOMY      VAGINAL PROLAPSE REPAIR         Family History   Problem Relation Age of Onset    Hypertension Mother     Lung cancer Mother     Hypertension Father     Heart disease Father     Heart attack Father        Social History     Occupational History    Not on file   Tobacco Use    Smoking status: Current Every Day Smoker     Packs/day: 0 50     Years: 40 00     Pack years: 20 00    Smokeless tobacco: Current User   Substance and Sexual Activity    Alcohol use: Not Currently     Alcohol/week: 1 0 standard drinks     Types: 1 Shots of liquor per week     Comment: rarely    Drug use: No    Sexual activity: Not Currently     Partners: Male         Current Outpatient Medications:     albuterol (VENTOLIN HFA) 90 mcg/act inhaler, inhale 2 puff by inhalation route  every 4 - 6 hours as needed, Disp: , Rfl:     ALPRAZolam (XANAX PO), Xanax, Disp: , Rfl:     amLODIPine (NORVASC) 5 mg tablet, Take 1 tablet (5 mg total) by mouth daily, Disp: 90 tablet, Rfl: 1    baclofen 10 mg tablet, Take 1 tablet (10 mg total) by mouth 3 (three) times a day, Disp: 270 tablet, Rfl: 0    cetirizine (ZyrTEC) 10 mg tablet, Take 0 5 tablets (5 mg total) by mouth daily, Disp: 30 tablet, Rfl: 3    cimetidine (TAGAMET) 200 mg tablet, Take 1 tablet (200 mg total) by mouth 2 (two) times a day, Disp: 60 tablet, Rfl: 3    diphenhydrAMINE (BENADRYL ALLERGY) 25 mg capsule, Take by mouth, Disp: , Rfl:     [START ON 8/20/2020] HYDROcodone-acetaminophen (NORCO)  mg per tablet, Take 4-5 tablets daily PRN, Disp: 130 tablet, Rfl: 0    levothyroxine 25 mcg tablet, TAKE 1 TABLET BY MOUTH EVERY MORNING ON SATURDAY, SUNDAY, TUESDAY AND THURSDAY, Disp: 30 tablet, Rfl: 2    levothyroxine 50 mcg tablet, TAKE 1 TABLET BY ORAL ROUTE EVERY MORNING ON MONDAY, WEDNESDAY AND FRIDAY, Disp: 30 tablet, Rfl: 2    losartan (COZAAR) 25 mg tablet, TAKE 1 TABLET BY MOUTH EVERY DAY, Disp: 90 tablet, Rfl: 1    pantoprazole (PROTONIX) 40 mg tablet, TAKE 1 TABLET BY MOUTH TWICE A DAY, Disp: 180 tablet, Rfl: 1   pregabalin (LYRICA) 150 mg capsule, Take 1 capsule (150 mg total) by mouth 3 (three) times a day, Disp: 270 capsule, Rfl: 0    sucralfate (CARAFATE) 1 g tablet, TAKE 1 TABLET BY MOUTH 4 TIMES EVERY DAY ON AN EMPTY STOMACH 1 HOUR BEFORE MEALS AND AT BEDTIME, Disp: 120 tablet, Rfl: 0    traZODone (DESYREL) 50 mg tablet, TAKE 1 TABLET BY MOUTH EVERY DAY AT BEDTIME AS NEEDED, Disp: 30 tablet, Rfl: 0    Lancets Misc  (ACCU-CHEK FASTCLIX LANCET) KIT, 3 (three) times a day, Disp: , Rfl:     meclizine (ANTIVERT) 12 5 MG tablet, Take 1 tablet (12 5 mg total) by mouth every 12 (twelve) hours as needed for dizziness (Patient not taking: Reported on 7/22/2020), Disp: 30 tablet, Rfl: 1    MICROLET LANCETS MISC, Microlet Lancet, Disp: , Rfl:     ONETOUCH VERIO test strip, TEST TWICE A DAY, Disp: , Rfl:     Current Facility-Administered Medications:     cyanocobalamin injection 1,000 mcg, 1,000 mcg, Intramuscular, Q30 Days, Aleksandar Edmondson MD, 1,000 mcg at 12/19/19 3798    cyanocobalamin injection 1,000 mcg, 1,000 mcg, Intramuscular, Q30 Days, Marielle Mario MD, 1,000 mcg at 07/08/20 1036    Allergies   Allergen Reactions    Cephalosporins Hives    Erythromycin GI Intolerance    Fentanyl Itching     Occurred during surgery     Paxil [Paroxetine] Hives     After 2 weeks    Penicillins Itching    Pravastatin Myalgia    Prednisolone Itching    Statins Itching    Sulfa Antibiotics Diarrhea    Wellbutrin [Bupropion] Hives     After 2 weeks     Marzena's Wort Rash       Physical Exam:    /70   Pulse 57   Temp 98 7 °F (37 1 °C)   Resp 18   Ht 5' 5" (1 651 m)   Wt 75 3 kg (166 lb)   LMP  (LMP Unknown)   BMI 27 62 kg/m²     Constitutional:normal, well developed, well nourished, alert, in no distress and non-toxic and no overt pain behavior    Eyes:anicteric  HEENT:grossly intact  Neck:supple, symmetric, trachea midline and no masses   Pulmonary:even and unlabored  Cardiovascular:No edema or pitting edema present  Skin:Normal without rashes or lesions and well hydrated  Psychiatric:Mood and affect appropriate  Neurologic:Cranial Nerves II-XII grossly intact  Musculoskeletal:ambulates with cane      Imaging  No orders to display         No orders of the defined types were placed in this encounter

## 2020-07-23 ENCOUNTER — TRANSCRIBE ORDERS (OUTPATIENT)
Dept: ADMINISTRATIVE | Facility: HOSPITAL | Age: 70
End: 2020-07-23

## 2020-07-23 ENCOUNTER — TELEPHONE (OUTPATIENT)
Dept: PAIN MEDICINE | Facility: MEDICAL CENTER | Age: 70
End: 2020-07-23

## 2020-07-23 DIAGNOSIS — G56.03 CARPAL TUNNEL SYNDROME, BILATERAL UPPER LIMBS: Primary | ICD-10-CM

## 2020-07-23 NOTE — TELEPHONE ENCOUNTER
Pt called stating that she would like to know if the lyrica was approved by her workmans comp     Pt can be reached at [phone]

## 2020-07-24 ENCOUNTER — TELEPHONE (OUTPATIENT)
Dept: PAIN MEDICINE | Facility: MEDICAL CENTER | Age: 70
End: 2020-07-24

## 2020-07-24 ENCOUNTER — TELEPHONE (OUTPATIENT)
Dept: FAMILY MEDICINE CLINIC | Facility: CLINIC | Age: 70
End: 2020-07-24

## 2020-07-24 NOTE — TELEPHONE ENCOUNTER
Patient   [phone]  Dr Korey Fagan     Patient is calling in because she would like to know if she shoul go to the Neurosurg  For her back She said that she called her pcp and was told to call and ask Dr Korey Fagan       Please follow up with patient once he gives an answer

## 2020-07-24 NOTE — TELEPHONE ENCOUNTER
Angelina Bernstein left a    Patient aware medication is not covered under w/c and is ok with getting prescription under regular insurance until they find out if they are going to cover it next month

## 2020-07-27 NOTE — TELEPHONE ENCOUNTER
Yolanda [address]  [address]  called to confirm our fax # so she can fax over results of EMG  She received a failures  I confirmed  I gave her Fax# [phone] and  [phone]    Thank you

## 2020-07-27 NOTE — TELEPHONE ENCOUNTER
Jess Escobar from Pharmacy and advised of the prior note for the medication Lyrica for the pt  Please be advise thank you      Pharmacy  call back # [phone]

## 2020-07-27 NOTE — TELEPHONE ENCOUNTER
She can certainly go for an evaluation to see what they say given her epidural injection increased her pain   We can discuss this at her next OV coming up and a referral can be given at that time

## 2020-07-28 ENCOUNTER — OFFICE VISIT (OUTPATIENT)
Dept: OBGYN CLINIC | Facility: OTHER | Age: 70
End: 2020-07-28
Payer: OTHER MISCELLANEOUS

## 2020-07-28 VITALS
DIASTOLIC BLOOD PRESSURE: 74 MMHG | SYSTOLIC BLOOD PRESSURE: 124 MMHG | HEART RATE: 51 BPM | WEIGHT: 166 LBS | BODY MASS INDEX: 27.66 KG/M2 | HEIGHT: 65 IN

## 2020-07-28 DIAGNOSIS — M17.11 PRIMARY OSTEOARTHRITIS OF RIGHT KNEE: Primary | ICD-10-CM

## 2020-07-28 PROCEDURE — 20610 DRAIN/INJ JOINT/BURSA W/O US: CPT | Performed by: ORTHOPAEDIC SURGERY

## 2020-07-28 RX ORDER — HYALURONATE SODIUM 10 MG/ML
20 SYRINGE (ML) INTRAARTICULAR
Status: COMPLETED | OUTPATIENT
Start: 2020-07-28 | End: 2020-07-28

## 2020-07-28 RX ADMIN — Medication 20 MG: at 11:20

## 2020-07-28 NOTE — PROGRESS NOTES
Large joint arthrocentesis: R knee  Date/Time: 7/28/2020 11:20 AM  Consent given by: patient  Site marked: site marked  Timeout: Immediately prior to procedure a time out was called to verify the correct patient, procedure, equipment, support staff and site/side marked as required   Supporting Documentation  Indications: pain   Procedure Details  Location: knee - R knee  Preparation: Patient was prepped and draped in the usual sterile fashion  Needle size: 22 G  Ultrasound guidance: no  Approach: anterolateral  Medications administered: 20 mg Sodium Hyaluronate 20 MG/2ML    Aspirate amount: 0 mL    Patient tolerance: patient tolerated the procedure well with no immediate complications  Dressing:  Sterile dressing applied

## 2020-07-29 ENCOUNTER — APPOINTMENT (OUTPATIENT)
Dept: LAB | Facility: HOSPITAL | Age: 70
End: 2020-07-29
Payer: COMMERCIAL

## 2020-07-29 ENCOUNTER — TRANSCRIBE ORDERS (OUTPATIENT)
Dept: LAB | Facility: HOSPITAL | Age: 70
End: 2020-07-29

## 2020-07-29 DIAGNOSIS — K95.89 IRON DEFICIENCY ANEMIA FOLLOWING BARIATRIC SURGERY: ICD-10-CM

## 2020-07-29 DIAGNOSIS — K21.9 GASTROESOPHAGEAL REFLUX DISEASE WITHOUT ESOPHAGITIS: ICD-10-CM

## 2020-07-29 DIAGNOSIS — D50.8 IRON DEFICIENCY ANEMIA FOLLOWING BARIATRIC SURGERY: ICD-10-CM

## 2020-07-29 DIAGNOSIS — D47.2 MGUS (MONOCLONAL GAMMOPATHY OF UNKNOWN SIGNIFICANCE): ICD-10-CM

## 2020-07-29 DIAGNOSIS — K91.2 POSTSURGICAL MALABSORPTION: ICD-10-CM

## 2020-07-29 DIAGNOSIS — E78.5 HYPERLIPIDEMIA ASSOCIATED WITH TYPE 2 DIABETES MELLITUS (HCC): ICD-10-CM

## 2020-07-29 DIAGNOSIS — E11.42 TYPE 2 DIABETES MELLITUS WITH DIABETIC POLYNEUROPATHY, WITHOUT LONG-TERM CURRENT USE OF INSULIN (HCC): ICD-10-CM

## 2020-07-29 DIAGNOSIS — E11.69 HYPERLIPIDEMIA ASSOCIATED WITH TYPE 2 DIABETES MELLITUS (HCC): ICD-10-CM

## 2020-07-29 DIAGNOSIS — Z98.84 BARIATRIC SURGERY STATUS: ICD-10-CM

## 2020-07-29 DIAGNOSIS — E03.8 HYPOTHYROIDISM DUE TO HASHIMOTO'S THYROIDITIS: ICD-10-CM

## 2020-07-29 DIAGNOSIS — E04.2 MULTIPLE THYROID NODULES: ICD-10-CM

## 2020-07-29 DIAGNOSIS — IMO0002 TYPE II DIABETES MELLITUS WITH MANIFESTATIONS, UNCONTROLLED: ICD-10-CM

## 2020-07-29 DIAGNOSIS — E06.3 HYPOTHYROIDISM DUE TO HASHIMOTO'S THYROIDITIS: ICD-10-CM

## 2020-07-29 LAB
25(OH)D3 SERPL-MCNC: 17.4 NG/ML (ref 30–100)
ALBUMIN SERPL BCP-MCNC: 3.6 G/DL (ref 3–5.2)
ALP SERPL-CCNC: 84 U/L (ref 43–122)
ALT SERPL W P-5'-P-CCNC: 20 U/L (ref 9–52)
ANION GAP SERPL CALCULATED.3IONS-SCNC: 5 MMOL/L (ref 5–14)
AST SERPL W P-5'-P-CCNC: 31 U/L (ref 14–36)
BASOPHILS # BLD AUTO: 0 THOUSANDS/ΜL (ref 0–0.1)
BASOPHILS NFR BLD AUTO: 1 % (ref 0–1)
BILIRUB SERPL-MCNC: 0.4 MG/DL
BUN SERPL-MCNC: 14 MG/DL (ref 5–25)
CALCIUM SERPL-MCNC: 9.1 MG/DL (ref 8.4–10.2)
CHLORIDE SERPL-SCNC: 109 MMOL/L (ref 97–108)
CHOLEST SERPL-MCNC: 203 MG/DL
CO2 SERPL-SCNC: 26 MMOL/L (ref 22–30)
CREAT SERPL-MCNC: 0.69 MG/DL (ref 0.6–1.2)
EOSINOPHIL # BLD AUTO: 0.1 THOUSAND/ΜL (ref 0–0.4)
EOSINOPHIL NFR BLD AUTO: 3 % (ref 0–6)
ERYTHROCYTE [DISTWIDTH] IN BLOOD BY AUTOMATED COUNT: 13.5 %
EST. AVERAGE GLUCOSE BLD GHB EST-MCNC: 103 MG/DL
FERRITIN SERPL-MCNC: 19 NG/ML (ref 8–388)
GFR SERPL CREATININE-BSD FRML MDRD: 88 ML/MIN/1.73SQ M
GLUCOSE P FAST SERPL-MCNC: 84 MG/DL (ref 70–99)
HBA1C MFR BLD: 5.2 %
HCT VFR BLD AUTO: 37.1 % (ref 36–46)
HDLC SERPL-MCNC: 64 MG/DL
HGB BLD-MCNC: 12.7 G/DL (ref 12–16)
IGA SERPL-MCNC: 236 MG/DL (ref 70–400)
IGG SERPL-MCNC: 580 MG/DL (ref 700–1600)
IGM SERPL-MCNC: 92 MG/DL (ref 40–230)
IRON SATN MFR SERPL: 21 %
IRON SERPL-MCNC: 66 UG/DL (ref 50–170)
LDLC SERPL CALC-MCNC: 123 MG/DL
LYMPHOCYTES # BLD AUTO: 0.8 THOUSANDS/ΜL (ref 0.5–4)
LYMPHOCYTES NFR BLD AUTO: 24 % (ref 25–45)
MAGNESIUM SERPL-MCNC: 2.2 MG/DL (ref 1.6–2.3)
MCH RBC QN AUTO: 33.3 PG (ref 26–34)
MCHC RBC AUTO-ENTMCNC: 34.2 G/DL (ref 31–36)
MCV RBC AUTO: 97 FL (ref 80–100)
MONOCYTES # BLD AUTO: 0.3 THOUSAND/ΜL (ref 0.2–0.9)
MONOCYTES NFR BLD AUTO: 7 % (ref 1–10)
NEUTROPHILS # BLD AUTO: 2.4 THOUSANDS/ΜL (ref 1.8–7.8)
NEUTS SEG NFR BLD AUTO: 66 % (ref 45–65)
NONHDLC SERPL-MCNC: 139 MG/DL
PLATELET # BLD AUTO: 294 THOUSANDS/UL (ref 150–450)
PMV BLD AUTO: 7.8 FL (ref 8.9–12.7)
POTASSIUM SERPL-SCNC: 3.2 MMOL/L (ref 3.6–5)
PROT SERPL-MCNC: 6.5 G/DL (ref 5.9–8.4)
RBC # BLD AUTO: 3.82 MILLION/UL (ref 4–5.2)
SODIUM SERPL-SCNC: 140 MMOL/L (ref 137–147)
T4 FREE SERPL-MCNC: 1 NG/DL (ref 0.76–1.46)
TIBC SERPL-MCNC: 312 UG/DL (ref 250–450)
TRIGL SERPL-MCNC: 81 MG/DL
TSH SERPL DL<=0.05 MIU/L-ACNC: 1.99 UIU/ML (ref 0.47–4.68)
VIT B12 SERPL-MCNC: 323 PG/ML (ref 100–900)
WBC # BLD AUTO: 3.6 THOUSAND/UL (ref 4.5–11)

## 2020-07-29 PROCEDURE — 3044F HG A1C LEVEL LT 7.0%: CPT | Performed by: INTERNAL MEDICINE

## 2020-07-29 PROCEDURE — 83540 ASSAY OF IRON: CPT

## 2020-07-29 PROCEDURE — 84165 PROTEIN E-PHORESIS SERUM: CPT | Performed by: PATHOLOGY

## 2020-07-29 PROCEDURE — 83036 HEMOGLOBIN GLYCOSYLATED A1C: CPT

## 2020-07-29 PROCEDURE — 82306 VITAMIN D 25 HYDROXY: CPT

## 2020-07-29 PROCEDURE — 84446 ASSAY OF VITAMIN E: CPT

## 2020-07-29 PROCEDURE — 84165 PROTEIN E-PHORESIS SERUM: CPT

## 2020-07-29 PROCEDURE — 84590 ASSAY OF VITAMIN A: CPT

## 2020-07-29 PROCEDURE — 84439 ASSAY OF FREE THYROXINE: CPT

## 2020-07-29 PROCEDURE — 86334 IMMUNOFIX E-PHORESIS SERUM: CPT

## 2020-07-29 PROCEDURE — 86376 MICROSOMAL ANTIBODY EACH: CPT

## 2020-07-29 PROCEDURE — 86334 IMMUNOFIX E-PHORESIS SERUM: CPT | Performed by: PATHOLOGY

## 2020-07-29 PROCEDURE — 82784 ASSAY IGA/IGD/IGG/IGM EACH: CPT

## 2020-07-29 PROCEDURE — 85025 COMPLETE CBC W/AUTO DIFF WBC: CPT

## 2020-07-29 PROCEDURE — 80061 LIPID PANEL: CPT

## 2020-07-29 PROCEDURE — 36415 COLL VENOUS BLD VENIPUNCTURE: CPT

## 2020-07-29 PROCEDURE — 86800 THYROGLOBULIN ANTIBODY: CPT

## 2020-07-29 PROCEDURE — 83735 ASSAY OF MAGNESIUM: CPT

## 2020-07-29 PROCEDURE — 83550 IRON BINDING TEST: CPT

## 2020-07-29 PROCEDURE — 84207 ASSAY OF VITAMIN B-6: CPT

## 2020-07-29 PROCEDURE — 80053 COMPREHEN METABOLIC PANEL: CPT

## 2020-07-29 PROCEDURE — 82607 VITAMIN B-12: CPT

## 2020-07-29 PROCEDURE — 84443 ASSAY THYROID STIM HORMONE: CPT

## 2020-07-29 PROCEDURE — 83918 ORGANIC ACIDS TOTAL QUANT: CPT

## 2020-07-29 PROCEDURE — 82728 ASSAY OF FERRITIN: CPT

## 2020-07-29 PROCEDURE — 83883 ASSAY NEPHELOMETRY NOT SPEC: CPT

## 2020-07-30 LAB
ALBUMIN SERPL ELPH-MCNC: 3.5 G/DL (ref 3.5–5)
ALBUMIN SERPL ELPH-MCNC: 57.4 % (ref 52–65)
ALPHA1 GLOB SERPL ELPH-MCNC: 0.39 G/DL (ref 0.1–0.4)
ALPHA1 GLOB SERPL ELPH-MCNC: 6.4 % (ref 2.5–5)
ALPHA2 GLOB SERPL ELPH-MCNC: 0.74 G/DL (ref 0.4–1.2)
ALPHA2 GLOB SERPL ELPH-MCNC: 12.1 % (ref 7–13)
BETA GLOB ABNORMAL SERPL ELPH-MCNC: 0.44 G/DL (ref 0.4–0.8)
BETA1 GLOB SERPL ELPH-MCNC: 7.2 % (ref 5–13)
BETA2 GLOB SERPL ELPH-MCNC: 4.9 % (ref 2–8)
BETA2+GAMMA GLOB SERPL ELPH-MCNC: 0.3 G/DL (ref 0.2–0.5)
GAMMA GLOB ABNORMAL SERPL ELPH-MCNC: 0.73 G/DL (ref 0.5–1.6)
GAMMA GLOB SERPL ELPH-MCNC: 12 % (ref 12–22)
IGG/ALB SER: 1.35 {RATIO} (ref 1.1–1.8)
INTERPRETATION UR IFE-IMP: NORMAL
KAPPA LC FREE SER-MCNC: 38.5 MG/L (ref 3.3–19.4)
KAPPA LC FREE/LAMBDA FREE SER: 1.94 {RATIO} (ref 0.26–1.65)
LAMBDA LC FREE SERPL-MCNC: 19.8 MG/L (ref 5.7–26.3)
M PROTEIN 1 MFR SERPL ELPH: 2.2 %
M PROTEIN 1 SERPL ELPH-MCNC: 0.13 G/DL
PROT PATTERN SERPL ELPH-IMP: ABNORMAL
PROT SERPL-MCNC: 6.1 G/DL (ref 6.4–8.2)
THYROGLOB AB SERPL-ACNC: <1 IU/ML (ref 0–0.9)
THYROPEROXIDASE AB SERPL-ACNC: <9 IU/ML (ref 0–34)

## 2020-07-31 LAB
METHYLMALONATE SERPL-SCNC: 159 NMOL/L (ref 0–378)
SL AMB DISCLAIMER: NORMAL

## 2020-08-01 LAB
A-TOCOPHEROL VIT E SERPL-MCNC: 12.1 MG/L (ref 9–29)
GAMMA TOCOPHEROL SERPL-MCNC: 1.8 MG/L (ref 0.5–4.9)
VIT A SERPL-MCNC: 14.9 UG/DL (ref 22–69.5)
VIT B6 SERPL-MCNC: 3.8 UG/L (ref 2–32.8)

## 2020-08-03 NOTE — PROGRESS NOTES
Large joint arthrocentesis: R knee  Date/Time: 8/4/2020 10:58 AM  Consent given by: patient  Site marked: site marked  Timeout: Immediately prior to procedure a time out was called to verify the correct patient, procedure, equipment, support staff and site/side marked as required   Supporting Documentation  Indications: pain   Procedure Details  Location: knee - R knee  Preparation: Patient was prepped and draped in the usual sterile fashion  Needle size: 22 G  Ultrasound guidance: no  Approach: anterolateral  Medications administered: 20 mg Sodium Hyaluronate 20 MG/2ML    Aspirate amount: 0 mL    Patient tolerance: patient tolerated the procedure well with no immediate complications  Dressing:  Sterile dressing applied

## 2020-08-04 ENCOUNTER — OFFICE VISIT (OUTPATIENT)
Dept: OBGYN CLINIC | Facility: OTHER | Age: 70
End: 2020-08-04
Payer: OTHER MISCELLANEOUS

## 2020-08-04 VITALS — HEIGHT: 65 IN | BODY MASS INDEX: 27.66 KG/M2 | WEIGHT: 166 LBS

## 2020-08-04 DIAGNOSIS — M17.11 PRIMARY OSTEOARTHRITIS OF RIGHT KNEE: Primary | ICD-10-CM

## 2020-08-04 PROCEDURE — 4040F PNEUMOC VAC/ADMIN/RCVD: CPT | Performed by: ORTHOPAEDIC SURGERY

## 2020-08-04 PROCEDURE — 3008F BODY MASS INDEX DOCD: CPT | Performed by: ORTHOPAEDIC SURGERY

## 2020-08-04 PROCEDURE — 4004F PT TOBACCO SCREEN RCVD TLK: CPT | Performed by: ORTHOPAEDIC SURGERY

## 2020-08-04 PROCEDURE — 20610 DRAIN/INJ JOINT/BURSA W/O US: CPT | Performed by: ORTHOPAEDIC SURGERY

## 2020-08-04 PROCEDURE — 1160F RVW MEDS BY RX/DR IN RCRD: CPT | Performed by: ORTHOPAEDIC SURGERY

## 2020-08-04 RX ORDER — HYALURONATE SODIUM 10 MG/ML
20 SYRINGE (ML) INTRAARTICULAR
Status: COMPLETED | OUTPATIENT
Start: 2020-08-04 | End: 2020-08-04

## 2020-08-04 RX ADMIN — Medication 20 MG: at 10:58

## 2020-08-05 ENCOUNTER — HOSPITAL ENCOUNTER (OUTPATIENT)
Dept: RADIOLOGY | Age: 70
Discharge: HOME | End: 2020-08-05
Payer: OTHER MISCELLANEOUS

## 2020-08-05 DIAGNOSIS — M75.102 ROTATOR CUFF SYNDROME, LEFT: ICD-10-CM

## 2020-08-05 DIAGNOSIS — M25.512 LEFT SHOULDER PAIN, UNSPECIFIED CHRONICITY: ICD-10-CM

## 2020-08-05 PROCEDURE — 73221 MRI JOINT UPR EXTREM W/O DYE: CPT

## 2020-08-06 ENCOUNTER — HOSPITAL ENCOUNTER (OUTPATIENT)
Dept: RADIOLOGY | Facility: HOSPITAL | Age: 70
Discharge: HOME | End: 2020-08-06
Payer: COMMERCIAL

## 2020-08-06 DIAGNOSIS — G56.03 CARPAL TUNNEL SYNDROME, BILATERAL UPPER LIMBS: ICD-10-CM

## 2020-08-06 PROCEDURE — 76882 US LMTD JT/FCL EVL NVASC XTR: CPT

## 2020-08-11 ENCOUNTER — OFFICE VISIT (OUTPATIENT)
Dept: OBGYN CLINIC | Facility: OTHER | Age: 70
End: 2020-08-11
Payer: OTHER MISCELLANEOUS

## 2020-08-11 VITALS
WEIGHT: 163 LBS | HEIGHT: 65 IN | HEART RATE: 49 BPM | DIASTOLIC BLOOD PRESSURE: 66 MMHG | SYSTOLIC BLOOD PRESSURE: 118 MMHG | BODY MASS INDEX: 27.16 KG/M2 | TEMPERATURE: 97.4 F

## 2020-08-11 DIAGNOSIS — M17.11 PRIMARY OSTEOARTHRITIS OF RIGHT KNEE: Primary | ICD-10-CM

## 2020-08-11 DIAGNOSIS — M75.102 ROTATOR CUFF SYNDROME, LEFT: ICD-10-CM

## 2020-08-11 DIAGNOSIS — M19.012 ARTHRITIS OF LEFT GLENOHUMERAL JOINT: ICD-10-CM

## 2020-08-11 DIAGNOSIS — M75.52 SUBACROMIAL BURSITIS OF LEFT SHOULDER JOINT: ICD-10-CM

## 2020-08-11 PROCEDURE — 20610 DRAIN/INJ JOINT/BURSA W/O US: CPT | Performed by: ORTHOPAEDIC SURGERY

## 2020-08-11 PROCEDURE — 99214 OFFICE O/P EST MOD 30 MIN: CPT | Performed by: ORTHOPAEDIC SURGERY

## 2020-08-11 RX ADMIN — TRIAMCINOLONE ACETONIDE 40 MG: 40 INJECTION, SUSPENSION INTRA-ARTICULAR; INTRAMUSCULAR at 10:40

## 2020-08-11 RX ADMIN — LIDOCAINE HYDROCHLORIDE 4 ML: 10 INJECTION, SOLUTION INFILTRATION; PERINEURAL at 10:45

## 2020-08-11 RX ADMIN — LIDOCAINE HYDROCHLORIDE 4 ML: 10 INJECTION, SOLUTION INFILTRATION; PERINEURAL at 10:40

## 2020-08-11 RX ADMIN — TRIAMCINOLONE ACETONIDE 40 MG: 40 INJECTION, SUSPENSION INTRA-ARTICULAR; INTRAMUSCULAR at 10:45

## 2020-08-11 NOTE — PROGRESS NOTES
1  Primary osteoarthritis of right knee  Ambulatory referral to Physical Therapy   2  Rotator cuff syndrome, left  Ambulatory referral to Physical Therapy   3  Subacromial bursitis of left shoulder joint     4  Arthritis of left glenohumeral joint       Orders Placed This Encounter   Procedures    Large joint arthrocentesis: L glenohumeral    Large joint arthrocentesis: L subacromial bursa    Ambulatory referral to Physical Therapy        Imaging Studies (I personally reviewed images in PACS and report):    MRI left shoulder 8/5/2020  Evidence of left biceps tendonitis, subacromial bursitis  Past diagnostics  8/5/2020  IMPRESSION:     1  Postsurgical changes within the rotator cuff without evidence of re-tear  2  Mild subacromion subdeltoid bursitis  3  Mild long head biceps tendinosis and tenosynovitis  4  Moderate glenohumeral chondrosis    ASSESSMENT/PLAN:  Explained my current clinical findings and reviewed MRI findings of the left shoulder with Reinaldo Pink  She was agreeable to and received a left glenohumeral as well as subacromial cortisone injection for symptomatic relief  Will also suggest her to initiate physical therapy rehabilitation with regards to her left shoulder pain  Follow-up in about 10 weeks for clinical reassessment  Return in about 10 weeks (around 10/20/2020)  _____________________________________________________________  CHIEF COMPLAINT:  Chronic left shoulder pain    HPI:  Jenna Salamanca is a 79 y o  female with an extensive PMH including diabetes mellitus type 2, HTN, emphysema, fibromyalgia who presents for chronic left shoulder pain  Visit 8/10/2020 : Today, she reports continued pain of her left shoulder which is aching, of moderate intensity, primarily on the lateral aspect and made worse with forward flexion, abduction or reaching behind her back  This is associated with some generalized sense of weakness of the left shoulder        Review of Systems Constitutional: Negative  HENT: Negative  Eyes: Negative  Respiratory: Negative  Cardiovascular: Negative  Gastrointestinal: Negative  Endocrine: Negative  Genitourinary: Negative  Skin: Negative  Allergic/Immunologic: Negative  Neurological: Negative  Hematological: Negative  Psychiatric/Behavioral: Negative          Following history reviewed and update:    Past Medical History:   Diagnosis Date    Anemia     Anxiety     hx of panic attacks (now under control)     Arthritis     Asthma     resolved (no problems in a decade)     Baker's cyst of knee     Bronchitis     Bunion, left foot     Cervical pain     Chronic GERD     Chronic pain disorder     Depression     Diabetes mellitus, type 2 (HCC)     Diabetic peripheral neuropathy ([address] )     Endometriosis     Fibromyalgia     Hyperlipidemia     Hypertension     Joint pain     Low back pain     Lung nodules     Macular degeneration     Pulmonary emphysema ([facility] [address] ) 1/16/2020    Spondylosis      Past Surgical History:   Procedure Laterality Date    BACK SURGERY  1996    L5, S1- laser surgery fusion of c5 and c6     BREAST LUMPECTOMY Right     CHOLECYSTECTOMY  2004    FOOT SURGERY Left 2005    fusion     GASTRIC BYPASS  2010    Dr Aleisha Templeton    just uterus     KNEE ARTHROSCOPY      LAMINECTOMY      ORTHOPEDIC SURGERY      OVARIAN CYST REMOVAL  1975    PELVIC LAPAROSCOPY      ovaries (x10)     PLEURAL SCARIFICATION  1967    SHOULDER OPEN ROTATOR CUFF REPAIR Left 2008    TONSILLECTOMY      VAGINAL PROLAPSE REPAIR       Social History   Social History     Substance and Sexual Activity   Alcohol Use Not Currently    Alcohol/week: 1 0 standard drinks    Types: 1 Shots of liquor per week    Comment: rarely     Social History     Substance and Sexual Activity   Drug Use No     Social History     Tobacco Use   Smoking Status Current Every Day Smoker    Packs/day: 0 50    Years: 40 00  Pack years: 20 00   Smokeless Tobacco Current User     Family History   Problem Relation Age of Onset    Hypertension Mother     Lung cancer Mother     Hypertension Father     Heart disease Father     Heart attack Father      Allergies   Allergen Reactions    Cephalosporins Hives    Erythromycin GI Intolerance    Fentanyl Itching     Occurred during surgery     Paxil [Paroxetine] Hives     After 2 weeks    Penicillins Itching    Pravastatin Myalgia    Prednisolone Itching    Statins Itching    Sulfa Antibiotics Diarrhea    Wellbutrin [Bupropion] Hives     After 2 weeks     Marzena's Wort Rash          Physical Exam  Vitals signs and nursing note reviewed  /66 (BP Location: Right arm, Patient Position: Sitting, Cuff Size: Standard)   Pulse (!) 49   Temp (!) 97 4 °F (36 3 °C)   Ht 5' 5" (1 651 m)   Wt 73 9 kg (163 lb)   LMP  (LMP Unknown)   BMI 27 12 kg/m²     Ortho Exam    Left shoulder exam:  No significant atrophy or deformity noted  There is tenderness to palpation over the [facility] joint as well as the subacromial bursa and the posterior glenohumeral joint area  There is no tenderness over the bicipital groove  Active range of motion is forward flexion to 60, abduction is 70 degree, internal rotation is at the level of lower lumbar spine, external rotation in adduction is 30°  Normal cross adduction  Rotator cuff strength is good except grade 4/5 with supraspinatus and infraspinatus  Positive subacromial impingement signs      Large joint arthrocentesis: L glenohumeral  Date/Time: 8/11/2020 10:40 AM  Consent given by: patient  Site marked: site marked  Timeout: Immediately prior to procedure a time out was called to verify the correct patient, procedure, equipment, support staff and site/side marked as required   Supporting Documentation  Indications: pain   Procedure Details  Location: shoulder - L glenohumeral  Preparation: Patient was prepped and draped in the usual sterile fashion  Needle size: 22 G  Ultrasound guidance: no  Approach: posterior  Medications administered: 4 mL lidocaine 1 %; 40 mg triamcinolone acetonide 40 mg/mL    Patient tolerance: patient tolerated the procedure well with no immediate complications  Dressing:  Sterile dressing applied    Large joint arthrocentesis: L subacromial bursa  Date/Time: 8/11/2020 10:45 AM  Consent given by: patient  Site marked: site marked  Timeout: Immediately prior to procedure a time out was called to verify the correct patient, procedure, equipment, support staff and site/side marked as required   Supporting Documentation  Indications: pain   Procedure Details  Location: shoulder - L subacromial bursa  Preparation: Patient was prepped and draped in the usual sterile fashion  Needle size: 22 G  Ultrasound guidance: no  Approach: lateral  Medications administered: 4 mL lidocaine 1 %; 40 mg triamcinolone acetonide 40 mg/mL    Patient tolerance: patient tolerated the procedure well with no immediate complications  Dressing:  Sterile dressing applied          Portions of the record may have been created with voice recognition software  Occasional wrong word or "sound alike" substitutions may have occurred due to the inherent limitations of voice recognition software  Please review the chart carefully and recognize, using context, where substitutions/typographical errors may have occurred

## 2020-08-12 ENCOUNTER — OFFICE VISIT (OUTPATIENT)
Dept: PAIN MEDICINE | Facility: MEDICAL CENTER | Age: 70
End: 2020-08-12
Payer: OTHER MISCELLANEOUS

## 2020-08-12 VITALS
HEIGHT: 65 IN | SYSTOLIC BLOOD PRESSURE: 154 MMHG | DIASTOLIC BLOOD PRESSURE: 73 MMHG | HEART RATE: 51 BPM | WEIGHT: 167 LBS | BODY MASS INDEX: 27.82 KG/M2

## 2020-08-12 DIAGNOSIS — M54.41 CHRONIC BILATERAL LOW BACK PAIN WITH BILATERAL SCIATICA: ICD-10-CM

## 2020-08-12 DIAGNOSIS — G89.29 CHRONIC BILATERAL LOW BACK PAIN WITH RIGHT-SIDED SCIATICA: ICD-10-CM

## 2020-08-12 DIAGNOSIS — Z01.812 PRE-PROCEDURAL LABORATORY EXAMINATION: ICD-10-CM

## 2020-08-12 DIAGNOSIS — M54.41 CHRONIC BILATERAL LOW BACK PAIN WITH RIGHT-SIDED SCIATICA: ICD-10-CM

## 2020-08-12 DIAGNOSIS — M79.18 MYOFASCIAL PAIN SYNDROME: ICD-10-CM

## 2020-08-12 DIAGNOSIS — M54.16 LUMBAR RADICULITIS: ICD-10-CM

## 2020-08-12 DIAGNOSIS — G89.29 CHRONIC BILATERAL LOW BACK PAIN WITH BILATERAL SCIATICA: ICD-10-CM

## 2020-08-12 DIAGNOSIS — G89.29 CHRONIC RIGHT-SIDED LOW BACK PAIN WITH RIGHT-SIDED SCIATICA: Primary | ICD-10-CM

## 2020-08-12 DIAGNOSIS — M54.42 CHRONIC BILATERAL LOW BACK PAIN WITH BILATERAL SCIATICA: ICD-10-CM

## 2020-08-12 DIAGNOSIS — M54.12 CERVICAL RADICULOPATHY: ICD-10-CM

## 2020-08-12 DIAGNOSIS — M54.2 NECK PAIN: ICD-10-CM

## 2020-08-12 DIAGNOSIS — M54.41 CHRONIC RIGHT-SIDED LOW BACK PAIN WITH RIGHT-SIDED SCIATICA: Primary | ICD-10-CM

## 2020-08-12 PROCEDURE — 99213 OFFICE O/P EST LOW 20 MIN: CPT | Performed by: NURSE PRACTITIONER

## 2020-08-12 PROCEDURE — 4004F PT TOBACCO SCREEN RCVD TLK: CPT | Performed by: NURSE PRACTITIONER

## 2020-08-12 PROCEDURE — 3008F BODY MASS INDEX DOCD: CPT | Performed by: NURSE PRACTITIONER

## 2020-08-12 PROCEDURE — 1160F RVW MEDS BY RX/DR IN RCRD: CPT | Performed by: NURSE PRACTITIONER

## 2020-08-12 PROCEDURE — 4040F PNEUMOC VAC/ADMIN/RCVD: CPT | Performed by: NURSE PRACTITIONER

## 2020-08-12 RX ORDER — BACLOFEN 10 MG/1
10 TABLET ORAL 3 TIMES DAILY
Qty: 270 TABLET | Refills: 0 | Status: SHIPPED | OUTPATIENT
Start: 2020-08-12 | End: 2020-09-16 | Stop reason: SDUPTHER

## 2020-08-12 RX ORDER — PREGABALIN 150 MG/1
150 CAPSULE ORAL 3 TIMES DAILY
Qty: 270 CAPSULE | Refills: 0 | Status: SHIPPED | OUTPATIENT
Start: 2020-08-12 | End: 2020-09-16 | Stop reason: SDUPTHER

## 2020-08-12 NOTE — PROGRESS NOTES
Assessment  1  Chronic right-sided low back pain with right-sided sciatica - Bilateral    2  Myofascial pain syndrome    3  Chronic bilateral low back pain with right-sided sciatica - Bilateral    4  Chronic bilateral low back pain with bilateral sciatica    5  Lumbar radiculitis    6  Cervical radiculopathy    7  Neck pain    8  Pre-procedural laboratory examination        Plan  Continue with baclofen and Lyrica these were both refilled today  Patient is requesting a surgical evaluation since she did not get any relief from her bilateral L4-5 transforaminal epidural steroid injection  I will provide her with a referral to see Dr Michael Domínguez  We will try and obtain a cervical spine MRI with and without contrast as she has had previous spinal surgery in her neck  After reviewing the EMG results there is no sign of a cervical radiculopathy however they have asked us to consider a cervical MRI due to her worsening right-sided neck, and shoulder pain  Will follow up in 3 months for medication refills        My impressions and treatment recommendations were discussed in detail with the patient who verbalized understanding and had no further questions  Discharge instructions were provided  I personally saw and examined the patient and I agree with the above discussed plan of care  Orders Placed This Encounter   Procedures    MRI cervical spine w wo contrast     Standing Status:   Future     Standing Expiration Date:   8/12/2024     Scheduling Instructions: There is no preparation for this test  Please leave your jewelry and valuables at home, wedding rings are the exception  Magnetic nail polish must be removed prior to arrival for your test  Please bring your insurance cards, a form of photo ID and a list of your medications with you  Arrive 15 minutes prior to your appointment time in order to register   Please bring any prior CT or MRI studies of this area that were not performed at [facility] facility  To schedule this appointment, please contact Central Scheduling at [phone]  Prior to your appointment, please make sure you complete the MRI Screening Form when you e-Check in for your appointment  This will be available starting 7 days before your appointment in [address]  You may receive an e-mail with an activation code if you do not have a [employer] account  If you do not have access to a device, we will complete your screening at your appointment  Order Specific Question:   What is the patient's sedation requirement? Answer:   No Sedation    Creatinine, serum     Standing Status:   Future     Standing Expiration Date:   8/12/2021    BUN     Standing Status:   Future     Standing Expiration Date:   8/12/2021    Ambulatory referral to Neurosurgery     Standing Status:   Future     Standing Expiration Date:   8/12/2021     Referral Priority:   Routine     Referral Type:   Consult - AMB     Referral Reason:   Specialty Services Required     Referred to Provider:   Max Mireles MD     Requested Specialty:   Neurosurgery     Number of Visits Requested:   1     Expiration Date:   8/12/2021     New Medications Ordered This Visit   Medications    baclofen 10 mg tablet     Sig: Take 1 tablet (10 mg total) by mouth 3 (three) times a day     Dispense:  270 tablet     Refill:  0    pregabalin (LYRICA) 150 mg capsule     Sig: Take 1 capsule (150 mg total) by mouth 3 (three) times a day     Dispense:  270 capsule     Refill:  0     Brand necessary, no generic  History of Present Illness    Layton Greer is a 79 y o  female presents for follow-up related to her chronic low back and bilateral leg pain as well as her right-sided neck and shoulder pain following an injury at work  Today she rates her pain 7/10 this is constant described as dull, aching, sharp, throbbing, pressure-like, numbness, and pins and needles      Patient was participating in aqua therapy however she tells me that this is on hold due to the patient making no improvement  She continues with baclofen and Lyrica which is providing 20% relief and no side effects or issues  Patient continues to experience worsening pain despite a bilateral L4-5 transforaminal epidural steroid injection  She tells me that the horrible burning pain has subsided in her right leg however she still experiences a lot of pain and she feels the injection may the pressure in her back worse  I have personally reviewed and/or updated the patient's past medical history, past surgical history, family history, social history, current medications, allergies, and vital signs today  Review of Systems   Constitutional: Negative for fever and unexpected weight change  HENT: Negative for trouble swallowing  Eyes: Negative for visual disturbance  Respiratory: Negative for shortness of breath and wheezing  Cardiovascular: Negative for chest pain and palpitations  Gastrointestinal: Negative for constipation, diarrhea, nausea and vomiting  Endocrine: Negative for cold intolerance, heat intolerance and polydipsia  Genitourinary: Negative for difficulty urinating and frequency  Musculoskeletal: Positive for gait problem  Negative for arthralgias, joint swelling and myalgias  Pain in extremity   Skin: Negative for rash  Neurological: Negative for dizziness, seizures, syncope, weakness and headaches  Hematological: Does not bruise/bleed easily  Psychiatric/Behavioral: Negative for dysphoric mood  All other systems reviewed and are negative        Patient Active Problem List   Diagnosis    Chronic pain syndrome    Cervical radiculopathy    Myofascial pain syndrome    Spondylosis of cervical region without myelopathy or radiculopathy    Fibromyalgia    Diabetic peripheral neuropathy (HCC)    Long-term current use of opiate analgesic    MGUS (monoclonal gammopathy of unknown significance)    Iron deficiency anemia following bariatric surgery    Light headedness    Uncomplicated opioid dependence (HCC)    Rheumatoid arthritis of hand ([address] )    Pulmonary emphysema (HCC)    Primary osteoarthritis of right knee    Pseudogout of left knee    Lumbar radiculitis    Chronic bilateral low back pain with bilateral sciatica    Rotator cuff syndrome, left    Left shoulder pain       Past Medical History:   Diagnosis Date    Anemia     Anxiety     hx of panic attacks (now under control)     Arthritis     Asthma     resolved (no problems in a decade)     Baker's cyst of knee     Bronchitis     Bunion, left foot     Cervical pain     Chronic GERD     Chronic pain disorder     Depression     Diabetes mellitus, type 2 (HCC)     Diabetic peripheral neuropathy (HCC)     Endometriosis     Fibromyalgia     Hyperlipidemia     Hypertension     Joint pain     Low back pain     Lung nodules     Macular degeneration     Pulmonary emphysema ([address] ) 1/16/2020    Spondylosis        Past Surgical History:   Procedure Laterality Date    BACK SURGERY  1996    L5, S1- laser surgery fusion of c5 and c6     BREAST LUMPECTOMY Right     CHOLECYSTECTOMY  2004    FOOT SURGERY Left 2005    fusion     GASTRIC BYPASS  2010    Dr Joanna Gross    just uterus     KNEE ARTHROSCOPY      LAMINECTOMY      ORTHOPEDIC SURGERY      OVARIAN CYST REMOVAL  1975    PELVIC LAPAROSCOPY      ovaries (x10)     PLEURAL SCARIFICATION  1967    SHOULDER OPEN ROTATOR CUFF REPAIR Left 2008    TONSILLECTOMY      VAGINAL PROLAPSE REPAIR         Family History   Problem Relation Age of Onset    Hypertension Mother     Lung cancer Mother     Hypertension Father     Heart disease Father     Heart attack Father        Social History     Occupational History    Not on file   Tobacco Use    Smoking status: Current Every Day Smoker     Packs/day: 0 50     Years: 40 00     Pack years: 20 00    Smokeless tobacco: Current User   Substance and Sexual Activity    Alcohol use: Not Currently     Alcohol/week: 1 0 standard drinks     Types: 1 Shots of liquor per week     Comment: rarely    Drug use: No    Sexual activity: Not Currently     Partners: Male       Current Outpatient Medications on File Prior to Visit   Medication Sig    albuterol (VENTOLIN HFA) 90 mcg/act inhaler inhale 2 puff by inhalation route  every 4 - 6 hours as needed    ALPRAZolam (XANAX PO) Xanax    amLODIPine (NORVASC) 5 mg tablet Take 1 tablet (5 mg total) by mouth daily    cetirizine (ZyrTEC) 10 mg tablet Take 0 5 tablets (5 mg total) by mouth daily    cimetidine (TAGAMET) 200 mg tablet Take 1 tablet (200 mg total) by mouth 2 (two) times a day    diphenhydrAMINE (BENADRYL ALLERGY) 25 mg capsule Take by mouth    [START ON 8/20/2020] HYDROcodone-acetaminophen (NORCO)  mg per tablet Take 4-5 tablets daily PRN    Lancets Misc  (ACCU-CHEK FASTCLIX LANCET) KIT 3 (three) times a day    levothyroxine 25 mcg tablet TAKE 1 TABLET BY MOUTH EVERY MORNING ON SATURDAY, SUNDAY, TUESDAY AND THURSDAY    levothyroxine 50 mcg tablet TAKE 1 TABLET BY ORAL ROUTE EVERY MORNING ON MONDAY, WEDNESDAY AND FRIDAY    losartan (COZAAR) 25 mg tablet TAKE 1 TABLET BY MOUTH EVERY DAY    meclizine (ANTIVERT) 12 5 MG tablet Take 1 tablet (12 5 mg total) by mouth every 12 (twelve) hours as needed for dizziness    MICROLET LANCETS MISC Microlet Lancet    ONETOUCH VERIO test strip TEST TWICE A DAY    pantoprazole (PROTONIX) 40 mg tablet TAKE 1 TABLET BY MOUTH TWICE A DAY    sucralfate (CARAFATE) 1 g tablet TAKE 1 TABLET BY MOUTH 4 TIMES EVERY DAY ON AN EMPTY STOMACH 1 HOUR BEFORE MEALS AND AT BEDTIME    traZODone (DESYREL) 50 mg tablet TAKE 1 TABLET BY MOUTH EVERY DAY AT BEDTIME AS NEEDED    [DISCONTINUED] baclofen 10 mg tablet Take 1 tablet (10 mg total) by mouth 3 (three) times a day    [DISCONTINUED] pregabalin (LYRICA) 150 mg capsule Take 1 capsule (150 mg total) by mouth 3 (three) times a day     Current Facility-Administered Medications on File Prior to Visit   Medication    cyanocobalamin injection 1,000 mcg    cyanocobalamin injection 1,000 mcg       Allergies   Allergen Reactions    Cephalosporins Hives    Erythromycin GI Intolerance    Fentanyl Itching     Occurred during surgery     Paxil [Paroxetine] Hives     After 2 weeks    Penicillins Itching    Pravastatin Myalgia    Prednisolone Itching    Statins Itching    Sulfa Antibiotics Diarrhea    Wellbutrin [Bupropion] Hives     After 2 weeks     Marzena's Wort Rash       Hydrocodone last taken: 08/12/20  UDS last taken: 05/30/20  Physical Exam    /73   Pulse (!) 51   Ht 5' 5" (1 651 m)   Wt 75 8 kg (167 lb)   LMP  (LMP Unknown)   BMI 27 79 kg/m²     Constitutional: normal, well developed, well nourished, alert, in no distress and non-toxic and no overt pain behavior    Eyes: anicteric  HEENT: grossly intact  Neck: supple, symmetric, trachea midline and no masses   Pulmonary:even and unlabored  Cardiovascular:No edema or pitting edema present  Skin:Normal without rashes or lesions and well hydrated  Psychiatric:Mood and affect appropriate  Neurologic:Cranial Nerves II-XII grossly intact  Musculoskeletal:ambulates with cane    Imaging

## 2020-08-13 DIAGNOSIS — K27.9 PUD (PEPTIC ULCER DISEASE): ICD-10-CM

## 2020-08-13 RX ORDER — SUCRALFATE 1 G/1
TABLET ORAL
Qty: 120 TABLET | Refills: 0 | Status: SHIPPED | OUTPATIENT
Start: 2020-08-13 | End: 2020-09-17

## 2020-08-16 DIAGNOSIS — G47.00 INSOMNIA, UNSPECIFIED TYPE: ICD-10-CM

## 2020-08-17 RX ORDER — TRAZODONE HYDROCHLORIDE 50 MG/1
TABLET ORAL
Qty: 30 TABLET | Refills: 0 | Status: SHIPPED | OUTPATIENT
Start: 2020-08-17 | End: 2020-09-10

## 2020-08-26 ENCOUNTER — OFFICE VISIT (OUTPATIENT)
Dept: CARDIOLOGY CLINIC | Facility: CLINIC | Age: 70
End: 2020-08-26
Payer: COMMERCIAL

## 2020-08-26 ENCOUNTER — TELEPHONE (OUTPATIENT)
Dept: HEMATOLOGY ONCOLOGY | Facility: CLINIC | Age: 70
End: 2020-08-26

## 2020-08-26 VITALS
WEIGHT: 165.2 LBS | SYSTOLIC BLOOD PRESSURE: 130 MMHG | DIASTOLIC BLOOD PRESSURE: 60 MMHG | BODY MASS INDEX: 27.49 KG/M2 | TEMPERATURE: 98.4 F

## 2020-08-26 DIAGNOSIS — R42 DIZZINESS: ICD-10-CM

## 2020-08-26 DIAGNOSIS — R53.83 OTHER FATIGUE: ICD-10-CM

## 2020-08-26 DIAGNOSIS — R00.1 SINUS BRADYCARDIA: Primary | ICD-10-CM

## 2020-08-26 PROCEDURE — 3044F HG A1C LEVEL LT 7.0%: CPT | Performed by: INTERNAL MEDICINE

## 2020-08-26 PROCEDURE — 4040F PNEUMOC VAC/ADMIN/RCVD: CPT | Performed by: INTERNAL MEDICINE

## 2020-08-26 PROCEDURE — 3077F SYST BP >= 140 MM HG: CPT | Performed by: INTERNAL MEDICINE

## 2020-08-26 PROCEDURE — 93000 ELECTROCARDIOGRAM COMPLETE: CPT | Performed by: INTERNAL MEDICINE

## 2020-08-26 PROCEDURE — 4004F PT TOBACCO SCREEN RCVD TLK: CPT | Performed by: INTERNAL MEDICINE

## 2020-08-26 PROCEDURE — 3079F DIAST BP 80-89 MM HG: CPT | Performed by: INTERNAL MEDICINE

## 2020-08-26 PROCEDURE — 1160F RVW MEDS BY RX/DR IN RCRD: CPT | Performed by: INTERNAL MEDICINE

## 2020-08-26 PROCEDURE — 99213 OFFICE O/P EST LOW 20 MIN: CPT | Performed by: INTERNAL MEDICINE

## 2020-08-26 NOTE — PROGRESS NOTES
Cardiology Follow Up    Joaquin Luong  1950  [id number]  [address]  [address]  [phone]    1  Sinus bradycardia  POCT ECG   2  Other fatigue     3   Dizziness         Interval History:   The patient has a history of mechanical fall  She did have injury to her back, shoulder, right knee  She is slowly recovering with physical therapy, but it is incomplete      She was originally seen for bradycardia  She has not had any episode of presyncope or syncope since then     Interval History:   The patient has been referred to me by [facility] JOVAN VIS evaluation of bradycardia  In addition the patient complains of exertional intolerance, postural lightheadedness, and significant fatigue     Episodes have been going on for quite some time and at least worsening over the last 2 years  Patient is in some training program at [employer]  She has to work for 5 hours a day  Once she reaches home she feels so fatigued that she cannot do anything  She prepares food over 30 minutes and thereafter can hardly do anything else     She informs prior [address] [place] [address] office visit where her heart rate is in the 30s and 40s  She has curtailed her activity and does everything very slowly     The patient is not complaining of anginal like chest pain or chest pressure  There is no worsening orthopnea, paroxysmal nocturnal dyspnea  There is no leg swelling     Patient does not get palpitation   There is no history of presyncope or syncope  There is rare history of transient  lightheadedness  There is significant exertional intolerance     There is history of snoring at night  There is history of morning fatigability  There is occasional history of daytime sleepiness       Patient Active Problem List   Diagnosis    Chronic pain syndrome    Cervical radiculopathy    Myofascial pain syndrome    Spondylosis of cervical region without myelopathy or radiculopathy    Fibromyalgia    Diabetic peripheral neuropathy (HCC)    Long-term current use of opiate analgesic    MGUS (monoclonal gammopathy of unknown significance)    Iron deficiency anemia following bariatric surgery    Light headedness    Uncomplicated opioid dependence (HCC)    Rheumatoid arthritis of hand ([facility] [address] )    Pulmonary emphysema (HCC)    Primary osteoarthritis of right knee    Pseudogout of left knee    Lumbar radiculitis    Chronic bilateral low back pain with bilateral sciatica    Rotator cuff syndrome, left    Left shoulder pain    Dizziness    Other fatigue     Past Medical History:   Diagnosis Date    Anemia     Anxiety     hx of panic attacks (now under control)     Arthritis     Asthma     resolved (no problems in a decade)     Baker's cyst of knee     Bronchitis     Bunion, left foot     Cervical pain     Chronic GERD     Chronic pain disorder     Depression     Diabetes mellitus, type 2 (HCC)     Diabetic peripheral neuropathy (HCC)     Endometriosis     Fibromyalgia     Hyperlipidemia     Hypertension     Joint pain     Low back pain     Lung nodules     Macular degeneration     Pulmonary emphysema ([facility] [address] ) 1/16/2020    Spondylosis      Social History     Socioeconomic History    Marital status:      Spouse name: Not on file    Number of children: Not on file    Years of education: Not on file    Highest education level: Not on file   Occupational History    Not on file   Social Needs    Financial resource strain: Not hard at all   [employer] insecurity     Worry: Never true     Inability: Never true   [employer] needs     Medical: No     Non-medical: No   Tobacco Use    Smoking status: Current Every Day Smoker     Packs/day: 0 50     Years: 40 00     Pack years: 20 00    Smokeless tobacco: Current User   Substance and Sexual Activity    Alcohol use: Not Currently     Alcohol/week: 1 0 standard drinks     Types: 1 Shots of liquor per week     Comment: rarely    Drug use: No    Sexual activity: Not Currently     Partners: Male   Lifestyle    Physical activity     Days per week: 0 days     Minutes per session: 0 min    Stress: Not at all   Relationships    Social connections     Talks on phone: Patient refused     Gets together: Patient refused     Attends Hoahaoism service: Patient refused     Active member of club or organization: Patient refused     Attends meetings of clubs or organizations: Patient refused     Relationship status: Patient refused    Intimate partner violence     Fear of current or ex partner: No     Emotionally abused: No     Physically abused: No     Forced sexual activity: No   Other Topics Concern    Not on file   Social History Narrative    Not on file      Family History   Problem Relation Age of Onset    Hypertension Mother     Lung cancer Mother     Hypertension Father     Heart disease Father     Heart attack Father      Past Surgical History:   Procedure Laterality Date    BACK SURGERY  1996    L5, S1- laser surgery fusion of c5 and c6     BREAST LUMPECTOMY Right     CHOLECYSTECTOMY  2004    FOOT SURGERY Left 2005    fusion     GASTRIC BYPASS  2010    Dr Elsie Perez    just uterus     KNEE ARTHROSCOPY      LAMINECTOMY      ORTHOPEDIC SURGERY      OVARIAN CYST REMOVAL  1975    PELVIC LAPAROSCOPY      ovaries (x10)     PLEURAL [address] Left 2008    TONSILLECTOMY      VAGINAL PROLAPSE REPAIR         Current Outpatient Medications:     albuterol (VENTOLIN HFA) 90 mcg/act inhaler, inhale 2 puff by inhalation route  every 4 - 6 hours as needed, Disp: , Rfl:     ALPRAZolam (XANAX PO), Xanax, Disp: , Rfl:     amLODIPine (NORVASC) 5 mg tablet, Take 1 tablet (5 mg total) by mouth daily, Disp: 90 tablet, Rfl: 1    baclofen 10 mg tablet, Take 1 tablet (10 mg total) by mouth 3 (three) times a day, Disp: 270 tablet, Rfl: 0    cetirizine (ZyrTEC) 10 mg tablet, Take 0 5 tablets (5 mg total) by mouth daily, Disp: 30 tablet, Rfl: 3    cimetidine (TAGAMET) 200 mg tablet, Take 1 tablet (200 mg total) by mouth 2 (two) times a day, Disp: 60 tablet, Rfl: 3    diphenhydrAMINE (BENADRYL ALLERGY) 25 mg capsule, Take by mouth, Disp: , Rfl:     HYDROcodone-acetaminophen (NORCO)  mg per tablet, Take 4-5 tablets daily PRN, Disp: 130 tablet, Rfl: 0    Lancets Misc  (ACCU-CHEK FASTCLIX LANCET) KIT, 3 (three) times a day, Disp: , Rfl:     levothyroxine 25 mcg tablet, TAKE 1 TABLET BY MOUTH EVERY MORNING ON SATURDAY, SUNDAY, TUESDAY AND THURSDAY, Disp: 30 tablet, Rfl: 2    levothyroxine 50 mcg tablet, TAKE 1 TABLET BY ORAL ROUTE EVERY MORNING ON MONDAY, WEDNESDAY AND FRIDAY, Disp: 30 tablet, Rfl: 2    losartan (COZAAR) 25 mg tablet, TAKE 1 TABLET BY MOUTH EVERY DAY, Disp: 90 tablet, Rfl: 1    MICROLET LANCETS MISC, Microlet Lancet, Disp: , Rfl:     ONETOUCH VERIO test strip, TEST TWICE A DAY, Disp: , Rfl:     pantoprazole (PROTONIX) 40 mg tablet, TAKE 1 TABLET BY MOUTH TWICE A DAY, Disp: 180 tablet, Rfl: 1    pregabalin (LYRICA) 150 mg capsule, Take 1 capsule (150 mg total) by mouth 3 (three) times a day, Disp: 270 capsule, Rfl: 0    sucralfate (CARAFATE) 1 g tablet, TAKE 1 TABLET BY MOUTH 4 TIMES EVERY DAY ON AN EMPTY STOMACH 1 HOUR BEFORE MEALS AND AT BEDTIME, Disp: 120 tablet, Rfl: 0    traZODone (DESYREL) 50 mg tablet, TAKE 1 TABLET BY MOUTH EVERY DAY AT BEDTIME AS NEEDED, Disp: 30 tablet, Rfl: 0    meclizine (ANTIVERT) 12 5 MG tablet, Take 1 tablet (12 5 mg total) by mouth every 12 (twelve) hours as needed for dizziness (Patient not taking: Reported on 8/26/2020), Disp: 30 tablet, Rfl: 1    Current Facility-Administered Medications:     cyanocobalamin injection 1,000 mcg, 1,000 mcg, Intramuscular, Q30 Days, Kiara Gilbert MD, 1,000 mcg at 12/19/19 0925    cyanocobalamin injection 1,000 mcg, 1,000 mcg, Intramuscular, Q30 Days, Aleksandar Edmondson MD, 1,000 mcg at 07/08/20 1036  Allergies   Allergen Reactions    Cephalosporins Hives    Erythromycin GI Intolerance    Fentanyl Itching     Occurred during surgery     Paxil [Paroxetine] Hives     After 2 weeks    Penicillins Itching    Pravastatin Myalgia    Prednisolone Itching    Statins Itching    Sulfa Antibiotics Diarrhea    Wellbutrin [Bupropion] Hives     After 2 weeks     Marzena's Wort Rash       Labs:  Lab Results   Component Value Date    K 3 2 (L) 07/29/2020    K 3 8 09/10/2019     (H) 07/29/2020     09/10/2019    CO2 26 07/29/2020    CO2 28 09/10/2019    BUN 14 07/29/2020    BUN 9 09/10/2019    CREATININE 0 69 07/29/2020    CALCIUM 9 1 07/29/2020    CALCIUM 8 9 09/10/2019     No results found for: CKTOTAL, CKMB, CKMBINDEX, TROPONINI  Lab Results   Component Value Date    WBC 3 60 (L) 07/29/2020    HGB 12 7 07/29/2020    HCT 37 1 07/29/2020    MCV 97 07/29/2020     07/29/2020     Lab Results   Component Value Date    TRIG 81 07/29/2020    TRIG 99 09/10/2019    HDL 64 07/29/2020    HDL 68 09/10/2019     Imaging:   Mri Shoulder Left Wo Contrast  Result Date: 8/5/2020  Narrative: MRI LEFT SHOULDER INDICATION:   Impression: 1  Postsurgical changes within the rotator cuff without evidence of re-tear 2  Mild subacromion subdeltoid bursitis 3  Mild long head biceps tendinosis and tenosynovitis 4  Moderate glenohumeral chondrosis Workstation performed: [id number]          [employer]  Result Date: 8/6/2020  Narrative: ULTRASOUND BILATERAL WRISTS (CARPAL TUNNEL SYNDROME PROTOCOL) TECHNIQUE:   Impression: RIGHT SIDE: Carpal tunnel syndrome ruled in based on marked abnormal CSA > 14 sq mm  There is a bifid right median nerve  LEFT SIDE: Findings suspicious for carpal tunnel syndrome based on increased cross sectional area of median nerve (CSA >/= 11 sq cm ) There is a bifid left median nerve and persistent left median artery   Workstation performed: [id number]     Imaging: All the studies were reviewed by myself     Tilt table 4/17/2019  Interpretation  No POTS  No orthostatic hypotension           Stress test, Exercise 4/9/2019  IMPRESSIONS: 1  Submaximal stress test with maximum heart rate only 77% of predicted heart rate  This sub maximal heart rate reduces the sensitivity of this test   2  Hypertensive response to exercise  3  Negative for symptoms of exertional angina pectoris or electrocardiographic changes of ischemia at the heart rate achieved  Diagnostic sensitivity was limited by submaximal stress            48 HR Holter monitor 2/26/2019  IMPRESSION:  1  The patient had predominantly sinus rhythm  2  Heart rate varied from 38 bpm to 107 bpm   The patients average heart rate was 55 bpm     3  The patient had a holter monitor tracing done for 47 hours and 59 minutes  4  The patient had 7 single ventricular ectopic activity versus Vibha Phenomenon  5  The patient had 382 or 0 2% supraventricular ectopy     6  There were 5 atrial runs with the longest being 11 beats and the fastest being 135 beats per minute     7  There were 12 atrial pairs and 9 episodes of atrial bigeminy and 9 episodes of atrial trigeminy  8  The longest R/R interval was 2 0 seconds  9  No other major arrhythmia was noted  10  The diary made 3 comments of left arm pain and left-sided chest pain   During each episode there was a PAC but no ST change   There were other times in which patient had PACs and made no comment making it unlikely that there was a relationship between the chest pain and the PAC              Echo 2/26/2019  SUMMARY:  1  Normal sinus rhythm  2  Excellent technical quality     LEFT VENTRICLE:  Normal left ventricular systolic function, EF estimated 55-60 percent  Normal left ventricular cavity size  Normal left ventricular wall thickness  Normal left ventricular wall motion   Normal left ventricular diastolic function and filling  Pressures             Review of Systems:  Review of Systems   All other systems reviewed and are negative  As described in my history of present illness        Physical Exam:  Physical Exam  Vitals signs reviewed  Constitutional:       Appearance: Normal appearance  She is well-developed  Comments: Not in any distress at the current time    The patient did have a fall in February  She sustained left shoulder injury, right hip injury and right knee injury, lower back injury   HENT:      Head: Normocephalic and atraumatic  Right Ear: External ear normal       Left Ear: External ear normal       Nose: Nose normal       Mouth/Throat:      Pharynx: Uvula midline  Eyes:      General: Lids are normal       Extraocular Movements: Extraocular movements intact  Conjunctiva/sclera: Conjunctivae normal       Pupils: Pupils are equal, round, and reactive to light  Comments: No pallor  No cyanosis  No icterus   Neck:      Musculoskeletal: Normal range of motion  Thyroid: No thyromegaly  Vascular: No carotid bruit or JVD  Trachea: Trachea normal       Comments: No jugular lymphadenopathy  Short thick neck  Cardiovascular:      Rate and Rhythm: Normal rate and regular rhythm  Chest Wall: PMI is not displaced  Pulses: Normal pulses  Heart sounds: Normal heart sounds, S1 normal and S2 normal  No murmur  No friction rub  No gallop  No S3 or S4 sounds  Pulmonary:      Effort: Pulmonary effort is normal  No accessory muscle usage or respiratory distress  Breath sounds: Normal breath sounds  No decreased breath sounds, wheezing, rhonchi or rales  Chest:      Chest wall: No tenderness  Abdominal:      General: Bowel sounds are normal  There is no distension  Palpations: Abdomen is soft  There is no hepatomegaly, splenomegaly or mass  Tenderness: There is no abdominal tenderness  Musculoskeletal: Normal range of motion           General: No swelling, tenderness or deformity  Right lower leg: No edema  Left lower leg: No edema  Lymphadenopathy:      Cervical: No cervical adenopathy  Skin:     Findings: No abrasion, erythema, lesion or rash  Nails: There is no clubbing  Neurological:      Mental Status: She is alert and oriented to person, place, and time  Comments: Facial symmetry is retained  Extraocular movements are retained  Head neck tongue and palate movement are retained and symmetric   Psychiatric:         Speech: Speech normal          Behavior: Behavior normal          Thought Content: Thought content normal          Discussion/Summary:  1  Exertional intolerance  Dizziness  Orthostatic lightheadedness  Fatigue     The patient is complaining of symptoms for at least the last 2 years  There has been progressive decline in physical activity  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  After 5 hours of work when she is back home, she is so fatigued as unwilling to move around     She reports multiple hospital or physician office visits, where heart rate was in the 40s        A summary of her testing is as follows  Till study -  there is no evidence of POTS, vasovagal syncope  Holter study -  Average heart rate is 55, minimum heart rate during sleeping is 38, no pauses greater than 2 seconds     At the current time she does not meet criteria for a pacemaker  She is advised to follow up with any new worsening symptoms    Only if there are new symptoms, and she meets guidelines for pacemaker will proceed with the same        2   Chest pain  The chest pain is not suggestive of angina  She has had it for close to 5 years  It is episode a week  There is no obvious precipitating factor  Patient can walk and the pain does not worsen  Pain usually is resolved by 5-10 minutes

## 2020-08-26 NOTE — LETTER
August 29, 2020     Lupe Marte, [address] [facility] [address]    Patient: Lety Stevens   YOB: 1950   Date of Visit: 8/26/2020       Dear Dr Shraddha Goodwin: Thank you for referring Melany Joiner to me for evaluation  Below are my notes for this consultation  If you have questions, please do not hesitate to call me  I look forward to following your patient along with you  Sincerely,        Jojo Olvera MD        CC: Carlos Peralta MD  8/29/2020  6:10 PM  Sign when Signing Visit                                             Cardiology Follow Up    Lety Stevens  1950  [id number]  [address]  [address] [facility] [address]  [phone]  [phone]    1  Sinus bradycardia  POCT ECG   2  Other fatigue     3   Dizziness         Interval History:   The patient has a history of mechanical fall  She did have injury to her back, shoulder, right knee  She is slowly recovering with physical therapy, but it is incomplete      She was originally seen for bradycardia  She has not had any episode of presyncope or syncope since then     Interval History:   The patient has been referred to me by [facility] evaluation of bradycardia  In addition the patient complains of exertional intolerance, postural lightheadedness, and significant fatigue     Episodes have been going on for quite some time and at least worsening over the last 2 years  Patient is in some training program at [employer]  She has to work for 5 hours a day  Once she reaches home she feels so fatigued that she cannot do anything  She prepares food over 30 minutes and thereafter can hardly do anything else     She informs prior [address] [place] [address] office visit where her heart rate is in the 30s and 40s  She has curtailed her activity and does everything very slowly     The patient is not complaining of anginal like chest pain or chest pressure  There is no worsening orthopnea, paroxysmal nocturnal dyspnea  There is no leg swelling     Patient does not get palpitation   There is no history of presyncope or syncope  There is rare history of transient  lightheadedness  There is significant exertional intolerance     There is history of snoring at night  There is history of morning fatigability  There is occasional history of daytime sleepiness       Patient Active Problem List   Diagnosis    Chronic pain syndrome    Cervical radiculopathy    Myofascial pain syndrome    Spondylosis of cervical region without myelopathy or radiculopathy    Fibromyalgia    Diabetic peripheral neuropathy (HCC)    Long-term current use of opiate analgesic    MGUS (monoclonal gammopathy of unknown significance)    Iron deficiency anemia following bariatric surgery    Light headedness    Uncomplicated opioid dependence ([address] )    Rheumatoid arthritis of hand ([address] )    Pulmonary emphysema ([address] )    Primary osteoarthritis of right knee    Pseudogout of left knee    Lumbar radiculitis    Chronic bilateral low back pain with bilateral sciatica    Rotator cuff syndrome, left    Left shoulder pain    Dizziness    Other fatigue     Past Medical History:   Diagnosis Date    Anemia     Anxiety     hx of panic attacks (now under control)     Arthritis     Asthma     resolved (no problems in a decade)     Baker's cyst of knee     Bronchitis     Bunion, left foot     Cervical pain     Chronic GERD     Chronic pain disorder     Depression     Diabetes mellitus, type 2 (HCC)     Diabetic peripheral neuropathy (HCC)     Endometriosis     Fibromyalgia     Hyperlipidemia     Hypertension     Joint pain     Low back pain     Lung nodules     Macular degeneration     Pulmonary emphysema ([facility] [address] ) 1/16/2020    Spondylosis      Social History     Socioeconomic History    Marital status:      Spouse name: Not on file    Number of children: Not on file    Years of education: Not on file   [employer] [facility] Highest education level: Not on file   Occupational History    Not on file   Social Needs    Financial resource strain: Not hard at all    Food insecurity     Worry: Never true     Inability: Never true   [employer] needs     Medical: No     Non-medical: No   Tobacco Use    Smoking status: Current Every Day Smoker     Packs/day: 0 50     Years: 40 00     Pack years: 20 00    Smokeless tobacco: Current User   Substance and Sexual Activity    Alcohol use: Not Currently     Alcohol/week: 1 0 standard drinks     Types: 1 Shots of liquor per week     Comment: rarely    Drug use: No    Sexual activity: Not Currently     Partners: Male   Lifestyle    Physical activity     Days per week: 0 days     Minutes per session: 0 min    Stress: Not at all   Relationships    Social connections     Talks on phone: Patient refused     Gets together: Patient refused     Attends [employer] service: Patient refused     Active member of club or organization: Patient refused     Attends meetings of clubs or organizations: Patient refused     Relationship status: Patient refused    Intimate partner violence     Fear of current or ex partner: No     Emotionally abused: No     Physically abused: No     Forced sexual activity: No   Other Topics Concern    Not on file   Social History Narrative    Not on file      Family History   Problem Relation Age of Onset    Hypertension Mother     Lung cancer Mother     Hypertension Father     Heart disease Father     Heart attack Father      Past Surgical History:   Procedure Laterality Date    BACK SURGERY  1996    L5, S1- laser surgery fusion of c5 and c6     BREAST LUMPECTOMY Right     CHOLECYSTECTOMY  2004    FOOT SURGERY Left 2005    fusion     GASTRIC BYPASS  2010    Dr Meryl Summers    just uterus     KNEE ARTHROSCOPY      LAMINECTOMY      ORTHOPEDIC SURGERY      OVARIAN CYST REMOVAL  1975    PELVIC LAPAROSCOPY      ovaries (x10)     PLEURAL SCARIFICATION  1967    SHOULDER OPEN ROTATOR CUFF REPAIR Left 2008    TONSILLECTOMY      VAGINAL PROLAPSE REPAIR         Current Outpatient Medications:     albuterol (VENTOLIN HFA) 90 mcg/act inhaler, inhale 2 puff by inhalation route  every 4 - 6 hours as needed, Disp: , Rfl:     ALPRAZolam (XANAX PO), Xanax, Disp: , Rfl:     amLODIPine (NORVASC) 5 mg tablet, Take 1 tablet (5 mg total) by mouth daily, Disp: 90 tablet, Rfl: 1    baclofen 10 mg tablet, Take 1 tablet (10 mg total) by mouth 3 (three) times a day, Disp: 270 tablet, Rfl: 0    cetirizine (ZyrTEC) 10 mg tablet, Take 0 5 tablets (5 mg total) by mouth daily, Disp: 30 tablet, Rfl: 3    cimetidine (TAGAMET) 200 mg tablet, Take 1 tablet (200 mg total) by mouth 2 (two) times a day, Disp: 60 tablet, Rfl: 3    diphenhydrAMINE (BENADRYL ALLERGY) 25 mg capsule, Take by mouth, Disp: , Rfl:     HYDROcodone-acetaminophen (NORCO)  mg per tablet, Take 4-5 tablets daily PRN, Disp: 130 tablet, Rfl: 0    Lancets Misc  (ACCU-CHEK FASTCLIX LANCET) KIT, 3 (three) times a day, Disp: , Rfl:     levothyroxine 25 mcg tablet, TAKE 1 TABLET BY MOUTH EVERY MORNING ON SATURDAY, SUNDAY, TUESDAY AND THURSDAY, Disp: 30 tablet, Rfl: 2    levothyroxine 50 mcg tablet, TAKE 1 TABLET BY ORAL ROUTE EVERY MORNING ON MONDAY, WEDNESDAY AND FRIDAY, Disp: 30 tablet, Rfl: 2    losartan (COZAAR) 25 mg tablet, TAKE 1 TABLET BY MOUTH EVERY DAY, Disp: 90 tablet, Rfl: 1    MICROLET LANCETS MISC, Microlet Lancet, Disp: , Rfl:     ONETOUCH VERIO test strip, TEST TWICE A DAY, Disp: , Rfl:     pantoprazole (PROTONIX) 40 mg tablet, TAKE 1 TABLET BY MOUTH TWICE A DAY, Disp: 180 tablet, Rfl: 1    pregabalin (LYRICA) 150 mg capsule, Take 1 capsule (150 mg total) by mouth 3 (three) times a day, Disp: 270 capsule, Rfl: 0    sucralfate (CARAFATE) 1 g tablet, TAKE 1 TABLET BY MOUTH 4 TIMES EVERY DAY ON AN EMPTY STOMACH 1 HOUR BEFORE MEALS AND AT BEDTIME, Disp: 120 tablet, Rfl: 0   traZODone (DESYREL) 50 mg tablet, TAKE 1 TABLET BY MOUTH EVERY DAY AT BEDTIME AS NEEDED, Disp: 30 tablet, Rfl: 0    meclizine (ANTIVERT) 12 5 MG tablet, Take 1 tablet (12 5 mg total) by mouth every 12 (twelve) hours as needed for dizziness (Patient not taking: Reported on 8/26/2020), Disp: 30 tablet, Rfl: 1    Current Facility-Administered Medications:     cyanocobalamin injection 1,000 mcg, 1,000 mcg, Intramuscular, Q30 Days, Aleksandar Celis MD, 1,000 mcg at 12/19/19 0605    cyanocobalamin injection 1,000 mcg, 1,000 mcg, Intramuscular, Q30 Days, Lai Willams MD, 1,000 mcg at 07/08/20 1036  Allergies   Allergen Reactions    Cephalosporins Hives    Erythromycin GI Intolerance    Fentanyl Itching     Occurred during surgery     Paxil [Paroxetine] Hives     After 2 weeks    Penicillins Itching    Pravastatin Myalgia    Prednisolone Itching    Statins Itching    Sulfa Antibiotics Diarrhea    Wellbutrin [Bupropion] Hives     After 2 weeks     Marzena's Wort Rash       Labs:  Lab Results   Component Value Date    K 3 2 (L) 07/29/2020    K 3 8 09/10/2019     (H) 07/29/2020     09/10/2019    CO2 26 07/29/2020    CO2 28 09/10/2019    BUN 14 07/29/2020    BUN 9 09/10/2019    CREATININE 0 69 07/29/2020    CALCIUM 9 1 07/29/2020    CALCIUM 8 9 09/10/2019     No results found for: CKTOTAL, CKMB, CKMBINDEX, TROPONINI  Lab Results   Component Value Date    WBC 3 60 (L) 07/29/2020    HGB 12 7 07/29/2020    HCT 37 1 07/29/2020    MCV 97 07/29/2020     07/29/2020     Lab Results   Component Value Date    TRIG 81 07/29/2020    TRIG 99 09/10/2019    HDL 64 07/29/2020    HDL 68 09/10/2019     Imaging:   Mri Shoulder Left Wo Contrast  Result Date: 8/5/2020  Narrative: MRI LEFT SHOULDER INDICATION:   Impression: 1  Postsurgical changes within the rotator cuff without evidence of re-tear 2  Mild subacromion subdeltoid bursitis 3  Mild long head biceps tendinosis and tenosynovitis 4    Moderate glenohumeral chondrosis Workstation performed: [id number]         [employer]  Result Date: 8/6/2020  Narrative: ULTRASOUND BILATERAL WRISTS (CARPAL TUNNEL SYNDROME PROTOCOL) TECHNIQUE:   Impression: RIGHT SIDE: Carpal tunnel syndrome ruled in based on marked abnormal CSA > 14 sq mm  There is a bifid right median nerve  LEFT SIDE: Findings suspicious for carpal tunnel syndrome based on increased cross sectional area of median nerve (CSA >/= 11 sq cm ) There is a bifid left median nerve and persistent left median artery  Workstation performed: [id number]     Imaging: All the studies were reviewed by myself     Tilt table 4/17/2019  Interpretation  No POTS  No orthostatic hypotension           Stress test, Exercise 4/9/2019  IMPRESSIONS: 1  Submaximal stress test with maximum heart rate only 77% of predicted heart rate  This sub maximal heart rate reduces the sensitivity of this test   2  Hypertensive response to exercise  3  Negative for symptoms of exertional angina pectoris or electrocardiographic changes of ischemia at the heart rate achieved  Diagnostic sensitivity was limited by submaximal stress            48 HR Holter monitor 2/26/2019  IMPRESSION:  1  The patient had predominantly sinus rhythm  2  Heart rate varied from 38 bpm to 107 bpm   The patients average heart rate was 55 bpm     3  The patient had a holter monitor tracing done for 47 hours and 59 minutes  4  The patient had 7 single ventricular ectopic activity versus Vibha Phenomenon  5  The patient had 382 or 0 2% supraventricular ectopy     6  There were 5 atrial runs with the longest being 11 beats and the fastest being 135 beats per minute     7  There were 12 atrial pairs and 9 episodes of atrial bigeminy and 9 episodes of atrial trigeminy  8  The longest R/R interval was 2 0 seconds  9  No other major arrhythmia was noted  10   The diary made 3 comments of left arm pain and left-sided chest pain   During each episode there was a PAC but no ST change   There were other times in which patient had PACs and made no comment making it unlikely that there was a relationship between the chest pain and the PAC              Echo 2/26/2019  SUMMARY:  1  Normal sinus rhythm  2  Excellent technical quality     LEFT VENTRICLE:  Normal left ventricular systolic function, EF estimated 55-60 percent  Normal left ventricular cavity size  Normal left ventricular wall thickness  Normal left ventricular wall motion  Normal left ventricular diastolic function and filling  Pressures             Review of Systems:  Review of Systems   All other systems reviewed and are negative  As described in my history of present illness        Physical Exam:  Physical Exam  Vitals signs reviewed  Constitutional:       Appearance: Normal appearance  She is well-developed  Comments: Not in any distress at the current time    The patient did have a fall in February  She sustained left shoulder injury, right hip injury and right knee injury, lower back injury   HENT:      Head: Normocephalic and atraumatic  Right Ear: External ear normal       Left Ear: External ear normal       Nose: Nose normal       Mouth/Throat:      Pharynx: Uvula midline  Eyes:      General: Lids are normal       Extraocular Movements: Extraocular movements intact  Conjunctiva/sclera: Conjunctivae normal       Pupils: Pupils are equal, round, and reactive to light  Comments: No pallor  No cyanosis  No icterus   Neck:      Musculoskeletal: Normal range of motion  Thyroid: No thyromegaly  Vascular: No carotid bruit or JVD  Trachea: Trachea normal       Comments: No jugular lymphadenopathy  Short thick neck  Cardiovascular:      Rate and Rhythm: Normal rate and regular rhythm  Chest Wall: PMI is not displaced  Pulses: Normal pulses  Heart sounds: Normal heart sounds, S1 normal and S2 normal  No murmur  No friction rub  No gallop  No S3 or S4 sounds      Pulmonary: Effort: Pulmonary effort is normal  No accessory muscle usage or respiratory distress  Breath sounds: Normal breath sounds  No decreased breath sounds, wheezing, rhonchi or rales  Chest:      Chest wall: No tenderness  Abdominal:      General: Bowel sounds are normal  There is no distension  Palpations: Abdomen is soft  There is no hepatomegaly, splenomegaly or mass  Tenderness: There is no abdominal tenderness  Musculoskeletal: Normal range of motion  General: No swelling, tenderness or deformity  Right lower leg: No edema  Left lower leg: No edema  Lymphadenopathy:      Cervical: No cervical adenopathy  Skin:     Findings: No abrasion, erythema, lesion or rash  Nails: There is no clubbing  Neurological:      Mental Status: She is alert and oriented to person, place, and time  Comments: Facial symmetry is retained  Extraocular movements are retained  Head neck tongue and palate movement are retained and symmetric   Psychiatric:         Speech: Speech normal          Behavior: Behavior normal          Thought Content:  Thought content normal          Discussion/Summary:  1  Exertional intolerance  Dizziness  Orthostatic lightheadedness  Fatigue     The patient is complaining of symptoms for at least the last 2 years  There has been progressive decline in physical activity  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  After 5 hours of work when she is back home, she is so fatigued as unwilling to move around     She reports multiple hospital or physician office visits, where heart rate was in the 40s        A summary of her testing is as follows  Till study -  there is no evidence of POTS, vasovagal syncope  Holter study -  Average heart rate is 55, minimum heart rate during sleeping is 38, no pauses greater than 2 seconds     At the current time she does not meet criteria for a pacemaker  She is advised to follow up with any new worsening symptoms    Only if there are new symptoms, and she meets guidelines for pacemaker will proceed with the same        2   Chest pain  The chest pain is not suggestive of angina  She has had it for close to 5 years  It is episode a week  There is no obvious precipitating factor  Patient can walk and the pain does not worsen  Pain usually is resolved by 5-10 minutes

## 2020-08-27 ENCOUNTER — TELEPHONE (OUTPATIENT)
Dept: HEMATOLOGY ONCOLOGY | Facility: CLINIC | Age: 70
End: 2020-08-27

## 2020-08-27 ENCOUNTER — OFFICE VISIT (OUTPATIENT)
Dept: HEMATOLOGY ONCOLOGY | Facility: CLINIC | Age: 70
End: 2020-08-27
Payer: COMMERCIAL

## 2020-08-27 VITALS
WEIGHT: 165 LBS | SYSTOLIC BLOOD PRESSURE: 146 MMHG | RESPIRATION RATE: 16 BRPM | BODY MASS INDEX: 27.49 KG/M2 | TEMPERATURE: 98.2 F | HEART RATE: 48 BPM | DIASTOLIC BLOOD PRESSURE: 82 MMHG | HEIGHT: 65 IN

## 2020-08-27 DIAGNOSIS — D50.8 IRON DEFICIENCY ANEMIA FOLLOWING BARIATRIC SURGERY: ICD-10-CM

## 2020-08-27 DIAGNOSIS — D47.2 MGUS (MONOCLONAL GAMMOPATHY OF UNKNOWN SIGNIFICANCE): Primary | ICD-10-CM

## 2020-08-27 DIAGNOSIS — K95.89 IRON DEFICIENCY ANEMIA FOLLOWING BARIATRIC SURGERY: ICD-10-CM

## 2020-08-27 PROCEDURE — 4004F PT TOBACCO SCREEN RCVD TLK: CPT | Performed by: NURSE PRACTITIONER

## 2020-08-27 PROCEDURE — 3044F HG A1C LEVEL LT 7.0%: CPT | Performed by: NURSE PRACTITIONER

## 2020-08-27 PROCEDURE — 3008F BODY MASS INDEX DOCD: CPT | Performed by: INTERNAL MEDICINE

## 2020-08-27 PROCEDURE — 3008F BODY MASS INDEX DOCD: CPT | Performed by: NURSE PRACTITIONER

## 2020-08-27 PROCEDURE — 4040F PNEUMOC VAC/ADMIN/RCVD: CPT | Performed by: NURSE PRACTITIONER

## 2020-08-27 PROCEDURE — 99214 OFFICE O/P EST MOD 30 MIN: CPT | Performed by: NURSE PRACTITIONER

## 2020-08-27 PROCEDURE — 3079F DIAST BP 80-89 MM HG: CPT | Performed by: NURSE PRACTITIONER

## 2020-08-27 PROCEDURE — 1160F RVW MEDS BY RX/DR IN RCRD: CPT | Performed by: NURSE PRACTITIONER

## 2020-08-27 PROCEDURE — 3077F SYST BP >= 140 MM HG: CPT | Performed by: NURSE PRACTITIONER

## 2020-08-27 NOTE — PROGRESS NOTES
[address] [facility]ATOLOGY [facility] SPECIAL[facility] [id number]  [phone]  Hematology Ambulatory [address], 1950, [id number]  8/27/2020    Assessment/Plan:    1  MGUS (monoclonal gammopathy of unknown significance)   Patient is a 66-year-old female with a history of IgA MGUS since November 2009 on observation  We reviewed her most recent labs which revealed a monoclonal gammopathy as IgA kappa  Her kappa lambda ratio was 1 94 slightly elevated than it was in February 2020  We will continue to observe through labs every 6 months     - CBC and differential; Future  - Comprehensive metabolic panel; Future  - IgG, IgA, IgM; Future  - Immunoglobulin free LT chains blood; Future  - Protein electrophoresis, serum; Future  - Iron Panel (Includes Ferritin, Iron Sat%, Iron, and TIBC); Future    2  Iron deficiency anemia following bariatric surgery   Patient has a history of iron deficiency anemia secondary to bariatric surgery  We reviewed her recent iron panel her saturation is 21% and her ferritin level is 19  We discussed potential use for parenteral iron as her last iron infusion was in April 2019  At this time patient would like to defer iron supplementation  We will continue to monitor her iron levels  Patient is undergoing physical therapy due to an accident she had earlier in the year  I will request labs in 6 months and see her shortly after  Patient verbalized understanding is in agreement with the plan  - CBC and differential; Future  - Iron Panel (Includes Ferritin, Iron Sat%, Iron, and TIBC); Future      The patient is scheduled for follow-up in approximately 6 months with Dr Garth Goins or me  Patient  voiced agreement and understanding to the above  Patient  knows to call the Hematology/Oncology office with any questions and concerns regarding the above      Barrier(s) to care: None  The patient is able to self care   ------------------------------------------------------------------------------------------------------    Sheldon Hampton of Systems   Constitutional: Negative for activity change, appetite change, fatigue, fever and unexpected weight change  Respiratory: Negative for cough and shortness of breath  Cardiovascular: Negative for chest pain and leg swelling  Gastrointestinal: Negative for abdominal pain, constipation, diarrhea and nausea  Endocrine: Negative for cold intolerance and heat intolerance  Musculoskeletal: Positive for back pain  Negative for arthralgias and myalgias  Skin: Negative  Neurological: Negative for dizziness, weakness and headaches  Hematological: Negative for adenopathy  Does not bruise/bleed easily         Patient Active Problem List   Diagnosis    Chronic pain syndrome    Cervical radiculopathy    Myofascial pain syndrome    Spondylosis of cervical region without myelopathy or radiculopathy    Fibromyalgia    Diabetic peripheral neuropathy (HCC)    Long-term current use of opiate analgesic    MGUS (monoclonal gammopathy of unknown significance)    Iron deficiency anemia following bariatric surgery    Light headedness    Uncomplicated opioid dependence (HCC)    [facility] arthritis [facility]    Pulmonary emphysema ([facility] )    Primary osteoarthritis of right knee    Pseudogout of left knee    Lumbar radiculitis    Chronic bilateral low back pain with bilateral sciatica    Rotator cuff syndrome, left    Left shoulder pain    Dizziness    Other fatigue       Past Medical History:   Diagnosis Date    Anemia     Anxiety     hx of panic attacks (now under control)     Arthritis     Asthma     resolved (no problems in a decade)     Baker's cyst of knee     Bronchitis     Bunion, left foot     Cervical pain     Chronic GERD     Chronic pain disorder     Depression     [facility], [facility]     Diabetic peripheral neuropathy ([facility]     Endometriosis     Fibromyalgia     Hyperlipidemia     Hypertension     Joint pain     Low back pain     Lung nodules     Macular degeneration     Pulmonary emphysema ([address] ) 1/16/2020    Spondylosis        Past Surgical History:   Procedure Laterality Date    BACK SURGERY  1996    L5, S1- laser surgery fusion of c5 and c6     BREAST LUMPECTOMY Right     CHOLECYSTECTOMY  2004    FOOT SURGERY Left 2005    fusion     GASTRIC BYPASS  2010    Dr Cecy jason uterus     KNEE ARTHROSCOPY      LAMINECTOMY      ORTHOPEDIC SURGERY      OVARIAN CYST REMOVAL  1975    PELVIC LAPAROSCOPY      ovaries (x10)     PLEURAL SCARIFICATION  1967    SHOULDER OPEN ROTATOR CUFF REPAIR Left 2008    TONSILLECTOMY      VAGINAL PROLAPSE REPAIR         Family History   Problem Relation Age of Onset    Hypertension Mother     Lung cancer Mother     Hypertension Father     Heart disease Father     Heart attack Father        Social History     Socioeconomic History    Marital status:      Spouse name: None    Number of children: None    Years of education: None    Highest education level: None   Occupational History    None   Social Needs    Financial resource strain: Not hard at all   [place] insecurity     Worry: Never true     Inability: Never true    Transportation needs     Medical: No     Non-medical: No   Tobacco Use    Smoking status: Current Every Day Smoker     Packs/day: 0 50     Years: 40 00     Pack years: 20 00    Smokeless tobacco: Current User   Substance and Sexual Activity    Alcohol use: Not Currently     Alcohol/week: 1 0 standard drinks     Types: 1 Shots of liquor per week     Comment: rarely    Drug use: No    Sexual activity: Not Currently     Partners: Male   Lifestyle    Physical activity     Days per week: 0 days     Minutes per session: 0 min    Stress: Not at all   Relationships    Social connections     Talks on phone: Patient refused     Gets together: Patient refused     Attends Zoroastrianism service: Patient refused     Active member of club or organization: Patient refused     Attends meetings of clubs or organizations: Patient refused     Relationship status: Patient refused    Intimate partner violence     Fear of current or ex partner: No     Emotionally abused: No     Physically abused: No     Forced sexual activity: No   Other Topics Concern    None   Social History Narrative    None         Current Outpatient Medications:     amLODIPine (NORVASC) 5 mg tablet, Take 1 tablet (5 mg total) by mouth daily, Disp: 90 tablet, Rfl: 1    baclofen 10 mg tablet, Take 1 tablet (10 mg total) by mouth 3 (three) times a day, Disp: 270 tablet, Rfl: 0    cetirizine (ZyrTEC) 10 mg tablet, Take 0 5 tablets (5 mg total) by mouth daily, Disp: 30 tablet, Rfl: 3    cimetidine (TAGAMET) 200 mg tablet, Take 1 tablet (200 mg total) by mouth 2 (two) times a day, Disp: 60 tablet, Rfl: 3    diphenhydrAMINE (BENADRYL ALLERGY) 25 mg capsule, Take by mouth, Disp: , Rfl:     HYDROcodone-acetaminophen (NORCO)  mg per tablet, Take 4-5 tablets daily PRN, Disp: 130 tablet, Rfl: 0    Lancets Misc  (ACCU-CHEK FASTCLIX LANCET) KIT, 3 (three) times a day, Disp: , Rfl:     levothyroxine 25 mcg tablet, TAKE 1 TABLET BY MOUTH EVERY MORNING ON SATURDAY, SUNDAY, TUESDAY AND THURSDAY, Disp: 30 tablet, Rfl: 2    levothyroxine 50 mcg tablet, TAKE 1 TABLET BY ORAL ROUTE EVERY MORNING ON MONDAY, WEDNESDAY AND FRIDAY, Disp: 30 tablet, Rfl: 2    losartan (COZAAR) 25 mg tablet, TAKE 1 TABLET BY MOUTH EVERY DAY, Disp: 90 tablet, Rfl: 1    MICROLET LANCETS MISC, Microlet Lancet, Disp: , Rfl:     ONETOUCH VERIO test strip, TEST TWICE A DAY, Disp: , Rfl:     pantoprazole (PROTONIX) 40 mg tablet, TAKE 1 TABLET BY MOUTH TWICE A DAY, Disp: 180 tablet, Rfl: 1    pregabalin (LYRICA) 150 mg capsule, Take 1 capsule (150 mg total) by mouth 3 (three) times a day, Disp: 270 capsule, Rfl: 0   sucralfate (CARAFATE) 1 g tablet, TAKE 1 TABLET BY MOUTH 4 TIMES EVERY DAY ON AN EMPTY STOMACH 1 HOUR BEFORE MEALS AND AT BEDTIME, Disp: 120 tablet, Rfl: 0    traZODone (DESYREL) 50 mg tablet, TAKE 1 TABLET BY MOUTH EVERY DAY AT BEDTIME AS NEEDED, Disp: 30 tablet, Rfl: 0    albuterol (VENTOLIN HFA) 90 mcg/act inhaler, inhale 2 puff by inhalation route  every 4 - 6 hours as needed, Disp: , Rfl:     ALPRAZolam (XANAX PO), Xanax, Disp: , Rfl:     Current Facility-Administered Medications:     cyanocobalamin injection 1,000 mcg, 1,000 mcg, Intramuscular, Q30 Days, Aleksandar Edmondson MD, 1,000 mcg at 12/19/19 0869    cyanocobalamin injection 1,000 mcg, 1,000 mcg, Intramuscular, Q30 Days, Shasta East MD, 1,000 mcg at 07/08/20 1036    Allergies   Allergen Reactions    Cephalosporins Hives    Erythromycin GI Intolerance    Fentanyl Itching     Occurred during surgery     Paxil [Paroxetine] Hives     After 2 weeks    Penicillins Itching    Pravastatin Myalgia    Prednisolone Itching    Statins Itching    Sulfa Antibiotics Diarrhea    Wellbutrin [Bupropion] Hives     After 2 weeks     Marzena's Wort Rash       Objective:  /82 (BP Location: Right arm, Patient Position: Sitting, Cuff Size: Standard)   Pulse (!) 48   Temp 98 2 °F (36 8 °C)   Resp 16   Ht 5' 5" (1 651 m)   Wt 74 8 kg (165 lb)   LMP  (LMP Unknown)   BMI 27 46 kg/m²    Physical Exam  Constitutional:       Appearance: She is well-developed  HENT:      Head: Normocephalic and atraumatic  Eyes:      Conjunctiva/sclera: Conjunctivae normal       Pupils: Pupils are equal, round, and reactive to light  Neck:      Musculoskeletal: Normal range of motion and neck supple  Cardiovascular:      Rate and Rhythm: Normal rate and regular rhythm  Heart sounds: Normal heart sounds  No murmur  Pulmonary:      Effort: No respiratory distress  Breath sounds: Normal breath sounds     Abdominal:      General: Bowel sounds are normal  Palpations: Abdomen is soft  Musculoskeletal: Normal range of motion  Lymphadenopathy:      Cervical: No cervical adenopathy  Skin:     General: Skin is warm and dry  Capillary Refill: Capillary refill takes less than 2 seconds  Neurological:      Mental Status: She is alert and oriented to person, place, and time     Psychiatric:         Behavior: Behavior normal          Result Review  Labs:  Appointment on 07/29/2020   Component Date Value Ref Range Status    WBC 07/29/2020 3 60* 4 50 - 11 00 Thousand/uL Final    RBC 07/29/2020 3 82* 4 00 - 5 20 Million/uL Final    Hemoglobin 07/29/2020 12 7  12 0 - 16 0 g/dL Final    Hematocrit 07/29/2020 37 1  36 0 - 46 0 % Final    MCV 07/29/2020 97  80 - 100 fL Final    MCH 07/29/2020 33 3  26 0 - 34 0 pg Final    MCHC 07/29/2020 34 2  31 0 - 36 0 g/dL Final    RDW 07/29/2020 13 5  <15 3 % Final    MPV 07/29/2020 7 8* 8 9 - 12 7 fL Final    Platelets 02/82/8085 294  150 - 450 Thousands/uL Final    Neutrophils Relative 07/29/2020 66* 45 - 65 % Final    Lymphocytes Relative 07/29/2020 24* 25 - 45 % Final    Monocytes Relative 07/29/2020 7  1 - 10 % Final    Eosinophils Relative 07/29/2020 3  0 - 6 % Final    Basophils Relative 07/29/2020 1  0 - 1 % Final    Neutrophils Absolute 07/29/2020 2 40  1 80 - 7 80 Thousands/µL Final    Lymphocytes Absolute 07/29/2020 0 80  0 50 - 4 00 Thousands/µL Final    Monocytes Absolute 07/29/2020 0 30  0 20 - 0 90 Thousand/µL Final    Eosinophils Absolute 07/29/2020 0 10  0 00 - 0 40 Thousand/µL Final    Basophils Absolute 07/29/2020 0 00  0 00 - 0 10 Thousands/µL Final    Sodium 07/29/2020 140  137 - 147 mmol/L Final    Potassium 07/29/2020 3 2* 3 6 - 5 0 mmol/L Final    Chloride 07/29/2020 109* 97 - 108 mmol/L Final    CO2 07/29/2020 26  22 - 30 mmol/L Final    ANION GAP 07/29/2020 5  5 - 14 mmol/L Final    BUN 07/29/2020 14  5 - 25 mg/dL Final    Creatinine 07/29/2020 0 69  0 60 - 1 20 mg/dL Final Standardized to IDMS reference method    Glucose, Fasting 07/29/2020 84  70 - 99 mg/dL Final      Specimen collection should occur prior to Sulfasalazine administration due to the potential for falsely depressed results  Specimen collection should occur prior to Sulfapyridine administration due to the potential for falsely elevated results   Calcium 07/29/2020 9 1  8 4 - 10 2 mg/dL Final    AST 07/29/2020 31  14 - 36 U/L Final      Specimen collection should occur prior to Sulfasalazine administration due to the potential for falsely depressed results   ALT 07/29/2020 20  9 - 52 U/L Final      Specimen collection should occur prior to Sulfasalazine administration due to the potential for falsely depressed results       Alkaline Phosphatase 07/29/2020 84  43 - 122 U/L Final    Total Protein 07/29/2020 6 5  5 9 - 8 4 g/dL Final    Albumin 07/29/2020 3 6  3 0 - 5 2 g/dL Final    Total Bilirubin 07/29/2020 0 40  <1 30 mg/dL Final    eGFR 07/29/2020 88  >60 ml/min/1 73sq m Final    IGA 07/29/2020 236 0  70 0 - 400 0 mg/dL Final    IGG 07/29/2020 580 0* 700 0-1,600 0 mg/dL Final    IGM 07/29/2020 92 0  40 0 - 230 0 mg/dL Final    Ig Kappa Free Light Chain 07/29/2020 38 5* 3 3 - 19 4 mg/L Final    Ig Lambda Free Light Chain 07/29/2020 19 8  5 7 - 26 3 mg/L Final    Kappa/Lambda FluidC Ratio 07/29/2020 1 94* 0 26 - 1 65 Final    A/G Ratio 07/29/2020 1 35  1 10 - 1 80 Final    Albumin Electrophoresis 07/29/2020 57 4  52 0 - 65 0 % Final    Albumin CONC 07/29/2020 3 50  3 50 - 5 00 g/dl Final    Alpha 1 07/29/2020 6 4* 2 5 - 5 0 % Final    ALPHA 1 CONC 07/29/2020 0 39  0 10 - 0 40 g/dL Final    Alpha 2 07/29/2020 12 1  7 0 - 13 0 % Final    ALPHA 2 CONC 07/29/2020 0 74  0 40 - 1 20 g/dL Final    Beta-1 07/29/2020 7 2  5 0 - 13 0 % Final    BETA 1 CONC 07/29/2020 0 44  0 40 - 0 80 g/dL Final    Beta-2 07/29/2020 4 9  2 0 - 8 0 % Final    BETA 2 CONC 07/29/2020 0 30  0 20 - 0 50 g/dL Final    Gamma Globulin 07/29/2020 12 0  12 0 - 22 0 % Final    GAMMA CONC 07/29/2020 0 73  0 50 - 1 60 g/dL Final    M Peak ID 1 07/29/2020 2 20  % Final    M PEAK 1 CONC 07/29/2020 0 13  g/dL Final    Total Protein 07/29/2020 6 1* 6 4 - 8 2 g/dL Final    SPEP Interpretation 07/29/2020 The SPEP shows a monoclonal peak in the gamma region  Immunofixation to be performed  Reviewed by: Erlinda Gomez MD ([id number]) **Electronic Signature**   Final    Iron Saturation 07/29/2020 21  % Final    TIBC 07/29/2020 312  250 - 450 ug/dL Final    Iron 07/29/2020 66  50 - 170 ug/dL Final      Patients treated with metal-binding drugs (ie  Deferoxamine) may have depressed iron values   Ferritin 07/29/2020 19  8 - 388 ng/mL Final    Methylmalonic Acid, S 07/29/2020 159  0 - 378 nmol/L Final    Disclaimer: 07/29/2020 Comment   Final    This test was developed and its performance characteristics  determined by [facility]  It has not been cleared or approved  by the Food and Drug Administration   Cholesterol 07/29/2020 203* <200 mg/dL Final      Cholesterol:       Desirable         <200 mg/dl       Borderline         200-239 mg/dl       High              >239           Triglycerides 07/29/2020 81  <150 mg/dL Final      Triglyceride:     Normal          <150 mg/dl     Borderline High 150-199 mg/dl     High            200-499 mg/dl        Very High       >499 mg/dl    Specimen collection should occur prior to N-Acetylcysteine or Metamizole administration due to the potential for falsely depressed results   HDL, Direct 07/29/2020 64  >=40 mg/dL Final      HDL Cholesterol:       Low     <41 mg/dL  Specimen collection should occur prior to Metamizole administration due to the potential for falsley depressed results      LDL Calculated 07/29/2020 123  <130 mg/dL Final      LDL Cholesterol:     Optimal           <100 mg/dl     Near Optimal      100-129 mg/dl     Above Optimal       Borderline High 130-159 mg/dl       High            160-189 mg/dl       Very High       >189 mg/dl         This screening LDL is a calculated result  It does not have the accuracy of the Direct Measured LDL in the monitoring of patients with hyperlipidemia and/or statin therapy  Direct Measure LDL ([id number]) must be ordered separately in these patients   Non-HDL-Chol (CHOL-HDL) 07/29/2020 139  mg/dl Final    Magnesium 07/29/2020 2 2  1 6 - 2 3 mg/dL Final    Free T4 07/29/2020 1 00  0 76 - 1 46 ng/dL Final      Specimen collection should occur prior to Sulfasalazine administration due to the potential for falsely elevated results   TSH 3RD GENERATON 07/29/2020 1 990  0 465 - 4 680 uIU/mL Final      The recommended reference ranges for TSH during pregnancy are as follows:   First trimester 0 1 to 2 5 uIU/mL   Second trimester  0 2 to 3 0 uIU/mL   Third trimester 0 3 to 3 0 uIU/m    Note: Normal ranges may not apply to patients who are transgender, non-binary, or whose legal sex, sex at birth, and gender identity differ  Using supplements with high doses of biotin 20 to more than 300 times greater than the adequate daily intake for adults of 30 mcg/day as established by the [place] [employer], can cause falsely depress results   Vitamin A 07/29/2020 14 9* 22 0 - 69 5 ug/dL Final    Reference intervals for vitamin A determined from [facility] internal  studies  Individuals with vitamin A less than 20 ug/dL are considered  vitamin A deficient and those with serum concentrations less than  10 ug/dL are considered severely deficient  This test was developed and its performance characteristics  determined by [facility]  It has not been cleared or approved  by the Food and Drug Administration      Vitamin E(Alpha Tocopherol) 07/29/2020 12 1  9 0 - 29 0 mg/L Final    Vitamin E(Gamma Tocopherol) 07/29/2020 1 8  0 5 - 4 9 mg/L Final    Reference intervals for alpha and gamma-tocopherol determined from  [address], [id number]  Individuals with alpha-tocopherol levels less than 5 0 mg/L are  considered vitamin E deficient   Vitamin B-12 07/29/2020 323  100 - 900 pg/mL Final    Vitamin B6 07/29/2020 3 8  2 0 - 32 8 ug/L Final    Hemoglobin A1C 07/29/2020 5 2  Normal 3 8-5 6%; PreDiabetic 5 7-6 4%; Diabetic >=6 5%; Glycemic control for adults with diabetes <7 0% % Final    EAG 07/29/2020 103  mg/dl Final    Vit D, 25-Hydroxy 07/29/2020 17 4* 30 0 - 100 0 ng/mL Final    THYROID MICROSOMAL ANTIBODY 07/29/2020 <9  0 - 34 IU/mL Final    Thyroglobulin Ab 07/29/2020 <1 0  0 0 - 0 9 IU/mL Final    Thyroglobulin Antibody measured by [facility]    Immunofixation Interpretation 07/29/2020 Serum immunofixation shows a monoclonal gammopathy identified as IgA kappa (0 13 g/dL)  Reviewed by: Lindsey Gomez MD  ([id number])  **Electronic Signature**   Final         Please note: This report has been generated by a voice recognition software system  Therefore there may be syntax, spelling, and/or grammatical errors  Please call if you have any questions

## 2020-08-31 ENCOUNTER — TELEPHONE (OUTPATIENT)
Dept: NEUROSURGERY | Facility: CLINIC | Age: 70
End: 2020-08-31

## 2020-08-31 ENCOUNTER — APPOINTMENT (OUTPATIENT)
Dept: LAB | Facility: HOSPITAL | Age: 70
End: 2020-08-31
Payer: COMMERCIAL

## 2020-08-31 DIAGNOSIS — Z01.812 PRE-PROCEDURAL LABORATORY EXAMINATION: ICD-10-CM

## 2020-08-31 LAB
BUN SERPL-MCNC: 11 MG/DL (ref 5–25)
CREAT SERPL-MCNC: 0.79 MG/DL (ref 0.6–1.2)
GFR SERPL CREATININE-BSD FRML MDRD: 76 ML/MIN/1.73SQ M

## 2020-08-31 PROCEDURE — 36415 COLL VENOUS BLD VENIPUNCTURE: CPT

## 2020-08-31 PROCEDURE — 84520 ASSAY OF UREA NITROGEN: CPT

## 2020-08-31 PROCEDURE — 82565 ASSAY OF CREATININE: CPT

## 2020-09-01 ENCOUNTER — CONSULT (OUTPATIENT)
Dept: NEUROSURGERY | Facility: CLINIC | Age: 70
End: 2020-09-01
Payer: OTHER MISCELLANEOUS

## 2020-09-01 VITALS
HEIGHT: 65 IN | RESPIRATION RATE: 16 BRPM | HEART RATE: 52 BPM | BODY MASS INDEX: 27.49 KG/M2 | DIASTOLIC BLOOD PRESSURE: 62 MMHG | TEMPERATURE: 98.1 F | SYSTOLIC BLOOD PRESSURE: 143 MMHG | WEIGHT: 165 LBS

## 2020-09-01 DIAGNOSIS — G89.29 CHRONIC RIGHT-SIDED LOW BACK PAIN WITH RIGHT-SIDED SCIATICA: ICD-10-CM

## 2020-09-01 DIAGNOSIS — M54.16 LUMBAR RADICULOPATHY: ICD-10-CM

## 2020-09-01 DIAGNOSIS — G89.4 CHRONIC PAIN SYNDROME: Primary | ICD-10-CM

## 2020-09-01 DIAGNOSIS — R20.2 PARESTHESIA OF BILATERAL LEGS: ICD-10-CM

## 2020-09-01 DIAGNOSIS — M54.41 CHRONIC BILATERAL LOW BACK PAIN WITH BILATERAL SCIATICA: ICD-10-CM

## 2020-09-01 DIAGNOSIS — E11.42 DIABETIC PERIPHERAL NEUROPATHY (HCC): ICD-10-CM

## 2020-09-01 DIAGNOSIS — G89.29 CHRONIC BILATERAL LOW BACK PAIN WITH BILATERAL SCIATICA: ICD-10-CM

## 2020-09-01 DIAGNOSIS — M54.42 CHRONIC BILATERAL LOW BACK PAIN WITH BILATERAL SCIATICA: ICD-10-CM

## 2020-09-01 DIAGNOSIS — M54.41 CHRONIC RIGHT-SIDED LOW BACK PAIN WITH RIGHT-SIDED SCIATICA: ICD-10-CM

## 2020-09-01 PROCEDURE — 99244 OFF/OP CNSLTJ NEW/EST MOD 40: CPT | Performed by: NURSE PRACTITIONER

## 2020-09-01 NOTE — ASSESSMENT & PLAN NOTE
Lab Results   Component Value Date    HGBA1C 5 2 07/29/2020   · As detailed in HPI  · EMG Bilateral Lower extremities   · Reports since gastric bypass surgery  April 2010 , weight loss for 340 lbs to 165 LBS has hypoglycemia , recurrent low blood sugar can go as low as in  the 40's

## 2020-09-01 NOTE — ASSESSMENT & PLAN NOTE
· As detailed in HPI    Imagining: MRI Lumbar  Reviewed in collaboration with Dr Kierra Steel surgical pathology appreciated, as per Dr Kierra Steel If she has sciatica she may be a good candidate for a lumbar inter body fusion  PLAN:  EMG: Bilateral lower extremities   RTO after completion of EMG, appointment snpx my schedule
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
## 2020-09-08 ENCOUNTER — OFFICE VISIT (OUTPATIENT)
Dept: FAMILY MEDICINE CLINIC | Facility: CLINIC | Age: 70
End: 2020-09-08
Payer: OTHER MISCELLANEOUS

## 2020-09-08 ENCOUNTER — OFFICE VISIT (OUTPATIENT)
Dept: FAMILY MEDICINE CLINIC | Facility: CLINIC | Age: 70
End: 2020-09-08
Payer: COMMERCIAL

## 2020-09-08 VITALS
TEMPERATURE: 98.5 F | OXYGEN SATURATION: 97 % | RESPIRATION RATE: 14 BRPM | HEART RATE: 70 BPM | BODY MASS INDEX: 27.72 KG/M2 | WEIGHT: 166.4 LBS | DIASTOLIC BLOOD PRESSURE: 76 MMHG | SYSTOLIC BLOOD PRESSURE: 136 MMHG | HEIGHT: 65 IN

## 2020-09-08 VITALS
HEIGHT: 65 IN | RESPIRATION RATE: 14 BRPM | OXYGEN SATURATION: 97 % | DIASTOLIC BLOOD PRESSURE: 76 MMHG | WEIGHT: 166.3 LBS | HEART RATE: 52 BPM | BODY MASS INDEX: 27.71 KG/M2 | SYSTOLIC BLOOD PRESSURE: 136 MMHG | TEMPERATURE: 98.5 F

## 2020-09-08 DIAGNOSIS — M54.41 CHRONIC BILATERAL LOW BACK PAIN WITH BILATERAL SCIATICA: Primary | ICD-10-CM

## 2020-09-08 DIAGNOSIS — G89.29 CHRONIC BILATERAL LOW BACK PAIN WITH BILATERAL SCIATICA: Primary | ICD-10-CM

## 2020-09-08 DIAGNOSIS — E53.8 LOW VITAMIN B12 LEVEL: ICD-10-CM
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
## 2020-09-08 DIAGNOSIS — R79.89 LOW VITAMIN D LEVEL: ICD-10-CM

## 2020-09-08 DIAGNOSIS — R79.89 LOW SERUM VITAMIN A: ICD-10-CM

## 2020-09-08 DIAGNOSIS — M25.561 ACUTE PAIN OF RIGHT KNEE: ICD-10-CM

## 2020-09-08 DIAGNOSIS — K21.9 GASTROESOPHAGEAL REFLUX DISEASE WITHOUT ESOPHAGITIS: ICD-10-CM

## 2020-09-08 DIAGNOSIS — M54.16 LUMBAR RADICULITIS: ICD-10-CM

## 2020-09-08 DIAGNOSIS — E87.6 HYPOKALEMIA: ICD-10-CM

## 2020-09-08 DIAGNOSIS — M54.42 CHRONIC BILATERAL LOW BACK PAIN WITH BILATERAL SCIATICA: Primary | ICD-10-CM

## 2020-09-08 DIAGNOSIS — R79.89 LOW SERUM VITAMIN A: Primary | ICD-10-CM

## 2020-09-08 PROBLEM — E11.42 DIABETIC PERIPHERAL NEUROPATHY (HCC): Status: RESOLVED | Noted: 2018-02-14 | Resolved: 2020-09-08

## 2020-09-08 PROCEDURE — 99214 OFFICE O/P EST MOD 30 MIN: CPT | Performed by: INTERNAL MEDICINE

## 2020-09-08 PROCEDURE — 96372 THER/PROPH/DIAG INJ SC/IM: CPT | Performed by: INTERNAL MEDICINE

## 2020-09-08 RX ORDER — MULTIVITAMIN WITH IRON
8000 TABLET ORAL DAILY
Qty: 90 CAPSULE | Refills: 1 | Status: SHIPPED | OUTPATIENT
Start: 2020-09-08 | End: 2020-09-09

## 2020-09-08 RX ORDER — ERGOCALCIFEROL 1.25 MG/1
50000 CAPSULE ORAL WEEKLY
Qty: 13 CAPSULE | Refills: 2 | Status: SHIPPED | OUTPATIENT
Start: 2020-09-08 | End: 2020-09-08 | Stop reason: SDUPTHER

## 2020-09-08 RX ORDER — MULTIVITAMIN WITH IRON
8000 TABLET ORAL DAILY
Qty: 90 CAPSULE | Refills: 1 | Status: SHIPPED | OUTPATIENT
Start: 2020-09-08 | End: 2021-06-14 | Stop reason: SDUPTHER

## 2020-09-08 RX ORDER — ERGOCALCIFEROL 1.25 MG/1
50000 CAPSULE ORAL WEEKLY
Qty: 13 CAPSULE | Refills: 2 | Status: SHIPPED | OUTPATIENT
Start: 2020-09-08 | End: 2021-10-25

## 2020-09-08 RX ORDER — CYANOCOBALAMIN 1000 UG/ML
1000 INJECTION INTRAMUSCULAR; SUBCUTANEOUS
Status: SHIPPED | OUTPATIENT
Start: 2020-09-08

## 2020-09-08 RX ORDER — PANTOPRAZOLE SODIUM 40 MG/1
40 TABLET, DELAYED RELEASE ORAL 2 TIMES DAILY
Qty: 180 TABLET | Refills: 1 | Status: SHIPPED | OUTPATIENT
Start: 2020-09-08 | End: 2021-04-17

## 2020-09-08 RX ORDER — MULTIVITAMIN WITH IRON
8000 TABLET ORAL DAILY
Qty: 90 CAPSULE | Refills: 1 | Status: SHIPPED | OUTPATIENT
Start: 2020-09-08 | End: 2020-09-08 | Stop reason: SDUPTHER

## 2020-09-08 RX ADMIN — CYANOCOBALAMIN 1000 MCG: 1000 INJECTION INTRAMUSCULAR; SUBCUTANEOUS at 12:10

## 2020-09-08 NOTE — PROGRESS NOTES
Assessment/Plan:         Diagnoses and all orders for this visit:    Chronic bilateral low back pain with bilateral sciatica  Which has gotten worse due to work injury,   Stay off work  Continue same  RTC in 3mos     Acute pain of right knee; as above,    Lumbar radiculitis; as above    Work related injury; which caused the above issues    Other orders  -     Cancel: POCT peak flow        Subjective:      Patient ID: Oliva Capellan is a 79 y o  female  79 y o lady is here for re check on work injury, she still is complaining of Pain and stiffness, and neuropathy as before, she is seeing pain Mangt,neurosurgeru,Orthopedic,    The following portions of the patient's history were reviewed and updated as appropriate: allergies, current medications, past family history, past medical history, past social history, past surgical history and problem list     Review of Systems   Constitutional: Negative for chills, fatigue and fever  HENT: Negative for congestion, facial swelling, sore throat, trouble swallowing and voice change  Eyes: Negative for pain, discharge and visual disturbance  Respiratory: Negative for cough, shortness of breath and wheezing  Cardiovascular: Negative for chest pain, palpitations and leg swelling  Gastrointestinal: Negative for abdominal pain, blood in stool, constipation, diarrhea and nausea  Endocrine: Negative for polydipsia, polyphagia and polyuria  Genitourinary: Negative for difficulty urinating, hematuria and urgency  Musculoskeletal: Positive for arthralgias, back pain and joint swelling  Negative for myalgias  Skin: Negative for rash  Neurological: Positive for numbness  Negative for dizziness, tremors, weakness and headaches  Hematological: Negative for adenopathy  Does not bruise/bleed easily  Psychiatric/Behavioral: Negative for dysphoric mood, sleep disturbance and suicidal ideas           Objective:      /76 (BP Location: Left arm, Patient Position: Sitting, Cuff Size: Standard)   Pulse 70   Temp 98 5 °F (36 9 °C) (Probe)   Resp 14   Ht 5' 5" (1 651 m)   Wt 75 5 kg (166 lb 6 4 oz)   LMP  (LMP Unknown)   SpO2 97%   BMI 27 69 kg/m²          Physical Exam  Constitutional:       General: She is not in acute distress  HENT:      Head: Normocephalic  Mouth/Throat:      Pharynx: No oropharyngeal exudate  Eyes:      General: No scleral icterus  Conjunctiva/sclera: Conjunctivae normal       Pupils: Pupils are equal, round, and reactive to light  Neck:      Musculoskeletal: Neck supple  Thyroid: No thyromegaly  Cardiovascular:      Rate and Rhythm: Normal rate and regular rhythm  Heart sounds: Normal heart sounds  No murmur  Pulmonary:      Effort: Pulmonary effort is normal  No respiratory distress  Breath sounds: Normal breath sounds  No wheezing or rales  Abdominal:      General: Bowel sounds are normal  There is no distension  Palpations: Abdomen is soft  Tenderness: There is no abdominal tenderness  There is no guarding or rebound  Musculoskeletal:         General: Tenderness present  Lymphadenopathy:      Cervical: No cervical adenopathy  Skin:     Coloration: Skin is not pale  Findings: No rash  Neurological:      Mental Status: She is alert and oriented to person, place, and time        Comments: Pt uses a cane to support gait

## 2020-09-08 NOTE — LETTER
September 8, 2020     Patient: Jose Hernandez   YOB: 1950   Date of Visit: 9/8/2020       To Whom it May Concern:    Ben Tucker is under my professional care  She was seen in my office on 9/8/2020  She is to be off work pending re:evaluation on 12/08/2020  If you have any questions or concerns, please don't hesitate to call           Sincerely,          Micaela Gutierrez MD        CC:

## 2020-09-08 NOTE — PROGRESS NOTES
Assessment/Plan:         Diagnoses and all orders for this visit:    Low serum vitamin A; start :  -     vitamin A 2400 MCG (8000 UT) capsule; Take 1 capsule (8,000 Units total) by mouth daily    Low vitamin D level; start :  -     ergocalciferol (VITAMIN D2) 50,000 units; Take 1 capsule (50,000 Units total) by mouth once a week    Hypokalemia; one banana daily  RTc in 1 mo w BMP    Low vitamin B12 level; Cyanocobalamine 1000 mcg IM now  And Monthly    Gastroesophageal reflux disease without esophagitis  -     pantoprazole (PROTONIX) 40 mg tablet; Take 1 tablet (40 mg total) by mouth 2 (two) times a day    RTC in 3 mos w Blood work    Subjective:      Patient ID: Gloria Yañez is a 79 y o  female  79 Y O lady is here for Regular check up, she still smokes, less than 10 Cigs per day, No New symptoms, recent blood work and med list reviewed w pt in detail    The following portions of the patient's history were reviewed and updated as appropriate: allergies, current medications, past family history, past medical history, past social history, past surgical history and problem list     Review of Systems   Constitutional: Negative for chills, fatigue and fever  HENT: Negative for congestion, facial swelling, sore throat, trouble swallowing and voice change  Eyes: Negative for pain, discharge and visual disturbance  Respiratory: Negative for cough, shortness of breath and wheezing  Cardiovascular: Negative for chest pain, palpitations and leg swelling  Gastrointestinal: Negative for abdominal pain, blood in stool, constipation, diarrhea and nausea  Endocrine: Negative for polydipsia, polyphagia and polyuria  Genitourinary: Negative for difficulty urinating, hematuria and urgency  Musculoskeletal: Negative for arthralgias and myalgias  Skin: Negative for rash  Neurological: Negative for dizziness, tremors, weakness and headaches  Hematological: Negative for adenopathy   Does not bruise/bleed easily  Psychiatric/Behavioral: Negative for dysphoric mood, sleep disturbance and suicidal ideas  Objective:      /76 (BP Location: Left arm, Patient Position: Sitting, Cuff Size: Standard)   Pulse (!) 52   Temp 98 5 °F (36 9 °C) (Probe)   Resp 14   Ht 5' 5" (1 651 m)   Wt 75 4 kg (166 lb 4 8 oz)   LMP  (LMP Unknown)   SpO2 97%   BMI 27 67 kg/m²          Physical Exam  Constitutional:       General: She is not in acute distress  HENT:      Head: Normocephalic  Mouth/Throat:      Pharynx: No oropharyngeal exudate  Eyes:      General: No scleral icterus  Conjunctiva/sclera: Conjunctivae normal       Pupils: Pupils are equal, round, and reactive to light  Neck:      Musculoskeletal: Neck supple  Thyroid: No thyromegaly  Cardiovascular:      Rate and Rhythm: Normal rate and regular rhythm  Heart sounds: Normal heart sounds  No murmur  Pulmonary:      Effort: Pulmonary effort is normal  No respiratory distress  Breath sounds: Normal breath sounds  No wheezing or rales  Abdominal:      General: Bowel sounds are normal  There is no distension  Palpations: Abdomen is soft  Tenderness: There is no abdominal tenderness  There is no guarding or rebound  Musculoskeletal:         General: Tenderness present  Lymphadenopathy:      Cervical: No cervical adenopathy  Skin:     Coloration: Skin is not pale  Findings: No rash  Neurological:      Mental Status: She is alert and oriented to person, place, and time  Sensory: Sensory deficit present        Comments: Pt uses a walker to support gait

## 2020-09-09 DIAGNOSIS — R79.89 LOW SERUM VITAMIN A: ICD-10-CM

## 2020-09-09 RX ORDER — MULTIVITAMIN WITH IRON
8000 TABLET ORAL DAILY
Qty: 90 CAPSULE | Refills: 1 | Status: SHIPPED | OUTPATIENT
Start: 2020-09-09 | End: 2021-03-11

## 2020-09-10 DIAGNOSIS — G47.00 INSOMNIA, UNSPECIFIED TYPE: ICD-10-CM

## 2020-09-10 RX ORDER — TRAZODONE HYDROCHLORIDE 50 MG/1
TABLET ORAL
Qty: 30 TABLET | Refills: 0 | Status: SHIPPED | OUTPATIENT
Start: 2020-09-10 | End: 2020-10-07

## 2020-09-16 ENCOUNTER — OFFICE VISIT (OUTPATIENT)
Dept: PAIN MEDICINE | Facility: MEDICAL CENTER | Age: 70
End: 2020-09-16
Payer: COMMERCIAL

## 2020-09-16 VITALS
SYSTOLIC BLOOD PRESSURE: 137 MMHG | WEIGHT: 167 LBS | HEIGHT: 65 IN | BODY MASS INDEX: 27.82 KG/M2 | DIASTOLIC BLOOD PRESSURE: 65 MMHG | HEART RATE: 55 BPM | TEMPERATURE: 97.5 F

## 2020-09-16 DIAGNOSIS — M79.18 MYOFASCIAL PAIN SYNDROME: ICD-10-CM

## 2020-09-16 DIAGNOSIS — G89.4 CHRONIC PAIN SYNDROME: ICD-10-CM

## 2020-09-16 DIAGNOSIS — Z79.891 LONG-TERM CURRENT USE OF OPIATE ANALGESIC: Primary | ICD-10-CM

## 2020-09-16 DIAGNOSIS — K27.9 PUD (PEPTIC ULCER DISEASE): ICD-10-CM

## 2020-09-16 DIAGNOSIS — M54.41 CHRONIC BILATERAL LOW BACK PAIN WITH BILATERAL SCIATICA: ICD-10-CM

## 2020-09-16 DIAGNOSIS — M54.12 CERVICAL RADICULOPATHY: ICD-10-CM

## 2020-09-16 DIAGNOSIS — G89.29 CHRONIC BILATERAL LOW BACK PAIN WITH RIGHT-SIDED SCIATICA: ICD-10-CM

## 2020-09-16 DIAGNOSIS — M54.42 CHRONIC BILATERAL LOW BACK PAIN WITH BILATERAL SCIATICA: ICD-10-CM

## 2020-09-16 DIAGNOSIS — M54.16 LUMBAR RADICULITIS: ICD-10-CM

## 2020-09-16 DIAGNOSIS — G89.29 CHRONIC BILATERAL LOW BACK PAIN WITH BILATERAL SCIATICA: ICD-10-CM

## 2020-09-16 DIAGNOSIS — F11.20 UNCOMPLICATED OPIOID DEPENDENCE (HCC): ICD-10-CM

## 2020-09-16 DIAGNOSIS — M54.41 CHRONIC BILATERAL LOW BACK PAIN WITH RIGHT-SIDED SCIATICA: ICD-10-CM

## 2020-09-16 PROCEDURE — 99214 OFFICE O/P EST MOD 30 MIN: CPT | Performed by: NURSE PRACTITIONER

## 2020-09-16 PROCEDURE — 80305 DRUG TEST PRSMV DIR OPT OBS: CPT | Performed by: NURSE PRACTITIONER

## 2020-09-16 PROCEDURE — 4004F PT TOBACCO SCREEN RCVD TLK: CPT | Performed by: NURSE PRACTITIONER

## 2020-09-16 PROCEDURE — 1160F RVW MEDS BY RX/DR IN RCRD: CPT | Performed by: NURSE PRACTITIONER

## 2020-09-16 RX ORDER — BACLOFEN 10 MG/1
10 TABLET ORAL 3 TIMES DAILY
Qty: 270 TABLET | Refills: 0 | Status: SHIPPED | OUTPATIENT
Start: 2020-09-16 | End: 2020-11-09 | Stop reason: SDUPTHER

## 2020-09-16 RX ORDER — HYDROCODONE BITARTRATE AND ACETAMINOPHEN 10; 325 MG/1; MG/1
TABLET ORAL
Qty: 130 TABLET | Refills: 0 | Status: SHIPPED | OUTPATIENT
Start: 2020-10-13 | End: 2020-11-09 | Stop reason: SDUPTHER

## 2020-09-16 RX ORDER — PREGABALIN 150 MG/1
150 CAPSULE ORAL 3 TIMES DAILY
Qty: 270 CAPSULE | Refills: 0 | Status: SHIPPED | OUTPATIENT
Start: 2020-09-16 | End: 2020-11-09 | Stop reason: SDUPTHER

## 2020-09-16 RX ORDER — HYDROCODONE BITARTRATE AND ACETAMINOPHEN 10; 325 MG/1; MG/1
TABLET ORAL
Qty: 130 TABLET | Refills: 0 | Status: SHIPPED | OUTPATIENT
Start: 2020-09-16 | End: 2020-09-16 | Stop reason: SDUPTHER

## 2020-09-16 NOTE — PROGRESS NOTES
Assessment  1  Long-term current use of opiate analgesic    2  Chronic pain syndrome    3  Uncomplicated opioid dependence ([address] )    4  Lumbar radiculitis    5  Chronic bilateral low back pain with bilateral sciatica    6  Cervical radiculopathy    7  Myofascial pain syndrome    8  Chronic bilateral low back pain with right-sided sciatica - Bilateral        Plan  Continue with hydrocodone as prescribed this was refilled for the next 2 months with 1 month having a do not fill date of October 13, 2020  Continue with baclofen and Lyrica these were also refilled today  Patient to return in 8 weeks for medication refills      There are risks associated with opioid medications, including dependence, addiction and tolerance  The patient understands and agrees to use these medications only as prescribed  Potential side effects of the medications include, but are not limited to, constipation, drowsiness, addiction, impaired judgment and risk of fatal overdose if not taken as prescribed  The patient was warned against driving while taking sedation medications  Sharing medications is a felony  At this point in time, the patient is showing no signs of addiction, abuse, diversion or suicidal ideation  A urine drug screen was collected at today's office visit as part of our medication management protocol  The point of care testing results were appropriate for what was being prescribed  The specimen will be sent for confirmatory testing  The drug screen is medically necessary because the patient is either dependent on opioid medication or is being considered for opioid medication therapy and the results could impact ongoing or future treatment  The drug screen is to evaluate for the presences or absence of prescribed, non-prescribed, and/or illicit drugs/substances      [place] Prescription Drug Monitoring Program report was reviewed and was appropriate         My impressions and treatment recommendations were discussed in detail with the patient who verbalized understanding and had no further questions  Discharge instructions were provided  I personally saw and examined the patient and I agree with the above discussed plan of care      Orders Placed This Encounter   Procedures    MM ALL_Prescribed Meds and Special Instructions    MM DT_Alprazolam Definitive Test    MM DT_Amphetamine Definitive Test    MM DT_Aripiprazole Definitive Test    MM DT_Buprenorphine Definitive Test    MM DT_Butalbital Definitive Test    MM DT_Clonazepam Definitive Test    MM DT_Clozapine Definitive Test    MM DT_Cocaine Definitive Test    MM DT_Codeine Definitive Test    MM DT_Desipramine Definitive Test    MM DT_Dextromethorphan Definitive Test    MM Diazepam Definitive Test    MM DT_Ethyl Glucuronide/Ethyl Sulfate Definitive Test    MM DT_Fentanyl Definitive Test    MM DT_Haloperidol Definitive Test    MM DT_Heroin Definitive Test    MM DT_Hydrocodone Definitive Test    MM DT_Hydromorphone Definitive Test    MM DT_Imipramine Definitive Test    MM DT_Kratom Definitive Test    MM DT_Levorphanol Definitive Test    MM DT_MDMA Definitive Test    MM DT_Meperidine Definitive Test    MM DT_Methadone Definitive Test    MM DT_Methamphetamine Definitive Test    MM DT_Methylphenidate Definitive Test    MM DT_Morphine Definitive Test    MM Lorazepam Definitive Test    MM DT_Olanzapine Definitive Test    MM DT_Oxycodone Definitive Test    MM DT_Oxymorphone Definitive Test    MM DT_Phencyclidine Definitive Test    MM DT_Phenobarbital Definitive Test    MM DT_Phentermine Definitive Test    MM DT_Quetiapine Definitive Test    MM DT_Risperidone Definitive Test    MM DT_Secobarbital Definitive Test    MM DT_Tapentadol Definitive Test    MM DT_Temazapam Definitive Test    MM DT_THC Definitive Test    MM DT_Tramadol Definitive Test     New Medications Ordered This Visit   Medications    baclofen 10 mg tablet     Sig: Take 1 tablet (10 mg total) by mouth 3 (three) times a day     Dispense:  270 tablet     Refill:  0    pregabalin (LYRICA) 150 mg capsule     Sig: Take 1 capsule (150 mg total) by mouth 3 (three) times a day     Dispense:  270 capsule     Refill:  0     Brand necessary, no generic   HYDROcodone-acetaminophen (NORCO)  mg per tablet     Sig: Take 4-5 tablets daily PRN     Dispense:  130 tablet     Refill:  0       History of Present Illness    Fartun Bolanos is a 79 y o  female presents for follow-up related to her chronic neck and arm pain as well as her low low back and leg pain  Today she rates her pain 7/10 this is constant and described as burning, dull, aching, sharp, throbbing, shooting, numbness, and pins and needles  Patient continues to take hydrocodone 10/325 mg 4-5 tablets daily, baclofen 10 mg 3 times daily, and Lyrica 150 mg 3 times daily  She reports 30% relief from her medication regimen and no side effects or issues  I have personally reviewed and/or updated the patient's past medical history, past surgical history, family history, social history, current medications, allergies, and vital signs today  Review of Systems   Respiratory: Negative for shortness of breath  Cardiovascular: Negative for chest pain  Gastrointestinal: Negative for constipation, diarrhea, nausea and vomiting  Musculoskeletal: Positive for back pain, gait problem and neck pain  Negative for arthralgias, joint swelling and myalgias  Skin: Negative for rash  Neurological: Positive for dizziness and numbness  Negative for seizures and weakness  All other systems reviewed and are negative        Patient Active Problem List   Diagnosis    Chronic pain syndrome    Cervical radiculopathy    Myofascial pain syndrome    Spondylosis of cervical region without myelopathy or radiculopathy    Fibromyalgia    Long-term current use of opiate analgesic    MGUS (monoclonal gammopathy of unknown significance)  Iron deficiency anemia following bariatric surgery    Light headedness    Uncomplicated opioid dependence (HCC)    Rheumatoid arthritis of hand ([address] )    Pulmonary emphysema (HCC)    Primary osteoarthritis of right knee    Pseudogout of left knee    Lumbar radiculitis    Chronic bilateral low back pain with bilateral sciatica    Rotator cuff syndrome, left    Left shoulder pain    Dizziness    Other fatigue       Past Medical History:   Diagnosis Date    Anemia     Anxiety     hx of panic attacks (now under control)     Arthritis     Asthma     resolved (no problems in a decade)     Baker's cyst of knee     Bronchitis     Bunion, left foot     Cervical pain     Chronic GERD     Chronic pain disorder     Depression     Diabetes mellitus, type 2 (HCC)     Diabetic peripheral neuropathy (HCC)     Endometriosis     Fibromyalgia     Hyperlipidemia     Hypertension     Joint pain     Low back pain     Lung nodules     Macular degeneration     Pulmonary emphysema ([facility] [place] [address] ) 1/16/2020    Spondylosis        Past Surgical History:   Procedure Laterality Date    BACK SURGERY  1996    L5, S1- laser surgery fusion of c5 and c6     BREAST LUMPECTOMY Right     CHOLECYSTECTOMY  2004    FOOT SURGERY Left 2005    fusion     GASTRIC BYPASS  2010    Dr Joe Mcadams    just uterus     KNEE ARTHROSCOPY      LAMINECTOMY      ORTHOPEDIC SURGERY      OVARIAN CYST REMOVAL  1975    PELVIC LAPAROSCOPY      ovaries (x10)     PLEURAL SCARIFICATION  1967    SHOULDER OPEN ROTATOR CUFF REPAIR Left 2008    TONSILLECTOMY      VAGINAL PROLAPSE REPAIR         Family History   Problem Relation Age of Onset    Hypertension Mother     Lung cancer Mother     Hypertension Father     Heart disease Father     Heart attack Father     Cancer Father        Social History     Occupational History    Not on file   Tobacco Use    Smoking status: Current Every Day Smoker     Packs/day: 0 50 Years: 40 00     Pack years: 20 00    Smokeless tobacco: Current User   Substance and Sexual Activity    Alcohol use: Not Currently     Alcohol/week: 1 0 standard drinks     Types: 1 Shots of liquor per week     Comment: rarely    Drug use: No    Sexual activity: Not Currently     Partners: Male       Current Outpatient Medications on File Prior to Visit   Medication Sig    albuterol (VENTOLIN HFA) 90 mcg/act inhaler inhale 2 puff by inhalation route  every 4 - 6 hours as needed    amLODIPine (NORVASC) 5 mg tablet Take 1 tablet (5 mg total) by mouth daily    cetirizine (ZyrTEC) 10 mg tablet Take 0 5 tablets (5 mg total) by mouth daily    cimetidine (TAGAMET) 200 mg tablet Take 1 tablet (200 mg total) by mouth 2 (two) times a day    diphenhydrAMINE (BENADRYL ALLERGY) 25 mg capsule Take 50 mg by mouth daily     ergocalciferol (VITAMIN D2) 50,000 units Take 1 capsule (50,000 Units total) by mouth once a week    Lancets Misc  (ACCU-CHEK FASTCLIX LANCET) KIT 3 (three) times a day    levothyroxine 25 mcg tablet TAKE 1 TABLET BY MOUTH EVERY MORNING ON SATURDAY, SUNDAY, TUESDAY AND THURSDAY    levothyroxine 50 mcg tablet TAKE 1 TABLET BY ORAL ROUTE EVERY MORNING ON MONDAY, WEDNESDAY AND FRIDAY    losartan (COZAAR) 25 mg tablet TAKE 1 TABLET BY MOUTH EVERY DAY    MICROLET LANCETS MISC Microlet Lancet    ONETOUCH VERIO test strip TEST TWICE A DAY    pantoprazole (PROTONIX) 40 mg tablet Take 1 tablet (40 mg total) by mouth 2 (two) times a day    sucralfate (CARAFATE) 1 g tablet TAKE 1 TABLET BY MOUTH 4 TIMES EVERY DAY ON AN EMPTY STOMACH 1 HOUR BEFORE MEALS AND AT BEDTIME    traZODone (DESYREL) 50 mg tablet TAKE 1 TABLET BY MOUTH EVERY DAY AT BEDTIME AS NEEDED    vitamin A 2400 MCG (8000 UT) capsule Take 1 capsule (8,000 Units total) by mouth daily    vitamin A 2400 MCG (8000 UT) capsule TAKE 1 CAPSULE (8,000 UNITS TOTAL) BY MOUTH DAILY    [DISCONTINUED] baclofen 10 mg tablet Take 1 tablet (10 mg total) by mouth 3 (three) times a day    [DISCONTINUED] HYDROcodone-acetaminophen (NORCO)  mg per tablet Take 4-5 tablets daily PRN    [DISCONTINUED] pregabalin (LYRICA) 150 mg capsule Take 1 capsule (150 mg total) by mouth 3 (three) times a day     Current Facility-Administered Medications on File Prior to Visit   Medication    cyanocobalamin injection 1,000 mcg    cyanocobalamin injection 1,000 mcg    cyanocobalamin injection 1,000 mcg       Allergies   Allergen Reactions    Cephalosporins Hives    Erythromycin GI Intolerance    Fentanyl Itching     Occurred during surgery     Paxil [Paroxetine] Hives     After 2 weeks    Penicillins Itching    Pravastatin Myalgia    Prednisolone Itching    Statins Itching    Sulfa Antibiotics Diarrhea    Wellbutrin [Bupropion] Hives     After 2 weeks     Marzena's Wort Rash       Physical Exam    /65   Pulse 55   Temp 97 5 °F (36 4 °C) (Temporal)   Ht 5' 5" (1 651 m)   Wt 75 8 kg (167 lb)   LMP  (LMP Unknown)   BMI 27 79 kg/m²     Constitutional: normal, well developed, well nourished, alert, in no distress and non-toxic and no overt pain behavior    Eyes: anicteric  HEENT: grossly intact  Neck: supple, symmetric, trachea midline and no masses   Pulmonary:even and unlabored  Cardiovascular:No edema or pitting edema present  Skin:Normal without rashes or lesions and well hydrated  Psychiatric:Mood and affect appropriate  Neurologic:Cranial Nerves II-XII grossly intact  Musculoskeletal:ambulates with cane    Imaging

## 2020-09-16 NOTE — PATIENT INSTRUCTIONS
Opioid Pain Management   AMBULATORY CARE:   An opioid  is a type of medicine used to treat pain  Examples of opioids are oxycodone, morphine, fentanyl, or codeine  Take opioid medicines as directed, for the condition it is prescribed:  Common problems that may occur when you do not take opioid medicines as directed include the following:  · Health problems  may occur  You may have trouble breathing  You may also develop liver or kidney damage, or stomach bleeding  Any of these health problems can become life-threatening  · Opioid dependence  means your body needs the opioid medicine to keep it from going through withdrawal      · Opioid tolerance  means the opioid does not control pain as well as it used to  You need higher doses of the opioid to get pain relief  · Opioid addiction  means you are not able to control the use of the opioid medicine  You use it when you do not have pain  You crave the opioid medicine  Call 911 or have someone call 911 for any of the following:   · You are breathing slower than normal, or you have trouble breathing  · You cannot be awakened  · You have a seizure  Seek care immediately if:   · Your heart is beating slower than usual     · Your heart feels like it is jumping or fluttering  · You are so sleepy that you cannot stay awake  · You have severe muscle pain or weakness  · You see or hear things that are not real   Contact your healthcare provider if:   · You are too dizzy to stand up  · Your pain gets worse or you have new pain  · You cannot do your usual activities because of side effects from the opioid  · You are constipated or have abdominal pain  · You have questions or concerns about your condition or care  Opioid safety measures:   · Take your medicine as directed  Ask if you need more information on how to take your medicine correctly  Follow up with your healthcare provider regularly  You may need to have your dose adjusted   Do not use opioid medicine if you are pregnant or breastfeeding  · Give your healthcare provider a list of all your medicines  Include any over-the-counter medicines, vitamins, and herbs  It can be dangerous to take opioids with certain other medicines, such as antihistamines  · Keep opioid medicine in a safe place  Store your opioid medicine in a locked cabinet to keep it away from children and others  · Do not drink alcohol while you use opioids  Alcohol use with an opioid medicine can make you sleepy and slow your breathing rate  You may stop breathing completely  · Do not drive or operate heavy machinery after you take opioid medicine  Opioid medicine can make you drowsy and make it hard to concentrate  You may injure yourself or others if you drive or operate heavy machinery while taking your medicine  · Drink fluids and eat high-fiber foods  Fluids and fiber will help prevent constipation  Ask your healthcare provider what fluids are right for you and how much you should drink  Also ask for a list of foods that contain fiber  Follow up with your healthcare provider as directed: You may need to have your dose adjusted  You may be referred to a pain specialist  Write down your questions so you remember to ask them during your visits  © 2017 [facility] Information is for End User's use only and may not be sold, redistributed or otherwise used for commercial purposes  All illustrations and images included in CareNotes® are the copyrighted property of [employer]  or Hipolito Joshi  The above information is an  only  It is not intended as medical advice for individual conditions or treatments  Talk to your doctor, nurse or pharmacist before following any medical regimen to see if it is safe and effective for you

## 2020-09-17 RX ORDER — SUCRALFATE 1 G/1
TABLET ORAL
Qty: 120 TABLET | Refills: 0 | Status: SHIPPED | OUTPATIENT
Start: 2020-09-17 | End: 2020-10-11

## 2020-09-19 DIAGNOSIS — I10 ESSENTIAL HYPERTENSION: ICD-10-CM

## 2020-09-19 RX ORDER — AMLODIPINE BESYLATE 5 MG/1
TABLET ORAL
Qty: 90 TABLET | Refills: 1 | Status: SHIPPED | OUTPATIENT
Start: 2020-09-19 | End: 2021-03-15

## 2020-09-22 ENCOUNTER — HOSPITAL ENCOUNTER (OUTPATIENT)
Dept: RADIOLOGY | Age: 70
Discharge: HOME | End: 2020-09-22
Payer: COMMERCIAL

## 2020-09-22 DIAGNOSIS — M54.2 NECK PAIN: ICD-10-CM

## 2020-09-22 DIAGNOSIS — M54.12 CERVICAL RADICULOPATHY: ICD-10-CM

## 2020-09-22 PROCEDURE — 72156 MRI NECK SPINE W/O & W/DYE: CPT

## 2020-09-22 PROCEDURE — A9585 GADOBUTROL INJECTION: HCPCS | Performed by: NURSE PRACTITIONER

## 2020-09-22 PROCEDURE — G1004 CDSM NDSC: HCPCS

## 2020-09-22 RX ADMIN — GADOBUTROL 7 ML: 604.72 INJECTION INTRAVENOUS at 11:19

## 2020-09-23 LAB
6MAM UR QL CFM: NEGATIVE NG/ML
7AMINOCLONAZEPAM UR QL CFM: NEGATIVE NG/ML
A-OH ALPRAZ UR QL CFM: NEGATIVE NG/ML
AMPHET UR QL CFM: NEGATIVE NG/ML
AMPHET UR QL CFM: NEGATIVE NG/ML
BUPRENORPHINE UR QL CFM: NEGATIVE NG/ML
BUTALBITAL UR QL CFM: NEGATIVE NG/ML
BZE UR QL CFM: NEGATIVE NG/ML
CODEINE UR QL CFM: NEGATIVE NG/ML
DESIPRAMINE UR QL CFM: NEGATIVE NG/ML
DESIPRAMINE UR QL CFM: NEGATIVE NG/ML
EDDP UR QL CFM: NEGATIVE NG/ML
ETHYL GLUCURONIDE UR QL CFM: NEGATIVE NG/ML
ETHYL SULFATE UR QL SCN: NEGATIVE NG/ML
FENTANYL UR QL CFM: NEGATIVE NG/ML
GLIADIN IGG SER IA-ACNC: NEGATIVE NG/ML
GLUCOSE 30M P 50 G LAC PO SERPL-MCNC: NEGATIVE NG/ML
HYDROCODONE UR QL CFM: NORMAL NG/ML
HYDROCODONE UR QL CFM: NORMAL NG/ML
HYDROMORPHONE UR QL CFM: NORMAL NG/ML
IMIPRAMINE UR QL CFM: NEGATIVE NG/ML
LORAZEPAM UR QL CFM: NEGATIVE NG/ML
MDMA UR QL CFM: NEGATIVE NG/ML
ME-PHENIDATE UR QL CFM: NEGATIVE NG/ML
MEPERIDINE UR QL CFM: NEGATIVE NG/ML
METHADONE UR QL CFM: NEGATIVE NG/ML
METHAMPHET UR QL CFM: NEGATIVE NG/ML
MORPHINE UR QL CFM: NEGATIVE NG/ML
MORPHINE UR QL CFM: NEGATIVE NG/ML
NORBUPRENORPHINE UR QL CFM: NEGATIVE NG/ML
NORDIAZEPAM UR QL CFM: NEGATIVE NG/ML
NORFENTANYL UR QL CFM: NEGATIVE NG/ML
NORHYDROCODONE UR QL CFM: NORMAL NG/ML
NORHYDROCODONE UR QL CFM: NORMAL NG/ML
NORMEPERIDINE UR QL CFM: NEGATIVE NG/ML
NOROXYCODONE UR QL CFM: NEGATIVE NG/ML
OLANZAPINE QUANTIFICATION: NEGATIVE NG/ML
OPC-3373 QUANTIFICATION: NEGATIVE
OXAZEPAM UR QL CFM: NEGATIVE NG/ML
OXAZEPAM UR QL CFM: NEGATIVE NG/ML
OXYCODONE UR QL CFM: NEGATIVE NG/ML
OXYMORPHONE UR QL CFM: NEGATIVE NG/ML
OXYMORPHONE UR QL CFM: NEGATIVE NG/ML
PCP UR QL CFM: NEGATIVE NG/ML
PHENOBARB UR QL CFM: NEGATIVE NG/ML
RESULT ALL_PRESCRIBED MEDS AND SPECIAL INSTRUCTIONS: NORMAL
SECOBARBITAL UR QL CFM: NEGATIVE NG/ML
SL AMB 3-METHYL-FENTANYL QUANTIFICATION: NORMAL NG/ML
SL AMB 4-ANPP QUANTIFICATION: NORMAL NG/ML
SL AMB 4-FIBF QUANTIFICATION: NORMAL NG/ML
SL AMB 7-OH-MITRAGYNINE (KRATOM ALKALOID) QUANTIFICATION: NEGATIVE NG/ML
SL AMB ACETYL FENTANYL QUANTIFICATION: NORMAL NG/ML
SL AMB ACETYL NORFENTANYL QUANTIFICATION: NORMAL NG/ML
SL AMB ACRYL FENTANYL QUANTIFICATION: NORMAL NG/ML
SL AMB BUTRYL FENTANYL QUANTIFICATION: NORMAL NG/ML
SL AMB CARFENTANIL QUANTIFICATION: NORMAL NG/ML
SL AMB CLOZAPINE QUANTIFICATION: NEGATIVE NG/ML
SL AMB CTHC (MARIJUANA METABOLITE) QUANTIFICATION: NEGATIVE NG/ML
SL AMB CYCLOPROPYL FENTANYL QUANTIFICATION: NORMAL NG/ML
SL AMB DEXTROMETHORPHAN QUANTIFICATION: NEGATIVE NG/ML
SL AMB DEXTRORPHAN (DEXTROMETHORPHAN METABOLITE) QUANT: NEGATIVE NG/ML
SL AMB DEXTRORPHAN (DEXTROMETHORPHAN METABOLITE) QUANT: NEGATIVE NG/ML
SL AMB FURANYL FENTANYL QUANTIFICATION: NORMAL NG/ML
SL AMB HALOPERIDOL  QUANTIFICATION: NEGATIVE NG/ML
SL AMB HALOPERIDOL METABOLITE QUANTIFICATION: NEGATIVE NG/ML
SL AMB HYDROXYRISPERIDONE QUANTIFICATION: NEGATIVE NG/ML
SL AMB METHOXYACETYL FENTANYL QUANTIFICATION: NORMAL NG/ML
SL AMB N-DESMETHYL U-47700 QUANTIFICATION: NORMAL NG/ML
SL AMB N-DESMETHYL-TRAMADOL QUANTIFICATION: NEGATIVE NG/ML
SL AMB N-DESMETHYLCLOZAPINE QUANTIFICATION: NEGATIVE NG/ML
SL AMB NORQUETIAPINE QUANTIFICATION: NEGATIVE NG/ML
SL AMB PHENTERMINE QUANTIFICATION: NEGATIVE NG/ML
SL AMB QUETIAPINE QUANTIFICATION: NEGATIVE NG/ML
SL AMB RISPERIDONE QUANTIFICATION: NEGATIVE NG/ML
SL AMB RITALINIC ACID QUANTIFICATION: NEGATIVE NG/ML
SL AMB U-47700 QUANTIFICATION: NORMAL NG/ML
TAPENTADOL UR QL CFM: NEGATIVE NG/ML
TEMAZEPAM UR QL CFM: NEGATIVE NG/ML
TEMAZEPAM UR QL CFM: NEGATIVE NG/ML
TRAMADOL UR QL CFM: NEGATIVE NG/ML
URATE/CREAT 24H UR: NEGATIVE NG/ML

## 2020-09-29 RX ORDER — LIDOCAINE HYDROCHLORIDE 10 MG/ML
4 INJECTION, SOLUTION INFILTRATION; PERINEURAL
Status: COMPLETED | OUTPATIENT
Start: 2020-08-11 | End: 2020-08-11

## 2020-09-29 RX ORDER — TRIAMCINOLONE ACETONIDE 40 MG/ML
40 INJECTION, SUSPENSION INTRA-ARTICULAR; INTRAMUSCULAR
Status: COMPLETED | OUTPATIENT
Start: 2020-08-11 | End: 2020-08-11

## 2020-10-02 ENCOUNTER — TELEPHONE (OUTPATIENT)
Dept: PAIN MEDICINE | Facility: MEDICAL CENTER | Age: 70
End: 2020-10-02

## 2020-10-07 DIAGNOSIS — G47.00 INSOMNIA, UNSPECIFIED TYPE: ICD-10-CM

## 2020-10-07 RX ORDER — TRAZODONE HYDROCHLORIDE 50 MG/1
TABLET ORAL
Qty: 30 TABLET | Refills: 0 | Status: SHIPPED | OUTPATIENT
Start: 2020-10-07 | End: 2020-11-05

## 2020-10-09 DIAGNOSIS — I10 ESSENTIAL HYPERTENSION: ICD-10-CM

## 2020-10-09 PROCEDURE — 4010F ACE/ARB THERAPY RXD/TAKEN: CPT | Performed by: NURSE PRACTITIONER

## 2020-10-09 RX ORDER — LOSARTAN POTASSIUM 25 MG/1
TABLET ORAL
Qty: 90 TABLET | Refills: 1 | Status: SHIPPED | OUTPATIENT
Start: 2020-10-09 | End: 2021-04-03

## 2020-10-11 DIAGNOSIS — K27.9 PUD (PEPTIC ULCER DISEASE): ICD-10-CM

## 2020-10-11 RX ORDER — SUCRALFATE 1 G/1
TABLET ORAL
Qty: 120 TABLET | Refills: 0 | Status: SHIPPED | OUTPATIENT
Start: 2020-10-11 | End: 2020-11-05

## 2020-10-19 DIAGNOSIS — E03.9 HYPOTHYROIDISM, UNSPECIFIED TYPE: ICD-10-CM

## 2020-10-19 RX ORDER — LEVOTHYROXINE SODIUM 0.03 MG/1
TABLET ORAL
Qty: 30 TABLET | Refills: 2 | Status: SHIPPED | OUTPATIENT
Start: 2020-10-19 | End: 2021-03-09

## 2020-10-20 ENCOUNTER — OFFICE VISIT (OUTPATIENT)
Dept: OBGYN CLINIC | Facility: OTHER | Age: 70
End: 2020-10-20
Payer: OTHER MISCELLANEOUS

## 2020-10-20 VITALS
BODY MASS INDEX: 28.66 KG/M2 | HEIGHT: 65 IN | DIASTOLIC BLOOD PRESSURE: 66 MMHG | HEART RATE: 57 BPM | WEIGHT: 172 LBS | SYSTOLIC BLOOD PRESSURE: 130 MMHG | TEMPERATURE: 97 F

## 2020-10-20 DIAGNOSIS — M25.512 CHRONIC LEFT SHOULDER PAIN: ICD-10-CM

## 2020-10-20 DIAGNOSIS — M75.22 BICEPS TENDINITIS OF LEFT SHOULDER: Primary | ICD-10-CM

## 2020-10-20 DIAGNOSIS — M75.02 ADHESIVE CAPSULITIS OF LEFT SHOULDER: ICD-10-CM

## 2020-10-20 DIAGNOSIS — G89.29 CHRONIC LEFT SHOULDER PAIN: ICD-10-CM

## 2020-10-20 DIAGNOSIS — M17.11 PRIMARY OSTEOARTHRITIS OF RIGHT KNEE: ICD-10-CM

## 2020-10-20 DIAGNOSIS — M75.102 ROTATOR CUFF SYNDROME, LEFT: ICD-10-CM

## 2020-10-20 PROCEDURE — 99213 OFFICE O/P EST LOW 20 MIN: CPT | Performed by: ORTHOPAEDIC SURGERY

## 2020-10-26 ENCOUNTER — TELEPHONE (OUTPATIENT)
Dept: FAMILY MEDICINE CLINIC | Facility: CLINIC | Age: 70
End: 2020-10-26

## 2020-10-27 ENCOUNTER — APPOINTMENT (EMERGENCY)
Dept: RADIOLOGY | Facility: HOSPITAL | Age: 70
End: 2020-10-27
Payer: COMMERCIAL

## 2020-10-27 ENCOUNTER — HOSPITAL ENCOUNTER (EMERGENCY)
Facility: HOSPITAL | Age: 70
Discharge: HOME | End: 2020-10-27
Attending: EMERGENCY MEDICINE | Admitting: EMERGENCY MEDICINE
Payer: COMMERCIAL

## 2020-10-27 VITALS
HEART RATE: 52 BPM | BODY MASS INDEX: 28.54 KG/M2 | OXYGEN SATURATION: 95 % | DIASTOLIC BLOOD PRESSURE: 62 MMHG | SYSTOLIC BLOOD PRESSURE: 123 MMHG | TEMPERATURE: 97.3 F | WEIGHT: 171.5 LBS | RESPIRATION RATE: 18 BRPM

## 2020-10-27 DIAGNOSIS — N39.0 UTI (URINARY TRACT INFECTION): ICD-10-CM

## 2020-10-27 DIAGNOSIS — R05.9 COUGH: Primary | ICD-10-CM

## 2020-10-27 LAB
ALBUMIN SERPL BCP-MCNC: 3.5 G/DL (ref 3–5.2)
ALP SERPL-CCNC: 86 U/L (ref 43–122)
ALT SERPL W P-5'-P-CCNC: 24 U/L (ref 9–52)
ANION GAP SERPL CALCULATED.3IONS-SCNC: 3 MMOL/L (ref 5–14)
AST SERPL W P-5'-P-CCNC: 26 U/L (ref 14–36)
BACTERIA UR QL AUTO: ABNORMAL /HPF
BASOPHILS # BLD AUTO: 0 THOUSANDS/ΜL (ref 0–0.1)
BASOPHILS NFR BLD AUTO: 0 % (ref 0–1)
BILIRUB SERPL-MCNC: 0.5 MG/DL
BILIRUB UR QL STRIP: NEGATIVE
BUN SERPL-MCNC: 8 MG/DL (ref 5–25)
CALCIUM SERPL-MCNC: 9 MG/DL (ref 8.4–10.2)
CHLORIDE SERPL-SCNC: 104 MMOL/L (ref 97–108)
CLARITY UR: ABNORMAL
CO2 SERPL-SCNC: 28 MMOL/L (ref 22–30)
COLOR UR: YELLOW
CREAT SERPL-MCNC: 0.73 MG/DL (ref 0.6–1.2)
EOSINOPHIL # BLD AUTO: 0.1 THOUSAND/ΜL (ref 0–0.4)
EOSINOPHIL NFR BLD AUTO: 1 % (ref 0–6)
ERYTHROCYTE [DISTWIDTH] IN BLOOD BY AUTOMATED COUNT: 14.2 %
GFR SERPL CREATININE-BSD FRML MDRD: 84 ML/MIN/1.73SQ M
GLUCOSE SERPL-MCNC: 70 MG/DL (ref 70–99)
GLUCOSE UR STRIP-MCNC: NEGATIVE MG/DL
HCT VFR BLD AUTO: 35.2 % (ref 36–46)
HGB BLD-MCNC: 11.8 G/DL (ref 12–16)
HGB UR QL STRIP.AUTO: 25
KETONES UR STRIP-MCNC: NEGATIVE MG/DL
LEUKOCYTE ESTERASE UR QL STRIP: 500
LYMPHOCYTES # BLD AUTO: 0.8 THOUSANDS/ΜL (ref 0.5–4)
LYMPHOCYTES NFR BLD AUTO: 14 % (ref 25–45)
MCH RBC QN AUTO: 32.3 PG (ref 26–34)
MCHC RBC AUTO-ENTMCNC: 33.6 G/DL (ref 31–36)
MCV RBC AUTO: 96 FL (ref 80–100)
MONOCYTES # BLD AUTO: 0.5 THOUSAND/ΜL (ref 0.2–0.9)
MONOCYTES NFR BLD AUTO: 8 % (ref 1–10)
NEUTROPHILS # BLD AUTO: 4.4 THOUSANDS/ΜL (ref 1.8–7.8)
NEUTS SEG NFR BLD AUTO: 77 % (ref 45–65)
NITRITE UR QL STRIP: NEGATIVE
NON-SQ EPI CELLS URNS QL MICRO: ABNORMAL /HPF
PH UR STRIP.AUTO: 6 [PH]
PLATELET # BLD AUTO: 258 THOUSANDS/UL (ref 150–450)
PMV BLD AUTO: 8.1 FL (ref 8.9–12.7)
POTASSIUM SERPL-SCNC: 4 MMOL/L (ref 3.6–5)
PROT SERPL-MCNC: 6.6 G/DL (ref 5.9–8.4)
PROT UR STRIP-MCNC: NEGATIVE MG/DL
RBC # BLD AUTO: 3.67 MILLION/UL (ref 4–5.2)
RBC #/AREA URNS AUTO: ABNORMAL /HPF
SODIUM SERPL-SCNC: 135 MMOL/L (ref 137–147)
SP GR UR STRIP.AUTO: 1.01 (ref 1–1.04)
TROPONIN I SERPL-MCNC: <0.01 NG/ML (ref 0–0.03)
UROBILINOGEN UA: NEGATIVE MG/DL
WBC # BLD AUTO: 5.7 THOUSAND/UL (ref 4.5–11)
WBC #/AREA URNS AUTO: ABNORMAL /HPF

## 2020-10-27 PROCEDURE — 87186 SC STD MICRODIL/AGAR DIL: CPT | Performed by: EMERGENCY MEDICINE

## 2020-10-27 PROCEDURE — 84484 ASSAY OF TROPONIN QUANT: CPT | Performed by: EMERGENCY MEDICINE

## 2020-10-27 PROCEDURE — 99284 EMERGENCY DEPT VISIT MOD MDM: CPT | Performed by: EMERGENCY MEDICINE

## 2020-10-27 PROCEDURE — 81001 URINALYSIS AUTO W/SCOPE: CPT | Performed by: EMERGENCY MEDICINE

## 2020-10-27 PROCEDURE — 93005 ELECTROCARDIOGRAM TRACING: CPT

## 2020-10-27 PROCEDURE — 36415 COLL VENOUS BLD VENIPUNCTURE: CPT | Performed by: EMERGENCY MEDICINE

## 2020-10-27 PROCEDURE — 80053 COMPREHEN METABOLIC PANEL: CPT | Performed by: EMERGENCY MEDICINE

## 2020-10-27 PROCEDURE — 71045 X-RAY EXAM CHEST 1 VIEW: CPT

## 2020-10-27 PROCEDURE — U0003 INFECTIOUS AGENT DETECTION BY NUCLEIC ACID (DNA OR RNA); SEVERE ACUTE RESPIRATORY SYNDROME CORONAVIRUS 2 (SARS-COV-2) (CORONAVIRUS DISEASE [COVID-19]), AMPLIFIED PROBE TECHNIQUE, MAKING USE OF HIGH THROUGHPUT TECHNOLOGIES AS DESCRIBED BY CMS-2020-01-R: HCPCS | Performed by: EMERGENCY MEDICINE

## 2020-10-27 PROCEDURE — 99284 EMERGENCY DEPT VISIT MOD MDM: CPT

## 2020-10-27 PROCEDURE — 87077 CULTURE AEROBIC IDENTIFY: CPT | Performed by: EMERGENCY MEDICINE

## 2020-10-27 PROCEDURE — 85025 COMPLETE CBC W/AUTO DIFF WBC: CPT | Performed by: EMERGENCY MEDICINE

## 2020-10-27 PROCEDURE — 87086 URINE CULTURE/COLONY COUNT: CPT | Performed by: EMERGENCY MEDICINE

## 2020-10-27 RX ORDER — METHYLPREDNISOLONE 4 MG/1
TABLET ORAL
Qty: 21 TABLET | Refills: 0 | Status: SHIPPED | OUTPATIENT
Start: 2020-10-27 | End: 2020-10-28 | Stop reason: SDUPTHER

## 2020-10-27 RX ORDER — LEVOFLOXACIN 500 MG/1
500 TABLET, FILM COATED ORAL DAILY
Qty: 10 TABLET | Refills: 0 | Status: SHIPPED | OUTPATIENT
Start: 2020-10-27 | End: 2020-11-06

## 2020-10-27 RX ORDER — MAGNESIUM HYDROXIDE/ALUMINUM HYDROXICE/SIMETHICONE 120; 1200; 1200 MG/30ML; MG/30ML; MG/30ML
15 SUSPENSION ORAL ONCE
Status: COMPLETED | OUTPATIENT
Start: 2020-10-27 | End: 2020-10-27

## 2020-10-27 RX ORDER — BENZONATATE 100 MG/1
100 CAPSULE ORAL EVERY 8 HOURS
Qty: 21 CAPSULE | Refills: 0 | Status: SHIPPED | OUTPATIENT
Start: 2020-10-27 | End: 2021-08-18

## 2020-10-27 RX ORDER — CLINDAMYCIN HYDROCHLORIDE 300 MG/1
300 CAPSULE ORAL EVERY 6 HOURS
Qty: 28 CAPSULE | Refills: 0 | Status: CANCELLED | OUTPATIENT
Start: 2020-10-27 | End: 2020-11-03

## 2020-10-27 RX ORDER — LIDOCAINE HYDROCHLORIDE 20 MG/ML
15 SOLUTION OROPHARYNGEAL ONCE
Status: COMPLETED | OUTPATIENT
Start: 2020-10-27 | End: 2020-10-27

## 2020-10-27 RX ORDER — ALBUTEROL SULFATE 90 UG/1
2 AEROSOL, METERED RESPIRATORY (INHALATION) EVERY 4 HOURS PRN
Qty: 1 INHALER | Refills: 0 | Status: SHIPPED | OUTPATIENT
Start: 2020-10-27

## 2020-10-27 RX ADMIN — LIDOCAINE HYDROCHLORIDE 15 ML: 20 SOLUTION ORAL; TOPICAL at 12:11

## 2020-10-27 RX ADMIN — ALUMINUM HYDROXIDE, MAGNESIUM HYDROXIDE, AND SIMETHICONE 15 ML: 200; 200; 20 SUSPENSION ORAL at 12:11

## 2020-10-28 ENCOUNTER — TELEPHONE (OUTPATIENT)
Dept: EMERGENCY DEPT | Facility: HOSPITAL | Age: 70
End: 2020-10-28

## 2020-10-28 DIAGNOSIS — R05.9 COUGH: ICD-10-CM

## 2020-10-28 LAB
ATRIAL RATE: 48 BPM
P AXIS: 55 DEGREES
PR INTERVAL: 156 MS
QRS AXIS: 17 DEGREES
QRSD INTERVAL: 94 MS
QT INTERVAL: 432 MS
QTC INTERVAL: 385 MS
SARS-COV-2 RNA SPEC QL NAA+PROBE: NOT DETECTED
T WAVE AXIS: 31 DEGREES
VENTRICULAR RATE: 48 BPM

## 2020-10-28 PROCEDURE — 93010 ELECTROCARDIOGRAM REPORT: CPT | Performed by: INTERNAL MEDICINE

## 2020-10-28 RX ORDER — METHYLPREDNISOLONE 4 MG/1
TABLET ORAL
Qty: 21 TABLET | Refills: 0 | Status: SHIPPED | OUTPATIENT
Start: 2020-10-28 | End: 2020-10-28 | Stop reason: SDUPTHER

## 2020-10-28 RX ORDER — METHYLPREDNISOLONE 4 MG/1
TABLET ORAL
Qty: 21 TABLET | Refills: 0 | Status: SHIPPED | OUTPATIENT
Start: 2020-10-28 | End: 2021-03-10

## 2020-10-29 ENCOUNTER — TELEPHONE (OUTPATIENT)
Dept: FAMILY MEDICINE CLINIC | Facility: CLINIC | Age: 70
End: 2020-10-29

## 2020-10-29 LAB — BACTERIA UR CULT: ABNORMAL

## 2020-10-30 DIAGNOSIS — J30.9 ALLERGIC RHINITIS, UNSPECIFIED SEASONALITY, UNSPECIFIED TRIGGER: ICD-10-CM

## 2020-10-30 RX ORDER — CETIRIZINE HYDROCHLORIDE 10 MG/1
TABLET ORAL
Qty: 45 TABLET | Refills: 2 | Status: SHIPPED | OUTPATIENT
Start: 2020-10-30 | End: 2021-07-23

## 2020-11-05 ENCOUNTER — OFFICE VISIT (OUTPATIENT)
Dept: NEUROSURGERY | Facility: CLINIC | Age: 70
End: 2020-11-05
Payer: OTHER MISCELLANEOUS

## 2020-11-05 VITALS
HEART RATE: 61 BPM | BODY MASS INDEX: 29.16 KG/M2 | WEIGHT: 175 LBS | SYSTOLIC BLOOD PRESSURE: 108 MMHG | HEIGHT: 65 IN | TEMPERATURE: 98 F | DIASTOLIC BLOOD PRESSURE: 65 MMHG | RESPIRATION RATE: 16 BRPM

## 2020-11-05 DIAGNOSIS — E11.42 DIABETIC PERIPHERAL NEUROPATHY (HCC): ICD-10-CM

## 2020-11-05 DIAGNOSIS — M54.42 CHRONIC BILATERAL LOW BACK PAIN WITH BILATERAL SCIATICA: ICD-10-CM

## 2020-11-05 DIAGNOSIS — M54.41 CHRONIC BILATERAL LOW BACK PAIN WITH BILATERAL SCIATICA: ICD-10-CM

## 2020-11-05 DIAGNOSIS — E16.2 HYPOGLYCEMIA: ICD-10-CM

## 2020-11-05 DIAGNOSIS — G47.00 INSOMNIA, UNSPECIFIED TYPE: ICD-10-CM

## 2020-11-05 DIAGNOSIS — G89.4 CHRONIC PAIN SYNDROME: Primary | ICD-10-CM

## 2020-11-05 DIAGNOSIS — G89.29 CHRONIC BILATERAL LOW BACK PAIN WITH BILATERAL SCIATICA: ICD-10-CM

## 2020-11-05 DIAGNOSIS — K27.9 PUD (PEPTIC ULCER DISEASE): ICD-10-CM

## 2020-11-05 PROCEDURE — 99214 OFFICE O/P EST MOD 30 MIN: CPT | Performed by: NURSE PRACTITIONER

## 2020-11-05 RX ORDER — SUCRALFATE 1 G/1
TABLET ORAL
Qty: 120 TABLET | Refills: 0 | Status: SHIPPED | OUTPATIENT
Start: 2020-11-05 | End: 2020-11-29

## 2020-11-05 RX ORDER — TRAZODONE HYDROCHLORIDE 50 MG/1
TABLET ORAL
Qty: 30 TABLET | Refills: 0 | Status: SHIPPED | OUTPATIENT
Start: 2020-11-05 | End: 2021-01-09

## 2020-11-06 PROBLEM — E16.2 HYPOGLYCEMIA: Status: ACTIVE | Noted: 2020-11-06

## 2020-11-06 PROBLEM — E16.2 HYPOGLYCEMIA: Status: RESOLVED | Noted: 2020-11-06 | Resolved: 2020-11-06

## 2020-11-09 ENCOUNTER — OFFICE VISIT (OUTPATIENT)
Dept: PAIN MEDICINE | Facility: MEDICAL CENTER | Age: 70
End: 2020-11-09
Payer: COMMERCIAL

## 2020-11-09 ENCOUNTER — TELEPHONE (OUTPATIENT)
Dept: OBGYN CLINIC | Facility: MEDICAL CENTER | Age: 70
End: 2020-11-09

## 2020-11-09 VITALS
BODY MASS INDEX: 28.82 KG/M2 | DIASTOLIC BLOOD PRESSURE: 74 MMHG | HEIGHT: 65 IN | TEMPERATURE: 98.2 F | SYSTOLIC BLOOD PRESSURE: 126 MMHG | WEIGHT: 173 LBS | HEART RATE: 67 BPM

## 2020-11-09 DIAGNOSIS — G89.29 CHRONIC BILATERAL LOW BACK PAIN WITH RIGHT-SIDED SCIATICA: ICD-10-CM

## 2020-11-09 DIAGNOSIS — M54.41 CHRONIC BILATERAL LOW BACK PAIN WITH RIGHT-SIDED SCIATICA: ICD-10-CM

## 2020-11-09 DIAGNOSIS — G89.4 CHRONIC PAIN SYNDROME: ICD-10-CM

## 2020-11-09 DIAGNOSIS — G89.29 CHRONIC BILATERAL LOW BACK PAIN WITH BILATERAL SCIATICA: ICD-10-CM

## 2020-11-09 DIAGNOSIS — M54.42 CHRONIC BILATERAL LOW BACK PAIN WITH BILATERAL SCIATICA: ICD-10-CM

## 2020-11-09 DIAGNOSIS — M54.41 CHRONIC BILATERAL LOW BACK PAIN WITH BILATERAL SCIATICA: ICD-10-CM

## 2020-11-09 DIAGNOSIS — M79.18 MYOFASCIAL PAIN SYNDROME: ICD-10-CM

## 2020-11-09 DIAGNOSIS — M54.16 LUMBAR RADICULITIS: Primary | ICD-10-CM

## 2020-11-09 DIAGNOSIS — M54.12 CERVICAL RADICULOPATHY: ICD-10-CM

## 2020-11-09 PROCEDURE — 3074F SYST BP LT 130 MM HG: CPT | Performed by: NURSE PRACTITIONER

## 2020-11-09 PROCEDURE — 99214 OFFICE O/P EST MOD 30 MIN: CPT | Performed by: NURSE PRACTITIONER

## 2020-11-09 PROCEDURE — 4004F PT TOBACCO SCREEN RCVD TLK: CPT | Performed by: NURSE PRACTITIONER

## 2020-11-09 PROCEDURE — 1160F RVW MEDS BY RX/DR IN RCRD: CPT | Performed by: NURSE PRACTITIONER

## 2020-11-09 PROCEDURE — 3008F BODY MASS INDEX DOCD: CPT | Performed by: NURSE PRACTITIONER

## 2020-11-09 PROCEDURE — 3078F DIAST BP <80 MM HG: CPT | Performed by: NURSE PRACTITIONER

## 2020-11-09 RX ORDER — BACLOFEN 10 MG/1
10 TABLET ORAL 3 TIMES DAILY
Qty: 270 TABLET | Refills: 0 | Status: SHIPPED | OUTPATIENT
Start: 2020-11-09 | End: 2021-01-04 | Stop reason: SDUPTHER

## 2020-11-09 RX ORDER — HYDROCODONE BITARTRATE AND ACETAMINOPHEN 10; 325 MG/1; MG/1
TABLET ORAL
Qty: 130 TABLET | Refills: 0 | Status: SHIPPED | OUTPATIENT
Start: 2020-11-09 | End: 2020-11-09 | Stop reason: SDUPTHER

## 2020-11-09 RX ORDER — PREGABALIN 150 MG/1
150 CAPSULE ORAL 3 TIMES DAILY
Qty: 270 CAPSULE | Refills: 0 | Status: SHIPPED | OUTPATIENT
Start: 2020-11-09 | End: 2021-01-04 | Stop reason: SDUPTHER

## 2020-11-09 RX ORDER — HYDROCODONE BITARTRATE AND ACETAMINOPHEN 10; 325 MG/1; MG/1
TABLET ORAL
Qty: 130 TABLET | Refills: 0 | Status: SHIPPED | OUTPATIENT
Start: 2020-12-05 | End: 2021-01-04 | Stop reason: SDUPTHER

## 2020-11-10 ENCOUNTER — TELEPHONE (OUTPATIENT)
Dept: FAMILY MEDICINE CLINIC | Facility: CLINIC | Age: 70
End: 2020-11-10

## 2020-11-10 DIAGNOSIS — N39.0 ACUTE UTI: Primary | ICD-10-CM

## 2020-11-10 RX ORDER — NITROFURANTOIN 25; 75 MG/1; MG/1
100 CAPSULE ORAL 2 TIMES DAILY
Qty: 10 CAPSULE | Refills: 0 | Status: SHIPPED | OUTPATIENT
Start: 2020-11-10 | End: 2020-11-15

## 2020-11-13 ENCOUNTER — VBI (OUTPATIENT)
Dept: ADMINISTRATIVE | Facility: OTHER | Age: 70
End: 2020-11-13

## 2020-11-19 ENCOUNTER — TELEPHONE (OUTPATIENT)
Dept: PAIN MEDICINE | Facility: MEDICAL CENTER | Age: 70
End: 2020-11-19

## 2020-11-29 DIAGNOSIS — K27.9 PUD (PEPTIC ULCER DISEASE): ICD-10-CM

## 2020-11-29 RX ORDER — SUCRALFATE 1 G/1
TABLET ORAL
Qty: 120 TABLET | Refills: 0 | Status: SHIPPED | OUTPATIENT
Start: 2020-11-29 | End: 2021-01-19

## 2020-12-04 ENCOUNTER — TELEPHONE (OUTPATIENT)
Dept: OBGYN CLINIC | Facility: HOSPITAL | Age: 70
End: 2020-12-04

## 2020-12-08 ENCOUNTER — OFFICE VISIT (OUTPATIENT)
Dept: FAMILY MEDICINE CLINIC | Facility: CLINIC | Age: 70
End: 2020-12-08
Payer: COMMERCIAL

## 2020-12-08 ENCOUNTER — OFFICE VISIT (OUTPATIENT)
Dept: FAMILY MEDICINE CLINIC | Facility: CLINIC | Age: 70
End: 2020-12-08
Payer: OTHER MISCELLANEOUS

## 2020-12-08 VITALS
HEIGHT: 65 IN | DIASTOLIC BLOOD PRESSURE: 74 MMHG | RESPIRATION RATE: 14 BRPM | TEMPERATURE: 97.7 F | HEART RATE: 60 BPM | WEIGHT: 173 LBS | SYSTOLIC BLOOD PRESSURE: 140 MMHG | OXYGEN SATURATION: 97 % | BODY MASS INDEX: 28.82 KG/M2

## 2020-12-08 VITALS
HEART RATE: 47 BPM | BODY MASS INDEX: 28.82 KG/M2 | TEMPERATURE: 96.7 F | SYSTOLIC BLOOD PRESSURE: 140 MMHG | OXYGEN SATURATION: 97 % | HEIGHT: 65 IN | WEIGHT: 173 LBS | DIASTOLIC BLOOD PRESSURE: 74 MMHG | RESPIRATION RATE: 14 BRPM
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
## 2020-12-08 DIAGNOSIS — J43.9 PULMONARY EMPHYSEMA, UNSPECIFIED EMPHYSEMA TYPE (HCC): ICD-10-CM

## 2020-12-08 DIAGNOSIS — M54.41 CHRONIC BILATERAL LOW BACK PAIN WITH BILATERAL SCIATICA: ICD-10-CM

## 2020-12-08 DIAGNOSIS — M54.12 CERVICAL RADICULOPATHY: ICD-10-CM

## 2020-12-08 DIAGNOSIS — H60.313 CHRONIC DIFFUSE OTITIS EXTERNA OF BOTH EARS: ICD-10-CM

## 2020-12-08 DIAGNOSIS — E11.69 HYPERLIPIDEMIA ASSOCIATED WITH TYPE 2 DIABETES MELLITUS (HCC): ICD-10-CM

## 2020-12-08 DIAGNOSIS — R32 URINARY INCONTINENCE, UNSPECIFIED TYPE: ICD-10-CM

## 2020-12-08 DIAGNOSIS — G89.29 CHRONIC BILATERAL LOW BACK PAIN WITH BILATERAL SCIATICA: ICD-10-CM

## 2020-12-08 DIAGNOSIS — E53.8 LOW VITAMIN B12 LEVEL: ICD-10-CM

## 2020-12-08 DIAGNOSIS — M25.512 CHRONIC LEFT SHOULDER PAIN: Primary | ICD-10-CM

## 2020-12-08 DIAGNOSIS — M54.42 CHRONIC BILATERAL LOW BACK PAIN WITH BILATERAL SCIATICA: ICD-10-CM

## 2020-12-08 DIAGNOSIS — F17.210 CIGARETTE SMOKER: ICD-10-CM

## 2020-12-08 DIAGNOSIS — Z00.01 ENCOUNTER FOR GENERAL ADULT MEDICAL EXAMINATION WITH ABNORMAL FINDINGS: Primary | ICD-10-CM

## 2020-12-08 DIAGNOSIS — E06.3 HYPOTHYROIDISM DUE TO HASHIMOTO'S THYROIDITIS: ICD-10-CM

## 2020-12-08 DIAGNOSIS — E03.8 HYPOTHYROIDISM DUE TO HASHIMOTO'S THYROIDITIS: ICD-10-CM

## 2020-12-08 DIAGNOSIS — M54.16 LUMBAR RADICULITIS: ICD-10-CM

## 2020-12-08 DIAGNOSIS — G89.29 CHRONIC LEFT SHOULDER PAIN: Primary | ICD-10-CM

## 2020-12-08 DIAGNOSIS — N39.0 URINARY TRACT INFECTION WITHOUT HEMATURIA, SITE UNSPECIFIED: ICD-10-CM

## 2020-12-08 DIAGNOSIS — E78.5 HYPERLIPIDEMIA ASSOCIATED WITH TYPE 2 DIABETES MELLITUS (HCC): ICD-10-CM

## 2020-12-08 DIAGNOSIS — IMO0002 TYPE II DIABETES MELLITUS WITH MANIFESTATIONS, UNCONTROLLED: ICD-10-CM

## 2020-12-08 LAB
SL AMB  POCT GLUCOSE, UA: NORMAL
SL AMB LEUKOCYTE ESTERASE,UA: NORMAL
SL AMB POCT BILIRUBIN,UA: NORMAL
SL AMB POCT BLOOD,UA: NORMAL
SL AMB POCT COLOR,UA: YELLOW
SL AMB POCT KETONES,UA: NORMAL
SL AMB POCT NITRITE,UA: NORMAL
SL AMB POCT PH,UA: 5
SL AMB POCT SPECIFIC GRAVITY,UA: 1.01
SL AMB POCT URINE PROTEIN: NORMAL
SL AMB POCT UROBILINOGEN: NORMAL

## 2020-12-08 PROCEDURE — 96372 THER/PROPH/DIAG INJ SC/IM: CPT

## 2020-12-08 PROCEDURE — 99214 OFFICE O/P EST MOD 30 MIN: CPT | Performed by: INTERNAL MEDICINE

## 2020-12-08 PROCEDURE — 81002 URINALYSIS NONAUTO W/O SCOPE: CPT | Performed by: INTERNAL MEDICINE

## 2020-12-08 PROCEDURE — T1015 CLINIC SERVICE: HCPCS | Performed by: INTERNAL MEDICINE

## 2020-12-08 PROCEDURE — 3061F NEG MICROALBUMINURIA REV: CPT | Performed by: INTERNAL MEDICINE

## 2020-12-08 PROCEDURE — 4004F PT TOBACCO SCREEN RCVD TLK: CPT | Performed by: INTERNAL MEDICINE

## 2020-12-08 PROCEDURE — 1160F RVW MEDS BY RX/DR IN RCRD: CPT | Performed by: INTERNAL MEDICINE

## 2020-12-08 RX ORDER — VARENICLINE TARTRATE 0.5 MG/1
0.5 TABLET, FILM COATED ORAL 2 TIMES DAILY
Qty: 60 TABLET | Refills: 2 | Status: SHIPPED | OUTPATIENT
Start: 2020-12-08 | End: 2021-08-11 | Stop reason: ALTCHOICE

## 2020-12-08 RX ORDER — NITROFURANTOIN 25; 75 MG/1; MG/1
100 CAPSULE ORAL DAILY
Qty: 30 CAPSULE | Refills: 1 | Status: SHIPPED | OUTPATIENT
Start: 2020-12-08 | End: 2021-01-30

## 2020-12-08 RX ORDER — TOBRAMYCIN AND DEXAMETHASONE 3; 1 MG/ML; MG/ML
SUSPENSION/ DROPS OPHTHALMIC
Qty: 5 ML | Refills: 0 | Status: SHIPPED | OUTPATIENT
Start: 2020-12-08 | End: 2021-03-10

## 2020-12-08 RX ORDER — CYANOCOBALAMIN 1000 UG/ML
1000 INJECTION INTRAMUSCULAR; SUBCUTANEOUS
Status: SHIPPED | OUTPATIENT
Start: 2020-12-08

## 2020-12-08 RX ADMIN — CYANOCOBALAMIN 1000 MCG: 1000 INJECTION INTRAMUSCULAR; SUBCUTANEOUS at 11:18

## 2020-12-09 ENCOUNTER — OFFICE VISIT (OUTPATIENT)
Dept: PAIN MEDICINE | Facility: MEDICAL CENTER | Age: 70
End: 2020-12-09
Payer: OTHER MISCELLANEOUS

## 2020-12-09 VITALS
RESPIRATION RATE: 18 BRPM | WEIGHT: 171 LBS | TEMPERATURE: 97.8 F | HEART RATE: 49 BPM | BODY MASS INDEX: 28.49 KG/M2 | SYSTOLIC BLOOD PRESSURE: 126 MMHG | HEIGHT: 65 IN | DIASTOLIC BLOOD PRESSURE: 71 MMHG

## 2020-12-09 DIAGNOSIS — M54.12 CERVICAL RADICULOPATHY: Primary | ICD-10-CM

## 2020-12-09 DIAGNOSIS — M54.42 CHRONIC BILATERAL LOW BACK PAIN WITH BILATERAL SCIATICA: ICD-10-CM

## 2020-12-09 DIAGNOSIS — G89.29 CHRONIC BILATERAL LOW BACK PAIN WITH BILATERAL SCIATICA: ICD-10-CM

## 2020-12-09 DIAGNOSIS — M25.512 CHRONIC LEFT SHOULDER PAIN: ICD-10-CM

## 2020-12-09 DIAGNOSIS — M54.41 CHRONIC BILATERAL LOW BACK PAIN WITH BILATERAL SCIATICA: ICD-10-CM

## 2020-12-09 DIAGNOSIS — G89.29 CHRONIC LEFT SHOULDER PAIN: ICD-10-CM

## 2020-12-09 PROCEDURE — 99213 OFFICE O/P EST LOW 20 MIN: CPT | Performed by: NURSE PRACTITIONER

## 2020-12-09 PROCEDURE — 3008F BODY MASS INDEX DOCD: CPT | Performed by: INTERNAL MEDICINE

## 2020-12-11 ENCOUNTER — TELEPHONE (OUTPATIENT)
Dept: FAMILY MEDICINE CLINIC | Facility: CLINIC | Age: 70
End: 2020-12-11

## 2020-12-11 DIAGNOSIS — Z20.822 ENCOUNTER FOR SCREENING LABORATORY TESTING FOR COVID-19 VIRUS: Primary | ICD-10-CM

## 2020-12-11 DIAGNOSIS — IMO0002 TYPE II DIABETES MELLITUS WITH MANIFESTATIONS, UNCONTROLLED: ICD-10-CM

## 2020-12-11 DIAGNOSIS — Z20.822 ENCOUNTER FOR SCREENING LABORATORY TESTING FOR COVID-19 VIRUS: ICD-10-CM

## 2020-12-11 PROCEDURE — U0003 INFECTIOUS AGENT DETECTION BY NUCLEIC ACID (DNA OR RNA); SEVERE ACUTE RESPIRATORY SYNDROME CORONAVIRUS 2 (SARS-COV-2) (CORONAVIRUS DISEASE [COVID-19]), AMPLIFIED PROBE TECHNIQUE, MAKING USE OF HIGH THROUGHPUT TECHNOLOGIES AS DESCRIBED BY CMS-2020-01-R: HCPCS | Performed by: INTERNAL MEDICINE

## 2020-12-12 LAB — SARS-COV-2 RNA SPEC QL NAA+PROBE: NOT DETECTED

## 2020-12-14 ENCOUNTER — TELEPHONE (OUTPATIENT)
Dept: FAMILY MEDICINE CLINIC | Facility: CLINIC | Age: 70
End: 2020-12-14

## 2020-12-15 DIAGNOSIS — R35.0 URINARY FREQUENCY: Primary | ICD-10-CM

## 2020-12-15 RX ORDER — LEVOFLOXACIN 250 MG/1
250 TABLET ORAL DAILY
Qty: 10 TABLET | Refills: 0 | Status: SHIPPED | OUTPATIENT
Start: 2020-12-15 | End: 2020-12-25

## 2020-12-21 ENCOUNTER — TRANSCRIBE ORDERS (OUTPATIENT)
Dept: LAB | Facility: HOSPITAL | Age: 70
End: 2020-12-21

## 2020-12-21 ENCOUNTER — LAB (OUTPATIENT)
Dept: LAB | Facility: HOSPITAL | Age: 70
End: 2020-12-21
Payer: COMMERCIAL

## 2020-12-21 ENCOUNTER — HOSPITAL ENCOUNTER (OUTPATIENT)
Dept: CT IMAGING | Facility: HOSPITAL | Age: 70
Discharge: HOME | End: 2020-12-21
Payer: COMMERCIAL

## 2020-12-21 ENCOUNTER — HOSPITAL ENCOUNTER (OUTPATIENT)
Dept: ULTRASOUND IMAGING | Facility: HOSPITAL | Age: 70
Discharge: HOME | End: 2020-12-21
Payer: COMMERCIAL

## 2020-12-21 DIAGNOSIS — F17.210 CIGARETTE SMOKER: ICD-10-CM

## 2020-12-21 DIAGNOSIS — E78.5 HYPERLIPIDEMIA ASSOCIATED WITH TYPE 2 DIABETES MELLITUS (HCC): ICD-10-CM

## 2020-12-21 DIAGNOSIS — R32 URINARY INCONTINENCE, UNSPECIFIED TYPE: ICD-10-CM

## 2020-12-21 DIAGNOSIS — IMO0002 TYPE II DIABETES MELLITUS WITH MANIFESTATIONS, UNCONTROLLED: ICD-10-CM

## 2020-12-21 DIAGNOSIS — E11.69 HYPERLIPIDEMIA ASSOCIATED WITH TYPE 2 DIABETES MELLITUS (HCC): ICD-10-CM

## 2020-12-21 DIAGNOSIS — E06.3 HYPOTHYROIDISM DUE TO HASHIMOTO'S THYROIDITIS: ICD-10-CM

## 2020-12-21 DIAGNOSIS — E03.8 HYPOTHYROIDISM DUE TO HASHIMOTO'S THYROIDITIS: ICD-10-CM

## 2020-12-21 LAB
ALBUMIN SERPL BCP-MCNC: 3.3 G/DL (ref 3–5.2)
ALP SERPL-CCNC: 91 U/L (ref 43–122)
ALT SERPL W P-5'-P-CCNC: 13 U/L (ref 9–52)
ANION GAP SERPL CALCULATED.3IONS-SCNC: 3 MMOL/L (ref 5–14)
AST SERPL W P-5'-P-CCNC: 25 U/L (ref 14–36)
BASOPHILS # BLD AUTO: 0 THOUSANDS/ΜL (ref 0–0.1)
BASOPHILS NFR BLD AUTO: 1 % (ref 0–1)
BILIRUB SERPL-MCNC: 0.4 MG/DL
BUN SERPL-MCNC: 8 MG/DL (ref 5–25)
CALCIUM ALBUM COR SERPL-MCNC: 9.2 MG/DL (ref 8.3–10.1)
CALCIUM SERPL-MCNC: 8.6 MG/DL (ref 8.4–10.2)
CHLORIDE SERPL-SCNC: 105 MMOL/L (ref 97–108)
CHOLEST SERPL-MCNC: 205 MG/DL
CO2 SERPL-SCNC: 30 MMOL/L (ref 22–30)
CREAT SERPL-MCNC: 0.75 MG/DL (ref 0.6–1.2)
EOSINOPHIL # BLD AUTO: 0.2 THOUSAND/ΜL (ref 0–0.4)
EOSINOPHIL NFR BLD AUTO: 6 % (ref 0–6)
ERYTHROCYTE [DISTWIDTH] IN BLOOD BY AUTOMATED COUNT: 14.1 %
EST. AVERAGE GLUCOSE BLD GHB EST-MCNC: 105 MG/DL
GFR SERPL CREATININE-BSD FRML MDRD: 81 ML/MIN/1.73SQ M
GLUCOSE P FAST SERPL-MCNC: 69 MG/DL (ref 70–99)
HBA1C MFR BLD: 5.3 %
HCT VFR BLD AUTO: 34.4 % (ref 36–46)
HDLC SERPL-MCNC: 61 MG/DL
HGB BLD-MCNC: 11.2 G/DL (ref 12–16)
LDLC SERPL CALC-MCNC: 124 MG/DL
LYMPHOCYTES # BLD AUTO: 0.7 THOUSANDS/ΜL (ref 0.5–4)
LYMPHOCYTES NFR BLD AUTO: 19 % (ref 25–45)
MAGNESIUM SERPL-MCNC: 2 MG/DL (ref 1.6–2.3)
MCH RBC QN AUTO: 31.2 PG (ref 26–34)
MCHC RBC AUTO-ENTMCNC: 32.5 G/DL (ref 31–36)
MCV RBC AUTO: 96 FL (ref 80–100)
MONOCYTES # BLD AUTO: 0.2 THOUSAND/ΜL (ref 0.2–0.9)
MONOCYTES NFR BLD AUTO: 7 % (ref 1–10)
NEUTROPHILS # BLD AUTO: 2.5 THOUSANDS/ΜL (ref 1.8–7.8)
NEUTS SEG NFR BLD AUTO: 67 % (ref 45–65)
NONHDLC SERPL-MCNC: 144 MG/DL
PLATELET # BLD AUTO: 285 THOUSANDS/UL (ref 150–450)
PMV BLD AUTO: 7.7 FL (ref 8.9–12.7)
POTASSIUM SERPL-SCNC: 3.8 MMOL/L (ref 3.6–5)
PROT SERPL-MCNC: 6.3 G/DL (ref 5.9–8.4)
RBC # BLD AUTO: 3.58 MILLION/UL (ref 4–5.2)
SODIUM SERPL-SCNC: 138 MMOL/L (ref 137–147)
T4 FREE SERPL-MCNC: 1.01 NG/DL (ref 0.76–1.46)
TRIGL SERPL-MCNC: 101 MG/DL
TSH SERPL DL<=0.05 MIU/L-ACNC: 4.49 UIU/ML (ref 0.47–4.68)
WBC # BLD AUTO: 3.8 THOUSAND/UL (ref 4.5–11)

## 2020-12-21 PROCEDURE — 83036 HEMOGLOBIN GLYCOSYLATED A1C: CPT

## 2020-12-21 PROCEDURE — 83735 ASSAY OF MAGNESIUM: CPT

## 2020-12-21 PROCEDURE — G0297 LDCT FOR LUNG CA SCREEN: HCPCS

## 2020-12-21 PROCEDURE — 3044F HG A1C LEVEL LT 7.0%: CPT | Performed by: INTERNAL MEDICINE

## 2020-12-21 PROCEDURE — 36415 COLL VENOUS BLD VENIPUNCTURE: CPT

## 2020-12-21 PROCEDURE — 51798 US URINE CAPACITY MEASURE: CPT

## 2020-12-21 PROCEDURE — 85025 COMPLETE CBC W/AUTO DIFF WBC: CPT

## 2020-12-21 PROCEDURE — 84443 ASSAY THYROID STIM HORMONE: CPT

## 2020-12-21 PROCEDURE — 84439 ASSAY OF FREE THYROXINE: CPT

## 2020-12-21 PROCEDURE — 80053 COMPREHEN METABOLIC PANEL: CPT

## 2020-12-21 PROCEDURE — 80061 LIPID PANEL: CPT

## 2020-12-22 ENCOUNTER — TRANSCRIBE ORDERS (OUTPATIENT)
Dept: LAB | Facility: HOSPITAL | Age: 70
End: 2020-12-22

## 2020-12-22 LAB
BILIRUB UR QL STRIP: NEGATIVE
CLARITY UR: ABNORMAL
COLOR UR: ABNORMAL
GLUCOSE UR STRIP-MCNC: NEGATIVE MG/DL
HGB UR QL STRIP.AUTO: NEGATIVE
KETONES UR STRIP-MCNC: NEGATIVE MG/DL
LEUKOCYTE ESTERASE UR QL STRIP: NEGATIVE
NITRITE UR QL STRIP: NEGATIVE
PH UR STRIP.AUTO: 6 [PH]
PROT UR STRIP-MCNC: NEGATIVE MG/DL
SP GR UR STRIP.AUTO: 1.02 (ref 1–1.04)
UROBILINOGEN UA: NEGATIVE MG/DL

## 2021-01-04 ENCOUNTER — OFFICE VISIT (OUTPATIENT)
Dept: PAIN MEDICINE | Facility: MEDICAL CENTER | Age: 71
End: 2021-01-04
Payer: COMMERCIAL

## 2021-01-04 VITALS
DIASTOLIC BLOOD PRESSURE: 70 MMHG | TEMPERATURE: 97 F | WEIGHT: 171 LBS | SYSTOLIC BLOOD PRESSURE: 127 MMHG | HEIGHT: 65 IN | HEART RATE: 60 BPM | BODY MASS INDEX: 28.49 KG/M2

## 2021-01-04 DIAGNOSIS — M54.41 CHRONIC BILATERAL LOW BACK PAIN WITH RIGHT-SIDED SCIATICA: ICD-10-CM

## 2021-01-04 DIAGNOSIS — G89.29 CHRONIC BILATERAL LOW BACK PAIN WITH BILATERAL SCIATICA: ICD-10-CM

## 2021-01-04 DIAGNOSIS — F11.20 UNCOMPLICATED OPIOID DEPENDENCE (HCC): ICD-10-CM

## 2021-01-04 DIAGNOSIS — M54.16 LUMBAR RADICULITIS: ICD-10-CM

## 2021-01-04 DIAGNOSIS — M54.41 CHRONIC BILATERAL LOW BACK PAIN WITH BILATERAL SCIATICA: ICD-10-CM

## 2021-01-04 DIAGNOSIS — M79.18 MYOFASCIAL PAIN SYNDROME: ICD-10-CM

## 2021-01-04 DIAGNOSIS — G89.4 CHRONIC PAIN SYNDROME: ICD-10-CM

## 2021-01-04 DIAGNOSIS — G89.29 CHRONIC BILATERAL LOW BACK PAIN WITH RIGHT-SIDED SCIATICA: ICD-10-CM

## 2021-01-04 DIAGNOSIS — M54.12 CERVICAL RADICULOPATHY: ICD-10-CM

## 2021-01-04 DIAGNOSIS — M54.42 CHRONIC BILATERAL LOW BACK PAIN WITH BILATERAL SCIATICA: ICD-10-CM

## 2021-01-04 DIAGNOSIS — M47.812 SPONDYLOSIS OF CERVICAL REGION WITHOUT MYELOPATHY OR RADICULOPATHY: ICD-10-CM

## 2021-01-04 DIAGNOSIS — Z79.891 LONG-TERM CURRENT USE OF OPIATE ANALGESIC: Primary | ICD-10-CM

## 2021-01-04 PROCEDURE — 3008F BODY MASS INDEX DOCD: CPT | Performed by: NURSE PRACTITIONER

## 2021-01-04 PROCEDURE — 4004F PT TOBACCO SCREEN RCVD TLK: CPT | Performed by: NURSE PRACTITIONER

## 2021-01-04 PROCEDURE — 3078F DIAST BP <80 MM HG: CPT | Performed by: NURSE PRACTITIONER

## 2021-01-04 PROCEDURE — 99214 OFFICE O/P EST MOD 30 MIN: CPT | Performed by: NURSE PRACTITIONER

## 2021-01-04 PROCEDURE — 3074F SYST BP LT 130 MM HG: CPT | Performed by: NURSE PRACTITIONER

## 2021-01-04 PROCEDURE — 80305 DRUG TEST PRSMV DIR OPT OBS: CPT | Performed by: NURSE PRACTITIONER

## 2021-01-04 PROCEDURE — 1160F RVW MEDS BY RX/DR IN RCRD: CPT | Performed by: NURSE PRACTITIONER

## 2021-01-04 RX ORDER — HYDROCODONE BITARTRATE AND ACETAMINOPHEN 10; 325 MG/1; MG/1
TABLET ORAL
Qty: 130 TABLET | Refills: 0 | Status: SHIPPED | OUTPATIENT
Start: 2021-01-04 | End: 2021-01-04 | Stop reason: SDUPTHER

## 2021-01-04 RX ORDER — HYDROCODONE BITARTRATE AND ACETAMINOPHEN 10; 325 MG/1; MG/1
TABLET ORAL
Qty: 130 TABLET | Refills: 0 | Status: SHIPPED | OUTPATIENT
Start: 2021-02-01 | End: 2021-01-29 | Stop reason: SDUPTHER

## 2021-01-04 RX ORDER — PREGABALIN 150 MG/1
150 CAPSULE ORAL 3 TIMES DAILY
Qty: 90 CAPSULE | Refills: 1 | Status: SHIPPED | OUTPATIENT
Start: 2021-01-04 | End: 2021-03-22 | Stop reason: SDUPTHER

## 2021-01-04 RX ORDER — BACLOFEN 10 MG/1
10 TABLET ORAL 3 TIMES DAILY
Qty: 90 TABLET | Refills: 1 | Status: SHIPPED | OUTPATIENT
Start: 2021-01-04 | End: 2021-03-01 | Stop reason: SDUPTHER

## 2021-01-04 NOTE — PROGRESS NOTES
Assessment:  1  Cervical radiculopathy    2  Lumbar radiculitis    3  Chronic bilateral low back pain with bilateral sciatica    4  Myofascial pain syndrome    5  Spondylosis of cervical region without myelopathy or radiculopathy    6  Chronic pain syndrome    7  Chronic bilateral low back pain with right-sided sciatica - Bilateral        Plan:  Can you with hydrocodone as prescribed this was refilled for the next 2 months with 1 month having a do not fill date of February 1, 2021    Continue with baclofen and Lyrica these were also refilled  While the patient was in the office today an opioid contract was thoroughly reviewed and signed by the patient  The patient was given adequate time to ask questions in regards to the contract and a signed copy was sent home for his/her records  Patient to return in 8 weeks for medication refill  [place] Prescription Drug Monitoring Program report was reviewed and was appropriate     A urine drug screen was collected at today's office visit as part of our medication management protocol  The point of care testing results were appropriate for what was being prescribed  The specimen will be sent for confirmatory testing  The drug screen is medically necessary because the patient is either dependent on opioid medication or is being considered for opioid medication therapy and the results could impact ongoing or future treatment  The drug screen is to evaluate for the presences or absence of prescribed, non-prescribed, and/or illicit drugs/substances  There are risks associated with opioid medications, including dependence, addiction and tolerance  The patient understands and agrees to use these medications only as prescribed  Potential side effects of the medications include, but are not limited to, constipation, drowsiness, addiction, impaired judgment and risk of fatal overdose if not taken as prescribed   The patient was warned against driving while taking sedation medications  Sharing medications is a felony  At this point in time, the patient is showing no signs of addiction, abuse, diversion or suicidal ideation  History of Present Illness: The patient is a 79 y o  female presenting for a follow-up visit related to her chronic neck and arm pain as well as her low back and bilateral leg pain     The patient currently reports pain rated 8/10 this is constant described as burning, sharp, throbbing, pressure-like, shooting, numbness, and pins and needles  Current Facility-Administered Medications   Medication Dose Route Frequency Provider Last Rate    cyanocobalamin  1,000 mcg Intramuscular Q30 Days Aleksandar Edmondson MD      cyanocobalamin  1,000 mcg Intramuscular Q30 Days Enos Ahumada, MD      cyanocobalamin  1,000 mcg Intramuscular Q30 Days Enos Ahumada, MD      cyanocobalamin  1,000 mcg Intramuscular Q30 Days Enos Ahumada, MD     Current pain medication includes hydrocodone 10/325 mg 4-5 tablets daily, baclofen 10 mg 3 times daily and Lyrica 150 mg 3 times daily  The patient reports that this regimen is providing 30 % pain relief  The patient is reporting no side effects from this pain medication regimen  Pain Contract Signed:  January 4, 2021  Last Urine Drug Screen:  January 4, 2021    I have personally reviewed and/or updated the patient's past medical history, past surgical history, family history, social history, current medications, allergies, and vital signs today  Review of Systems:    Review of Systems   Respiratory: Positive for shortness of breath  Cardiovascular: Positive for chest pain  Gastrointestinal: Negative for constipation, diarrhea, nausea and vomiting  Musculoskeletal: Positive for gait problem and joint swelling  Negative for arthralgias and myalgias  Joint stiffness     Skin: Negative for rash  Neurological: Negative for dizziness, seizures and weakness  All other systems reviewed and are negative          Past Medical History:   Diagnosis Date    Anemia     Anxiety     hx of panic attacks (now under control)     Arthritis     Asthma     resolved (no problems in a decade)     Baker's cyst of knee     Bronchitis     Bunion, left foot     Cervical pain     Chronic GERD     Chronic pain     Chronic pain disorder     Depression     Diabetes mellitus, type 2 (HCC)     Diabetic peripheral neuropathy (HCC)     Endometriosis     Fibromyalgia     Hyperlipidemia     Hypertension     Joint pain     Low back pain     Low back pain     Lung nodules     Macular degeneration     Pulmonary emphysema ([address] ) 1/16/2020    Spondylosis        Past Surgical History:   Procedure Laterality Date    BACK SURGERY  1996    L5, S1- laser surgery fusion of c5 and c6     BREAST LUMPECTOMY Right     CHOLECYSTECTOMY  2004    FOOT SURGERY Left 2005    fusion    [facility] BYPASS  2010    Dr Jacqueline Petit    just uterus     KNEE ARTHROSCOPY      LAMINECTOMY      ORTHOPEDIC SURGERY      OVARIAN CYST REMOVAL  1975    PELVIC LAPAROSCOPY      ovaries (x10)     PLEURAL SCARIFICATION  1967    SHOULDER OPEN ROTATOR CUFF REPAIR Left 2008    TONSILLECTOMY      VAGINAL PROLAPSE REPAIR         Family History   Problem Relation Age of Onset    Hypertension Mother     Lung cancer Mother     Hypertension Father     Heart disease Father     Heart attack Father     Cancer Father        Social History     Occupational History    Not on file   Tobacco Use    Smoking status: Current Every Day Smoker     Packs/day: 0 25     Years: 40 00     Pack years: 10 00     Types: Cigarettes    Smokeless tobacco: Current User   Substance and Sexual Activity    Alcohol use: Not Currently     Alcohol/week: 1 0 standard drinks     Types: 1 Shots of liquor per week     Comment: rarely    Drug use: No    Sexual activity: Not Currently     Partners: Male         Current Outpatient Medications:    albuterol (PROVENTIL HFA,VENTOLIN HFA) 90 mcg/act inhaler, Inhale 2 puffs every 4 (four) hours as needed for wheezing, Disp: 1 Inhaler, Rfl: 0    albuterol (VENTOLIN HFA) 90 mcg/act inhaler, inhale 2 puff by inhalation route  every 4 - 6 hours as needed, Disp: , Rfl:     amLODIPine (NORVASC) 5 mg tablet, TAKE 1 TABLET BY MOUTH EVERY DAY, Disp: 90 tablet, Rfl: 1    baclofen 10 mg tablet, Take 1 tablet (10 mg total) by mouth 3 (three) times a day, Disp: 90 tablet, Rfl: 1    benzonatate (TESSALON PERLES) 100 mg capsule, Take 1 capsule (100 mg total) by mouth every 8 (eight) hours, Disp: 21 capsule, Rfl: 0    cetirizine (ZyrTEC) 10 mg tablet, TAKE 1/2 TABLET BY MOUTH DAILY, Disp: 45 tablet, Rfl: 2    cimetidine (TAGAMET) 200 mg tablet, Take 1 tablet (200 mg total) by mouth 2 (two) times a day, Disp: 60 tablet, Rfl: 3    diphenhydrAMINE (BENADRYL ALLERGY) 25 mg capsule, Take 50 mg by mouth daily , Disp: , Rfl:     ergocalciferol (VITAMIN D2) 50,000 units, Take 1 capsule (50,000 Units total) by mouth once a week, Disp: 13 capsule, Rfl: 2    [START ON 2/1/2021] HYDROcodone-acetaminophen (NORCO)  mg per tablet, Take 4-5 tablets daily PRN, Disp: 130 tablet, Rfl: 0    Lancets Misc  (ACCU-CHEK FASTCLIX LANCET) KIT, 3 (three) times a day, Disp: , Rfl:     levothyroxine 25 mcg tablet, TAKE 1 TABLET BY MOUTH EVERY MORNING ON SATURDAY, SUNDAY, TUESDAY AND THURSDAY, Disp: 30 tablet, Rfl: 2    levothyroxine 50 mcg tablet, TAKE 1 TABLET BY ORAL ROUTE EVERY MORNING ON MONDAY, WEDNESDAY AND FRIDAY, Disp: 30 tablet, Rfl: 2    losartan (COZAAR) 25 mg tablet, TAKE 1 TABLET BY MOUTH EVERY DAY, Disp: 90 tablet, Rfl: 1    MICROLET LANCETS MISC, Microlet Lancet, Disp: , Rfl:     ONETOUCH VERIO test strip, TEST TWICE A DAY, Disp: , Rfl:     pantoprazole (PROTONIX) 40 mg tablet, Take 1 tablet (40 mg total) by mouth 2 (two) times a day, Disp: 180 tablet, Rfl: 1    pregabalin (LYRICA) 150 mg capsule, Take 1 capsule (150 mg total) by mouth 3 (three) times a day, Disp: 90 capsule, Rfl: 1    sucralfate (CARAFATE) 1 g tablet, TAKE 1 TABLET BY MOUTH 4 TIMES EVERY DAY ON AN EMPTY STOMACH 1 HOUR BEFORE MEALS AND AT BEDTIME, Disp: 120 tablet, Rfl: 0    tobramycin-dexamethasone (TOBRADEX) ophthalmic suspension, Use 2-3 Drops in both Ears BID as Needed, Disp: 5 mL, Rfl: 0    traZODone (DESYREL) 50 mg tablet, TAKE 1 TABLET BY MOUTH EVERY DAY AT BEDTIME AS NEEDED, Disp: 30 tablet, Rfl: 0    vitamin A 2400 MCG (8000 UT) capsule, Take 1 capsule (8,000 Units total) by mouth daily, Disp: 90 capsule, Rfl: 1    vitamin A 2400 MCG (8000 UT) capsule, TAKE 1 CAPSULE (8,000 UNITS TOTAL) BY MOUTH DAILY, Disp: 90 capsule, Rfl: 1    fluticasone-umeclidinium-vilanterol (TRELEGY) 100-62 5-25 MCG/INH inhaler, Inhale 1 puff daily Rinse mouth after use   (Patient not taking: Reported on 12/9/2020), Disp: 1 Inhaler, Rfl: 3    methylPREDNISolone 4 MG tablet therapy pack, Use as directed on package (Patient not taking: Reported on 1/4/2021), Disp: 21 tablet, Rfl: 0    nitrofurantoin (MACROBID) 100 mg capsule, Take 1 capsule (100 mg total) by mouth daily With food/Lunch (Patient not taking: Reported on 12/9/2020), Disp: 30 capsule, Rfl: 1    varenicline (CHANTIX) 0 5 mg tablet, Take 1 tablet (0 5 mg total) by mouth 2 (two) times a day (Patient not taking: Reported on 12/9/2020), Disp: 60 tablet, Rfl: 2    Current Facility-Administered Medications:     cyanocobalamin injection 1,000 mcg, 1,000 mcg, Intramuscular, Q30 Days, Aleksandar Edmondson MD, 1,000 mcg at 12/08/20 1118    cyanocobalamin injection 1,000 mcg, 1,000 mcg, Intramuscular, Q30 Days, Aleksandar Edmondson MD, 1,000 mcg at 07/08/20 1036    cyanocobalamin injection 1,000 mcg, 1,000 mcg, Intramuscular, Q30 Days, Aleksandar Edmondson MD, 1,000 mcg at 09/08/20 1210    cyanocobalamin injection 1,000 mcg, 1,000 mcg, Intramuscular, Q30 Days, Dorian Matson MD    Allergies   Allergen Reactions    Cephalosporins Hives    Erythromycin GI Intolerance    Fentanyl Itching     Occurred during surgery     Paxil [Paroxetine] Hives     After 2 weeks    Penicillins Itching    Pravastatin Myalgia    Prednisolone Itching    Statins Itching    Sulfa Antibiotics Diarrhea    Wellbutrin [Bupropion] Hives     After 2 weeks     Marzena's Wort Rash       Physical Exam:    /70   Pulse 60   Temp (!) 97 °F (36 1 °C)   Ht 5' 5" (1 651 m)   Wt 77 6 kg (171 lb)   LMP  (LMP Unknown)   BMI 28 46 kg/m²     Constitutional:normal, well developed, well nourished, alert, in no distress and non-toxic and no overt pain behavior  Eyes:anicteric  HEENT:grossly intact  Neck:supple, symmetric, trachea midline and no masses   Pulmonary:even and unlabored  Cardiovascular:No edema or pitting edema present  Skin:Normal without rashes or lesions and well hydrated  Psychiatric:Mood and affect appropriate  Neurologic:Cranial Nerves II-XII grossly intact  Musculoskeletal:ambulates with cane      Imaging  No orders to display         No orders of the defined types were placed in this encounter

## 2021-01-04 NOTE — PATIENT INSTRUCTIONS
Opioid Safety   WHAT YOU NEED TO KNOW:   An opioid medicine is used to treat pain  Examples are oxycodone, morphine, fentanyl, or codeine  Pain control and management may help you rest, heal, and return to your daily activities  You and your family will receive information about how to manage your pain at home  The instructions will include what to do if you have side effects as your pain is managed  You will get information on how to handle opioid medicine safely  You will also get suggestions on how to control pain without opioids  It is important to follow all instructions so your pain is managed effectively  DISCHARGE INSTRUCTIONS:   Call your local emergency number (911 in the [place]), or have someone else call if:   · You have a seizure  · You cannot be woken  · You have trouble staying awake and your breathing is slow or shallow  · Your speech is slurred, or you are confused  · You are dizzy or stumble when you walk  Call your doctor, or have someone close to you call if:   · You are extremely drowsy, or you have trouble staying awake or speaking  · You have pale or clammy skin  · You have blue fingernails or lips  · Your heartbeat is slower than normal     · You cannot stop vomiting  · You have questions or concerns about your condition or care  Use opioids safely:   · Take prescribed opioids exactly as directed  Opioids come with directions based on the kind and how it is given  Talk to your healthcare provider or a pharmacist if you have any questions  Do not take more than the recommended amount  Too much can cause a life-threatening overdose  Do not continue to take it after your pain stops  You may develop tolerance  This means you keep needing higher doses to get the same effect  You may also develop opioid use disorder  This means you are not able to control your opioid use  · Do not give opioids to others or take opioids that belong to someone else    The kind or amount one person takes may not be right for another  The person you share them with may also be taking medicines that do not mix with opioids  He or she may drink alcohol or use other drugs that can cause life-threatening problems when mixed with opioids  · Do not mix opioids with other medicines or alcohol  The combination can cause an overdose, or cause you to stop breathing  Alcohol, sleeping pills, and medicines such as antihistamines can make you sleepy  A combination with opioids can lead to a coma  · Do not drive or operate heavy machinery after you use an opioid  You may feel drowsy or have trouble concentrating  You can injure yourself or others if you drive or use heavy machinery when you are not alert  Your provider or pharmacist can tell you how long to wait after a dose before you do these activities  · Talk to your healthcare provider if you have any side effects  Side effects include nausea, sleepiness, itching, and trouble thinking clearly  Your provider may need to make changes to the kind or amount of opioid you are taking  He or she can also help you find ways to prevent or relieve side effects  Manage constipation:  Constipation is the most common side effect of opioid medicine  Constipation is when you have hard, dry bowel movements, or you go longer than usual between bowel movements  Tell your healthcare provider about all changes in your bowel movements while you are taking opioids  He or she may recommend laxative medicine to help you have a bowel movement  He or she may also change the kind of opioid you are taking, or change when you take it  The following are more ways you can prevent or relieve constipation:  · Drink liquids as directed  You may need to drink extra liquids to help soften and move your bowels  Ask how much liquid to drink each day and which liquids are best for you  · Eat high-fiber foods    This may help decrease constipation by adding bulk to your bowel movements  High-fiber foods include fruits, vegetables, whole-grain breads and cereals, and beans  Your healthcare provider or dietitian can help you create a high-fiber meal plan  Your provider may also recommend a fiber supplement if you cannot get enough fiber from food  · Exercise regularly  Regular physical activity can help stimulate your intestines  Walking is a good exercise to prevent or relieve constipation  Ask which exercises are best for you  · Schedule a time each day to have a bowel movement  This may help train your body to have regular bowel movements  Bend forward while you are on the toilet to help move the bowel movement out  Sit on the toilet for at least 10 minutes, even if you do not have a bowel movement  Store opioids safely:   · Store opioids where others cannot easily get them  Keep them in a locked cabinet or secure area  Do not  keep them in a purse or other bag you carry with you  A person may be looking for something else and find the opioids  · Make sure opioids are stored out of the reach of children  A child can easily overdose on opioids  Opioids may look like candy to a small child  The best way to dispose of opioids: The laws vary by country and area  In the [place], the best way is to return the opioids through a take-back program  This program is offered by the [employer] ([employer])  The following are options for using the program:  · Take the opioids to a [employer] collection site  The site is often a law enforcement center  Call your local law enforcement center for scheduled take-back days in your area  You will be given information on where to go if the collection site is in a different location  · Take the opioids to an approved pharmacy or hospital   A pharmacy or hospital may be set up as a collection site  You will need to ask if it is a [employer] collection site if you were not directed there   A pharmacy or doctor's office may not be able to take back opioids unless it is a [employer] site  · Use a mail-back system  This means you are given containers to put the opioids into  You will then mail them in the containers  · Use a take-back drop box  This is a place to leave the opioids at any time  People and animals will not be able to get into the box  Your local law enforcement agency can tell you where to find a drop box in your area  Other safe ways to dispose of opioids: The medicine may come with disposal instructions  The instructions may vary depending on the brand of medicine you are using  Instructions may come in a Medication Guide, but not every medicine has one  You may instead get instructions from your pharmacy or doctor  Follow instructions carefully  The following are general guidelines to follow:  · Find out if you can flush the opioid  Some opioids can be flushed down the toilet or poured into the sink  You will need to contact authorities in your area to see if this is an option for you  The FDA also offers a list of medicines that are safe to flush down the toilet  You can check the list if you cannot get the information for your local area  · Ask your waste management company about rules for putting opioids in the trash  The company will be able to give you specific directions  Scratch out personal information on the original medicine label so it cannot be read  Then put it in the trash  Do not label the trash or put any information on it about the opioids  It should look like regular household trash so no one is tempted to look for the opioids  Keep the trash out of the reach of children and animals  Always make sure trash is secure  · Talk to officials if you live in a facility  If you live in a nursing home or assisted living center, talk to an official  The person will know the rules for your area  Other ways to manage pain:   · Ask your healthcare provider about non-opioid medicines to control pain  Some medicines may even work better than opioids, depending on the cause of your pain  Nonprescription medicines include NSAIDs (such as ibuprofen) and acetaminophen  Prescription medicines include muscle relaxers, antidepressants, and steroids  · Pain may be managed without any medicines  Some ways to relieve pain include massage, aromatherapy, or meditation  Physical or occupational therapy may also help  For more information:   · Drug Enforcement Administration  [address] [facility] [address]  Phone: 3- 143 - 060-0189  Web Address: [facility]/drug_disposal/    · [address] and Drug Administration  [place] , [address] [facility]  Phone: 0- 204 - [phone]  Web Address: http://[employer]/  Follow up with your doctor or pain specialist as directed: You may need to have your dose adjusted  Your doctor or pain specialist can also help you find ways to manage pain without opioids  Write down your questions so you remember to ask them during your visits  © Copyright [address] [place] [address] Information is for End User's use only and may not be sold, redistributed or otherwise used for commercial purposes  All illustrations and images included in CareNotes® are the copyrighted property of [employer]  or [facility] Katharine Schwartz  The above information is an  only  It is not intended as medical advice for individual conditions or treatments  Talk to your doctor, nurse or pharmacist before following any medical regimen to see if it is safe and effective for you

## 2021-01-06 LAB
6MAM UR QL CFM: NEGATIVE NG/ML
7AMINOCLONAZEPAM UR QL CFM: NEGATIVE NG/ML
A-OH ALPRAZ UR QL CFM: NEGATIVE NG/ML
ACCEPTABLE CREAT UR QL: NORMAL MG/DL
ACCEPTIBLE SP GR UR QL: NORMAL
AMITRIP UR QL CFM: NEGATIVE NG/ML
AMPHET UR QL CFM: NEGATIVE NG/ML
BUPRENORPHINE UR QL CFM: NEGATIVE NG/ML
BZE UR QL CFM: NEGATIVE NG/ML
CARISOPRODOL UR QL CFM: NEGATIVE NG/ML
EDDP UR QL CFM: NEGATIVE NG/ML
ETHYL GLUCURONIDE UR QL CFM: NEGATIVE NG/ML
ETHYL SULFATE UR QL SCN: NEGATIVE NG/ML
FENTANYL UR QL CFM: NEGATIVE NG/ML
GLIADIN IGG SER IA-ACNC: NEGATIVE NG/ML
GLUCOSE 30M P 50 G LAC PO SERPL-MCNC: NEGATIVE NG/ML
HYDROCODONE UR QL CFM: ABNORMAL NG/ML
HYDROMORPHONE UR QL CFM: ABNORMAL NG/ML
LORAZEPAM UR QL CFM: NEGATIVE NG/ML
MEPERIDINE UR QL CFM: NEGATIVE NG/ML
MEPROBAMATE UR QL CFM: NEGATIVE NG/ML
METHADONE UR QL CFM: NEGATIVE NG/ML
MORPHINE UR QL CFM: NEGATIVE NG/ML
NITRITE UR QL: NORMAL UG/ML
NORBUPRENORPHINE UR QL CFM: NEGATIVE NG/ML
NORDIAZEPAM UR QL CFM: NEGATIVE NG/ML
NORFENTANYL UR QL CFM: NEGATIVE NG/ML
NORHYDROCODONE UR QL CFM: ABNORMAL NG/ML
NORMEPERIDINE UR QL CFM: NEGATIVE NG/ML
NOROXYCODONE UR QL CFM: NEGATIVE NG/ML
NORTRIP UR QL CFM: NEGATIVE NG/ML
NORTRIP UR QL CFM: NEGATIVE NG/ML
OLANZAPINE QUANTIFICATION: NEGATIVE NG/ML
OPC-3373 QUANTIFICATION: NEGATIVE
OXAZEPAM UR QL CFM: NEGATIVE NG/ML
OXYCODONE UR QL CFM: NEGATIVE NG/ML
OXYMORPHONE UR QL CFM: NEGATIVE NG/ML
OXYMORPHONE UR QL CFM: NEGATIVE NG/ML
PHENOBARB UR QL CFM: NEGATIVE NG/ML
RESULT ALL_PRESCRIBED MEDS AND SPECIAL INSTRUCTIONS: NORMAL
SECOBARBITAL UR QL CFM: NEGATIVE NG/ML
SL AMB 3-METHYL-FENTANYL QUANTIFICATION: NORMAL NG/ML
SL AMB 4-ANPP QUANTIFICATION: NORMAL NG/ML
SL AMB 4-FIBF QUANTIFICATION: NORMAL NG/ML
SL AMB 7-OH-MITRAGYNINE (KRATOM ALKALOID) QUANTIFICATION: NEGATIVE NG/ML
SL AMB ACETYL FENTANYL QUANTIFICATION: NORMAL NG/ML
SL AMB ACETYL NORFENTANYL QUANTIFICATION: NORMAL NG/ML
SL AMB ACRYL FENTANYL QUANTIFICATION: NORMAL NG/ML
SL AMB BUTRYL FENTANYL QUANTIFICATION: NORMAL NG/ML
SL AMB CARFENTANIL QUANTIFICATION: NORMAL NG/ML
SL AMB CITALOPRAM/ESCITALOPRAM QUANTIFICATION: NEGATIVE NG/ML
SL AMB CITALOPRAM/ESCITALOPRAM QUANTIFICATION: NEGATIVE NG/ML
SL AMB CLOZAPINE QUANTIFICATION: NEGATIVE NG/ML
SL AMB CTHC (MARIJUANA METABOLITE) QUANTIFICATION: NEGATIVE NG/ML
SL AMB CYCLOPROPYL FENTANYL QUANTIFICATION: NORMAL NG/ML
SL AMB FURANYL FENTANYL QUANTIFICATION: NORMAL NG/ML
SL AMB HALOPERIDOL  QUANTIFICATION: NEGATIVE NG/ML
SL AMB HALOPERIDOL METABOLITE QUANTIFICATION: NEGATIVE NG/ML
SL AMB METHOXYACETYL FENTANYL QUANTIFICATION: NORMAL NG/ML
SL AMB N-DESMETHYL U-47700 QUANTIFICATION: NORMAL NG/ML
SL AMB N-DESMETHYL-TRAMADOL QUANTIFICATION: NEGATIVE NG/ML
SL AMB N-DESMETHYLCLOZAPINE QUANTIFICATION: NEGATIVE NG/ML
SL AMB PREGABALIN QUANTIFICATION: ABNORMAL
SL AMB U-47700 QUANTIFICATION: NORMAL NG/ML
SPECIMEN PH ACCEPTABLE UR: NORMAL
TAPENTADOL UR QL CFM: NEGATIVE NG/ML
TEMAZEPAM UR QL CFM: NEGATIVE NG/ML
TEMAZEPAM UR QL CFM: NEGATIVE NG/ML
TRAMADOL UR QL CFM: NEGATIVE NG/ML
URATE/CREAT 24H UR: NEGATIVE NG/ML

## 2021-01-09 DIAGNOSIS — G47.00 INSOMNIA, UNSPECIFIED TYPE: ICD-10-CM

## 2021-01-09 RX ORDER — TRAZODONE HYDROCHLORIDE 50 MG/1
TABLET ORAL
Qty: 30 TABLET | Refills: 0 | Status: SHIPPED | OUTPATIENT
Start: 2021-01-09 | End: 2021-01-30

## 2021-01-19 DIAGNOSIS — K27.9 PUD (PEPTIC ULCER DISEASE): ICD-10-CM

## 2021-01-19 RX ORDER — SUCRALFATE 1 G/1
TABLET ORAL
Qty: 120 TABLET | Refills: 0 | Status: SHIPPED | OUTPATIENT
Start: 2021-01-19 | End: 2021-02-12

## 2021-01-21 ENCOUNTER — TELEPHONE (OUTPATIENT)
Dept: PAIN MEDICINE | Facility: MEDICAL CENTER | Age: 71
End: 2021-01-21

## 2021-01-21 NOTE — TELEPHONE ENCOUNTER
[facility] called stating that brand name Ashleigh Mahnomen is too expensive for the pt and pharmacist  would like to know if its okay to try generic brand

## 2021-01-21 NOTE — TELEPHONE ENCOUNTER
S/W Ananth Parent at [address] [facility] her of the same  She verbalized understanding

## 2021-01-28 DIAGNOSIS — M79.18 MYOFASCIAL PAIN SYNDROME: ICD-10-CM

## 2021-01-29 ENCOUNTER — TELEPHONE (OUTPATIENT)
Dept: PAIN MEDICINE | Facility: MEDICAL CENTER | Age: 71
End: 2021-01-29

## 2021-01-29 DIAGNOSIS — G89.4 CHRONIC PAIN SYNDROME: ICD-10-CM

## 2021-01-29 DIAGNOSIS — N39.0 URINARY TRACT INFECTION WITHOUT HEMATURIA, SITE UNSPECIFIED: ICD-10-CM

## 2021-01-29 DIAGNOSIS — G47.00 INSOMNIA, UNSPECIFIED TYPE: ICD-10-CM

## 2021-01-29 DIAGNOSIS — R32 URINARY INCONTINENCE, UNSPECIFIED TYPE: ICD-10-CM

## 2021-01-29 RX ORDER — HYDROCODONE BITARTRATE AND ACETAMINOPHEN 10; 325 MG/1; MG/1
TABLET ORAL
Qty: 130 TABLET | Refills: 0 | Status: SHIPPED | OUTPATIENT
Start: 2021-01-30 | End: 2021-03-01 | Stop reason: SDUPTHER

## 2021-01-29 NOTE — TELEPHONE ENCOUNTER
Pt says she is going to run out of her oxycodone on Monday  She is asking if she can refill it early before the snow storm? She says she is 80 y/o and unable to travel in those weather conditions  She is asking if we can allow her an early refill for Saturday or Sunday? Preferably Saturday if possible  [facility] target in [place]   Call back# [phone]

## 2021-01-29 NOTE — TELEPHONE ENCOUNTER
Please see previous task-    RN s/w [facility] Pharmacist who advised they can't fill the script until 2/1 per the order  A new script would need to be sent for pt to  prior to 2/1  Pt last filled her med on 1/4/21 for a 26 day supply of 130 tabs        Please advise-

## 2021-01-30 RX ORDER — TRAZODONE HYDROCHLORIDE 50 MG/1
TABLET ORAL
Qty: 30 TABLET | Refills: 0 | Status: SHIPPED | OUTPATIENT
Start: 2021-01-30 | End: 2021-02-26

## 2021-01-30 RX ORDER — NITROFURANTOIN 25; 75 MG/1; MG/1
100 CAPSULE ORAL DAILY
Qty: 30 CAPSULE | Refills: 1 | Status: SHIPPED | OUTPATIENT
Start: 2021-01-30 | End: 2021-03-10 | Stop reason: SDUPTHER

## 2021-02-01 RX ORDER — BACLOFEN 10 MG/1
TABLET ORAL
Qty: 270 TABLET | Refills: 0 | OUTPATIENT
Start: 2021-02-01

## 2021-02-09 ENCOUNTER — TELEPHONE (OUTPATIENT)
Dept: FAMILY MEDICINE CLINIC | Facility: CLINIC | Age: 71
End: 2021-02-09

## 2021-02-09 NOTE — TELEPHONE ENCOUNTER
Patient would like letter to get out of jury duty due to medical reasons  Consuelo Rai ID #[id number]   seq #[id number]    Fax to [phone]    Danita Hernandez to write letter?   Please advsie

## 2021-02-12 DIAGNOSIS — M79.18 MYOFASCIAL PAIN SYNDROME: ICD-10-CM

## 2021-02-12 DIAGNOSIS — K27.9 PUD (PEPTIC ULCER DISEASE): ICD-10-CM

## 2021-02-12 RX ORDER — SUCRALFATE 1 G/1
TABLET ORAL
Qty: 120 TABLET | Refills: 0 | Status: SHIPPED | OUTPATIENT
Start: 2021-02-12 | End: 2021-03-11

## 2021-02-15 RX ORDER — BACLOFEN 10 MG/1
TABLET ORAL
Qty: 90 TABLET | Refills: 1 | OUTPATIENT
Start: 2021-02-15

## 2021-02-26 DIAGNOSIS — G47.00 INSOMNIA, UNSPECIFIED TYPE: ICD-10-CM

## 2021-02-26 RX ORDER — TRAZODONE HYDROCHLORIDE 50 MG/1
TABLET ORAL
Qty: 30 TABLET | Refills: 0 | Status: SHIPPED | OUTPATIENT
Start: 2021-02-26 | End: 2021-02-28

## 2021-02-28 DIAGNOSIS — G47.00 INSOMNIA, UNSPECIFIED TYPE: ICD-10-CM

## 2021-02-28 RX ORDER — TRAZODONE HYDROCHLORIDE 50 MG/1
TABLET ORAL
Qty: 30 TABLET | Refills: 0 | Status: SHIPPED | OUTPATIENT
Start: 2021-02-28 | End: 2021-04-12

## 2021-03-01 ENCOUNTER — OFFICE VISIT (OUTPATIENT)
Dept: PAIN MEDICINE | Facility: MEDICAL CENTER | Age: 71
End: 2021-03-01
Payer: COMMERCIAL

## 2021-03-01 VITALS
SYSTOLIC BLOOD PRESSURE: 154 MMHG | WEIGHT: 178 LBS | TEMPERATURE: 97.4 F | BODY MASS INDEX: 29.66 KG/M2 | DIASTOLIC BLOOD PRESSURE: 72 MMHG | HEIGHT: 65 IN | HEART RATE: 72 BPM

## 2021-03-01 DIAGNOSIS — M79.18 MYOFASCIAL PAIN SYNDROME: ICD-10-CM

## 2021-03-01 DIAGNOSIS — G89.29 CHRONIC BILATERAL LOW BACK PAIN WITH BILATERAL SCIATICA: ICD-10-CM

## 2021-03-01 DIAGNOSIS — M47.812 SPONDYLOSIS OF CERVICAL REGION WITHOUT MYELOPATHY OR RADICULOPATHY: ICD-10-CM

## 2021-03-01 DIAGNOSIS — M54.41 CHRONIC BILATERAL LOW BACK PAIN WITH BILATERAL SCIATICA: ICD-10-CM

## 2021-03-01 DIAGNOSIS — G89.29 CHRONIC LEFT SHOULDER PAIN: ICD-10-CM

## 2021-03-01 DIAGNOSIS — M25.512 CHRONIC LEFT SHOULDER PAIN: ICD-10-CM

## 2021-03-01 DIAGNOSIS — M54.16 LUMBAR RADICULITIS: Primary | ICD-10-CM

## 2021-03-01 DIAGNOSIS — M54.42 CHRONIC BILATERAL LOW BACK PAIN WITH BILATERAL SCIATICA: ICD-10-CM

## 2021-03-01 DIAGNOSIS — G89.4 CHRONIC PAIN SYNDROME: ICD-10-CM

## 2021-03-01 PROCEDURE — 99214 OFFICE O/P EST MOD 30 MIN: CPT | Performed by: NURSE PRACTITIONER

## 2021-03-01 RX ORDER — HYDROCODONE BITARTRATE AND ACETAMINOPHEN 10; 325 MG/1; MG/1
TABLET ORAL
Qty: 130 TABLET | Refills: 0 | Status: SHIPPED | OUTPATIENT
Start: 2021-03-01 | End: 2021-03-01 | Stop reason: SDUPTHER

## 2021-03-01 RX ORDER — HYDROCODONE BITARTRATE AND ACETAMINOPHEN 10; 325 MG/1; MG/1
TABLET ORAL
Qty: 130 TABLET | Refills: 0 | Status: SHIPPED | OUTPATIENT
Start: 2021-03-31 | End: 2021-04-26 | Stop reason: SDUPTHER

## 2021-03-01 RX ORDER — BACLOFEN 10 MG/1
10 TABLET ORAL 3 TIMES DAILY
Qty: 90 TABLET | Refills: 1 | Status: SHIPPED | OUTPATIENT
Start: 2021-03-01 | End: 2021-04-26 | Stop reason: SDUPTHER

## 2021-03-01 NOTE — PROGRESS NOTES
Assessment  1  Lumbar radiculitis    2  Myofascial pain syndrome    3  Chronic bilateral low back pain with bilateral sciatica    4  Spondylosis of cervical region without myelopathy or radiculopathy    5  Chronic pain syndrome    6  Chronic left shoulder pain        Plan    Continue with hydrocodone as prescribed she was given a 2 month supply the medication with 1 month having a do not fill date of March 31, 2021  continue with baclofen and Lyrica these were also  Refilled today  Follow-up in 8 weeks for medication refills        There are risks associated with opioid medications, including dependence, addiction and tolerance  The patient understands and agrees to use these medications only as prescribed  Potential side effects of the medications include, but are not limited to, constipation, drowsiness, addiction, impaired judgment and risk of fatal overdose if not taken as prescribed  The patient was warned against driving while taking sedation medications  Sharing medications is a felony  At this point in time, the patient is showing no signs of addiction, abuse, diversion or suicidal ideation  [place] Prescription Drug Monitoring Program report was reviewed and was appropriate         My impressions and treatment recommendations were discussed in detail with the patient who verbalized understanding and had no further questions  Discharge instructions were provided  I personally saw and examined the patient and I agree with the above discussed plan of care  No orders of the defined types were placed in this encounter      New Medications Ordered This Visit   Medications    baclofen 10 mg tablet     Sig: Take 1 tablet (10 mg total) by mouth 3 (three) times a day     Dispense:  90 tablet     Refill:  1    HYDROcodone-acetaminophen (NORCO)  mg per tablet     Sig: Take 4-5 tablets daily PRN     Dispense:  130 tablet     Refill:  0     Early fill this one time because of upcoming weather concerns       History of Present Illness    Nelsy Garcia is a 79 y o  female  Presents for follow-up related to her chronic neck and shoulder pain as well as her low back and leg pain  Today she rates her pain 7/10 this is constant and described as burning, sharp, throbbing, pressure-like, shooting, numbness, and pins and needles  Patient continues to use hydrocodone 10/325 mg 4-5 tablets per day along with baclofen 10 mg 3 times daily and Lyrica 150 mg 3 times daily  She gets 30% relief from her medication which she feels is adequate without any side effects or issues  Patient is still participating in physical therapy for her shoulder pain and will be following up with her orthopedic specialist in the near future  I have personally reviewed and/or updated the patient's past medical history, past surgical history, family history, social history, current medications, allergies, and vital signs today  Review of Systems   Respiratory: Negative for shortness of breath  Cardiovascular: Negative for chest pain  Gastrointestinal: Negative for constipation, diarrhea, nausea and vomiting  Musculoskeletal: Positive for back pain, gait problem (arms and right leg pain  ), joint swelling and neck pain  Negative for arthralgias and myalgias  Skin: Negative for rash  Neurological: Positive for dizziness  Negative for seizures and weakness  All other systems reviewed and are negative        Patient Active Problem List   Diagnosis    Chronic pain syndrome    Cervical radiculopathy    Myofascial pain syndrome    Spondylosis of cervical region without myelopathy or radiculopathy    Fibromyalgia    Diabetic peripheral neuropathy (HCC)    Long-term current use of opiate analgesic    MGUS (monoclonal gammopathy of unknown significance)    Iron deficiency anemia following bariatric surgery    Light headedness    [facility]com[facility] [address] [facility]    [facility] [address] [facility]    Pulmonary emphysema ([facility] [address] )    Primary osteoarthritis of right knee    Pseudogout of left knee    Lumbar radiculitis    Chronic bilateral low back pain with bilateral sciatica    Rotator cuff syndrome, left    Left shoulder pain    Dizziness    Other fatigue    Biceps tendinitis of left shoulder    Adhesive capsulitis of left shoulder    Hypoglycemia       Past Medical History:   Diagnosis Date    Anemia     Anxiety     hx of panic attacks (now under control)     Arthritis     Asthma     resolved (no problems in a decade)     Baker's cyst of knee     Bronchitis     Bunion, left foot     Cervical pain     Chronic GERD     Chronic pain     Chronic pain disorder     Depression     Diabetes mellitus, type 2 (HCC)     Diabetic peripheral neuropathy (HCC)     Endometriosis     Fibromyalgia     Hyperlipidemia     Hypertension     Joint pain     Low back pain     Low back pain     Lung nodules     Macular degeneration     Pulmonary emphysema ([facility] [address] ) 1/16/2020    Spondylosis        Past Surgical History:   Procedure Laterality Date    BACK SURGERY  1996    L5, S1- laser surgery fusion of c5 and c6     BREAST LUMPECTOMY Right     CHOLECYSTECTOMY  2004    FOOT SURGERY Left 2005    fusion    [facility] BYPASS  2010    Dr Gilford Moan just uterus     KNEE ARTHROSCOPY      LAMINECTOMY      ORTHOPEDIC SURGERY      OVARIAN CYST REMOVAL  1975    PELVIC LAPAROSCOPY      ovaries (x10)     PLEURAL SCARIFICATION  1967    SHOULDER OPEN ROTATOR CUFF REPAIR Left 2008    TONSILLECTOMY      VAGINAL PROLAPSE REPAIR         Family History   Problem Relation Age of Onset    Hypertension Mother     Lung cancer Mother     Hypertension Father     Heart disease Father     Heart attack Father     Cancer Father        Social History     Occupational History    Not on file   Tobacco Use    Smoking status: Current Every Day Smoker     Packs/day: 0 25     Years: 40 00     Pack years: 10 00     Types: Cigarettes    Smokeless tobacco: Current User   Substance and Sexual Activity    Alcohol use: Not Currently     Alcohol/week: 1 0 standard drinks     Types: 1 Shots of liquor per week     Comment: rarely    Drug use: No    Sexual activity: Not Currently     Partners: Male       Current Outpatient Medications on File Prior to Visit   Medication Sig    albuterol (PROVENTIL HFA,VENTOLIN HFA) 90 mcg/act inhaler Inhale 2 puffs every 4 (four) hours as needed for wheezing    albuterol (VENTOLIN HFA) 90 mcg/act inhaler inhale 2 puff by inhalation route  every 4 - 6 hours as needed    amLODIPine (NORVASC) 5 mg tablet TAKE 1 TABLET BY MOUTH EVERY DAY    benzonatate (TESSALON PERLES) 100 mg capsule Take 1 capsule (100 mg total) by mouth every 8 (eight) hours    cetirizine (ZyrTEC) 10 mg tablet TAKE 1/2 TABLET BY MOUTH DAILY    cimetidine (TAGAMET) 200 mg tablet Take 1 tablet (200 mg total) by mouth 2 (two) times a day    diphenhydrAMINE (BENADRYL ALLERGY) 25 mg capsule Take 50 mg by mouth daily     ergocalciferol (VITAMIN D2) 50,000 units Take 1 capsule (50,000 Units total) by mouth once a week    Lancets Misc  (ACCU-CHEK FASTCLIX LANCET) KIT 3 (three) times a day    levothyroxine 25 mcg tablet TAKE 1 TABLET BY MOUTH EVERY MORNING ON SATURDAY, SUNDAY, TUESDAY AND THURSDAY    levothyroxine 50 mcg tablet TAKE 1 TABLET BY ORAL ROUTE EVERY MORNING ON MONDAY, WEDNESDAY AND FRIDAY    losartan (COZAAR) 25 mg tablet TAKE 1 TABLET BY MOUTH EVERY DAY    MICROLET LANCETS MISC Microlet Lancet    nitrofurantoin (MACROBID) 100 mg capsule TAKE 1 CAPSULE (100 MG TOTAL) BY MOUTH DAILY WITH FOOD/LUNCH    ONETOUCH VERIO test strip TEST TWICE A DAY    pantoprazole (PROTONIX) 40 mg tablet Take 1 tablet (40 mg total) by mouth 2 (two) times a day    pregabalin (LYRICA) 150 mg capsule Take 1 capsule (150 mg total) by mouth 3 (three) times a day    sucralfate (CARAFATE) 1 g tablet TAKE 1 TABLET BY MOUTH 4 TIMES EVERY DAY ON AN EMPTY STOMACH 1 HOUR BEFORE MEALS AND AT BEDTIME    traZODone (DESYREL) 50 mg tablet TAKE 1 TABLET BY MOUTH EVERY DAY AT BEDTIME AS NEEDED    vitamin A 2400 MCG (8000 UT) capsule Take 1 capsule (8,000 Units total) by mouth daily    [DISCONTINUED] HYDROcodone-acetaminophen (NORCO)  mg per tablet Take 4-5 tablets daily PRN    fluticasone-umeclidinium-vilanterol (TRELEGY) 100-62 5-25 MCG/INH inhaler Inhale 1 puff daily Rinse mouth after use   (Patient not taking: Reported on 12/9/2020)    methylPREDNISolone 4 MG tablet therapy pack Use as directed on package (Patient not taking: Reported on 1/4/2021)    tobramycin-dexamethasone (TOBRADEX) ophthalmic suspension Use 2-3 Drops in both Ears BID as Needed (Patient not taking: Reported on 3/1/2021)    varenicline (CHANTIX) 0 5 mg tablet Take 1 tablet (0 5 mg total) by mouth 2 (two) times a day (Patient not taking: Reported on 12/9/2020)    vitamin A 2400 MCG (8000 UT) capsule TAKE 1 CAPSULE (8,000 UNITS TOTAL) BY MOUTH DAILY (Patient not taking: Reported on 3/1/2021)    [DISCONTINUED] baclofen 10 mg tablet Take 1 tablet (10 mg total) by mouth 3 (three) times a day     Current Facility-Administered Medications on File Prior to Visit   Medication    cyanocobalamin injection 1,000 mcg    cyanocobalamin injection 1,000 mcg    cyanocobalamin injection 1,000 mcg    cyanocobalamin injection 1,000 mcg       Allergies   Allergen Reactions    Cephalosporins Hives    Erythromycin GI Intolerance    Fentanyl Itching     Occurred during surgery     Paxil [Paroxetine] Hives     After 2 weeks    Penicillins Itching    Pravastatin Myalgia    Prednisolone Itching    Statins Itching    Sulfa Antibiotics Diarrhea    Wellbutrin [Bupropion] Hives     After 2 weeks     Marzena's Wort Rash       Physical Exam    /72   Pulse 72   Temp (!) 97 4 °F (36 3 °C) (Temporal)   Ht 5' 5" (1 651 m)   Wt 80 7 kg (178 lb)   LMP  (LMP Unknown)   BMI 29 62 kg/m²     Constitutional: normal, well developed, well nourished, alert, in no distress and non-toxic and no overt pain behavior    Eyes: anicteric  HEENT: grossly intact  Neck: supple, symmetric, trachea midline and no masses   Pulmonary:even and unlabored  Cardiovascular:No edema or pitting edema present  Skin:Normal without rashes or lesions and well hydrated  Psychiatric:Mood and affect appropriate  Neurologic:Cranial Nerves II-XII grossly intact  Musculoskeletal:ambulates with cane    Imaging

## 2021-03-01 NOTE — PATIENT INSTRUCTIONS
Opioid Safety   AMBULATORY CARE:   Opioid safety  includes the correct use, storage, and disposal of opioids  Examples of opioid medicines to treat pain include oxycodone, morphine, fentanyl, and codeine  Call your local emergency number (911 in the [address]), or have someone else call if:   · You have a seizure  · You cannot be woken  · You have trouble staying awake and your breathing is slow or shallow  · Your speech is slurred, or you are confused  · You are dizzy or stumble when you walk  Call your doctor, or have someone close to you call if:   · You are extremely drowsy, or you have trouble staying awake or speaking  · You have pale or clammy skin  · You have blue fingernails or lips  · Your heartbeat is slower than normal     · You cannot stop vomiting  · You have questions or concerns about your condition or care  Use opioids safely:   · Take prescribed opioids exactly as directed  Opioids come with directions based on the kind of opioid and how it is given  Do not take more than the recommended amount, or for longer than needed  · Do not give opioids to others or take opioids that belong to someone else  Misuse of opioids can lead to an addiction or overdose  · Do not mix opioids with other medicines or alcohol  The combination can cause an overdose, or lead to a coma  · Do not drive or operate heavy machinery after you take the opioid  Your provider or pharmacist can tell you how long to wait after a dose before you do these activities  · Talk to your healthcare provider if you have any side effects  He or she can help you prevent or relieve side effects  Side effects include nausea, sleepiness, itching, and trouble thinking clearly  Manage constipation:  Constipation is the most common side effect of opioid medicine  Constipation is when you have hard, dry bowel movements, or you go longer than usual between bowel movements   Tell your healthcare provider about all changes in your bowel movements while you are taking opioids  He or she may recommend laxative medicine to help you have a bowel movement  He or she may also change the kind of opioid you are taking, or change when you take it  The following are more ways you can prevent or relieve constipation:  · Drink liquids as directed  You may need to drink extra liquids to help soften and move your bowels  Ask how much liquid to drink each day and which liquids are best for you  · Eat high-fiber foods  This may help decrease constipation by adding bulk to your bowel movements  High-fiber foods include fruits, vegetables, whole-grain breads and cereals, and beans  Your healthcare provider or dietitian can help you create a high-fiber meal plan  Your provider may also recommend a fiber supplement if you cannot get enough fiber from food  · Exercise regularly  Regular physical activity can help stimulate your intestines  Walking is a good exercise to prevent or relieve constipation  Ask which exercises are best for you  · Schedule a time each day to have a bowel movement  This may help train your body to have regular bowel movements  Bend forward while you are on the toilet to help move the bowel movement out  Sit on the toilet for at least 10 minutes, even if you do not have a bowel movement  Store opioids safely:   · Store opioids where others cannot easily get them  Keep them in a locked cabinet or secure area  Do not  keep them in a purse or other bag you carry with you  A person may be looking for something else and find the opioids  · Make sure opioids are stored out of the reach of children  A child can easily overdose on opioids  Opioids may look like candy to a small child  The best way to dispose of opioids: The laws vary by country and area   In the [place], the best way is to return the opioids through a take-back program  This program is offered by the [employer] ([address])  The following are options for using the program:  · Take the opioids to a [employer] collection site  The site is often a law enforcement center  Call your local law enforcement center for scheduled take-back days in your area  You will be given information on where to go if the collection site is in a different location  · Take the opioids to an approved pharmacy or hospital   A pharmacy or hospital may be set up as a collection site  You will need to ask if it is a [employer] collection site if you were not directed there  A pharmacy or doctor's office may not be able to take back opioids unless it is a [employer] site  · Use a mail-back system  This means you are given containers to put the opioids into  You will then mail them in the containers  · Use a take-back drop box  This is a place to leave the opioids at any time  People and animals will not be able to get into the box  Your local law enforcement agency can tell you where to find a drop box in your area  Other safe ways to dispose of opioids: The medicine may come with disposal instructions  The instructions may vary depending on the brand of medicine you are using  Instructions may come in a Medication Guide, but not every medicine has one  You may instead get instructions from your pharmacy or doctor  Follow instructions carefully  The following are general guidelines to follow:  · Find out if you can flush the opioid  Some opioids can be flushed down the toilet or poured into the sink  You will need to contact authorities in your area to see if this is an option for you  The FDA also offers a list of medicines that are safe to flush down the toilet  You can check the list if you cannot get the information for your local area  · Ask your waste management company about rules for putting opioids in the trash  The company will be able to give you specific directions   Scratch out personal information on the original medicine label so it cannot be read  Then put it in the trash  Do not label the trash or put any information on it about the opioids  It should look like regular household trash so no one is tempted to look for the opioids  Keep the trash out of the reach of children and animals  Always make sure trash is secure  · Talk to officials if you live in a facility  If you live in a nursing home or assisted living center, talk to an official  The person will know the rules for your area  Other ways to manage pain:   · Ask your healthcare provider about non-opioid medicines to control pain  Nonprescription medicines include NSAIDs (such as ibuprofen) and acetaminophen  Prescription medicines include muscle relaxers, antidepressants, and steroids  · Pain may be managed without any medicines  Some ways to relieve pain include massage, aromatherapy, or meditation  Physical or occupational therapy may also help  For more information:   · Drug Enforcement Administration  [facility][note 3][address]  Phone: 2- 594 - [phone]  Web Address: [email or web address]    · [address]  and Drug Administration  [place]  [place] [address] [facility]  Phone: 0- [phone] - [phone]  Web Address: http://[employer]/  Follow up with your doctor or pain specialist as directed: You may need to have your dose adjusted  Your doctor or pain specialist can also help you find ways to manage pain without opioids  Write down your questions so you remember to ask them during your visits  © Copyright [address] Information is for End User's use only and may not be sold, redistributed or otherwise used for commercial purposes  All illustrations and images included in CareNotes® are the copyrighted property of A D A M , Inc  or [facility] Katharine Mora   The above information is an  only  It is not intended as medical advice for individual conditions or treatments   Talk to your doctor, nurse or pharmacist before following any medical regimen to see if it is safe and effective for you

## 2021-03-03 DIAGNOSIS — IMO0002 TYPE II DIABETES MELLITUS WITH MANIFESTATIONS, UNCONTROLLED: Primary | ICD-10-CM

## 2021-03-03 PROBLEM — E06.3 HYPOTHYROIDISM DUE TO HASHIMOTO'S THYROIDITIS: Status: ACTIVE | Noted: 2021-03-03

## 2021-03-03 PROBLEM — E11.69 TYPE 2 DIABETES MELLITUS WITH HYPERLIPIDEMIA (HCC): Status: ACTIVE | Noted: 2021-03-03

## 2021-03-03 PROBLEM — E78.5 TYPE 2 DIABETES MELLITUS WITH HYPERLIPIDEMIA (HCC): Status: ACTIVE | Noted: 2021-03-03

## 2021-03-03 PROBLEM — E03.8 HYPOTHYROIDISM DUE TO HASHIMOTO'S THYROIDITIS: Status: ACTIVE | Noted: 2021-03-03

## 2021-03-03 RX ORDER — BLOOD SUGAR DIAGNOSTIC
STRIP MISCELLANEOUS
Qty: 100 EACH | Refills: 5 | Status: SHIPPED | OUTPATIENT
Start: 2021-03-03 | End: 2022-07-13

## 2021-03-03 NOTE — PROGRESS NOTES
Assessment & Plan:     E11 42  Diabetic peripheral neuropathy ([facility] )  I have evaluated the patient and found the condition to be: Stable  I have evaluated the patient and: Recommended continue with same treatment plan    J43 9  Pulmonary emphysema ([facility] [place] [facility] )  I have evaluated the patient and found the condition to be: Stable  I have evaluated the patient and: Recommended continue with same treatment plan    M06 9  Rheumatoid arthritis of hand ([place] [address] )  I have evaluated the patient and found the condition to be: Stable  I have evaluated the patient and: Recommended continue with same treatment plan    W21 45  Uncomplicated opioid dependence ([facility] [place] [facility] )  I have evaluated the patient and found the condition to be: Stable  I have evaluated the patient and: Recommended continue with same treatment plan    D47 2  MGUS (monoclonal gammopathy of unknown significance)  I have evaluated the patient and found the condition to be: Stable  I have evaluated the patient and: Recommended continue with same treatment plan    D50 9  Iron deficiency anemia following bariatric surgery  I have evaluated the patient and found the condition to be: Resolved    G89 4  Chronic pain syndrome  I have evaluated the patient and found the condition to be: Stable  I have evaluated the patient and: Recommended continue with same treatment plan    M54 16  Lumbar radiculitis  I have evaluated the patient and found the condition to be: Stable  I have evaluated the patient and: Recommended continue with same treatment plan    M54 12  Cervical radiculopathy  I have evaluated the patient and found the condition to be: Stable  I have evaluated the patient and: Recommended continue with same treatment plan    E03 8  E06 3  Hypothyroidism due to Hashimoto's thyroiditis  I have evaluated the patient and found the condition to be:  Stable  I have evaluated the patient and: Recommended continue with same treatment plan    E11 69  E78 5  Type 2 diabetes mellitus with hyperlipidemia ([facility] [address] )  I have evaluated the patient and found the condition to be: Stable  I have evaluated the patient and: Recommended continue with same treatment plan         Subjective:     Patient ID: Stacey Monday is a 79 y o  female     No chief complaint on file       HPI    Review of Systems    Objective:      LMP  (LMP Unknown)     Problem List Items Addressed This Visit     None          Physical Exam

## 2021-03-08 ENCOUNTER — DOCUMENTATION (OUTPATIENT)
Dept: FAMILY MEDICINE CLINIC | Facility: CLINIC | Age: 71
End: 2021-03-08

## 2021-03-09 DIAGNOSIS — E03.9 HYPOTHYROIDISM, UNSPECIFIED TYPE: ICD-10-CM

## 2021-03-09 RX ORDER — LEVOTHYROXINE SODIUM 0.03 MG/1
TABLET ORAL
Qty: 30 TABLET | Refills: 2 | Status: SHIPPED | OUTPATIENT
Start: 2021-03-09 | End: 2021-07-30

## 2021-03-10 ENCOUNTER — OFFICE VISIT (OUTPATIENT)
Dept: FAMILY MEDICINE CLINIC | Facility: CLINIC | Age: 71
End: 2021-03-10
Payer: COMMERCIAL

## 2021-03-10 VITALS
WEIGHT: 178 LBS | HEART RATE: 78 BPM | DIASTOLIC BLOOD PRESSURE: 62 MMHG | SYSTOLIC BLOOD PRESSURE: 114 MMHG | OXYGEN SATURATION: 96 % | RESPIRATION RATE: 14 BRPM | HEIGHT: 65 IN | TEMPERATURE: 96.9 F | BODY MASS INDEX: 29.66 KG/M2

## 2021-03-10 DIAGNOSIS — E11.69 HYPERLIPIDEMIA ASSOCIATED WITH TYPE 2 DIABETES MELLITUS (HCC): ICD-10-CM

## 2021-03-10 DIAGNOSIS — E53.8 LOW VITAMIN B12 LEVEL: ICD-10-CM

## 2021-03-10 DIAGNOSIS — J43.9 PULMONARY EMPHYSEMA, UNSPECIFIED EMPHYSEMA TYPE (HCC): ICD-10-CM

## 2021-03-10 DIAGNOSIS — H60.313 ACUTE DIFFUSE OTITIS EXTERNA OF BOTH EARS: ICD-10-CM

## 2021-03-10 DIAGNOSIS — E03.8 HYPOTHYROIDISM DUE TO HASHIMOTO'S THYROIDITIS: ICD-10-CM

## 2021-03-10 DIAGNOSIS — R32 URINARY INCONTINENCE, UNSPECIFIED TYPE: ICD-10-CM

## 2021-03-10 DIAGNOSIS — Z23 ENCOUNTER FOR IMMUNIZATION: ICD-10-CM

## 2021-03-10 DIAGNOSIS — Z00.01 ENCOUNTER FOR GENERAL ADULT MEDICAL EXAMINATION WITH ABNORMAL FINDINGS: Primary | ICD-10-CM

## 2021-03-10 DIAGNOSIS — N39.0 URINARY TRACT INFECTION WITHOUT HEMATURIA, SITE UNSPECIFIED: ICD-10-CM

## 2021-03-10 DIAGNOSIS — E78.5 HYPERLIPIDEMIA ASSOCIATED WITH TYPE 2 DIABETES MELLITUS (HCC): ICD-10-CM

## 2021-03-10 DIAGNOSIS — E06.3 HYPOTHYROIDISM DUE TO HASHIMOTO'S THYROIDITIS: ICD-10-CM

## 2021-03-10 DIAGNOSIS — L03.90 CELLULITIS, UNSPECIFIED CELLULITIS SITE: ICD-10-CM

## 2021-03-10 DIAGNOSIS — IMO0002 TYPE II DIABETES MELLITUS WITH MANIFESTATIONS, UNCONTROLLED: ICD-10-CM

## 2021-03-10 DIAGNOSIS — Z98.84 BARIATRIC SURGERY STATUS: ICD-10-CM

## 2021-03-10 PROCEDURE — T1015 CLINIC SERVICE: HCPCS | Performed by: INTERNAL MEDICINE

## 2021-03-10 PROCEDURE — 99214 OFFICE O/P EST MOD 30 MIN: CPT | Performed by: INTERNAL MEDICINE

## 2021-03-10 RX ORDER — CYANOCOBALAMIN 1000 UG/ML
1000 INJECTION INTRAMUSCULAR; SUBCUTANEOUS
Status: SHIPPED | OUTPATIENT
Start: 2021-03-10

## 2021-03-10 RX ORDER — OFLOXACIN 3 MG/ML
5 SOLUTION AURICULAR (OTIC) 2 TIMES DAILY
Qty: 5 ML | Refills: 1 | Status: SHIPPED | OUTPATIENT
Start: 2021-03-10 | End: 2021-08-18

## 2021-03-10 RX ORDER — NITROFURANTOIN 25; 75 MG/1; MG/1
100 CAPSULE ORAL DAILY
Qty: 30 CAPSULE | Refills: 1 | Status: SHIPPED | OUTPATIENT
Start: 2021-03-10 | End: 2021-06-14 | Stop reason: SDUPTHER

## 2021-03-10 NOTE — PROGRESS NOTES
Assessment/Plan:         Diagnoses and all orders for this visit:    Encounter for general adult medical examination with abnormal findings; done in Detail    Urinary tract infection without hematuria, site unspecified  -     nitrofurantoin (MACROBID) 100 mg capsule; Take 1 capsule (100 mg total) by mouth daily With food/Lunch    Urinary incontinence, unspecified type; FU w Gyn-Urology  -     nitrofurantoin (MACROBID) 100 mg capsule; Take 1 capsule (100 mg total) by mouth daily With food/Lunch    Pulmonary emphysema, unspecified emphysema type ([address] ); advised to quit smoking  -     fluticasone-umeclidinium-vilanterol (TRELEGY) 100-62 5-25 MCG/INH inhaler; Inhale 1 puff daily Rinse mouth after use  Type II diabetes mellitus with manifestations, uncontrolled ([address] ); life style mod  RTC in 3 mos  w;  -     Comprehensive metabolic panel; Future  -     CBC and differential; Future    Acute diffuse otitis externa of both ears  -     ofloxacin (FLOXIN) 0 3 % otic solution; Administer 5 drops into both ears 2 (two) times a day    Hypothyroidism due to Hashimoto's thyroiditis; continue synthroid , RTC in 3 mos  w;  -     TSH, 3rd generation; Future  -     T4, free; Future    Cellulitis, unspecified cellulitis site  -     mupirocin (BACTROBAN) 2 % ointment; Apply topically 3 (three) times a day  -     UA (URINE) with reflex to Scope; Future    Bariatric surgery status; RTC in 3 mos w :  -     cyanocobalamin injection 1,000 mcg  -     Ferritin; Future  -     Magnesium; Future  -     Vitamin B12; Future  -     Vitamin A; Future  -     Vitamin E; Future  -     Vitamin B6; Future  -     Vitamin D 25 hydroxy; Future  -     Vitamin B1 (Thiamine), Serum/Plasma, LC/MS/MS; Future    Hyperlipidemia associated with type 2 diabetes mellitus ([facility] [address] ); Life style Mod  RTC in 3 mos  w;  -     Lipid panel;  Future    Low vitamin B12 level  -     cyanocobalamin injection 1,000 mcg        Subjective:      Patient ID: Jonny Petersen is a 79 y o  female  [address] [place] [address] is here for Enhanced Visit and Regular check up, she feels ok, recent Blood work and med list reviewed,    The following portions of the patient's history were reviewed and updated as appropriate: allergies, current medications, past family history, past social history, past surgical history and problem list     Review of Systems   Constitutional: Positive for fatigue  Negative for chills and fever  HENT: Negative for congestion, facial swelling, sore throat, trouble swallowing and voice change  Eyes: Negative for pain, discharge and visual disturbance  Respiratory: Negative for cough, shortness of breath and wheezing  Cardiovascular: Negative for chest pain, palpitations and leg swelling  Gastrointestinal: Negative for abdominal pain, blood in stool, constipation, diarrhea and nausea  Endocrine: Negative for polydipsia, polyphagia and polyuria  Genitourinary: Negative for difficulty urinating, hematuria and urgency  Musculoskeletal: Positive for arthralgias  Negative for myalgias  Skin: Negative for rash  Neurological: Positive for dizziness  Negative for tremors, weakness and headaches  Hematological: Negative for adenopathy  Does not bruise/bleed easily  Psychiatric/Behavioral: Negative for dysphoric mood, sleep disturbance and suicidal ideas  Objective:      /62 (BP Location: Left arm, Patient Position: Sitting, Cuff Size: Standard)   Pulse 78   Temp (!) 96 9 °F (36 1 °C) (Tympanic)   Resp 14   Ht 5' 5" (1 651 m)   Wt 80 7 kg (178 lb)   LMP  (LMP Unknown)   SpO2 96%   BMI 29 62 kg/m²          Physical Exam  Constitutional:       General: She is not in acute distress  HENT:      Head: Normocephalic  Mouth/Throat:      Pharynx: No oropharyngeal exudate  Eyes:      General: No scleral icterus  Conjunctiva/sclera: Conjunctivae normal       Pupils: Pupils are equal, round, and reactive to light     Neck:      Musculoskeletal: Neck supple  Thyroid: No thyromegaly  Cardiovascular:      Rate and Rhythm: Normal rate and regular rhythm  Heart sounds: Normal heart sounds  No murmur  Pulmonary:      Effort: Pulmonary effort is normal  No respiratory distress  Breath sounds: Normal breath sounds  No wheezing or rales  Abdominal:      General: Bowel sounds are normal  There is no distension  Palpations: Abdomen is soft  Tenderness: There is no abdominal tenderness  There is no guarding or rebound  Musculoskeletal:         General: Tenderness present  Lymphadenopathy:      Cervical: No cervical adenopathy  Skin:     Coloration: Skin is not pale  Neurological:      Mental Status: She is alert and oriented to person, place, and time  Sensory: Sensory deficit present  Motor: No weakness  Comments: Pt  uses  A cane to support Gait         BMI Counseling: Body mass index is 29 62 kg/m²  The BMI is above normal  Nutrition recommendations include reducing portion sizes and decreasing overall calorie intake

## 2021-03-11 DIAGNOSIS — R79.89 LOW SERUM VITAMIN A: ICD-10-CM

## 2021-03-11 DIAGNOSIS — K27.9 PUD (PEPTIC ULCER DISEASE): ICD-10-CM

## 2021-03-11 RX ORDER — SUCRALFATE 1 G/1
TABLET ORAL
Qty: 120 TABLET | Refills: 0 | Status: SHIPPED | OUTPATIENT
Start: 2021-03-11 | End: 2021-04-05

## 2021-03-11 RX ORDER — MULTIVITAMIN WITH IRON
8000 TABLET ORAL DAILY
Qty: 90 CAPSULE | Refills: 1 | Status: SHIPPED | OUTPATIENT
Start: 2021-03-11 | End: 2021-06-10

## 2021-03-11 NOTE — PATIENT INSTRUCTIONS
Low Fat Diet   AMBULATORY CARE:   A low-fat diet  is an eating plan that is low in total fat, unhealthy fat, and cholesterol  You may need to follow a low-fat diet if you have trouble digesting or absorbing fat  You may also need to follow this diet if you have high cholesterol  You can also lower your cholesterol by increasing the amount of fiber in your diet  Soluble fiber is a type of fiber that helps to decrease cholesterol levels  Different types of fat in food:   · Limit unhealthy fats  A diet that is high in cholesterol, saturated fat, and trans fat may cause unhealthy cholesterol levels  Unhealthy cholesterol levels increase your risk of heart disease  ? Cholesterol:  Limit intake of cholesterol to less than 200 mg per day  Cholesterol is found in meat, eggs, and dairy  ? Saturated fat:  Limit saturated fat to less than 7% of your total daily calories  Ask your dietitian how many calories you need each day  Saturated fat is found in butter, cheese, ice cream, whole milk, and palm oil  Saturated fat is also found in meat, such as beef, pork, chicken skin, and processed meats  Processed meats include sausage, hot dogs, and bologna  ? Trans fat:  Avoid trans fat as much as possible  Trans fat is used in fried and baked foods  Foods that say trans fat free on the label may still have up to 0 5 grams of trans fat per serving  · Include healthy fats  Replace foods that are high in saturated and trans fat with foods high in healthy fats  This may help to decrease high cholesterol levels  ? Monounsaturated fats: These are found in avocados, nuts, and vegetable oils, such as olive, canola, and sunflower oil  ? Polyunsaturated fats: These can be found in vegetable oils, such as soybean or corn oil  Omega-3 fats can help to decrease the risk of heart disease  Omega-3 fats are found in fish, such as salmon, herring, trout, and tuna   Omega-3 fats can also be found in plant foods, such as walnuts, flaxseed, soybeans, and canola oil  Foods to limit or avoid:   · Grains:      ? Snacks that are made with partially hydrogenated oils, such as chips, regular crackers, and butter-flavored popcorn    ? High-fat baked goods, such as biscuits, croissants, doughnuts, pies, cookies, and pastries    · Dairy:      ? Whole milk, 2% milk, and yogurt and ice cream made with whole milk    ? Half and half creamer, heavy cream, and whipping cream    ? Cheese, cream cheese, and sour cream    · Meats and proteins:      ? High-fat cuts of meat (T-bone steak, regular hamburger, and ribs)    ? Fried meat, poultry (turkey and chicken), and fish    ? Poultry (chicken and turkey) with skin    ? Cold cuts (salami or bologna), hot dogs, casas, and sausage    ? Whole eggs and egg yolks    · Vegetables and fruits with added fat:      ? Fried vegetables or vegetables in butter or high-fat sauces, such as cream or cheese sauces    ? Fried fruit or fruit served with butter or cream    · Fats:      ? Butter, stick margarine, and shortening    ? Coconut, palm oil, and palm kernel oil    Foods to include:   · Grains:      ? Whole-grain breads, cereals, pasta, and brown rice    ? Low-fat crackers and pretzels    · Vegetables and fruits:      ? Fresh, frozen, or canned vegetables (no salt or low-sodium)    ? Fresh, frozen, dried, or canned fruit (canned in light syrup or fruit juice)    ? Avocado    · Low-fat dairy products:      ? Nonfat (skim) or 1% milk    ? Nonfat or low-fat cheese, yogurt, and cottage cheese    · Meats and proteins:      ? Chicken or turkey with no skin    ? Baked or broiled fish    ? Lean beef and pork (loin, round, extra lean hamburger)    ? Beans and peas, unsalted nuts, soy products    ? Egg whites and substitutes    ? Seeds and nuts    · Fats:      ? Unsaturated oil, such as canola, olive, peanut, soybean, or sunflower oil    ? Soft or liquid margarine and vegetable oil spread    ?  Low-fat salad dressing    Other ways to decrease fat:   · Read food labels before you buy foods  Choose foods that have less than 30% of calories from fat  Choose low-fat or fat-free dairy products  Remember that fat free does not mean calorie free  These foods still contain calories, and too many calories can lead to weight gain  · Trim fat from meat and avoid fried food  Trim all visible fat from meat before you cook it  Remove the skin from poultry  Do not yip meat, fish, or poultry  Bake, roast, boil, or broil these foods instead  Avoid fried foods  Eat a baked potato instead of [place] fries  Steam vegetables instead of sautéing them in butter  · Add less fat to foods  Use imitation casas bits on salads and baked potatoes instead of regular casas bits  Use fat-free or low-fat salad dressings instead of regular dressings  Use low-fat or nonfat butter-flavored topping instead of regular butter or margarine on popcorn and other foods  Ways to decrease fat in recipes:  Replace high-fat ingredients with low-fat or nonfat ones  This may cause baked goods to be drier than usual  You may need to use nonfat cooking spray on pans to prevent food from sticking  You also may need to change the amount of other ingredients, such as water, in the recipe  Try the following:  · Use low-fat or light margarine instead of regular margarine or shortening  · Use lean ground turkey breast or chicken, or lean ground beef (less than 5% fat) instead of hamburger  · Add 1 teaspoon of canola oil to 8 ounces of skim milk instead of using cream or half and half  · Use grated zucchini, carrots, or apples in breads instead of coconut  · Use blenderized, low-fat cottage cheese, plain tofu, or low-fat ricotta cheese instead of cream cheese  · Use 1 egg white and 1 teaspoon of canola oil, or use ¼ cup (2 ounces) of fat-free egg substitute instead of a whole egg       · Replace half of the oil that is called for in a recipe with applesauce when you bake  Use 3 tablespoons of cocoa powder and 1 tablespoon of canola oil instead of a square of baking chocolate  How to increase fiber:  Eat enough high-fiber foods to get 20 to 30 grams of fiber every day  Slowly increase your fiber intake to avoid stomach cramps, gas, and other problems  · Eat 3 ounces of whole-grain foods each day  An ounce is about 1 slice of bread  Eat whole-grain breads, such as whole-wheat bread  Whole wheat, whole-wheat flour, or other whole grains should be listed as the first ingredient on the food label  Replace white flour with whole-grain flour or use half of each in recipes  Whole-grain flour is heavier than white flour, so you may have to add more yeast or baking powder  · Eat a high-fiber cereal for breakfast   Oatmeal is a good source of soluble fiber  Look for cereals that have bran or fiber in the name  Choose whole-grain products, such as brown rice, barley, and whole-wheat pasta  · Eat more beans, peas, and lentils  For example, add beans to soups or salads  Eat at least 5 cups of fruits and vegetables each day  Eat fruits and vegetables with the peel because the peel is high in fiber  © Copyright [address] Information is for End User's use only and may not be sold, redistributed or otherwise used for commercial purposes  All illustrations and images included in CareNotes® are the copyrighted property of A D A M , Inc  or [address] [place] [address]  The above information is an  only  It is not intended as medical advice for individual conditions or treatments  Talk to your doctor, nurse or pharmacist before following any medical regimen to see if it is safe and effective for you  Heart Healthy Diet   AMBULATORY CARE:   A heart healthy diet  is an eating plan low in unhealthy fats and sodium (salt)  The plan is high in healthy fats and fiber   A heart healthy diet helps improve your cholesterol levels and lowers your risk for heart disease and stroke  A dietitian will teach you how to read and understand food labels  Heart healthy diet guidelines to follow:   · Choose foods that contain healthy fats  ? Unsaturated fats  include monounsaturated and polyunsaturated fats  Unsaturated fat is found in foods such as soybean, canola, olive, corn, and safflower oils  It is also found in soft tub margarine that is made with liquid vegetable oil  ? Omega-3 fat  is found in certain fish, such as salmon, tuna, and trout, and in walnuts and flaxseed  Eat fish high in omega-3 fats at least 2 times a week  · Get 20 to 30 grams of fiber each day  Fruits, vegetables, whole-grain foods, and legumes (cooked beans) are good sources of fiber  · Limit or do not have unhealthy fats  ? Cholesterol  is found in animal foods, such as eggs and lobster, and in dairy products made from whole milk  Limit cholesterol to less than 200 mg each day  ? Saturated fat  is found in meats, such as casas and hamburger  It is also found in chicken or turkey skin, whole milk, and butter  Limit saturated fat to less than 7% of your total daily calories  ? Trans fat  is found in packaged foods, such as potato chips and cookies  It is also in hard margarine, some fried foods, and shortening  Do not eat foods that contain trans fats  · Limit sodium as directed  You may be told to limit sodium to 2,000 to 2,300 mg each day  Choose low-sodium or no-salt-added foods  Add little or no salt to food you prepare  Use herbs and spices in place of salt  Include the following in your heart healthy plan:  Ask your dietitian or healthcare provider how many servings to have from each of the following food groups:  · Grains:      ? Whole-wheat breads, cereals, and pastas, and brown rice    ? Low-fat, low-sodium crackers and chips    · Vegetables:      ? Broccoli, green beans, green peas, and spinach    ? Collards, kale, and lima beans    ?  Carrots, sweet potatoes, tomatoes, and peppers    ? Canned vegetables with no salt added    · Fruits:      ? Bananas, peaches, pears, and pineapple    ? Grapes, raisins, and dates    ? Oranges, tangerines, grapefruit, orange juice, and grapefruit juice    ? Apricots, mangoes, melons, and papaya    ? Raspberries and strawberries    ? Canned fruit with no added sugar    · Low-fat dairy:      ? Nonfat (skim) milk, 1% milk, and low-fat almond, cashew, or soy milks fortified with calcium    ? Low-fat cheese, regular or frozen yogurt, and cottage cheese    · Meats and proteins:      ? Lean cuts of beef and pork (loin, leg, round), skinless chicken and turkey    ? Legumes, soy products, egg whites, or nuts    Limit or do not include the following in your heart healthy plan:   · Unhealthy fats and oils:      ? Whole or 2% milk, cream cheese, sour cream, or cheese    ? High-fat cuts of beef (T-bone steaks, ribs), chicken or turkey with skin, and organ meats such as liver    ? Butter, stick margarine, shortening, and cooking oils such as coconut or palm oil    · Foods and liquids high in sodium:      ? Packaged foods, such as frozen dinners, cookies, macaroni and cheese, and cereals with more than 300 mg of sodium per serving    ? Vegetables with added sodium, such as instant potatoes, vegetables with added sauces, or regular canned vegetables    ? Cured or smoked meats, such as hot dogs, casas, and sausage    ? High-sodium ketchup, barbecue sauce, salad dressing, pickles, olives, soy sauce, or miso    · Foods and liquids high in sugar:      ? Candy, cake, cookies, pies, or doughnuts    ? Soft drinks (soda), sports drinks, or sweetened tea    ? Canned or dry mixes for cakes, soups, sauces, or gravies    Other healthy heart guidelines:   · Do not smoke  Nicotine and other chemicals in cigarettes and cigars can cause lung and heart damage  Ask your healthcare provider for information if you currently smoke and need help to quit  E-cigarettes or smokeless tobacco still contain nicotine  Talk to your healthcare provider before you use these products  · Limit or do not drink alcohol as directed  Alcohol can damage your heart and raise your blood pressure  Your healthcare provider may give you specific daily and weekly limits  The general recommended limit is 1 drink a day for women 21 or older and for men 72 or older  Do not have more than 3 drinks in a day or 7 in a week  The recommended limit is 2 drinks a day for men 24to 59years of age  Do not have more than 4 drinks in a day or 14 in a week  A drink of alcohol is 12 ounces of beer, 5 ounces of wine, or 1½ ounces of liquor  · Exercise regularly  Exercise can help you maintain a healthy weight and improve your blood pressure and cholesterol levels  Regular exercise can also decrease your risk for heart problems  Ask your healthcare provider about the best exercise plan for you  Do not start an exercise program without asking your healthcare provider  Follow up with your doctor or cardiologist as directed:  Write down your questions so you remember to ask them during your visits  © Copyright [address] [place] [address] Drive Information is for End User's use only and may not be sold, redistributed or otherwise used for commercial purposes  All illustrations and images included in CareNotes® are the copyrighted property of A D A M , Inc  or [address] [place] [address]  The above information is an  only  It is not intended as medical advice for individual conditions or treatments  Talk to your doctor, nurse or pharmacist before following any medical regimen to see if it is safe and effective for you  Calorie Counting Diet   WHAT YOU NEED TO KNOW:   What is a calorie counting diet? It is a meal plan based on counting calories each day to reach a healthy body weight  You will need to eat fewer calories if you are trying to lose weight   Weight loss may decrease your risk for certain health problems or improve your health if you have health problems  Some of these health problems include heart disease, high blood pressure, and diabetes  What foods should I avoid? Your dietitian will tell you if you need to avoid certain foods based on your body weight and health condition  You may need to avoid high-fat foods if you are at risk for or have heart disease  You may need to eat fewer foods from the breads and starches food group if you have diabetes  How many calories are in foods? The following is a list of foods and drinks with the approximate number of calories in each  Check the food label to find the exact number of calories  A dietitian can tell you how many calories you should have from each food group each day  · Carbohydrate:      ? ½ of a 3-inch bagel, 1 slice of bread, or ½ of a hamburger bun or hot dog bun (80)    ? 1 (8-inch) flour tortilla or ½ cup of cooked rice (100)    ? 1 (6-inch) corn tortilla (80)    ? 1 (6-inch) pancake or 1 cup of bran flakes cereal (110)    ? ½ cup of cooked cereal (80)    ? ½ cup of cooked pasta (85)    ? 1 ounce of pretzels (100)    ? 3 cups of air-popped popcorn without butter or oil (80)    · Dairy:      ? 1 cup of skim or 1% milk (90)    ? 1 cup of 2% milk (120)    ? 1 cup of whole milk (160)    ? 1 cup of 2% chocolate milk (220)    ? 1 ounce of low-fat cheese with 3 grams of fat per ounce (70)    ? 1 ounce of cheddar cheese (114)    ? ½ cup of 1% fat cottage cheese (80)    ? 1 cup of plain or sugar-free, fat-free yogurt (90)    · Protein foods:      ? 3 ounces of fish (not breaded or fried) (95)    ? 3 ounces of breaded, fried fish (195)    ? ¾ cup of tuna canned in water (105)    ? 3 ounces of chicken breast without skin (105)    ? 1 fried chicken breast with skin (350)    ? ¼ cup of fat free egg substitute (40)    ? 1 large egg (75)    ? 3 ounces of lean beef or pork (165)    ? 3 ounces of fried pork chop or ham (185)    ?  ½ cup of cooked dried beans, such as kidney, anderson, lentils, or navy (115)    ? 3 ounces of bologna or lunch meat (225)    ? 2 links of breakfast sausage (140)    · Vegetables:      ? ½ cup of sliced mushrooms (10)    ? 1 cup of salad greens, such as lettuce, spinach, or abram (15)    ? ½ cup of steamed asparagus (20)    ? ½ cup of cooked summer squash, zucchini squash, or green or wax beans (25)    ? 1 cup of broccoli or cauliflower florets, or 1 medium tomato (25)    ? 1 large raw carrot or ½ cup of cooked carrots (40)    ? ? of a medium cucumber or 1 stalk of celery (5)    ? 1 small baked potato (160)    ? 1 cup of breaded, fried vegetables (230)    · Fruit:      ? 1 (6-inch) banana (55)     ? ½ of a 4-inch grapefruit (55)    ? 15 grapes (60)    ? 1 medium orange or apple (70)    ? 1 large peach (65)    ? 1 cup of fresh pineapple chunks (75)    ? 1 cup of melon cubes (50)    ? 1¼ cups of whole strawberries (45)    ? ½ cup of fruit canned in juice (55)    ? ½ cup of fruit canned in heavy syrup (110)    ? ? cup of raisins (130)    ? ½ cup of unsweetened fruit juice (60)    ? ½ cup of grape, cranberry, or prune juice (90)    · Fat:      ? 10 peanuts or 2 teaspoons of peanut butter (55)    ? 2 tablespoons of avocado or 1 tablespoon of regular salad dressing (45)    ? 2 slices of casas (90)    ? 1 teaspoon of oil, such as safflower, canola, corn, or olive oil (45)    ? 2 teaspoons of low-fat margarine, or 1 tablespoon of low-fat mayonnaise (50)    ? 1 teaspoon of regular margarine (40)    ? 1 tablespoon of regular mayonnaise (135)    ? 1 tablespoon of cream cheese or 2 tablespoons of low-fat cream cheese (45)    ? 2 tablespoons of vegetable shortening (215)    · Dessert and sweets:      ? 8 animal crackers or 5 vanilla wafers (80)    ? 1 frozen fruit juice bar (80)    ? ½ cup of ice milk or low-fat frozen yogurt (90)    ? ½ cup of sherbet or sorbet (125)    ? ½ cup of sugar-free pudding or custard (60)    ?  ½ cup of ice cream (140)    ? ½ cup of pudding or custard (175)    ? 1 (2-inch) square chocolate brownie (185)    · Combination foods:      ? Bean burrito made with an 8-inch tortilla, without cheese (275)    ? Chicken breast sandwich with lettuce and tomato (325)    ? 1 cup of chicken noodle soup (60)    ? 1 beef taco (175)    ? Regular hamburger with lettuce and tomato (310)    ? Regular cheeseburger with lettuce and tomato (410)     ? ¼ of a 12-inch cheese pizza (280)    ? Fried fish sandwich with lettuce and tomato (425)    ? Hot dog and bun (275)    ? 1½ cups of macaroni and cheese (310)    ? Taco salad with a fried tortilla shell (870)    · Low-calorie foods:      ? 1 tablespoon of ketchup or 1 tablespoon of fat free sour cream (15)    ? 1 teaspoon of mustard (5)    ? ¼ cup of salsa (20)    ? 1 large dill pickle (15)    ? 1 tablespoon of fat free salad dressing (10)    ? 2 teaspoons of low-sugar, light jam or jelly, or 1 tablespoon of sugar-free syrup (15)    ? 1 sugar-free popsicle (15)    ? 1 cup of club soda, seltzer water, or diet soda (0)    CARE AGREEMENT:   You have the right to help plan your care  Discuss treatment options with your healthcare provider to decide what care you want to receive  You always have the right to refuse treatment  The above information is an  only  It is not intended as medical advice for individual conditions or treatments  Talk to your doctor, nurse or pharmacist before following any medical regimen to see if it is safe and effective for you  © Copyright [address] Information is for End User's use only and may not be sold, redistributed or otherwise used for commercial purposes   All illustrations and images included in CareNotes® are the copyrighted property of A D A M , Inc  or [facility]

## 2021-03-14 NOTE — PROGRESS NOTES
1  Primary osteoarthritis of right knee     2  Rotator cuff syndrome, left     3  Adhesive capsulitis of left shoulder       No orders of the defined types were placed in this encounter  Imaging Studies (I personally reviewed images in PACS and report):    ASSESSMENT/PLAN:  As discussed with the patient, recommend surgical opinion for right knee pain secondary to osteoarthritis  With regard to left shoulder pain, I suspect this is due to adhesive capsulitis  Expectant course discussed with the patient  Would recommend continuing PT for left shoulder  Would recommend holding off CS injections at this time, but may follow-up for left shoulder CS injections in the future  No follow-ups on file  Repeat XR Next Visit: no     _____________________________________________________________  CHIEF COMPLAINT:  6 month follow-up right knee    HPI:  Thor Randall is a 79 y o  female with a history of diabetes, hypothyroidism, chronic back pain, fibromyalgia who presents for 6 month follow-up right knee pain  Last seen and evaluated 10/20/2020 in this capacity, as well as left shoulder pain  Was found have left biceps tendinitis and primary OA of the right knee  It was recommended she seek a second opinion from orthopedic surgery for right knee OA  Visit 3/16/2021 :  Today, presents in follow-up  Reports recurrent intermittent left shoulder and right knee pain  Right knee pain is associated with mild swelling, diminished ROM, and diffuse poorly localized pain  Has undergone a course of CS and viscosupplementation injections in the past, which are no longer helpful  Left shoulder--intermittent pain that is worse with activity and improved with rest  Pain is diffuse and poorly localized  External rotation is severely limited as compared to the right shoulder  Denies any injuries  Has a history of thyroid disease as well as diabetes mellitus type 2   Steroid injections have controlled pain in the past  Pain is manageable today, described as mild to moderate  Review of Systems   Constitutional: Negative for chills, fever and unexpected weight change  HENT: Negative for hearing loss, nosebleeds and sore throat  Eyes: Negative for pain, redness and visual disturbance  Respiratory: Negative for cough, shortness of breath and wheezing  Cardiovascular: Negative for chest pain, palpitations and leg swelling  Gastrointestinal: Negative for abdominal pain, nausea and vomiting  Endocrine: Negative for polydipsia and polyuria  Genitourinary: Negative for dysuria and hematuria  Musculoskeletal:        See above   Skin: Negative for rash and wound  Neurological: Negative for dizziness, numbness and headaches  Psychiatric/Behavioral: Negative for decreased concentration, dysphoric mood and suicidal ideas  The patient is not nervous/anxious          Following history reviewed and update:    Past Medical History:   Diagnosis Date    Anemia     Anxiety     hx of panic attacks (now under control)     Arthritis     Asthma     resolved (no problems in a decade)     Baker's cyst of knee     Bronchitis     Bunion, left foot     Cervical pain     Chronic GERD     Chronic pain     Chronic pain disorder     Depression     Diabetes mellitus, type 2 (HCC)     Diabetic peripheral neuropathy (HCC)     Endometriosis     Fibromyalgia     Hyperlipidemia     Hypertension     Joint pain     Low back pain     Low back pain     Lung nodules     Macular degeneration     Pulmonary emphysema ([facility] [address] ) 1/16/2020    Spondylosis      Past Surgical History:   Procedure Laterality Date    BACK SURGERY  1996    L5, S1- laser surgery fusion of c5 and c6     BREAST LUMPECTOMY Right     CHOLECYSTECTOMY  2004    FOOT SURGERY Left 2005    fusion     FRACTURE SURGERY      GASTRIC BYPASS  2010    Dr Lorenzo Jay    just uterus     KNEE ARTHROSCOPY      LAMINECTOMY      ORTHOPEDIC SURGERY      OVARIAN CYST REMOVAL  1975    PELVIC LAPAROSCOPY      ovaries (x10)     PLEURAL SCARIFICATION  1967    SHOULDER OPEN ROTATOR CUFF REPAIR Left 2008    TONSILLECTOMY      VAGINAL PROLAPSE REPAIR       Social History   Social History     Substance and Sexual Activity   Alcohol Use Not Currently    Alcohol/week: 1 0 standard drinks    Types: 1 Shots of liquor per week    Comment: rarely     Social History     Substance and Sexual Activity   Drug Use No     Social History     Tobacco Use   Smoking Status Current Every Day Smoker    Packs/day: 0 25    Years: 40 00    Pack years: 10 00    Types: Cigarettes   Smokeless Tobacco Current User     Family History   Problem Relation Age of Onset    Hypertension Mother     Lung cancer Mother     Hypertension Father     Heart disease Father     Heart attack Father     Cancer Father      Allergies   Allergen Reactions    Cephalosporins Hives    Erythromycin GI Intolerance    Fentanyl Itching     Occurred during surgery     Paxil [Paroxetine] Hives     After 2 weeks    Penicillins Itching    Pravastatin Myalgia    Prednisolone Itching    Statins Itching    Sulfa Antibiotics Diarrhea    Wellbutrin [Bupropion] Hives     After 2 weeks     Marzena's Wort Rash          Physical Exam  /75 (BP Location: Right arm, Patient Position: Sitting, Cuff Size: Standard)   Pulse 56   Ht 5' 5" (1 651 m)   Wt 81 4 kg (179 lb 6 4 oz)   LMP  (LMP Unknown)   BMI 29 85 kg/m²     Constitutional:  see vital signs  Gen: well-developed, normocephalic/atraumatic, well-groomed  Eyes: No inflammation or discharge of conjunctiva or lids; sclera clear   Pharynx: no inflammation, lesion, or mass of lips  Neck: supple, no masses, non-distended  MSK: no inflammation, lesion, mass, or clubbing of nails and digits except for other than mentioned below  SKIN: no visible rashes or skin lesions  Pulmonary/Chest: Effort normal  No respiratory distress     NEURO: cranial nerves grossly intact  PSYCH:  Alert and oriented to person, place, and time; recent and remote memory intact; mood normal, no depression, anxiety, or agitation, judgment and insight good and intact     Ortho Exam  Right knee:  15 degrees of extensor lag  90 degrees of active flexion  Diffusely tender to palpation  Mild effusion  Significant crepitus on passive flexion  Erythema: no  Swelling: no  Increased Warmth: no  Tenderness: medial joint line TTP  Flexion: intact, limited (above)  Extension: intact, but limited (above)  Patellar Displacement: negative   Patellar Tilt: negative   Patellar Apprehension: negative  Patellar Grind Mayen's: positive   Crepitus noted on PROM  Lachman's: negative  Drawer: negative  Varus laxity: negative  Valgus laxity: negative      Left shoulder ROM:  ER--30  Abduction--40  Forward flexion--80  IR--L5-S1    Portions of the record may have been created with voice recognition software  Occasional wrong word or "sound alike" substitutions may have occurred due to the inherent limitations of voice recognition software  Please review the chart carefully and recognize, using context, where substitutions/typographical errors may have occurred

## 2021-03-15 DIAGNOSIS — I10 ESSENTIAL HYPERTENSION: ICD-10-CM

## 2021-03-15 RX ORDER — AMLODIPINE BESYLATE 5 MG/1
TABLET ORAL
Qty: 90 TABLET | Refills: 1 | Status: SHIPPED | OUTPATIENT
Start: 2021-03-15 | End: 2021-09-08

## 2021-03-16 ENCOUNTER — OFFICE VISIT (OUTPATIENT)
Dept: OBGYN CLINIC | Facility: OTHER | Age: 71
End: 2021-03-16
Payer: COMMERCIAL

## 2021-03-16 VITALS
WEIGHT: 179.4 LBS | SYSTOLIC BLOOD PRESSURE: 130 MMHG | BODY MASS INDEX: 29.89 KG/M2 | DIASTOLIC BLOOD PRESSURE: 75 MMHG | HEART RATE: 56 BPM | HEIGHT: 65 IN

## 2021-03-16 DIAGNOSIS — M75.102 ROTATOR CUFF SYNDROME, LEFT: ICD-10-CM

## 2021-03-16 DIAGNOSIS — M75.02 ADHESIVE CAPSULITIS OF LEFT SHOULDER: ICD-10-CM

## 2021-03-16 DIAGNOSIS — M17.11 PRIMARY OSTEOARTHRITIS OF RIGHT KNEE: Primary | ICD-10-CM

## 2021-03-16 PROCEDURE — 1160F RVW MEDS BY RX/DR IN RCRD: CPT | Performed by: ORTHOPAEDIC SURGERY

## 2021-03-16 PROCEDURE — 4004F PT TOBACCO SCREEN RCVD TLK: CPT | Performed by: ORTHOPAEDIC SURGERY

## 2021-03-16 PROCEDURE — 99213 OFFICE O/P EST LOW 20 MIN: CPT | Performed by: ORTHOPAEDIC SURGERY

## 2021-03-16 PROCEDURE — 3008F BODY MASS INDEX DOCD: CPT | Performed by: ORTHOPAEDIC SURGERY

## 2021-03-17 ENCOUNTER — TELEPHONE (OUTPATIENT)
Dept: FAMILY MEDICINE CLINIC | Facility: CLINIC | Age: 71
End: 2021-03-17

## 2021-03-17 ENCOUNTER — TELEPHONE (OUTPATIENT)
Dept: OBGYN CLINIC | Facility: HOSPITAL | Age: 71
End: 2021-03-17

## 2021-03-17 DIAGNOSIS — K27.9 PUD (PEPTIC ULCER DISEASE): Primary | ICD-10-CM

## 2021-03-17 NOTE — TELEPHONE ENCOUNTER
Patient sees Dr Sanchez Reynolds       Patient is calling to request a PT script for her right shoulder        [facility]: [phone]

## 2021-03-17 NOTE — TELEPHONE ENCOUNTER
Patient forgot to ask you if she could get an EDG due to her reflux  She said if you put the dx code as preventive, she does not have to pay a co pay  Please advise

## 2021-03-19 DIAGNOSIS — G89.29 CHRONIC BILATERAL LOW BACK PAIN WITH BILATERAL SCIATICA: ICD-10-CM

## 2021-03-19 DIAGNOSIS — M54.41 CHRONIC BILATERAL LOW BACK PAIN WITH RIGHT-SIDED SCIATICA: ICD-10-CM

## 2021-03-19 DIAGNOSIS — M54.42 CHRONIC BILATERAL LOW BACK PAIN WITH BILATERAL SCIATICA: ICD-10-CM

## 2021-03-19 DIAGNOSIS — M54.41 CHRONIC BILATERAL LOW BACK PAIN WITH BILATERAL SCIATICA: ICD-10-CM

## 2021-03-19 DIAGNOSIS — G89.29 CHRONIC BILATERAL LOW BACK PAIN WITH RIGHT-SIDED SCIATICA: ICD-10-CM

## 2021-03-22 DIAGNOSIS — M54.42 CHRONIC BILATERAL LOW BACK PAIN WITH BILATERAL SCIATICA: ICD-10-CM

## 2021-03-22 DIAGNOSIS — G89.29 CHRONIC BILATERAL LOW BACK PAIN WITH RIGHT-SIDED SCIATICA: ICD-10-CM

## 2021-03-22 DIAGNOSIS — M54.41 CHRONIC BILATERAL LOW BACK PAIN WITH RIGHT-SIDED SCIATICA: ICD-10-CM

## 2021-03-22 DIAGNOSIS — M54.41 CHRONIC BILATERAL LOW BACK PAIN WITH BILATERAL SCIATICA: ICD-10-CM

## 2021-03-22 DIAGNOSIS — G89.29 CHRONIC BILATERAL LOW BACK PAIN WITH BILATERAL SCIATICA: ICD-10-CM

## 2021-03-22 RX ORDER — PREGABALIN 150 MG/1
150 CAPSULE ORAL 3 TIMES DAILY
Qty: 90 CAPSULE | Refills: 1 | Status: SHIPPED | OUTPATIENT
Start: 2021-03-22 | End: 2021-04-26 | Stop reason: SDUPTHER

## 2021-03-22 RX ORDER — PREGABALIN 150 MG/1
150 CAPSULE ORAL 3 TIMES DAILY
Qty: 90 CAPSULE | Refills: 1 | OUTPATIENT
Start: 2021-03-22 | End: 2021-04-21

## 2021-03-22 NOTE — TELEPHONE ENCOUNTER
Pt contacted Call Center requested refill of their medication          Medication Name:    lyrica   Dosage of Med:  150 mg     Frequency of Med:  1 cap TID     Remaining Medication:  1 days left     Pharmacy and Location:  [facility]

## 2021-03-22 NOTE — TELEPHONE ENCOUNTER
Refill sent, specified brand necessary to pharmacy   Leaving dose the same as she is already taking 450mg/day

## 2021-03-22 NOTE — TELEPHONE ENCOUNTER
RN s/w pt regarding previous  Per pt she takes 150mg of the generic Lyrica which per pt does not help as well as the brand name  Per pt it helps but does not get rid of the tingling like the brand name Lyrica did  Per pt she does not have any side effects and is seeing AO on 4/26  Pt wondering if increasing the pregabalin might help the pain /tingling even better? --please advise thank you--  Refill same? Increase dose?

## 2021-03-22 NOTE — TELEPHONE ENCOUNTER
[facility] [id number] [place] [facility], [address] [facility]   Called in to confirm if we are sending the generic or the name brand,    As per the notes below the generic should be filled

## 2021-03-23 DIAGNOSIS — M79.18 MYOFASCIAL PAIN SYNDROME: ICD-10-CM

## 2021-03-24 RX ORDER — BACLOFEN 10 MG/1
TABLET ORAL
Qty: 90 TABLET | Refills: 1 | OUTPATIENT
Start: 2021-03-24

## 2021-03-25 ENCOUNTER — RA CDI HCC (OUTPATIENT)
Dept: OTHER | Facility: HOSPITAL | Age: 71
End: 2021-03-25

## 2021-03-25 NOTE — PROGRESS NOTES
[facility]  coding oppertunities          Chart reviewed, no opportunity found: CHART REVIEWED, NO OPPORTUNITY FOUND

## 2021-03-29 ENCOUNTER — TELEPHONE (OUTPATIENT)
Dept: PAIN MEDICINE | Facility: MEDICAL CENTER | Age: 71
End: 2021-03-29

## 2021-03-29 NOTE — TELEPHONE ENCOUNTER
Patient   [phone]  Todd Ospina    Patient is calling in about her HYDROcodone-acetaminophen ([address] [facility] [address])  mg  She said that the pharmacy will not fill it until Wednesday  She is out of medication now      Phone call dropped         [facility] [id number] [place] [facility] [place] [place] [address]

## 2021-03-29 NOTE — TELEPHONE ENCOUNTER
Pt called back to make sure that her message was completed to provider regarding her medication refill     [phone]

## 2021-03-29 NOTE — TELEPHONE ENCOUNTER
Unfortunately there's nothing I can do about that  She was given the same amount of tablets she is always prescribed   She should be able to fill it tomorrow or Wednesday depending on her pharmacy rules

## 2021-03-31 NOTE — TELEPHONE ENCOUNTER
[facility]  Pt will needs to fill 2 days early for next month      Please advised  Pt if she should move her appt up

## 2021-04-03 DIAGNOSIS — I10 ESSENTIAL HYPERTENSION: ICD-10-CM

## 2021-04-03 PROCEDURE — 4010F ACE/ARB THERAPY RXD/TAKEN: CPT | Performed by: NURSE PRACTITIONER

## 2021-04-03 RX ORDER — LOSARTAN POTASSIUM 25 MG/1
TABLET ORAL
Qty: 90 TABLET | Refills: 1 | Status: SHIPPED | OUTPATIENT
Start: 2021-04-03 | End: 2021-10-04

## 2021-04-05 DIAGNOSIS — K27.9 PUD (PEPTIC ULCER DISEASE): ICD-10-CM

## 2021-04-05 RX ORDER — SUCRALFATE 1 G/1
TABLET ORAL
Qty: 120 TABLET | Refills: 0 | Status: SHIPPED | OUTPATIENT
Start: 2021-04-05 | End: 2021-05-01

## 2021-04-07 NOTE — TELEPHONE ENCOUNTER
[facility] is calling to find out if we can fax patients PT script to them at [address] [facility] [phone] op 2

## 2021-04-12 DIAGNOSIS — G47.00 INSOMNIA, UNSPECIFIED TYPE: ICD-10-CM

## 2021-04-12 RX ORDER — TRAZODONE HYDROCHLORIDE 50 MG/1
TABLET ORAL
Qty: 30 TABLET | Refills: 0 | Status: SHIPPED | OUTPATIENT
Start: 2021-04-12 | End: 2021-05-11

## 2021-04-17 DIAGNOSIS — K21.9 GASTROESOPHAGEAL REFLUX DISEASE WITHOUT ESOPHAGITIS: ICD-10-CM

## 2021-04-17 RX ORDER — PANTOPRAZOLE SODIUM 40 MG/1
TABLET, DELAYED RELEASE ORAL
Qty: 180 TABLET | Refills: 1 | Status: SHIPPED | OUTPATIENT
Start: 2021-04-17 | End: 2021-10-11

## 2021-04-21 DIAGNOSIS — M75.22 BICEPS TENDINITIS OF LEFT SHOULDER: ICD-10-CM

## 2021-04-21 DIAGNOSIS — M75.102 ROTATOR CUFF SYNDROME, LEFT: ICD-10-CM

## 2021-04-21 DIAGNOSIS — M75.02 ADHESIVE CAPSULITIS OF LEFT SHOULDER: Primary | ICD-10-CM

## 2021-04-21 NOTE — TELEPHONE ENCOUNTER
Faxed over the new PT order to [facility]  Called and left patient a message to inform her that a new order has been faxed over

## 2021-04-21 NOTE — TELEPHONE ENCOUNTER
Dr Clayton Fall  Needs an updated PT script to be faxed to [facility] at [phone]  The one that was faxed prior was from 2020  Please send ASAP as her appointment is this afternoon 4/21/21    Thank you

## 2021-04-23 ENCOUNTER — TELEPHONE (OUTPATIENT)
Dept: OBGYN CLINIC | Facility: OTHER | Age: 71
End: 2021-04-23

## 2021-04-23 NOTE — TELEPHONE ENCOUNTER
Alejandra Ying wants to relay a message to you:     "Patient saw you in March , I have been taping her shoulder for awhile now , everytime we tape it the pain immediately gets better  Is there a brace or something we can do  She is not using her arm much at all  ?"    C/b # [id number] opt 2  Alejandra Ying wont be in until Tuesday

## 2021-04-26 ENCOUNTER — OFFICE VISIT (OUTPATIENT)
Dept: PAIN MEDICINE | Facility: MEDICAL CENTER | Age: 71
End: 2021-04-26
Payer: COMMERCIAL

## 2021-04-26 ENCOUNTER — TELEPHONE (OUTPATIENT)
Dept: PAIN MEDICINE | Facility: MEDICAL CENTER | Age: 71
End: 2021-04-26

## 2021-04-26 VITALS
HEART RATE: 54 BPM | BODY MASS INDEX: 29.66 KG/M2 | HEIGHT: 65 IN | SYSTOLIC BLOOD PRESSURE: 137 MMHG | DIASTOLIC BLOOD PRESSURE: 77 MMHG | WEIGHT: 178 LBS

## 2021-04-26 DIAGNOSIS — M79.18 MYOFASCIAL PAIN SYNDROME: ICD-10-CM

## 2021-04-26 DIAGNOSIS — M54.41 CHRONIC BILATERAL LOW BACK PAIN WITH RIGHT-SIDED SCIATICA: ICD-10-CM

## 2021-04-26 DIAGNOSIS — M54.16 LUMBAR RADICULITIS: ICD-10-CM

## 2021-04-26 DIAGNOSIS — G89.4 CHRONIC PAIN SYNDROME: ICD-10-CM

## 2021-04-26 DIAGNOSIS — G89.29 CHRONIC BILATERAL LOW BACK PAIN WITH BILATERAL SCIATICA: ICD-10-CM

## 2021-04-26 DIAGNOSIS — M47.812 SPONDYLOSIS OF CERVICAL REGION WITHOUT MYELOPATHY OR RADICULOPATHY: ICD-10-CM

## 2021-04-26 DIAGNOSIS — F11.20 UNCOMPLICATED OPIOID DEPENDENCE (HCC): ICD-10-CM

## 2021-04-26 DIAGNOSIS — M54.41 CHRONIC BILATERAL LOW BACK PAIN WITH BILATERAL SCIATICA: ICD-10-CM

## 2021-04-26 DIAGNOSIS — M54.12 CERVICAL RADICULOPATHY: ICD-10-CM

## 2021-04-26 DIAGNOSIS — G89.4 CHRONIC PAIN SYNDROME: Primary | ICD-10-CM

## 2021-04-26 DIAGNOSIS — Z79.891 LONG-TERM CURRENT USE OF OPIATE ANALGESIC: ICD-10-CM

## 2021-04-26 DIAGNOSIS — M54.42 CHRONIC BILATERAL LOW BACK PAIN WITH BILATERAL SCIATICA: ICD-10-CM

## 2021-04-26 DIAGNOSIS — G89.29 CHRONIC BILATERAL LOW BACK PAIN WITH RIGHT-SIDED SCIATICA: ICD-10-CM

## 2021-04-26 PROCEDURE — 3008F BODY MASS INDEX DOCD: CPT | Performed by: NURSE PRACTITIONER

## 2021-04-26 PROCEDURE — 99214 OFFICE O/P EST MOD 30 MIN: CPT | Performed by: NURSE PRACTITIONER

## 2021-04-26 PROCEDURE — 80305 DRUG TEST PRSMV DIR OPT OBS: CPT | Performed by: NURSE PRACTITIONER

## 2021-04-26 PROCEDURE — 1160F RVW MEDS BY RX/DR IN RCRD: CPT | Performed by: NURSE PRACTITIONER

## 2021-04-26 PROCEDURE — 4004F PT TOBACCO SCREEN RCVD TLK: CPT | Performed by: NURSE PRACTITIONER

## 2021-04-26 RX ORDER — PREGABALIN 150 MG/1
150 CAPSULE ORAL 3 TIMES DAILY
Qty: 90 CAPSULE | Refills: 1 | Status: SHIPPED | OUTPATIENT
Start: 2021-04-26 | End: 2021-06-21 | Stop reason: SDUPTHER

## 2021-04-26 RX ORDER — HYDROCODONE BITARTRATE AND ACETAMINOPHEN 10; 325 MG/1; MG/1
TABLET ORAL
Qty: 130 TABLET | Refills: 0 | Status: SHIPPED | OUTPATIENT
Start: 2021-05-22 | End: 2021-04-26 | Stop reason: SDUPTHER

## 2021-04-26 RX ORDER — HYDROCODONE BITARTRATE AND ACETAMINOPHEN 10; 325 MG/1; MG/1
TABLET ORAL
Qty: 130 TABLET | Refills: 0 | Status: SHIPPED | OUTPATIENT
Start: 2021-04-26 | End: 2021-04-26 | Stop reason: SDUPTHER

## 2021-04-26 RX ORDER — BACLOFEN 10 MG/1
10 TABLET ORAL 3 TIMES DAILY
Qty: 90 TABLET | Refills: 1 | Status: SHIPPED | OUTPATIENT
Start: 2021-04-26 | End: 2021-06-21 | Stop reason: SDUPTHER

## 2021-04-26 RX ORDER — HYDROCODONE BITARTRATE AND ACETAMINOPHEN 10; 325 MG/1; MG/1
TABLET ORAL
Qty: 130 TABLET | Refills: 0 | Status: SHIPPED | OUTPATIENT
Start: 2021-05-22 | End: 2021-05-24 | Stop reason: SDUPTHER

## 2021-04-26 NOTE — PROGRESS NOTES
Assessment:  1  Chronic pain syndrome    2  Long-term current use of opiate analgesic    3  Uncomplicated opioid dependence ([address] )    4  Lumbar radiculitis    5  Cervical radiculopathy    6  Chronic bilateral low back pain with bilateral sciatica    7  Myofascial pain syndrome    8  Spondylosis of cervical region without myelopathy or radiculopathy    9  Chronic bilateral low back pain with right-sided sciatica - Bilateral        Plan:    Continue with hydrocodone this was refilled today with a 2 month supply the medication 1 month has a do not fill date of May 22, 2021  continue with Lyrica and baclofen these were also refilled today     follow-up in 8 weeks for medication refills      [address] Monitoring Program report was reviewed and was appropriate     A urine drug screen was collected at today's office visit as part of our medication management protocol  The point of care testing results were appropriate for what was being prescribed  The specimen will be sent for confirmatory testing  The drug screen is medically necessary because the patient is either dependent on opioid medication or is being considered for opioid medication therapy and the results could impact ongoing or future treatment  The drug screen is to evaluate for the presences or absence of prescribed, non-prescribed, and/or illicit drugs/substances  There are risks associated with opioid medications, including dependence, addiction and tolerance  The patient understands and agrees to use these medications only as prescribed  Potential side effects of the medications include, but are not limited to, constipation, drowsiness, addiction, impaired judgment and risk of fatal overdose if not taken as prescribed  The patient was warned against driving while taking sedation medications  Sharing medications is a felony   At this point in time, the patient is showing no signs of addiction, abuse, diversion or suicidal ideation  History of Present Illness: The patient is a 79 y o  female last seen on  March 1, 2021 who presents for a follow up office visit in regards to chronic pain secondary to  Lumbar radiculitis, myofascial pain syndrome, chronic bilateral low back pain with bilateral sciatica, spondylosis of the cervical region, and chronic left shoulder pain  The patient currently reports  Pain rated 7/10 which is constant and most bothersome at night  She describes this as burning, sharp, throbbing, cramping, pressure-like, shooting, numbness, and pins and needles  Current pain medications includes:  Hydrocodone 10/325 mg 1 tablet 4-5 times daily, baclofen 10 mg 3 times daily and Lyrica 150 mg 3 times daily    Current Facility-Administered Medications   Medication Dose Route Frequency Provider Last Rate    cyanocobalamin  1,000 mcg Intramuscular Q30 Days Tara Benito MD      cyanocobalamin  1,000 mcg Intramuscular Q30 Days Tara Benito MD      cyanocobalamin  1,000 mcg Intramuscular Q30 Days Tara Benito MD      cyanocobalamin  1,000 mcg Intramuscular Q30 Days Tara Benito MD      cyanocobalamin  1,000 mcg Intramuscular Q30 Days Tara Benito MD        The patient reports that this regimen is providing  30% pain relief  The patient is reporting no side effects from this pain medication regimen  Pain Contract Signed:  January 4, 2021  Last Urine Drug Screen:  April 26, 2021    I have personally reviewed and/or updated the patient's past medical history, past surgical history, family history, social history, current medications, allergies, and vital signs today  Review of Systems:    Review of Systems   All other systems reviewed and are negative          Past Medical History:   Diagnosis Date    Anemia     Anxiety     hx of panic attacks (now under control)     Arthritis     Asthma     resolved (no problems in a decade)     Baker's cyst of knee     Bronchitis     Bunion, left foot     Cervical pain     Chronic GERD     Chronic pain     Chronic pain disorder     Depression     Diabetes mellitus, type 2 (HCC)     Diabetic peripheral neuropathy (HCC)     Endometriosis     Fibromyalgia     Hyperlipidemia     Hypertension     Joint pain     Low back pain     Low back pain     Lung nodules     Macular degeneration     Pulmonary emphysema ([facility] [address] ) 1/16/2020    Spondylosis        Past Surgical History:   Procedure Laterality Date    BACK SURGERY  1996    L5, S1- laser surgery fusion of c5 and c6     BREAST LUMPECTOMY Right     CHOLECYSTECTOMY  2004    FOOT SURGERY Left 2005    fusion    [place]  BYPASS  2010    Dr Figueroa     just uterus     KNEE ARTHROSCOPY      LAMINECTOMY      ORTHOPEDIC SURGERY      OVARIAN CYST REMOVAL  1975    PELVIC LAPAROSCOPY      ovaries (x10)     PLEURAL SCARIFICATION  1967    SHOULDER OPEN ROTATOR CUFF REPAIR Left 2008    TONSILLECTOMY      VAGINAL PROLAPSE REPAIR         Family History   Problem Relation Age of Onset    Hypertension Mother     Lung cancer Mother     Hypertension Father     Heart disease Father     Heart attack Father     Cancer Father        Social History     Occupational History    Not on file   Tobacco Use    Smoking status: Current Every Day Smoker     Packs/day: 0 25     Years: 40 00     Pack years: 10 00     Types: Cigarettes    Smokeless tobacco: Current User   Substance and Sexual Activity    Alcohol use: Not Currently     Alcohol/week: 1 0 standard drinks     Types: 1 Shots of liquor per week     Comment: rarely    Drug use: No    Sexual activity: Not Currently     Partners: Male         Current Outpatient Medications:     albuterol (PROVENTIL HFA,VENTOLIN HFA) 90 mcg/act inhaler, Inhale 2 puffs every 4 (four) hours as needed for wheezing, Disp: 1 Inhaler, Rfl: 0    albuterol (VENTOLIN HFA) 90 mcg/act inhaler, inhale 2 puff by inhalation route  every 4 - 6 hours as needed, Disp: , Rfl:     amLODIPine (NORVASC) 5 mg tablet, TAKE 1 TABLET BY MOUTH EVERY DAY, Disp: 90 tablet, Rfl: 1    benzonatate (TESSALON PERLES) 100 mg capsule, Take 1 capsule (100 mg total) by mouth every 8 (eight) hours, Disp: 21 capsule, Rfl: 0    cetirizine (ZyrTEC) 10 mg tablet, TAKE 1/2 TABLET BY MOUTH DAILY, Disp: 45 tablet, Rfl: 2    cimetidine (TAGAMET) 200 mg tablet, Take 1 tablet (200 mg total) by mouth 2 (two) times a day, Disp: 60 tablet, Rfl: 3    diphenhydrAMINE (BENADRYL ALLERGY) 25 mg capsule, Take 50 mg by mouth daily , Disp: , Rfl:     ergocalciferol (VITAMIN D2) 50,000 units, Take 1 capsule (50,000 Units total) by mouth once a week, Disp: 13 capsule, Rfl: 2    fluticasone-umeclidinium-vilanterol (TRELEGY) 100-62 5-25 MCG/INH inhaler, Inhale 1 puff daily Rinse mouth after use , Disp: 1 Inhaler, Rfl: 3    [START ON 5/22/2021] HYDROcodone-acetaminophen (NORCO)  mg per tablet, Take 4-5 tablets daily PRN, Disp: 130 tablet, Rfl: 0    Lancets Misc  (ACCU-CHEK FASTCLIX LANCET) KIT, 3 (three) times a day, Disp: , Rfl:     levothyroxine 25 mcg tablet, TAKE 1 TABLET BY MOUTH EVERY MORNING ON SATURDAY, SUNDAY, TUESDAY AND THURSDAY, Disp: 30 tablet, Rfl: 2    levothyroxine 50 mcg tablet, TAKE 1 TABLET BY ORAL ROUTE EVERY MORNING ON MONDAY, WEDNESDAY AND FRIDAY, Disp: 30 tablet, Rfl: 2    losartan (COZAAR) 25 mg tablet, TAKE 1 TABLET BY MOUTH EVERY DAY, Disp: 90 tablet, Rfl: 1    MICROLET LANCETS MISC, Microlet Lancet, Disp: , Rfl:     mupirocin (BACTROBAN) 2 % ointment, Apply topically 3 (three) times a day, Disp: 22 g, Rfl: 0    nitrofurantoin (MACROBID) 100 mg capsule, Take 1 capsule (100 mg total) by mouth daily With food/Lunch, Disp: 30 capsule, Rfl: 1    ofloxacin (FLOXIN) 0 3 % otic solution, Administer 5 drops into both ears 2 (two) times a day, Disp: 5 mL, Rfl: 1    OneTouch Verio test strip, TEST TWICE A DAY, Disp: 100 each, Rfl: 5    pantoprazole (PROTONIX) 40 mg tablet, TAKE 1 TABLET BY MOUTH TWICE A DAY, Disp: 180 tablet, Rfl: 1    sucralfate (CARAFATE) 1 g tablet, TAKE 1 TABLET BY MOUTH 4 TIMES EVERY DAY ON AN EMPTY STOMACH 1 HOUR BEFORE MEALS AND AT BEDTIME, Disp: 120 tablet, Rfl: 0    traZODone (DESYREL) 50 mg tablet, TAKE 1 TABLET BY MOUTH EVERY DAY AT BEDTIME AS NEEDED, Disp: 30 tablet, Rfl: 0    varenicline (CHANTIX) 0 5 mg tablet, Take 1 tablet (0 5 mg total) by mouth 2 (two) times a day, Disp: 60 tablet, Rfl: 2    vitamin A 2400 MCG (8000 UT) capsule, Take 1 capsule (8,000 Units total) by mouth daily, Disp: 90 capsule, Rfl: 1    vitamin A 2400 MCG (8000 UT) capsule, TAKE 1 CAPSULE (8,000 UNITS TOTAL) BY MOUTH DAILY, Disp: 90 capsule, Rfl: 1    baclofen 10 mg tablet, Take 1 tablet (10 mg total) by mouth 3 (three) times a day, Disp: 90 tablet, Rfl: 1    pregabalin (LYRICA) 150 mg capsule, Take 1 capsule (150 mg total) by mouth 3 (three) times a day, Disp: 90 capsule, Rfl: 1    Current Facility-Administered Medications:     cyanocobalamin injection 1,000 mcg, 1,000 mcg, Intramuscular, Q30 Days, James Martinez MD, 1,000 mcg at 12/08/20 1118    cyanocobalamin injection 1,000 mcg, 1,000 mcg, Intramuscular, Q30 Days, James Martinez MD, 1,000 mcg at 07/08/20 1036    cyanocobalamin injection 1,000 mcg, 1,000 mcg, Intramuscular, Q30 Days, Aleksandar Cornell MD, 1,000 mcg at 09/08/20 1210    cyanocobalamin injection 1,000 mcg, 1,000 mcg, Intramuscular, Q30 Days, Aleksandar Cornell MD    cyanocobalamin injection 1,000 mcg, 1,000 mcg, Intramuscular, Q30 Days, James Martinez MD, Stopped at 03/10/21 1056    Allergies   Allergen Reactions    Cephalosporins Hives    Erythromycin GI Intolerance    Fentanyl Itching     Occurred during surgery     Paxil [Paroxetine] Hives     After 2 weeks    Penicillins Itching    Pravastatin Myalgia    Prednisolone Itching    Statins Itching    Sulfa Antibiotics Diarrhea    Wellbutrin [Bupropion] Hives     After 2 weeks     Marzena's Wort Rash Physical Exam:    /77   Pulse (!) 54   Ht 5' 5" (1 651 m)   Wt 80 7 kg (178 lb)   LMP  (LMP Unknown)   BMI 29 62 kg/m²     Constitutional:normal, well developed, well nourished, alert, in no distress and non-toxic and no overt pain behavior    Eyes:anicteric  HEENT:grossly intact  Neck:supple, symmetric, trachea midline and no masses   Pulmonary:even and unlabored  Cardiovascular:No edema or pitting edema present  Skin:Normal without rashes or lesions and well hydrated  Psychiatric:Mood and affect appropriate  Neurologic:Cranial Nerves II-XII grossly intact  Musculoskeletal:ambulates with cane      Imaging  No orders to display         Orders Placed This Encounter   Procedures    MM ALL_Prescribed Meds and Special Instructions    MM DT_Alprazolam Definitive Test    MM DT_Amphetamine Definitive Test    MM DT_Aripiprazole Definitive Test    MM DT_Buprenorphine Definitive Test    MM DT_Butalbital Definitive Test    MM DT_Clonazepam Definitive Test    MM DT_Clozapine Definitive Test    MM DT_Cocaine Definitive Test    MM DT_Codeine Definitive Test    MM DT_Desipramine Definitive Test    MM DT_Dextromethorphan Definitive Test    MM Diazepam Definitive Test    MM DT_Ethyl Glucuronide/Ethyl Sulfate Definitive Test    MM DT_Fentanyl Definitive Test    MM DT_Haloperidol Definitive Test    MM DT_Heroin Definitive Test    MM DT_Hydrocodone Definitive Test    MM DT_Hydromorphone Definitive Test    MM DT_Imipramine Definitive Test    MM DT_Kratom Definitive Test    MM DT_Levorphanol Definitive Test    MM DT_MDMA Definitive Test    MM DT_Meperidine Definitive Test    MM DT_Methadone Definitive Test    MM DT_Methamphetamine Definitive Test    MM DT_Methylphenidate Definitive Test    MM DT_Morphine Definitive Test    MM Lorazepam Definitive Test    MM DT_Olanzapine Definitive Test    MM DT_Oxycodone Definitive Test    MM DT_Oxymorphone Definitive Test    MM DT_Phencyclidine Definitive Test    MM DT_Phenobarbital Definitive Test    MM DT_Phentermine Definitive Test    MM DT_Quetiapine Definitive Test    MM DT_Risperidone Definitive Test    MM DT_Secobarbital Definitive Test    MM DT_Tapentadol Definitive Test    MM DT_Temazapam Definitive Test    MM DT_THC Definitive Test    MM DT_Tramadol Definitive Test

## 2021-04-26 NOTE — TELEPHONE ENCOUNTER
Attempted to reach pt and a male answered and said she was not home  He will tell the pt to call [facility]

## 2021-04-26 NOTE — TELEPHONE ENCOUNTER
Pt called stating that [facility] and [facility] [address]

## 2021-04-26 NOTE — PATIENT INSTRUCTIONS
Opioid Safety   AMBULATORY CARE:   Opioid safety  includes the correct use, storage, and disposal of opioids  Examples of opioid medicines to treat pain include oxycodone, morphine, fentanyl, and codeine  Call your local emergency number (911 in the [address]), or have someone else call if:   · You have a seizure  · You cannot be woken  · You have trouble staying awake and your breathing is slow or shallow  · Your speech is slurred, or you are confused  · You are dizzy or stumble when you walk  Call your doctor, or have someone close to you call if:   · You are extremely drowsy, or you have trouble staying awake or speaking  · You have pale or clammy skin  · You have blue fingernails or lips  · Your heartbeat is slower than normal     · You cannot stop vomiting  · You have questions or concerns about your condition or care  Use opioids safely:   · Take prescribed opioids exactly as directed  Opioids come with directions based on the kind of opioid and how it is given  Do not take more than the recommended amount, or for longer than needed  · Do not give opioids to others or take opioids that belong to someone else  Misuse of opioids can lead to an addiction or overdose  · Do not mix opioids with other medicines or alcohol  The combination can cause an overdose, or lead to a coma  · Do not drive or operate heavy machinery after you take the opioid  Your provider or pharmacist can tell you how long to wait after a dose before you do these activities  · Talk to your healthcare provider if you have any side effects  He or she can help you prevent or relieve side effects  Side effects include nausea, sleepiness, itching, and trouble thinking clearly  Manage constipation:  Constipation is the most common side effect of opioid medicine  Constipation is when you have hard, dry bowel movements, or you go longer than usual between bowel movements   Tell your healthcare provider about all changes in your bowel movements while you are taking opioids  He or she may recommend laxative medicine to help you have a bowel movement  He or she may also change the kind of opioid you are taking, or change when you take it  The following are more ways you can prevent or relieve constipation:  · Drink liquids as directed  You may need to drink extra liquids to help soften and move your bowels  Ask how much liquid to drink each day and which liquids are best for you  · Eat high-fiber foods  This may help decrease constipation by adding bulk to your bowel movements  High-fiber foods include fruits, vegetables, whole-grain breads and cereals, and beans  Your healthcare provider or dietitian can help you create a high-fiber meal plan  Your provider may also recommend a fiber supplement if you cannot get enough fiber from food  · Exercise regularly  Regular physical activity can help stimulate your intestines  Walking is a good exercise to prevent or relieve constipation  Ask which exercises are best for you  · Schedule a time each day to have a bowel movement  This may help train your body to have regular bowel movements  Bend forward while you are on the toilet to help move the bowel movement out  Sit on the toilet for at least 10 minutes, even if you do not have a bowel movement  Store opioids safely:   · Store opioids where others cannot easily get them  Keep them in a locked cabinet or secure area  Do not  keep them in a purse or other bag you carry with you  A person may be looking for something else and find the opioids  · Make sure opioids are stored out of the reach of children  A child can easily overdose on opioids  Opioids may look like candy to a small child  The best way to dispose of opioids: The laws vary by country and area   In the [place], the best way is to return the opioids through a take-back program  This program is offered by the [employer] ([address])  The following are options for using the program:  · Take the opioids to a [employer] collection site  The site is often a law enforcement center  Call your local law enforcement center for scheduled take-back days in your area  You will be given information on where to go if the collection site is in a different location  · Take the opioids to an approved pharmacy or hospital   A pharmacy or hospital may be set up as a collection site  You will need to ask if it is a [employer] collection site if you were not directed there  A pharmacy or doctor's office may not be able to take back opioids unless it is a NAT[employer] site  · Use a mail-back system  This means you are given containers to put the opioids into  You will then mail them in the containers  · Use a take-back drop box  This is a place to leave the opioids at any time  People and animals will not be able to get into the box  Your local law enforcement agency can tell you where to find a drop box in your area  Other safe ways to dispose of opioids: The medicine may come with disposal instructions  The instructions may vary depending on the brand of medicine you are using  Instructions may come in a Medication Guide, but not every medicine has one  You may instead get instructions from your pharmacy or doctor  Follow instructions carefully  The following are general guidelines to follow:  · Find out if you can flush the opioid  Some opioids can be flushed down the toilet or poured into the sink  You will need to contact authorities in your area to see if this is an option for you  The FDA also offers a list of medicines that are safe to flush down the toilet  You can check the list if you cannot get the information for your local area  · Ask your waste management company about rules for putting opioids in the trash  The company will be able to give you specific directions   Scratch out personal information on the original medicine label so it cannot be read  Then put it in the trash  Do not label the trash or put any information on it about the opioids  It should look like regular household trash so no one is tempted to look for the opioids  Keep the trash out of the reach of children and animals  Always make sure trash is secure  · Talk to officials if you live in a facility  If you live in a nursing home or assisted living center, talk to an official  The person will know the rules for your area  Other ways to manage pain:   · Ask your healthcare provider about non-opioid medicines to control pain  Nonprescription medicines include NSAIDs (such as ibuprofen) and acetaminophen  Prescription medicines include muscle relaxers, antidepressants, and steroids  · Pain may be managed without any medicines  Some ways to relieve pain include massage, aromatherapy, or meditation  Physical or occupational therapy may also help  For more information:   · Drug Enforcement Administration  [facility] [facility] [address]  Phone: 3- 723 - 103-6233  Web Address: [facility]/drug_disposal/    · [address]  and Drug Administration  [facility] [address]  Phone: 0- 139 - 543-7572  Web Address: http://Blurb/  Follow up with your doctor or pain specialist as directed: You may need to have your dose adjusted  Your doctor or pain specialist can also help you find ways to manage pain without opioids  Write down your questions so you remember to ask them during your visits  © Copyright [address] Information is for End User's use only and may not be sold, redistributed or otherwise used for commercial purposes  All illustrations and images included in CareNotes® are the copyrighted property of [employer]  or [facility] Katharine Mora   The above information is an  only  It is not intended as medical advice for individual conditions or treatments   Talk to your doctor, nurse or pharmacist before following any medical regimen to see if it is safe and effective for you

## 2021-05-01 DIAGNOSIS — K27.9 PUD (PEPTIC ULCER DISEASE): ICD-10-CM

## 2021-05-01 RX ORDER — SUCRALFATE 1 G/1
TABLET ORAL
Qty: 120 TABLET | Refills: 0 | Status: SHIPPED | OUTPATIENT
Start: 2021-05-01 | End: 2021-05-25

## 2021-05-11 DIAGNOSIS — G47.00 INSOMNIA, UNSPECIFIED TYPE: ICD-10-CM

## 2021-05-11 RX ORDER — TRAZODONE HYDROCHLORIDE 50 MG/1
TABLET ORAL
Qty: 30 TABLET | Refills: 0 | Status: SHIPPED | OUTPATIENT
Start: 2021-05-11 | End: 2021-06-12

## 2021-05-24 ENCOUNTER — TELEPHONE (OUTPATIENT)
Dept: PAIN MEDICINE | Facility: MEDICAL CENTER | Age: 71
End: 2021-05-24

## 2021-05-24 ENCOUNTER — TELEPHONE (OUTPATIENT)
Dept: HEMATOLOGY ONCOLOGY | Facility: CLINIC | Age: 71
End: 2021-05-24

## 2021-05-24 DIAGNOSIS — G89.4 CHRONIC PAIN SYNDROME: ICD-10-CM

## 2021-05-24 RX ORDER — HYDROCODONE BITARTRATE AND ACETAMINOPHEN 10; 325 MG/1; MG/1
TABLET ORAL
Qty: 10 TABLET | Refills: 0 | Status: SHIPPED | OUTPATIENT
Start: 2021-05-24 | End: 2021-06-21 | Stop reason: SDUPTHER

## 2021-05-24 NOTE — TELEPHONE ENCOUNTER
[place] [facility]   She is asking for a script for 10 pills of Norco and will fill it when it is due  She got 120 pills on Saturday b/c that is all they had in stock  Pt does not run into problems for next panchito  Please advise

## 2021-05-24 NOTE — TELEPHONE ENCOUNTER
We shouldn't have to write a new script when her original script was ordered for 130 tablets  The pharmacy should contact her when they get the rest of the  Medication in and fill the remainder of her script

## 2021-05-24 NOTE — TELEPHONE ENCOUNTER
Pharmacist calling to make you aware   HYDROcodone-acetaminophen (NORCO)  mg per tablet [[id number]]   They only had 120 tablets on hand and patient is requesting if we can send a script to request the 10 she did not get       Please advise    Thank you

## 2021-05-24 NOTE — TELEPHONE ENCOUNTER
Scheduling Appointment     Who Is Calling to Schedule Patient    Doctor [facility]   Date and Time 06/09 at 11:30am          Patient verbalized understanding    yes

## 2021-05-24 NOTE — TELEPHONE ENCOUNTER
Per previous task the pharmacy is requesting the script for the 10 pills she didn't get  RN S/W pharmacy  Please advise

## 2021-05-25 DIAGNOSIS — K27.9 PUD (PEPTIC ULCER DISEASE): ICD-10-CM

## 2021-05-25 RX ORDER — SUCRALFATE 1 G/1
TABLET ORAL
Qty: 120 TABLET | Refills: 0 | Status: SHIPPED | OUTPATIENT
Start: 2021-05-25 | End: 2021-05-27

## 2021-05-27 DIAGNOSIS — K27.9 PUD (PEPTIC ULCER DISEASE): ICD-10-CM

## 2021-05-27 RX ORDER — SUCRALFATE 1 G/1
TABLET ORAL
Qty: 120 TABLET | Refills: 0 | Status: SHIPPED | OUTPATIENT
Start: 2021-05-27 | End: 2021-07-11

## 2021-06-04 ENCOUNTER — TELEPHONE (OUTPATIENT)
Dept: HEMATOLOGY ONCOLOGY | Facility: CLINIC | Age: 71
End: 2021-06-04

## 2021-06-04 DIAGNOSIS — D47.2 MGUS (MONOCLONAL GAMMOPATHY OF UNKNOWN SIGNIFICANCE): Primary | ICD-10-CM

## 2021-06-04 NOTE — TELEPHONE ENCOUNTER
I phoned the patient and left a voicemail message reminding her to have her labs completed prior to her appointment with CARTER Jain on 6/9  The [facility] number was provided

## 2021-06-05 DIAGNOSIS — E03.9 HYPOTHYROIDISM, UNSPECIFIED TYPE: ICD-10-CM

## 2021-06-06 RX ORDER — LEVOTHYROXINE SODIUM 0.05 MG/1
TABLET ORAL
Qty: 30 TABLET | Refills: 2 | Status: SHIPPED | OUTPATIENT
Start: 2021-06-06 | End: 2021-12-20

## 2021-06-07 ENCOUNTER — TELEPHONE (OUTPATIENT)
Dept: HEMATOLOGY ONCOLOGY | Facility: CLINIC | Age: 71
End: 2021-06-07

## 2021-06-07 ENCOUNTER — TELEPHONE (OUTPATIENT)
Dept: FAMILY MEDICINE CLINIC | Facility: CLINIC | Age: 71
End: 2021-06-07

## 2021-06-07 NOTE — TELEPHONE ENCOUNTER
Appointment Cancellation Or Reschedule     Person calling in Patient   Provider [facility] Visit Date and Time 06-09-21 @ 11:30 am   Office Visit Location [place]    Did patient want to reschedule their office appointment? If so, when was it scheduled to? Yes - 06-14-21 @ 11:30 am   Is this patient calling to reschedule an infusion appointment? Is this patient a Chemo patient? When is their next infusion visit? Reason for Cancellation or Reschedule Patient feeling sick     If the patient is a treatment patient, please route this to the office nurse  If the patient is not on treatment, please route to the office MA

## 2021-06-07 NOTE — TELEPHONE ENCOUNTER
Called pt and spoke to her she states that she is unable to go to the ED due to the cost, I asked her if she would be able to go to urgent care   Pt states that she will try her best

## 2021-06-08 ENCOUNTER — RA CDI HCC (OUTPATIENT)
Dept: OTHER | Facility: HOSPITAL | Age: 71
End: 2021-06-08

## 2021-06-08 NOTE — PROGRESS NOTES
[place] [id number]  coding opportunities             Chart reviewed, (number of) suggestions sent to provider: 2                  Patients insurance company: [employer] (Med[employer] and [employer])     Visit status: Patient canceled the appointment     Provider never responded to [place] [address]  coding request     [place] [address]  coding opportunities             Chart reviewed, (number of) suggestions sent to provider: 2      DX:  M06  9-Rheumatoid arthritis, unspecified  E11 42-Type 2 diabetes mellitus with diabetic polyneuropathy       Patients insurance company: [employer] (Medicare Advantage and [employer])

## 2021-06-09 ENCOUNTER — TELEPHONE (OUTPATIENT)
Dept: HEMATOLOGY ONCOLOGY | Facility: CLINIC | Age: 71
End: 2021-06-09

## 2021-06-09 NOTE — TELEPHONE ENCOUNTER
Reschedule Appointment     Who is calling in Patient    Doctor Appointment Scheduled with Ankit Mason date and time 06/14 at 11:30am   New date and time 06/28 at 11:30am   Location [place]   Patient verbalized understanding    yes

## 2021-06-10 ENCOUNTER — TELEPHONE (OUTPATIENT)
Dept: FAMILY MEDICINE CLINIC | Facility: CLINIC | Age: 71
End: 2021-06-10

## 2021-06-10 DIAGNOSIS — R05.9 COUGH: Primary | ICD-10-CM

## 2021-06-10 NOTE — TELEPHONE ENCOUNTER
Patient went to [facility] on Monday  They gave her Levaquin for 10 days, Tessalon Perles and and inhaler  She stated they told her she does not have pneumonia, she did have a chest xray  However, she still feels horrible, worn out, tired  She had the second COVID shot  Is there anything else she should do? Please advise

## 2021-06-11 DIAGNOSIS — R05.9 COUGH: ICD-10-CM

## 2021-06-11 PROCEDURE — U0003 INFECTIOUS AGENT DETECTION BY NUCLEIC ACID (DNA OR RNA); SEVERE ACUTE RESPIRATORY SYNDROME CORONAVIRUS 2 (SARS-COV-2) (CORONAVIRUS DISEASE [COVID-19]), AMPLIFIED PROBE TECHNIQUE, MAKING USE OF HIGH THROUGHPUT TECHNOLOGIES AS DESCRIBED BY CMS-2020-01-R: HCPCS | Performed by: INTERNAL MEDICINE

## 2021-06-11 PROCEDURE — U0005 INFEC AGEN DETEC AMPLI PROBE: HCPCS | Performed by: INTERNAL MEDICINE

## 2021-06-12 ENCOUNTER — NURSE TRIAGE (OUTPATIENT)
Dept: OTHER | Facility: OTHER | Age: 71
End: 2021-06-12

## 2021-06-12 DIAGNOSIS — G47.00 INSOMNIA, UNSPECIFIED TYPE: ICD-10-CM

## 2021-06-12 DIAGNOSIS — R79.89 LOW SERUM VITAMIN A: ICD-10-CM

## 2021-06-12 DIAGNOSIS — R32 URINARY INCONTINENCE, UNSPECIFIED TYPE: ICD-10-CM

## 2021-06-12 DIAGNOSIS — N39.0 URINARY TRACT INFECTION WITHOUT HEMATURIA, SITE UNSPECIFIED: ICD-10-CM

## 2021-06-12 LAB — SARS-COV-2 RNA RESP QL NAA+PROBE: NEGATIVE

## 2021-06-12 RX ORDER — TRAZODONE HYDROCHLORIDE 50 MG/1
TABLET ORAL
Qty: 30 TABLET | Refills: 0 | Status: SHIPPED | OUTPATIENT
Start: 2021-06-12 | End: 2021-07-06

## 2021-06-12 RX ORDER — MULTIVITAMIN WITH IRON
8000 TABLET ORAL DAILY
Qty: 90 CAPSULE | Refills: 0 | Status: CANCELLED | OUTPATIENT
Start: 2021-06-12

## 2021-06-12 RX ORDER — NITROFURANTOIN 25; 75 MG/1; MG/1
100 CAPSULE ORAL DAILY
Qty: 30 CAPSULE | Refills: 0 | Status: CANCELLED | OUTPATIENT
Start: 2021-06-12

## 2021-06-12 NOTE — TELEPHONE ENCOUNTER
Regarding: Cough  ----- Message from Patrick Conley sent at 6/12/2021 12:48 PM EDT -----  "I am coughing and I was cleared of COVID-19 and would like to see if I can be given something to get rid of this cough "

## 2021-06-12 NOTE — TELEPHONE ENCOUNTER
Reason for Disposition   Difficulty breathing    Answer Assessment - Initial Assessment Questions  1  ONSET: "When did the cough begin?"       6/4/21 per patient  2  SEVERITY: "How bad is the cough today?"      Better than yesterday  3  RESPIRATORY DISTRESS: "Describe your breathing "       SOB with exertion  "cannot walk to bathroom which is like 30 feet"  4  FEVER: "Do you have a fever?" If so, ask: "What is your temperature, how was it measured, and when did it start?"      Per patient 100 9 on Thursday, Forehead  5  SPUTUM: "Describe the color of your sputum" (clear, white, yellow, green)      White/clear Mucus per patient  6  HEMOPTYSIS: "Are you coughing up any blood?" If so ask: "How much?" (flecks, streaks, tablespoons, etc )     Denies  7  CARDIAC HISTORY: "Do you have any history of heart disease?" (e g , heart attack, congestive heart failure)      Denies  8  LUNG HISTORY: "Do you have any history of lung disease?"  (e g , pulmonary embolus, asthma, emphysema)     Asthma, emphysema, aspiration pneumonia  9  PE RISK FACTORS: "Do you have a history of blood clots?" (or: recent major surgery, recent prolonged travel, bedridden)      Denies  10  OTHER SYMPTOMS: "Do you have any other symptoms?" (e g , runny nose, wheezing, chest pain)       States SOB, chest pain when breathing  Sore throat     12  TRAVEL: "Have you traveled out of the country in the last month?" (e g , travel history, exposures)      Denies    Protocols used: COUGH - ACUTE PRODUCTIVE-ADULT-AH

## 2021-06-12 NOTE — TELEPHONE ENCOUNTER
Seen in [facility]  Negative for COVID  States has a hx of aspiration pneumonia but Chest Xray clear  Given Tessalon Perles and Levaquin x10 days  C/o SOB with exertion and productive cough  States having "difficulty walking to bathroom" due to SOB  Recommended to go to ED  Refused  States she will get better with steroids  On-call Provider contacted  Recommended ER immediately due to possible cardiac cough  Patient contacted and informed of Dr Atkins Seat recommendation  Continues to refuse to go to ER  States cannot afford co-pay  States she "has people that will watch over me " And that she is "slowly getting better " Advise to call back if any questions  Advised again about seeking medical attention in ED before completing call  Patient refused again

## 2021-06-14 DIAGNOSIS — R32 URINARY INCONTINENCE, UNSPECIFIED TYPE: ICD-10-CM

## 2021-06-14 DIAGNOSIS — R79.89 LOW SERUM VITAMIN A: ICD-10-CM

## 2021-06-14 DIAGNOSIS — N39.0 URINARY TRACT INFECTION WITHOUT HEMATURIA, SITE UNSPECIFIED: ICD-10-CM

## 2021-06-14 RX ORDER — NITROFURANTOIN 25; 75 MG/1; MG/1
100 CAPSULE ORAL DAILY
Qty: 30 CAPSULE | Refills: 1 | Status: SHIPPED | OUTPATIENT
Start: 2021-06-14 | End: 2021-08-13

## 2021-06-14 RX ORDER — MULTIVITAMIN WITH IRON
8000 TABLET ORAL DAILY
Qty: 90 CAPSULE | Refills: 1 | Status: SHIPPED | OUTPATIENT
Start: 2021-06-14 | End: 2021-11-15

## 2021-06-21 ENCOUNTER — TELEPHONE (OUTPATIENT)
Dept: PAIN MEDICINE | Facility: CLINIC | Age: 71
End: 2021-06-21

## 2021-06-21 ENCOUNTER — OFFICE VISIT (OUTPATIENT)
Dept: PAIN MEDICINE | Facility: MEDICAL CENTER | Age: 71
End: 2021-06-21
Payer: COMMERCIAL

## 2021-06-21 VITALS
TEMPERATURE: 97.2 F | HEART RATE: 54 BPM | DIASTOLIC BLOOD PRESSURE: 55 MMHG | WEIGHT: 176 LBS | SYSTOLIC BLOOD PRESSURE: 93 MMHG | BODY MASS INDEX: 29.32 KG/M2 | HEIGHT: 65 IN

## 2021-06-21 DIAGNOSIS — M54.16 LUMBAR RADICULITIS: Primary | ICD-10-CM

## 2021-06-21 DIAGNOSIS — M54.12 CERVICAL RADICULOPATHY: ICD-10-CM

## 2021-06-21 DIAGNOSIS — M79.18 MYOFASCIAL PAIN SYNDROME: ICD-10-CM

## 2021-06-21 DIAGNOSIS — M54.41 CHRONIC BILATERAL LOW BACK PAIN WITH BILATERAL SCIATICA: ICD-10-CM

## 2021-06-21 DIAGNOSIS — G89.29 CHRONIC BILATERAL LOW BACK PAIN WITH BILATERAL SCIATICA: ICD-10-CM

## 2021-06-21 DIAGNOSIS — M47.812 SPONDYLOSIS OF CERVICAL REGION WITHOUT MYELOPATHY OR RADICULOPATHY: ICD-10-CM

## 2021-06-21 DIAGNOSIS — M54.42 CHRONIC BILATERAL LOW BACK PAIN WITH BILATERAL SCIATICA: ICD-10-CM

## 2021-06-21 DIAGNOSIS — M54.41 CHRONIC BILATERAL LOW BACK PAIN WITH RIGHT-SIDED SCIATICA: ICD-10-CM

## 2021-06-21 DIAGNOSIS — G89.29 CHRONIC BILATERAL LOW BACK PAIN WITH RIGHT-SIDED SCIATICA: ICD-10-CM

## 2021-06-21 DIAGNOSIS — G89.4 CHRONIC PAIN SYNDROME: ICD-10-CM

## 2021-06-21 PROCEDURE — 99214 OFFICE O/P EST MOD 30 MIN: CPT | Performed by: NURSE PRACTITIONER

## 2021-06-21 PROCEDURE — 1160F RVW MEDS BY RX/DR IN RCRD: CPT | Performed by: NURSE PRACTITIONER

## 2021-06-21 PROCEDURE — 3008F BODY MASS INDEX DOCD: CPT | Performed by: NURSE PRACTITIONER

## 2021-06-21 PROCEDURE — 4004F PT TOBACCO SCREEN RCVD TLK: CPT | Performed by: NURSE PRACTITIONER

## 2021-06-21 RX ORDER — BACLOFEN 10 MG/1
10 TABLET ORAL 3 TIMES DAILY
Qty: 90 TABLET | Refills: 1 | Status: SHIPPED | OUTPATIENT
Start: 2021-06-21 | End: 2021-07-19

## 2021-06-21 RX ORDER — HYDROCODONE BITARTRATE AND ACETAMINOPHEN 10; 325 MG/1; MG/1
TABLET ORAL
Qty: 10 TABLET | Refills: 0 | Status: SHIPPED | OUTPATIENT
Start: 2021-06-21 | End: 2021-06-21 | Stop reason: SDUPTHER

## 2021-06-21 RX ORDER — PREGABALIN 150 MG/1
150 CAPSULE ORAL 3 TIMES DAILY
Qty: 90 CAPSULE | Refills: 1 | Status: SHIPPED | OUTPATIENT
Start: 2021-06-21 | End: 2021-06-21 | Stop reason: SDUPTHER

## 2021-06-21 RX ORDER — HYDROCODONE BITARTRATE AND ACETAMINOPHEN 10; 325 MG/1; MG/1
TABLET ORAL
Qty: 10 TABLET | Refills: 0 | Status: SHIPPED | OUTPATIENT
Start: 2021-07-17 | End: 2021-06-21 | Stop reason: SDUPTHER

## 2021-06-21 NOTE — TELEPHONE ENCOUNTER
Patient states her [facility] pharmacy received wrong quantity for her [place] refill  She states they received 10 pills when it should be 130 pills   Please advise, [place]    Call back# [phone]

## 2021-06-21 NOTE — TELEPHONE ENCOUNTER
Pt says shes currently at [facility] and none of the scripts are there   Ph# [phone]

## 2021-06-21 NOTE — PROGRESS NOTES
Assessment:  1  Lumbar radiculitis    2  Myofascial pain syndrome    3  Chronic bilateral low back pain with right-sided sciatica - Bilateral    4  Chronic bilateral low back pain with bilateral sciatica    5  Chronic pain syndrome    6  Cervical radiculopathy    7  Spondylosis of cervical region without myelopathy or radiculopathy        Plan:    Continue with hydrocodone as prescribed she was given a 2 month supply the medication with 1 month having a do not fill date of July 17, 2021  continue with Lyrica and baclofen these were also refilled today  Follow-up visit in 8 weeks for medication refills  [facility] Drug Monitoring Program report was reviewed and was appropriate         There are risks associated with opioid medications, including dependence, addiction and tolerance  The patient understands and agrees to use these medications only as prescribed  Potential side effects of the medications include, but are not limited to, constipation, drowsiness, addiction, impaired judgment and risk of fatal overdose if not taken as prescribed  The patient was warned against driving while taking sedation medications  Sharing medications is a felony  At this point in time, the patient is showing no signs of addiction, abuse, diversion or suicidal ideation  History of Present Illness: The patient is a 79 y o  female last seen on  April 26, 2021 who presents for a follow up office visit in regards to chronic pain secondary to  Lumbar radiculitis, cervical radiculopathy, myofascial pain, cervical spondylosis  The patient currently reports  Pain rated 7/10 this is constant and described as burning, sharp, throbbing, cramping, pressure-like, shooting, numbness, and pins and needles  Patient continues with hydrocodone 10/325 mg 1 tablet 4-5 times daily along with Lyrica 150 mg 3 times daily, and baclofen 10 mg 3 times daily    Her medication provides 30% relief without side effects or issues  Current Facility-Administered Medications   Medication Dose Route Frequency Provider Last Rate    cyanocobalamin  1,000 mcg Intramuscular Q30 Days Aleksandar Edmondson MD      cyanocobalamin  1,000 mcg Intramuscular Q30 Days Aleksandar Edmondson MD      cyanocobalamin  1,000 mcg Intramuscular Q30 Days Delon Bosworth, MD      cyanocobalamin  1,000 mcg Intramuscular Q30 Days Delon Bosworth, MD      cyanocobalamin  1,000 mcg Intramuscular Q30 Days Delon Bosworth, MD        I have personally reviewed and/or updated the patient's past medical history, past surgical history, family history, social history, current medications, allergies, and vital signs today  Review of Systems:    Review of Systems   Respiratory: Negative for shortness of breath  Cardiovascular: Negative for chest pain  Gastrointestinal: Positive for nausea  Negative for constipation, diarrhea and vomiting  Musculoskeletal: Positive for arthralgias, back pain, gait problem, joint swelling and neck pain  Negative for myalgias  Skin: Negative for rash  Neurological: Positive for dizziness  Negative for seizures and weakness  All other systems reviewed and are negative          Past Medical History:   Diagnosis Date    Anemia     Anxiety     hx of panic attacks (now under control)     Arthritis     Asthma     resolved (no problems in a decade)     Baker's cyst of knee     Bronchitis     Bunion, left foot     Cervical pain     Chronic GERD     Chronic pain     Chronic pain disorder     Depression     Diabetes mellitus, type 2 (HCC)     Diabetic peripheral neuropathy (HCC)     Endometriosis     Fibromyalgia     Hyperlipidemia     Hypertension     Joint pain     Low back pain     Low back pain     Lung nodules     Macular degeneration     Pulmonary emphysema ([facility] [address] ) 1/16/2020    Spondylosis        Past Surgical History:   Procedure Laterality Date    BACK SURGERY  1996    L5, S1- laser surgery fusion of c5 and c6     BREAST LUMPECTOMY Right     CHOLECYSTECTOMY  2004    FOOT SURGERY Left 2005    fusion    Othel Lapping BYPASS  2010    Dr Diana jason uterus     KNEE ARTHROSCOPY      LAMINECTOMY      ORTHOPEDIC SURGERY      OVARIAN CYST REMOVAL  1975    PELVIC LAPAROSCOPY      ovaries (x10)     PLEURAL SCARIFICATION  1967    SHOULDER OPEN ROTATOR CUFF REPAIR Left 2008    TONSILLECTOMY      VAGINAL PROLAPSE REPAIR         Family History   Problem Relation Age of Onset    Hypertension Mother     Lung cancer Mother     Hypertension Father     Heart disease Father     Heart attack Father     Cancer Father        Social History     Occupational History    Not on file   Tobacco Use    Smoking status: Current Every Day Smoker     Packs/day: 0 25     Years: 40 00     Pack years: 10 00     Types: Cigarettes    Smokeless tobacco: Current User   Vaping Use    Vaping Use: Never used   Substance and Sexual Activity    Alcohol use: Not Currently     Alcohol/week: 1 0 standard drinks     Types: 1 Shots of liquor per week     Comment: rarely    Drug use: No    Sexual activity: Not Currently     Partners: Male         Current Outpatient Medications:     albuterol (PROVENTIL HFA,VENTOLIN HFA) 90 mcg/act inhaler, Inhale 2 puffs every 4 (four) hours as needed for wheezing, Disp: 1 Inhaler, Rfl: 0    albuterol (VENTOLIN HFA) 90 mcg/act inhaler, inhale 2 puff by inhalation route  every 4 - 6 hours as needed, Disp: , Rfl:     amLODIPine (NORVASC) 5 mg tablet, TAKE 1 TABLET BY MOUTH EVERY DAY, Disp: 90 tablet, Rfl: 1    benzonatate (TESSALON PERLES) 100 mg capsule, Take 1 capsule (100 mg total) by mouth every 8 (eight) hours, Disp: 21 capsule, Rfl: 0    cetirizine (ZyrTEC) 10 mg tablet, TAKE 1/2 TABLET BY MOUTH DAILY, Disp: 45 tablet, Rfl: 2    cimetidine (TAGAMET) 200 mg tablet, Take 1 tablet (200 mg total) by mouth 2 (two) times a day, Disp: 60 tablet, Rfl: 3    diphenhydrAMINE (BENADRYL ALLERGY) 25 mg capsule, Take 50 mg by mouth daily , Disp: , Rfl:     ergocalciferol (VITAMIN D2) 50,000 units, Take 1 capsule (50,000 Units total) by mouth once a week, Disp: 13 capsule, Rfl: 2    fluticasone-umeclidinium-vilanterol (TRELEGY) 100-62 5-25 MCG/INH inhaler, Inhale 1 puff daily Rinse mouth after use , Disp: 1 Inhaler, Rfl: 3    [START ON 7/17/2021] HYDROcodone-acetaminophen (NORCO)  mg per tablet, Take 4-5 tablets daily PRN, Disp: 10 tablet, Rfl: 0    Lancets Misc  (ACCU-CHEK FASTCLIX LANCET) KIT, 3 (three) times a day, Disp: , Rfl:     levothyroxine 25 mcg tablet, TAKE 1 TABLET BY MOUTH EVERY MORNING ON SATURDAY, SUNDAY, TUESDAY AND THURSDAY, Disp: 30 tablet, Rfl: 2    levothyroxine 50 mcg tablet, TAKE 1 TABLET BY MOUTH EVERY MORNING ON MONDAY, WEDNESDAY AND FRIDAY, Disp: 30 tablet, Rfl: 2    losartan (COZAAR) 25 mg tablet, TAKE 1 TABLET BY MOUTH EVERY DAY, Disp: 90 tablet, Rfl: 1    MICROLET LANCETS MISC, Microlet Lancet, Disp: , Rfl:     mupirocin (BACTROBAN) 2 % ointment, Apply topically 3 (three) times a day, Disp: 22 g, Rfl: 0    nitrofurantoin (MACROBID) 100 mg capsule, Take 1 capsule (100 mg total) by mouth daily With food/Lunch, Disp: 30 capsule, Rfl: 1    ofloxacin (FLOXIN) 0 3 % otic solution, Administer 5 drops into both ears 2 (two) times a day, Disp: 5 mL, Rfl: 1    OneTouch Verio test strip, TEST TWICE A DAY, Disp: 100 each, Rfl: 5    pantoprazole (PROTONIX) 40 mg tablet, TAKE 1 TABLET BY MOUTH TWICE A DAY, Disp: 180 tablet, Rfl: 1    sucralfate (CARAFATE) 1 g tablet, TAKE 1 TABLET BY MOUTH 4 TIMES EVERY DAY ON AN EMPTY STOMACH 1 HOUR BEFORE MEALS AND AT BEDTIME, Disp: 120 tablet, Rfl: 0    traZODone (DESYREL) 50 mg tablet, TAKE 1 TABLET BY MOUTH EVERY DAY AT BEDTIME AS NEEDED, Disp: 30 tablet, Rfl: 0    vitamin A 2400 MCG (8000 UT) capsule, Take 1 capsule (8,000 Units total) by mouth daily, Disp: 90 capsule, Rfl: 1    baclofen 10 mg tablet, Take 1 tablet (10 mg total) by mouth 3 (three) times a day, Disp: 90 tablet, Rfl: 1    pregabalin (LYRICA) 150 mg capsule, Take 1 capsule (150 mg total) by mouth 3 (three) times a day, Disp: 90 capsule, Rfl: 1    varenicline (CHANTIX) 0 5 mg tablet, Take 1 tablet (0 5 mg total) by mouth 2 (two) times a day, Disp: 60 tablet, Rfl: 2    Current Facility-Administered Medications:     cyanocobalamin injection 1,000 mcg, 1,000 mcg, Intramuscular, Q30 Days, Kiara Gilbert MD, 1,000 mcg at 12/08/20 1118    cyanocobalamin injection 1,000 mcg, 1,000 mcg, Intramuscular, Q30 Days, Kiara Gilbert MD, 1,000 mcg at 07/08/20 1036    cyanocobalamin injection 1,000 mcg, 1,000 mcg, Intramuscular, Q30 Days, Kiara Gilbert MD, 1,000 mcg at 09/08/20 1210    cyanocobalamin injection 1,000 mcg, 1,000 mcg, Intramuscular, Q30 Days, Aleksandar Edmondson MD    cyanocobalamin injection 1,000 mcg, 1,000 mcg, Intramuscular, Q30 Days, Kiara Gilbert MD    Allergies   Allergen Reactions    Cephalosporins Hives    Erythromycin GI Intolerance    Fentanyl Itching     Occurred during surgery     Paxil [Paroxetine] Hives     After 2 weeks    Penicillins Itching    Pravastatin Myalgia    Prednisolone Itching    Statins Itching    Sulfa Antibiotics Diarrhea    Wellbutrin [Bupropion] Hives     After 2 weeks     Marzena's Wort Rash       Physical Exam:    BP 93/55   Pulse (!) 54   Temp (!) 97 2 °F (36 2 °C)   Ht 5' 5" (1 651 m)   Wt 79 8 kg (176 lb)   LMP  (LMP Unknown)   BMI 29 29 kg/m²     Constitutional:normal, well developed, well nourished, alert, in no distress and non-toxic and no overt pain behavior    Eyes:anicteric  HEENT:grossly intact  Neck:supple, symmetric, trachea midline and no masses   Pulmonary:even and unlabored  Cardiovascular:No edema or pitting edema present  Skin:Normal without rashes or lesions and well hydrated  Psychiatric:Mood and affect appropriate  Neurologic:Cranial Nerves II-XII grossly intact  Musculoskeletal:ambulates with cane      Imaging  No orders to display         No orders of the defined types were placed in this encounter

## 2021-06-21 NOTE — TELEPHONE ENCOUNTER
RN attempted to reach pt  Detailed VMMLOM (ok per release of information document) that scripts were resent  CB# and OH provided

## 2021-06-21 NOTE — TELEPHONE ENCOUNTER
RN attempted to reach pt  Detailed VMMLOM (ok per REZA) left that script was adjusted and resent  CB# and OH provided

## 2021-06-21 NOTE — TELEPHONE ENCOUNTER
S/W Alicia Cabrera at the pharmacy  She stated they did not get any of the scripts that were sent today  She stated the systems were down before  Please advise

## 2021-06-22 ENCOUNTER — APPOINTMENT (OUTPATIENT)
Dept: LAB | Facility: HOSPITAL | Age: 71
End: 2021-06-22
Payer: COMMERCIAL

## 2021-06-22 DIAGNOSIS — E06.3 HYPOTHYROIDISM DUE TO HASHIMOTO'S THYROIDITIS: ICD-10-CM

## 2021-06-22 DIAGNOSIS — Z98.84 BARIATRIC SURGERY STATUS: ICD-10-CM

## 2021-06-22 DIAGNOSIS — E03.8 HYPOTHYROIDISM DUE TO HASHIMOTO'S THYROIDITIS: ICD-10-CM

## 2021-06-22 DIAGNOSIS — E11.69 HYPERLIPIDEMIA ASSOCIATED WITH TYPE 2 DIABETES MELLITUS (HCC): ICD-10-CM

## 2021-06-22 DIAGNOSIS — K95.89 IRON DEFICIENCY ANEMIA FOLLOWING BARIATRIC SURGERY: ICD-10-CM

## 2021-06-22 DIAGNOSIS — D47.2 MGUS (MONOCLONAL GAMMOPATHY OF UNKNOWN SIGNIFICANCE): ICD-10-CM

## 2021-06-22 DIAGNOSIS — IMO0002 TYPE II DIABETES MELLITUS WITH MANIFESTATIONS, UNCONTROLLED: ICD-10-CM

## 2021-06-22 DIAGNOSIS — D50.8 IRON DEFICIENCY ANEMIA FOLLOWING BARIATRIC SURGERY: ICD-10-CM

## 2021-06-22 DIAGNOSIS — E78.5 HYPERLIPIDEMIA ASSOCIATED WITH TYPE 2 DIABETES MELLITUS (HCC): ICD-10-CM

## 2021-06-22 LAB
25(OH)D3 SERPL-MCNC: 71 NG/ML (ref 30–100)
ALBUMIN SERPL BCP-MCNC: 3.5 G/DL (ref 3–5.2)
ALP SERPL-CCNC: 93 U/L (ref 43–122)
ALT SERPL W P-5'-P-CCNC: 11 U/L
ANION GAP SERPL CALCULATED.3IONS-SCNC: 6 MMOL/L (ref 5–14)
AST SERPL W P-5'-P-CCNC: 28 U/L (ref 14–36)
BASOPHILS # BLD AUTO: 0 THOUSANDS/ΜL (ref 0–0.1)
BASOPHILS NFR BLD AUTO: 1 % (ref 0–1)
BILIRUB SERPL-MCNC: 0.24 MG/DL
BUN SERPL-MCNC: 9 MG/DL (ref 5–25)
CALCIUM SERPL-MCNC: 9.1 MG/DL (ref 8.4–10.2)
CHLORIDE SERPL-SCNC: 108 MMOL/L (ref 97–108)
CHOLEST SERPL-MCNC: 201 MG/DL
CO2 SERPL-SCNC: 26 MMOL/L (ref 22–30)
CREAT SERPL-MCNC: 0.66 MG/DL (ref 0.6–1.2)
EOSINOPHIL # BLD AUTO: 0.1 THOUSAND/ΜL (ref 0–0.4)
EOSINOPHIL NFR BLD AUTO: 3 % (ref 0–6)
ERYTHROCYTE [DISTWIDTH] IN BLOOD BY AUTOMATED COUNT: 16.3 %
FERRITIN SERPL-MCNC: 14 NG/ML (ref 8–388)
GFR SERPL CREATININE-BSD FRML MDRD: 90 ML/MIN/1.73SQ M
GLUCOSE P FAST SERPL-MCNC: 68 MG/DL (ref 70–99)
HCT VFR BLD AUTO: 34.9 % (ref 36–46)
HDLC SERPL-MCNC: 54 MG/DL
HGB BLD-MCNC: 11.8 G/DL (ref 12–16)
IGA SERPL-MCNC: 269 MG/DL (ref 70–400)
IGG SERPL-MCNC: 580 MG/DL (ref 700–1600)
IGM SERPL-MCNC: 91 MG/DL (ref 40–230)
IRON SATN MFR SERPL: 12 %
IRON SERPL-MCNC: 40 UG/DL (ref 50–170)
LDLC SERPL CALC-MCNC: 121 MG/DL
LYMPHOCYTES # BLD AUTO: 0.9 THOUSANDS/ΜL (ref 0.5–4)
LYMPHOCYTES NFR BLD AUTO: 23 % (ref 25–45)
MAGNESIUM SERPL-MCNC: 1.8 MG/DL (ref 1.6–2.3)
MCH RBC QN AUTO: 30.7 PG (ref 26–34)
MCHC RBC AUTO-ENTMCNC: 33.8 G/DL (ref 31–36)
MCV RBC AUTO: 91 FL (ref 80–100)
MONOCYTES # BLD AUTO: 0.4 THOUSAND/ΜL (ref 0.2–0.9)
MONOCYTES NFR BLD AUTO: 11 % (ref 1–10)
NEUTROPHILS # BLD AUTO: 2.4 THOUSANDS/ΜL (ref 1.8–7.8)
NEUTS SEG NFR BLD AUTO: 63 % (ref 45–65)
NONHDLC SERPL-MCNC: 147 MG/DL
PLATELET # BLD AUTO: 251 THOUSANDS/UL (ref 150–450)
PMV BLD AUTO: 8.2 FL (ref 8.9–12.7)
POTASSIUM SERPL-SCNC: 3.7 MMOL/L (ref 3.6–5)
PROT SERPL-MCNC: 6.4 G/DL (ref 5.9–8.4)
RBC # BLD AUTO: 3.84 MILLION/UL (ref 4–5.2)
SODIUM SERPL-SCNC: 140 MMOL/L (ref 137–147)
T4 FREE SERPL-MCNC: 1 NG/DL (ref 0.76–1.46)
TIBC SERPL-MCNC: 334 UG/DL (ref 250–450)
TRIGL SERPL-MCNC: 128 MG/DL
TSH SERPL DL<=0.05 MIU/L-ACNC: 0.12 UIU/ML (ref 0.47–4.68)
VIT B12 SERPL-MCNC: 202 PG/ML (ref 100–900)
WBC # BLD AUTO: 3.8 THOUSAND/UL (ref 4.5–11)

## 2021-06-22 PROCEDURE — 84590 ASSAY OF VITAMIN A: CPT

## 2021-06-22 PROCEDURE — 80061 LIPID PANEL: CPT

## 2021-06-22 PROCEDURE — 82784 ASSAY IGA/IGD/IGG/IGM EACH: CPT

## 2021-06-22 PROCEDURE — 83550 IRON BINDING TEST: CPT

## 2021-06-22 PROCEDURE — 83883 ASSAY NEPHELOMETRY NOT SPEC: CPT

## 2021-06-22 PROCEDURE — 85025 COMPLETE CBC W/AUTO DIFF WBC: CPT

## 2021-06-22 PROCEDURE — 86334 IMMUNOFIX E-PHORESIS SERUM: CPT

## 2021-06-22 PROCEDURE — 84439 ASSAY OF FREE THYROXINE: CPT

## 2021-06-22 PROCEDURE — 83540 ASSAY OF IRON: CPT

## 2021-06-22 PROCEDURE — 84165 PROTEIN E-PHORESIS SERUM: CPT

## 2021-06-22 PROCEDURE — 84446 ASSAY OF VITAMIN E: CPT

## 2021-06-22 PROCEDURE — 80053 COMPREHEN METABOLIC PANEL: CPT

## 2021-06-22 PROCEDURE — 84425 ASSAY OF VITAMIN B-1: CPT

## 2021-06-22 PROCEDURE — 84443 ASSAY THYROID STIM HORMONE: CPT

## 2021-06-22 PROCEDURE — 84207 ASSAY OF VITAMIN B-6: CPT

## 2021-06-22 PROCEDURE — 36415 COLL VENOUS BLD VENIPUNCTURE: CPT

## 2021-06-22 PROCEDURE — 83735 ASSAY OF MAGNESIUM: CPT

## 2021-06-22 PROCEDURE — 82306 VITAMIN D 25 HYDROXY: CPT

## 2021-06-22 PROCEDURE — 82607 VITAMIN B-12: CPT

## 2021-06-22 PROCEDURE — 82728 ASSAY OF FERRITIN: CPT

## 2021-06-23 ENCOUNTER — TELEPHONE (OUTPATIENT)
Dept: HEMATOLOGY ONCOLOGY | Facility: CLINIC | Age: 71
End: 2021-06-23

## 2021-06-23 LAB
ALBUMIN SERPL ELPH-MCNC: 3.55 G/DL (ref 3.5–5)
ALBUMIN SERPL ELPH-MCNC: 57.2 % (ref 52–65)
ALPHA1 GLOB SERPL ELPH-MCNC: 0.37 G/DL (ref 0.1–0.4)
ALPHA1 GLOB SERPL ELPH-MCNC: 5.9 % (ref 2.5–5)
ALPHA2 GLOB SERPL ELPH-MCNC: 0.77 G/DL (ref 0.4–1.2)
ALPHA2 GLOB SERPL ELPH-MCNC: 12.4 % (ref 7–13)
BETA GLOB ABNORMAL SERPL ELPH-MCNC: 0.47 G/DL (ref 0.4–0.8)
BETA1 GLOB SERPL ELPH-MCNC: 7.5 % (ref 5–13)
BETA2 GLOB SERPL ELPH-MCNC: 4.4 % (ref 2–8)
BETA2+GAMMA GLOB SERPL ELPH-MCNC: 0.27 G/DL (ref 0.2–0.5)
GAMMA GLOB ABNORMAL SERPL ELPH-MCNC: 0.78 G/DL (ref 0.5–1.6)
GAMMA GLOB SERPL ELPH-MCNC: 12.6 % (ref 12–22)
IGG/ALB SER: 1.34 {RATIO} (ref 1.1–1.8)
INTERPRETATION UR IFE-IMP: NORMAL
KAPPA LC FREE SER-MCNC: 36.4 MG/L (ref 3.3–19.4)
KAPPA LC FREE/LAMBDA FREE SER: 1.99 {RATIO} (ref 0.26–1.65)
LAMBDA LC FREE SERPL-MCNC: 18.3 MG/L (ref 5.7–26.3)
M PROTEIN 1 MFR SERPL ELPH: 2.3 %
M PROTEIN 1 SERPL ELPH-MCNC: 0.14 G/DL
PROT PATTERN SERPL ELPH-IMP: ABNORMAL
PROT SERPL-MCNC: 6.2 G/DL (ref 6.4–8.2)

## 2021-06-23 NOTE — TELEPHONE ENCOUNTER
I phoned the patient and left a message on her voicemail indicating that her appointment with Stephanie Doherty on 6/28 would need to be rescheduled, as Julián Morin will not be in the office that day  The patient was provided with an appointment on 7/14 at 36 with [facility] number was provided for the patient to call if this date and time do not work for her

## 2021-06-25 ENCOUNTER — TELEPHONE (OUTPATIENT)
Dept: HEMATOLOGY ONCOLOGY | Facility: CLINIC | Age: 71
End: 2021-06-25

## 2021-06-25 LAB — VIT B1 BLD-SCNC: 80.4 NMOL/L (ref 66.5–200)

## 2021-06-25 NOTE — TELEPHONE ENCOUNTER
Call from patient  Patient has a high copay for visits  Dr Ruba Khan stated he would review lab work and try to avoid appt if possible  Patient requests lab work be reviewed  Patient states she is feeling fatigued and will need iron infusions set up    Will send to Dr Zachariah Mcallister RNs    Patient aware of plan

## 2021-06-25 NOTE — TELEPHONE ENCOUNTER
Called Ric Alejandra and discussed that I would review these lab results with Dr Leeroy Corona and c/b on Monday 6/28/21  Ric Alejandra was in agreement with this plan

## 2021-06-26 LAB
A-TOCOPHEROL VIT E SERPL-MCNC: 8.8 MG/L (ref 9–29)
GAMMA TOCOPHEROL SERPL-MCNC: 1.5 MG/L (ref 0.5–4.9)
VIT A SERPL-MCNC: 33.8 UG/DL (ref 22–69.5)

## 2021-06-28 ENCOUNTER — TELEPHONE (OUTPATIENT)
Dept: HEMATOLOGY ONCOLOGY | Facility: CLINIC | Age: 71
End: 2021-06-28

## 2021-06-28 PROBLEM — D50.9 IRON DEFICIENCY ANEMIA, UNSPECIFIED: Status: ACTIVE | Noted: 2021-06-28

## 2021-06-28 NOTE — TELEPHONE ENCOUNTER
Attempted to c/b pt without success  Left VM, as pt needs to have fereheme  Will need to schedule her  Discussed pt's recent labwork and Dr Gloria Madrigal stated that her labwork was WNL and she does not need to see Dr Gloria Madrigal for one year  Left [facility] Phone # for c/b

## 2021-06-28 NOTE — TELEPHONE ENCOUNTER
Patient is calling in inquiring whether she will be getting a infusion before her upcoming appointment?  She can be reached back at [phone]

## 2021-06-29 ENCOUNTER — TELEPHONE (OUTPATIENT)
Dept: HEMATOLOGY ONCOLOGY | Facility: CLINIC | Age: 71
End: 2021-06-29

## 2021-06-29 LAB — VIT B6 SERPL-MCNC: 3.7 UG/L (ref 2–32.8)

## 2021-06-29 NOTE — TELEPHONE ENCOUNTER
Khurram a return phone call from Guido Monet  Discussed pt's recent labwork, and will be scheduling her iron infusions @ [facility]  Pt asked to d/c upcoming appt with CE/CARTER, as she has a high co-pay and has had labs reviewed  Will advise CARTER of the cancellation

## 2021-06-29 NOTE — TELEPHONE ENCOUNTER
Attempted to call pt today with the date and time of iron infusion, without success  Left VM with date and time of infusion, 7/1/21 @ 6603

## 2021-07-01 ENCOUNTER — HOSPITAL ENCOUNTER (OUTPATIENT)
Dept: INFUSION CENTER | Facility: HOSPITAL | Age: 71
Discharge: HOME | End: 2021-07-01
Attending: INTERNAL MEDICINE
Payer: COMMERCIAL

## 2021-07-01 VITALS
SYSTOLIC BLOOD PRESSURE: 140 MMHG | RESPIRATION RATE: 18 BRPM | TEMPERATURE: 97.9 F | HEART RATE: 57 BPM | DIASTOLIC BLOOD PRESSURE: 74 MMHG

## 2021-07-01 DIAGNOSIS — K95.89 IRON DEFICIENCY ANEMIA FOLLOWING BARIATRIC SURGERY: ICD-10-CM

## 2021-07-01 DIAGNOSIS — D50.8 OTHER IRON DEFICIENCY ANEMIA: Primary | ICD-10-CM

## 2021-07-01 DIAGNOSIS — D50.8 IRON DEFICIENCY ANEMIA FOLLOWING BARIATRIC SURGERY: ICD-10-CM

## 2021-07-01 PROCEDURE — 96365 THER/PROPH/DIAG IV INF INIT: CPT

## 2021-07-01 RX ORDER — SODIUM CHLORIDE 9 MG/ML
20 INJECTION, SOLUTION INTRAVENOUS ONCE
Status: CANCELLED | OUTPATIENT
Start: 2021-07-08

## 2021-07-01 RX ORDER — SODIUM CHLORIDE 9 MG/ML
20 INJECTION, SOLUTION INTRAVENOUS ONCE
Status: COMPLETED | OUTPATIENT
Start: 2021-07-01 | End: 2021-07-01

## 2021-07-01 RX ADMIN — FERUMOXYTOL 510 MG: 510 INJECTION INTRAVENOUS at 14:40

## 2021-07-01 RX ADMIN — SODIUM CHLORIDE 20 ML/HR: 0.9 INJECTION, SOLUTION INTRAVENOUS at 14:35

## 2021-07-01 NOTE — PLAN OF CARE
Problem: Potential for Falls  Goal: Patient will remain free of falls  Description: INTERVENTIONS:  - Educate patient/family on patient safety including physical limitations  - Instruct patient to call for assistance with activity   - Consult OT/PT to assist with strengthening/mobility   - Keep Call bell within reach  - Keep bed low and locked with side rails adjusted as appropriate  - Keep care items and personal belongings within reach  - Initiate and maintain comfort rounds  - Make Fall Risk Sign visible to staff  - - Apply yellow socks and bracelet for high fall risk patients  - Consider moving patient to room near nurses station  7/1/2021 1431 by Cholo Price RN  Outcome: Progressing  7/1/2021 1431 by Cholo Price RN  Outcome: Progressing

## 2021-07-01 NOTE — PLAN OF CARE
Problem: Potential for Falls  Goal: Patient will remain free of falls  Description: INTERVENTIONS:  - Educate patient/family on patient safety including physical limitations  - Instruct patient to call for assistance with activity   - Consult OT/PT to assist with strengthening/mobility   - Keep Call bell within reach  - Keep bed low and locked with side rails adjusted as appropriate  - Keep care items and personal belongings within reach  - Initiate and maintain comfort rounds  - Make Fall Risk Sign visible to staff  - - Apply yellow socks and bracelet for high fall risk patients  - Consider moving patient to room near nurses station  Outcome: Progressing

## 2021-07-06 DIAGNOSIS — G47.00 INSOMNIA, UNSPECIFIED TYPE: ICD-10-CM

## 2021-07-06 RX ORDER — TRAZODONE HYDROCHLORIDE 50 MG/1
TABLET ORAL
Qty: 30 TABLET | Refills: 0 | Status: SHIPPED | OUTPATIENT
Start: 2021-07-06 | End: 2021-08-02

## 2021-07-07 ENCOUNTER — TELEPHONE (OUTPATIENT)
Dept: PAIN MEDICINE | Facility: MEDICAL CENTER | Age: 71
End: 2021-07-07

## 2021-07-07 NOTE — TELEPHONE ENCOUNTER
Patient called to add note to her chart to remind her she wants to talk to Kimberly Schwartz on next office visit regarding her diagnosis  She received letter from her insurance about diagnosis coding  Thank you

## 2021-07-08 ENCOUNTER — HOSPITAL ENCOUNTER (OUTPATIENT)
Dept: INFUSION CENTER | Facility: HOSPITAL | Age: 71
Discharge: HOME | End: 2021-07-08
Attending: INTERNAL MEDICINE
Payer: COMMERCIAL

## 2021-07-08 VITALS
HEART RATE: 56 BPM | RESPIRATION RATE: 18 BRPM | OXYGEN SATURATION: 98 % | TEMPERATURE: 97.7 F | SYSTOLIC BLOOD PRESSURE: 120 MMHG | DIASTOLIC BLOOD PRESSURE: 60 MMHG

## 2021-07-08 DIAGNOSIS — D50.8 IRON DEFICIENCY ANEMIA FOLLOWING BARIATRIC SURGERY: ICD-10-CM

## 2021-07-08 DIAGNOSIS — D50.8 OTHER IRON DEFICIENCY ANEMIA: Primary | ICD-10-CM

## 2021-07-08 DIAGNOSIS — K95.89 IRON DEFICIENCY ANEMIA FOLLOWING BARIATRIC SURGERY: ICD-10-CM

## 2021-07-08 PROCEDURE — 96365 THER/PROPH/DIAG IV INF INIT: CPT

## 2021-07-08 RX ORDER — SODIUM CHLORIDE 9 MG/ML
20 INJECTION, SOLUTION INTRAVENOUS ONCE
Status: CANCELLED | OUTPATIENT
Start: 2021-07-08

## 2021-07-08 RX ORDER — SODIUM CHLORIDE 9 MG/ML
20 INJECTION, SOLUTION INTRAVENOUS ONCE
Status: COMPLETED | OUTPATIENT
Start: 2021-07-08 | End: 2021-07-08

## 2021-07-08 RX ADMIN — SODIUM CHLORIDE 20 ML/HR: 0.9 INJECTION, SOLUTION INTRAVENOUS at 14:48

## 2021-07-08 RX ADMIN — FERUMOXYTOL 510 MG: 510 INJECTION INTRAVENOUS at 14:48

## 2021-07-08 NOTE — PLAN OF CARE
Problem: Potential for Falls  Goal: Patient will remain free of falls  Description: INTERVENTIONS:  - Educate patient/family on patient safety including physical limitations  - Instruct patient to call for assistance with activity   - Consult OT/PT to assist with strengthening/mobility   - Keep Call bell within reach  - Keep bed low and locked with side rails adjusted as appropriate  - Keep care items and personal belongings within reach  - Initiate and maintain comfort rounds  - Make Fall Risk Sign visible to staff  - Apply yellow socks and bracelet for high fall risk patients  - Consider moving patient to room near nurses station  7/8/2021 1432 by Marylu Osei RN  Outcome: Progressing  7/8/2021 1432 by Marylu Osei, RN  Outcome: Progressing

## 2021-07-10 DIAGNOSIS — K27.9 PUD (PEPTIC ULCER DISEASE): ICD-10-CM

## 2021-07-11 RX ORDER — SUCRALFATE 1 G/1
TABLET ORAL
Qty: 120 TABLET | Refills: 0 | Status: SHIPPED | OUTPATIENT
Start: 2021-07-11 | End: 2021-08-08

## 2021-07-14 ENCOUNTER — DOCUMENTATION (OUTPATIENT)
Dept: HEMATOLOGY ONCOLOGY | Facility: CLINIC | Age: 71
End: 2021-07-14

## 2021-07-14 ENCOUNTER — TELEPHONE (OUTPATIENT)
Dept: HEMATOLOGY ONCOLOGY | Facility: CLINIC | Age: 71
End: 2021-07-14

## 2021-07-14 DIAGNOSIS — K95.89 IRON DEFICIENCY ANEMIA FOLLOWING BARIATRIC SURGERY: Primary | ICD-10-CM

## 2021-07-14 DIAGNOSIS — D50.8 IRON DEFICIENCY ANEMIA FOLLOWING BARIATRIC SURGERY: Primary | ICD-10-CM

## 2021-07-14 NOTE — TELEPHONE ENCOUNTER
Appointment Cancellation Or Reschedule     Person calling in Patient    Provider [facility] Visit Date and Time  07/14/21 at 11:30 am    Office Visit Location [facility]   Did patient want to reschedule their office appointment? If so, when was it scheduled to? Yes - Call back    Is this patient calling to reschedule an infusion appointment? no   When is their next infusion appointment? No appt   Is this patient a Chemo patient? n/a   Reason for Cancellation or Reschedule Patient will like to discuss appt before rescheduling and also has questions about lab work  Please reach back to patient at [phone]  If the patient is a treatment patient, please route this to the office nurse  If the patient is not on treatment, please route to the office MA

## 2021-07-14 NOTE — PROGRESS NOTES
Voicemail message left for patient with appointment date of Monday, 10/4/21 at [place] [id number]   I asked patient to call back if this date and time does not work for her

## 2021-07-14 NOTE — TELEPHONE ENCOUNTER
Called patient to review blood work  Previous iron saturation in June 2021 was 12% and ferritin level of 14  Patient has undergone Feraheme infusion 510 mg IV x2 doses most recent on 07/08/2021  CBC essentially stable hemoglobin 11 8  We reviewed her myeloma panel which is also stable  We will plan to see patient in 3 months with repeat blood work  Patient verbalized understanding and is in agreement with the plan

## 2021-07-15 ENCOUNTER — TELEPHONE (OUTPATIENT)
Dept: PAIN MEDICINE | Facility: MEDICAL CENTER | Age: 71
End: 2021-07-15

## 2021-07-15 NOTE — TELEPHONE ENCOUNTER
[facility] in [employer] called and left a vm stating that patient is due to fill her Hydrocodone on 7/17 however they have a script for Hydrocodone quantity of 10 tablets on hold in her profile that will need to be taken out     C/b# [phone]

## 2021-07-16 ENCOUNTER — RA CDI HCC (OUTPATIENT)
Dept: OTHER | Facility: HOSPITAL | Age: 71
End: 2021-07-16

## 2021-07-16 DIAGNOSIS — M79.18 MYOFASCIAL PAIN SYNDROME: ICD-10-CM

## 2021-07-16 NOTE — PROGRESS NOTES
[place] [address]  coding opportunities             Chart reviewed, (number of) suggestions sent to provider: 2                  Patients insurance company: [employer] ([employer] and [employer])     Visit status: Patient canceled the appointment        [place] [address]  coding opportunities             Chart reviewed, (number of) suggestions sent to provider: 2    DX:  M06  9-Rheumatoid arthritis, unspecified  E11 42-Type 2 diabetes mellitus with diabetic polyneuropathy                  Patients insurance company: [employer] ([employer] and [employer])

## 2021-07-19 RX ORDER — BACLOFEN 10 MG/1
10 TABLET ORAL 3 TIMES DAILY
Qty: 270 TABLET | Refills: 1 | Status: SHIPPED | OUTPATIENT
Start: 2021-07-19 | End: 2021-08-11 | Stop reason: SDUPTHER

## 2021-07-23 DIAGNOSIS — J30.9 ALLERGIC RHINITIS, UNSPECIFIED SEASONALITY, UNSPECIFIED TRIGGER: ICD-10-CM

## 2021-07-23 RX ORDER — CETIRIZINE HYDROCHLORIDE 10 MG/1
TABLET ORAL
Qty: 45 TABLET | Refills: 2 | Status: SHIPPED | OUTPATIENT
Start: 2021-07-23 | End: 2022-02-16

## 2021-07-30 DIAGNOSIS — E03.9 HYPOTHYROIDISM, UNSPECIFIED TYPE: ICD-10-CM

## 2021-07-30 RX ORDER — LEVOTHYROXINE SODIUM 0.03 MG/1
TABLET ORAL
Qty: 30 TABLET | Refills: 2 | Status: SHIPPED | OUTPATIENT
Start: 2021-07-30 | End: 2022-01-24

## 2021-07-31 DIAGNOSIS — G47.00 INSOMNIA, UNSPECIFIED TYPE: ICD-10-CM

## 2021-08-02 RX ORDER — TRAZODONE HYDROCHLORIDE 50 MG/1
TABLET ORAL
Qty: 30 TABLET | Refills: 0 | Status: SHIPPED | OUTPATIENT
Start: 2021-08-02 | End: 2021-08-30

## 2021-08-07 DIAGNOSIS — K27.9 PUD (PEPTIC ULCER DISEASE): ICD-10-CM

## 2021-08-08 RX ORDER — SUCRALFATE 1 G/1
TABLET ORAL
Qty: 120 TABLET | Refills: 0 | Status: SHIPPED | OUTPATIENT
Start: 2021-08-08 | End: 2021-09-02

## 2021-08-11 ENCOUNTER — OFFICE VISIT (OUTPATIENT)
Dept: PAIN MEDICINE | Facility: MEDICAL CENTER | Age: 71
End: 2021-08-11
Payer: COMMERCIAL

## 2021-08-11 ENCOUNTER — RA CDI HCC (OUTPATIENT)
Dept: OTHER | Facility: HOSPITAL | Age: 71
End: 2021-08-11

## 2021-08-11 VITALS
HEART RATE: 61 BPM | HEIGHT: 65 IN | SYSTOLIC BLOOD PRESSURE: 110 MMHG | WEIGHT: 177 LBS | BODY MASS INDEX: 29.49 KG/M2 | DIASTOLIC BLOOD PRESSURE: 65 MMHG

## 2021-08-11 DIAGNOSIS — Z79.891 LONG-TERM CURRENT USE OF OPIATE ANALGESIC: Primary | ICD-10-CM

## 2021-08-11 DIAGNOSIS — M54.41 CHRONIC BILATERAL LOW BACK PAIN WITH RIGHT-SIDED SCIATICA: ICD-10-CM

## 2021-08-11 DIAGNOSIS — M54.41 CHRONIC BILATERAL LOW BACK PAIN WITH BILATERAL SCIATICA: ICD-10-CM

## 2021-08-11 DIAGNOSIS — G89.4 CHRONIC PAIN SYNDROME: ICD-10-CM

## 2021-08-11 DIAGNOSIS — F11.20 UNCOMPLICATED OPIOID DEPENDENCE (HCC): ICD-10-CM

## 2021-08-11 DIAGNOSIS — G89.29 CHRONIC BILATERAL LOW BACK PAIN WITH BILATERAL SCIATICA: ICD-10-CM

## 2021-08-11 DIAGNOSIS — M54.12 CERVICAL RADICULOPATHY: ICD-10-CM

## 2021-08-11 DIAGNOSIS — G89.29 CHRONIC BILATERAL LOW BACK PAIN WITH RIGHT-SIDED SCIATICA: ICD-10-CM

## 2021-08-11 DIAGNOSIS — M54.16 LUMBAR RADICULITIS: ICD-10-CM

## 2021-08-11 DIAGNOSIS — M79.18 MYOFASCIAL PAIN SYNDROME: ICD-10-CM

## 2021-08-11 DIAGNOSIS — M47.812 SPONDYLOSIS OF CERVICAL REGION WITHOUT MYELOPATHY OR RADICULOPATHY: ICD-10-CM

## 2021-08-11 DIAGNOSIS — M54.42 CHRONIC BILATERAL LOW BACK PAIN WITH BILATERAL SCIATICA: ICD-10-CM

## 2021-08-11 PROCEDURE — 99214 OFFICE O/P EST MOD 30 MIN: CPT | Performed by: NURSE PRACTITIONER

## 2021-08-11 PROCEDURE — 80305 DRUG TEST PRSMV DIR OPT OBS: CPT | Performed by: NURSE PRACTITIONER

## 2021-08-11 RX ORDER — PREGABALIN 200 MG/1
200 CAPSULE ORAL 3 TIMES DAILY
Qty: 90 CAPSULE | Refills: 1 | Status: SHIPPED | OUTPATIENT
Start: 2021-08-11 | End: 2021-10-06 | Stop reason: SDUPTHER

## 2021-08-11 RX ORDER — HYDROCODONE BITARTRATE AND ACETAMINOPHEN 10; 325 MG/1; MG/1
TABLET ORAL
Qty: 130 TABLET | Refills: 0 | Status: SHIPPED | OUTPATIENT
Start: 2021-09-08 | End: 2021-10-06 | Stop reason: SDUPTHER

## 2021-08-11 RX ORDER — BACLOFEN 10 MG/1
10 TABLET ORAL 3 TIMES DAILY
Qty: 270 TABLET | Refills: 1 | Status: SHIPPED | OUTPATIENT
Start: 2021-08-11 | End: 2021-11-29 | Stop reason: SDUPTHER

## 2021-08-11 RX ORDER — HYDROCODONE BITARTRATE AND ACETAMINOPHEN 10; 325 MG/1; MG/1
TABLET ORAL
Qty: 130 TABLET | Refills: 0 | Status: SHIPPED | OUTPATIENT
Start: 2021-08-11 | End: 2021-08-11 | Stop reason: SDUPTHER

## 2021-08-11 NOTE — PROGRESS NOTES
Assessment:  1  Long-term current use of opiate analgesic    2  Chronic pain syndrome    3  Uncomplicated opioid dependence (HCC)    4  Cervical radiculopathy    5  Lumbar radiculitis    6  Chronic bilateral low back pain with bilateral sciatica    7  Myofascial pain syndrome    8  Spondylosis of cervical region without myelopathy or radiculopathy    9  Chronic bilateral low back pain with right-sided sciatica - Bilateral        Plan:  Continue with hydrocodone as presribed  This was refilled for 2 months with 1 month having a do not fill date of 9-8-21  Continue Lyrica and increase to 200 mg 3 times daily to better cover her neuropathic pain symptoms in her legs  Continue baclofen this was also refilled  follow-up in 8 weeks for medication refills  [place] Prescription Drug Monitoring Program report was reviewed and was appropriate     A urine drug screen was collected at today's office visit as part of our medication management protocol  The point of care testing results were appropriate for what was being prescribed  The specimen will be sent for confirmatory testing  The drug screen is medically necessary because the patient is either dependent on opioid medication or is being considered for opioid medication therapy and the results could impact ongoing or future treatment  The drug screen is to evaluate for the presences or absence of prescribed, non-prescribed, and/or illicit drugs/substances  There are risks associated with opioid medications, including dependence, addiction and tolerance  The patient understands and agrees to use these medications only as prescribed  Potential side effects of the medications include, but are not limited to, constipation, drowsiness, addiction, impaired judgment and risk of fatal overdose if not taken as prescribed  The patient was warned against driving while taking sedation medications  Sharing medications is a felony   At this point in time, the patient is showing no signs of addiction, abuse, diversion or suicidal ideation  History of Present Illness: The patient is a 70 y o  female last seen on  June 21, 2021who presents for a follow up office visit in regards to chronic pain secondary to  Lumbar radiculitis, myofascial pain syndrome, cervical radiculopathy, and cervical spondylosis  The patient currently reports  Pain rated 7/10 this is constant described as burning, dull, aching, sharp, throbbing, cramping, pressure-like, shooting, numbness, and pins and needles  Patient tells me that her neuropathic pain symptoms in her legs have been worsening  Current Facility-Administered Medications   Medication Dose Route Frequency Provider Last Rate    cyanocobalamin  1,000 mcg Intramuscular Q30 Days Hugo Latham MD      cyanocobalamin  1,000 mcg Intramuscular Q30 Days Hugo Latham MD      cyanocobalamin  1,000 mcg Intramuscular Q30 Days Hugo Latham MD      cyanocobalamin  1,000 mcg Intramuscular Q30 Days Hugo Latham MD      cyanocobalamin  1,000 mcg Intramuscular Q30 Days Hugo Latham MD           Current pain medication includes hydrocodone 10/325 mg 1 tablet 4-5 times daily, Lyrica 150 mg 3 times daily, and baclofen 10 mg 3 times daily  The patient reports that this regimen is providing  30% pain relief  The patient is reporting no side effects from this pain medication regimen  Pain Contract Signed: 1/5/2021  Last Urine Drug Screen: 8/11/2021  Hydrocodone last taken 8/11/2021 in the AM    I have personally reviewed and/or updated the patient's past medical history, past surgical history, family history, social history, current medications, allergies, and vital signs today  Review of Systems:    Review of Systems   Respiratory: Negative for shortness of breath  Cardiovascular: Negative for chest pain  Gastrointestinal: Negative for constipation, diarrhea, nausea and vomiting     Musculoskeletal: Positive for back pain, gait problem, joint swelling, neck pain and neck stiffness  Negative for arthralgias and myalgias  Skin: Negative for rash  Neurological: Positive for dizziness  Negative for seizures and weakness  Psychiatric/Behavioral: Positive for confusion  All other systems reviewed and are negative          Past Medical History:   Diagnosis Date    Anemia     Anxiety     hx of panic attacks (now under control)     Arthritis     Asthma     resolved (no problems in a decade)     Baker's cyst of knee     Bronchitis     Bunion, left foot     Cervical pain     Chronic GERD     Chronic pain     Chronic pain disorder     Depression     Diabetes mellitus, type 2 (HCC)     Diabetic peripheral neuropathy (HCC)     Endometriosis     Fibromyalgia     Hyperlipidemia     Hypertension     Joint pain     Low back pain     Low back pain     Lung nodules     Macular degeneration     [facility]se[facility] ) 1/16/2020    Spondylosis        Past Surgical History:   Procedure Laterality Date    BACK SURGERY  1996    L5, S1- laser surgery fusion of c5 and c6     BREAST LUMPECTOMY Right     CHOLECYSTECTOMY  2004    FOOT SURGERY Left 2005    fusion    [place] [facility]PASS  2010    Dr Bennie Smith    just uterus     KNEE ARTHROSCOPY      LAMINECTOMY      ORTHOPEDIC SURGERY      OVARIAN CYST REMOVAL  1975    PELVIC LAPAROSCOPY      ovaries (x10)     PLEURAL SCARIFICATION  1967    SHOULDER OPEN ROTATOR CUFF REPAIR Left 2008    TONSILLECTOMY      VAGINAL PROLAPSE REPAIR         Family History   Problem Relation Age of Onset    Hypertension Mother     Lung cancer Mother     Hypertension Father     Heart disease Father     Heart attack Father     Cancer Father        Social History     Occupational History    Not on file   Tobacco Use    Smoking status: Current Every Day Smoker     Packs/day: 0 25     Years: 40 00     Pack years: 10 00     Types: Cigarettes    Smokeless tobacco: Current User   Vaping Use    Vaping Use: Never used   Substance and Sexual Activity    Alcohol use: Not Currently     Alcohol/week: 1 0 standard drinks     Types: 1 Shots of liquor per week     Comment: rarely    Drug use: No    Sexual activity: Not Currently     Partners: Male         Current Outpatient Medications:     albuterol (PROVENTIL HFA,VENTOLIN HFA) 90 mcg/act inhaler, Inhale 2 puffs every 4 (four) hours as needed for wheezing, Disp: 1 Inhaler, Rfl: 0    albuterol (VENTOLIN HFA) 90 mcg/act inhaler, inhale 2 puff by inhalation route  every 4 - 6 hours as needed, Disp: , Rfl:     amLODIPine (NORVASC) 5 mg tablet, TAKE 1 TABLET BY MOUTH EVERY DAY, Disp: 90 tablet, Rfl: 1    baclofen 10 mg tablet, Take 1 tablet (10 mg total) by mouth 3 (three) times a day, Disp: 270 tablet, Rfl: 1    benzonatate (TESSALON PERLES) 100 mg capsule, Take 1 capsule (100 mg total) by mouth every 8 (eight) hours, Disp: 21 capsule, Rfl: 0    cetirizine (ZyrTEC) 10 mg tablet, TAKE 1/2 TABLET BY MOUTH DAILY, Disp: 45 tablet, Rfl: 2    cimetidine (TAGAMET) 200 mg tablet, Take 1 tablet (200 mg total) by mouth 2 (two) times a day, Disp: 60 tablet, Rfl: 3    diphenhydrAMINE (BENADRYL ALLERGY) 25 mg capsule, Take 50 mg by mouth daily , Disp: , Rfl:     ergocalciferol (VITAMIN D2) 50,000 units, Take 1 capsule (50,000 Units total) by mouth once a week, Disp: 13 capsule, Rfl: 2    fluticasone-umeclidinium-vilanterol (TRELEGY) 100-62 5-25 MCG/INH inhaler, Inhale 1 puff daily Rinse mouth after use , Disp: 1 Inhaler, Rfl: 3    [START ON 9/8/2021] HYDROcodone-acetaminophen (NORCO)  mg per tablet, Take 4-5 tablets daily PRN, Disp: 130 tablet, Rfl: 0    Lancets Misc  (ACCU-CHEK FASTCLIX LANCET) KIT, 3 (three) times a day, Disp: , Rfl:     levothyroxine 25 mcg tablet, TAKE 1 TABLET BY MOUTH EVERY MORNING ON SATURDAY, SUNDAY, TUESDAY AND THURSDAY, Disp: 30 tablet, Rfl: 2    levothyroxine 50 mcg tablet, TAKE 1 TABLET BY MOUTH EVERY MORNING ON MONDAY, WEDNESDAY AND FRIDAY, Disp: 30 tablet, Rfl: 2    losartan (COZAAR) 25 mg tablet, TAKE 1 TABLET BY MOUTH EVERY DAY, Disp: 90 tablet, Rfl: 1    MICROLET LANCETS MISC, Microlet Lancet, Disp: , Rfl:     mupirocin (BACTROBAN) 2 % ointment, Apply topically 3 (three) times a day, Disp: 22 g, Rfl: 0    nitrofurantoin (MACROBID) 100 mg capsule, Take 1 capsule (100 mg total) by mouth daily With food/Lunch, Disp: 30 capsule, Rfl: 1    ofloxacin (FLOXIN) 0 3 % otic solution, Administer 5 drops into both ears 2 (two) times a day, Disp: 5 mL, Rfl: 1    OneTouch Verio test strip, TEST TWICE A DAY, Disp: 100 each, Rfl: 5    pantoprazole (PROTONIX) 40 mg tablet, TAKE 1 TABLET BY MOUTH TWICE A DAY, Disp: 180 tablet, Rfl: 1    sucralfate (CARAFATE) 1 g tablet, TAKE 1 TABLET BY MOUTH 4 TIMES EVERY DAY ON AN EMPTY STOMACH 1 HOUR BEFORE MEALS AND AT BEDTIME, Disp: 120 tablet, Rfl: 0    traZODone (DESYREL) 50 mg tablet, TAKE 1 TABLET BY MOUTH EVERY DAY AT BEDTIME AS NEEDED, Disp: 30 tablet, Rfl: 0    vitamin A 2400 MCG (8000 UT) capsule, Take 1 capsule (8,000 Units total) by mouth daily, Disp: 90 capsule, Rfl: 1    pregabalin (LYRICA) 200 MG capsule, Take 1 capsule (200 mg total) by mouth 3 (three) times a day, Disp: 90 capsule, Rfl: 1    Current Facility-Administered Medications:     cyanocobalamin injection 1,000 mcg, 1,000 mcg, Intramuscular, Q30 Days, Aleksandar Edmondson MD, 1,000 mcg at 12/08/20 1118    cyanocobalamin injection 1,000 mcg, 1,000 mcg, Intramuscular, Q30 Days, Aleksandar Edmondson MD, 1,000 mcg at 07/08/20 1036    cyanocobalamin injection 1,000 mcg, 1,000 mcg, Intramuscular, Q30 Days, Aleksandar Edmondson MD, 1,000 mcg at 09/08/20 1210    cyanocobalamin injection 1,000 mcg, 1,000 mcg, Intramuscular, Q30 Days, Aleksandar Edmondson MD    cyanocobalamin injection 1,000 mcg, 1,000 mcg, Intramuscular, Q30 Days, Gianni Aguilar MD    Allergies   Allergen Reactions    Cephalosporins Hives    Erythromycin GI Intolerance    Fentanyl Itching     Occurred during surgery     Paxil [Paroxetine] Hives     After 2 weeks    Penicillins Itching    Pravastatin Myalgia    Prednisolone Itching    Statins Itching    Sulfa Antibiotics Diarrhea    Wellbutrin [Bupropion] Hives     After 2 weeks     Marzena's Wort Rash       Physical Exam:    /65   Pulse 61   Ht 5' 5" (1 651 m)   Wt 80 3 kg (177 lb)   LMP  (LMP Unknown)   BMI 29 45 kg/m²     Constitutional:normal, well developed, well nourished, alert, in no distress and non-toxic and no overt pain behavior    Eyes:anicteric  HEENT:grossly intact  Neck:supple, symmetric, trachea midline and no masses   Pulmonary:even and unlabored  Cardiovascular:No edema or pitting edema present  Skin:Normal without rashes or lesions and well hydrated  Psychiatric:Mood and affect appropriate  Neurologic:Cranial Nerves II-XII grossly intact  Musculoskeletal:ambulates with cane      Imaging  No orders to display         Orders Placed This Encounter   Procedures    MM ALL_Prescribed Meds and Special Instructions    MM DT_Alprazolam Definitive Test    MM DT_Amphetamine Definitive Test    MM DT_Aripiprazole Definitive Test    MM DT_Bath Salts Definitive Test    MM DT_Buprenorphine Definitive Test    MM DT_Butalbital Definitive Test    MM DT_Clonazepam Definitive Test    MM DT_Clozapine Definitive Test    MM DT_Cocaine Definitive Test    MM DT_Codeine Definitive Test    MM DT_Desipramine Definitive Test    MM DT_Dextromethorphan Definitive Test    MM Diazepam Definitive Test    MM DT_Ethyl Glucuronide/Ethyl Sulfate Definitive Test    MM DT_Fentanyl Definitive Test    MM DT_Heroin Definitive Test    MM DT_Hydrocodone Definitive Test    MM DT_Hydromorphone Definitive Test    MM DT_Kratom Definitive Test    MM DT_Levorphanol Definitive Test    MM Lorazepam Definitive Test    MM DT_MDMA Definitive Test    MM DT_Meperidine Definitive Test    MM DT_Methadone Definitive Test    MM DT_Methamphetamine Definitive Test    MM DT_Methylphenidate Definitive Test    MM DT_Morphine Definitive Test    MM DT_Olanzapine Definitive Test    MM DT_Oxazepam Definitive Test    MM DT_Oxycodone Definitive Test    MM DT_Oxymorphone Definitive Test    MM DT_Phenobarbital Definitive Test    MM DT_Phentermine Definitive Test    MM DT_Secobarbital Definitive Test    MM DT_Spice Definitive Test    MM DT_Tapentadol Definitive Test    MM DT_Temazapam Definitive Test    MM DT_THC Definitive Test    MM DT_Tramadol Definitive Test    MM DT_Validity Specific    MM DT_Validity pH    MM DT_Validity Creatinine    MM DT_Validity Oxidant

## 2021-08-11 NOTE — PROGRESS NOTES
[place] [address]  coding opportunities          Number of diagnosis code(s) already on the problem list added to FYI fla               Number of suggestions used: 0      Number of suggestions NOT actually used: 2     Patients insurance company: [employer] (Med[employer]are [employer]vantage and [place])   dx on flag not used   Visit status: Patient arrived for their scheduled appointment        [place] [address]  coding opportunities        2 already on the problem list:   DX:  M06  9-Rheumatoid arthritis, unspecified  E11 42-Type 2 diabetes mellitus with diabetic polyneuropathy                     Patients insurance company: [employer] (Medicare Advantage and [place])

## 2021-08-13 DIAGNOSIS — R32 URINARY INCONTINENCE, UNSPECIFIED TYPE: ICD-10-CM

## 2021-08-13 DIAGNOSIS — N39.0 URINARY TRACT INFECTION WITHOUT HEMATURIA, SITE UNSPECIFIED: ICD-10-CM

## 2021-08-13 RX ORDER — NITROFURANTOIN 25; 75 MG/1; MG/1
100 CAPSULE ORAL DAILY
Qty: 30 CAPSULE | Refills: 1 | Status: SHIPPED | OUTPATIENT
Start: 2021-08-13 | End: 2021-10-18

## 2021-08-18 ENCOUNTER — OFFICE VISIT (OUTPATIENT)
Dept: FAMILY MEDICINE CLINIC | Facility: CLINIC | Age: 71
End: 2021-08-18
Payer: COMMERCIAL

## 2021-08-18 VITALS
HEART RATE: 55 BPM | OXYGEN SATURATION: 97 % | BODY MASS INDEX: 27.99 KG/M2 | TEMPERATURE: 97.6 F | WEIGHT: 168 LBS | SYSTOLIC BLOOD PRESSURE: 126 MMHG | HEIGHT: 65 IN | DIASTOLIC BLOOD PRESSURE: 68 MMHG | RESPIRATION RATE: 14 BRPM

## 2021-08-18 DIAGNOSIS — Z01.419 WOMEN'S ANNUAL ROUTINE GYNECOLOGICAL EXAMINATION: ICD-10-CM

## 2021-08-18 DIAGNOSIS — Z12.11 SCREEN FOR COLON CANCER: ICD-10-CM

## 2021-08-18 DIAGNOSIS — J43.9 PULMONARY EMPHYSEMA, UNSPECIFIED EMPHYSEMA TYPE (HCC): ICD-10-CM

## 2021-08-18 DIAGNOSIS — E53.8 LOW VITAMIN B12 LEVEL: ICD-10-CM

## 2021-08-18 DIAGNOSIS — Z12.31 SCREENING MAMMOGRAM, ENCOUNTER FOR: ICD-10-CM

## 2021-08-18 DIAGNOSIS — E11.42 DIABETIC PERIPHERAL NEUROPATHY (HCC): ICD-10-CM

## 2021-08-18 DIAGNOSIS — Z98.84 BARIATRIC SURGERY STATUS: ICD-10-CM

## 2021-08-18 DIAGNOSIS — Z00.01 ENCOUNTER FOR GENERAL ADULT MEDICAL EXAMINATION WITH ABNORMAL FINDINGS: Primary | ICD-10-CM

## 2021-08-18 LAB — SL AMB POCT HEMOGLOBIN AIC: 5.2 (ref ?–6.5)

## 2021-08-18 PROCEDURE — 1160F RVW MEDS BY RX/DR IN RCRD: CPT | Performed by: INTERNAL MEDICINE

## 2021-08-18 PROCEDURE — 3725F SCREEN DEPRESSION PERFORMED: CPT | Performed by: INTERNAL MEDICINE

## 2021-08-18 PROCEDURE — 96372 THER/PROPH/DIAG INJ SC/IM: CPT

## 2021-08-18 PROCEDURE — 4004F PT TOBACCO SCREEN RCVD TLK: CPT | Performed by: INTERNAL MEDICINE

## 2021-08-18 PROCEDURE — 99214 OFFICE O/P EST MOD 30 MIN: CPT | Performed by: INTERNAL MEDICINE

## 2021-08-18 PROCEDURE — 1125F AMNT PAIN NOTED PAIN PRSNT: CPT | Performed by: INTERNAL MEDICINE

## 2021-08-18 PROCEDURE — 3288F FALL RISK ASSESSMENT DOCD: CPT | Performed by: INTERNAL MEDICINE

## 2021-08-18 PROCEDURE — 83036 HEMOGLOBIN GLYCOSYLATED A1C: CPT | Performed by: INTERNAL MEDICINE

## 2021-08-18 PROCEDURE — 1170F FXNL STATUS ASSESSED: CPT | Performed by: INTERNAL MEDICINE

## 2021-08-18 PROCEDURE — 3044F HG A1C LEVEL LT 7.0%: CPT | Performed by: INTERNAL MEDICINE

## 2021-08-18 PROCEDURE — G0439 PPPS, SUBSEQ VISIT: HCPCS | Performed by: INTERNAL MEDICINE

## 2021-08-18 RX ORDER — PEDI MULTIVIT NO.25/FOLIC ACID 300 MCG
1 TABLET,CHEWABLE ORAL DAILY
Qty: 100 TABLET | Refills: 5 | Status: SHIPPED | OUTPATIENT
Start: 2021-08-18

## 2021-08-18 RX ORDER — GUAIFENESIN/DEXTROMETHORPHAN 100-10MG/5
5 SYRUP ORAL 3 TIMES DAILY PRN
Qty: 118 ML | Refills: 1 | Status: SHIPPED | OUTPATIENT
Start: 2021-08-18 | End: 2021-11-29 | Stop reason: CLARIF

## 2021-08-18 RX ORDER — BUDESONIDE, GLYCOPYRROLATE, AND FORMOTEROL FUMARATE 160; 9; 4.8 UG/1; UG/1; UG/1
2 AEROSOL, METERED RESPIRATORY (INHALATION) 2 TIMES DAILY
Qty: 10.7 G | Refills: 3 | Status: SHIPPED | OUTPATIENT
Start: 2021-08-18 | End: 2021-11-29 | Stop reason: CLARIF

## 2021-08-18 RX ORDER — CYANOCOBALAMIN 1000 UG/ML
1000 INJECTION INTRAMUSCULAR; SUBCUTANEOUS
Status: SHIPPED | OUTPATIENT
Start: 2021-08-18

## 2021-08-18 RX ADMIN — CYANOCOBALAMIN 1000 MCG: 1000 INJECTION INTRAMUSCULAR; SUBCUTANEOUS at 10:19

## 2021-08-18 NOTE — PROGRESS NOTES
Assessment and Plan:     Problem List Items Addressed This Visit        Endocrine    Diabetic peripheral neuropathy ([facility] [address] ) - Primary           Preventive health issues were discussed with patient, and age appropriate screening tests were ordered as noted in patient's After Visit Summary  Personalized health advice and appropriate referrals for health education or preventive services given if needed, as noted in patient's After Visit Summary       History of Present Illness:     Patient presents for Medicare Annual Wellness visit    Patient Care Team:  Hayley Arroyo MD as PCP - General (Internal Medicine)  Hayley Arroyo MD as PCP - PCP-[facility]  DO Byron Ramirez Cue (Pain Medicine)  Monie Toussaint DO (Pain Medicine)  Susi Rdz MD     Problem List:     Patient Active Problem List   Diagnosis    Chronic pain syndrome    Cervical radiculopathy    Myofascial pain syndrome    Spondylosis of cervical region without myelopathy or radiculopathy    Fibromyalgia    Diabetic peripheral neuropathy ([address] )    Long-term current use of opiate analgesic    MGUS (monoclonal gammopathy of unknown significance)    Iron deficiency anemia following bariatric surgery    Light headedness    Uncomplicated opioid dependence ([address] )    Rheumatoid arthritis of hand ([address] )    Pulmonary emphysema ([address] )    Primary osteoarthritis of right knee    Pseudogout of left knee    Lumbar radiculitis    Chronic bilateral low back pain with bilateral sciatica    Rotator cuff syndrome, left    Left shoulder pain    Dizziness    Other fatigue    Biceps tendinitis of left shoulder    Adhesive capsulitis of left shoulder    Hypoglycemia    Hypothyroidism due to Hashimoto's thyroiditis    Type 2 diabetes mellitus with hyperlipidemia ([address] )    Iron deficiency anemia, unspecified      Past Medical and Surgical History:     Past Medical History:   Diagnosis Date    Anemia     Anxiety     hx of panic attacks (now under control)     Arthritis     Asthma     resolved (no problems in a decade)     Baker's cyst of knee     Bronchitis     Bunion, left foot     Cervical pain     Chronic GERD     Chronic pain     Chronic pain disorder     Depression     Diabetes mellitus, type 2 (HCC)     Diabetic peripheral neuropathy (HCC)     Endometriosis     Fibromyalgia     Hyperlipidemia     Hypertension     Joint pain     Low back pain     Low back pain     Lung nodules     Macular degeneration     Pulmonary emphysema ([facility] [address] ) 1/16/2020    Spondylosis      Past Surgical History:   Procedure Laterality Date    BACK SURGERY  1996    L5, S1- laser surgery fusion of c5 and c6     BREAST LUMPECTOMY Right     CHOLECYSTECTOMY  2004    FOOT SURGERY Left 2005    fusion    Funmilayo Michael BYPASS  2010    Dr Mcclain Salts    just uterus     KNEE ARTHROSCOPY      LAMINECTOMY      ORTHOPEDIC SURGERY      OVARIAN CYST REMOVAL  1975    PELVIC LAPAROSCOPY      ovaries (x10)     PLEURAL SCARIFICATION  1967    SHOULDER OPEN ROTATOR CUFF REPAIR Left 2008    TONSILLECTOMY      VAGINAL PROLAPSE REPAIR        Family History:     Family History   Problem Relation Age of Onset    Hypertension Mother     Lung cancer Mother     Hypertension Father     Heart disease Father     Heart attack Father     Cancer Father       Social History:     Social History     Socioeconomic History    Marital status:      Spouse name: None    Number of children: None    Years of education: None    Highest education level: None   Occupational History    None   Tobacco Use    Smoking status: Current Every Day Smoker     Packs/day: 0 25     Years: 40 00     Pack years: 10 00     Types: Cigarettes    Smokeless tobacco: Current User   Vaping Use    Vaping Use: Never used   Substance and Sexual Activity    Alcohol use: Not Currently     Alcohol/week: 1 0 standard drinks     Types: 1 Shots of liquor per week     Comment: rarely    Drug use: No    Sexual activity: Not Currently     Partners: Male   Other Topics Concern    None   Social History Narrative    None     Social Determinants of Health     Financial Resource Strain: Low Risk     Difficulty of Paying Living Expenses: Not hard at all   Food Insecurity: No Food Insecurity    Worried About Running Out of Food in the Last Year: Never true    Patricio of Food in the Last Year: Never true   Transportation Needs: No Transportation Needs    Lack of Transportation (Medical): No    Lack of Transportation (Non-Medical):  No   Physical Activity: Inactive    Days of Exercise per Week: 0 days    Minutes of Exercise per Session: 0 min   Stress: No Stress Concern Present    Feeling of Stress : Not at all   Social Connections: Unknown    Frequency of Communication with Friends and Family: Patient refused    Frequency of Social Gatherings with Friends and Family: Patient refused    Attends Anabaptism Services: Patient refused    Active Member of Clubs or Organizations: Patient refused    Attends Club or Organization Meetings: Patient refused    Marital Status: Patient refused   Intimate Partner Violence: Not At Risk    Fear of Current or Ex-Partner: No    Emotionally Abused: No    Physically Abused: No    Sexually Abused: No      Medications and Allergies:     Current Outpatient Medications   Medication Sig Dispense Refill    albuterol (PROVENTIL HFA,VENTOLIN HFA) 90 mcg/act inhaler Inhale 2 puffs every 4 (four) hours as needed for wheezing 1 Inhaler 0    albuterol (VENTOLIN HFA) 90 mcg/act inhaler inhale 2 puff by inhalation route  every 4 - 6 hours as needed      amLODIPine (NORVASC) 5 mg tablet TAKE 1 TABLET BY MOUTH EVERY DAY 90 tablet 1    baclofen 10 mg tablet Take 1 tablet (10 mg total) by mouth 3 (three) times a day 270 tablet 1    benzonatate (TESSALON PERLES) 100 mg capsule Take 1 capsule (100 mg total) by mouth every 8 (eight) hours 21 capsule 0    cetirizine (ZyrTEC) 10 mg tablet TAKE 1/2 TABLET BY MOUTH DAILY 45 tablet 2    cimetidine (TAGAMET) 200 mg tablet Take 1 tablet (200 mg total) by mouth 2 (two) times a day 60 tablet 3    diphenhydrAMINE (BENADRYL ALLERGY) 25 mg capsule Take 50 mg by mouth daily       ergocalciferol (VITAMIN D2) 50,000 units Take 1 capsule (50,000 Units total) by mouth once a week 13 capsule 2    fluticasone-umeclidinium-vilanterol (TRELEGY) 100-62 5-25 MCG/INH inhaler Inhale 1 puff daily Rinse mouth after use   1 Inhaler 3    [START ON 9/8/2021] HYDROcodone-acetaminophen (NORCO)  mg per tablet Take 4-5 tablets daily  tablet 0    Lancets Misc  (ACCU-CHEK FASTCLIX LANCET) KIT 3 (three) times a day      levothyroxine 25 mcg tablet TAKE 1 TABLET BY MOUTH EVERY MORNING ON SATURDAY, SUNDAY, TUESDAY AND THURSDAY 30 tablet 2    levothyroxine 50 mcg tablet TAKE 1 TABLET BY MOUTH EVERY MORNING ON MONDAY, WEDNESDAY AND FRIDAY 30 tablet 2    losartan (COZAAR) 25 mg tablet TAKE 1 TABLET BY MOUTH EVERY DAY 90 tablet 1    MICROLET LANCETS MISC Microlet Lancet      mupirocin (BACTROBAN) 2 % ointment Apply topically 3 (three) times a day 22 g 0    nitrofurantoin (MACROBID) 100 mg capsule TAKE 1 CAPSULE (100 MG TOTAL) BY MOUTH DAILY WITH FOOD/LUNCH 30 capsule 1    ofloxacin (FLOXIN) 0 3 % otic solution Administer 5 drops into both ears 2 (two) times a day 5 mL 1    OneTouch Verio test strip TEST TWICE A  each 5    pantoprazole (PROTONIX) 40 mg tablet TAKE 1 TABLET BY MOUTH TWICE A  tablet 1    pregabalin (LYRICA) 200 MG capsule Take 1 capsule (200 mg total) by mouth 3 (three) times a day 90 capsule 1    sucralfate (CARAFATE) 1 g tablet TAKE 1 TABLET BY MOUTH 4 TIMES EVERY DAY ON AN EMPTY STOMACH 1 HOUR BEFORE MEALS AND AT BEDTIME 120 tablet 0    traZODone (DESYREL) 50 mg tablet TAKE 1 TABLET BY MOUTH EVERY DAY AT BEDTIME AS NEEDED 30 tablet 0    vitamin A 2400 MCG (8000 UT) capsule Take 1 capsule (8,000 Units total) by mouth daily 90 capsule 1     Current Facility-Administered Medications   Medication Dose Route Frequency Provider Last Rate Last Admin    cyanocobalamin injection 1,000 mcg  1,000 mcg Intramuscular Q30 Days Aleksandar Middleton MD   1,000 mcg at 12/08/20 1118    cyanocobalamin injection 1,000 mcg  1,000 mcg Intramuscular Q30 Days Shlomo Polanco MD   1,000 mcg at 07/08/20 1036    cyanocobalamin injection 1,000 mcg  1,000 mcg Intramuscular Q30 Days Shlomo Polanco MD   1,000 mcg at 09/08/20 1210    cyanocobalamin injection 1,000 mcg  1,000 mcg Intramuscular Q30 Days Aleksandar Middleton MD        cyanocobalamin injection 1,000 mcg  1,000 mcg Intramuscular Q30 Days Shlomo Polanco MD         Allergies   Allergen Reactions    Cephalosporins Hives    Erythromycin GI Intolerance    Fentanyl Itching     Occurred during surgery     Paxil [Paroxetine] Hives     After 2 weeks    Penicillins Itching    Pravastatin Myalgia    Prednisolone Itching    Statins Itching    Sulfa Antibiotics Diarrhea    Wellbutrin [Bupropion] Hives     After 2 weeks     Marzena's Wort Rash      Immunizations:     Immunization History   Administered Date(s) Administered    INFLUENZA 10/27/2018    Influenza Split High Dose Preservative Free IM 10/27/2018, 11/11/2019    Influenza, high dose seasonal 0 7 mL 10/13/2020    Pneumococcal Conjugate 13-Valent 11/29/2017    Pneumococcal Polysaccharide PPV23 01/08/2013, 05/02/2019    Tdap 02/07/2019    Zoster Vaccine Recombinant 02/07/2019      Health Maintenance:         Topic Date Due    Colorectal Cancer Screening  06/11/2024    Hepatitis C Screening  Completed         Topic Date Due    Influenza Vaccine (1) 09/01/2021      Medicare Health Risk Assessment:     Ht 5' 5" (1 651 m)   LMP  (LMP Unknown)   BMI 29 45 kg/m²      Jhonathan Shelton is here for her Subsequent Wellness visit  Health Risk Assessment:   Patient rates overall health as good   Patient feels that their physical health rating is same  Patient is satisfied with their life  Eyesight was rated as same  Hearing was rated as same  Patient feels that their emotional and mental health rating is same  Patients states they are never, rarely angry  Patient states they are never, rarely unusually tired/fatigued  Pain experienced in the last 7 days has been none  Patient states that she has experienced no weight loss or gain in last 6 months  Depression Screening:   PHQ-2 Score: 0      Fall Risk Screening: In the past year, patient has experienced: no history of falling in past year      Urinary Incontinence Screening:   Patient has not leaked urine accidently in the last six months  Home Safety:  Patient does not have trouble with stairs inside or outside of their home  Patient has working smoke alarms and has working carbon monoxide detector  Home safety hazards include: none  Nutrition:   Current diet is Diabetic  Medications:   Patient is not currently taking any over-the-counter supplements  Patient is able to manage medications  Activities of Daily Living (ADLs)/Instrumental Activities of Daily Living (IADLs):   Walk and transfer into and out of bed and chair?: Yes  Dress and groom yourself?: Yes    Bathe or shower yourself?: Yes    Feed yourself?  Yes  Do your laundry/housekeeping?: Yes  Manage your money, pay your bills and track your expenses?: Yes  Make your own meals?: Yes    Do your own shopping?: Yes    Previous Hospitalizations:   Any hospitalizations or ED visits within the last 12 months?: No      Advance Care Planning:   Living will: No    Durable POA for healthcare: No    Advanced directive counseling given: No    Five wishes given: No      PREVENTIVE SCREENINGS      Cardiovascular Screening:    General: Screening Not Indicated, History Lipid Disorder and Risks and Benefits Discussed      Diabetes Screening:     General: Screening Not Indicated, History Diabetes and Risks and Benefits Discussed      Colorectal Cancer Screening:     General: Screening Current      Breast Cancer Screening:     General: Risks and Benefits Discussed      Cervical Cancer Screening:    General: Screening Not Indicated and Risks and Benefits Discussed      Osteoporosis Screening:    General: Risks and Benefits Discussed      Abdominal Aortic Aneurysm (AAA) Screening:        General: Risks and Benefits Discussed      Lung Cancer Screening:     General: Screening Not Indicated and Risks and Benefits Discussed      Hepatitis C Screening:    General: Screening Current and Risks and Benefits Discussed    Screening, Brief Intervention, and Referral to Treatment (SBIRT)    Screening      Single Item Drug Screening:  How often have you used an illegal drug (including marijuana) or a prescription medication for non-medical reasons in the past year? never    Single Item Drug Screen Score: 0  Interpretation: Negative screen for possible drug use disorder    Other Counseling Topics:   Car/seat belt/driving safety, skin self-exam, sunscreen and regular weightbearing exercise and calcium and vitamin D intake         Daisy Moran MD

## 2021-08-18 NOTE — PATIENT INSTRUCTIONS
Medicare Preventive Visit Patient Instructions  Thank you for completing your Welcome to Medicare Visit or Medicare Annual Wellness Visit today  Your next wellness visit will be due in one year (8/19/2022)  The screening/preventive services that you may require over the next 5-10 years are detailed below  Some tests may not apply to you based off risk factors and/or age  Screening tests ordered at today's visit but not completed yet may show as past due  Also, please note that scanned in results may not display below  Preventive Screenings:  Service Recommendations Previous Testing/Comments   Colorectal Cancer Screening  * Colonoscopy    * Fecal Occult Blood Test (FOBT)/Fecal Immunochemical Test (FIT)  * Fecal DNA/Cologuard Test  * Flexible Sigmoidoscopy Age: 54-65 years old   Colonoscopy: every 10 years (may be performed more frequently if at higher risk)  OR  FOBT/FIT: every 1 year  OR  Cologuard: every 3 years  OR  Sigmoidoscopy: every 5 years  Screening may be recommended earlier than age 48 if at higher risk for colorectal cancer  Also, an individualized decision between you and your healthcare provider will decide whether screening between the ages of 74-80 would be appropriate  Colonoscopy: 06/11/2014  FOBT/FIT: Not on file  Cologuard: Not on file  Sigmoidoscopy: Not on file    Screening Current     Breast Cancer Screening Age: 36 years old  Frequency: every 1-2 years  Not required if history of left and right mastectomy Mammogram: 03/01/2017        Cervical Cancer Screening Between the ages of 21-29, pap smear recommended once every 3 years  Between the ages of 33-67, can perform pap smear with HPV co-testing every 5 years     Recommendations may differ for women with a history of total hysterectomy, cervical cancer, or abnormal pap smears in past  Pap Smear: Not on file    Screening Not Indicated   Hepatitis C Screening Once for adults born between 1945 and 1965  More frequently in patients at high risk for Hepatitis C Hep C Antibody: 02/07/2019    Screening Current   Diabetes Screening 1-2 times per year if you're at risk for diabetes or have pre-diabetes Fasting glucose: 68 mg/dL   A1C: 5 3 %    Screening Not Indicated  History Diabetes   Cholesterol Screening Once every 5 years if you don't have a lipid disorder  May order more often based on risk factors  Lipid panel: 06/22/2021    Screening Not Indicated  History Lipid Disorder     Other Preventive Screenings Covered by Medicare:  1  Abdominal Aortic Aneurysm (AAA) Screening: covered once if your at risk  You're considered to be at risk if you have a family history of AAA  2  Lung Cancer Screening: covers low dose CT scan once per year if you meet all of the following conditions: (1) Age 50-69; (2) No signs or symptoms of lung cancer; (3) Current smoker or have quit smoking within the last 15 years; (4) You have a tobacco smoking history of at least 30 pack years (packs per day multiplied by number of years you smoked); (5) You get a written order from a healthcare provider  3  Glaucoma Screening: covered annually if you're considered high risk: (1) You have diabetes OR (2) Family history of glaucoma OR (3)  aged 48 and older OR (3)  American aged 72 and older  3  Osteoporosis Screening: covered every 2 years if you meet one of the following conditions: (1) You're estrogen deficient and at risk for osteoporosis based off medical history and other findings; (2) Have a vertebral abnormality; (3) On glucocorticoid therapy for more than 3 months; (4) Have primary hyperparathyroidism; (5) On osteoporosis medications and need to assess response to drug therapy  · Last bone density test (DXA Scan): Not on file  5  HIV Screening: covered annually if you're between the age of 12-76  Also covered annually if you are younger than 13 and older than 72 with risk factors for HIV infection   For pregnant patients, it is covered up to 3 times per pregnancy  Immunizations:  Immunization Recommendations   Influenza Vaccine Annual influenza vaccination during flu season is recommended for all persons aged >= 6 months who do not have contraindications   Pneumococcal Vaccine (Prevnar and Pneumovax)  * Prevnar = PCV13  * Pneumovax = PPSV23   Adults 25-60 years old: 1-3 doses may be recommended based on certain risk factors  Adults 72 years old: Prevnar (PCV13) vaccine recommended followed by Pneumovax (PPSV23) vaccine  If already received PPSV23 since turning 65, then PCV13 recommended at least one year after PPSV23 dose  Hepatitis B Vaccine 3 dose series if at intermediate or high risk (ex: diabetes, end stage renal disease, liver disease)   Tetanus (Td) Vaccine - COST NOT COVERED BY MEDICARE PART B Following completion of primary series, a booster dose should be given every 10 years to maintain immunity against tetanus  Td may also be given as tetanus wound prophylaxis  Tdap Vaccine - COST NOT COVERED BY MEDICARE PART B Recommended at least once for all adults  For pregnant patients, recommended with each pregnancy  Shingles Vaccine (Shingrix) - COST NOT COVERED BY MEDICARE PART B  2 shot series recommended in those aged 48 and above     Health Maintenance Due:      Topic Date Due    Colorectal Cancer Screening  06/11/2024    Hepatitis C Screening  Completed     Immunizations Due:      Topic Date Due    Influenza Vaccine (1) 09/01/2021     Advance Directives   What are advance directives? Advance directives are legal documents that state your wishes and plans for medical care  These plans are made ahead of time in case you lose your ability to make decisions for yourself  Advance directives can apply to any medical decision, such as the treatments you want, and if you want to donate organs  What are the types of advance directives? There are many types of advance directives, and each state has rules about how to use them   You may choose a combination of any of the following:  · Living will: This is a written record of the treatment you want  You can also choose which treatments you do not want, which to limit, and which to stop at a certain time  This includes surgery, medicine, IV fluid, and tube feedings  · Durable power of  for healthcare [facility]): This is a written record that states who you want to make healthcare choices for you when you are unable to make them for yourself  This person, called a proxy, is usually a family member or a friend  You may choose more than 1 proxy  · Do not resuscitate (DNR) order:  A DNR order is used in case your heart stops beating or you stop breathing  It is a request not to have certain forms of treatment, such as CPR  A DNR order may be included in other types of advance directives  · Medical directive: This covers the care that you want if you are in a coma, near death, or unable to make decisions for yourself  You can list the treatments you want for each condition  Treatment may include pain medicine, surgery, blood transfusions, dialysis, IV or tube feedings, and a ventilator (breathing machine)  · Values history: This document has questions about your views, beliefs, and how you feel and think about life  This information can help others choose the care that you would choose  Why are advance directives important? An advance directive helps you control your care  Although spoken wishes may be used, it is better to have your wishes written down  Spoken wishes can be misunderstood, or not followed  Treatments may be given even if you do not want them  An advance directive may make it easier for your family to make difficult choices about your care  Cigarette Smoking and Your Health   Risks to your health if you smoke:  Nicotine and other chemicals found in tobacco damage every cell in your body   Even if you are a light smoker, you have an increased risk for cancer, heart disease, and lung disease  If you are pregnant or have diabetes, smoking increases your risk for complications  Benefits to your health if you stop smoking:   · You decrease respiratory symptoms such as coughing, wheezing, and shortness of breath  · You reduce your risk for cancers of the lung, mouth, throat, kidney, bladder, pancreas, stomach, and cervix  If you already have cancer, you increase the benefits of chemotherapy  You also reduce your risk for cancer returning or a second cancer from developing  · You reduce your risk for heart disease, blood clots, heart attack, and stroke  · You reduce your risk for lung infections, and diseases such as pneumonia, asthma, chronic bronchitis, and emphysema  · Your circulation improves  More oxygen can be delivered to your body  If you have diabetes, you lower your risk for complications, such as kidney, artery, and eye diseases  You also lower your risk for nerve damage  Nerve damage can lead to amputations, poor vision, and blindness  · You improve your body's ability to heal and to fight infections  For more information and support to stop smoking:   · [employer]  Phone: 2- 841 - 000-7386  Web Address: canvs.co  How to Quit Using Smokeless Tobacco   Why it is important to stop using smokeless tobacco:  Smokeless tobacco comes in many forms  Examples include chew, snuff, dip, dissolvable tobacco, and snus  All smokeless tobacco products contain nicotine and may contain as much nicotine as 3 cigarettes  You may be physically dependent on nicotine  You may also be emotionally addicted to it  The cravings can be strong, but it is important to quit using smokeless tobacco  You will improve your health and decrease your cancer, stroke, and heart attack risk  Mouth sores and tooth problems will also improve when you quit   You can benefit from quitting no matter how long you have used smokeless tobacco    Prepare to stop using smokeless tobacco:  Nicotine is a highly addictive drug  Withdrawal symptoms can happen when you stop and make it hard to quit  The following can help keep you on track:  · Set a quit date  · Tell friends, family, and coworkers that you plan to quit  · Remove all smokeless tobacco products from your home, car, and workplace  Manage weight gain after you quit:  Nicotine can affect your metabolism  You may gain a few pounds after you quit  The following can help you control your weight:  · Eat healthy foods  · Drink water before, during, and between meals  · Exercise as directed  Weight Management   Why it is important to manage your weight:  Being overweight increases your risk of health conditions such as heart disease, high blood pressure, type 2 diabetes, and certain types of cancer  It can also increase your risk for osteoarthritis, sleep apnea, and other respiratory problems  Aim for a slow, steady weight loss  Even a small amount of weight loss can lower your risk of health problems  How to lose weight safely:  A safe and healthy way to lose weight is to eat fewer calories and get regular exercise  You can lose up about 1 pound a week by decreasing the number of calories you eat by 500 calories each day  Healthy meal plan for weight management:  A healthy meal plan includes a variety of foods, contains fewer calories, and helps you stay healthy  A healthy meal plan includes the following:  · Eat whole-grain foods more often  A healthy meal plan should contain fiber  Fiber is the part of grains, fruits, and vegetables that is not broken down by your body  Whole-grain foods are healthy and provide extra fiber in your diet  Some examples of whole-grain foods are whole-wheat breads and pastas, oatmeal, brown rice, and bulgur  · Eat a variety of vegetables every day  Include dark, leafy greens such as spinach, kale, pat greens, and mustard greens   Eat yellow and orange vegetables such as carrots, sweet potatoes, and winter squash  · Eat a variety of fruits every day  Choose fresh or canned fruit (canned in its own juice or light syrup) instead of juice  Fruit juice has very little or no fiber  · Eat low-fat dairy foods  Drink fat-free (skim) milk or 1% milk  Eat fat-free yogurt and low-fat cottage cheese  Try low-fat cheeses such as mozzarella and other reduced-fat cheeses  · Choose meat and other protein foods that are low in fat  Choose beans or other legumes such as split peas or lentils  Choose fish, skinless poultry (chicken or turkey), or lean cuts of red meat (beef or pork)  Before you cook meat or poultry, cut off any visible fat  · Use less fat and oil  Try baking foods instead of frying them  Add less fat, such as margarine, sour cream, regular salad dressing and mayonnaise to foods  Eat fewer high-fat foods  Some examples of high-fat foods include french fries, doughnuts, ice cream, and cakes  · Eat fewer sweets  Limit foods and drinks that are high in sugar  This includes candy, cookies, regular soda, and sweetened drinks  Exercise:  Exercise at least 30 minutes per day on most days of the week  Some examples of exercise include walking, biking, dancing, and swimming  You can also fit in more physical activity by taking the stairs instead of the elevator or parking farther away from stores  Ask your healthcare provider about the best exercise plan for you  © Copyright [employer] 2018 Information is for End User's use only and may not be sold, redistributed or otherwise used for commercial purposes   All illustrations and images included in CareNotes® are the copyrighted property of [employer]  or [address] [place] [address] [employer]

## 2021-08-18 NOTE — PROGRESS NOTES
Assessment/Plan:         Diagnoses and all orders for this visit:    Encounter for general adult medical examination with abnormal findings; done in detail    Diabetic peripheral neuropathy ([facility] );   -     POCT hemoglobin A1c    Low vitamin B12 level;   -     cyanocobalamin injection 1,000 mcg, IM     Pulmonary emphysema, unspecified emphysema type ([facility] ); stop smoking,  -     dextromethorphan-guaiFENesin (ROBITUSSIN DM)  mg/5 mL syrup; Take 5 mL by mouth 3 (three) times a day as needed for cough or congestion  -     Budeson-Glycopyrrol-Formoterol (Breztri Aerosphere) 160-9-4 8 MCG/ACT AERO; Inhale 2 puffs 2 (two) times a day Rinse mouth after use  Bariatric surgery status; start :  -     Pediatric Multiple Vit-C-FA (pediatric multivitamin) chewable tablet; Chew 1 tablet daily    Screen for colon cancer  -     Ambulatory referral for colonoscopy; Future    Screening mammogram, encounter for  -     Mammo screening bilateral w 3d & cad; Future    Women's annual routine gynecological examination  -     Ambulatory referral to Gynecology; Future        Subjective:      Patient ID: Jonnathan Morataya is a 70 y o  female  70 Y O lady is here for AWV and Regular check up, she feels the same, recent blood work and med list reviewed w pt in detail    The following portions of the patient's history were reviewed and updated as appropriate: allergies, current medications, past family history, past medical history, past social history, past surgical history and problem list     Review of Systems   Constitutional: Negative for chills, fatigue and fever  HENT: Negative for congestion, facial swelling, sore throat, trouble swallowing and voice change  Eyes: Negative for pain, discharge and visual disturbance  Respiratory: Negative for cough, shortness of breath and wheezing  Cardiovascular: Negative for chest pain, palpitations and leg swelling     Gastrointestinal: Negative for abdominal pain, blood in stool, constipation, diarrhea and nausea  Endocrine: Negative for polydipsia, polyphagia and polyuria  Genitourinary: Negative for difficulty urinating, hematuria and urgency  Musculoskeletal: Negative for arthralgias and myalgias  Skin: Negative for rash  Neurological: Negative for dizziness, tremors, weakness and headaches  Hematological: Negative for adenopathy  Does not bruise/bleed easily  Psychiatric/Behavioral: Negative for dysphoric mood, sleep disturbance and suicidal ideas  Objective:      /68 (BP Location: Left arm, Patient Position: Sitting, Cuff Size: Standard)   Pulse 55   Temp 97 6 °F (36 4 °C) (Tympanic)   Resp 14   Ht 5' 5" (1 651 m)   Wt 76 2 kg (168 lb)   LMP  (LMP Unknown)   SpO2 97%   BMI 27 96 kg/m²          Physical Exam  Constitutional:       General: She is not in acute distress  HENT:      Head: Normocephalic  Mouth/Throat:      Pharynx: No oropharyngeal exudate  Eyes:      General: No scleral icterus  Conjunctiva/sclera: Conjunctivae normal       Pupils: Pupils are equal, round, and reactive to light  Neck:      Thyroid: No thyromegaly  Cardiovascular:      Rate and Rhythm: Normal rate and regular rhythm  Heart sounds: Normal heart sounds  No murmur heard  Pulmonary:      Effort: Pulmonary effort is normal  No respiratory distress  Breath sounds: Wheezing present  No rales  Abdominal:      General: Bowel sounds are normal  There is no distension  Palpations: Abdomen is soft  Tenderness: There is no abdominal tenderness  There is no guarding or rebound  Musculoskeletal:         General: Tenderness present  Cervical back: Neck supple  Lymphadenopathy:      Cervical: No cervical adenopathy  Skin:     Coloration: Skin is not pale  Findings: No rash  Neurological:      Mental Status: She is alert and oriented to person, place, and time  Sensory: Sensory deficit present  Motor: No weakness

## 2021-08-20 LAB
6MAM UR QL CFM: NEGATIVE NG/ML
7AMINOCLONAZEPAM UR QL CFM: NEGATIVE NG/ML
A-OH ALPRAZ UR QL CFM: NEGATIVE NG/ML
ACCEPTABLE CREAT UR QL: NORMAL MG/DL
ACCEPTIBLE SP GR UR QL: NORMAL
AMPHET UR QL CFM: NEGATIVE NG/ML
AMPHET UR QL CFM: NEGATIVE NG/ML
BUPRENORPHINE UR QL CFM: NEGATIVE NG/ML
BUTALBITAL UR QL CFM: NEGATIVE NG/ML
BZE UR QL CFM: NEGATIVE NG/ML
CODEINE UR QL CFM: NEGATIVE NG/ML
DESIPRAMINE UR QL CFM: NEGATIVE NG/ML
EDDP UR QL CFM: NEGATIVE NG/ML
ETHYL GLUCURONIDE UR QL CFM: NEGATIVE NG/ML
ETHYL SULFATE UR QL SCN: NEGATIVE NG/ML
FENTANYL UR QL CFM: NEGATIVE NG/ML
GLIADIN IGG SER IA-ACNC: NEGATIVE NG/ML
GLUCOSE 30M P 50 G LAC PO SERPL-MCNC: NEGATIVE NG/ML
HYDROCODONE UR QL CFM: NORMAL NG/ML
HYDROCODONE UR QL CFM: NORMAL NG/ML
HYDROMORPHONE UR QL CFM: NORMAL NG/ML
LORAZEPAM UR QL CFM: NEGATIVE NG/ML
MDMA UR QL CFM: NEGATIVE NG/ML
ME-PHENIDATE UR QL CFM: NEGATIVE NG/ML
MEPERIDINE UR QL CFM: NEGATIVE NG/ML
MEPHEDRONE UR QL CFM: NEGATIVE NG/ML
METHADONE UR QL CFM: NEGATIVE NG/ML
METHAMPHET UR QL CFM: NEGATIVE NG/ML
MORPHINE UR QL CFM: NEGATIVE NG/ML
MORPHINE UR QL CFM: NEGATIVE NG/ML
NITRITE UR QL: NORMAL UG/ML
NORBUPRENORPHINE UR QL CFM: NEGATIVE NG/ML
NORDIAZEPAM UR QL CFM: NEGATIVE NG/ML
NORFENTANYL UR QL CFM: NEGATIVE NG/ML
NORHYDROCODONE UR QL CFM: NORMAL NG/ML
NORHYDROCODONE UR QL CFM: NORMAL NG/ML
NORMEPERIDINE UR QL CFM: NEGATIVE NG/ML
NOROXYCODONE UR QL CFM: NEGATIVE NG/ML
OLANZAPINE QUANTIFICATION: NEGATIVE NG/ML
OPC-3373 QUANTIFICATION: NEGATIVE
OXAZEPAM UR QL CFM: NEGATIVE NG/ML
OXYCODONE UR QL CFM: NEGATIVE NG/ML
OXYMORPHONE UR QL CFM: NEGATIVE NG/ML
OXYMORPHONE UR QL CFM: NEGATIVE NG/ML
PHENOBARB UR QL CFM: NEGATIVE NG/ML
RESULT ALL_PRESCRIBED MEDS AND SPECIAL INSTRUCTIONS: NORMAL
SECOBARBITAL UR QL CFM: NEGATIVE NG/ML
SL AMB 3-METHYL-FENTANYL QUANTIFICATION: NORMAL NG/ML
SL AMB 4-ANPP QUANTIFICATION: NORMAL NG/ML
SL AMB 4-FIBF QUANTIFICATION: NORMAL NG/ML
SL AMB 5F-ADB-M7 METABOLITE QUANTIFICATION: NEGATIVE NG/ML
SL AMB 7-OH-MITRAGYNINE (KRATOM ALKALOID) QUANTIFICATION: NEGATIVE NG/ML
SL AMB AB-FUBINACA-M3 METABOLITE QUANTIFICATION: NEGATIVE NG/ML
SL AMB ACETYL FENTANYL QUANTIFICATION: NORMAL NG/ML
SL AMB ACETYL NORFENTANYL QUANTIFICATION: NORMAL NG/ML
SL AMB ACRYL FENTANYL QUANTIFICATION: NORMAL NG/ML
SL AMB BATH SALTS: NEGATIVE NG/ML
SL AMB BUTRYL FENTANYL QUANTIFICATION: NORMAL NG/ML
SL AMB CARFENTANIL QUANTIFICATION: NORMAL NG/ML
SL AMB CLOZAPINE QUANTIFICATION: NEGATIVE NG/ML
SL AMB CTHC (MARIJUANA METABOLITE) QUANTIFICATION: NEGATIVE NG/ML
SL AMB CYCLOPROPYL FENTANYL QUANTIFICATION: NORMAL NG/ML
SL AMB DEXTROMETHORPHAN QUANTIFICATION: NEGATIVE NG/ML
SL AMB DEXTRORPHAN (DEXTROMETHORPHAN METABOLITE) QUANT: NEGATIVE NG/ML
SL AMB DEXTRORPHAN (DEXTROMETHORPHAN METABOLITE) QUANT: NEGATIVE NG/ML
SL AMB FURANYL FENTANYL QUANTIFICATION: NORMAL NG/ML
SL AMB JWH018 METABOLITE QUANTIFICATION: NEGATIVE NG/ML
SL AMB JWH073 METABOLITE QUANTIFICATION: NEGATIVE NG/ML
SL AMB MDMB-FUBINACA-M1 METABOLITE QUANTIFICATION: NEGATIVE NG/ML
SL AMB METHOXYACETYL FENTANYL QUANTIFICATION: NORMAL NG/ML
SL AMB METHYLONE QUANTIFICATION: NEGATIVE NG/ML
SL AMB N-DESMETHYL U-47700 QUANTIFICATION: NORMAL NG/ML
SL AMB N-DESMETHYL-TRAMADOL QUANTIFICATION: NEGATIVE NG/ML
SL AMB N-DESMETHYLCLOZAPINE QUANTIFICATION: NEGATIVE NG/ML
SL AMB PHENTERMINE QUANTIFICATION: NEGATIVE NG/ML
SL AMB RCS4 METABOLITE QUANTIFICATION: NEGATIVE NG/ML
SL AMB RITALINIC ACID QUANTIFICATION: NEGATIVE NG/ML
SL AMB U-47700 QUANTIFICATION: NORMAL NG/ML
SPECIMEN DRAWN SERPL: NEGATIVE NG/ML
SPECIMEN PH ACCEPTABLE UR: NORMAL
TAPENTADOL UR QL CFM: NEGATIVE NG/ML
TEMAZEPAM UR QL CFM: NEGATIVE NG/ML
TEMAZEPAM UR QL CFM: NEGATIVE NG/ML
TRAMADOL UR QL CFM: NEGATIVE NG/ML
URATE/CREAT 24H UR: NEGATIVE NG/ML

## 2021-08-27 DIAGNOSIS — G47.00 INSOMNIA, UNSPECIFIED TYPE: ICD-10-CM

## 2021-08-30 RX ORDER — TRAZODONE HYDROCHLORIDE 50 MG/1
TABLET ORAL
Qty: 30 TABLET | Refills: 0 | Status: SHIPPED | OUTPATIENT
Start: 2021-08-30 | End: 2021-09-27

## 2021-09-02 ENCOUNTER — OFFICE VISIT (OUTPATIENT)
Dept: FAMILY MEDICINE CLINIC | Facility: CLINIC | Age: 71
End: 2021-09-02
Payer: COMMERCIAL

## 2021-09-02 ENCOUNTER — TELEPHONE (OUTPATIENT)
Dept: FAMILY MEDICINE CLINIC | Facility: CLINIC | Age: 71
End: 2021-09-02

## 2021-09-02 ENCOUNTER — APPOINTMENT (OUTPATIENT)
Dept: LAB | Facility: HOSPITAL | Age: 71
End: 2021-09-02
Payer: COMMERCIAL

## 2021-09-02 ENCOUNTER — HOSPITAL ENCOUNTER (OUTPATIENT)
Dept: CT IMAGING | Facility: HOSPITAL | Age: 71
Discharge: HOME | End: 2021-09-02
Payer: COMMERCIAL

## 2021-09-02 VITALS
RESPIRATION RATE: 14 BRPM | HEIGHT: 65 IN | HEART RATE: 56 BPM | TEMPERATURE: 97 F | DIASTOLIC BLOOD PRESSURE: 62 MMHG | WEIGHT: 169 LBS | OXYGEN SATURATION: 96 % | SYSTOLIC BLOOD PRESSURE: 118 MMHG | BODY MASS INDEX: 28.16 KG/M2
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
## 2021-09-02 DIAGNOSIS — G44.311 INTRACTABLE ACUTE POST-TRAUMATIC HEADACHE: Primary | ICD-10-CM
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
## 2021-09-02 DIAGNOSIS — G44.311 INTRACTABLE ACUTE POST-TRAUMATIC HEADACHE: ICD-10-CM

## 2021-09-02 DIAGNOSIS — D50.8 IRON DEFICIENCY ANEMIA FOLLOWING BARIATRIC SURGERY: ICD-10-CM

## 2021-09-02 DIAGNOSIS — K95.89 IRON DEFICIENCY ANEMIA FOLLOWING BARIATRIC SURGERY: ICD-10-CM

## 2021-09-02 DIAGNOSIS — K27.9 PUD (PEPTIC ULCER DISEASE): ICD-10-CM

## 2021-09-02 PROCEDURE — 3008F BODY MASS INDEX DOCD: CPT | Performed by: INTERNAL MEDICINE

## 2021-09-02 PROCEDURE — 70450 CT HEAD/BRAIN W/O DYE: CPT

## 2021-09-02 PROCEDURE — 99214 OFFICE O/P EST MOD 30 MIN: CPT | Performed by: INTERNAL MEDICINE

## 2021-09-02 PROCEDURE — G1004 CDSM NDSC: HCPCS

## 2021-09-02 RX ORDER — KETOROLAC TROMETHAMINE 10 MG/1
10 TABLET, FILM COATED ORAL 2 TIMES DAILY WITH MEALS
Qty: 10 TABLET | Refills: 0 | Status: SHIPPED | OUTPATIENT
Start: 2021-09-02 | End: 2021-11-29 | Stop reason: CLARIF

## 2021-09-02 RX ORDER — SUCRALFATE 1 G/1
TABLET ORAL
Qty: 120 TABLET | Refills: 0 | Status: SHIPPED | OUTPATIENT
Start: 2021-09-02 | End: 2021-09-27

## 2021-09-02 NOTE — PROGRESS NOTES
Assessment/Plan:         Diagnoses and all orders for this visit:    Intractable acute post-traumatic headache; STAT  -     CT head wo contrast; Future   Toradol 10 mg BID with food PRN    Fall in home, initial encounter  -     CT head wo contrast; Future        Subjective:      Patient ID: Luis Alfredo Holt is a 70 y o  female  [address] is here for headache mostly on right side, after fall at home 2 weeks ago, No other symptoms,  The following portions of the patient's history were reviewed and updated as appropriate: allergies, current medications, past family history, past social history, past surgical history and problem list     Review of Systems   Constitutional: Negative for chills, fatigue and fever  HENT: Negative for congestion, facial swelling, sore throat, trouble swallowing and voice change  Eyes: Negative for pain, discharge and visual disturbance  Respiratory: Negative for cough, shortness of breath and wheezing  Cardiovascular: Negative for chest pain, palpitations and leg swelling  Gastrointestinal: Negative for abdominal pain, blood in stool, constipation, diarrhea and nausea  Endocrine: Negative for polydipsia, polyphagia and polyuria  Genitourinary: Negative for difficulty urinating, hematuria and urgency  Musculoskeletal: Negative for arthralgias and myalgias  Skin: Negative for rash  Neurological: Positive for dizziness  Negative for tremors, weakness and headaches  Hematological: Negative for adenopathy  Does not bruise/bleed easily  Psychiatric/Behavioral: Negative for dysphoric mood, sleep disturbance and suicidal ideas           Objective:      /62 (BP Location: Left arm, Patient Position: Sitting, Cuff Size: Standard)   Pulse 56   Temp (!) 97 °F (36 1 °C) (Tympanic)   Resp 14   Ht 5' 5" (1 651 m)   Wt 76 7 kg (169 lb)   LMP  (LMP Unknown)   SpO2 96%   BMI 28 12 kg/m²          Physical Exam  Constitutional:       General: She is not in acute distress  HENT:      Head: Normocephalic  Mouth/Throat:      Pharynx: No oropharyngeal exudate  Eyes:      General: No scleral icterus  Conjunctiva/sclera: Conjunctivae normal       Pupils: Pupils are equal, round, and reactive to light  Neck:      Thyroid: No thyromegaly  Cardiovascular:      Rate and Rhythm: Normal rate and regular rhythm  Heart sounds: Normal heart sounds  No murmur heard  Pulmonary:      Effort: Pulmonary effort is normal  No respiratory distress  Breath sounds: Normal breath sounds  No wheezing or rales  Abdominal:      General: Bowel sounds are normal  There is no distension  Palpations: Abdomen is soft  Tenderness: There is no abdominal tenderness  There is no guarding or rebound  Musculoskeletal:         General: Tenderness present  Cervical back: Neck supple  Lymphadenopathy:      Cervical: No cervical adenopathy  Skin:     Coloration: Skin is not pale  Findings: No rash  Neurological:      Mental Status: She is alert and oriented to person, place, and time  Sensory: No sensory deficit  Motor: No weakness

## 2021-09-08 DIAGNOSIS — I10 ESSENTIAL HYPERTENSION: ICD-10-CM

## 2021-09-08 RX ORDER — AMLODIPINE BESYLATE 5 MG/1
TABLET ORAL
Qty: 90 TABLET | Refills: 1 | Status: SHIPPED | OUTPATIENT
Start: 2021-09-08 | End: 2021-12-20

## 2021-09-09 ENCOUNTER — TELEPHONE (OUTPATIENT)
Dept: GASTROENTEROLOGY | Facility: CLINIC | Age: 71
End: 2021-09-09

## 2021-09-13 ENCOUNTER — TELEPHONE (OUTPATIENT)
Dept: FAMILY MEDICINE CLINIC | Facility: CLINIC | Age: 71
End: 2021-09-13

## 2021-09-13 NOTE — TELEPHONE ENCOUNTER
Patient called, Nurys Nunezome should not be prescribed for people who have had gastric bypass  She said she does not need it right now, she wanted Dr Masood Quintero to be aware if the pharmacy called to change the medication

## 2021-09-24 ENCOUNTER — TELEPHONE (OUTPATIENT)
Dept: HEMATOLOGY ONCOLOGY | Facility: CLINIC | Age: 71
End: 2021-09-24

## 2021-09-24 NOTE — TELEPHONE ENCOUNTER
Called patient and left voicemail to reschedule 10/4/21 appointment, as Carley Vela is not available that day

## 2021-09-26 DIAGNOSIS — K27.9 PUD (PEPTIC ULCER DISEASE): ICD-10-CM

## 2021-09-26 DIAGNOSIS — G47.00 INSOMNIA, UNSPECIFIED TYPE: ICD-10-CM

## 2021-09-27 RX ORDER — SUCRALFATE 1 G/1
TABLET ORAL
Qty: 120 TABLET | Refills: 0 | Status: SHIPPED | OUTPATIENT
Start: 2021-09-27 | End: 2021-10-19

## 2021-09-27 RX ORDER — TRAZODONE HYDROCHLORIDE 50 MG/1
TABLET ORAL
Qty: 30 TABLET | Refills: 0 | Status: SHIPPED | OUTPATIENT
Start: 2021-09-27 | End: 2021-10-19

## 2021-10-03 DIAGNOSIS — I10 ESSENTIAL HYPERTENSION: ICD-10-CM

## 2021-10-04 PROCEDURE — 4010F ACE/ARB THERAPY RXD/TAKEN: CPT | Performed by: NURSE PRACTITIONER

## 2021-10-04 RX ORDER — LOSARTAN POTASSIUM 25 MG/1
TABLET ORAL
Qty: 90 TABLET | Refills: 1 | Status: SHIPPED | OUTPATIENT
Start: 2021-10-04 | End: 2022-02-16

## 2021-10-06 ENCOUNTER — OFFICE VISIT (OUTPATIENT)
Dept: PAIN MEDICINE | Facility: MEDICAL CENTER | Age: 71
End: 2021-10-06
Payer: OTHER MISCELLANEOUS

## 2021-10-06 VITALS
WEIGHT: 173 LBS | DIASTOLIC BLOOD PRESSURE: 56 MMHG | SYSTOLIC BLOOD PRESSURE: 114 MMHG | BODY MASS INDEX: 28.82 KG/M2 | TEMPERATURE: 98.1 F | HEART RATE: 52 BPM | HEIGHT: 65 IN

## 2021-10-06 DIAGNOSIS — M54.41 CHRONIC BILATERAL LOW BACK PAIN WITH RIGHT-SIDED SCIATICA: ICD-10-CM

## 2021-10-06 DIAGNOSIS — G89.29 CHRONIC BILATERAL LOW BACK PAIN WITH RIGHT-SIDED SCIATICA: ICD-10-CM

## 2021-10-06 DIAGNOSIS — M79.18 MYOFASCIAL PAIN SYNDROME: ICD-10-CM

## 2021-10-06 DIAGNOSIS — M54.41 CHRONIC BILATERAL LOW BACK PAIN WITH BILATERAL SCIATICA: ICD-10-CM

## 2021-10-06 DIAGNOSIS — M54.42 CHRONIC BILATERAL LOW BACK PAIN WITH BILATERAL SCIATICA: ICD-10-CM

## 2021-10-06 DIAGNOSIS — M54.16 LUMBAR RADICULITIS: ICD-10-CM

## 2021-10-06 DIAGNOSIS — G89.29 CHRONIC BILATERAL LOW BACK PAIN WITH BILATERAL SCIATICA: ICD-10-CM

## 2021-10-06 DIAGNOSIS — G89.4 CHRONIC PAIN SYNDROME: ICD-10-CM

## 2021-10-06 DIAGNOSIS — M47.812 SPONDYLOSIS OF CERVICAL REGION WITHOUT MYELOPATHY OR RADICULOPATHY: ICD-10-CM

## 2021-10-06 DIAGNOSIS — M54.12 CERVICAL RADICULOPATHY: Primary | ICD-10-CM

## 2021-10-06 PROCEDURE — 3008F BODY MASS INDEX DOCD: CPT | Performed by: NURSE PRACTITIONER

## 2021-10-06 PROCEDURE — 4004F PT TOBACCO SCREEN RCVD TLK: CPT | Performed by: NURSE PRACTITIONER

## 2021-10-06 PROCEDURE — 99214 OFFICE O/P EST MOD 30 MIN: CPT | Performed by: NURSE PRACTITIONER

## 2021-10-06 PROCEDURE — 1160F RVW MEDS BY RX/DR IN RCRD: CPT | Performed by: NURSE PRACTITIONER

## 2021-10-06 RX ORDER — HYDROCODONE BITARTRATE AND ACETAMINOPHEN 10; 325 MG/1; MG/1
TABLET ORAL
Qty: 130 TABLET | Refills: 0 | Status: SHIPPED | OUTPATIENT
Start: 2021-11-03 | End: 2021-11-29 | Stop reason: SDUPTHER

## 2021-10-06 RX ORDER — PREGABALIN 200 MG/1
200 CAPSULE ORAL 3 TIMES DAILY
Qty: 90 CAPSULE | Refills: 1 | Status: SHIPPED | OUTPATIENT
Start: 2021-10-06 | End: 2021-11-29 | Stop reason: SDUPTHER

## 2021-10-06 RX ORDER — HYDROCODONE BITARTRATE AND ACETAMINOPHEN 10; 325 MG/1; MG/1
TABLET ORAL
Qty: 130 TABLET | Refills: 0 | Status: SHIPPED | OUTPATIENT
Start: 2021-10-06 | End: 2021-10-06 | Stop reason: SDUPTHER

## 2021-10-10 DIAGNOSIS — K21.9 GASTROESOPHAGEAL REFLUX DISEASE WITHOUT ESOPHAGITIS: ICD-10-CM

## 2021-10-11 RX ORDER — PANTOPRAZOLE SODIUM 40 MG/1
TABLET, DELAYED RELEASE ORAL
Qty: 180 TABLET | Refills: 1 | Status: SHIPPED | OUTPATIENT
Start: 2021-10-11 | End: 2022-01-28 | Stop reason: SDUPTHER

## 2021-10-17 DIAGNOSIS — R32 URINARY INCONTINENCE, UNSPECIFIED TYPE: ICD-10-CM

## 2021-10-17 DIAGNOSIS — N39.0 URINARY TRACT INFECTION WITHOUT HEMATURIA, SITE UNSPECIFIED: ICD-10-CM

## 2021-10-18 ENCOUNTER — APPOINTMENT (OUTPATIENT)
Dept: LAB | Facility: MEDICAL CENTER | Age: 71
End: 2021-10-18
Payer: COMMERCIAL

## 2021-10-18 LAB
ALBUMIN SERPL BCP-MCNC: 3.1 G/DL (ref 3.5–5)
ALP SERPL-CCNC: 109 U/L (ref 46–116)
ALT SERPL W P-5'-P-CCNC: 25 U/L (ref 12–78)
ANION GAP SERPL CALCULATED.3IONS-SCNC: 2 MMOL/L (ref 4–13)
AST SERPL W P-5'-P-CCNC: 23 U/L (ref 5–45)
BASOPHILS # BLD AUTO: 0.02 THOUSANDS/ΜL (ref 0–0.1)
BASOPHILS NFR BLD AUTO: 0 % (ref 0–1)
BILIRUB SERPL-MCNC: 0.26 MG/DL (ref 0.2–1)
BUN SERPL-MCNC: 7 MG/DL (ref 5–25)
CALCIUM ALBUM COR SERPL-MCNC: 9.9 MG/DL (ref 8.3–10.1)
CALCIUM SERPL-MCNC: 9.2 MG/DL (ref 8.3–10.1)
CHLORIDE SERPL-SCNC: 109 MMOL/L (ref 100–108)
CO2 SERPL-SCNC: 28 MMOL/L (ref 21–32)
CREAT SERPL-MCNC: 0.68 MG/DL (ref 0.6–1.3)
EOSINOPHIL # BLD AUTO: 0.1 THOUSAND/ΜL (ref 0–0.61)
EOSINOPHIL NFR BLD AUTO: 2 % (ref 0–6)
ERYTHROCYTE [DISTWIDTH] IN BLOOD BY AUTOMATED COUNT: 13.7 % (ref 11.6–15.1)
FERRITIN SERPL-MCNC: 156 NG/ML (ref 8–388)
GFR SERPL CREATININE-BSD FRML MDRD: 88 ML/MIN/1.73SQ M
GLUCOSE P FAST SERPL-MCNC: 78 MG/DL (ref 65–99)
HCT VFR BLD AUTO: 41.9 % (ref 34.8–46.1)
HGB BLD-MCNC: 13.3 G/DL (ref 11.5–15.4)
IMM GRANULOCYTES # BLD AUTO: 0.01 THOUSAND/UL (ref 0–0.2)
IMM GRANULOCYTES NFR BLD AUTO: 0 % (ref 0–2)
IRON SATN MFR SERPL: 28 % (ref 15–50)
IRON SERPL-MCNC: 76 UG/DL (ref 50–170)
LYMPHOCYTES # BLD AUTO: 1.17 THOUSANDS/ΜL (ref 0.6–4.47)
LYMPHOCYTES NFR BLD AUTO: 25 % (ref 14–44)
MCH RBC QN AUTO: 32.7 PG (ref 26.8–34.3)
MCHC RBC AUTO-ENTMCNC: 31.7 G/DL (ref 31.4–37.4)
MCV RBC AUTO: 103 FL (ref 82–98)
MONOCYTES # BLD AUTO: 0.38 THOUSAND/ΜL (ref 0.17–1.22)
MONOCYTES NFR BLD AUTO: 8 % (ref 4–12)
NEUTROPHILS # BLD AUTO: 2.98 THOUSANDS/ΜL (ref 1.85–7.62)
NEUTS SEG NFR BLD AUTO: 65 % (ref 43–75)
NRBC BLD AUTO-RTO: 0 /100 WBCS
PLATELET # BLD AUTO: 188 THOUSANDS/UL (ref 149–390)
PMV BLD AUTO: 11.3 FL (ref 8.9–12.7)
POTASSIUM SERPL-SCNC: 4.2 MMOL/L (ref 3.5–5.3)
PROT SERPL-MCNC: 6.8 G/DL (ref 6.4–8.2)
RBC # BLD AUTO: 4.07 MILLION/UL (ref 3.81–5.12)
SODIUM SERPL-SCNC: 139 MMOL/L (ref 136–145)
TIBC SERPL-MCNC: 269 UG/DL (ref 250–450)
WBC # BLD AUTO: 4.66 THOUSAND/UL (ref 4.31–10.16)

## 2021-10-18 PROCEDURE — 36415 COLL VENOUS BLD VENIPUNCTURE: CPT

## 2021-10-18 PROCEDURE — 85025 COMPLETE CBC W/AUTO DIFF WBC: CPT

## 2021-10-18 PROCEDURE — 80053 COMPREHEN METABOLIC PANEL: CPT

## 2021-10-18 PROCEDURE — 82728 ASSAY OF FERRITIN: CPT

## 2021-10-18 PROCEDURE — 83540 ASSAY OF IRON: CPT

## 2021-10-18 PROCEDURE — 83550 IRON BINDING TEST: CPT

## 2021-10-18 RX ORDER — NITROFURANTOIN 25; 75 MG/1; MG/1
100 CAPSULE ORAL DAILY
Qty: 30 CAPSULE | Refills: 1 | Status: SHIPPED | OUTPATIENT
Start: 2021-10-18 | End: 2021-12-20

## 2021-10-19 DIAGNOSIS — K27.9 PUD (PEPTIC ULCER DISEASE): ICD-10-CM

## 2021-10-19 DIAGNOSIS — G47.00 INSOMNIA, UNSPECIFIED TYPE: ICD-10-CM

## 2021-10-19 RX ORDER — TRAZODONE HYDROCHLORIDE 50 MG/1
TABLET ORAL
Qty: 30 TABLET | Refills: 0 | Status: SHIPPED | OUTPATIENT
Start: 2021-10-19 | End: 2021-11-11

## 2021-10-19 RX ORDER — SUCRALFATE 1 G/1
TABLET ORAL
Qty: 120 TABLET | Refills: 0 | Status: SHIPPED | OUTPATIENT
Start: 2021-10-19 | End: 2021-10-21

## 2021-10-20 DIAGNOSIS — K27.9 PUD (PEPTIC ULCER DISEASE): ICD-10-CM

## 2021-10-21 RX ORDER — SUCRALFATE 1 G/1
TABLET ORAL
Qty: 360 TABLET | Refills: 1 | Status: SHIPPED | OUTPATIENT
Start: 2021-10-21 | End: 2022-05-09

## 2021-10-23 DIAGNOSIS — R79.89 LOW VITAMIN D LEVEL: ICD-10-CM

## 2021-10-25 RX ORDER — ERGOCALCIFEROL 1.25 MG/1
CAPSULE ORAL
Qty: 13 CAPSULE | Refills: 2 | Status: SHIPPED | OUTPATIENT
Start: 2021-10-25 | End: 2022-04-27 | Stop reason: SDUPTHER

## 2021-11-11 DIAGNOSIS — G47.00 INSOMNIA, UNSPECIFIED TYPE: ICD-10-CM

## 2021-11-11 RX ORDER — TRAZODONE HYDROCHLORIDE 50 MG/1
TABLET ORAL
Qty: 30 TABLET | Refills: 0 | Status: SHIPPED | OUTPATIENT
Start: 2021-11-11 | End: 2021-11-22

## 2021-11-13 DIAGNOSIS — R79.89 LOW SERUM VITAMIN A: ICD-10-CM

## 2021-11-15 RX ORDER — MULTIVITAMIN WITH IRON
8000 TABLET ORAL DAILY
Qty: 90 CAPSULE | Refills: 1 | Status: SHIPPED | OUTPATIENT
Start: 2021-11-15 | End: 2022-03-21

## 2021-11-21 DIAGNOSIS — G47.00 INSOMNIA, UNSPECIFIED TYPE: ICD-10-CM

## 2021-11-22 RX ORDER — TRAZODONE HYDROCHLORIDE 50 MG/1
TABLET ORAL
Qty: 90 TABLET | Refills: 1 | Status: SHIPPED | OUTPATIENT
Start: 2021-11-22 | End: 2022-02-16

## 2021-11-29 ENCOUNTER — OFFICE VISIT (OUTPATIENT)
Dept: PAIN MEDICINE | Facility: MEDICAL CENTER | Age: 71
End: 2021-11-29
Payer: COMMERCIAL

## 2021-11-29 ENCOUNTER — TELEPHONE (OUTPATIENT)
Dept: PAIN MEDICINE | Facility: MEDICAL CENTER | Age: 71
End: 2021-11-29

## 2021-11-29 VITALS
HEART RATE: 54 BPM | BODY MASS INDEX: 29.66 KG/M2 | SYSTOLIC BLOOD PRESSURE: 114 MMHG | DIASTOLIC BLOOD PRESSURE: 52 MMHG | TEMPERATURE: 98.3 F | WEIGHT: 178 LBS | HEIGHT: 65 IN

## 2021-11-29 DIAGNOSIS — G89.29 CHRONIC LEFT SHOULDER PAIN: ICD-10-CM

## 2021-11-29 DIAGNOSIS — M75.22 BICEPS TENDINITIS OF LEFT SHOULDER: ICD-10-CM

## 2021-11-29 DIAGNOSIS — M54.42 CHRONIC BILATERAL LOW BACK PAIN WITH BILATERAL SCIATICA: ICD-10-CM

## 2021-11-29 DIAGNOSIS — M79.18 MYOFASCIAL PAIN SYNDROME: ICD-10-CM

## 2021-11-29 DIAGNOSIS — G89.29 CHRONIC BILATERAL LOW BACK PAIN WITH BILATERAL SCIATICA: ICD-10-CM

## 2021-11-29 DIAGNOSIS — Z79.891 LONG-TERM CURRENT USE OF OPIATE ANALGESIC: Primary | ICD-10-CM

## 2021-11-29 DIAGNOSIS — G89.4 CHRONIC PAIN SYNDROME: ICD-10-CM

## 2021-11-29 DIAGNOSIS — M54.16 LUMBAR RADICULITIS: ICD-10-CM

## 2021-11-29 DIAGNOSIS — F11.20 UNCOMPLICATED OPIOID DEPENDENCE (HCC): ICD-10-CM

## 2021-11-29 DIAGNOSIS — G89.29 CHRONIC BILATERAL LOW BACK PAIN WITH RIGHT-SIDED SCIATICA: ICD-10-CM

## 2021-11-29 DIAGNOSIS — M25.512 CHRONIC LEFT SHOULDER PAIN: ICD-10-CM

## 2021-11-29 DIAGNOSIS — M17.11 PRIMARY OSTEOARTHRITIS OF RIGHT KNEE: ICD-10-CM

## 2021-11-29 DIAGNOSIS — M54.41 CHRONIC BILATERAL LOW BACK PAIN WITH RIGHT-SIDED SCIATICA: ICD-10-CM

## 2021-11-29 DIAGNOSIS — M75.02 ADHESIVE CAPSULITIS OF LEFT SHOULDER: ICD-10-CM

## 2021-11-29 DIAGNOSIS — M54.41 CHRONIC BILATERAL LOW BACK PAIN WITH BILATERAL SCIATICA: ICD-10-CM

## 2021-11-29 PROCEDURE — 3008F BODY MASS INDEX DOCD: CPT | Performed by: NURSE PRACTITIONER

## 2021-11-29 PROCEDURE — 4004F PT TOBACCO SCREEN RCVD TLK: CPT | Performed by: NURSE PRACTITIONER

## 2021-11-29 PROCEDURE — 1160F RVW MEDS BY RX/DR IN RCRD: CPT | Performed by: NURSE PRACTITIONER

## 2021-11-29 PROCEDURE — 80305 DRUG TEST PRSMV DIR OPT OBS: CPT | Performed by: NURSE PRACTITIONER

## 2021-11-29 PROCEDURE — 99214 OFFICE O/P EST MOD 30 MIN: CPT | Performed by: NURSE PRACTITIONER

## 2021-11-29 RX ORDER — HYDROCODONE BITARTRATE AND ACETAMINOPHEN 10; 325 MG/1; MG/1
TABLET ORAL
Qty: 130 TABLET | Refills: 0 | Status: SHIPPED | OUTPATIENT
Start: 2021-11-29 | End: 2022-01-19 | Stop reason: SDUPTHER

## 2021-11-29 RX ORDER — BACLOFEN 10 MG/1
10 TABLET ORAL 3 TIMES DAILY
Qty: 270 TABLET | Refills: 1 | Status: SHIPPED | OUTPATIENT
Start: 2021-11-29 | End: 2022-01-19 | Stop reason: SDUPTHER

## 2021-11-29 RX ORDER — HYDROCODONE BITARTRATE AND ACETAMINOPHEN 10; 325 MG/1; MG/1
TABLET ORAL
Qty: 130 TABLET | Refills: 0 | Status: SHIPPED | OUTPATIENT
Start: 2021-12-27 | End: 2022-01-19 | Stop reason: SDUPTHER

## 2021-11-29 RX ORDER — LANOLIN ALCOHOL/MO/W.PET/CERES
CREAM (GRAM) TOPICAL DAILY
COMMUNITY
End: 2022-04-27 | Stop reason: SDUPTHER

## 2021-11-29 RX ORDER — PREGABALIN 200 MG/1
200 CAPSULE ORAL 3 TIMES DAILY
Qty: 90 CAPSULE | Refills: 1 | Status: SHIPPED | OUTPATIENT
Start: 2021-11-29 | End: 2022-01-19 | Stop reason: SDUPTHER

## 2021-12-15 ENCOUNTER — RA CDI HCC (OUTPATIENT)
Dept: OTHER | Facility: HOSPITAL | Age: 71
End: 2021-12-15

## 2021-12-18 DIAGNOSIS — E03.9 HYPOTHYROIDISM, UNSPECIFIED TYPE: ICD-10-CM

## 2021-12-18 DIAGNOSIS — I10 ESSENTIAL HYPERTENSION: ICD-10-CM

## 2021-12-18 DIAGNOSIS — R32 URINARY INCONTINENCE, UNSPECIFIED TYPE: ICD-10-CM

## 2021-12-18 DIAGNOSIS — N39.0 URINARY TRACT INFECTION WITHOUT HEMATURIA, SITE UNSPECIFIED: ICD-10-CM

## 2021-12-20 RX ORDER — AMLODIPINE BESYLATE 5 MG/1
TABLET ORAL
Qty: 90 TABLET | Refills: 1 | Status: SHIPPED | OUTPATIENT
Start: 2021-12-20 | End: 2022-01-28 | Stop reason: SDUPTHER

## 2021-12-20 RX ORDER — LEVOTHYROXINE SODIUM 0.05 MG/1
TABLET ORAL
Qty: 30 TABLET | Refills: 2 | Status: SHIPPED | OUTPATIENT
Start: 2021-12-20 | End: 2022-04-18

## 2021-12-20 RX ORDER — NITROFURANTOIN 25; 75 MG/1; MG/1
100 CAPSULE ORAL DAILY
Qty: 30 CAPSULE | Refills: 1 | Status: SHIPPED | OUTPATIENT
Start: 2021-12-20 | End: 2022-01-24

## 2021-12-30 DIAGNOSIS — R50.9 FEVER AND CHILLS: Primary | ICD-10-CM

## 2022-01-06 PROCEDURE — U0003 INFECTIOUS AGENT DETECTION BY NUCLEIC ACID (DNA OR RNA); SEVERE ACUTE RESPIRATORY SYNDROME CORONAVIRUS 2 (SARS-COV-2) (CORONAVIRUS DISEASE [COVID-19]), AMPLIFIED PROBE TECHNIQUE, MAKING USE OF HIGH THROUGHPUT TECHNOLOGIES AS DESCRIBED BY CMS-2020-01-R: HCPCS | Performed by: INTERNAL MEDICINE

## 2022-01-19 ENCOUNTER — TELEPHONE (OUTPATIENT)
Dept: PAIN MEDICINE | Facility: MEDICAL CENTER | Age: 72
End: 2022-01-19

## 2022-01-19 ENCOUNTER — OFFICE VISIT (OUTPATIENT)
Dept: PAIN MEDICINE | Facility: MEDICAL CENTER | Age: 72
End: 2022-01-19
Payer: COMMERCIAL

## 2022-01-19 VITALS
HEIGHT: 65 IN | DIASTOLIC BLOOD PRESSURE: 54 MMHG | TEMPERATURE: 98.3 F | WEIGHT: 176 LBS | HEART RATE: 50 BPM | SYSTOLIC BLOOD PRESSURE: 112 MMHG | BODY MASS INDEX: 29.32 KG/M2 | OXYGEN SATURATION: 95 %

## 2022-01-19 DIAGNOSIS — M54.16 LUMBAR RADICULITIS: ICD-10-CM

## 2022-01-19 DIAGNOSIS — G89.29 CHRONIC BILATERAL LOW BACK PAIN WITH BILATERAL SCIATICA: Primary | ICD-10-CM

## 2022-01-19 DIAGNOSIS — M54.41 CHRONIC BILATERAL LOW BACK PAIN WITH RIGHT-SIDED SCIATICA: ICD-10-CM

## 2022-01-19 DIAGNOSIS — G89.29 CHRONIC BILATERAL LOW BACK PAIN WITH RIGHT-SIDED SCIATICA: ICD-10-CM

## 2022-01-19 DIAGNOSIS — M79.18 MYOFASCIAL PAIN SYNDROME: ICD-10-CM

## 2022-01-19 DIAGNOSIS — M54.12 CERVICAL RADICULOPATHY: ICD-10-CM

## 2022-01-19 DIAGNOSIS — M47.812 SPONDYLOSIS OF CERVICAL REGION WITHOUT MYELOPATHY OR RADICULOPATHY: ICD-10-CM

## 2022-01-19 DIAGNOSIS — G89.4 CHRONIC PAIN SYNDROME: ICD-10-CM

## 2022-01-19 DIAGNOSIS — M54.41 CHRONIC BILATERAL LOW BACK PAIN WITH BILATERAL SCIATICA: Primary | ICD-10-CM

## 2022-01-19 DIAGNOSIS — M54.42 CHRONIC BILATERAL LOW BACK PAIN WITH BILATERAL SCIATICA: Primary | ICD-10-CM

## 2022-01-19 PROCEDURE — 99214 OFFICE O/P EST MOD 30 MIN: CPT | Performed by: NURSE PRACTITIONER

## 2022-01-19 RX ORDER — HYDROCODONE BITARTRATE AND ACETAMINOPHEN 10; 325 MG/1; MG/1
TABLET ORAL
Qty: 130 TABLET | Refills: 0 | Status: SHIPPED | OUTPATIENT
Start: 2022-01-19 | End: 2022-01-19 | Stop reason: SDUPTHER

## 2022-01-19 RX ORDER — HYDROCODONE BITARTRATE AND ACETAMINOPHEN 10; 325 MG/1; MG/1
TABLET ORAL
Qty: 130 TABLET | Refills: 0 | Status: SHIPPED | OUTPATIENT
Start: 2022-02-16 | End: 2022-01-24 | Stop reason: SDUPTHER

## 2022-01-19 RX ORDER — PREGABALIN 200 MG/1
200 CAPSULE ORAL 3 TIMES DAILY
Qty: 90 CAPSULE | Refills: 1 | Status: SHIPPED | OUTPATIENT
Start: 2022-01-19 | End: 2022-03-16 | Stop reason: SDUPTHER

## 2022-01-19 RX ORDER — BACLOFEN 10 MG/1
10 TABLET ORAL 3 TIMES DAILY
Qty: 270 TABLET | Refills: 1 | Status: SHIPPED | OUTPATIENT
Start: 2022-01-19 | End: 2022-05-09 | Stop reason: SDUPTHER

## 2022-01-19 RX ORDER — HYDROCODONE BITARTRATE AND ACETAMINOPHEN 10; 325 MG/1; MG/1
TABLET ORAL
Qty: 130 TABLET | Refills: 0 | Status: SHIPPED | OUTPATIENT
Start: 2022-01-19 | End: 2022-01-24 | Stop reason: SDUPTHER

## 2022-01-19 NOTE — TELEPHONE ENCOUNTER
Pt called stating that her pharmacy didn't receive the refills for  baclofen 10 mg tablet and pregabalin (LYRICA) 200 MG capsule -pls use the [facility]      cb [phone]

## 2022-01-19 NOTE — PATIENT INSTRUCTIONS
Opioid Safety   AMBULATORY CARE:   Opioid safety  includes the correct use, storage, and disposal of opioids  Examples of opioid medicines to treat pain include oxycodone, morphine, fentanyl, and codeine  Call your local emergency number (911 in the [address]), or have someone else call if:   · You have a seizure  · You cannot be woken  · You have trouble staying awake and your breathing is slow or shallow  · Your speech is slurred, or you are confused  · You are dizzy or stumble when you walk  Call your doctor, or have someone close to you call if:   · You are extremely drowsy, or you have trouble staying awake or speaking  · You have pale or clammy skin  · You have blue fingernails or lips  · Your heartbeat is slower than normal     · You cannot stop vomiting  · You have questions or concerns about your condition or care  Use opioids safely:   · Take prescribed opioids exactly as directed  Opioids come with directions based on the kind of opioid and how it is given  Do not take more than the recommended amount, or for longer than needed  · Do not give opioids to others or take opioids that belong to someone else  Misuse of opioids can lead to an addiction or overdose  · Do not mix opioids with other medicines or alcohol  The combination can cause an overdose, or lead to a coma  · Do not drive or operate heavy machinery after you take the opioid  Your provider or pharmacist can tell you how long to wait after a dose before you do these activities  · Talk to your healthcare provider if you have any side effects  He or she can help you prevent or relieve side effects  Side effects include nausea, sleepiness, itching, and trouble thinking clearly  Manage constipation:  Constipation is the most common side effect of opioid medicine  Constipation is when you have hard, dry bowel movements, or you go longer than usual between bowel movements   Tell your healthcare provider about all changes in your bowel movements while you are taking opioids  He or she may recommend laxative medicine to help you have a bowel movement  He or she may also change the kind of opioid you are taking, or change when you take it  The following are more ways you can prevent or relieve constipation:  · Drink liquids as directed  You may need to drink extra liquids to help soften and move your bowels  Ask how much liquid to drink each day and which liquids are best for you  · Eat high-fiber foods  This may help decrease constipation by adding bulk to your bowel movements  High-fiber foods include fruits, vegetables, whole-grain breads and cereals, and beans  Your healthcare provider or dietitian can help you create a high-fiber meal plan  Your provider may also recommend a fiber supplement if you cannot get enough fiber from food  · Exercise regularly  Regular physical activity can help stimulate your intestines  Walking is a good exercise to prevent or relieve constipation  Ask which exercises are best for you  · Schedule a time each day to have a bowel movement  This may help train your body to have regular bowel movements  Bend forward while you are on the toilet to help move the bowel movement out  Sit on the toilet for at least 10 minutes, even if you do not have a bowel movement  Store opioids safely:   · Store opioids where others cannot easily get them  Keep them in a locked cabinet or secure area  Do not  keep them in a purse or other bag you carry with you  A person may be looking for something else and find the opioids  · Make sure opioids are stored out of the reach of children  A child can easily overdose on opioids  Opioids may look like candy to a small child  The best way to dispose of opioids: The laws vary by country and area   In the [place], the best way is to return the opioids through a take-back program  This program is offered by the [place] [employer] ([address] [facility] [address])  The following are options for using the program:  · Take the opioids to a [employer] collection site  The site is often a law enforcement center  Call your local law enforcement center for scheduled take-back days in your area  You will be given information on where to go if the collection site is in a different location  · Take the opioids to an approved pharmacy or hospital   A pharmacy or hospital may be set up as a collection site  You will need to ask if it is a [employer] collection site if you were not directed there  A pharmacy or doctor's office may not be able to take back opioids unless it is a [employer] site  · Use a mail-back system  This means you are given containers to put the opioids into  You will then mail them in the containers  · Use a take-back drop box  This is a place to leave the opioids at any time  People and animals will not be able to get into the box  Your local law enforcement agency can tell you where to find a drop box in your area  Other safe ways to dispose of opioids: The medicine may come with disposal instructions  The instructions may vary depending on the brand of medicine you are using  Instructions may come in a Medication Guide, but not every medicine has one  You may instead get instructions from your pharmacy or doctor  Follow instructions carefully  The following are general guidelines to follow:  · Find out if you can flush the opioid  Some opioids can be flushed down the toilet or poured into the sink  You will need to contact authorities in your area to see if this is an option for you  The FDA also offers a list of medicines that are safe to flush down the toilet  You can check the list if you cannot get the information for your local area  · Ask your waste management company about rules for putting opioids in the trash  The company will be able to give you specific directions   Scratch out personal information on the original medicine label so it cannot be read  Then put it in the trash  Do not label the trash or put any information on it about the opioids  It should look like regular household trash so no one is tempted to look for the opioids  Keep the trash out of the reach of children and animals  Always make sure trash is secure  · Talk to officials if you live in a facility  If you live in a nursing home or assisted living center, talk to an official  The person will know the rules for your area  Other ways to manage pain:   · Ask your healthcare provider about non-opioid medicines to control pain  Nonprescription medicines include NSAIDs (such as ibuprofen) and acetaminophen  Prescription medicines include muscle relaxers, antidepressants, and steroids  · Pain may be managed without any medicines  Some ways to relieve pain include massage, aromatherapy, or meditation  Physical or occupational therapy may also help  For more information:   · Drug Enforcement Administration  [facility] [address]  Phone: 8- 710 - [phone]  Web Address: [facility]/drug_disposal/    · [address]  and Drug Administration  [facility] , [address] [place] [address]  Phone: 9- [phone] - [phone]  Web Address: http://[employer]/    Follow up with your doctor or pain specialist as directed: You may need to have your dose adjusted  Your doctor or pain specialist can also help you find ways to manage pain without opioids  Write down your questions so you remember to ask them during your visits  © Copyright [employer] 2021 Information is for End User's use only and may not be sold, redistributed or otherwise used for commercial purposes  All illustrations and images included in CareNotes® are the copyrighted property of A D A M , Inc  or Frankie Schwartz  The above information is an  only  It is not intended as medical advice for individual conditions or treatments   Talk to your doctor, nurse or pharmacist before following any medical regimen to see if it is safe and effective for you

## 2022-01-19 NOTE — TELEPHONE ENCOUNTER
[facility] called stating that the norco due for this month was not received by the pharmacy     Please resend

## 2022-01-19 NOTE — PROGRESS NOTES
Assessment:  1  Chronic bilateral low back pain with bilateral sciatica    2  Chronic pain syndrome    3  Lumbar radiculitis    4  Cervical radiculopathy    5  Myofascial pain syndrome    6  Spondylosis of cervical region without myelopathy or radiculopathy        Plan:  Continue with hydrocodone as prescribed she was given a 2 month supply of the medication with 1 month having a do not fill date of February 16, 2022  Continue with Lyrica and baclofen these were also refilled today  While the patient was in the office today an opioid contract was thoroughly reviewed and signed by the patient  The patient was given adequate time to ask questions in regards to the contract and a signed copy was sent home for his/her records  Patient to return in 8 weeks for medication refills        [address] [place] [address] [employer] Monitoring Program report was reviewed and was appropriate         There are risks associated with opioid medications, including dependence, addiction and tolerance  The patient understands and agrees to use these medications only as prescribed  Potential side effects of the medications include, but are not limited to, constipation, drowsiness, addiction, impaired judgment and risk of fatal overdose if not taken as prescribed  The patient was warned against driving while taking sedation medications  Sharing medications is a felony  At this point in time, the patient is showing no signs of addiction, abuse, diversion or suicidal ideation  History of Present Illness: The patient is a 70 y o  female last seen on November 29, 2021 who presents for a follow up office visit in regards to chronic pain secondary to lumbar radiculitis, myofascial pain syndrome, chronic bilateral low back pain  The patient currently reports worsening pain symptoms rated 8/10 this is constant described as burning, dull, aching, sharp, throbbing, pressure-like, shooting, numbness, and pins and needles      Patient has not yet schedule her left L4-5 transforaminal epidural steroid injection that we last ordered for her at her previous office visit she is not sure if this is something that she wants to do  She has also not yet scheduled her evaluation for her shoulder pain with Dr Raven Wade  Current Facility-Administered Medications   Medication Dose Route Frequency Provider Last Rate    cyanocobalamin  1,000 mcg Intramuscular Q30 Days Darius Calle MD      cyanocobalamin  1,000 mcg Intramuscular Q30 Days Darius Calle MD      cyanocobalamin  1,000 mcg Intramuscular Q30 Days Darius Calle MD      cyanocobalamin  1,000 mcg Intramuscular Q30 Days Darius Calle MD      cyanocobalamin  1,000 mcg Intramuscular Q30 Days Darius Calle MD      cyanocobalamin  1,000 mcg Intramuscular Q30 Days Darius Calle MD     Patient continues to take hydrocodone 10/325 mg 4-5 tablets daily, Lyrica 200 mg 3 times daily, and baclofen 10 mg 3 times daily The patient reports that this regimen is providing 30 % pain relief  The patient is reporting no side effects from this pain medication regimen  Janae Varma last taken   1/19 AM      Pain Contract Signed: 1/19/22  Last Urine Drug Screen: 11/29/21    I have personally reviewed and/or updated the patient's past medical history, past surgical history, family history, social history, current medications, allergies, and vital signs today  Review of Systems:    Review of Systems   Respiratory: Negative for shortness of breath  Cardiovascular: Negative for chest pain  Gastrointestinal: Positive for diarrhea  Negative for constipation, nausea and vomiting  Musculoskeletal: Positive for arthralgias, gait problem, joint swelling and myalgias  Skin: Negative for rash  Neurological: Positive for dizziness  Negative for seizures and weakness  Psychiatric/Behavioral: Positive for decreased concentration  All other systems reviewed and are negative          Past Medical History:   Diagnosis Date    Anemia     Anxiety     hx of panic attacks (now under control)     Arthritis     Asthma     resolved (no problems in a decade)     Baker's cyst of knee     Bronchitis     Bunion, left foot     Cervical pain     Chronic GERD     Chronic pain     Chronic pain disorder     Depression     Diabetes mellitus, type 2 (HCC)     Diabetic peripheral neuropathy (HCC)     Endometriosis     Fibromyalgia     Hyperlipidemia     Hypertension     Joint pain     Low back pain     Low back pain     Lung nodules     Macular degeneration     Pulmonary emphysema ([address] ) 1/16/2020    Spondylosis        Past Surgical History:   Procedure Laterality Date    BACK SURGERY  1996    L5, S1- laser surgery fusion of c5 and c6     BREAST LUMPECTOMY Right     CHOLECYSTECTOMY  2004    FOOT SURGERY Left 2005    fusion    Linell Brass BYPASS  2010    Dr Werner Primus    just uterus     KNEE ARTHROSCOPY      LAMINECTOMY      ORTHOPEDIC SURGERY      OVARIAN CYST REMOVAL  1975    PELVIC LAPAROSCOPY      ovaries (x10)     PLEURAL SCARIFICATION  1967    SHOULDER OPEN ROTATOR CUFF REPAIR Left 2008    TONSILLECTOMY      VAGINAL PROLAPSE REPAIR         Family History   Problem Relation Age of Onset    Hypertension Mother     Lung cancer Mother     Hypertension Father     Heart disease Father     Heart attack Father     Cancer Father        Social History     Occupational History    Not on file   Tobacco Use    Smoking status: Current Every Day Smoker     Packs/day: 0 25     Years: 40 00     Pack years: 10 00     Types: Cigarettes    Smokeless tobacco: Current User   Vaping Use    Vaping Use: Never used   Substance and Sexual Activity    Alcohol use: Not Currently     Alcohol/week: 1 0 standard drink     Types: 1 Shots of liquor per week     Comment: rarely    Drug use: No    Sexual activity: Not Currently     Partners: Male         Current Outpatient Medications:   albuterol (PROVENTIL HFA,VENTOLIN HFA) 90 mcg/act inhaler, Inhale 2 puffs every 4 (four) hours as needed for wheezing, Disp: 1 Inhaler, Rfl: 0    amLODIPine (NORVASC) 5 mg tablet, TAKE 1 TABLET BY MOUTH EVERY DAY, Disp: 90 tablet, Rfl: 1    baclofen 10 mg tablet, Take 1 tablet (10 mg total) by mouth 3 (three) times a day, Disp: 270 tablet, Rfl: 1    cetirizine (ZyrTEC) 10 mg tablet, TAKE 1/2 TABLET BY MOUTH DAILY, Disp: 45 tablet, Rfl: 2    diphenhydrAMINE (BENADRYL ALLERGY) 25 mg capsule, Take 50 mg by mouth daily , Disp: , Rfl:     ergocalciferol (VITAMIN D2) 50,000 units, TAKE 1 CAPSULE BY MOUTH ONE TIME PER WEEK, Disp: 13 capsule, Rfl: 2    [START ON 2/16/2022] HYDROcodone-acetaminophen (NORCO)  mg per tablet, Take 4-5 tablets daily PRN, Disp: 130 tablet, Rfl: 0    levothyroxine 25 mcg tablet, TAKE 1 TABLET BY MOUTH EVERY MORNING ON SATURDAY, SUNDAY, TUESDAY AND THURSDAY, Disp: 30 tablet, Rfl: 2    levothyroxine 50 mcg tablet, TAKE 1 TABLET BY MOUTH EVERY MORNING ON MONDAY, WEDNESDAY AND FRIDAY, Disp: 30 tablet, Rfl: 2    losartan (COZAAR) 25 mg tablet, TAKE 1 TABLET BY MOUTH EVERY DAY, Disp: 90 tablet, Rfl: 1    nitrofurantoin (MACROBID) 100 mg capsule, TAKE 1 CAPSULE (100 MG TOTAL) BY MOUTH DAILY WITH FOOD/LUNCH, Disp: 30 capsule, Rfl: 1    pantoprazole (PROTONIX) 40 mg tablet, TAKE 1 TABLET BY MOUTH TWICE A DAY, Disp: 180 tablet, Rfl: 1    Pediatric Multiple Vit-C-FA (pediatric multivitamin) chewable tablet, Chew 1 tablet daily, Disp: 100 tablet, Rfl: 5    pregabalin (LYRICA) 200 MG capsule, Take 1 capsule (200 mg total) by mouth 3 (three) times a day, Disp: 90 capsule, Rfl: 1    sucralfate (CARAFATE) 1 g tablet, TAKE 1 TABLET BY MOUTH 4 TIMES EVERY DAY ON AN EMPTY STOMACH 1 HOUR BEFORE MEALS AND AT BEDTIME, Disp: 360 tablet, Rfl: 1    traZODone (DESYREL) 50 mg tablet, TAKE 1 TABLET BY MOUTH EVERY DAY AT BEDTIME AS NEEDED, Disp: 90 tablet, Rfl: 1    vitamin A 2400 MCG (8000 UT) capsule, TAKE 1 CAPSULE (8,000 UNITS TOTAL) BY MOUTH DAILY, Disp: 90 capsule, Rfl: 1    vitamin B-12 (VITAMIN B-12) 1,000 mcg tablet, Take by mouth daily, Disp: , Rfl:     albuterol (VENTOLIN HFA) 90 mcg/act inhaler, inhale 2 puff by inhalation route  every 4 - 6 hours as needed, Disp: , Rfl:     cimetidine (TAGAMET) 200 mg tablet, Take 1 tablet (200 mg total) by mouth 2 (two) times a day (Patient not taking: Reported on 11/29/2021 ), Disp: 60 tablet, Rfl: 3    fluticasone-umeclidinium-vilanterol (TRELEGY) 100-62 5-25 MCG/INH inhaler, Inhale 1 puff daily Rinse mouth after use   (Patient not taking: Reported on 10/6/2021), Disp: 1 Inhaler, Rfl: 3    HYDROcodone-acetaminophen (NORCO)  mg per tablet, Take 4-5 tablets daily as needed, Disp: 130 tablet, Rfl: 0    Lancets Misc  (ACCU-CHEK FASTCLIX LANCET) KIT, 3 (three) times a day (Patient not taking: Reported on 1/19/2022 ), Disp: , Rfl:     MICROLET LANCETS MISC, Microlet Lancet (Patient not taking: Reported on 1/19/2022), Disp: , Rfl:     OneTouch Verio test strip, TEST TWICE A DAY (Patient not taking: Reported on 1/19/2022), Disp: 100 each, Rfl: 5    Current Facility-Administered Medications:     cyanocobalamin injection 1,000 mcg, 1,000 mcg, Intramuscular, Q30 Days, Aleksandar Edmondson MD, 1,000 mcg at 12/08/20 1118    cyanocobalamin injection 1,000 mcg, 1,000 mcg, Intramuscular, Q30 Days, Aleksandar Edmondson MD, 1,000 mcg at 07/08/20 1036    cyanocobalamin injection 1,000 mcg, 1,000 mcg, Intramuscular, Q30 Days, Aleksandar Edmondson MD, 1,000 mcg at 09/08/20 1210    cyanocobalamin injection 1,000 mcg, 1,000 mcg, Intramuscular, Q30 Days, Aleksandar Edmondson MD    cyanocobalamin injection 1,000 mcg, 1,000 mcg, Intramuscular, Q30 Days, lAeksandar Edmondson MD    cyanocobalamin injection 1,000 mcg, 1,000 mcg, Intramuscular, Q30 Days, Aleksandar Edmondson MD, 1,000 mcg at 08/18/21 1019    Allergies   Allergen Reactions    Cephalosporins Hives    Erythromycin GI Intolerance    Fentanyl Itching Occurred during surgery     Paxil [Paroxetine] Hives     After 2 weeks    Penicillins Itching    Pravastatin Myalgia    Prednisolone Itching    Statins Itching    Sulfa Antibiotics Diarrhea    Wellbutrin [Bupropion] Hives     After 2 weeks     Marzena's Wort Rash       Physical Exam:    /54   Pulse (!) 50   Temp 98 3 °F (36 8 °C)   Ht 5' 5" (1 651 m)   Wt 79 8 kg (176 lb)   LMP  (LMP Unknown)   SpO2 95%   BMI 29 29 kg/m²     Constitutional:normal, well developed, well nourished, alert, in no distress and non-toxic and no overt pain behavior  Eyes:anicteric  HEENT:grossly intact  Neck:supple, symmetric, trachea midline and no masses   Pulmonary:even and unlabored  Cardiovascular:No edema or pitting edema present  Skin:Normal without rashes or lesions and well hydrated  Psychiatric:Mood and affect appropriate  Neurologic:Cranial Nerves II-XII grossly intact  Musculoskeletal:ambulates with cane      Imaging  No orders to display         No orders of the defined types were placed in this encounter

## 2022-01-21 ENCOUNTER — RA CDI HCC (OUTPATIENT)
Dept: OTHER | Facility: HOSPITAL | Age: 72
End: 2022-01-21

## 2022-01-21 ENCOUNTER — TELEPHONE (OUTPATIENT)
Dept: PAIN MEDICINE | Facility: MEDICAL CENTER | Age: 72
End: 2022-01-21

## 2022-01-21 DIAGNOSIS — N39.0 URINARY TRACT INFECTION WITHOUT HEMATURIA, SITE UNSPECIFIED: ICD-10-CM

## 2022-01-21 DIAGNOSIS — R32 URINARY INCONTINENCE, UNSPECIFIED TYPE: ICD-10-CM

## 2022-01-21 NOTE — TELEPHONE ENCOUNTER
[facility] called stating that they are faxing over a form they need filled for pt to get auth for her hydrocodone

## 2022-01-21 NOTE — PROGRESS NOTES
[facility]  coding opportunities             Chart Reviewed * (Number of) Inbasket suggestions sent to Provider: 2                  Patients insurance company: [employer] (Medicare Advantage and Commercial)

## 2022-01-22 DIAGNOSIS — E03.9 HYPOTHYROIDISM, UNSPECIFIED TYPE: ICD-10-CM

## 2022-01-24 ENCOUNTER — TELEPHONE (OUTPATIENT)
Dept: PAIN MEDICINE | Facility: MEDICAL CENTER | Age: 72
End: 2022-01-24

## 2022-01-24 DIAGNOSIS — M54.41 CHRONIC BILATERAL LOW BACK PAIN WITH BILATERAL SCIATICA: ICD-10-CM

## 2022-01-24 DIAGNOSIS — M54.42 CHRONIC BILATERAL LOW BACK PAIN WITH BILATERAL SCIATICA: ICD-10-CM

## 2022-01-24 DIAGNOSIS — M54.16 LUMBAR RADICULITIS: ICD-10-CM

## 2022-01-24 DIAGNOSIS — G89.29 CHRONIC BILATERAL LOW BACK PAIN WITH BILATERAL SCIATICA: ICD-10-CM

## 2022-01-24 DIAGNOSIS — G89.4 CHRONIC PAIN SYNDROME: ICD-10-CM

## 2022-01-24 RX ORDER — NITROFURANTOIN 25; 75 MG/1; MG/1
100 CAPSULE ORAL DAILY
Qty: 30 CAPSULE | Refills: 1 | Status: SHIPPED | OUTPATIENT
Start: 2022-01-24 | End: 2022-01-26

## 2022-01-24 RX ORDER — HYDROCODONE BITARTRATE AND ACETAMINOPHEN 10; 325 MG/1; MG/1
TABLET ORAL
Qty: 130 TABLET | Refills: 0 | Status: SHIPPED | OUTPATIENT
Start: 2022-02-16 | End: 2022-02-16 | Stop reason: SDUPTHER

## 2022-01-24 RX ORDER — HYDROCODONE BITARTRATE AND ACETAMINOPHEN 10; 325 MG/1; MG/1
TABLET ORAL
Qty: 130 TABLET | Refills: 0 | Status: SHIPPED | OUTPATIENT
Start: 2022-01-24 | End: 2022-03-16 | Stop reason: SDUPTHER

## 2022-01-24 RX ORDER — LEVOTHYROXINE SODIUM 0.03 MG/1
TABLET ORAL
Qty: 30 TABLET | Refills: 2 | Status: SHIPPED | OUTPATIENT
Start: 2022-01-24 | End: 2022-04-27 | Stop reason: SDUPTHER

## 2022-01-24 NOTE — TELEPHONE ENCOUNTER
[facility] called in to check on the status of the updated script   Pharmacy was advise that this currently reviewing

## 2022-01-24 NOTE — TELEPHONE ENCOUNTER
Pharmacy states they have not received any script     HYDROcodone-acetaminophen (NORCO)  mg per tablet         Please advise    Thank you     [phone]

## 2022-01-24 NOTE — TELEPHONE ENCOUNTER
Patient   [phone], A    Patient is calling in saying that she was only able to get 7 days wroth of her HYDROcodone-acetaminophen [facility])      Pt said that she needed prior auth  However her insurance approved it already  She now needs a new script for the remaining of the pills  Please send script to day        This is her prescription info just incase it has to go over to the prior auth thomas Pearce: [id number]  ID: [id number]  [facility]:[phone]

## 2022-01-25 NOTE — TELEPHONE ENCOUNTER
Left a detailed VMMLOM as per REZA on chart  Advised that script was received at [facility] and they should call her when ready for

## 2022-01-25 NOTE — TELEPHONE ENCOUNTER
RN s/w Shanel Madison from [facility] and he stated that yes they got it actually twice  RN pointed out that he did not get it twice, there were 2 separate scripts one with a DNF  Shanel Madison acknowledged same

## 2022-01-26 DIAGNOSIS — R32 URINARY INCONTINENCE, UNSPECIFIED TYPE: ICD-10-CM

## 2022-01-26 DIAGNOSIS — N39.0 URINARY TRACT INFECTION WITHOUT HEMATURIA, SITE UNSPECIFIED: ICD-10-CM

## 2022-01-26 RX ORDER — NITROFURANTOIN 25; 75 MG/1; MG/1
100 CAPSULE ORAL DAILY
Qty: 30 CAPSULE | Refills: 1 | Status: SHIPPED | OUTPATIENT
Start: 2022-01-26 | End: 2022-03-21

## 2022-01-28 ENCOUNTER — OFFICE VISIT (OUTPATIENT)
Dept: FAMILY MEDICINE CLINIC | Facility: CLINIC | Age: 72
End: 2022-01-28
Payer: COMMERCIAL

## 2022-01-28 VITALS
BODY MASS INDEX: 29.16 KG/M2 | HEIGHT: 65 IN | WEIGHT: 175 LBS | TEMPERATURE: 97.4 F | HEART RATE: 82 BPM | DIASTOLIC BLOOD PRESSURE: 62 MMHG | RESPIRATION RATE: 14 BRPM | SYSTOLIC BLOOD PRESSURE: 122 MMHG | OXYGEN SATURATION: 97 %

## 2022-01-28 DIAGNOSIS — K27.9 PUD (PEPTIC ULCER DISEASE): ICD-10-CM

## 2022-01-28 DIAGNOSIS — K21.9 GASTROESOPHAGEAL REFLUX DISEASE WITHOUT ESOPHAGITIS: ICD-10-CM

## 2022-01-28 DIAGNOSIS — L03.90 CELLULITIS, UNSPECIFIED CELLULITIS SITE: ICD-10-CM

## 2022-01-28 DIAGNOSIS — M54.12 CERVICAL RADICULOPATHY: ICD-10-CM

## 2022-01-28 DIAGNOSIS — F17.210 CIGARETTE SMOKER: ICD-10-CM

## 2022-01-28 DIAGNOSIS — R19.7 DIARRHEA, UNSPECIFIED TYPE: ICD-10-CM

## 2022-01-28 DIAGNOSIS — E03.8 HYPOTHYROIDISM DUE TO HASHIMOTO'S THYROIDITIS: ICD-10-CM

## 2022-01-28 DIAGNOSIS — M81.8 IDIOPATHIC OSTEOPOROSIS: ICD-10-CM

## 2022-01-28 DIAGNOSIS — I10 ESSENTIAL HYPERTENSION: Primary | ICD-10-CM

## 2022-01-28 DIAGNOSIS — E78.5 TYPE 2 DIABETES MELLITUS WITH HYPERLIPIDEMIA (HCC): ICD-10-CM

## 2022-01-28 DIAGNOSIS — E53.8 LOW VITAMIN B12 LEVEL: ICD-10-CM

## 2022-01-28 DIAGNOSIS — Z13.820 SCREENING FOR OSTEOPOROSIS: ICD-10-CM

## 2022-01-28 DIAGNOSIS — R79.89 LOW SERUM VITAMIN A: ICD-10-CM

## 2022-01-28 DIAGNOSIS — E06.3 HYPOTHYROIDISM DUE TO HASHIMOTO'S THYROIDITIS: ICD-10-CM

## 2022-01-28 DIAGNOSIS — J43.9 PULMONARY EMPHYSEMA, UNSPECIFIED EMPHYSEMA TYPE (HCC): ICD-10-CM

## 2022-01-28 DIAGNOSIS — E11.69 TYPE 2 DIABETES MELLITUS WITH HYPERLIPIDEMIA (HCC): ICD-10-CM

## 2022-01-28 DIAGNOSIS — F11.20 UNCOMPLICATED OPIOID DEPENDENCE (HCC): ICD-10-CM

## 2022-01-28 PROCEDURE — 99214 OFFICE O/P EST MOD 30 MIN: CPT | Performed by: INTERNAL MEDICINE

## 2022-01-28 PROCEDURE — 1160F RVW MEDS BY RX/DR IN RCRD: CPT | Performed by: INTERNAL MEDICINE

## 2022-01-28 PROCEDURE — 4004F PT TOBACCO SCREEN RCVD TLK: CPT | Performed by: INTERNAL MEDICINE

## 2022-01-28 RX ORDER — AMLODIPINE BESYLATE 5 MG/1
5 TABLET ORAL DAILY
Qty: 90 TABLET | Refills: 3 | Status: SHIPPED | OUTPATIENT
Start: 2022-01-28 | End: 2022-04-27 | Stop reason: SDUPTHER

## 2022-01-28 RX ORDER — PANTOPRAZOLE SODIUM 40 MG/1
40 TABLET, DELAYED RELEASE ORAL 2 TIMES DAILY
Qty: 180 TABLET | Refills: 3 | Status: SHIPPED | OUTPATIENT
Start: 2022-01-28 | End: 2022-04-27 | Stop reason: SDUPTHER

## 2022-01-28 RX ORDER — LEVOFLOXACIN 500 MG/1
500 TABLET, FILM COATED ORAL EVERY 24 HOURS
Qty: 7 TABLET | Refills: 1 | Status: SHIPPED | OUTPATIENT
Start: 2022-01-28 | End: 2022-02-04

## 2022-01-28 NOTE — PROGRESS NOTES
Patient's shoes and socks removed  Right Foot/Ankle   Right Foot Inspection  Skin Exam: skin normal and skin intact  No dry skin, no warmth, no callus, no erythema, no maceration, no abnormal color, no pre-ulcer, no ulcer and no callus  Toe Exam: ROM and strength within normal limits  Sensory   Vibration: diminished  Proprioception: diminished  Monofilament testing: diminished    Vascular  Capillary refills: < 3 seconds  The right DP pulse is 1+  The right PT pulse is 0  Left Foot/Ankle  Left Foot Inspection  Skin Exam: skin normal and skin intact  No dry skin, no warmth, no erythema, no maceration, normal color, no pre-ulcer, no ulcer and no callus  Toe Exam: ROM and strength within normal limits  Sensory   Vibration: diminished  Proprioception: diminished  Monofilament testing: diminished    Vascular  Capillary refills: < 3 seconds  The left DP pulse is 1+  The left PT pulse is 0       Assign Risk Category  No deformity present  Loss of protective sensation  Weak pulses  Risk: 2 Pain Medicine

## 2022-01-28 NOTE — PATIENT INSTRUCTIONS
Osteoporosis Education   Osteoporosis  is a long-term medical condition that causes your bones to become weak, brittle, and more likely to fracture  Osteoporosis occurs when your body absorbs more bone than it makes  It is also caused by a lack of calcium and estrogen (female hormone)  Common symptoms include the following: You may not have any signs or symptoms  You may break a bone after a muscle strain, bump, or fall  A break usually occurs in the hip, spine, or wrist  A collapsed vertebra (bone in your spine) may cause severe back pain or loss of height from bent posture  Call your doctor if:   ·  You have severe pain  ·  You have increasing pain after a fall  ·  You have pain when you do your daily activities  ·  You have questions or concerns about your condition or care  Diagnosis of osteoporosis:   · Blood and urine tests  measure your calcium, vitamin D, and estrogen levels  · An x-ray or CT may show thinned bones or a fracture  You may be given contrast liquid to help the bones show up better in the pictures  Tell the healthcare provider if you have ever had an allergic reaction to contrast liquid  Do not enter the MRI room with anything metal  Metal can cause serious injury  Tell the healthcare provider if you have any metal in or on your body  · A bone density test  compares your bone thickness with what is expected for someone of your age, gender, and ethnicity  Treatment for osteoporosis may include medicines to prevent bone loss, build new bone, and increase estrogen  These medicines help prevent fractures and may be given as a pill or injection  Ask your healthcare provider for more information on these medicines  Prevent bone loss:  · Eat healthy foods that are high in calcium  This helps keep your bones strong  Good sources of calcium are milk, cheese, broccoli, tofu, almonds, and canned salmon and sardines  Recommended to get at least 1200mg daily of calcium    · Increase your vitamin D intake  Vitamin D is in fish oils, some vegetables, and fortified milk, cereal, and bread  Vitamin D is also formed in the skin when it is exposed to the sun  Ask your healthcare provider how much sunlight is safe for you  You will require at least 800 units of vitamin D daily taken as a supplement  · Drink liquids as directed  Ask your healthcare provider how much liquid to drink each day and which liquids are best for you  Do not have alcohol or caffeine  They decrease bone mineral density, which can weaken your bones  · Exercise regularly  Ask your healthcare provider about the best exercise plan for you  Weight bearing exercise for 30 minutes, 3 times a week can help build and strengthen bone  · Do not smoke  Nicotine and other chemicals in cigarettes and cigars can cause lung damage  Ask your healthcare provider for information if you currently smoke and need help to quit  E-cigarettes or smokeless tobacco still contain nicotine  Talk to your healthcare provider before you use these products  · Go to physical therapy as directed  A physical therapist teaches you exercises to help improve movement and muscle strength  · Alcohol  It is recommended to avoid heavy alcohol use as increased consumption of alcohol is known to cause bone loss  © Copyright [employer] 2021 Information is for End User's use only and may not be sold, redistributed or otherwise used for commercial purposes  All illustrations and images included in CareNotes® are the copyrighted property of [employer]  or [facility] Katharine Mora     Calcium and vitamin D for bone health (Beyond the Basics)    CALCIUM AND VITAMIN D OVERVIEW  Osteoporosis is a common bone disorder that causes a progressive loss in bone density and mass  As a result, bones become thin, weakened, and easily fractured   It is estimated that more than 1 3 million osteoporosis-associated (or "osteoporotic") fractures occur every year in the [place], primarily of bone within the spine (the vertebrae), the hip, and the forearm near the wrist  =    A number of treatments can help to prevent loss of bone and treat low bone mass  However, the first step in preventing or treating osteoporosis is to consume foods and drinks that provide calcium, a mineral essential for bone strength, and vitamin D, which aids in calcium breakdown and absorption  =    CALCIUM AND VITAMIN D BENEFITS  Good nutrition is important at all ages to keep the bones healthy  · Taking calcium reduces bone loss and decreases the risk of fracturing the vertebrae (the bones that surround the spinal cord)  · Consuming calcium during childhood (eg, in milk) can lead to higher bone mass in adulthood  This increase in bone density can reduce the risk of fractures later in life  · Calcium may also have benefits in other body systems by reducing blood pressure and cholesterol levels  · Calcium and vitamin D supplements may help prevent tooth loss in older adults  RECOMMENDATIONS FOR CALCIUM    General recommendations -- Premenopausal women and men should consume at least 1000 mg of calcium, while postmenopausal women should consume 1200 mg (total diet plus supplement)  You should not consume more than 2000 mg of calcium per day (total diet plus supplement) due to the risk of side effects  Calcium in the diet -- The primary sources of calcium in the diet include milk and other dairy products, such as hard cheese, cottage cheese, or yogurt, as well as green vegetables, such as kale and broccoli (table 1)  Some cereals, soy products, and fruit juices are fortified with calcium  Calcium supplements -- The body is able to absorb calcium contained in supplements as well as from dietary sources  If it is not possible to get enough calcium from dietary sources, consult a health care provider to determine the best type, dose, and timing of calcium supplements       · Calcium carbonate is effective and is the least expensive form of calcium  It is best absorbed with a low-iron meal (such as breakfast)  Calcium carbonate may not be absorbed well in people who also take a specific medication for gastroesophageal reflux (called a proton pump inhibitor or H2 blocker), which blocks stomach acid  · Many natural calcium carbonate preparations such as oyster shells or bone meal contain some lead  · Calcium citrate is well absorbed in the fasting state as well as with a meal   · Calcium doses above 500 mg are not absorbed as well as smaller doses, so large doses of supplements should be taken in divided doses (eg, in the morning and evening)  · Calcium supplements do not replace other osteoporosis treatments such as hormone replacement, bisphosphonates (eg, risedronate [sample brand name: Actonel] and alendronate [brand name: Fosamax]), and raloxifene (brand name: Evista)  Calcium and vitamin D supplements alone are usually insufficient to prevent age-related bone loss, although they may be beneficial in some subgroups (older adults, those with very low intake)  In most patients with or at risk for osteoporosis, the addition of medication or hormonal therapy is necessary in order to slow the breakdown and removal of bone (ie, resorption)  Underlying gastrointestinal diseases -- Patients who do not adequately absorb nutrients from the gastrointestinal tract (due to malabsorption) may require more than 1000 to 1200 mg of calcium per day  In such cases, a health care provider can help to determine the optimal dose of calcium  Medications -- All medications should be discussed with a health care provider to ensure that possible interactions with calcium are identified  Certain medications change the amount of calcium that is absorbed and/or excreted  As an example, loop diuretics (eg, furosemide [sample brand name: Lasix]) increase the amount of calcium excreted in the urine      On the other hand, thiazide diuretics (eg, hydrochlorothiazide) can reduce levels of calcium in the urine, potentially reducing the risk of bone loss and kidney stones  Side effects of calcium -- Calcium is usually easily tolerated when it is taken in divided doses several times per day  Some people experience side effects related to calcium, including constipation and indigestion  Calcium supplements interfere with the absorption of iron and thyroid hormone, and therefore, these medications should be taken at different times  Kidney stones -- There is no evidence that consuming large amounts of calcium (from foods and drinks) increases the risk of kidney stones, or that avoiding dietary calcium decreases the risk  In fact, avoiding dairy products is likely to increase the risk of kidney stones  However, use of calcium supplements may increase the risk of kidney stones in susceptible individuals by raising the level of calcium in the urine  This is particularly true if the supplement is taken between meals or at bedtime, and this is one of the reasons we prefer for patients to get as much of their calcium requirement through dietary means  IMPORTANCE OF VITAMIN D  Vitamin D decreases bone loss and lowers the risk of fracture, especially in older men and women  Along with calcium, vitamin D also helps to prevent and treat osteoporosis  To absorb calcium efficiently, an adequate amount of vitamin D must be present  Vitamin D is normally made in the skin after exposure to sunlight  Recommendations for vitamin D -- The current recommendation is that men over 70 years and postmenopausal women consume at least 800 international units (20 micrograms) of vitamin D per day  Lower levels of vitamin D are not as effective, while high doses can be toxic, especially if taken for long periods of time   Although the optimal intake has not been clearly established in premenopausal women or in younger men with osteoporosis, 600 international units (15 micrograms) of vitamin D daily is generally suggested  Vitamin D is available as an individual supplement and is included in most multivitamins and some calcium supplements (table 2)  Milk is a relatively good dietary source of vitamin D, with approximately 100 international units (2 5 micrograms) per cup (240 mL), and salmon has 800 to 1000 international units (20 to 25 micrograms) of vitamin D per serving  Most healthy people don't need to check with their health care provider before taking standard doses of vitamin D and don't need monitoring of vitamin D levels if they are not being treated for vitamin D deficiency  However, recommendations for vitamin D supplementation may be different in people with certain underlying medical conditions, such as sarcoidosis or lymphoma  In these situations, a provider will determine if supplements are needed; if so, the person's vitamin D and calcium levels should be monitored with regular tests  ©0731 [employer], [address] rights reserved

## 2022-01-28 NOTE — PROGRESS NOTES
Assessment/Plan:         Diagnoses and all orders for this visit:    Type 2 diabetes mellitus with hyperlipidemia [facility])  -     Ambulatory Referral to Ophthalmology; Future    Screening for osteoporosis  -     DXA bone density spine hip and pelvis; Future    Essential hypertension; continue same  RTC in 3 mos w Blood work  -     UA (URINE) with reflex to Scope; Future  -     Comprehensive metabolic panel; Future  -     CBC and differential; Future  -     Magnesium; Future  -     amLODIPine (NORVASC) 5 mg tablet; Take 1 tablet (5 mg total) by mouth daily    Gastroesophageal reflux disease without esophagitis  -     pantoprazole (PROTONIX) 40 mg tablet; Take 1 tablet (40 mg total) by mouth 2 (two) times a day    Low serum vitamin A; continue Vit A supplements    PUD (peptic ulcer disease); stable  Continue Protonix    Hypothyroidism due to Hashimoto's thyroiditis; continue Synthroid 50 mcg  RTC in 3 mos w :  -     UA (URINE) with reflex to Scope; Future  -     TSH, 3rd generation; Future  -     T4, free; Future  -     US thyroid; Future    Diarrhea, unspecified type  -     Ambulatory referral to Gastroenterology; Future    Cigarette smoker; advised to quit,  -     CT lung screening program; Future    Cellulitis, unspecified cellulitis site  -     levofloxacin (LEVAQUIN) 500 mg tablet; Take 1 tablet (500 mg total) by mouth every 24 hours for 7 days With food/lunch    Cervical radiculopathy  -     EMG 2 Limb Upper Extremity; Future    Pulmonary emphysema, unspecified emphysema type ([address] ); stop smoking  Use inhalers    Low vitamin B12 level;  -     Vitamin D 25 hydroxy; Future  -     Vitamin B12; Future    Idiopathic osteoporosis  -     Vitamin D 25 hydroxy; Future        Subjective:      Patient ID: Jenny Neves is a 70 y o  female  70 Y O lady is here for Regular check up, she feels ok, No recent blood work, med list reviewed w Pt in detail          The following portions of the patient's history were reviewed and updated as appropriate: allergies, current medications, past medical history, past social history, past surgical history and problem list     Review of Systems   Constitutional: Negative for chills, fatigue and fever  HENT: Negative for congestion, facial swelling, sore throat, trouble swallowing and voice change  Eyes: Negative for pain, discharge and visual disturbance  Respiratory: Negative for cough, shortness of breath and wheezing  Cardiovascular: Negative for chest pain, palpitations and leg swelling  Gastrointestinal: Negative for abdominal pain, blood in stool, constipation, diarrhea and nausea  Endocrine: Negative for polydipsia, polyphagia and polyuria  Genitourinary: Negative for difficulty urinating, hematuria and urgency  Musculoskeletal: Positive for arthralgias  Negative for myalgias  Skin: Negative for rash  Neurological: Positive for dizziness  Negative for tremors, weakness and headaches  Hematological: Negative for adenopathy  Does not bruise/bleed easily  Psychiatric/Behavioral: Negative for dysphoric mood, sleep disturbance and suicidal ideas  Objective:      /62 (BP Location: Left arm, Patient Position: Sitting, Cuff Size: Standard)   Pulse 82   Temp (!) 97 4 °F (36 3 °C) (Tympanic)   Resp 14   Ht 5' 5" (1 651 m)   Wt 79 4 kg (175 lb)   LMP  (LMP Unknown)   SpO2 97%   BMI 29 12 kg/m²          Physical Exam  Constitutional:       General: She is not in acute distress  HENT:      Head: Normocephalic  Mouth/Throat:      Pharynx: No oropharyngeal exudate  Eyes:      General: No scleral icterus  Conjunctiva/sclera: Conjunctivae normal       Pupils: Pupils are equal, round, and reactive to light  Neck:      Thyroid: No thyromegaly  Cardiovascular:      Rate and Rhythm: Normal rate and regular rhythm  Heart sounds: Normal heart sounds  No murmur heard  Pulmonary:      Effort: Pulmonary effort is normal  No respiratory distress  Breath sounds: Normal breath sounds  No wheezing or rales  Abdominal:      General: Bowel sounds are normal  There is no distension  Palpations: Abdomen is soft  Tenderness: There is no abdominal tenderness  There is no guarding or rebound  Musculoskeletal:         General: Tenderness present  Cervical back: Neck supple  Lymphadenopathy:      Cervical: No cervical adenopathy  Skin:     Coloration: Skin is not pale  Findings: No rash  Neurological:      Mental Status: She is alert and oriented to person, place, and time  Sensory: No sensory deficit  Motor: No weakness

## 2022-02-15 ENCOUNTER — CONSULT (OUTPATIENT)
Dept: GASTROENTEROLOGY | Facility: MEDICAL CENTER | Age: 72
End: 2022-02-15
Payer: COMMERCIAL

## 2022-02-15 ENCOUNTER — TELEPHONE (OUTPATIENT)
Dept: FAMILY MEDICINE CLINIC | Facility: CLINIC | Age: 72
End: 2022-02-15

## 2022-02-15 VITALS
SYSTOLIC BLOOD PRESSURE: 138 MMHG | BODY MASS INDEX: 29.29 KG/M2 | TEMPERATURE: 96.7 F | WEIGHT: 176 LBS | DIASTOLIC BLOOD PRESSURE: 78 MMHG | HEART RATE: 52 BPM

## 2022-02-15 DIAGNOSIS — R19.7 DIARRHEA, UNSPECIFIED TYPE: Primary | ICD-10-CM

## 2022-02-15 DIAGNOSIS — Z86.010 HISTORY OF COLON POLYPS: ICD-10-CM

## 2022-02-15 DIAGNOSIS — K21.9 GASTROESOPHAGEAL REFLUX DISEASE, UNSPECIFIED WHETHER ESOPHAGITIS PRESENT: ICD-10-CM

## 2022-02-15 PROCEDURE — 4004F PT TOBACCO SCREEN RCVD TLK: CPT | Performed by: INTERNAL MEDICINE

## 2022-02-15 PROCEDURE — 99204 OFFICE O/P NEW MOD 45 MIN: CPT | Performed by: INTERNAL MEDICINE

## 2022-02-15 NOTE — PROGRESS NOTES
[facility] - Outpatient Consultation  Lety Stevens 70 y o  female MRN: [id number]  Encounter: [id number]          ASSESSMENT AND PLAN:   40-year-old female with history of diabetes, MGUS, hypothyroidism,  History of RYGB hypertension and GERD who presents for evaluation  1  Diarrhea, unspecified type  She reports new onset of diarrhea since September  Differential includes infectious, inflammatory or malabsorptive causes  I have ordered stool and serologic testing for further workup  If her infection tests are negative then she can increase her Imodium up to 4  times daily  Biopsies will also be obtained at the time of her colonoscopy for evaluation of microscopic colitis  - Tissue transglutaminase, IgA; Future  - IgA; Future  - Calprotectin,Fecal; Future  - Giardia antigen; Future  - Clostridium difficile toxin by PCR; Future  - Stool Enteric Bacterial Panel by PCR; Future  - Ova and parasite examination; Future    2  Gastroesophageal reflux disease, unspecified whether esophagitis present  She has history of chronic GERD poorly controlled on PPI  She was previously on to H2 blocker but is unsure if this was helping her symptoms  I discussed dietary/lifestyle anti-reflux measures with her today  She reportedly had an EGD 8 years ago which was normal however procedure report is not available for review  Repeat EGD will be performed for evaluation given her ongoing symptoms, for screening for Velazquez's esophagus as well  - EGD; Future    3  History of colon polyps  She has history of 2 small polyps on 2014 colonoscopy and was recommended to repeat in 5 years and is overdue at this time  I discussed the indication, risk and benefit of colonoscopy for colon cancer screening with her today and she is agreeable to proceed  - Colonoscopy;  Future      ______________________________________________________________________    HPI:  40-year-old female with history of diabetes, MGUS, hypothyroidism,  History of RYGB hypertension and GERD who presents for evaluation  She reports new onset of diarrhea starting 5 months ago  Prior to this she was having regular, formed bowel movements  Now upon waking she will have 3-4 liquid bowel movements  Then, she will take an Imodium either have a liquid stool or semi formed after  She has no abdominal pain and rare nausea, vomiting  She denies melena or hematochezia  Her appetite is low and her weight is increasing  She also reports chronic symptoms of gastroesophageal reflux particularly with nighttime symptoms with regurgitation and cough  She takes pantoprazole twice daily, was previously on cimetidine twice daily and uses Carafate up to 4 times a day as needed for breakthrough symptoms  Her past surgical history includes hysterectomy and cholecystectomy and Sarah-en-Y gastric bypass   She has no family history of colon cancer  She takes no anti-platelet or anticoagulant medications    2014 colonoscopy showed 2 polyps, diverticulosis and hemorrhoids she was recommended to repeat in 5 years  Pathology is not available for review  She reportedly had an EGD in 2014 which was normal, however procedure report is not available for review  10/21 hemoglobin is 13 3, liver enzymes within normal limits, hemoglobin A1c 5 2      REVIEW OF SYSTEMS:    CONSTITUTIONAL: Denies any fever, chills, rigors, and weight loss  HEENT: No earache or tinnitus  Denies hearing loss or visual disturbances  CARDIOVASCULAR: No chest pain or palpitations  RESPIRATORY: Denies any cough, hemoptysis, shortness of breath or dyspnea on exertion  GASTROINTESTINAL: As noted in the History of Present Illness  GENITOURINARY: No problems with urination  Denies any hematuria or dysuria  NEUROLOGIC: No dizziness or vertigo, denies headaches  MUSCULOSKELETAL: Denies any muscle or joint pain  SKIN: Denies skin rashes or itching     ENDOCRINE: Denies excessive thirst  Denies intolerance to heat or cold  PSYCHOSOCIAL: Denies depression or anxiety  Denies any recent memory loss         Historical Information   Past Medical History:   Diagnosis Date    Anemia     Anxiety     hx of panic attacks (now under control)     Arthritis     Asthma     resolved (no problems in a decade)     Baker's cyst of knee     Bronchitis     Bunion, left foot     Cervical pain     Chronic GERD     Chronic pain     Chronic pain disorder     Depression     Diabetes mellitus, type 2 (HCC)     Diabetic peripheral neuropathy (HCC)     Endometriosis     Fibromyalgia     Hyperlipidemia     Hypertension     Joint pain     Low back pain     Low back pain     Lung nodules     Macular degeneration     Pulmonary emphysema ([facility] [address] ) 1/16/2020    Spondylosis      Past Surgical History:   Procedure Laterality Date    BACK SURGERY  1996    L5, S1- laser surgery fusion of c5 and c6     BREAST LUMPECTOMY Right     CHOLECYSTECTOMY  2004    FOOT SURGERY Left 2005    fusion    [address] [facility] GASTRIC BYPASS  2010    Dr Stephanie jason uterus     KNEE ARTHROSCOPY      LAMINECTOMY      ORTHOPEDIC SURGERY      OVARIAN CYST REMOVAL  1975    PELVIC LAPAROSCOPY      ovaries (x10)     PLEURAL SCARIFICATION  1967    SHOULDER OPEN ROTATOR CUFF REPAIR Left 2008    TONSILLECTOMY      VAGINAL PROLAPSE REPAIR       Social History   Social History     Substance and Sexual Activity   Alcohol Use Not Currently    Alcohol/week: 1 0 standard drink    Types: 1 Shots of liquor per week    Comment: rarely     Social History     Substance and Sexual Activity   Drug Use No     Social History     Tobacco Use   Smoking Status Current Every Day Smoker    Packs/day: 0 25    Years: 40 00    Pack years: 10 00    Types: Cigarettes   Smokeless Tobacco Current User     Family History   Problem Relation Age of Onset    Hypertension Mother     Lung cancer Mother     Hypertension Father     Heart disease Father     Heart attack Father     Cancer Father        Meds/Allergies       Current Outpatient Medications:     albuterol (PROVENTIL HFA,VENTOLIN HFA) 90 mcg/act inhaler    albuterol (VENTOLIN HFA) 90 mcg/act inhaler    amLODIPine (NORVASC) 5 mg tablet    baclofen 10 mg tablet    cetirizine (ZyrTEC) 10 mg tablet    cimetidine (TAGAMET) 200 mg tablet    diphenhydrAMINE (BENADRYL ALLERGY) 25 mg capsule    ergocalciferol (VITAMIN D2) 50,000 units    fluticasone-umeclidinium-vilanterol (TRELEGY) 100-62 5-25 MCG/INH inhaler    [START ON 2/16/2022] HYDROcodone-acetaminophen (NORCO)  mg per tablet    HYDROcodone-acetaminophen (NORCO)  mg per tablet    Lancets Misc  (ACCU-CHEK FASTCLIX LANCET) KIT    levothyroxine 25 mcg tablet    levothyroxine 50 mcg tablet    losartan (COZAAR) 25 mg tablet    MICROLET LANCETS MISC    nitrofurantoin (MACROBID) 100 mg capsule    OneTouch Verio test strip    pantoprazole (PROTONIX) 40 mg tablet    Pediatric Multiple Vit-C-FA (pediatric multivitamin) chewable tablet    pregabalin (LYRICA) 200 MG capsule    sucralfate (CARAFATE) 1 g tablet    traZODone (DESYREL) 50 mg tablet    vitamin A 2400 MCG (8000 UT) capsule    vitamin B-12 (VITAMIN B-12) 1,000 mcg tablet    Current Facility-Administered Medications:     cyanocobalamin injection 1,000 mcg, 1,000 mcg, Intramuscular, Q30 Days, 1,000 mcg at 12/08/20 1118    cyanocobalamin injection 1,000 mcg, 1,000 mcg, Intramuscular, Q30 Days, 1,000 mcg at 07/08/20 1036    cyanocobalamin injection 1,000 mcg, 1,000 mcg, Intramuscular, Q30 Days, 1,000 mcg at 09/08/20 1210    cyanocobalamin injection 1,000 mcg, 1,000 mcg, Intramuscular, Q30 Days    cyanocobalamin injection 1,000 mcg, 1,000 mcg, Intramuscular, Q30 Days    cyanocobalamin injection 1,000 mcg, 1,000 mcg, Intramuscular, Q30 Days, 1,000 mcg at 08/18/21 1019    Allergies   Allergen Reactions    Cephalosporins Hives  Erythromycin GI Intolerance    Fentanyl Itching     Occurred during surgery     Paxil [Paroxetine] Hives     After 2 weeks    Penicillins Itching    Pravastatin Myalgia    Prednisolone Itching    Statins Itching    Sulfa Antibiotics Diarrhea    Wellbutrin [Bupropion] Hives     After 2 weeks     Marzena's Wort Rash           Objective     Blood pressure 138/78, pulse (!) 52, temperature (!) 96 7 °F (35 9 °C), weight 79 8 kg (176 lb), not currently breastfeeding  There is no height or weight on file to calculate BMI  PHYSICAL EXAM:      General Appearance:   Alert, cooperative, no distress   HEENT:   Normocephalic, atraumatic, anicteric  Neck:  Supple, symmetrical, trachea midline   Lungs:   Clear to auscultation bilaterally; no rales, rhonchi or wheezing; respirations unlabored    Heart[de-identified]   Regular rate and rhythm; no murmur, rub, or gallop  Abdomen:   Soft, non-tender, non-distended; normal bowel sounds; no masses, no organomegaly    Genitalia:   Deferred    Rectal:   Deferred    Extremities:  No cyanosis, clubbing or edema    Pulses:  2+ and symmetric    Skin:  No jaundice, rashes, or lesions    Lymph nodes:  No palpable cervical lymphadenopathy        Lab Results:   No visits with results within 1 Day(s) from this visit  Latest known visit with results is:   Orders Only on 01/06/2022   Component Date Value    SARS-CoV-2 01/06/2022 Negative          Radiology Results:   No results found

## 2022-02-15 NOTE — PATIENT INSTRUCTIONS
Scheduled date of colon/egd (as of today) 4/6/22  Physician performing Dr Yazmin Richter  Location of procedure  [facility]  Bowel prep reviewed with patient: Miralax/dulcolax  Instructions reviewed with patient by: ma   Clearances: na

## 2022-02-15 NOTE — TELEPHONE ENCOUNTER
Pt is currently taking Pantoprazole and sucralfate, for her reflux  She at one point was also taking Tagamet  She has not taken that in quite some time but it is still on her med list   Her question is should she be taking it? Please advise

## 2022-02-16 ENCOUNTER — TELEPHONE (OUTPATIENT)
Dept: PAIN MEDICINE | Facility: MEDICAL CENTER | Age: 72
End: 2022-02-16

## 2022-02-16 DIAGNOSIS — G47.00 INSOMNIA, UNSPECIFIED TYPE: ICD-10-CM

## 2022-02-16 DIAGNOSIS — I10 ESSENTIAL HYPERTENSION: ICD-10-CM

## 2022-02-16 DIAGNOSIS — J30.9 ALLERGIC RHINITIS, UNSPECIFIED SEASONALITY, UNSPECIFIED TRIGGER: ICD-10-CM

## 2022-02-16 PROCEDURE — 4010F ACE/ARB THERAPY RXD/TAKEN: CPT | Performed by: INTERNAL MEDICINE

## 2022-02-16 RX ORDER — CETIRIZINE HYDROCHLORIDE 10 MG/1
TABLET ORAL
Qty: 45 TABLET | Refills: 2 | Status: SHIPPED | OUTPATIENT
Start: 2022-02-16

## 2022-02-16 RX ORDER — LOSARTAN POTASSIUM 25 MG/1
TABLET ORAL
Qty: 90 TABLET | Refills: 1 | Status: SHIPPED | OUTPATIENT
Start: 2022-02-16 | End: 2022-04-27 | Stop reason: SDUPTHER

## 2022-02-16 RX ORDER — TRAZODONE HYDROCHLORIDE 50 MG/1
TABLET ORAL
Qty: 90 TABLET | Refills: 1 | Status: SHIPPED | OUTPATIENT
Start: 2022-02-16

## 2022-02-16 NOTE — TELEPHONE ENCOUNTER
S/w Pharmacist at pt's [facility] pharmacy and was advised that script was received and is being filled  Pt is aware and at the pharmacy now

## 2022-02-16 NOTE — TELEPHONE ENCOUNTER
[facility]  [place]  [address]    [facility] is calling in stating that they need Crista's  address and proper NATALIIA so that the pts script can be filled  It looks like the escript was sent from [place] office       HYDROcodone-acetaminophen [facility] AND [facility])        Please call back asap, because the pt wants to  meds today

## 2022-02-18 ENCOUNTER — HOSPITAL ENCOUNTER (OUTPATIENT)
Dept: ULTRASOUND IMAGING | Facility: HOSPITAL | Age: 72
Discharge: HOME | End: 2022-02-18

## 2022-02-22 ENCOUNTER — APPOINTMENT (OUTPATIENT)
Dept: LAB | Facility: HOSPITAL | Age: 72
End: 2022-02-22
Payer: COMMERCIAL

## 2022-02-22 ENCOUNTER — HOSPITAL ENCOUNTER (OUTPATIENT)
Dept: ULTRASOUND IMAGING | Facility: HOSPITAL | Age: 72
Discharge: HOME | End: 2022-02-22
Payer: COMMERCIAL

## 2022-02-22 DIAGNOSIS — E06.3 HYPOTHYROIDISM DUE TO HASHIMOTO'S THYROIDITIS: ICD-10-CM

## 2022-02-22 DIAGNOSIS — E03.8 HYPOTHYROIDISM DUE TO HASHIMOTO'S THYROIDITIS: ICD-10-CM

## 2022-02-22 DIAGNOSIS — M81.8 IDIOPATHIC OSTEOPOROSIS: ICD-10-CM

## 2022-02-22 DIAGNOSIS — R19.7 DIARRHEA, UNSPECIFIED TYPE: ICD-10-CM

## 2022-02-22 DIAGNOSIS — I10 ESSENTIAL HYPERTENSION: ICD-10-CM

## 2022-02-22 DIAGNOSIS — E53.8 LOW VITAMIN B12 LEVEL: ICD-10-CM

## 2022-02-22 LAB
25(OH)D3 SERPL-MCNC: 116.6 NG/ML (ref 30–100)
ALBUMIN SERPL BCP-MCNC: 3.8 G/DL (ref 3–5.2)
ALP SERPL-CCNC: 86 U/L (ref 43–122)
ALT SERPL W P-5'-P-CCNC: 18 U/L
ANION GAP SERPL CALCULATED.3IONS-SCNC: 4 MMOL/L (ref 5–14)
AST SERPL W P-5'-P-CCNC: 32 U/L (ref 14–36)
BASOPHILS # BLD AUTO: 0 THOUSANDS/ΜL (ref 0–0.1)
BASOPHILS NFR BLD AUTO: 1 % (ref 0–1)
BILIRUB SERPL-MCNC: 0.31 MG/DL
BUN SERPL-MCNC: 10 MG/DL (ref 5–25)
CALCIUM SERPL-MCNC: 8.6 MG/DL (ref 8.4–10.2)
CHLORIDE SERPL-SCNC: 107 MMOL/L (ref 97–108)
CO2 SERPL-SCNC: 29 MMOL/L (ref 22–30)
CREAT SERPL-MCNC: 0.71 MG/DL (ref 0.6–1.2)
EOSINOPHIL # BLD AUTO: 0.1 THOUSAND/ΜL (ref 0–0.4)
EOSINOPHIL NFR BLD AUTO: 2 % (ref 0–6)
ERYTHROCYTE [DISTWIDTH] IN BLOOD BY AUTOMATED COUNT: 13.8 %
GFR SERPL CREATININE-BSD FRML MDRD: 85 ML/MIN/1.73SQ M
GLUCOSE P FAST SERPL-MCNC: 68 MG/DL (ref 70–99)
HCT VFR BLD AUTO: 39.3 % (ref 36–46)
HGB BLD-MCNC: 13 G/DL (ref 12–16)
IGA SERPL-MCNC: 257 MG/DL (ref 70–400)
LYMPHOCYTES # BLD AUTO: 0.9 THOUSANDS/ΜL (ref 0.5–4)
LYMPHOCYTES NFR BLD AUTO: 21 % (ref 25–45)
MAGNESIUM SERPL-MCNC: 1.9 MG/DL (ref 1.6–2.3)
MCH RBC QN AUTO: 32.9 PG (ref 26–34)
MCHC RBC AUTO-ENTMCNC: 33.2 G/DL (ref 31–36)
MCV RBC AUTO: 99 FL (ref 80–100)
MONOCYTES # BLD AUTO: 0.4 THOUSAND/ΜL (ref 0.2–0.9)
MONOCYTES NFR BLD AUTO: 9 % (ref 1–10)
NEUTROPHILS # BLD AUTO: 2.8 THOUSANDS/ΜL (ref 1.8–7.8)
NEUTS SEG NFR BLD AUTO: 67 % (ref 45–65)
PLATELET # BLD AUTO: 192 THOUSANDS/UL (ref 150–450)
PMV BLD AUTO: 9.1 FL (ref 8.9–12.7)
POTASSIUM SERPL-SCNC: 4.2 MMOL/L (ref 3.6–5)
PROT SERPL-MCNC: 6.8 G/DL (ref 5.9–8.4)
RBC # BLD AUTO: 3.97 MILLION/UL (ref 4–5.2)
SODIUM SERPL-SCNC: 140 MMOL/L (ref 137–147)
T4 FREE SERPL-MCNC: 0.88 NG/DL (ref 0.76–1.46)
TSH SERPL DL<=0.05 MIU/L-ACNC: 2.99 UIU/ML (ref 0.47–4.68)
VIT B12 SERPL-MCNC: 3068 PG/ML (ref 100–900)
WBC # BLD AUTO: 4.2 THOUSAND/UL (ref 4.5–11)

## 2022-02-22 PROCEDURE — 76536 US EXAM OF HEAD AND NECK: CPT

## 2022-02-22 PROCEDURE — 82306 VITAMIN D 25 HYDROXY: CPT

## 2022-02-22 PROCEDURE — 84443 ASSAY THYROID STIM HORMONE: CPT

## 2022-02-22 PROCEDURE — 36415 COLL VENOUS BLD VENIPUNCTURE: CPT

## 2022-02-22 PROCEDURE — 86364 TISS TRNSGLTMNASE EA IG CLAS: CPT

## 2022-02-22 PROCEDURE — 80053 COMPREHEN METABOLIC PANEL: CPT

## 2022-02-22 PROCEDURE — 82607 VITAMIN B-12: CPT

## 2022-02-22 PROCEDURE — 82784 ASSAY IGA/IGD/IGG/IGM EACH: CPT

## 2022-02-22 PROCEDURE — 85025 COMPLETE CBC W/AUTO DIFF WBC: CPT

## 2022-02-22 PROCEDURE — 84439 ASSAY OF FREE THYROXINE: CPT

## 2022-02-22 PROCEDURE — 83735 ASSAY OF MAGNESIUM: CPT

## 2022-02-24 ENCOUNTER — APPOINTMENT (OUTPATIENT)
Dept: LAB | Facility: HOSPITAL | Age: 72
End: 2022-02-24
Attending: INTERNAL MEDICINE
Payer: COMMERCIAL

## 2022-02-24 DIAGNOSIS — R19.7 DIARRHEA, UNSPECIFIED TYPE: ICD-10-CM

## 2022-02-24 LAB
C DIFF TOX GENS STL QL NAA+PROBE: NEGATIVE
CAMPYLOBACTER DNA SPEC NAA+PROBE: NORMAL
SALMONELLA DNA SPEC QL NAA+PROBE: NORMAL
SHIGA TOXIN STX GENE SPEC NAA+PROBE: NORMAL
SHIGELLA DNA SPEC QL NAA+PROBE: NORMAL
TTG IGA SER-ACNC: <2 U/ML (ref 0–3)

## 2022-02-24 PROCEDURE — 83993 ASSAY FOR CALPROTECTIN FECAL: CPT

## 2022-02-24 PROCEDURE — 87505 NFCT AGENT DETECTION GI: CPT

## 2022-02-24 PROCEDURE — 87209 SMEAR COMPLEX STAIN: CPT

## 2022-02-24 PROCEDURE — 87177 OVA AND PARASITES SMEARS: CPT

## 2022-02-25 LAB — G LAMBLIA AG STL QL IA: NEGATIVE

## 2022-02-28 LAB — CALPROTECTIN STL-MCNT: <16 UG/G (ref 0–120)

## 2022-03-02 ENCOUNTER — TELEPHONE (OUTPATIENT)
Dept: GASTROENTEROLOGY | Facility: MEDICAL CENTER | Age: 72
End: 2022-03-02

## 2022-03-16 ENCOUNTER — OFFICE VISIT (OUTPATIENT)
Dept: PAIN MEDICINE | Facility: MEDICAL CENTER | Age: 72
End: 2022-03-16
Payer: COMMERCIAL

## 2022-03-16 VITALS
HEIGHT: 66 IN | BODY MASS INDEX: 28.61 KG/M2 | DIASTOLIC BLOOD PRESSURE: 60 MMHG | HEART RATE: 54 BPM | OXYGEN SATURATION: 96 % | TEMPERATURE: 97.9 F | SYSTOLIC BLOOD PRESSURE: 128 MMHG | WEIGHT: 178 LBS

## 2022-03-16 DIAGNOSIS — M54.41 CHRONIC BILATERAL LOW BACK PAIN WITH RIGHT-SIDED SCIATICA: ICD-10-CM

## 2022-03-16 DIAGNOSIS — M54.41 CHRONIC BILATERAL LOW BACK PAIN WITH BILATERAL SCIATICA: ICD-10-CM

## 2022-03-16 DIAGNOSIS — G89.4 CHRONIC PAIN SYNDROME: ICD-10-CM

## 2022-03-16 DIAGNOSIS — M54.42 CHRONIC BILATERAL LOW BACK PAIN WITH BILATERAL SCIATICA: ICD-10-CM

## 2022-03-16 DIAGNOSIS — M54.12 CERVICAL RADICULOPATHY: ICD-10-CM

## 2022-03-16 DIAGNOSIS — G89.29 CHRONIC BILATERAL LOW BACK PAIN WITH BILATERAL SCIATICA: ICD-10-CM

## 2022-03-16 DIAGNOSIS — G89.29 CHRONIC BILATERAL LOW BACK PAIN WITH RIGHT-SIDED SCIATICA: ICD-10-CM

## 2022-03-16 DIAGNOSIS — M54.16 LUMBAR RADICULITIS: ICD-10-CM

## 2022-03-16 DIAGNOSIS — Z79.891 LONG-TERM CURRENT USE OF OPIATE ANALGESIC: ICD-10-CM

## 2022-03-16 DIAGNOSIS — M79.18 MYOFASCIAL PAIN SYNDROME: ICD-10-CM

## 2022-03-16 DIAGNOSIS — M47.812 SPONDYLOSIS OF CERVICAL REGION WITHOUT MYELOPATHY OR RADICULOPATHY: ICD-10-CM

## 2022-03-16 DIAGNOSIS — F11.20 UNCOMPLICATED OPIOID DEPENDENCE (HCC): Primary | ICD-10-CM

## 2022-03-16 PROCEDURE — 4004F PT TOBACCO SCREEN RCVD TLK: CPT | Performed by: NURSE PRACTITIONER

## 2022-03-16 PROCEDURE — 3008F BODY MASS INDEX DOCD: CPT | Performed by: NURSE PRACTITIONER

## 2022-03-16 PROCEDURE — 99214 OFFICE O/P EST MOD 30 MIN: CPT | Performed by: NURSE PRACTITIONER

## 2022-03-16 PROCEDURE — 80305 DRUG TEST PRSMV DIR OPT OBS: CPT | Performed by: NURSE PRACTITIONER

## 2022-03-16 PROCEDURE — 1160F RVW MEDS BY RX/DR IN RCRD: CPT | Performed by: NURSE PRACTITIONER

## 2022-03-16 RX ORDER — HYDROCODONE BITARTRATE AND ACETAMINOPHEN 10; 325 MG/1; MG/1
TABLET ORAL
Qty: 130 TABLET | Refills: 0 | Status: SHIPPED | OUTPATIENT
Start: 2022-04-13 | End: 2022-05-09 | Stop reason: SDUPTHER

## 2022-03-16 RX ORDER — PREGABALIN 200 MG/1
200 CAPSULE ORAL 3 TIMES DAILY
Qty: 90 CAPSULE | Refills: 1 | Status: SHIPPED | OUTPATIENT
Start: 2022-03-16 | End: 2022-05-09 | Stop reason: SDUPTHER

## 2022-03-16 RX ORDER — HYDROCODONE BITARTRATE AND ACETAMINOPHEN 10; 325 MG/1; MG/1
TABLET ORAL
Qty: 130 TABLET | Refills: 0 | Status: SHIPPED | OUTPATIENT
Start: 2022-03-16 | End: 2022-05-09 | Stop reason: SDUPTHER

## 2022-03-16 NOTE — PROGRESS NOTES
Assessment:  1  Uncomplicated opioid dependence ([facility] [address] )    2  Chronic pain syndrome    3  Long-term current use of opiate analgesic    4  Cervical radiculopathy    5  Lumbar radiculitis    6  Chronic bilateral low back pain with bilateral sciatica    7  Myofascial pain syndrome    8  Spondylosis of cervical region without myelopathy or radiculopathy    9  Chronic bilateral low back pain with right-sided sciatica - Bilateral        Plan:    Continue hydrocodone as prescribed she was given a 2 month supply the medication with the do not fill date of April 13, 2022  Continue Lyrica this was also refilled today  Continue baclofen, no refill required  [place] Prescription Drug Monitoring Program report was reviewed and was appropriate     A urine drug screen was collected at today's office visit as part of our medication management protocol  The point of care testing results were appropriate for what was being prescribed  The specimen will be sent for confirmatory testing  The drug screen is medically necessary because the patient is either dependent on opioid medication or is being considered for opioid medication therapy and the results could impact ongoing or future treatment  The drug screen is to evaluate for the presences or absence of prescribed, non-prescribed, and/or illicit drugs/substances  There are risks associated with opioid medications, including dependence, addiction and tolerance  The patient understands and agrees to use these medications only as prescribed  Potential side effects of the medications include, but are not limited to, constipation, drowsiness, addiction, impaired judgment and risk of fatal overdose if not taken as prescribed  The patient was warned against driving while taking sedation medications  Sharing medications is a felony  At this point in time, the patient is showing no signs of addiction, abuse, diversion or suicidal ideation          The patient will follow-up in 8 weeks for medication prescription refill and reevaluation  The patient was advised to contact the office should their symptoms worsen in the interim  The patient was agreeable and verbalized an understanding  History of Present Illness: The patient is a 70 y o  female last seen on January 19, 2022 who presents for a follow up office visit in regards to chronic pain secondary to lumbar radiculitis, cervical radiculopathy, myofascial pain syndrome, and cervical spondylosis  The patient currently reports worsening pain symptoms rated 8/10 which is constant, and described as burning, sharp, throbbing, cramping, shooting, numbness, and pins and needles  Current Facility-Administered Medications   Medication Dose Route Frequency Provider Last Rate    cyanocobalamin  1,000 mcg Intramuscular Q30 Days Delon Bosworth, MD      cyanocobalamin  1,000 mcg Intramuscular Q30 Days Delon Bosworth, MD      cyanocobalamin  1,000 mcg Intramuscular Q30 Days Delon Bosworth, MD      cyanocobalamin  1,000 mcg Intramuscular Q30 Days Delon Bosworth, MD      cyanocobalamin  1,000 mcg Intramuscular Q30 Days Delon Bosworth, MD      cyanocobalamin  1,000 mcg Intramuscular Q30 Days Delon Bosworth, MD     Patient continues to use hydrocodone 10/325 mg 4-5 tablets per day, Lyrica 200 mg 3 times daily and baclofen 10 mg 3 times daily  The patient reports that this regimen is providing 30 % pain relief  The patient is reporting no side effects from this pain medication regimen  Hydrocodone last taken 03/16 AM    Pain Contract Signed: 01/19/22  Last Urine Drug Screen: 03/16/22    I have personally reviewed and/or updated the patient's past medical history, past surgical history, family history, social history, current medications, allergies, and vital signs today  Review of Systems:    Review of Systems   Respiratory: Negative for shortness of breath  Cardiovascular: Negative for chest pain     Gastrointestinal: Negative for constipation, diarrhea, nausea and vomiting  Musculoskeletal: Positive for arthralgias, back pain, gait problem, joint swelling and myalgias  Skin: Negative for rash  Neurological: Positive for dizziness and weakness  Negative for seizures  Psychiatric/Behavioral: Positive for decreased concentration  All other systems reviewed and are negative          Past Medical History:   Diagnosis Date    Anemia     Anxiety     hx of panic attacks (now under control)     Arthritis     Asthma     resolved (no problems in a decade)     Baker's cyst of knee     Bronchitis     Bunion, left foot     Cervical pain     Chronic GERD     Chronic pain     Chronic pain disorder     Depression     Diabetes mellitus, type 2 (HCC)     Diabetic peripheral neuropathy (HCC)     Endometriosis     Fibromyalgia     Hyperlipidemia     Hypertension     Joint pain     Low back pain     Low back pain     Lung nodules     Macular degeneration     Pulmonary emphysema ([facility] [address] ) 1/16/2020    Spondylosis        Past Surgical History:   Procedure Laterality Date    BACK SURGERY  1996    L5, S1- laser surgery fusion of c5 and c6     BREAST LUMPECTOMY Right     CHOLECYSTECTOMY  2004    FOOT SURGERY Left 2005    fusion    [facility]PASS  2010    Dr Katie James    just uterus     KNEE ARTHROSCOPY      LAMINECTOMY      ORTHOPEDIC SURGERY      OVARIAN CYST REMOVAL  1975    PELVIC LAPAROSCOPY      ovaries (x10)     PLEURAL SCARIFICATION  1967    SHOULDER OPEN ROTATOR CUFF REPAIR Left 2008    TONSILLECTOMY      VAGINAL PROLAPSE REPAIR         Family History   Problem Relation Age of Onset    Hypertension Mother     Lung cancer Mother     Hypertension Father     Heart disease Father     Heart attack Father     Cancer Father        Social History     Occupational History    Not on file   Tobacco Use    Smoking status: Current Every Day Smoker     Packs/day: 0 25 Years: 40 00     Pack years: 10 00     Types: Cigarettes    Smokeless tobacco: Current User   Vaping Use    Vaping Use: Never used   Substance and Sexual Activity    Alcohol use: Not Currently     Alcohol/week: 1 0 standard drink     Types: 1 Shots of liquor per week     Comment: rarely    Drug use: No    Sexual activity: Not Currently     Partners: Male         Current Outpatient Medications:     albuterol (PROVENTIL HFA,VENTOLIN HFA) 90 mcg/act inhaler, Inhale 2 puffs every 4 (four) hours as needed for wheezing, Disp: 1 Inhaler, Rfl: 0    albuterol (VENTOLIN HFA) 90 mcg/act inhaler, inhale 2 puff by inhalation route  every 4 - 6 hours as needed, Disp: , Rfl:     amLODIPine (NORVASC) 5 mg tablet, Take 1 tablet (5 mg total) by mouth daily, Disp: 90 tablet, Rfl: 3    cetirizine (ZyrTEC) 10 mg tablet, TAKE 1/2 TABLET BY MOUTH DAILY, Disp: 45 tablet, Rfl: 2    cimetidine (TAGAMET) 200 mg tablet, Take 1 tablet (200 mg total) by mouth 2 (two) times a day, Disp: 60 tablet, Rfl: 3    diphenhydrAMINE (BENADRYL ALLERGY) 25 mg capsule, Take 50 mg by mouth daily , Disp: , Rfl:     ergocalciferol (VITAMIN D2) 50,000 units, TAKE 1 CAPSULE BY MOUTH ONE TIME PER WEEK, Disp: 13 capsule, Rfl: 2    fluticasone-umeclidinium-vilanterol (TRELEGY) 100-62 5-25 MCG/INH inhaler, Inhale 1 puff daily Rinse mouth after use , Disp: 1 Inhaler, Rfl: 3    HYDROcodone-acetaminophen (NORCO)  mg per tablet, Take 4-5 tablets daily PRN, Disp: 130 tablet, Rfl: 0    [START ON 4/13/2022] HYDROcodone-acetaminophen (NORCO)  mg per tablet, Take 4-5 tablets daily as needed, Disp: 130 tablet, Rfl: 0    Lancets Misc  (ACCU-CHEK FASTCLIX LANCET) KIT, 3 (three) times a day  , Disp: , Rfl:     levothyroxine 25 mcg tablet, TAKE 1 TABLET BY MOUTH EVERY MORNING ON SATURDAY, SUNDAY, TUESDAY AND THURSDAY, Disp: 30 tablet, Rfl: 2    levothyroxine 50 mcg tablet, TAKE 1 TABLET BY MOUTH EVERY MORNING ON MONDAY, WEDNESDAY AND FRIDAY, Disp: 30 tablet, Rfl: 2    losartan (COZAAR) 25 mg tablet, TAKE 1 TABLET BY MOUTH EVERY DAY, Disp: 90 tablet, Rfl: 1    MICROLET LANCETS MISC, Microlet Lancet, Disp: , Rfl:     nitrofurantoin (MACROBID) 100 mg capsule, TAKE 1 CAPSULE (100 MG TOTAL) BY MOUTH DAILY WITH FOOD/LUNCH, Disp: 30 capsule, Rfl: 1    OneTouch Verio test strip, TEST TWICE A DAY, Disp: 100 each, Rfl: 5    pantoprazole (PROTONIX) 40 mg tablet, Take 1 tablet (40 mg total) by mouth 2 (two) times a day, Disp: 180 tablet, Rfl: 3    Pediatric Multiple Vit-C-FA (pediatric multivitamin) chewable tablet, Chew 1 tablet daily, Disp: 100 tablet, Rfl: 5    sucralfate (CARAFATE) 1 g tablet, TAKE 1 TABLET BY MOUTH 4 TIMES EVERY DAY ON AN EMPTY STOMACH 1 HOUR BEFORE MEALS AND AT BEDTIME, Disp: 360 tablet, Rfl: 1    traZODone (DESYREL) 50 mg tablet, TAKE 1 TABLET BY MOUTH EVERY DAY AT BEDTIME AS NEEDED, Disp: 90 tablet, Rfl: 1    vitamin A 2400 MCG (8000 UT) capsule, TAKE 1 CAPSULE (8,000 UNITS TOTAL) BY MOUTH DAILY, Disp: 90 capsule, Rfl: 1    vitamin B-12 (VITAMIN B-12) 1,000 mcg tablet, Take by mouth daily, Disp: , Rfl:     baclofen 10 mg tablet, Take 1 tablet (10 mg total) by mouth 3 (three) times a day, Disp: 270 tablet, Rfl: 1    pregabalin (LYRICA) 200 MG capsule, Take 1 capsule (200 mg total) by mouth 3 (three) times a day, Disp: 90 capsule, Rfl: 1    Current Facility-Administered Medications:     cyanocobalamin injection 1,000 mcg, 1,000 mcg, Intramuscular, Q30 Days, Aleksandar Edmondson MD, 1,000 mcg at 12/08/20 1118    cyanocobalamin injection 1,000 mcg, 1,000 mcg, Intramuscular, Q30 Days, Aleksandar Edmondson MD, 1,000 mcg at 07/08/20 1036    cyanocobalamin injection 1,000 mcg, 1,000 mcg, Intramuscular, Q30 Days, Aleksandar Edmondson MD, 1,000 mcg at 09/08/20 1210    cyanocobalamin injection 1,000 mcg, 1,000 mcg, Intramuscular, Q30 Days, Aleksandar Edmondson MD    cyanocobalamin injection 1,000 mcg, 1,000 mcg, Intramuscular, Q30 Days, Kristina Velazquez MD    cyanocobalamin injection 1,000 mcg, 1,000 mcg, Intramuscular, Q30 Days, Aleksandar Edmondson MD, 1,000 mcg at 08/18/21 1019    Allergies   Allergen Reactions    Cephalosporins Hives    Erythromycin GI Intolerance    Fentanyl Itching     Occurred during surgery     Paxil [Paroxetine] Hives     After 2 weeks    Penicillins Itching    Pravastatin Myalgia    Prednisolone Itching    Statins Itching    Sulfa Antibiotics Diarrhea    Wellbutrin [Bupropion] Hives     After 2 weeks     Marzena's Wort Rash       Physical Exam:    /60   Pulse (!) 54   Temp 97 9 °F (36 6 °C)   Ht 5' 5 5" (1 664 m)   Wt 80 7 kg (178 lb)   LMP  (LMP Unknown)   SpO2 96%   BMI 29 17 kg/m²     Constitutional:normal, well developed, well nourished, alert, in no distress and non-toxic and no overt pain behavior    Eyes:anicteric  HEENT:grossly intact  Neck:supple, symmetric, trachea midline and no masses   Pulmonary:even and unlabored  Cardiovascular:No edema or pitting edema present  Skin:Normal without rashes or lesions and well hydrated  Psychiatric:Mood and affect appropriate  Neurologic:Cranial Nerves II-XII grossly intact  Musculoskeletal:ambulates with cane      Imaging  No orders to display         Orders Placed This Encounter   Procedures    MM ALL_Prescribed Meds and Special Instructions    MM DT_Alprazolam Definitive Test    MM DT_Amphetamine Definitive Test    MM DT_Aripiprazole Definitive Test    MM DT_Bath Salts Definitive Test    MM DT_Buprenorphine Definitive Test    MM DT_Butalbital Definitive Test    MM DT_Clonazepam Definitive Test    MM DT_Clozapine Definitive Test    MM DT_Cocaine Definitive Test    MM DT_Codeine Definitive Test    MM DT_Desipramine Definitive Test    MM DT_Dextromethorphan Definitive Test    MM Diazepam Definitive Test    MM DT_Ethyl Glucuronide/Ethyl Sulfate Definitive Test    MM DT_Fentanyl Definitive Test    MM DT_Heroin Definitive Test    MM DT_Hydrocodone Definitive Test    MM DT_Hydromorphone Definitive Test    MM DT_Kratom Definitive Test    MM DT_Levorphanol Definitive Test    MM Lorazepam Definitive Test    MM DT_MDMA Definitive Test    MM DT_Meperidine Definitive Test    MM DT_Methadone Definitive Test    MM DT_Methamphetamine Definitive Test    MM DT_Methylphenidate Definitive Test    MM DT_Morphine Definitive Test    MM DT_Olanzapine Definitive Test    MM DT_Oxazepam Definitive Test    MM DT_Oxycodone Definitive Test    MM DT_Oxymorphone Definitive Test    MM DT_Phenobarbital Definitive Test    MM DT_Phentermine Definitive Test    MM DT_Secobarbital Definitive Test    MM DT_Spice Definitive Test    MM DT_Tapentadol Definitive Test    MM DT_Temazapam Definitive Test    MM DT_THC Definitive Test    MM DT_Tramadol Definitive Test    MM DT_Validity Specific    MM DT_Validity pH    MM DT_Validity Oxidant    MM DT_Validity Creatinine

## 2022-03-16 NOTE — PATIENT INSTRUCTIONS
Opioid Safety   AMBULATORY CARE:   Opioid safety  includes the correct use, storage, and disposal of opioids  Examples of opioid medicines to treat pain include oxycodone, morphine, fentanyl, and codeine  Call your local emergency number (911 in the [address]), or have someone else call if:   · You have a seizure  · You cannot be woken  · You have trouble staying awake and your breathing is slow or shallow  · Your speech is slurred, or you are confused  · You are dizzy or stumble when you walk  Call your doctor, or have someone close to you call if:   · You are extremely drowsy, or you have trouble staying awake or speaking  · You have pale or clammy skin  · You have blue fingernails or lips  · Your heartbeat is slower than normal     · You cannot stop vomiting  · You have questions or concerns about your condition or care  Use opioids safely:   · Take prescribed opioids exactly as directed  Opioids come with directions based on the kind of opioid and how it is given  Do not take more than the recommended amount, or for longer than needed  · Do not give opioids to others or take opioids that belong to someone else  Misuse of opioids can lead to an addiction or overdose  · Do not mix opioids with other medicines or alcohol  The combination can cause an overdose, or lead to a coma  · Do not drive or operate heavy machinery after you take the opioid  Your provider or pharmacist can tell you how long to wait after a dose before you do these activities  · Talk to your healthcare provider if you have any side effects  He or she can help you prevent or relieve side effects  Side effects include nausea, sleepiness, itching, and trouble thinking clearly  Manage constipation:  Constipation is the most common side effect of opioid medicine  Constipation is when you have hard, dry bowel movements, or you go longer than usual between bowel movements   Tell your healthcare provider about all changes in your bowel movements while you are taking opioids  He or she may recommend laxative medicine to help you have a bowel movement  He or she may also change the kind of opioid you are taking, or change when you take it  The following are more ways you can prevent or relieve constipation:  · Drink liquids as directed  You may need to drink extra liquids to help soften and move your bowels  Ask how much liquid to drink each day and which liquids are best for you  · Eat high-fiber foods  This may help decrease constipation by adding bulk to your bowel movements  High-fiber foods include fruits, vegetables, whole-grain breads and cereals, and beans  Your healthcare provider or dietitian can help you create a high-fiber meal plan  Your provider may also recommend a fiber supplement if you cannot get enough fiber from food  · Exercise regularly  Regular physical activity can help stimulate your intestines  Walking is a good exercise to prevent or relieve constipation  Ask which exercises are best for you  · Schedule a time each day to have a bowel movement  This may help train your body to have regular bowel movements  Bend forward while you are on the toilet to help move the bowel movement out  Sit on the toilet for at least 10 minutes, even if you do not have a bowel movement  Store opioids safely:   · Store opioids where others cannot easily get them  Keep them in a locked cabinet or secure area  Do not  keep them in a purse or other bag you carry with you  A person may be looking for something else and find the opioids  · Make sure opioids are stored out of the reach of children  A child can easily overdose on opioids  Opioids may look like candy to a small child  The best way to dispose of opioids: The laws vary by country and area   In the [place], the best way is to return the opioids through a take-back program  This program is offered by the [employer] ([address] [facility])  The following are options for using the program:  · Take the opioids to a [employer] collection site  The site is often a law enforcement center  Call your local law enforcement center for scheduled take-back days in your area  You will be given information on where to go if the collection site is in a different location  · Take the opioids to an approved pharmacy or hospital   A pharmacy or hospital may be set up as a collection site  You will need to ask if it is a [employer] collection site if you were not directed there  A pharmacy or doctor's office may not be able to take back opioids unless it is a [employer] site  · Use a mail-back system  This means you are given containers to put the opioids into  You will then mail them in the containers  · Use a take-back drop box  This is a place to leave the opioids at any time  People and animals will not be able to get into the box  Your local law enforcement agency can tell you where to find a drop box in your area  Other safe ways to dispose of opioids: The medicine may come with disposal instructions  The instructions may vary depending on the brand of medicine you are using  Instructions may come in a Medication Guide, but not every medicine has one  You may instead get instructions from your pharmacy or doctor  Follow instructions carefully  The following are general guidelines to follow:  · Find out if you can flush the opioid  Some opioids can be flushed down the toilet or poured into the sink  You will need to contact authorities in your area to see if this is an option for you  The FDA also offers a list of medicines that are safe to flush down the toilet  You can check the list if you cannot get the information for your local area  · Ask your waste management company about rules for putting opioids in the trash  The company will be able to give you specific directions   Scratch out personal information on the original medicine label so it cannot be read  Then put it in the trash  Do not label the trash or put any information on it about the opioids  It should look like regular household trash so no one is tempted to look for the opioids  Keep the trash out of the reach of children and animals  Always make sure trash is secure  · Talk to officials if you live in a facility  If you live in a nursing home or assisted living center, talk to an official  The person will know the rules for your area  Other ways to manage pain:   · Ask your healthcare provider about non-opioid medicines to control pain  Nonprescription medicines include NSAIDs (such as ibuprofen) and acetaminophen  Prescription medicines include muscle relaxers, antidepressants, and steroids  · Pain may be managed without any medicines  Some ways to relieve pain include massage, aromatherapy, or meditation  Physical or occupational therapy may also help  For more information:   · Drug Enforcement Administration  [facility] [address]  Phone: [phone]  Web Address: [facility]/drug_[email or web address]    · [address]  and Drug Administration  [facility] , [address] [place] [address]  Phone: [phone]  Web Address: [email or web address]/    Follow up with your doctor or pain specialist as directed: You may need to have your dose adjusted  Your doctor or pain specialist can also help you find ways to manage pain without opioids  Write down your questions so you remember to ask them during your visits  © Copyright [employer] 2022 Information is for End User's use only and may not be sold, redistributed or otherwise used for commercial purposes  All illustrations and images included in CareNotes® are the copyrighted property of A D A M , Inc  or Frankie Schwartz  The above information is an  only  It is not intended as medical advice for individual conditions or treatments   Talk to your doctor, nurse or pharmacist before following any medical regimen to see if it is safe and effective for you

## 2022-03-18 DIAGNOSIS — R79.89 LOW SERUM VITAMIN A: ICD-10-CM

## 2022-03-18 DIAGNOSIS — R32 URINARY INCONTINENCE, UNSPECIFIED TYPE: ICD-10-CM

## 2022-03-18 DIAGNOSIS — N39.0 URINARY TRACT INFECTION WITHOUT HEMATURIA, SITE UNSPECIFIED: ICD-10-CM

## 2022-03-18 LAB
6MAM UR QL CFM: NEGATIVE NG/ML
7AMINOCLONAZEPAM UR QL CFM: NEGATIVE NG/ML
A-OH ALPRAZ UR QL CFM: NEGATIVE NG/ML
ACCEPTABLE CREAT UR QL: NORMAL MG/DL
ACCEPTIBLE SP GR UR QL: NORMAL
AMPHET UR QL CFM: NEGATIVE NG/ML
AMPHET UR QL CFM: NEGATIVE NG/ML
BUPRENORPHINE UR QL CFM: NEGATIVE NG/ML
BUTALBITAL UR QL CFM: NEGATIVE NG/ML
BZE UR QL CFM: NEGATIVE NG/ML
CODEINE UR QL CFM: NEGATIVE NG/ML
DESIPRAMINE UR QL CFM: NEGATIVE NG/ML
EDDP UR QL CFM: NEGATIVE NG/ML
ETHYL GLUCURONIDE UR QL CFM: NEGATIVE NG/ML
ETHYL SULFATE UR QL SCN: NEGATIVE NG/ML
EUTYLONE UR QL: NEGATIVE NG/ML
FENTANYL UR QL CFM: NEGATIVE NG/ML
GLIADIN IGG SER IA-ACNC: NEGATIVE NG/ML
GLUCOSE 30M P 50 G LAC PO SERPL-MCNC: NEGATIVE NG/ML
HYDROCODONE UR QL CFM: NORMAL NG/ML
HYDROCODONE UR QL CFM: NORMAL NG/ML
HYDROMORPHONE UR QL CFM: NORMAL NG/ML
LORAZEPAM UR QL CFM: NEGATIVE NG/ML
MDMA UR QL CFM: NEGATIVE NG/ML
ME-PHENIDATE UR QL CFM: NEGATIVE NG/ML
MEPERIDINE UR QL CFM: NEGATIVE NG/ML
METHADONE UR QL CFM: NEGATIVE NG/ML
METHAMPHET UR QL CFM: NEGATIVE NG/ML
MORPHINE UR QL CFM: NEGATIVE NG/ML
MORPHINE UR QL CFM: NEGATIVE NG/ML
NITRITE UR QL: NORMAL UG/ML
NORBUPRENORPHINE UR QL CFM: NEGATIVE NG/ML
NORDIAZEPAM UR QL CFM: NEGATIVE NG/ML
NORFENTANYL UR QL CFM: NEGATIVE NG/ML
NORHYDROCODONE UR QL CFM: NORMAL NG/ML
NORHYDROCODONE UR QL CFM: NORMAL NG/ML
NORMEPERIDINE UR QL CFM: NEGATIVE NG/ML
NOROXYCODONE UR QL CFM: NEGATIVE NG/ML
OLANZAPINE QUANTIFICATION: NEGATIVE NG/ML
OPC-3373 QUANTIFICATION: NEGATIVE
OXAZEPAM UR QL CFM: NEGATIVE NG/ML
OXYCODONE UR QL CFM: NEGATIVE NG/ML
OXYMORPHONE UR QL CFM: NEGATIVE NG/ML
OXYMORPHONE UR QL CFM: NEGATIVE NG/ML
PHENOBARB UR QL CFM: NEGATIVE NG/ML
RESULT ALL_PRESCRIBED MEDS AND SPECIAL INSTRUCTIONS: NORMAL
SECOBARBITAL UR QL CFM: NEGATIVE NG/ML
SL AMB 3-METHYL-FENTANYL QUANTIFICATION: NORMAL NG/ML
SL AMB 4-ANPP QUANTIFICATION: NORMAL NG/ML
SL AMB 4-FIBF QUANTIFICATION: NORMAL NG/ML
SL AMB 5F-ADB-M7 METABOLITE QUANTIFICATION: NEGATIVE NG/ML
SL AMB 7-OH-MITRAGYNINE (KRATOM ALKALOID) QUANTIFICATION: NEGATIVE NG/ML
SL AMB AB-FUBINACA-M3 METABOLITE QUANTIFICATION: NEGATIVE NG/ML
SL AMB ACETYL FENTANYL QUANTIFICATION: NORMAL NG/ML
SL AMB ACETYL NORFENTANYL QUANTIFICATION: NORMAL NG/ML
SL AMB ACRYL FENTANYL QUANTIFICATION: NORMAL NG/ML
SL AMB BUTRYL FENTANYL QUANTIFICATION: NORMAL NG/ML
SL AMB CARFENTANIL QUANTIFICATION: NORMAL NG/ML
SL AMB CLOZAPINE QUANTIFICATION: NEGATIVE NG/ML
SL AMB CTHC (MARIJUANA METABOLITE) QUANTIFICATION: NEGATIVE NG/ML
SL AMB CYCLOPROPYL FENTANYL QUANTIFICATION: NORMAL NG/ML
SL AMB DEXTROMETHORPHAN QUANTIFICATION: NEGATIVE NG/ML
SL AMB DEXTRORPHAN (DEXTROMETHORPHAN METABOLITE) QUANT: NEGATIVE NG/ML
SL AMB DEXTRORPHAN (DEXTROMETHORPHAN METABOLITE) QUANT: NEGATIVE NG/ML
SL AMB FURANYL FENTANYL QUANTIFICATION: NORMAL NG/ML
SL AMB JWH018 METABOLITE QUANTIFICATION: NEGATIVE NG/ML
SL AMB JWH073 METABOLITE QUANTIFICATION: NEGATIVE NG/ML
SL AMB MDMB-FUBINACA-M1 METABOLITE QUANTIFICATION: NEGATIVE NG/ML
SL AMB METHOXYACETYL FENTANYL QUANTIFICATION: NORMAL NG/ML
SL AMB METHYLONE QUANTIFICATION: NEGATIVE NG/ML
SL AMB N-DESMETHYL U-47700 QUANTIFICATION: NORMAL NG/ML
SL AMB N-DESMETHYL-TRAMADOL QUANTIFICATION: NEGATIVE NG/ML
SL AMB N-DESMETHYLCLOZAPINE QUANTIFICATION: NEGATIVE NG/ML
SL AMB PHENTERMINE QUANTIFICATION: NEGATIVE NG/ML
SL AMB RCS4 METABOLITE QUANTIFICATION: NEGATIVE NG/ML
SL AMB RITALINIC ACID QUANTIFICATION: NEGATIVE NG/ML
SL AMB U-47700 QUANTIFICATION: NORMAL NG/ML
SPECIMEN DRAWN SERPL: NEGATIVE NG/ML
SPECIMEN PH ACCEPTABLE UR: NORMAL
TAPENTADOL UR QL CFM: NEGATIVE NG/ML
TEMAZEPAM UR QL CFM: NEGATIVE NG/ML
TEMAZEPAM UR QL CFM: NEGATIVE NG/ML
TRAMADOL UR QL CFM: NEGATIVE NG/ML
URATE/CREAT 24H UR: NEGATIVE NG/ML

## 2022-03-21 RX ORDER — NITROFURANTOIN 25; 75 MG/1; MG/1
100 CAPSULE ORAL DAILY
Qty: 30 CAPSULE | Refills: 1 | Status: SHIPPED | OUTPATIENT
Start: 2022-03-21 | End: 2022-06-20

## 2022-03-21 RX ORDER — MULTIVITAMIN WITH IRON
8000 TABLET ORAL DAILY
Qty: 90 CAPSULE | Refills: 1 | Status: SHIPPED | OUTPATIENT
Start: 2022-03-21 | End: 2022-07-05 | Stop reason: SDUPTHER

## 2022-04-17 DIAGNOSIS — E03.9 HYPOTHYROIDISM, UNSPECIFIED TYPE: ICD-10-CM

## 2022-04-18 RX ORDER — LEVOTHYROXINE SODIUM 0.05 MG/1
TABLET ORAL
Qty: 30 TABLET | Refills: 2 | Status: SHIPPED | OUTPATIENT
Start: 2022-04-18 | End: 2022-04-27 | Stop reason: SDUPTHER

## 2022-04-27 ENCOUNTER — OFFICE VISIT (OUTPATIENT)
Dept: FAMILY MEDICINE CLINIC | Facility: CLINIC | Age: 72
End: 2022-04-27
Payer: COMMERCIAL

## 2022-04-27 VITALS
HEART RATE: 53 BPM | WEIGHT: 180 LBS | RESPIRATION RATE: 14 BRPM | OXYGEN SATURATION: 97 % | TEMPERATURE: 97.9 F | BODY MASS INDEX: 28.93 KG/M2 | HEIGHT: 66 IN | SYSTOLIC BLOOD PRESSURE: 118 MMHG | DIASTOLIC BLOOD PRESSURE: 68 MMHG

## 2022-04-27 DIAGNOSIS — E78.5 TYPE 2 DIABETES MELLITUS WITH HYPERLIPIDEMIA (HCC): Primary | ICD-10-CM

## 2022-04-27 DIAGNOSIS — K21.9 GASTROESOPHAGEAL REFLUX DISEASE WITHOUT ESOPHAGITIS: ICD-10-CM

## 2022-04-27 DIAGNOSIS — E03.8 HYPOTHYROIDISM DUE TO HASHIMOTO'S THYROIDITIS: ICD-10-CM

## 2022-04-27 DIAGNOSIS — E06.3 HYPOTHYROIDISM DUE TO HASHIMOTO'S THYROIDITIS: ICD-10-CM

## 2022-04-27 DIAGNOSIS — I10 ESSENTIAL HYPERTENSION: ICD-10-CM

## 2022-04-27 DIAGNOSIS — E03.9 HYPOTHYROIDISM, UNSPECIFIED TYPE: ICD-10-CM

## 2022-04-27 DIAGNOSIS — F17.210 CIGARETTE SMOKER: ICD-10-CM

## 2022-04-27 DIAGNOSIS — E53.8 LOW VITAMIN B12 LEVEL: ICD-10-CM

## 2022-04-27 DIAGNOSIS — K27.9 PUD (PEPTIC ULCER DISEASE): ICD-10-CM

## 2022-04-27 DIAGNOSIS — R79.89 LOW VITAMIN D LEVEL: ICD-10-CM

## 2022-04-27 DIAGNOSIS — J43.9 PULMONARY EMPHYSEMA, UNSPECIFIED EMPHYSEMA TYPE (HCC): ICD-10-CM

## 2022-04-27 DIAGNOSIS — R79.89 LOW SERUM VITAMIN A: ICD-10-CM

## 2022-04-27 DIAGNOSIS — E11.69 TYPE 2 DIABETES MELLITUS WITH HYPERLIPIDEMIA (HCC): Primary | ICD-10-CM

## 2022-04-27 DIAGNOSIS — S09.90XA TRAUMATIC INJURY OF HEAD, INITIAL ENCOUNTER: ICD-10-CM

## 2022-04-27 LAB — SL AMB POCT HEMOGLOBIN AIC: 5.1 (ref ?–6.5)

## 2022-04-27 PROCEDURE — 3044F HG A1C LEVEL LT 7.0%: CPT | Performed by: INTERNAL MEDICINE

## 2022-04-27 PROCEDURE — 1160F RVW MEDS BY RX/DR IN RCRD: CPT | Performed by: INTERNAL MEDICINE

## 2022-04-27 PROCEDURE — 3078F DIAST BP <80 MM HG: CPT | Performed by: INTERNAL MEDICINE

## 2022-04-27 PROCEDURE — 3074F SYST BP LT 130 MM HG: CPT | Performed by: INTERNAL MEDICINE

## 2022-04-27 PROCEDURE — 83036 HEMOGLOBIN GLYCOSYLATED A1C: CPT | Performed by: INTERNAL MEDICINE

## 2022-04-27 PROCEDURE — 4004F PT TOBACCO SCREEN RCVD TLK: CPT | Performed by: INTERNAL MEDICINE

## 2022-04-27 PROCEDURE — 99214 OFFICE O/P EST MOD 30 MIN: CPT | Performed by: INTERNAL MEDICINE

## 2022-04-27 PROCEDURE — 3008F BODY MASS INDEX DOCD: CPT | Performed by: INTERNAL MEDICINE

## 2022-04-27 PROCEDURE — 4010F ACE/ARB THERAPY RXD/TAKEN: CPT | Performed by: INTERNAL MEDICINE

## 2022-04-27 RX ORDER — AMLODIPINE BESYLATE 5 MG/1
5 TABLET ORAL DAILY
Qty: 90 TABLET | Refills: 3 | Status: SHIPPED | OUTPATIENT
Start: 2022-04-27

## 2022-04-27 RX ORDER — LEVOTHYROXINE SODIUM 0.05 MG/1
50 TABLET ORAL EVERY OTHER DAY
Qty: 30 TABLET | Refills: 2 | Status: SHIPPED | OUTPATIENT
Start: 2022-04-27

## 2022-04-27 RX ORDER — PANTOPRAZOLE SODIUM 40 MG/1
40 TABLET, DELAYED RELEASE ORAL 2 TIMES DAILY
Qty: 180 TABLET | Refills: 3 | Status: SHIPPED | OUTPATIENT
Start: 2022-04-27

## 2022-04-27 RX ORDER — LOSARTAN POTASSIUM 25 MG/1
25 TABLET ORAL DAILY
Qty: 90 TABLET | Refills: 1 | Status: SHIPPED | OUTPATIENT
Start: 2022-04-27

## 2022-04-27 RX ORDER — LEVOTHYROXINE SODIUM 0.03 MG/1
25 TABLET ORAL EVERY OTHER DAY
Qty: 30 TABLET | Refills: 2 | Status: SHIPPED | OUTPATIENT
Start: 2022-04-27

## 2022-04-27 RX ORDER — LANOLIN ALCOHOL/MO/W.PET/CERES
1000 CREAM (GRAM) TOPICAL DAILY
Qty: 90 TABLET | Refills: 3 | Status: SHIPPED | OUTPATIENT
Start: 2022-04-27

## 2022-04-27 RX ORDER — ERGOCALCIFEROL 1.25 MG/1
50000 CAPSULE ORAL WEEKLY
Qty: 13 CAPSULE | Refills: 2 | Status: SHIPPED | OUTPATIENT
Start: 2022-04-27

## 2022-04-27 NOTE — PROGRESS NOTES
Assessment/Plan:         Diagnoses and all orders for this visit:    Type 2 diabetes mellitus with hyperlipidemia ([place] [id number] ); life style mod  -     POCT hemoglobin A1c  Continue same ,  RTC in 3 mos w :  -     Ferritin; Future  -     Comprehensive metabolic panel; Future  -     CBC and differential; Future  -     Magnesium; Future  -     Lipid panel; Future    Essential hypertension  -     amLODIPine (NORVASC) 5 mg tablet; Take 1 tablet (5 mg total) by mouth daily  -     losartan (COZAAR) 25 mg tablet; Take 1 tablet (25 mg total) by mouth daily  -     Ferritin; Future  -     Comprehensive metabolic panel; Future  -     CBC and differential; Future  -     Magnesium; Future  -     Lipid panel; Future    Low vitamin D level  -     ergocalciferol (VITAMIN D2) 50,000 units; Take 1 capsule (50,000 Units total) by mouth once a week  -     Vitamin D 25 hydroxy; Future    Pulmonary emphysema, unspecified emphysema type ([place] [address] )  -     fluticasone-umeclidinium-vilanterol (TRELEGY) 100-62 5-25 MCG/INH inhaler; Inhale 1 puff daily Rinse mouth after use  Gastroesophageal reflux disease without esophagitis  -     pantoprazole (PROTONIX) 40 mg tablet; Take 1 tablet (40 mg total) by mouth 2 (two) times a day    Renew :  -     levothyroxine 25 mcg tablet; Take 1 tablet (25 mcg total) by mouth every other day  -     levothyroxine 50 mcg tablet; Take 1 tablet (50 mcg total) by mouth every other day    Low serum vitamin A  -     Vitamin A; Future    PUD (peptic ulcer disease)  -     Ferritin; Future  -     Comprehensive metabolic panel; Future  -     CBC and differential; Future  -     Magnesium; Future  -     Lipid panel; Future    Hypothyroidism due to Hashimoto's thyroiditis; continue Synthroid  50 mcg QOD   And 25 Mcg QOD,  RTC in 2-3 mos w :  -     TSH, 3rd generation; Future  -     T4, free; Future    Cigarette smoker; advised to quit  RTC in 2-3 mos w Blood work ;  -     UA (URINE) with reflex to Scope;  Future    Low vitamin B12 level  -     vitamin B-12 (VITAMIN B-12) 1,000 mcg tablet; Take 1 tablet (1,000 mcg total) by mouth daily  -     UA (URINE) with reflex to Scope; Future  -     Vitamin B12; Future    Traumatic injury of head, initial encounter; STAT;  -     CT head wo contrast;         Subjective:      Patient ID: Andrew Lopez is a 70 y o  female  [address] is here for Regular check Up, she fell and hit her head, since then she has been having headache, dizziness, Recent Blood work and med list reviewed w pt in detail    The following portions of the patient's history were reviewed and updated as appropriate: allergies, current medications, past family history, past social history, past surgical history and problem list     Review of Systems   Constitutional: Negative for chills, fatigue and fever  HENT: Positive for postnasal drip  Negative for congestion, facial swelling, sore throat, trouble swallowing and voice change  Eyes: Negative for pain, discharge and visual disturbance  Respiratory: Positive for shortness of breath  Negative for cough and wheezing  Cardiovascular: Negative for chest pain, palpitations and leg swelling  Gastrointestinal: Negative for abdominal pain, blood in stool, constipation, diarrhea and nausea  Endocrine: Negative for polydipsia, polyphagia and polyuria  Genitourinary: Negative for difficulty urinating, hematuria and urgency  Musculoskeletal: Positive for arthralgias  Negative for myalgias  Skin: Negative for rash  Neurological: Positive for dizziness, numbness and headaches  Negative for tremors and weakness  Hematological: Negative for adenopathy  Does not bruise/bleed easily  Psychiatric/Behavioral: Negative for dysphoric mood, sleep disturbance and suicidal ideas           Objective:      /68 (BP Location: Left arm, Patient Position: Sitting, Cuff Size: Standard)   Pulse (!) 53   Temp 97 9 °F (36 6 °C) (Tympanic)   Resp 14   Ht 5' 5 5" (1 664 m) Wt 81 6 kg (180 lb)   LMP  (LMP Unknown)   SpO2 97%   BMI 29 50 kg/m²          Physical Exam  Constitutional:       General: She is not in acute distress  HENT:      Head: Normocephalic  Mouth/Throat:      Pharynx: No oropharyngeal exudate  Eyes:      General: No scleral icterus  Conjunctiva/sclera: Conjunctivae normal       Pupils: Pupils are equal, round, and reactive to light  Neck:      Thyroid: No thyromegaly  Cardiovascular:      Rate and Rhythm: Normal rate and regular rhythm  Heart sounds: Normal heart sounds  No murmur heard  Pulmonary:      Effort: Pulmonary effort is normal  No respiratory distress  Breath sounds: Normal breath sounds  No wheezing or rales  Abdominal:      General: Bowel sounds are normal  There is no distension  Palpations: Abdomen is soft  Tenderness: There is no abdominal tenderness  There is no guarding or rebound  Musculoskeletal:         General: Tenderness present  Cervical back: Neck supple  Lymphadenopathy:      Cervical: No cervical adenopathy  Skin:     Coloration: Skin is not pale  Findings: No rash  Neurological:      Mental Status: She is alert and oriented to person, place, and time  Sensory: Sensory deficit present  Motor: No weakness        Comments: Pt uses a cane to support gait

## 2022-04-27 NOTE — PROGRESS NOTES
BMI Counseling: Body mass index is 29 17 kg/m²  The BMI is above normal  Nutrition recommendations include reducing portion sizes

## 2022-04-28 ENCOUNTER — HOSPITAL ENCOUNTER (OUTPATIENT)
Dept: CT IMAGING | Facility: HOSPITAL | Age: 72
Discharge: HOME | End: 2022-04-28
Payer: COMMERCIAL

## 2022-04-28 ENCOUNTER — APPOINTMENT (OUTPATIENT)
Dept: LAB | Facility: HOSPITAL | Age: 72
End: 2022-04-28
Payer: COMMERCIAL

## 2022-04-28 DIAGNOSIS — S09.90XA TRAUMATIC INJURY OF HEAD, INITIAL ENCOUNTER: ICD-10-CM

## 2022-04-28 DIAGNOSIS — E78.49 OTHER HYPERLIPIDEMIA: Primary | ICD-10-CM

## 2022-04-28 DIAGNOSIS — E53.8 LOW VITAMIN B12 LEVEL: ICD-10-CM

## 2022-04-28 DIAGNOSIS — K27.9 PUD (PEPTIC ULCER DISEASE): ICD-10-CM

## 2022-04-28 DIAGNOSIS — I10 ESSENTIAL HYPERTENSION: ICD-10-CM

## 2022-04-28 DIAGNOSIS — R79.89 LOW SERUM VITAMIN A: ICD-10-CM

## 2022-04-28 DIAGNOSIS — E11.69 TYPE 2 DIABETES MELLITUS WITH HYPERLIPIDEMIA (HCC): ICD-10-CM

## 2022-04-28 DIAGNOSIS — E78.5 TYPE 2 DIABETES MELLITUS WITH HYPERLIPIDEMIA (HCC): ICD-10-CM

## 2022-04-28 DIAGNOSIS — E06.3 HYPOTHYROIDISM DUE TO HASHIMOTO'S THYROIDITIS: ICD-10-CM

## 2022-04-28 DIAGNOSIS — E03.8 HYPOTHYROIDISM DUE TO HASHIMOTO'S THYROIDITIS: ICD-10-CM

## 2022-04-28 DIAGNOSIS — R79.89 LOW VITAMIN D LEVEL: ICD-10-CM

## 2022-04-28 LAB
25(OH)D3 SERPL-MCNC: 74.9 NG/ML (ref 30–100)
ALBUMIN SERPL BCP-MCNC: 3.7 G/DL (ref 3–5.2)
ALP SERPL-CCNC: 94 U/L (ref 43–122)
ALT SERPL W P-5'-P-CCNC: 17 U/L
ANION GAP SERPL CALCULATED.3IONS-SCNC: 1 MMOL/L (ref 5–14)
AST SERPL W P-5'-P-CCNC: 29 U/L (ref 14–36)
BASOPHILS # BLD AUTO: 0.03 THOUSANDS/ΜL (ref 0–0.1)
BASOPHILS NFR BLD AUTO: 1 % (ref 0–1)
BILIRUB SERPL-MCNC: 0.49 MG/DL
BUN SERPL-MCNC: 11 MG/DL (ref 5–25)
CALCIUM SERPL-MCNC: 8.9 MG/DL (ref 8.4–10.2)
CHLORIDE SERPL-SCNC: 108 MMOL/L (ref 97–108)
CHOLEST SERPL-MCNC: 224 MG/DL
CO2 SERPL-SCNC: 31 MMOL/L (ref 22–30)
CREAT SERPL-MCNC: 0.7 MG/DL (ref 0.6–1.2)
EOSINOPHIL # BLD AUTO: 0.08 THOUSAND/ΜL (ref 0–0.61)
EOSINOPHIL NFR BLD AUTO: 2 % (ref 0–6)
ERYTHROCYTE [DISTWIDTH] IN BLOOD BY AUTOMATED COUNT: 13.2 % (ref 11.6–15.1)
FERRITIN SERPL-MCNC: 70 NG/ML (ref 8–388)
GFR SERPL CREATININE-BSD FRML MDRD: 87 ML/MIN/1.73SQ M
GLUCOSE P FAST SERPL-MCNC: 85 MG/DL (ref 70–99)
HCT VFR BLD AUTO: 41.5 % (ref 34.8–46.1)
HDLC SERPL-MCNC: 61 MG/DL
HGB BLD-MCNC: 13 G/DL (ref 11.5–15.4)
IMM GRANULOCYTES # BLD AUTO: 0.01 THOUSAND/UL (ref 0–0.2)
IMM GRANULOCYTES NFR BLD AUTO: 0 % (ref 0–2)
LDLC SERPL CALC-MCNC: 137 MG/DL
LYMPHOCYTES # BLD AUTO: 0.92 THOUSANDS/ΜL (ref 0.6–4.47)
LYMPHOCYTES NFR BLD AUTO: 23 % (ref 14–44)
MAGNESIUM SERPL-MCNC: 1.9 MG/DL (ref 1.6–2.3)
MCH RBC QN AUTO: 31.7 PG (ref 26.8–34.3)
MCHC RBC AUTO-ENTMCNC: 31.3 G/DL (ref 31.4–37.4)
MCV RBC AUTO: 101 FL (ref 82–98)
MONOCYTES # BLD AUTO: 0.33 THOUSAND/ΜL (ref 0.17–1.22)
MONOCYTES NFR BLD AUTO: 8 % (ref 4–12)
NEUTROPHILS # BLD AUTO: 2.61 THOUSANDS/ΜL (ref 1.85–7.62)
NEUTS SEG NFR BLD AUTO: 66 % (ref 43–75)
NONHDLC SERPL-MCNC: 163 MG/DL
NRBC BLD AUTO-RTO: 0 /100 WBCS
PLATELET # BLD AUTO: 182 THOUSANDS/UL (ref 149–390)
PMV BLD AUTO: 10.3 FL (ref 8.9–12.7)
POTASSIUM SERPL-SCNC: 4 MMOL/L (ref 3.6–5)
PROT SERPL-MCNC: 6.7 G/DL (ref 5.9–8.4)
RBC # BLD AUTO: 4.1 MILLION/UL (ref 3.81–5.12)
SODIUM SERPL-SCNC: 140 MMOL/L (ref 137–147)
T4 FREE SERPL-MCNC: 0.96 NG/DL (ref 0.76–1.46)
TRIGL SERPL-MCNC: 131 MG/DL
TSH SERPL DL<=0.05 MIU/L-ACNC: 2.04 UIU/ML (ref 0.45–4.5)
VIT B12 SERPL-MCNC: 602 PG/ML (ref 100–900)
WBC # BLD AUTO: 3.98 THOUSAND/UL (ref 4.31–10.16)

## 2022-04-28 PROCEDURE — 82728 ASSAY OF FERRITIN: CPT

## 2022-04-28 PROCEDURE — 70450 CT HEAD/BRAIN W/O DYE: CPT

## 2022-04-28 PROCEDURE — 84439 ASSAY OF FREE THYROXINE: CPT

## 2022-04-28 PROCEDURE — 85025 COMPLETE CBC W/AUTO DIFF WBC: CPT

## 2022-04-28 PROCEDURE — 80053 COMPREHEN METABOLIC PANEL: CPT

## 2022-04-28 PROCEDURE — 82306 VITAMIN D 25 HYDROXY: CPT

## 2022-04-28 PROCEDURE — 83735 ASSAY OF MAGNESIUM: CPT

## 2022-04-28 PROCEDURE — 84443 ASSAY THYROID STIM HORMONE: CPT

## 2022-04-28 PROCEDURE — 82607 VITAMIN B-12: CPT

## 2022-04-28 PROCEDURE — 84590 ASSAY OF VITAMIN A: CPT

## 2022-04-28 PROCEDURE — 80061 LIPID PANEL: CPT

## 2022-04-28 PROCEDURE — 36415 COLL VENOUS BLD VENIPUNCTURE: CPT

## 2022-05-06 LAB — VIT A SERPL-MCNC: 46.6 UG/DL (ref 22–69.5)

## 2022-05-07 DIAGNOSIS — K27.9 PUD (PEPTIC ULCER DISEASE): ICD-10-CM

## 2022-05-09 ENCOUNTER — OFFICE VISIT (OUTPATIENT)
Dept: PAIN MEDICINE | Facility: MEDICAL CENTER | Age: 72
End: 2022-05-09
Payer: COMMERCIAL

## 2022-05-09 VITALS
SYSTOLIC BLOOD PRESSURE: 134 MMHG | BODY MASS INDEX: 30.35 KG/M2 | HEIGHT: 65 IN | HEART RATE: 62 BPM | WEIGHT: 182.2 LBS | DIASTOLIC BLOOD PRESSURE: 71 MMHG

## 2022-05-09 DIAGNOSIS — M54.41 CHRONIC BILATERAL LOW BACK PAIN WITH BILATERAL SCIATICA: ICD-10-CM

## 2022-05-09 DIAGNOSIS — M54.16 LUMBAR RADICULITIS: ICD-10-CM

## 2022-05-09 DIAGNOSIS — G89.29 CHRONIC BILATERAL LOW BACK PAIN WITH RIGHT-SIDED SCIATICA: ICD-10-CM

## 2022-05-09 DIAGNOSIS — M54.41 CHRONIC BILATERAL LOW BACK PAIN WITH RIGHT-SIDED SCIATICA: ICD-10-CM

## 2022-05-09 DIAGNOSIS — G89.4 CHRONIC PAIN SYNDROME: Primary | ICD-10-CM

## 2022-05-09 DIAGNOSIS — G89.29 CHRONIC BILATERAL LOW BACK PAIN WITH BILATERAL SCIATICA: ICD-10-CM

## 2022-05-09 DIAGNOSIS — M79.18 MYOFASCIAL PAIN SYNDROME: ICD-10-CM

## 2022-05-09 DIAGNOSIS — M54.42 CHRONIC BILATERAL LOW BACK PAIN WITH BILATERAL SCIATICA: ICD-10-CM

## 2022-05-09 PROCEDURE — 1160F RVW MEDS BY RX/DR IN RCRD: CPT | Performed by: NURSE PRACTITIONER

## 2022-05-09 PROCEDURE — 3008F BODY MASS INDEX DOCD: CPT | Performed by: NURSE PRACTITIONER

## 2022-05-09 PROCEDURE — 3078F DIAST BP <80 MM HG: CPT | Performed by: NURSE PRACTITIONER

## 2022-05-09 PROCEDURE — 3075F SYST BP GE 130 - 139MM HG: CPT | Performed by: NURSE PRACTITIONER

## 2022-05-09 PROCEDURE — 99214 OFFICE O/P EST MOD 30 MIN: CPT | Performed by: NURSE PRACTITIONER

## 2022-05-09 PROCEDURE — 4004F PT TOBACCO SCREEN RCVD TLK: CPT | Performed by: NURSE PRACTITIONER

## 2022-05-09 RX ORDER — BACLOFEN 10 MG/1
10 TABLET ORAL 3 TIMES DAILY
Qty: 270 TABLET | Refills: 2 | Status: SHIPPED | OUTPATIENT
Start: 2022-05-09 | End: 2022-06-28

## 2022-05-09 RX ORDER — HYDROCODONE BITARTRATE AND ACETAMINOPHEN 10; 325 MG/1; MG/1
TABLET ORAL
Qty: 130 TABLET | Refills: 0 | Status: SHIPPED | OUTPATIENT
Start: 2022-05-09 | End: 2022-06-28 | Stop reason: SDUPTHER

## 2022-05-09 RX ORDER — PREGABALIN 200 MG/1
200 CAPSULE ORAL 3 TIMES DAILY
Qty: 90 CAPSULE | Refills: 1 | Status: SHIPPED | OUTPATIENT
Start: 2022-05-09 | End: 2022-06-28 | Stop reason: SDUPTHER

## 2022-05-09 RX ORDER — SUCRALFATE 1 G/1
TABLET ORAL
Qty: 360 TABLET | Refills: 1 | Status: SHIPPED | OUTPATIENT
Start: 2022-05-09

## 2022-05-09 RX ORDER — HYDROCODONE BITARTRATE AND ACETAMINOPHEN 10; 325 MG/1; MG/1
TABLET ORAL
Qty: 130 TABLET | Refills: 0 | Status: SHIPPED | OUTPATIENT
Start: 2022-06-07 | End: 2022-06-28 | Stop reason: SDUPTHER

## 2022-05-09 NOTE — PROGRESS NOTES
Assessment  1  Chronic pain syndrome    2  Chronic bilateral low back pain with bilateral sciatica    3  Myofascial pain syndrome    4  Lumbar radiculitis    5  Chronic bilateral low back pain with right-sided sciatica - Bilateral        Plan  Continue hydrocodone as prescribed this was refilled for the next 2 months with a do not fill date of June 7, 2022  Continue with Lyrica and baclofen these were also refilled  Follow-up visit in 8 weeks for medication refills          There are risks associated with opioid medications, including dependence, addiction and tolerance  The patient understands and agrees to use these medications only as prescribed  Potential side effects of the medications include, but are not limited to, constipation, drowsiness, addiction, impaired judgment and risk of fatal overdose if not taken as prescribed  The patient was warned against driving while taking sedation medications  Sharing medications is a felony  At this point in time, the patient is showing no signs of addiction, abuse, diversion or suicidal ideation  [place] Prescription Drug Monitoring Program report was reviewed and was appropriate         My impressions and treatment recommendations were discussed in detail with the patient who verbalized understanding and had no further questions  Discharge instructions were provided  I personally saw and examined the patient and I agree with the above discussed plan of care  No orders of the defined types were placed in this encounter      New Medications Ordered This Visit   Medications    baclofen 10 mg tablet     Sig: Take 1 tablet (10 mg total) by mouth 3 (three) times a day     Dispense:  270 tablet     Refill:  2    HYDROcodone-acetaminophen (NORCO)  mg per tablet     Sig: Take 4-5 tablets daily PRN     Dispense:  130 tablet     Refill:  0    HYDROcodone-acetaminophen (NORCO)  mg per tablet     Sig: Take 4-5 tablets daily as needed Dispense:  130 tablet     Refill:  0    pregabalin (LYRICA) 200 MG capsule     Sig: Take 1 capsule (200 mg total) by mouth 3 (three) times a day     Dispense:  90 capsule     Refill:  1     Brand necessary, no generic  History of Present Illness    Bruno Murillo is a 70 y o  female presents for follow-up related to her chronic low back and leg pain symptoms  Today she rates her pain 8/10 this is constant and described as burning, dull, aching, sharp, throbbing, cramping, shooting, and pins and needles  Patient tells me that her low back and leg pain symptoms have been worse since she had 2 falls this winter  She continues to use hydrocodone 10/325 mg for 5 tablets daily, Lyrica 200 mg 3 times daily and baclofen 10 mg 3 times daily  Her medications provide 30% relief without side effects or issues  I have personally reviewed and/or updated the patient's past medical history, past surgical history, family history, social history, current medications, allergies, and vital signs today  Review of Systems   Musculoskeletal: Positive for arthralgias, back pain, gait problem and joint swelling  Neurological: Positive for dizziness  All other systems reviewed and are negative        Patient Active Problem List   Diagnosis    Chronic pain syndrome    Cervical radiculopathy    Myofascial pain syndrome    Spondylosis of cervical region without myelopathy or radiculopathy    Fibromyalgia    Diabetic peripheral neuropathy (HCC)    Long-term current use of opiate analgesic    MGUS (monoclonal gammopathy of unknown significance)    Iron deficiency anemia following bariatric surgery    Light headedness    Uncomplicated opioid dependence (HCC)    Rheumatoid arthritis of hand ([facility] [address] )    Pulmonary emphysema ([facility] [address] )    Primary osteoarthritis of right knee    Pseudogout of left knee    Lumbar radiculitis    Chronic bilateral low back pain with bilateral sciatica    Rotator cuff syndrome, left    Left shoulder pain    Dizziness    Other fatigue    Biceps tendinitis of left shoulder    Adhesive capsulitis of left shoulder    Hypoglycemia    Hypothyroidism due to Hashimoto's thyroiditis    Type 2 diabetes mellitus with hyperlipidemia (HCC)    Iron deficiency anemia, unspecified       Past Medical History:   Diagnosis Date    Anemia     Anxiety     hx of panic attacks (now under control)     Arthritis     Asthma     resolved (no problems in a decade)     Baker's cyst of knee     Bronchitis     Bunion, left foot     Cervical pain     Chronic GERD     Chronic pain     Chronic pain disorder     Depression     Diabetes mellitus, type 2 (HCC)     Diabetic peripheral neuropathy (HCC)     Endometriosis     Fibromyalgia     Hyperlipidemia     Hypertension     Joint pain     Low back pain     Low back pain     Lung nodules     Macular degeneration     Pulmonary emphysema ([facility] [address] ) 1/16/2020    Spondylosis        Past Surgical History:   Procedure Laterality Date    BACK SURGERY  1996    L5, S1- laser surgery fusion of c5 and c6     BREAST LUMPECTOMY Right     CHOLECYSTECTOMY  2004    FOOT SURGERY Left 2005    fusion    Radha Cola BYPASS  2010    Dr Knowles Files    just uterus     KNEE ARTHROSCOPY      LAMINECTOMY      ORTHOPEDIC SURGERY      OVARIAN CYST REMOVAL  1975    PELVIC LAPAROSCOPY      ovaries (x10)     PLEURAL SCARIFICATION  1967    SHOULDER OPEN ROTATOR CUFF REPAIR Left 2008    TONSILLECTOMY      VAGINAL PROLAPSE REPAIR         Family History   Problem Relation Age of Onset    Hypertension Mother     Lung cancer Mother     Hypertension Father     Heart disease Father     Heart attack Father     Cancer Father        Social History     Occupational History    Not on file   Tobacco Use    Smoking status: Current Every Day Smoker     Packs/day: 0 25     Years: 40 00     Pack years: 10 00     Types: Cigarettes    Smokeless tobacco: Current User   Vaping Use    Vaping Use: Never used   Substance and Sexual Activity    Alcohol use: Not Currently     Alcohol/week: 1 0 standard drink     Types: 1 Shots of liquor per week     Comment: rarely    Drug use: No    Sexual activity: Not Currently     Partners: Male       Current Outpatient Medications on File Prior to Visit   Medication Sig    albuterol (PROVENTIL HFA,VENTOLIN HFA) 90 mcg/act inhaler Inhale 2 puffs every 4 (four) hours as needed for wheezing    albuterol (VENTOLIN HFA) 90 mcg/act inhaler inhale 2 puff by inhalation route  every 4 - 6 hours as needed    amLODIPine (NORVASC) 5 mg tablet Take 1 tablet (5 mg total) by mouth daily    Bempedoic Acid 180 MG TABS Take 180 mg by mouth in the morning    cetirizine (ZyrTEC) 10 mg tablet TAKE 1/2 TABLET BY MOUTH DAILY    cimetidine (TAGAMET) 200 mg tablet Take 1 tablet (200 mg total) by mouth 2 (two) times a day    diphenhydrAMINE (BENADRYL ALLERGY) 25 mg capsule Take 50 mg by mouth daily     ergocalciferol (VITAMIN D2) 50,000 units Take 1 capsule (50,000 Units total) by mouth once a week    fluticasone-umeclidinium-vilanterol (TRELEGY) 100-62 5-25 MCG/INH inhaler Inhale 1 puff daily Rinse mouth after use      Lancets Misc  (ACCU-CHEK FASTCLIX LANCET) KIT 3 (three) times a day      levothyroxine 25 mcg tablet Take 1 tablet (25 mcg total) by mouth every other day    levothyroxine 50 mcg tablet Take 1 tablet (50 mcg total) by mouth every other day    losartan (COZAAR) 25 mg tablet Take 1 tablet (25 mg total) by mouth daily    MICROLET LANCETS MISC Microlet Lancet    nitrofurantoin (MACROBID) 100 mg capsule TAKE 1 CAPSULE (100 MG TOTAL) BY MOUTH DAILY WITH FOOD/LUNCH    OneTouch Verio test strip TEST TWICE A DAY    pantoprazole (PROTONIX) 40 mg tablet Take 1 tablet (40 mg total) by mouth 2 (two) times a day    Pediatric Multiple Vit-C-FA (pediatric multivitamin) chewable tablet Chew 1 tablet daily    sucralfate (CARAFATE) 1 g tablet TAKE 1 TABLET BY MOUTH 4 TIMES EVERY DAY ON AN EMPTY STOMACH 1 HOUR BEFORE MEALS AND AT BEDTIME    traZODone (DESYREL) 50 mg tablet TAKE 1 TABLET BY MOUTH EVERY DAY AT BEDTIME AS NEEDED    vitamin A 2400 MCG (8000 UT) capsule TAKE 1 CAPSULE (8,000 UNITS TOTAL) BY MOUTH DAILY    vitamin B-12 (VITAMIN B-12) 1,000 mcg tablet Take 1 tablet (1,000 mcg total) by mouth daily    [DISCONTINUED] HYDROcodone-acetaminophen (NORCO)  mg per tablet Take 4-5 tablets daily PRN    [DISCONTINUED] HYDROcodone-acetaminophen (NORCO)  mg per tablet Take 4-5 tablets daily as needed    [DISCONTINUED] baclofen 10 mg tablet Take 1 tablet (10 mg total) by mouth 3 (three) times a day    [DISCONTINUED] pregabalin (LYRICA) 200 MG capsule Take 1 capsule (200 mg total) by mouth 3 (three) times a day    [DISCONTINUED] sucralfate (CARAFATE) 1 g tablet TAKE 1 TABLET BY MOUTH 4 TIMES EVERY DAY ON AN EMPTY STOMACH 1 HOUR BEFORE MEALS AND AT BEDTIME     Current Facility-Administered Medications on File Prior to Visit   Medication    cyanocobalamin injection 1,000 mcg    cyanocobalamin injection 1,000 mcg    cyanocobalamin injection 1,000 mcg    cyanocobalamin injection 1,000 mcg    cyanocobalamin injection 1,000 mcg    cyanocobalamin injection 1,000 mcg       Allergies   Allergen Reactions    Cephalosporins Hives    Erythromycin GI Intolerance    Fentanyl Itching     Occurred during surgery     Paxil [Paroxetine] Hives     After 2 weeks    Penicillins Itching    Pravastatin Myalgia    Prednisolone Itching    Statins Itching    Sulfa Antibiotics Diarrhea    Wellbutrin [Bupropion] Hives     After 2 weeks     Marzena's Wort Rash       Physical Exam    /71   Pulse 62   Ht 5' 5" (1 651 m)   Wt 82 6 kg (182 lb 3 2 oz)   LMP  (LMP Unknown)   BMI 30 32 kg/m²     Constitutional: normal, well developed, well nourished, alert, in no distress and non-toxic and no overt pain behavior    Eyes: anicteric  HEENT: grossly intact  Neck: supple, symmetric, trachea midline and no masses   Pulmonary:even and unlabored  Cardiovascular:No edema or pitting edema present  Skin:Normal without rashes or lesions and well hydrated  Psychiatric:Mood and affect appropriate  Neurologic:Cranial Nerves II-XII grossly intact  Musculoskeletal:ambulates with cane    Imaging

## 2022-05-09 NOTE — PATIENT INSTRUCTIONS
Opioid Safety   AMBULATORY CARE:   Opioid safety  includes the correct use, storage, and disposal of opioids  Examples of opioid medicines to treat pain include oxycodone, morphine, fentanyl, and codeine  Call your local emergency number (911 in the [address]), or have someone else call if:   · You have a seizure  · You cannot be woken  · You have trouble staying awake and your breathing is slow or shallow  · Your speech is slurred, or you are confused  · You are dizzy or stumble when you walk  Call your doctor, or have someone close to you call if:   · You are extremely drowsy, or you have trouble staying awake or speaking  · You have pale or clammy skin  · You have blue fingernails or lips  · Your heartbeat is slower than normal     · You cannot stop vomiting  · You have questions or concerns about your condition or care  Use opioids safely:   · Take prescribed opioids exactly as directed  Opioids come with directions based on the kind of opioid and how it is given  Do not take more than the recommended amount, or for longer than needed  · Do not give opioids to others or take opioids that belong to someone else  Misuse of opioids can lead to an addiction or overdose  · Do not mix opioids with other medicines or alcohol  The combination can cause an overdose, or lead to a coma  · Do not drive or operate heavy machinery after you take the opioid  Your provider or pharmacist can tell you how long to wait after a dose before you do these activities  · Talk to your healthcare provider if you have any side effects  He or she can help you prevent or relieve side effects  Side effects include nausea, sleepiness, itching, and trouble thinking clearly  Manage constipation:  Constipation is the most common side effect of opioid medicine  Constipation is when you have hard, dry bowel movements, or you go longer than usual between bowel movements   Tell your healthcare provider about all changes in your bowel movements while you are taking opioids  He or she may recommend laxative medicine to help you have a bowel movement  He or she may also change the kind of opioid you are taking, or change when you take it  The following are more ways you can prevent or relieve constipation:  · Drink liquids as directed  You may need to drink extra liquids to help soften and move your bowels  Ask how much liquid to drink each day and which liquids are best for you  · Eat high-fiber foods  This may help decrease constipation by adding bulk to your bowel movements  High-fiber foods include fruits, vegetables, whole-grain breads and cereals, and beans  Your healthcare provider or dietitian can help you create a high-fiber meal plan  Your provider may also recommend a fiber supplement if you cannot get enough fiber from food  · Exercise regularly  Regular physical activity can help stimulate your intestines  Walking is a good exercise to prevent or relieve constipation  Ask which exercises are best for you  · Schedule a time each day to have a bowel movement  This may help train your body to have regular bowel movements  Bend forward while you are on the toilet to help move the bowel movement out  Sit on the toilet for at least 10 minutes, even if you do not have a bowel movement  Store opioids safely:   · Store opioids where others cannot easily get them  Keep them in a locked cabinet or secure area  Do not  keep them in a purse or other bag you carry with you  A person may be looking for something else and find the opioids  · Make sure opioids are stored out of the reach of children  A child can easily overdose on opioids  Opioids may look like candy to a small child  The best way to dispose of opioids: The laws vary by country and area   In the [place], the best way is to return the opioids through a take-back program  This program is offered by the [place] [employer] ([address])  The following are options for using the program:  · Take the opioids to a [employer] collection site  The site is often a law enforcement center  Call your local law enforcement center for scheduled take-back days in your area  You will be given information on where to go if the collection site is in a different location  · Take the opioids to an approved pharmacy or hospital   A pharmacy or hospital may be set up as a collection site  You will need to ask if it is a [employer] collection site if you were not directed there  A pharmacy or doctor's office may not be able to take back opioids unless it is a NATAL[employer] site  · Use a mail-back system  This means you are given containers to put the opioids into  You will then mail them in the containers  · Use a take-back drop box  This is a place to leave the opioids at any time  People and animals will not be able to get into the box  Your local law enforcement agency can tell you where to find a drop box in your area  Other safe ways to dispose of opioids: The medicine may come with disposal instructions  The instructions may vary depending on the brand of medicine you are using  Instructions may come in a Medication Guide, but not every medicine has one  You may instead get instructions from your pharmacy or doctor  Follow instructions carefully  The following are general guidelines to follow:  · Find out if you can flush the opioid  Some opioids can be flushed down the toilet or poured into the sink  You will need to contact authorities in your area to see if this is an option for you  The FDA also offers a list of medicines that are safe to flush down the toilet  You can check the list if you cannot get the information for your local area  · Ask your waste management company about rules for putting opioids in the trash  The company will be able to give you specific directions   Scratch out personal information on the original medicine label so it cannot be read  Then put it in the trash  Do not label the trash or put any information on it about the opioids  It should look like regular household trash so no one is tempted to look for the opioids  Keep the trash out of the reach of children and animals  Always make sure trash is secure  · Talk to officials if you live in a facility  If you live in a nursing home or assisted living center, talk to an official  The person will know the rules for your area  Other ways to manage pain:   · Ask your healthcare provider about non-opioid medicines to control pain  Nonprescription medicines include NSAIDs (such as ibuprofen) and acetaminophen  Prescription medicines include muscle relaxers, antidepressants, and steroids  · Pain may be managed without any medicines  Some ways to relieve pain include massage, aromatherapy, or meditation  Physical or occupational therapy may also help  For more information:   · Drug Enforcement Administration  [facility] [facility] [address]  Phone: 2- [phone] - [phone]  Web Address: [facility]/drug_disposal/    · [address]  and Drug Administration  [facility] , [address] [place] [address]  Phone: 9- [phone]  Web Address: http://viVood/    Follow up with your doctor or pain specialist as directed: You may need to have your dose adjusted  Your doctor or pain specialist can also help you find ways to manage pain without opioids  Write down your questions so you remember to ask them during your visits  © Copyright [employer] 2022 Information is for End User's use only and may not be sold, redistributed or otherwise used for commercial purposes  All illustrations and images included in CareNotes® are the copyrighted property of A D A M , Inc  or Frankie Schwartz  The above information is an  only  It is not intended as medical advice for individual conditions or treatments   Talk to your doctor, nurse or pharmacist before following any medical regimen to see if it is safe and effective for you

## 2022-05-18 ENCOUNTER — RA CDI HCC (OUTPATIENT)
Dept: OTHER | Facility: HOSPITAL | Age: 72
End: 2022-05-18

## 2022-05-18 NOTE — PROGRESS NOTES
[address] 42  [facility] 75  coding opportunities          Chart Reviewed number of suggestions sent to Provider: 1     Patients Insurance     Medicare Insurance: Laurence Constantino

## 2022-05-23 ENCOUNTER — TELEPHONE (OUTPATIENT)
Dept: FAMILY MEDICINE CLINIC | Facility: CLINIC | Age: 72
End: 2022-05-23

## 2022-05-23 DIAGNOSIS — J44.1 ACUTE EXACERBATION OF CHRONIC OBSTRUCTIVE PULMONARY DISEASE (COPD) (HCC): Primary | ICD-10-CM

## 2022-05-23 RX ORDER — PREDNISONE 10 MG/1
TABLET ORAL
Qty: 20 TABLET | Refills: 0 | Status: SHIPPED | OUTPATIENT
Start: 2022-05-23 | End: 2022-08-10 | Stop reason: SDUPTHER

## 2022-05-23 RX ORDER — LEVOFLOXACIN 500 MG/1
500 TABLET, FILM COATED ORAL EVERY 24 HOURS
Qty: 10 TABLET | Refills: 0 | Status: SHIPPED | OUTPATIENT
Start: 2022-05-23 | End: 2022-05-24 | Stop reason: SDUPTHER

## 2022-05-23 NOTE — TELEPHONE ENCOUNTER
Patient aspirated her lungs last Wednesday  She is asking if you can give her the 10 day supply for Levaquin and prednisone like you usually give her  The mucous is now green and she is wheezing a bit

## 2022-05-24 ENCOUNTER — TELEPHONE (OUTPATIENT)
Dept: FAMILY MEDICINE CLINIC | Facility: CLINIC | Age: 72
End: 2022-05-24

## 2022-05-24 DIAGNOSIS — J44.1 ACUTE EXACERBATION OF CHRONIC OBSTRUCTIVE PULMONARY DISEASE (COPD) (HCC): ICD-10-CM

## 2022-05-24 RX ORDER — LEVOFLOXACIN 500 MG/1
500 TABLET, FILM COATED ORAL EVERY 24 HOURS
Qty: 10 TABLET | Refills: 0 | Status: SHIPPED | OUTPATIENT
Start: 2022-05-24 | End: 2022-06-03

## 2022-05-24 NOTE — TELEPHONE ENCOUNTER
Pt called stating that she has been having mucus coming from her lungs and that the past few days it has turned from clear to a greenish color would like something sent into the pharmacy please advise   - as

## 2022-06-06 ENCOUNTER — HOSPITAL ENCOUNTER (OUTPATIENT)
Dept: MAMMOGRAPHY | Facility: CLINIC | Age: 72
Discharge: HOME | End: 2022-06-06
Payer: COMMERCIAL

## 2022-06-06 ENCOUNTER — HOSPITAL ENCOUNTER (OUTPATIENT)
Dept: BONE DENSITY | Facility: CLINIC | Age: 72
Discharge: HOME | End: 2022-06-06
Payer: COMMERCIAL

## 2022-06-06 VITALS — BODY MASS INDEX: 30.32 KG/M2 | HEIGHT: 65 IN | WEIGHT: 182 LBS

## 2022-06-06 DIAGNOSIS — Z12.31 SCREENING MAMMOGRAM, ENCOUNTER FOR: ICD-10-CM

## 2022-06-06 DIAGNOSIS — Z13.820 SCREENING FOR OSTEOPOROSIS: ICD-10-CM

## 2022-06-06 PROCEDURE — 77063 BREAST TOMOSYNTHESIS BI: CPT

## 2022-06-06 PROCEDURE — 77067 SCR MAMMO BI INCL CAD: CPT

## 2022-06-06 PROCEDURE — 77080 DXA BONE DENSITY AXIAL: CPT

## 2022-06-08 ENCOUNTER — OFFICE VISIT (OUTPATIENT)
Dept: FAMILY MEDICINE CLINIC | Facility: CLINIC | Age: 72
End: 2022-06-08
Payer: COMMERCIAL

## 2022-06-08 VITALS
RESPIRATION RATE: 16 BRPM | HEART RATE: 56 BPM | BODY MASS INDEX: 30.22 KG/M2 | TEMPERATURE: 97 F | OXYGEN SATURATION: 97 % | WEIGHT: 181.4 LBS | DIASTOLIC BLOOD PRESSURE: 70 MMHG | SYSTOLIC BLOOD PRESSURE: 142 MMHG | HEIGHT: 65 IN

## 2022-06-08 DIAGNOSIS — G62.9 NEUROPATHY: ICD-10-CM

## 2022-06-08 DIAGNOSIS — D47.2 MGUS (MONOCLONAL GAMMOPATHY OF UNKNOWN SIGNIFICANCE): ICD-10-CM

## 2022-06-08 DIAGNOSIS — M81.8 IDIOPATHIC OSTEOPOROSIS: Primary | ICD-10-CM

## 2022-06-08 PROCEDURE — 1160F RVW MEDS BY RX/DR IN RCRD: CPT | Performed by: INTERNAL MEDICINE

## 2022-06-08 PROCEDURE — 99214 OFFICE O/P EST MOD 30 MIN: CPT | Performed by: INTERNAL MEDICINE

## 2022-06-08 PROCEDURE — 4004F PT TOBACCO SCREEN RCVD TLK: CPT | Performed by: INTERNAL MEDICINE

## 2022-06-08 RX ORDER — CALCIUM CARBONATE/VITAMIN D3 600 MG-20
1 TABLET ORAL 2 TIMES DAILY WITH MEALS
Qty: 180 TABLET | Refills: 3 | Status: SHIPPED | OUTPATIENT
Start: 2022-06-08

## 2022-06-08 NOTE — PATIENT INSTRUCTIONS
Osteoporosis Education   Osteoporosis  is a long-term medical condition that causes your bones to become weak, brittle, and more likely to fracture  Osteoporosis occurs when your body absorbs more bone than it makes  It is also caused by a lack of calcium and estrogen (female hormone)  Common symptoms include the following: You may not have any signs or symptoms  You may break a bone after a muscle strain, bump, or fall  A break usually occurs in the hip, spine, or wrist  A collapsed vertebra (bone in your spine) may cause severe back pain or loss of height from bent posture  Call your doctor if:   ·  You have severe pain  ·  You have increasing pain after a fall  ·  You have pain when you do your daily activities  ·  You have questions or concerns about your condition or care  Diagnosis of osteoporosis:   · Blood and urine tests  measure your calcium, vitamin D, and estrogen levels  · An x-ray or CT may show thinned bones or a fracture  You may be given contrast liquid to help the bones show up better in the pictures  Tell the healthcare provider if you have ever had an allergic reaction to contrast liquid  Do not enter the MRI room with anything metal  Metal can cause serious injury  Tell the healthcare provider if you have any metal in or on your body  · A bone density test  compares your bone thickness with what is expected for someone of your age, gender, and ethnicity  Treatment for osteoporosis may include medicines to prevent bone loss, build new bone, and increase estrogen  These medicines help prevent fractures and may be given as a pill or injection  Ask your healthcare provider for more information on these medicines  Prevent bone loss:  · Eat healthy foods that are high in calcium  This helps keep your bones strong  Good sources of calcium are milk, cheese, broccoli, tofu, almonds, and canned salmon and sardines  Recommended to get at least 1200mg daily of calcium    · Increase your vitamin D intake  Vitamin D is in fish oils, some vegetables, and fortified milk, cereal, and bread  Vitamin D is also formed in the skin when it is exposed to the sun  Ask your healthcare provider how much sunlight is safe for you  You will require at least 800 units of vitamin D daily taken as a supplement  · Drink liquids as directed  Ask your healthcare provider how much liquid to drink each day and which liquids are best for you  Do not have alcohol or caffeine  They decrease bone mineral density, which can weaken your bones  · Exercise regularly  Ask your healthcare provider about the best exercise plan for you  Weight bearing exercise for 30 minutes, 3 times a week can help build and strengthen bone  · Do not smoke  Nicotine and other chemicals in cigarettes and cigars can cause lung damage  Ask your healthcare provider for information if you currently smoke and need help to quit  E-cigarettes or smokeless tobacco still contain nicotine  Talk to your healthcare provider before you use these products  · Go to physical therapy as directed  A physical therapist teaches you exercises to help improve movement and muscle strength  · Alcohol  It is recommended to avoid heavy alcohol use as increased consumption of alcohol is known to cause bone loss  © Copyright [employer] 2021 Information is for End User's use only and may not be sold, redistributed or otherwise used for commercial purposes  All illustrations and images included in CareNotes® are the copyrighted property of A D A [employer] , [employer]  or [facility] Katharine Mora     Calcium and vitamin D for bone health (Beyond the Basics)    CALCIUM AND VITAMIN D OVERVIEW  Osteoporosis is a common bone disorder that causes a progressive loss in bone density and mass  As a result, bones become thin, weakened, and easily fractured   It is estimated that more than 1 3 million osteoporosis-associated (or "osteoporotic") fractures occur every year in the [place], primarily of bone within the spine (the vertebrae), the hip, and the forearm near the wrist  =    A number of treatments can help to prevent loss of bone and treat low bone mass  However, the first step in preventing or treating osteoporosis is to consume foods and drinks that provide calcium, a mineral essential for bone strength, and vitamin D, which aids in calcium breakdown and absorption  =    CALCIUM AND VITAMIN D BENEFITS  Good nutrition is important at all ages to keep the bones healthy  · Taking calcium reduces bone loss and decreases the risk of fracturing the vertebrae (the bones that surround the spinal cord)  · Consuming calcium during childhood (eg, in milk) can lead to higher bone mass in adulthood  This increase in bone density can reduce the risk of fractures later in life  · Calcium may also have benefits in other body systems by reducing blood pressure and cholesterol levels  · Calcium and vitamin D supplements may help prevent tooth loss in older adults  RECOMMENDATIONS FOR CALCIUM    General recommendations -- Premenopausal women and men should consume at least 1000 mg of calcium, while postmenopausal women should consume 1200 mg (total diet plus supplement)  You should not consume more than 2000 mg of calcium per day (total diet plus supplement) due to the risk of side effects  Calcium in the diet -- The primary sources of calcium in the diet include milk and other dairy products, such as hard cheese, cottage cheese, or yogurt, as well as green vegetables, such as kale and broccoli (table 1)  Some cereals, soy products, and fruit juices are fortified with calcium  Calcium supplements -- The body is able to absorb calcium contained in supplements as well as from dietary sources  If it is not possible to get enough calcium from dietary sources, consult a health care provider to determine the best type, dose, and timing of calcium supplements       · Calcium carbonate is effective and is the least expensive form of calcium  It is best absorbed with a low-iron meal (such as breakfast)  Calcium carbonate may not be absorbed well in people who also take a specific medication for gastroesophageal reflux (called a proton pump inhibitor or H2 blocker), which blocks stomach acid  · Many natural calcium carbonate preparations such as oyster shells or bone meal contain some lead  · Calcium citrate is well absorbed in the fasting state as well as with a meal   · Calcium doses above 500 mg are not absorbed as well as smaller doses, so large doses of supplements should be taken in divided doses (eg, in the morning and evening)  · Calcium supplements do not replace other osteoporosis treatments such as hormone replacement, bisphosphonates (eg, risedronate [sample brand name: Actonel] and alendronate [brand name: Fosamax]), and raloxifene (brand name: Evista)  Calcium and vitamin D supplements alone are usually insufficient to prevent age-related bone loss, although they may be beneficial in some subgroups (older adults, those with very low intake)  In most patients with or at risk for osteoporosis, the addition of medication or hormonal therapy is necessary in order to slow the breakdown and removal of bone (ie, resorption)  Underlying gastrointestinal diseases -- Patients who do not adequately absorb nutrients from the gastrointestinal tract (due to malabsorption) may require more than 1000 to 1200 mg of calcium per day  In such cases, a health care provider can help to determine the optimal dose of calcium  Medications -- All medications should be discussed with a health care provider to ensure that possible interactions with calcium are identified  Certain medications change the amount of calcium that is absorbed and/or excreted  As an example, loop diuretics (eg, furosemide [sample brand name: Lasix]) increase the amount of calcium excreted in the urine      On the other hand, thiazide diuretics (eg, hydrochlorothiazide) can reduce levels of calcium in the urine, potentially reducing the risk of bone loss and kidney stones  Side effects of calcium -- Calcium is usually easily tolerated when it is taken in divided doses several times per day  Some people experience side effects related to calcium, including constipation and indigestion  Calcium supplements interfere with the absorption of iron and thyroid hormone, and therefore, these medications should be taken at different times  Kidney stones -- There is no evidence that consuming large amounts of calcium (from foods and drinks) increases the risk of kidney stones, or that avoiding dietary calcium decreases the risk  In fact, avoiding dairy products is likely to increase the risk of kidney stones  However, use of calcium supplements may increase the risk of kidney stones in susceptible individuals by raising the level of calcium in the urine  This is particularly true if the supplement is taken between meals or at bedtime, and this is one of the reasons we prefer for patients to get as much of their calcium requirement through dietary means  IMPORTANCE OF VITAMIN D  Vitamin D decreases bone loss and lowers the risk of fracture, especially in older men and women  Along with calcium, vitamin D also helps to prevent and treat osteoporosis  To absorb calcium efficiently, an adequate amount of vitamin D must be present  Vitamin D is normally made in the skin after exposure to sunlight  Recommendations for vitamin D -- The current recommendation is that men over 70 years and postmenopausal women consume at least 800 international units (20 micrograms) of vitamin D per day  Lower levels of vitamin D are not as effective, while high doses can be toxic, especially if taken for long periods of time   Although the optimal intake has not been clearly established in premenopausal women or in younger men with osteoporosis, 600 international units (15 micrograms) of vitamin D daily is generally suggested  Vitamin D is available as an individual supplement and is included in most multivitamins and some calcium supplements (table 2)  Milk is a relatively good dietary source of vitamin D, with approximately 100 international units (2 5 micrograms) per cup (240 mL), and salmon has 800 to 1000 international units (20 to 25 micrograms) of vitamin D per serving  Most healthy people don't need to check with their health care provider before taking standard doses of vitamin D and don't need monitoring of vitamin D levels if they are not being treated for vitamin D deficiency  However, recommendations for vitamin D supplementation may be different in people with certain underlying medical conditions, such as sarcoidosis or lymphoma  In these situations, a provider will determine if supplements are needed; if so, the person's vitamin D and calcium levels should be monitored with regular tests  ©6348 [employer], [address] rights reserved

## 2022-06-17 DIAGNOSIS — N39.0 URINARY TRACT INFECTION WITHOUT HEMATURIA, SITE UNSPECIFIED: ICD-10-CM

## 2022-06-17 DIAGNOSIS — R32 URINARY INCONTINENCE, UNSPECIFIED TYPE: ICD-10-CM

## 2022-06-20 RX ORDER — NITROFURANTOIN 25; 75 MG/1; MG/1
100 CAPSULE ORAL DAILY
Qty: 30 CAPSULE | Refills: 1 | Status: SHIPPED | OUTPATIENT
Start: 2022-06-20 | End: 2022-07-05 | Stop reason: SDUPTHER

## 2022-06-24 RX ORDER — SODIUM CHLORIDE 9 MG/ML
125 INJECTION, SOLUTION INTRAVENOUS CONTINUOUS
Status: CANCELLED | OUTPATIENT
Start: 2022-06-24

## 2022-06-27 ENCOUNTER — ANESTHESIA EVENT (OUTPATIENT)
Dept: GASTROENTEROLOGY | Facility: MEDICAL CENTER | Age: 72
End: 2022-06-27

## 2022-06-27 ENCOUNTER — HOSPITAL ENCOUNTER (OUTPATIENT)
Dept: GASTROENTEROLOGY | Facility: MEDICAL CENTER | Age: 72
Setting detail: OUTPATIENT SURGERY
Discharge: HOME | End: 2022-06-27
Admitting: INTERNAL MEDICINE
Payer: COMMERCIAL

## 2022-06-27 ENCOUNTER — ANESTHESIA (OUTPATIENT)
Dept: GASTROENTEROLOGY | Facility: MEDICAL CENTER | Age: 72
End: 2022-06-27

## 2022-06-27 VITALS
DIASTOLIC BLOOD PRESSURE: 69 MMHG | SYSTOLIC BLOOD PRESSURE: 143 MMHG | OXYGEN SATURATION: 96 % | HEART RATE: 49 BPM | HEIGHT: 65 IN | BODY MASS INDEX: 29.99 KG/M2 | TEMPERATURE: 97.5 F | WEIGHT: 180 LBS | RESPIRATION RATE: 18 BRPM

## 2022-06-27 DIAGNOSIS — Z86.010 HISTORY OF COLON POLYPS: ICD-10-CM

## 2022-06-27 DIAGNOSIS — K21.9 GASTROESOPHAGEAL REFLUX DISEASE, UNSPECIFIED WHETHER ESOPHAGITIS PRESENT: ICD-10-CM

## 2022-06-27 PROCEDURE — 45385 COLONOSCOPY W/LESION REMOVAL: CPT | Performed by: INTERNAL MEDICINE

## 2022-06-27 PROCEDURE — 45380 COLONOSCOPY AND BIOPSY: CPT | Performed by: INTERNAL MEDICINE

## 2022-06-27 PROCEDURE — 43239 EGD BIOPSY SINGLE/MULTIPLE: CPT | Performed by: INTERNAL MEDICINE

## 2022-06-27 PROCEDURE — 88305 TISSUE EXAM BY PATHOLOGIST: CPT | Performed by: PATHOLOGY

## 2022-06-27 RX ORDER — PROPOFOL 10 MG/ML
INJECTION, EMULSION INTRAVENOUS AS NEEDED
Status: DISCONTINUED | OUTPATIENT
Start: 2022-06-27 | End: 2022-06-27

## 2022-06-27 RX ORDER — LIDOCAINE HYDROCHLORIDE 20 MG/ML
INJECTION, SOLUTION EPIDURAL; INFILTRATION; INTRACAUDAL; PERINEURAL AS NEEDED
Status: DISCONTINUED | OUTPATIENT
Start: 2022-06-27 | End: 2022-06-27

## 2022-06-27 RX ORDER — SODIUM CHLORIDE 9 MG/ML
125 INJECTION, SOLUTION INTRAVENOUS CONTINUOUS
Status: DISCONTINUED | OUTPATIENT
Start: 2022-06-27 | End: 2022-07-01 | Stop reason: HOSPADM

## 2022-06-27 RX ADMIN — PROPOFOL 50 MG: 10 INJECTION, EMULSION INTRAVENOUS at 08:16

## 2022-06-27 RX ADMIN — PROPOFOL 50 MG: 10 INJECTION, EMULSION INTRAVENOUS at 08:09

## 2022-06-27 RX ADMIN — PROPOFOL 100 MG: 10 INJECTION, EMULSION INTRAVENOUS at 08:06

## 2022-06-27 RX ADMIN — PROPOFOL 50 MG: 10 INJECTION, EMULSION INTRAVENOUS at 08:07

## 2022-06-27 RX ADMIN — SODIUM CHLORIDE 125 ML/HR: 0.9 INJECTION, SOLUTION INTRAVENOUS at 07:57

## 2022-06-27 RX ADMIN — LIDOCAINE HYDROCHLORIDE 60 MG: 20 INJECTION, SOLUTION EPIDURAL; INFILTRATION; INTRACAUDAL; PERINEURAL at 08:06

## 2022-06-27 RX ADMIN — PROPOFOL 50 MG: 10 INJECTION, EMULSION INTRAVENOUS at 08:11

## 2022-06-27 RX ADMIN — Medication 40 MG: at 08:07

## 2022-06-27 RX ADMIN — PROPOFOL 50 MG: 10 INJECTION, EMULSION INTRAVENOUS at 08:21

## 2022-06-27 RX ADMIN — PROPOFOL 50 MG: 10 INJECTION, EMULSION INTRAVENOUS at 08:26

## 2022-06-27 NOTE — H&P
H&P EXAM - Outpatient Endoscopy   Oliva Capellan 70 y o  female MRN: [id number]    [address] ENDOSCOPY   Encounter: [id number]      History and Physical - [facility]  Oliva Capellan 70 y o  female MRN: [id number]                  HPI: Oliva Capellan is a 70y o  year old female who presents for diarrhea, gerd, history of colon polyps      REVIEW OF SYSTEMS: Per the HPI, and otherwise unremarkable      Historical Information   Past Medical History:   Diagnosis Date    Anemia     Anxiety     hx of panic attacks (now under control)     Arthritis     Asthma     resolved (no problems in a decade)     Baker's cyst of knee     Bronchitis     Bunion, left foot     Cervical pain     Chronic GERD     Chronic pain     Chronic pain disorder     Depression     Diabetes mellitus, type 2 (HCC)     Diabetic peripheral neuropathy (HCC)     Endometriosis     Fibromyalgia     Hyperlipidemia     Hypertension     Joint pain     Low back pain     Low back pain     Lung nodules     Macular degeneration     Pulmonary emphysema ([facility] [address] ) 01/16/2020    Spondylosis     Uterine cancer ([facility] [address] )      Past Surgical History:   Procedure Laterality Date    BACK SURGERY  1996    L5, S1- laser surgery fusion of c5 and c6     BREAST CYST EXCISION Right     CHOLECYSTECTOMY  2004    FOOT SURGERY Left 2005    fusion     FRACTURE SURGERY      GASTRIC BYPASS  2010    Dr Henry Ocean    just uterus     KNEE ARTHROSCOPY      LAMINECTOMY      ORTHOPEDIC SURGERY      OVARIAN CYST REMOVAL  1975    PELVIC LAPAROSCOPY      ovaries (x10)     PLEURAL SCARIFICATION  1967    SHOULDER OPEN ROTATOR CUFF REPAIR Left 2008    TONSILLECTOMY      VAGINAL PROLAPSE REPAIR       Social History   Social History     Substance and Sexual Activity   Alcohol Use Not Currently    Alcohol/week: 1 0 standard drink    Types: 1 Shots of liquor per week    Comment: rarely     Social History     Substance and Sexual Activity   Drug Use No     Social History     Tobacco Use   Smoking Status Current Every Day Smoker    Packs/day: 0 25    Years: 40 00    Pack years: 10 00    Types: Cigarettes   Smokeless Tobacco Current User     Family History   Problem Relation Age of Onset    Hypertension Mother     Lung cancer Mother     Hypertension Father     Heart disease Father     Heart attack Father     Cancer Father     No Known Problems Sister     No Known Problems Daughter     No Known Problems Maternal Grandmother     No Known Problems Maternal Grandfather     No Known Problems Paternal Grandmother     No Known Problems Paternal Grandfather     Breast cancer Paternal Aunt 36    Breast cancer Paternal Aunt 39    Breast cancer Maternal Aunt 48       Meds/Allergies     (Not in a hospital admission)      Allergies   Allergen Reactions    Cephalosporins Hives    Erythromycin GI Intolerance    Fentanyl Itching     Occurred during surgery     Paxil [Paroxetine] Hives     After 2 weeks    Penicillins Itching    Pravastatin Myalgia    Statins Itching    Sulfa Antibiotics Diarrhea    Wellbutrin [Bupropion] Hives     After 2 weeks     Marzena's Wort Rash       Objective     /72   Pulse (!) 54   Temp 97 5 °F (36 4 °C) (Temporal)   Resp 18   Ht 5' 5" (1 651 m)   Wt 81 6 kg (180 lb)   LMP  (LMP Unknown)   SpO2 95%   BMI 29 95 kg/m²       PHYSICAL EXAM    Gen: NAD  CV: RRR  CHEST: Clear  ABD: soft, NT/ND  EXT: no edema      ASSESSMENT/PLAN:  This is a 70y o  year old female here for egd/colonoscopy, and she is stable and optimized for her procedure

## 2022-06-27 NOTE — ANESTHESIA PREPROCEDURE EVALUATION
Procedure:  COLONOSCOPY  EGD    Relevant Problems   ANESTHESIA (within normal limits)      ENDO   (+) Hypothyroidism due to Hashimoto's thyroiditis   (+) Type 2 diabetes mellitus with hyperlipidemia (HCC)      HEMATOLOGY   (+) Iron deficiency anemia following bariatric surgery   (+) Iron deficiency anemia, unspecified      MUSCULOSKELETAL   (+) Adhesive capsulitis of left shoulder   (+) Chronic bilateral low back pain with bilateral sciatica   (+) Fibromyalgia   (+) Myofascial pain syndrome   (+) Primary osteoarthritis of right knee   (+) Pseudogout of left knee   (+) Rheumatoid arthritis of hand (HCC)   (+) Spondylosis of cervical region without myelopathy or radiculopathy      NEURO/PSYCH   (+) Chronic bilateral low back pain with bilateral sciatica   (+) Chronic pain syndrome   (+) Diabetic peripheral neuropathy (HCC)   (+) Fibromyalgia   (+) Myofascial pain syndrome      PULMONARY   (+) Pulmonary emphysema (HCC)        Physical Exam    Airway    Mallampati score: III  TM Distance: >3 FB  Neck ROM: full     Dental       Cardiovascular  Rhythm: regular, Rate: normal,     Pulmonary  Breath sounds clear to auscultation,     Other Findings        Anesthesia Plan  ASA Score- 3     Anesthesia Type- IV sedation with anesthesia with ASA Monitors  Additional Monitors:   Airway Plan:           Plan Factors-Exercise tolerance (METS): <4 METS  Chart reviewed  Patient summary reviewed  Patient is a current smoker  Induction- intravenous  Postoperative Plan-     Informed Consent- Anesthetic plan and risks discussed with patient

## 2022-06-27 NOTE — ANESTHESIA POSTPROCEDURE EVALUATION
Post-Op Assessment Note    CV Status:  Stable    Pain management: adequate     Mental Status:  Alert and awake   Hydration Status:  Euvolemic   PONV Controlled:  Controlled   Airway Patency:  Patent      Post Op Vitals Reviewed: Yes      Staff: Anesthesiologist         No complications documented      /69 (06/27/22 0851)    Temp      Pulse (!) 49 (06/27/22 0851)   Resp 18 (06/27/22 0851)    SpO2 96 % (06/27/22 0851)

## 2022-06-28 ENCOUNTER — OFFICE VISIT (OUTPATIENT)
Dept: PAIN MEDICINE | Facility: MEDICAL CENTER | Age: 72
End: 2022-06-28
Payer: COMMERCIAL

## 2022-06-28 VITALS
HEART RATE: 54 BPM | SYSTOLIC BLOOD PRESSURE: 128 MMHG | TEMPERATURE: 97.8 F | OXYGEN SATURATION: 95 % | DIASTOLIC BLOOD PRESSURE: 60 MMHG | HEIGHT: 65 IN | WEIGHT: 180 LBS | BODY MASS INDEX: 29.99 KG/M2

## 2022-06-28 DIAGNOSIS — M54.16 LUMBAR RADICULITIS: ICD-10-CM

## 2022-06-28 DIAGNOSIS — F11.20 UNCOMPLICATED OPIOID DEPENDENCE (HCC): ICD-10-CM

## 2022-06-28 DIAGNOSIS — M54.42 CHRONIC BILATERAL LOW BACK PAIN WITH BILATERAL SCIATICA: ICD-10-CM

## 2022-06-28 DIAGNOSIS — M54.41 CHRONIC BILATERAL LOW BACK PAIN WITH BILATERAL SCIATICA: ICD-10-CM

## 2022-06-28 DIAGNOSIS — M79.18 MYOFASCIAL PAIN SYNDROME: ICD-10-CM

## 2022-06-28 DIAGNOSIS — M47.812 SPONDYLOSIS OF CERVICAL REGION WITHOUT MYELOPATHY OR RADICULOPATHY: ICD-10-CM

## 2022-06-28 DIAGNOSIS — M54.12 CERVICAL RADICULOPATHY: ICD-10-CM

## 2022-06-28 DIAGNOSIS — G89.29 CHRONIC BILATERAL LOW BACK PAIN WITH BILATERAL SCIATICA: ICD-10-CM

## 2022-06-28 DIAGNOSIS — G89.4 CHRONIC PAIN SYNDROME: Primary | ICD-10-CM

## 2022-06-28 DIAGNOSIS — M54.41 CHRONIC BILATERAL LOW BACK PAIN WITH RIGHT-SIDED SCIATICA: ICD-10-CM

## 2022-06-28 DIAGNOSIS — Z79.891 LONG-TERM CURRENT USE OF OPIATE ANALGESIC: ICD-10-CM

## 2022-06-28 DIAGNOSIS — G89.29 CHRONIC BILATERAL LOW BACK PAIN WITH RIGHT-SIDED SCIATICA: ICD-10-CM

## 2022-06-28 PROCEDURE — 3008F BODY MASS INDEX DOCD: CPT | Performed by: INTERNAL MEDICINE

## 2022-06-28 PROCEDURE — 80305 DRUG TEST PRSMV DIR OPT OBS: CPT | Performed by: NURSE PRACTITIONER

## 2022-06-28 PROCEDURE — 99214 OFFICE O/P EST MOD 30 MIN: CPT | Performed by: NURSE PRACTITIONER

## 2022-06-28 RX ORDER — PREGABALIN 200 MG/1
200 CAPSULE ORAL 3 TIMES DAILY
Qty: 90 CAPSULE | Refills: 1 | Status: SHIPPED | OUTPATIENT
Start: 2022-06-28 | End: 2022-07-28

## 2022-06-28 RX ORDER — HYDROCODONE BITARTRATE AND ACETAMINOPHEN 10; 325 MG/1; MG/1
TABLET ORAL
Qty: 130 TABLET | Refills: 0 | Status: SHIPPED | OUTPATIENT
Start: 2022-07-31

## 2022-06-28 RX ORDER — HYDROCODONE BITARTRATE AND ACETAMINOPHEN 10; 325 MG/1; MG/1
TABLET ORAL
Qty: 130 TABLET | Refills: 0 | Status: SHIPPED | OUTPATIENT
Start: 2022-07-03

## 2022-06-28 NOTE — PROGRESS NOTES
Assessment:  1  Chronic pain syndrome    2  Lumbar radiculitis    3  Cervical radiculopathy    4  Chronic bilateral low back pain with bilateral sciatica    5  Myofascial pain syndrome    6  Spondylosis of cervical region without myelopathy or radiculopathy    7  Long-term current use of opiate analgesic    8  Uncomplicated opioid dependence ([facility] [address] )    9  Chronic bilateral low back pain with right-sided sciatica - Bilateral        Plan:  1  Patient may continue Norco 10/325 mg 1 tablet 4 to 5 times a day as needed for pain #130 tablets for 30 days  The patient was given a 2 month supply of prescriptions with a Do Not Fill date(s) of July 3, 2022 and July 31, 2022    [place] Prescription Drug Monitoring Program report was reviewed and was appropriate     A urine drug screen was collected at today's office visit as part of our medication management protocol  The point of care testing results were appropriate for what was being prescribed  The specimen will be sent for confirmatory testing  The drug screen is medically necessary because the patient is either dependent on opioid medication or is being considered for opioid medication therapy and the results could impact ongoing or future treatment  The drug screen is to evaluate for the presences or absence of prescribed, non-prescribed, and/or illicit drugs/substances  There are risks associated with opioid medications, including dependence, addiction and tolerance  The patient understands and agrees to use these medications only as prescribed  Potential side effects of the medications include, but are not limited to, constipation, drowsiness, addiction, impaired judgment and risk of fatal overdose if not taken as prescribed  The patient was warned against driving while taking sedation medications  Sharing medications is a felony  At this point in time, the patient is showing no signs of addiction, abuse, diversion or suicidal ideation      2  The pregabalin 200 mg 3 times a day as prescribed  This medication was refilled today   3  Patient may continue baclofen  Refills were not required today  4  Patient will continue the home exercise program  5  Patient will follow up in 8 weeks or sooner if needed      History of Present Illness: The patient is a 70 y o  female last seen on 5/9/22 who presents for a follow up office visit in regards to chronic low back pain that radiates into her lower extremities  Patient denies bowel or bladder incontinence or saddle anesthesia  Patient offers no new complaints today  She states her pain is stable on her medication regimen of Norco 10/325 mg 1 tablet 4 to 5 times a day p r n  pain, pregabalin 200 mg 3 times a day and baclofen 10 mg 3 times a day p r n  with 30% relief of her pain without side effects  The patient rates her pain an 8/10 on the numeric pain rating scale    She constantly has pain which is described as burning, dull aching, sharp, throbbing, pressure-like, shooting, numbness and pins and needles    Current pain medications includes:    Current Facility-Administered Medications   Medication Dose Route Frequency Provider Last Rate    cyanocobalamin  1,000 mcg Intramuscular Q30 Days Cruzito Simmons MD      cyanocobalamin  1,000 mcg Intramuscular Q30 Days Cruzito Simmons MD      cyanocobalamin  1,000 mcg Intramuscular Q30 Days Cruzito Simmons MD      cyanocobalamin  1,000 mcg Intramuscular Q30 Days Cruzito Simmons MD      cyanocobalamin  1,000 mcg Intramuscular Q30 Days Cruzito Simmons MD      cyanocobalamin  1,000 mcg Intramuscular Q30 Days Cruzito Simmons MD       Facility-Administered Medications Ordered in Other Visits   Medication Dose Route Frequency Provider Last Rate    sodium chloride  125 mL/hr Intravenous Continuous Willena Males Kizzy Orta DO Stopped (06/27/22 0192)       Hydrocodone last taken 06/28 AM  Pain Contract Signed: 01/21/2022  Last Urine Drug Screen: 06/28/2022    I have personally reviewed and/or updated the patient's past medical history, past surgical history, family history, social history, current medications, allergies, and vital signs today  Review of Systems:    Review of Systems   Respiratory: Negative for shortness of breath  Cardiovascular: Negative for chest pain  Gastrointestinal: Negative for constipation, diarrhea, nausea and vomiting  Musculoskeletal: Positive for back pain, gait problem, joint swelling and myalgias  Negative for arthralgias  Skin: Negative for rash  Neurological: Negative for dizziness, seizures and weakness  Psychiatric/Behavioral: Positive for decreased concentration  All other systems reviewed and are negative          Past Medical History:   Diagnosis Date    Anemia     Anxiety     hx of panic attacks (now under control)     Arthritis     Asthma     resolved (no problems in a decade)     Baker's cyst of knee     Bronchitis     Bunion, left foot     Cervical pain     Chronic GERD     Chronic pain     Chronic pain disorder     Depression     Diabetes mellitus, type 2 (HCC)     Diabetic peripheral neuropathy (HCC)     Endometriosis     Fibromyalgia     Hyperlipidemia     Hypertension     Joint pain     Low back pain     Low back pain     Lung nodules     Macular degeneration     Pulmonary emphysema ([facility] [address] ) 01/16/2020    Spondylosis     Uterine cancer ([facility] [address] )        Past Surgical History:   Procedure Laterality Date    BACK SURGERY  1996    L5, S1- laser surgery fusion of c5 and c6     BREAST CYST EXCISION Right     CHOLECYSTECTOMY  2004    FOOT SURGERY Left 2005    fusion    [address] [facility] GASTRIC BYPASS  2010    Dr Tre Nguyen    just uterus     KNEE ARTHROSCOPY      LAMINECTOMY      ORTHOPEDIC SURGERY      OVARIAN CYST REMOVAL  1975    PELVIC LAPAROSCOPY      ovaries (x10)     PLEURAL SCARIFICATION  1967    SHOULDER OPEN ROTATOR CUFF REPAIR Left 2008    TONSILLECTOMY      VAGINAL PROLAPSE REPAIR Family History   Problem Relation Age of Onset    Hypertension Mother     Lung cancer Mother     Hypertension Father     Heart disease Father     Heart attack Father     Cancer Father     No Known Problems Sister     No Known Problems Daughter     No Known Problems Maternal Grandmother     No Known Problems Maternal Grandfather     No Known Problems Paternal Grandmother     No Known Problems Paternal Grandfather     Breast cancer Paternal Aunt 36    Breast cancer Paternal Aunt 39    Breast cancer Maternal Aunt 50       Social History     Occupational History    Not on file   Tobacco Use    Smoking status: Current Every Day Smoker     Packs/day: 0 25     Years: 40 00     Pack years: 10 00     Types: Cigarettes    Smokeless tobacco: Current User   Vaping Use    Vaping Use: Never used   Substance and Sexual Activity    Alcohol use: Not Currently     Alcohol/week: 1 0 standard drink     Types: 1 Shots of liquor per week     Comment: rarely    Drug use: No    Sexual activity: Not Currently     Partners: Male         Current Outpatient Medications:     albuterol (PROVENTIL HFA,VENTOLIN HFA) 90 mcg/act inhaler, inhale 2 puff by inhalation route  every 4 - 6 hours as needed, Disp: , Rfl:     albuterol (PROVENTIL HFA,VENTOLIN HFA) 90 mcg/act inhaler, Inhale 2 puffs every 4 (four) hours as needed for wheezing, Disp: 1 Inhaler, Rfl: 0    amLODIPine (NORVASC) 5 mg tablet, Take 1 tablet (5 mg total) by mouth daily, Disp: 90 tablet, Rfl: 3    baclofen 10 mg tablet, Take 1 tablet (10 mg total) by mouth 3 (three) times a day, Disp: 270 tablet, Rfl: 2    Calcium Carb-Cholecalciferol (Caltrate 600+D3) 600-800 MG-UNIT TABS, Take 1 tablet by mouth 2 (two) times a day with meals, Disp: 180 tablet, Rfl: 3    cetirizine (ZyrTEC) 10 mg tablet, TAKE 1/2 TABLET BY MOUTH DAILY, Disp: 45 tablet, Rfl: 2    diphenhydrAMINE (BENADRYL) 25 mg capsule, Take 50 mg by mouth daily , Disp: , Rfl:    fluticasone-umeclidinium-vilanterol (TRELEGY) 100-62 5-25 MCG/INH inhaler, Inhale 1 puff daily Rinse mouth after use , Disp: 1 each, Rfl: 3    [START ON 7/31/2022] HYDROcodone-acetaminophen (NORCO)  mg per tablet, Take 4-5 tablets daily PRN, Disp: 130 tablet, Rfl: 0    [START ON 7/3/2022] HYDROcodone-acetaminophen (NORCO)  mg per tablet, Take 4-5 tablets daily as needed, Disp: 130 tablet, Rfl: 0    levothyroxine 25 mcg tablet, Take 1 tablet (25 mcg total) by mouth every other day, Disp: 30 tablet, Rfl: 2    levothyroxine 50 mcg tablet, Take 1 tablet (50 mcg total) by mouth every other day, Disp: 30 tablet, Rfl: 2    losartan (COZAAR) 25 mg tablet, Take 1 tablet (25 mg total) by mouth daily, Disp: 90 tablet, Rfl: 1    nitrofurantoin (MACROBID) 100 mg capsule, TAKE 1 CAPSULE (100 MG TOTAL) BY MOUTH DAILY WITH FOOD/LUNCH, Disp: 30 capsule, Rfl: 1    pantoprazole (PROTONIX) 40 mg tablet, Take 1 tablet (40 mg total) by mouth 2 (two) times a day, Disp: 180 tablet, Rfl: 3    Pediatric Multiple Vit-C-FA (pediatric multivitamin) chewable tablet, Chew 1 tablet daily, Disp: 100 tablet, Rfl: 5    pregabalin (LYRICA) 200 MG capsule, Take 1 capsule (200 mg total) by mouth 3 (three) times a day, Disp: 90 capsule, Rfl: 1    sucralfate (CARAFATE) 1 g tablet, TAKE 1 TABLET BY MOUTH 4 TIMES EVERY DAY ON AN EMPTY STOMACH 1 HOUR BEFORE MEALS AND AT BEDTIME, Disp: 360 tablet, Rfl: 1    traZODone (DESYREL) 50 mg tablet, TAKE 1 TABLET BY MOUTH EVERY DAY AT BEDTIME AS NEEDED, Disp: 90 tablet, Rfl: 1    vitamin A 2400 MCG (8000 UT) capsule, TAKE 1 CAPSULE (8,000 UNITS TOTAL) BY MOUTH DAILY, Disp: 90 capsule, Rfl: 1    Bempedoic Acid 180 MG TABS, Take 180 mg by mouth in the morning, Disp: 90 tablet, Rfl: 3    cimetidine (TAGAMET) 200 mg tablet, Take 1 tablet (200 mg total) by mouth 2 (two) times a day (Patient not taking: Reported on 6/28/2022), Disp: 60 tablet, Rfl: 3    ergocalciferol (VITAMIN D2) 50,000 units, Take 1 capsule (50,000 Units total) by mouth once a week (Patient not taking: Reported on 6/28/2022), Disp: 13 capsule, Rfl: 2    Lancets Misc  (ACCU-CHEK FASTCLIX LANCET) KIT, 3 (three) times a day  , Disp: , Rfl:     MICROLET LANCETS MISC, Microlet Lancet, Disp: , Rfl:     OneTouch Verio test strip, TEST TWICE A DAY, Disp: 100 each, Rfl: 5    predniSONE 10 mg tablet, Take 3 tabs daily with breakfast for 3 days then Take 2 tabs daily for 3 Days then take one tab daily for 3 days then stop   , Disp: 20 tablet, Rfl: 0    vitamin B-12 (VITAMIN B-12) 1,000 mcg tablet, Take 1 tablet (1,000 mcg total) by mouth daily (Patient not taking: Reported on 6/28/2022), Disp: 90 tablet, Rfl: 3    Current Facility-Administered Medications:     cyanocobalamin injection 1,000 mcg, 1,000 mcg, Intramuscular, Q30 Days, Aleksandar Edmondson MD, 1,000 mcg at 12/08/20 1118    cyanocobalamin injection 1,000 mcg, 1,000 mcg, Intramuscular, Q30 Days, Aleksandar Edmondson MD, 1,000 mcg at 07/08/20 1036    cyanocobalamin injection 1,000 mcg, 1,000 mcg, Intramuscular, Q30 Days, Aleksandar Edmondson MD, 1,000 mcg at 09/08/20 1210    cyanocobalamin injection 1,000 mcg, 1,000 mcg, Intramuscular, Q30 Days, Aleksandar Edmondson MD    cyanocobalamin injection 1,000 mcg, 1,000 mcg, Intramuscular, Q30 Days, Aleksandar Edmondson MD    cyanocobalamin injection 1,000 mcg, 1,000 mcg, Intramuscular, Q30 Days, Aleksandar Edmondson MD, 1,000 mcg at 08/18/21 1019    Facility-Administered Medications Ordered in Other Visits:     sodium chloride 0 9 % infusion, 125 mL/hr, Intravenous, Continuous, Arsh Zapata DO, Stopped at 06/27/22 2431    Allergies   Allergen Reactions    Cephalosporins Hives    Erythromycin GI Intolerance    Fentanyl Itching     Occurred during surgery     Paxil [Paroxetine] Hives     After 2 weeks    Penicillins Itching    Pravastatin Myalgia    Statins Itching    Sulfa Antibiotics Diarrhea    Wellbutrin [Bupropion] Hives     After 2 weeks     Marzena's Wort Rash Physical Exam:    /60   Pulse (!) 54   Temp 97 8 °F (36 6 °C)   Ht 5' 5" (1 651 m)   Wt 81 6 kg (180 lb)   LMP  (LMP Unknown)   SpO2 95%   BMI 29 95 kg/m²     Constitutional:normal, well developed, well nourished, alert, in no distress and non-toxic and no overt pain behavior    Eyes:anicteric  HEENT:grossly intact  Neck:supple, symmetric, trachea midline and no masses   Pulmonary:even and unlabored  Cardiovascular:No edema or pitting edema present  Skin:Normal without rashes or lesions and well hydrated  Psychiatric:Mood and affect appropriate  Neurologic:Cranial Nerves II-XII grossly intact  Musculoskeletal:normal gait      Imaging  No orders to display         Orders Placed This Encounter   Procedures    MM ALL_Prescribed Meds and Special Instructions    MM DT_Alprazolam Definitive Test    MM DT_Amphetamine Definitive Test    MM DT_Aripiprazole Definitive Test    MM DT_Bath Salts Definitive Test    MM DT_Buprenorphine Definitive Test    MM DT_Butalbital Definitive Test    MM DT_Clonazepam Definitive Test    MM DT_Clozapine Definitive Test    MM DT_Cocaine Definitive Test    MM DT_Codeine Definitive Test    MM DT_Desipramine Definitive Test    MM DT_Dextromethorphan Definitive Test    MM Diazepam Definitive Test    MM DT_Ethyl Glucuronide/Ethyl Sulfate Definitive Test    MM DT_Fentanyl Definitive Test    MM DT_Heroin Definitive Test    MM DT_Hydrocodone Definitive Test    MM DT_Hydromorphone Definitive Test    MM DT_Kratom Definitive Test    MM DT_Levorphanol Definitive Test    MM Lorazepam Definitive Test    MM DT_MDMA Definitive Test    MM DT_Meperidine Definitive Test    MM DT_Methadone Definitive Test    MM DT_Methamphetamine Definitive Test    MM DT_Methylphenidate Definitive Test    MM DT_Morphine Definitive Test    MM DT_Olanzapine Definitive Test    MM DT_Oxazepam Definitive Test    MM DT_Oxycodone Definitive Test    MM DT_Oxymorphone Definitive Test    MM DT_Phenobarbital Definitive Test    MM DT_Phentermine Definitive Test    MM DT_Secobarbital Definitive Test    MM DT_Spice Definitive Test    MM DT_Tapentadol Definitive Test    MM DT_Temazapam Definitive Test    MM DT_THC Definitive Test    MM DT_Tramadol Definitive Test    MM DT_Validity Specific    MM DT_Validity pH    MM DT_Validity Oxidant    MM DT_Validity Creatinine

## 2022-07-05 DIAGNOSIS — N39.0 URINARY TRACT INFECTION WITHOUT HEMATURIA, SITE UNSPECIFIED: ICD-10-CM

## 2022-07-05 DIAGNOSIS — R32 URINARY INCONTINENCE, UNSPECIFIED TYPE: ICD-10-CM

## 2022-07-05 DIAGNOSIS — R79.89 LOW SERUM VITAMIN A: ICD-10-CM

## 2022-07-05 RX ORDER — NITROFURANTOIN 25; 75 MG/1; MG/1
100 CAPSULE ORAL DAILY
Qty: 30 CAPSULE | Refills: 1 | Status: SHIPPED | OUTPATIENT
Start: 2022-07-05

## 2022-07-05 RX ORDER — MULTIVITAMIN WITH IRON
8000 TABLET ORAL DAILY
Qty: 90 CAPSULE | Refills: 1 | Status: SHIPPED | OUTPATIENT
Start: 2022-07-05 | End: 2022-10-17

## 2022-07-13 ENCOUNTER — HOSPITAL ENCOUNTER (OUTPATIENT)
Dept: RADIOLOGY | Facility: HOSPITAL | Age: 72
Discharge: HOME | End: 2022-07-13
Attending: INTERNAL MEDICINE
Payer: COMMERCIAL

## 2022-07-13 DIAGNOSIS — K21.9 GASTROESOPHAGEAL REFLUX DISEASE, UNSPECIFIED WHETHER ESOPHAGITIS PRESENT: ICD-10-CM

## 2022-07-13 DIAGNOSIS — E11.8 TYPE 2 DIABETES MELLITUS WITH UNSPECIFIED COMPLICATIONS (HCC): ICD-10-CM

## 2022-07-13 PROCEDURE — 74240 X-RAY XM UPR GI TRC 1CNTRST: CPT

## 2022-07-13 RX ORDER — BLOOD SUGAR DIAGNOSTIC
STRIP MISCELLANEOUS
Qty: 100 STRIP | Refills: 5 | Status: SHIPPED | OUTPATIENT
Start: 2022-07-13 | End: 2022-09-23 | Stop reason: SDUPTHER

## 2022-07-25 ENCOUNTER — TELEPHONE (OUTPATIENT)
Dept: GASTROENTEROLOGY | Facility: MEDICAL CENTER | Age: 72
End: 2022-07-25

## 2022-07-25 DIAGNOSIS — Z98.84 HISTORY OF GASTRIC BYPASS: Primary | ICD-10-CM

## 2022-07-25 NOTE — TELEPHONE ENCOUNTER
I placed the referral for bariatric surgery  I think it is reasonable to make an appointment with them to discuss her symptoms and their thoughts on the upper GI exam  The upper GI showed normal changes after gastric bypass, but she has significant reflux of bile on her EGD   She can discuss with bariatrics what their thoughts are on needing a revision of her gastric bypass

## 2022-07-25 NOTE — TELEPHONE ENCOUNTER
Patient had to cancel her appointment today due to being sick, but would at least like to know her results today  Please call patient to advise

## 2022-07-25 NOTE — TELEPHONE ENCOUNTER
Pt would like to know what Dr Michael Vasquez recommendations would be post Upper GI  Pt would like to schedule with her bariatrics  now if that is what they were planning to discuss  Pt had to reschedule her appt to 8/30 due to not feeling well   Pt's [employer] messaging has not been working

## 2022-08-02 ENCOUNTER — TELEPHONE (OUTPATIENT)
Dept: FAMILY MEDICINE CLINIC | Facility: CLINIC | Age: 72
End: 2022-08-02

## 2022-08-02 DIAGNOSIS — J06.9 ACUTE URI: Primary | ICD-10-CM

## 2022-08-02 RX ORDER — LEVOFLOXACIN 500 MG/1
500 TABLET, FILM COATED ORAL EVERY 24 HOURS
Qty: 7 TABLET | Refills: 0 | Status: SHIPPED | OUTPATIENT
Start: 2022-08-02 | End: 2022-08-10 | Stop reason: SDUPTHER

## 2022-08-02 NOTE — TELEPHONE ENCOUNTER
PT called stating that she has had a sore scratchy throat since satuday and the cough is getting wet and loose with green mucus  Pt would like levaquin called into the pharmacy for her, pt states that she still has robotussin and tesslon pearls but would like antibiotic called in also stated that she took 2 covid tests and were negative   - as

## 2022-08-08 ENCOUNTER — TELEPHONE (OUTPATIENT)
Dept: GASTROENTEROLOGY | Facility: MEDICAL CENTER | Age: 72
End: 2022-08-08

## 2022-08-08 NOTE — TELEPHONE ENCOUNTER
Patient calling not understanding why bill was not charged as part of her annual visit  Explained we are specialist  States  has same insurance did not get billed   Told patientt to call billing office for more clarification

## 2022-08-10 DIAGNOSIS — J44.1 ACUTE EXACERBATION OF CHRONIC OBSTRUCTIVE PULMONARY DISEASE (COPD) (HCC): ICD-10-CM

## 2022-08-10 DIAGNOSIS — J06.9 ACUTE URI: ICD-10-CM

## 2022-08-10 RX ORDER — LEVOFLOXACIN 500 MG/1
500 TABLET, FILM COATED ORAL EVERY 24 HOURS
Qty: 10 TABLET | Refills: 0 | Status: SHIPPED | OUTPATIENT
Start: 2022-08-10 | End: 2022-08-20

## 2022-08-10 RX ORDER — PREDNISONE 10 MG/1
TABLET ORAL
Qty: 20 TABLET | Refills: 0 | Status: SHIPPED | OUTPATIENT
Start: 2022-08-10

## 2022-08-12 DIAGNOSIS — E11.8 TYPE 2 DIABETES MELLITUS WITH UNSPECIFIED COMPLICATIONS (HCC): Primary | ICD-10-CM

## 2022-08-15 DIAGNOSIS — E03.9 HYPOTHYROIDISM, UNSPECIFIED TYPE: ICD-10-CM

## 2022-08-15 RX ORDER — LEVOTHYROXINE SODIUM 0.03 MG/1
25 TABLET ORAL EVERY OTHER DAY
Qty: 45 TABLET | Refills: 2 | Status: SHIPPED | OUTPATIENT
Start: 2022-08-15

## 2022-08-17 ENCOUNTER — OFFICE VISIT (OUTPATIENT)
Dept: PAIN MEDICINE | Facility: MEDICAL CENTER | Age: 72
End: 2022-08-17
Payer: COMMERCIAL

## 2022-08-17 VITALS
HEIGHT: 65 IN | BODY MASS INDEX: 30.16 KG/M2 | OXYGEN SATURATION: 97 % | WEIGHT: 181 LBS | DIASTOLIC BLOOD PRESSURE: 60 MMHG | SYSTOLIC BLOOD PRESSURE: 138 MMHG | HEART RATE: 54 BPM

## 2022-08-17 DIAGNOSIS — M54.16 LUMBAR RADICULITIS: ICD-10-CM

## 2022-08-17 DIAGNOSIS — G89.4 CHRONIC PAIN SYNDROME: Primary | ICD-10-CM

## 2022-08-17 DIAGNOSIS — M54.42 CHRONIC BILATERAL LOW BACK PAIN WITH BILATERAL SCIATICA: ICD-10-CM

## 2022-08-17 DIAGNOSIS — M79.7 FIBROMYALGIA: ICD-10-CM

## 2022-08-17 DIAGNOSIS — M54.12 CERVICAL RADICULOPATHY: ICD-10-CM

## 2022-08-17 DIAGNOSIS — M79.18 MYOFASCIAL PAIN SYNDROME: ICD-10-CM

## 2022-08-17 DIAGNOSIS — M47.812 SPONDYLOSIS OF CERVICAL REGION WITHOUT MYELOPATHY OR RADICULOPATHY: ICD-10-CM

## 2022-08-17 DIAGNOSIS — G89.29 CHRONIC BILATERAL LOW BACK PAIN WITH BILATERAL SCIATICA: ICD-10-CM

## 2022-08-17 DIAGNOSIS — M54.41 CHRONIC BILATERAL LOW BACK PAIN WITH BILATERAL SCIATICA: ICD-10-CM

## 2022-08-17 PROCEDURE — 1160F RVW MEDS BY RX/DR IN RCRD: CPT | Performed by: NURSE PRACTITIONER

## 2022-08-17 PROCEDURE — 99214 OFFICE O/P EST MOD 30 MIN: CPT | Performed by: NURSE PRACTITIONER

## 2022-08-17 RX ORDER — HYDROCODONE BITARTRATE AND ACETAMINOPHEN 10; 325 MG/1; MG/1
TABLET ORAL
Qty: 130 TABLET | Refills: 0 | Status: SHIPPED | OUTPATIENT
Start: 2022-08-17 | End: 2022-10-20 | Stop reason: SDUPTHER

## 2022-08-17 RX ORDER — PREGABALIN 200 MG/1
200 CAPSULE ORAL 3 TIMES DAILY
Qty: 90 CAPSULE | Refills: 1 | Status: SHIPPED | OUTPATIENT
Start: 2022-08-17 | End: 2022-10-21 | Stop reason: SDUPTHER

## 2022-08-17 NOTE — PROGRESS NOTES
Assessment:  1  Chronic pain syndrome    2  Chronic bilateral low back pain with bilateral sciatica    3  Lumbar radiculitis    4  Spondylosis of cervical region without myelopathy or radiculopathy    5  Myofascial pain syndrome    6  Cervical radiculopathy    7  Fibromyalgia        Plan:  While the patient was in the office today, I did have a thorough conversation with the patient regarding their chronic pain syndrome, symptoms, medication regimen, and treatment plan  I encouraged the patient to finish out her Levaquin and prednisone and follow-up with her primary care provider as discussed  The patient was agreeable and verbalized an understanding  With regards to her medication regimen, I discussed with the patient at this point time since she is noting at least mild to moderate overall relief of her chronic pain symptoms and she has tried and failed every other alternative medication regimen and treatment plan option and I feel it is reasonable and appropriate with her underlying etiology, we will continue the p r n  Norco, Lyrica, and the baclofen as prescribed  The patient was agreeable and verbalized an understanding  [place] Prescription Drug Monitoring Program report was reviewed and was appropriate     The patient's opioid scripts were sent to their pharmacy electronically and was given a 2 month supply of prescriptions with a Do Not Fill date(s) of August 28, 2022 and September 25, 2022  There are risks associated with opioid medications, including dependence, addiction and tolerance  The patient understands and agrees to use these medications only as prescribed  Potential side effects of the medications include, but are not limited to, constipation, drowsiness, addiction, impaired judgment and risk of fatal overdose if not taken as prescribed  The patient was warned against driving while taking sedation medications  Sharing medications is a felony   At this point in time, the patient is showing no signs of addiction, abuse, diversion or suicidal ideation  The patient will follow-up in 9 weeks for medication prescription refill and reevaluation  The patient was advised to contact the office should their symptoms worsen in the interim  The patient was agreeable and verbalized an understanding  History of Present Illness: The patient is a 67 y o  female last seen on 5/9/22 who presents for a follow up office visit in regards to chronic pain syndrome, as the patient's pain has been ongoing for greater than a year, secondary to cervical spondylosis with chronic low back pain, radiculopathy and diffuse myofascial pain secondary to fibromyalgia  The patient currently reports that since her last office visit overall her pain symptoms have remained relatively the same and stable as can be expected with her current pain situation  The patient is currently on a titrating dose of oral prednisone and her 2nd round of Levaquin for respiratory infection  The patient presents today for regular medication follow-up visit  Current pain medications includes:  Norco 10/325, 1 tablet every 4-6 hours as needed for max of 5 pills per day, dispense number 130 pills per 30 days, Lyrica 200 mg t i d , and baclofen 10 mg t i d  The patient reports that this regimen is providing 30% pain relief  The patient is reporting no side effects from this pain medication regimen  Norco last taken 08/17/22 AM    Pain Contract Signed: 1/21/22  Last Urine Drug Screen: 6/28/22    I have personally reviewed and/or updated the patient's past medical history, past surgical history, family history, social history, current medications, allergies, and vital signs today  Review of Systems:    Review of Systems   Respiratory: Negative for shortness of breath  Cardiovascular: Negative for chest pain  Gastrointestinal: Negative for constipation, diarrhea, nausea and vomiting     Musculoskeletal: Positive for arthralgias, gait problem, joint swelling and myalgias  Skin: Negative for rash  Neurological: Positive for dizziness  Negative for seizures and weakness  Psychiatric/Behavioral: Positive for decreased concentration  All other systems reviewed and are negative          Past Medical History:   Diagnosis Date    Anemia     Anxiety     hx of panic attacks (now under control)     Arthritis     Asthma     resolved (no problems in a decade)     Baker's cyst of knee     Bronchitis     Bunion, left foot     Cervical pain     Chronic GERD     Chronic pain     Chronic pain disorder     Depression     Diabetes mellitus, type 2 (HCC)     Diabetic peripheral neuropathy (HCC)     Endometriosis     Fibromyalgia     Hyperlipidemia     Hypertension     Joint pain     Low back pain     Low back pain     Lung nodules     Macular degeneration     [facility]monary emphysema ([facility] [address] ) 01/16/2020    Spondylosis     Uterine cancer ([facility] [address] )        Past Surgical History:   Procedure Laterality Date    BACK SURGERY  1996    L5, S1- laser surgery fusion of c5 and c6     BREAST CYST EXCISION Right     CHOLECYSTECTOMY  2004    FOOT SURGERY Left 2005    fusion    [facility] BYPASS  2010    Dr Juani Conway    just uterus     KNEE ARTHROSCOPY      LAMINECTOMY      ORTHOPEDIC SURGERY      OVARIAN CYST REMOVAL  1975    PELVIC LAPAROSCOPY      ovaries (x10)     PLEURAL SCARIFICATION  1967    SHOULDER OPEN ROTATOR CUFF REPAIR Left 2008    TONSILLECTOMY      VAGINAL PROLAPSE REPAIR         Family History   Problem Relation Age of Onset    Hypertension Mother     Lung cancer Mother     Hypertension Father     Heart disease Father     Heart attack Father     Cancer Father     No Known Problems Sister     No Known Problems Daughter     No Known Problems Maternal Grandmother     No Known Problems Maternal Grandfather     No Known Problems Paternal Grandmother  No Known Problems Paternal Grandfather     Breast cancer Paternal Aunt 36    Breast cancer Paternal Aunt 39    Breast cancer Maternal Aunt 50       Social History     Occupational History    Not on file   Tobacco Use    Smoking status: Current Every Day Smoker     Packs/day: 0 25     Years: 40 00     Pack years: 10 00     Types: Cigarettes    Smokeless tobacco: Current User   Vaping Use    Vaping Use: Never used   Substance and Sexual Activity    Alcohol use: Not Currently     Alcohol/week: 1 0 standard drink     Types: 1 Shots of liquor per week     Comment: rarely    Drug use: No    Sexual activity: Not Currently     Partners: Male         Current Outpatient Medications:     albuterol (PROVENTIL HFA,VENTOLIN HFA) 90 mcg/act inhaler, inhale 2 puff by inhalation route  every 4 - 6 hours as needed, Disp: , Rfl:     albuterol (PROVENTIL HFA,VENTOLIN HFA) 90 mcg/act inhaler, Inhale 2 puffs every 4 (four) hours as needed for wheezing, Disp: 1 Inhaler, Rfl: 0    amLODIPine (NORVASC) 5 mg tablet, Take 1 tablet (5 mg total) by mouth daily, Disp: 90 tablet, Rfl: 3    baclofen 10 mg tablet, Take 1 tablet (10 mg total) by mouth 3 (three) times a day, Disp: 270 tablet, Rfl: 2    Calcium Carb-Cholecalciferol (Caltrate 600+D3) 600-800 MG-UNIT TABS, Take 1 tablet by mouth 2 (two) times a day with meals, Disp: 180 tablet, Rfl: 3    cetirizine (ZyrTEC) 10 mg tablet, TAKE 1/2 TABLET BY MOUTH DAILY, Disp: 45 tablet, Rfl: 2    diphenhydrAMINE (BENADRYL) 25 mg capsule, Take 50 mg by mouth daily , Disp: , Rfl:     fluticasone-umeclidinium-vilanterol (TRELEGY) 100-62 5-25 MCG/INH inhaler, Inhale 1 puff daily Rinse mouth after use , Disp: 1 each, Rfl: 3    HYDROcodone-acetaminophen (NORCO)  mg per tablet, Take 4-5 tablets daily PRN, Disp: 130 tablet, Rfl: 0    HYDROcodone-acetaminophen (NORCO)  mg per tablet, Take 4-5 tablets daily as needed, Disp: 130 tablet, Rfl: 0    levofloxacin (LEVAQUIN) 500 mg tablet, Take 1 tablet (500 mg total) by mouth every 24 hours for 10 days With food/Lunch, Disp: 10 tablet, Rfl: 0    levothyroxine 25 mcg tablet, TAKE 1 TABLET (25 MCG TOTAL) BY MOUTH EVERY OTHER DAY, Disp: 45 tablet, Rfl: 2    levothyroxine 50 mcg tablet, Take 1 tablet (50 mcg total) by mouth every other day, Disp: 30 tablet, Rfl: 2    losartan (COZAAR) 25 mg tablet, Take 1 tablet (25 mg total) by mouth daily, Disp: 90 tablet, Rfl: 1    nitrofurantoin (MACROBID) 100 mg capsule, Take 1 capsule (100 mg total) by mouth daily With food/Lunch (Patient not taking: Reported on 8/17/2022), Disp: 30 capsule, Rfl: 1    pantoprazole (PROTONIX) 40 mg tablet, Take 1 tablet (40 mg total) by mouth 2 (two) times a day, Disp: 180 tablet, Rfl: 3    Pediatric Multiple Vit-C-FA (pediatric multivitamin) chewable tablet, Chew 1 tablet daily, Disp: 100 tablet, Rfl: 5    predniSONE 10 mg tablet, Take 3 tabs daily with breakfast for 3 days then Take 2 tabs daily for 3 Days then take one tab daily for 3 days then stop   , Disp: 20 tablet, Rfl: 0    pregabalin (LYRICA) 200 MG capsule, Take 1 capsule (200 mg total) by mouth 3 (three) times a day, Disp: 90 capsule, Rfl: 1    sucralfate (CARAFATE) 1 g tablet, TAKE 1 TABLET BY MOUTH 4 TIMES EVERY DAY ON AN EMPTY STOMACH 1 HOUR BEFORE MEALS AND AT BEDTIME, Disp: 360 tablet, Rfl: 1    traZODone (DESYREL) 50 mg tablet, TAKE 1 TABLET BY MOUTH EVERY DAY AT BEDTIME AS NEEDED, Disp: 90 tablet, Rfl: 1    vitamin A 2400 MCG (8000 UT) capsule, Take 1 capsule (8,000 Units total) by mouth daily, Disp: 90 capsule, Rfl: 1    Bempedoic Acid 180 MG TABS, Take 180 mg by mouth in the morning (Patient not taking: Reported on 8/17/2022), Disp: 90 tablet, Rfl: 3    cimetidine (TAGAMET) 200 mg tablet, Take 1 tablet (200 mg total) by mouth 2 (two) times a day (Patient not taking: No sig reported), Disp: 60 tablet, Rfl: 3    ergocalciferol (VITAMIN D2) 50,000 units, Take 1 capsule (50,000 Units total) by mouth once a week (Patient not taking: No sig reported), Disp: 13 capsule, Rfl: 2    Lancets Misc  (ACCU-CHEK FASTCLIX LANCET) KIT, 3 (three) times a day   (Patient not taking: Reported on 8/17/2022), Disp: , Rfl:     MICROLET LANCETS MISC, Microlet Lancet (Patient not taking: Reported on 8/17/2022), Disp: , Rfl:     OneTouch Verio test strip, USE TO TEST TWICE A DAY (Patient not taking: Reported on 8/17/2022), Disp: 100 strip, Rfl: 5    Vitamin A 2400 MCG (8000 UT) TABS, TAKE 1 CAPSULE (8,000 UNITS TOTAL) BY MOUTH DAILY (Patient not taking: Reported on 8/17/2022), Disp: , Rfl:     vitamin B-12 (VITAMIN B-12) 1,000 mcg tablet, Take 1 tablet (1,000 mcg total) by mouth daily (Patient not taking: No sig reported), Disp: 90 tablet, Rfl: 3    Current Facility-Administered Medications:     cyanocobalamin injection 1,000 mcg, 1,000 mcg, Intramuscular, Q30 Days, Aleksandar Edmondson MD, 1,000 mcg at 12/08/20 1118    cyanocobalamin injection 1,000 mcg, 1,000 mcg, Intramuscular, Q30 Days, Aleksandar Edmondson MD, 1,000 mcg at 07/08/20 1036    cyanocobalamin injection 1,000 mcg, 1,000 mcg, Intramuscular, Q30 Days, Aleksandar Edmondson MD, 1,000 mcg at 09/08/20 1210    cyanocobalamin injection 1,000 mcg, 1,000 mcg, Intramuscular, Q30 Days, Aleksandar Edmondson MD    cyanocobalamin injection 1,000 mcg, 1,000 mcg, Intramuscular, Q30 Days, Aleksandar Edmondson MD    cyanocobalamin injection 1,000 mcg, 1,000 mcg, Intramuscular, Q30 Days, Enrrique Arevalo MD, 1,000 mcg at 08/18/21 1019    Allergies   Allergen Reactions    Cephalosporins Hives    Erythromycin GI Intolerance    Fentanyl Itching     Occurred during surgery     Paxil [Paroxetine] Hives     After 2 weeks    Penicillins Itching    Pravastatin Myalgia    Statins Itching    Sulfa Antibiotics Diarrhea    Wellbutrin [Bupropion] Hives     After 2 weeks     Marzena's Wort Rash       Physical Exam:    /60   Pulse (!) 54   Ht 5' 5" (1 651 m)   Wt 82 1 kg (181 lb)   LMP  (LMP Unknown)   SpO2 97%   BMI 30 12 kg/m²     Constitutional:normal, well developed, well nourished, alert, in no distress and non-toxic and no overt pain behavior  Eyes:anicteric  HEENT:grossly intact  Neck:supple, symmetric, trachea midline and no masses   Pulmonary:even and unlabored  Cardiovascular:No edema or pitting edema present  Skin:Normal without rashes or lesions and well hydrated  Psychiatric:Mood and affect appropriate  Neurologic:Cranial Nerves II-XII grossly intact  Musculoskeletal:The patient's gait was slow, slightly antalgic, painful, but steady with the use of a single cane  Imaging  No orders to display         No orders of the defined types were placed in this encounter

## 2022-08-17 NOTE — PATIENT INSTRUCTIONS

## 2022-08-28 ENCOUNTER — RA CDI HCC (OUTPATIENT)
Dept: OTHER | Facility: HOSPITAL | Age: 72
End: 2022-08-28

## 2022-08-28 NOTE — PROGRESS NOTES
[id number]  [facility]  coding opportunities          Chart Reviewed number of suggestions sent to Provider: 1     Patients Insurance     Medicare Insurance: Laurence Constantino

## 2022-08-30 ENCOUNTER — OFFICE VISIT (OUTPATIENT)
Dept: GASTROENTEROLOGY | Facility: MEDICAL CENTER | Age: 72
End: 2022-08-30
Payer: COMMERCIAL

## 2022-08-30 VITALS
HEART RATE: 61 BPM | HEIGHT: 65 IN | DIASTOLIC BLOOD PRESSURE: 80 MMHG | BODY MASS INDEX: 29.69 KG/M2 | WEIGHT: 178.2 LBS | SYSTOLIC BLOOD PRESSURE: 114 MMHG | OXYGEN SATURATION: 97 %

## 2022-08-30 DIAGNOSIS — K21.9 GASTROESOPHAGEAL REFLUX DISEASE WITHOUT ESOPHAGITIS: Primary | ICD-10-CM

## 2022-08-30 DIAGNOSIS — R10.13 EPIGASTRIC ABDOMINAL PAIN: ICD-10-CM

## 2022-08-30 DIAGNOSIS — R68.81 EARLY SATIETY: ICD-10-CM

## 2022-08-30 DIAGNOSIS — Z98.84 HISTORY OF GASTRIC BYPASS: ICD-10-CM

## 2022-08-30 DIAGNOSIS — K52.9 CHRONIC DIARRHEA: ICD-10-CM

## 2022-08-30 DIAGNOSIS — R11.0 NAUSEA: ICD-10-CM

## 2022-08-30 DIAGNOSIS — K29.60 GASTRITIS, BILE ACID REFLUX: ICD-10-CM

## 2022-08-30 PROCEDURE — 99214 OFFICE O/P EST MOD 30 MIN: CPT | Performed by: INTERNAL MEDICINE

## 2022-08-30 RX ORDER — CHOLESTYRAMINE 4 G/9G
1 POWDER, FOR SUSPENSION ORAL
Qty: 30 PACKET | Refills: 1 | Status: SHIPPED | OUTPATIENT
Start: 2022-08-30

## 2022-08-30 NOTE — PROGRESS NOTES
[facility] - Outpatient Follow-up Note  Layton Greer 67 y o  female MRN: [id number]  Encounter: [id number]          ASSESSMENT AND PLAN:  70-year-old female with history of Sarah-en-Y gastric bypass, GERD, hypothyroidism, diabetes who presents for follow-up evaluation  1  Gastroesophageal reflux disease without esophagitis  2  Nausea  3  Gastritis, bile acid reflux  4  History of gastric bypass  5  Early satiety  6  Epigastric abdominal pain  She reports chronic symptoms of gastroesophageal reflux with substernal chest and throat burning     Symptoms were persistent despite twice daily PPI  She underwent EGD showing significant bile reflux into her dilated gastric pouch, small hiatal hernia, and no evidence of esophagitis  Upper GI small-bowel follow-through showed evidence of gastroesophageal reflux, small hiatal hernia otherwise normal postoperative findings  I discussed that some of her symptoms may be related to gastroparesis given her sensation of early satiety and low appetite  Risk factors for gastroparesis include her regular use of narcotic oral pain medications  I recommend obtaining a gastric emptying study however she prefers to wait at this time  I discussed treatment for gastroparesis including smaller, more frequent meals, low-fat and low-fiber diet  She will continue twice daily PPI and Carafate as needed  Given her dilated gastric pouch and bile reflux it is reasonable to referred to Bariatric surgery to discuss if revision of her gastric bypass should be considered given her persistent symptoms  I recommend that she start Questran daily and increase as needed to help with bile reflux as she also likely has an element of bile salt diarrhea, irritable bowel syndrome (see below)  Continue to take twice daily ppi  Continue dietary/lifestyle anti-reflux measures    - Ambulatory Referral to Bariatric Surgery; Future      7   Chronic diarrhea  She has history of chronic diarrhea  Prior stool infectious, fecal calprotectin and celiac testing was negative  Random colon biopsies were unremarkable  She may have an element of bile salt diarrhea, or diarrhea predominant irritable bowel syndrome  I recommended she start Questran 1 packet daily and discussed she should take this medication at least 60 minutes after her thyroid medication, with breakfast   She can up titrate as needed to help her symptoms  If she has no improvement of her diarrhea on the Questran continue to use Imodium as needed she may benefit from the low FODMAP diet in the future  - cholestyramine (QUESTRAN) 4 g packet; Take 1 packet (4 g total) by mouth daily with breakfast Take 60 minutes after your thyroid medication  Dispense: 30 packet; Refill: 1    ______________________________________________________________________    SUBJECTIVE:  70-year-old female with history of Sarah-en-Y gastric bypass, GERD, hypothyroidism, diabetes who presents for follow-up evaluation  She was last seen in the GI office February 2022  At that time she had new onset of diarrhea  She underwent stool infectious studies and fecal calprotectin, celiac serologies which were unremarkable  She also reported history of gastroesophageal reflux which was poorly controlled on daily PPI  Interval history:  She underwent EGD colonoscopy June 2022 showing small hiatal hernia, significant amount of bile reflux into the gastric pouch, dilated gastric pouch with areas of edema and erythema  Colonoscopy had 1 subcentimeter colon polyp, pathology did not show tubular adenoma  In addition to multiple lipomas throughout the colon  She continues to have substernal and throat burning sensation  This can occur at night and she has to sit with the head of her bed elevated  It can wake her from sleep  She has nausea  She notes early satiety    She is only able to eat small amounts of food has epigastric abdominal pain which went to passes after a few hours  She continues to take pantoprazole twice daily and Carafate as needed with some relief of her symptoms  She uses Norco at least 4-5 times per day for joint and generalized discomfort  She reports bowel movements can be 1 semi solid stool verses times she can have diarrhea up to 6 times per day  The stool is nonbloody  She uses Imodium as needed with relief  She reports gastric bypass was in 2010   Cholecystectomy was in 2003      She underwent upper GI fluoroscopy showing expected postoperative changes after Sarah-en-Y gastric bypass  2014 colonoscopy showed 2 polyps, diverticulosis and hemorrhoids she was recommended to repeat in 5 years  Pathology is not available for review  She reportedly had an EGD in 2014 which was normal, however procedure report is not available for review    REVIEW OF SYSTEMS IS OTHERWISE NEGATIVE    Ten point review of systems is negative other than stated as    Historical Information   Past Medical History:   Diagnosis Date    Anemia     Anxiety     hx of panic attacks (now under control)     Arthritis     Asthma     resolved (no problems in a decade)     Baker's cyst of knee     Bronchitis     Bunion, left foot     Cervical pain     Chronic GERD     Chronic pain     Chronic pain disorder     Depression     Diabetes mellitus, type 2 (HCC)     Diabetic peripheral neuropathy (HCC)     Endometriosis     Fibromyalgia     Hyperlipidemia     Hypertension     Joint pain     Low back pain     Low back pain     Lung nodules     Macular degeneration     Pulmonary emphysema ([facility] [address] ) 01/16/2020    Spondylosis     Uterine cancer ([facility] [address] )      Past Surgical History:   Procedure Laterality Date    BACK SURGERY  1996    L5, S1- laser surgery fusion of c5 and c6     BREAST CYST EXCISION Right     CHOLECYSTECTOMY  2004    FOOT SURGERY Left 2005    fusion    Cherylene Jose Raul BYPASS  2010    Dr Carol Singh HYSTERECTOMY  1985    just uterus     KNEE ARTHROSCOPY      LAMINECTOMY      ORTHOPEDIC SURGERY      OVARIAN CYST REMOVAL  1975    PELVIC LAPAROSCOPY      ovaries (x10)     PLEURAL SCARIFICATION  1967    SHOULDER OPEN ROTATOR CUFF REPAIR Left 2008    TONSILLECTOMY      VAGINAL PROLAPSE REPAIR       Social History   Social History     Substance and Sexual Activity   Alcohol Use Not Currently    Alcohol/week: 1 0 standard drink    Types: 1 Shots of liquor per week    Comment: rarely     Social History     Substance and Sexual Activity   Drug Use No     Social History     Tobacco Use   Smoking Status Current Every Day Smoker    Packs/day: 0 25    Years: 40 00    Pack years: 10 00    Types: Cigarettes   Smokeless Tobacco Current User     Family History   Problem Relation Age of Onset    Hypertension Mother     Lung cancer Mother     Hypertension Father     Heart disease Father     Heart attack Father     Cancer Father     No Known Problems Sister     No Known Problems Daughter     No Known Problems Maternal Grandmother     No Known Problems Maternal Grandfather     No Known Problems Paternal Grandmother     No Known Problems Paternal Grandfather     Breast cancer Paternal Aunt 36    Breast cancer Paternal Aunt 39    Breast cancer Maternal Aunt 48       Meds/Allergies       Current Outpatient Medications:     albuterol (PROVENTIL HFA,VENTOLIN HFA) 90 mcg/act inhaler    albuterol (PROVENTIL HFA,VENTOLIN HFA) 90 mcg/act inhaler    amLODIPine (NORVASC) 5 mg tablet    Calcium Carb-Cholecalciferol (Caltrate 600+D3) 600-800 MG-UNIT TABS    cetirizine (ZyrTEC) 10 mg tablet    diphenhydrAMINE (BENADRYL) 25 mg capsule    fluticasone-umeclidinium-vilanterol (TRELEGY) 100-62 5-25 MCG/INH inhaler    HYDROcodone-acetaminophen (NORCO)  mg per tablet    HYDROcodone-acetaminophen (NORCO)  mg per tablet    levothyroxine 25 mcg tablet    levothyroxine 50 mcg tablet    losartan (COZAAR) 25 mg tablet    nitrofurantoin (MACROBID) 100 mg capsule    pantoprazole (PROTONIX) 40 mg tablet    Pediatric Multiple Vit-C-FA (pediatric multivitamin) chewable tablet    pregabalin (LYRICA) 200 MG capsule    sucralfate (CARAFATE) 1 g tablet    traZODone (DESYREL) 50 mg tablet    vitamin A 2400 MCG (8000 UT) capsule    baclofen 10 mg tablet    Bempedoic Acid 180 MG TABS    cimetidine (TAGAMET) 200 mg tablet    ergocalciferol (VITAMIN D2) 50,000 units    Lancets Misc  (ACCU-CHEK FASTCLIX LANCET) KIT    MICROLET LANCETS MISC    OneTouch Verio test strip    predniSONE 10 mg tablet    Vitamin A 2400 MCG (8000 UT) TABS    vitamin B-12 (VITAMIN B-12) 1,000 mcg tablet    Current Facility-Administered Medications:     cyanocobalamin injection 1,000 mcg, 1,000 mcg, Intramuscular, Q30 Days, 1,000 mcg at 12/08/20 1118    cyanocobalamin injection 1,000 mcg, 1,000 mcg, Intramuscular, Q30 Days, 1,000 mcg at 07/08/20 1036    cyanocobalamin injection 1,000 mcg, 1,000 mcg, Intramuscular, Q30 Days, 1,000 mcg at 09/08/20 1210    cyanocobalamin injection 1,000 mcg, 1,000 mcg, Intramuscular, Q30 Days    cyanocobalamin injection 1,000 mcg, 1,000 mcg, Intramuscular, Q30 Days    cyanocobalamin injection 1,000 mcg, 1,000 mcg, Intramuscular, Q30 Days, 1,000 mcg at 08/18/21 1019    Allergies   Allergen Reactions    Cephalosporins Hives    Erythromycin GI Intolerance    Fentanyl Itching     Occurred during surgery     Paxil [Paroxetine] Hives     After 2 weeks    Penicillins Itching    Pravastatin Myalgia    Statins Itching    Sulfa Antibiotics Diarrhea    Wellbutrin [Bupropion] Hives     After 2 weeks     Marzena's Wort Rash           Objective     Blood pressure 114/80, pulse 61, height 5' 5" (1 651 m), weight 80 8 kg (178 lb 3 2 oz), SpO2 97 %, not currently breastfeeding  Body mass index is 29 65 kg/m²        PHYSICAL EXAM:      General Appearance:   Alert, cooperative, no distress   HEENT:   Normocephalic, atraumatic, anicteric  Neck:  Supple, symmetrical, trachea midline   Lungs:   Clear to auscultation bilaterally; no rales, rhonchi or wheezing; respirations unlabored    Heart[de-identified]   Regular rate and rhythm; no murmur, rub, or gallop  Abdomen:   Soft, epigastric tenderness to deep palpation without rebound or guarding non-distended; normal bowel sounds; no masses, no organomegaly    Genitalia:   Deferred    Rectal:   Deferred    Extremities:  No cyanosis, clubbing or edema    Pulses:  2+ and symmetric    Skin:  No jaundice, rashes, or lesions    Lymph nodes:  No palpable cervical lymphadenopathy        Lab Results:   No visits with results within 1 Day(s) from this visit  Latest known visit with results is:   Office Visit on 06/28/2022   Component Date Value    RESULT ALL_PRESC MEDS SP* 89/90/0259 Not Applicable     Alpha-Hydroxyalprazolam * 06/28/2022 negative     Amphetamine Quantificati* 06/28/2022 negative     Aripiprazole Quantificat* 06/28/2022 negative     [id number] QUANTIFICATION 06/28/2022 negative     EUTYLONE QUANTIFICATION 06/28/2022 negative     METHYLONE QUANTIFICATION 06/28/2022 negative     Buprenorphine Quantifica* 06/28/2022 negative     Norbuprenorphine Quantif* 06/28/2022 negative     Butalbital Quantification 06/28/2022 negative     7-Amino-Clonazepam Quant* 06/28/2022 negative     Clozapine Quantification 06/28/2022 negative     N-desmethylclozapine Kian* 06/28/2022 negative     Cocaine metabolite Quant* 06/28/2022 negative     Codeine Quantification 06/28/2022 negative     Morphine Quantification 06/28/2022 negative     Hydrocodone Quantificati* 06/28/2022 positive-5175  236     Norhydrocodone Quantific* 06/28/2022 positive-4170 129     Hydromorphone Quantifica* 06/28/2022 positive-[phone]     Desipramine Quantificati* 06/28/2022 negative     Dextromethorphan Quantif* 06/28/2022 negative     Dextrorphan (Dextrometho* 06/28/2022 negative     Nordiazepam Quantificati* 06/28/2022 negative     Temazepam Quantification 06/28/2022 negative     Oxazepam Quantification 06/28/2022 negative     Ethyl Glucuronide Quanti* 06/28/2022 negative     Ethyl Sulfate Quantifica* 06/28/2022 negative     Fentanyl Quantification 06/28/2022 negative     Norfentanyl Quantificati* 06/28/2022 negative     3-methyl-fentanyl Quanti* 06/28/2022 Fen Neg     4-ANPP Quantification 06/28/2022 Fen Neg     4-FiBF Quantification 06/28/2022 Fen Neg     Acetyl fentanyl Quantifi* 06/28/2022 Fen Neg     Acetyl norfentanyl Quant* 06/28/2022 Fen Neg     Acryl fentanyl Quantific* 06/28/2022 Fen Neg     Butryl fentanyl Quantifi* 06/28/2022 Fen Neg     Carfentanil Quantificati* 06/28/2022 Fen Neg     Cyclopropyl fentanyl Kian* 06/28/2022 Fen Neg     Furanyl fentanyl Quantif* 06/28/2022 Fen Neg     Methoxyacetyl fentanyl Q* 06/28/2022 Fen Neg     [id number] Quantification 06/28/2022 Fen Neg     N-desmethyl D-[id number] Tariq* 06/28/2022 Fen Neg     6-RODOLFO (Heroin metabolite* 06/28/2022 negative     Hydrocodone Lyndsey Natalie* 06/28/2022 positive-5175  236     Norhydrocodone Quantific* 06/28/2022 positive-4170 129     Hydromorphone Quantifica* 06/28/2022 positive-[phone]     Hydromorphone Quantifica* 06/28/2022 positive-[phone]     Mitragynine (Kratom jarett* 06/28/2022 negative     1-HF-Lppmozcmdch (Kratom* 06/28/2022 negative     Dextrorphan (Dextrometho* 06/28/2022 negative     Lorazepam Quantification 06/28/2022 negative     MDMA Quantification 06/28/2022 negative     Meperidine Quantification 06/28/2022 negative     Normeperidine Quantifica* 06/28/2022 negative     Methadone Quantification 06/28/2022 negative     EDDP (Methadone metaboli* 06/28/2022 negative     Amphetamine Quantificati* 06/28/2022 negative     Methamphetamine Quantifi* 06/28/2022 negative     Methylphenidate Quantifi* 06/28/2022 negative     RITALINIC ACID QUANTIFIC* 06/28/2022 negative     Morphine Quantification 06/28/2022 negative     Hydromorphone Quantifica* 06/28/2022 positive-[phone]     OLANZAPINE QUANTIFICATION 06/28/2022 negative     Oxazepam Quantification 06/28/2022 negative     Oxycodone Quantification 06/28/2022 negative     Noroxycodone Quantificat* 06/28/2022 negative     Oxymorphone Quantificati* 06/28/2022 negative     Oxymorphone Quantificati* 06/28/2022 negative     Phenobarbital Quantifica* 06/28/2022 negative     PHENTERMINE QUANTIFICATI* 06/28/2022 negative     Secobarbital Quantificat* 06/28/2022 negative     5F-[id number] 06/28/2022 negative     [id number] METABOLIT* 06/28/2022 negative     [id number]-[id number] METABOL* 06/28/2022 negative     [id number] metabolite Quanti* 06/28/2022 negative     [id number] metabolite Quanti* 06/28/2022 negative     RCS4 METABOLITE QUANTIFI* 06/28/2022 negative     UPE20/ METABOLITE Q* 06/28/2022 negative     Tapentadol Quantification 06/28/2022 negative     Temazepam Quantification 06/28/2022 negative     Oxazepam Quantification 06/28/2022 negative     cTHC (Marijuana metaboli* 06/28/2022 negative     Tramadol Quantification 06/28/2022 negative     O-desmethyl-tramadol Kian* 06/28/2022 negative     N-DESMETHYL-TRAMADOL KIAN* 06/28/2022 negative     SPECIFIC GRAVITY URINE 06/28/2022 normal-1 018     pH 06/28/2022 normal-5 9     OXIDANT 06/28/2022 normal-0     CREATININE 06/28/2022 normal-248  5          Radiology Results:   No results found

## 2022-09-07 ENCOUNTER — OFFICE VISIT (OUTPATIENT)
Dept: BARIATRICS | Facility: CLINIC | Age: 72
End: 2022-09-07
Payer: COMMERCIAL

## 2022-09-07 VITALS
WEIGHT: 179.5 LBS | HEIGHT: 65 IN | SYSTOLIC BLOOD PRESSURE: 138 MMHG | HEART RATE: 97 BPM | BODY MASS INDEX: 29.91 KG/M2 | TEMPERATURE: 97.5 F | DIASTOLIC BLOOD PRESSURE: 70 MMHG

## 2022-09-07 DIAGNOSIS — Z98.84 HISTORY OF GASTRIC BYPASS: ICD-10-CM

## 2022-09-07 DIAGNOSIS — Z98.84 BARIATRIC SURGERY STATUS: ICD-10-CM

## 2022-09-07 DIAGNOSIS — K90.9 INTESTINAL MALABSORPTION: ICD-10-CM

## 2022-09-07 DIAGNOSIS — F17.200 SMOKING: ICD-10-CM

## 2022-09-07 DIAGNOSIS — Z48.815 ENCOUNTER FOR SURGICAL AFTERCARE FOLLOWING SURGERY OF DIGESTIVE SYSTEM: Primary | ICD-10-CM

## 2022-09-07 DIAGNOSIS — K29.60 GASTRITIS, BILE ACID REFLUX: ICD-10-CM

## 2022-09-07 DIAGNOSIS — K91.2 POSTSURGICAL MALABSORPTION: ICD-10-CM

## 2022-09-07 PROCEDURE — 3078F DIAST BP <80 MM HG: CPT | Performed by: NURSE PRACTITIONER

## 2022-09-07 PROCEDURE — 3075F SYST BP GE 130 - 139MM HG: CPT | Performed by: NURSE PRACTITIONER

## 2022-09-07 PROCEDURE — 1160F RVW MEDS BY RX/DR IN RCRD: CPT | Performed by: NURSE PRACTITIONER

## 2022-09-07 PROCEDURE — 99203 OFFICE O/P NEW LOW 30 MIN: CPT | Performed by: NURSE PRACTITIONER

## 2022-09-07 NOTE — PROGRESS NOTES
Assessment/Plan:     Patient ID: Jenelle Beck is a 67 y o  female  Bariatric Surgery Status/GERD/smoking    -s/p Sarah-En-Y Gastric Bypass with Dr Mathew Oleary on 04/20/2010 at [facility]    Presents to the office today for OD annual and establishing care  She was referred to us by GI team due to ongoing GERD issues - had an UGI and EGD performed with the following results below  She is inquiring if she is a surgical candidate for a surgical revision due to significant amount of bile reflux in gastric pouch  She is getting associated epigastric pain with food intake especially with meats which is new for her  She feels she is having a lot of pressure after a meal  Now sitting up more at night to prevent aspiration  Taking pantoprazole twice daily and carafate 4 times per day - pill form  Denies NSAIDs use, ETOH use  She is SMOKING HALF A PACK TO A PACK PER DAY  Does drink ice tea throughout the day to prevent hypoglycemia  UGI - 07/13/2022  FINDINGS:  radiographs demonstrate a normal bowel gas pattern without obstruction  Moderate volumes of formed stool the colon  Postoperative suture lines in the left upper abdomen from prior Sarah-en-Y gastric bypass and also in the left lower   abdomen from the lower small bowel-small bowel anastomosis  Surgical clips in the right upper quadrant from prior cholecystectomy  Visualized lung bases are clear  Bony structures demineralized and degenerative, commensurate with patient's age      The patient was able swallow without difficulty  No aspiration  Posterior indentation from the cricopharyngeus muscle      The esophagus is normal in caliber  Esophageal motility is normal and emptying of contrast from the esophagus is prompt  There is no mucosal mass, ulceration, diverticulum or persistent fold thickening identified  Tiny right-sided laryngocele      Patient has undergone a Sarah-en-Y gastric bypass bariatric surgery    The gastric pouch is average size   No gross gastric mucosal abnormalities although evaluation limited with single contrast technique  No penetrating ulcers or masses      The gastrojejunostomy is widely patent with a small J-shaped pouch protruding laterally/caudally towards the left  Post anastomotic jejunum appears average caliber with unremarkable mucosal pattern  The isolated native stomach and duodenum are not   opacified      The more distal small bowel also demonstrates a normal mucosal pattern  Distal small bowel-small bowel anastomosis is patent in the left lower abdomen      Gastroesophageal reflux was observed        Tiny sliding hiatal hernia which did not change significantly with Valsalva            IMPRESSION:  Expected postoperative findings  Please see above report "         EGD - 06/27/2022   "IMPRESSION:  Small hiatal hernia  Otherwise normal esophagus  Proximal and distal biopsies were obtained     Significant amount of bile reflux in the gastric pouch  Dilated gastric pouch with areas of edema and erythema  Biopsied  Normal gj anastomosis  Normal jejunum  Biopsied     RECOMMENDATION:  Await pathology results  Continue high dose ppi  Obtain upper GI barium swallow  Proceed with colonoscopy"      Abnormal Weight Gain - reports she is not eating much, at times with hypogylcemia  She keeps glucose tablets with her  She feels nauseous with her food due to her acid reflux  Overall she is eating healthy  PLAN:     - Smoking cessation was highly encouraged  Ulceration risks and associated complications was discussed  Patient understood  I have discussed with patient she is unable to have surgery if she continues to smoke  - Discussed factors that may be triggering her reflux - smoking, high intake of caffeinated products  Advised she should cut down her caffeine intake and increase her protein intake to prevent her hypoglycemia episodes     - Routine follow up as scheduled with surgeon to discuss whether or not she is a surgical candidate  Follow up in 1 year for annual check  - continue with GERD precautions as discussed  Continue with PPI and caraate - recommend patient to melt pill in small amount of water to take as a suspension    - Continue with healthy lifestyle, adequate protein intake of 60 gm, fluid intake of at least 64 oz  - Continue with MVI daily  - Activity as tolerated  - Labs ordered and will adjust accordingly if any deficiency  - Follow up with RD and SW as needed  · Continued/Maintain healthy weight loss with good nutrition intakes  · Adequate hydration with at least 64oz  fluid intake  · Follow diet as discussed  · Follow vitamin and mineral recommendations as reviewed with you  · Exercise as tolerated  · Colonoscopy referral made: UTD   · Mammogram - UTD    · Follow-up in with surgeon to discuss surgical options  We kindly ask that your arrive 15 minutes before your scheduled appointment time with your provider to allow our staff to room you, get your vital signs and update your chart  · Get lab work done  Please call the office if you need a script  It is recommended to check with your insurance BEFORE getting labs done to make sure they are covered by your policy  · Call our office if you have any problems with abdominal pain especially associated with fever, chills, nausea, vomiting or any other concerns  · All  Post-bariatric surgery patients should be aware that very small quantities of any alcohol can cause impairment and it is very possible not to feel the effect  The effect can be in the system for several hours  It is also a stomach irritant  · It is advised to AVOID alcohol, Nonsteroidal antiinflammatory drugs (NSAIDS) and nicotine of all forms   Any of these can cause stomach irritation/pain  · Discussed the effects of alcohol on a bariatric patient and the increased impairment risk  · Keep up the good work!      Postsurgical Malabsorption   -At risk for malabsorption of vitamins/minerals secondary to malabsorption and restriction of intake from bariatric surgery  -Currently taking adequate postop bariatric surgery vitamin supplementation  -Last set of bariatric labs completed on 04/2022 and showed WNL - not all labs were done with PCP; will order the rest    -Next set of bariatric labs ordered for approximately 2 weeks  -Patient received education about the importance of adhering to a lifelong supplementation regimen to avoid vitamin/mineral deficiencies      Diagnoses and all orders for this visit:    Encounter for surgical aftercare following surgery of digestive system  -     Zinc; Future  -     Vitamin B1, whole blood; Future  -     PTH, intact; Future  -     Folate; Future    History of gastric bypass  -     Ambulatory Referral to Bariatric Surgery    Gastritis, bile acid reflux  -     Ambulatory Referral to Bariatric Surgery    Bariatric surgery status  -     Zinc; Future  -     Vitamin B1, whole blood; Future  -     PTH, intact; Future  -     Folate; Future    Postsurgical malabsorption  -     Zinc; Future  -     Vitamin B1, whole blood; Future  -     PTH, intact; Future  -     Folate; Future    Intestinal malabsorption  -     Zinc; Future  -     Vitamin B1, whole blood; Future  -     PTH, intact; Future  -     Folate; Future    BMI 29 0-29 9,adult    Smoking         Subjective:      Patient ID: Scar Street is a 67 y o  female  -s/p Sarah-En-Y Gastric Bypass with Dr Robert Pate on 04/20/2010 at [facility]    Presents to the office today for OD annual and establishing care  She was referred to us by GI team due to ongoing GERD issues - had an UGI and EGD performed with the following results below  She is inquiring if she is a surgical candidate for a surgical revision due to significant amount of bile reflux in gastric pouch  She is getting associated pain with food intake especially with meats which is new for her   She feels she is having a lot of pressure after a meal  Now sitting up more at night to prevent aspiration  Taking pantoprazole twice daily and carafate 4 times per day - pill form  Denies NSAIDs use, ETOH use  She is SMOKING HALF A PACK TO A PACK PER DAY  Does drink ice tea throughout the day to prevent hypoglycemia  Initial: 340 lbs  Current: 179 5 lbs  EWL: (Weight loss is ahead of schedule at this post surgical period )  Karl: 150 lbs (able to achieve and maintain until approximately 2 years ago)  Current BMI is Body mass index is 29 87 kg/m²  · Tolerating a regular diet-yes  · Eating at least 60 grams of protein per day-no - likely no; skips breakfast usually  · Following 30/60 minute rule with liquids-yes  · Drinking at least 64 ounces of fluid per day-yes  · Drinking carbonated beverages-rarely  · Sufficient exercise-no   · Using NSAIDs regularly-no  · Using nicotine-yes - HALF A PACK TO ONE PACK PER DAY  · Using alcohol-no  · Supplements: Multivitamins - pediatric     · EWL is 80%, which places the patient ahead of schedule for expected post surgical weight loss at this time  The following portions of the patient's history were reviewed and updated as appropriate: allergies, current medications, past family history, past medical history, past social history, past surgical history and problem list     Review of Systems   Constitutional: Negative  Respiratory: Negative  Cardiovascular: Negative  Gastrointestinal: Positive for abdominal pain (epigastric region), diarrhea (CHRONIC - RELIEVED WITH QUESTRAN) and nausea  Negative for vomiting  Musculoskeletal: Positive for arthralgias and back pain  Neurological: Negative  Psychiatric/Behavioral: Negative  Objective:    /70   Pulse 97   Temp 97 5 °F (36 4 °C) (Tympanic)   Ht 5' 5" (1 651 m)   Wt 81 4 kg (179 lb 8 oz)   LMP  (LMP Unknown)   BMI 29 87 kg/m²      Physical Exam  Vitals and nursing note reviewed     Constitutional:       Appearance: Normal appearance  Cardiovascular:      Rate and Rhythm: Normal rate and regular rhythm  Pulses: Normal pulses  Heart sounds: Normal heart sounds  Pulmonary:      Effort: Pulmonary effort is normal       Breath sounds: Normal breath sounds  Abdominal:      General: Bowel sounds are normal       Palpations: Abdomen is soft  Tenderness: There is abdominal tenderness  Musculoskeletal:         General: Normal range of motion  Skin:     General: Skin is warm and dry  Neurological:      General: No focal deficit present  Mental Status: She is alert and oriented to person, place, and time  Psychiatric:         Mood and Affect: Mood normal          Behavior: Behavior normal          Thought Content: Thought content normal          Judgment: Judgment normal            I have spent 40 minutes with patient to review past medical history, review of chart with patient, education and counseling on smoking cessation, and review of plan of care

## 2022-09-11 DIAGNOSIS — Z98.84 BARIATRIC SURGERY STATUS: ICD-10-CM

## 2022-09-11 DIAGNOSIS — G47.00 INSOMNIA, UNSPECIFIED TYPE: ICD-10-CM

## 2022-09-11 DIAGNOSIS — J30.9 ALLERGIC RHINITIS, UNSPECIFIED SEASONALITY, UNSPECIFIED TRIGGER: ICD-10-CM

## 2022-09-12 ENCOUNTER — VBI (OUTPATIENT)
Dept: ADMINISTRATIVE | Facility: OTHER | Age: 72
End: 2022-09-12

## 2022-09-12 RX ORDER — PEDIATRIC MULTIVITAMIN NO.17
TABLET,CHEWABLE ORAL
Qty: 100 TABLET | Refills: 5 | Status: SHIPPED | OUTPATIENT
Start: 2022-09-12

## 2022-09-12 RX ORDER — TRAZODONE HYDROCHLORIDE 50 MG/1
TABLET ORAL
Qty: 90 TABLET | Refills: 1 | Status: SHIPPED | OUTPATIENT
Start: 2022-09-12

## 2022-09-12 RX ORDER — CETIRIZINE HYDROCHLORIDE 10 MG/1
TABLET ORAL
Qty: 45 TABLET | Refills: 2 | Status: SHIPPED | OUTPATIENT
Start: 2022-09-12

## 2022-09-17 DIAGNOSIS — K27.9 PUD (PEPTIC ULCER DISEASE): ICD-10-CM

## 2022-09-19 RX ORDER — SUCRALFATE 1 G/1
TABLET ORAL
Qty: 360 TABLET | Refills: 1 | Status: SHIPPED | OUTPATIENT
Start: 2022-09-19

## 2022-09-23 ENCOUNTER — TELEPHONE (OUTPATIENT)
Dept: FAMILY MEDICINE CLINIC | Facility: CLINIC | Age: 72
End: 2022-09-23

## 2022-09-23 DIAGNOSIS — N39.0 ACUTE UTI: Primary | ICD-10-CM

## 2022-09-23 DIAGNOSIS — E11.8 TYPE 2 DIABETES MELLITUS WITH UNSPECIFIED COMPLICATIONS (HCC): ICD-10-CM

## 2022-09-23 RX ORDER — LEVOFLOXACIN 250 MG/1
250 TABLET ORAL DAILY
Qty: 7 TABLET | Refills: 0 | Status: SHIPPED | OUTPATIENT
Start: 2022-09-23 | End: 2022-09-30

## 2022-09-23 RX ORDER — BLOOD SUGAR DIAGNOSTIC
1 STRIP MISCELLANEOUS 2 TIMES DAILY
Qty: 100 STRIP | Refills: 5 | Status: SHIPPED | OUTPATIENT
Start: 2022-09-23

## 2022-10-05 ENCOUNTER — OFFICE VISIT (OUTPATIENT)
Dept: BARIATRICS | Facility: CLINIC | Age: 72
End: 2022-10-05
Payer: COMMERCIAL

## 2022-10-05 VITALS
HEIGHT: 65 IN | HEART RATE: 55 BPM | BODY MASS INDEX: 29.49 KG/M2 | WEIGHT: 177 LBS | SYSTOLIC BLOOD PRESSURE: 116 MMHG | TEMPERATURE: 97.5 F | DIASTOLIC BLOOD PRESSURE: 78 MMHG

## 2022-10-05 DIAGNOSIS — Z48.815 ENCOUNTER FOR SURGICAL AFTERCARE FOLLOWING SURGERY OF DIGESTIVE SYSTEM: ICD-10-CM

## 2022-10-05 DIAGNOSIS — K21.9 GERD (GASTROESOPHAGEAL REFLUX DISEASE): Primary | ICD-10-CM

## 2022-10-05 PROCEDURE — 99203 OFFICE O/P NEW LOW 30 MIN: CPT | Performed by: SURGERY

## 2022-10-05 NOTE — PROGRESS NOTES
OFFICE VISIT - BARIATRIC SURGERY  Jenelle Beck 67 y o  female MRN: [id number]  Unit/Bed#:  Encounter: [id number]      HPI:  Jenelle Beck is a 67 y o  female status post Laparoscopic Sarah-En-Y Gastric Bypass by Dr Jj in 2010  Comes to the office today with complaints of reflux for evaluation  Subjective   She is getting associated epigastric pain with food intake after meals along with a lot of pressure  Now sitting up more at night to prevent aspiration, which has happened 4 times  Taking pantoprazole twice daily and carafate 4 times per day  Had EGD and UGI for evaluation for which she is here to review today  Review of Systems   Constitutional: Negative  Respiratory: Negative  Cardiovascular: Negative  Gastrointestinal: Positive for abdominal pain  Negative for nausea and vomiting         Historical Information   Past Medical History:   Diagnosis Date    Anemia     Anxiety     hx of panic attacks (now under control)     Arthritis     Asthma     resolved (no problems in a decade)     Baker's cyst of knee     Bronchitis     Bunion, left foot     Cervical pain     Chronic GERD     Chronic pain     Chronic pain disorder     Depression     Diabetes mellitus, type 2 (HCC)     Diabetic peripheral neuropathy (HCC)     Endometriosis     Fibromyalgia     Hyperlipidemia     Hypertension     Joint pain     Low back pain     Low back pain     Lung nodules     Macular degeneration     Pulmonary emphysema ([facility] [address] ) 01/16/2020    Spondylosis     Uterine cancer ([facility] [address] )      Past Surgical History:   Procedure Laterality Date    BACK SURGERY  1996    L5, S1- laser surgery fusion of c5 and c6     BREAST CYST EXCISION Right     CHOLECYSTECTOMY  2004    FOOT SURGERY Left 2005    fusion     FRACTURE SURGERY      GASTRIC BYPASS  2010    Dr Beatriz Rubio    just uterus     KNEE ARTHROSCOPY      LAMINECTOMY      ORTHOPEDIC SURGERY      OVARIAN CYST REMOVAL  1975  PELVIC LAPAROSCOPY      ovaries (x10)     PLEURAL SCARIFICATION  1967    SHOULDER OPEN ROTATOR CUFF REPAIR Left 2008    TONSILLECTOMY      VAGINAL PROLAPSE REPAIR       Social History   Social History     Substance and Sexual Activity   Alcohol Use Not Currently    Alcohol/week: 1 0 standard drink    Types: 1 Shots of liquor per week    Comment: rarely     Social History     Substance and Sexual Activity   Drug Use No     Social History     Tobacco Use   Smoking Status Current Every Day Smoker    Packs/day: 0 25    Years: 40 00    Pack years: 10 00    Types: Cigarettes   Smokeless Tobacco Current User       Objective       Current Vitals:   Blood Pressure: 116/78 (10/05/22 0949)  Pulse: 55 (10/05/22 0949)  Temperature: 97 5 °F (36 4 °C) (10/05/22 0949)  Temp Source: Tympanic (10/05/22 0949)  Height: 5' 5" (165 1 cm) (10/05/22 0949)  Weight - Scale: 80 3 kg (177 lb) (10/05/22 0949)    Invasive Devices  Report    None                 Physical Exam  Constitutional:       General: She is not in acute distress  Appearance: Normal appearance  She is not toxic-appearing  Cardiovascular:      Rate and Rhythm: Normal rate and regular rhythm  Pulses: Normal pulses  Heart sounds: Normal heart sounds  Pulmonary:      Effort: Pulmonary effort is normal  No respiratory distress  Breath sounds: Normal breath sounds  No wheezing  Abdominal:      General: Abdomen is flat  There is no distension  Palpations: Abdomen is soft  Tenderness: There is no abdominal tenderness  Neurological:      Mental Status: She is alert  Pathology, and Other Studies: I have personally reviewed pertinent films in PACS       UGI  IMPRESSION:  Expected postoperative findings  EGD  IMPRESSION:  Small hiatal hernia  Otherwise normal esophagus  Proximal and distal biopsies were obtained     Significant amount of bile reflux in the gastric pouch   Dilated gastric pouch with areas of edema and erythema  Biopsied  Normal gj anastomosis  Normal jejunum  Biopsied    Pathology from EGD   Lab Results   Component Value Date    FINALDX  06/27/2022     A  Jejunum, biopsy:  - Fragments of small intestinal mucosa without significant histopathologic changes  - There is no blunting of the intestinal villi or increased number of intraepithelial lymphocytes to suggest the presence of gluten sensitive enteropathy  B  Stomach, biopsy gastric pouch:  - Fragments of gastric oxyntic mucosa with mild chronic nonspecific inflammation   - No Helicobacter pylori organisms are identified with routine H&E stain  C  Esophagus, biopsy distal esophagus:  - Fragments of squamous mucosa without significant histopathologic changes  - There is no increase in the number of intraepithelial eosinophils to suggest the presence of eosinophilic esophagitis  D  Esophagus, biopsy proximal esophagus:  - Fragments of squamous mucosa without significant histopathologic changes  - There is no increase in the number of intraepithelial eosinophils to suggest the presence of eosinophilic esophagitis  E  Stomach, gastric pouch biopsy for abnormal mucosa:  - Fragments of gastric oxyntic mucosa with mild chronic nonspecific inflammation   - No Helicobacter pylori organisms are identified with routine H&E stain   - Negative for dysplasia or carcinoma  F  Colon, random colon biopsy:  - Fragments of colonic mucosa without significant histopathologic changes  - There is no increase in the number of intraepithelial lymphocytes or thickened basement membranes to suggest the presence of microscopic colitis  G  Large Intestine, Right/Ascending Colon, Ascending colon biopsy of lipoma:  - Fragments of colonic mucosa without significant histopathologic changes   - No submucosa identified       H  Large Intestine, Right/Ascending Colon, Biopsy of Lipoma ascending colon:  - Fragments of benign colonic mucosa and submucosal mature adipose tissue, compatible with lipoma  I  Large Intestine, Sigmoid Colon, biopsy sigmoid colon polyp:  - Benign colonic mucosa prominent lymphoid aggregate and focal hyperplastic changes  J  Rectum, biopsy Lipoma rectum at 10 cm:  - Fragments of benign colonic mucosa and submucosal mature adipose tissue, compatible with lipoma              Assessment/PLAN:    Josef Vásquez is a 67 y o  female status post Laparoscopic Sarah-En-Y Gastric Bypass by Dr Mavis Rouse in 2010, now with severe GERD    Workup thus far reviewed and discussed with patient  --------------------------------------------------------------------    - Will need 24h pH testing and Manometry  - Possibility of revisional surgery discussed  - We will refer for medical clearance preoperatively if deemed surgical candidate  - Follow up after completion of study              Cecile Cortes DO  Bariatric Surgery Fellow  [facility]  10/5/2022  10:35 AM

## 2022-10-15 DIAGNOSIS — R79.89 LOW SERUM VITAMIN A: ICD-10-CM

## 2022-10-17 RX ORDER — MULTIVITAMIN WITH IRON
8000 TABLET ORAL DAILY
Qty: 90 CAPSULE | Refills: 1 | Status: SHIPPED | OUTPATIENT
Start: 2022-10-17

## 2022-10-19 ENCOUNTER — TELEPHONE (OUTPATIENT)
Dept: GASTROENTEROLOGY | Facility: HOSPITAL | Age: 72
End: 2022-10-19

## 2022-10-20 ENCOUNTER — OFFICE VISIT (OUTPATIENT)
Dept: PAIN MEDICINE | Facility: MEDICAL CENTER | Age: 72
End: 2022-10-20
Payer: COMMERCIAL

## 2022-10-20 ENCOUNTER — TELEPHONE (OUTPATIENT)
Dept: PAIN MEDICINE | Facility: MEDICAL CENTER | Age: 72
End: 2022-10-20

## 2022-10-20 ENCOUNTER — TELEPHONE (OUTPATIENT)
Dept: GASTROENTEROLOGY | Facility: HOSPITAL | Age: 72
End: 2022-10-20

## 2022-10-20 ENCOUNTER — TELEPHONE (OUTPATIENT)
Dept: GASTROENTEROLOGY | Facility: MEDICAL CENTER | Age: 72
End: 2022-10-20

## 2022-10-20 VITALS
WEIGHT: 177 LBS | BODY MASS INDEX: 29.49 KG/M2 | SYSTOLIC BLOOD PRESSURE: 107 MMHG | HEIGHT: 65 IN | HEART RATE: 60 BPM | DIASTOLIC BLOOD PRESSURE: 63 MMHG

## 2022-10-20 DIAGNOSIS — M54.42 CHRONIC BILATERAL LOW BACK PAIN WITH BILATERAL SCIATICA: ICD-10-CM

## 2022-10-20 DIAGNOSIS — M54.16 LUMBAR RADICULITIS: ICD-10-CM

## 2022-10-20 DIAGNOSIS — G89.29 CHRONIC BILATERAL LOW BACK PAIN WITH BILATERAL SCIATICA: ICD-10-CM

## 2022-10-20 DIAGNOSIS — M54.2 NECK PAIN: ICD-10-CM

## 2022-10-20 DIAGNOSIS — G89.4 CHRONIC PAIN SYNDROME: Primary | ICD-10-CM

## 2022-10-20 DIAGNOSIS — M54.41 CHRONIC BILATERAL LOW BACK PAIN WITH BILATERAL SCIATICA: ICD-10-CM

## 2022-10-20 DIAGNOSIS — F11.20 UNCOMPLICATED OPIOID DEPENDENCE (HCC): ICD-10-CM

## 2022-10-20 DIAGNOSIS — M79.18 MYOFASCIAL PAIN SYNDROME: ICD-10-CM

## 2022-10-20 DIAGNOSIS — Z79.891 LONG-TERM CURRENT USE OF OPIATE ANALGESIC: ICD-10-CM

## 2022-10-20 PROCEDURE — 80305 DRUG TEST PRSMV DIR OPT OBS: CPT | Performed by: PHYSICIAN ASSISTANT

## 2022-10-20 PROCEDURE — 99214 OFFICE O/P EST MOD 30 MIN: CPT | Performed by: PHYSICIAN ASSISTANT

## 2022-10-20 RX ORDER — NORTRIPTYLINE HYDROCHLORIDE 10 MG/1
10 CAPSULE ORAL
Qty: 90 CAPSULE | Refills: 1 | Status: SHIPPED | OUTPATIENT
Start: 2022-10-20

## 2022-10-20 RX ORDER — PREGABALIN 200 MG/1
200 CAPSULE ORAL 3 TIMES DAILY
Qty: 90 CAPSULE | Refills: 1 | Status: CANCELLED | OUTPATIENT
Start: 2022-10-20 | End: 2022-11-19

## 2022-10-20 RX ORDER — MULTIVITAMIN WITH IRON
TABLET ORAL
COMMUNITY
Start: 2022-10-04 | End: 2023-07-24 | Stop reason: SDUPTHER

## 2022-10-20 RX ORDER — HYDROCODONE BITARTRATE AND ACETAMINOPHEN 10; 325 MG/1; MG/1
TABLET ORAL
Qty: 130 TABLET | Refills: 0 | Status: SHIPPED | OUTPATIENT
Start: 2022-10-20

## 2022-10-20 NOTE — TELEPHONE ENCOUNTER
Pt contacted Call Center requested refill of their medication  Medication Name:  Lyrica  Hydrocodone  And the new script you were going to fill  Dosage of Med:   200mg  10-325mg      Frequency of Med:   1 cap 3x a day  4 to 5 tabs as needed    Remaining Medication:   2  10  Pharmacy and Location:     [facility] on file    Pt  Preferred Callback Phone Number:   [phone]    Thank you      Pharmacy said they don't have any scripts

## 2022-10-20 NOTE — TELEPHONE ENCOUNTER
Per pharmacist, pt was there to  the medications hours ago  Pharmacist stated that she called soon after her appt and they hadn't received the scripts yet

## 2022-10-20 NOTE — PROGRESS NOTES
Assessment:  1  Chronic pain syndrome    2  Uncomplicated opioid dependence ([facility] [address] )    3  Long-term current use of opiate analgesic    4  Chronic bilateral low back pain with bilateral sciatica    5  Myofascial pain syndrome    6  Neck pain    7  Lumbar radiculitis        Plan:  While the patient was in the office today, I did have a thorough conversation regarding their chronic pain syndrome, medication management, and treatment plan options  Today as provided refills the Norco 10/325 with a do not fill date of 10/23/2022 and 11/20/2022  I have also started the patient on nortriptyline 10 mg to take at bedtime and hopefully this provides relief of the lower extremity symptoms that are occurring at nighttime  She will continue the Lyrica 200 mg t i d  as well as the baclofen 10 mg t i d  and does not require refills for those on today's visit  She is requesting her next refill to be brand necessary  I told her that I could send it that way to the pharmacy however I can not guarantee that it will be covered by her insurance  There are risks associated with opioid medications, including dependence, addiction and tolerance  The patient understands and agrees to use these medications only as prescribed  Potential side effects of the medications include, but are not limited to, constipation, drowsiness, addiction, impaired judgment and risk of fatal overdose if not taken as prescribed  The patient was warned against driving while taking sedation medications  Sharing medications is a felony  At this point in time, the patient is showing no signs of addiction, abuse, diversion or suicidal ideation  A urine drug screen was collected at today's office visit as part of our medication management protocol  The point of care testing results were appropriate for what was being prescribed  The specimen will be sent for confirmatory testing   The drug screen is medically necessary because the patient is either dependent on opioid medication or is being considered for opioid medication therapy and the results could impact ongoing or future treatment  The drug screen is to evaluate for the presences or absence of prescribed, non-prescribed, and/or illicit drugs/substances  [place] Prescription Drug Monitoring Program report was reviewed and was appropriate     My impressions and treatment recommendations were discussed in detail with the patient who verbalized understanding and had no further questions  Discharge instructions were provided  I personally saw and examined the patient and I agree with the above discussed plan of care  Orders Placed This Encounter   Procedures   • MM ALL_Prescribed Meds and Special Instructions     Order Specific Question:   Millennium Is HYDROCODONE/APAP prescribed? Answer:   Yes     Order Specific Question:   Millennium Is TRAZODONE prescribed? Answer:    Yes   • MM DT_Alprazolam Definitive Test   • MM DT_Amitriptyline Definitive Test   • MM DT_Amphetamine Definitive Test   • MM DT_Buprenorphine Definitive Test   • MM DT_Carisoprodol Definitive Test   • MM DT_Clonazepam Definitive Test   • MM DT_Cocaine Definitive Test   • MM DT_Codeine Definitive Test   • MM Diazepam Definitive Test   • MM DT_Ethyl Glucuronide/Ethyl Sulfate Definitive Test   • MM DT_Fentanyl Definitive Test   • MM DT_Heroin Definitive Test   • MM DT_Hydrocodone Definitive Test   • MM DT_Hydromorphone Definitive Test   • MM DT_Kratom Definitive Test   • MM Lorazepam Definitive Test   • MM DT_MDMA Definitive Test   • MM DT_Methadone Definitive Test   • MM DT_Methamphetaine-d/l Isomers Definitive   • MM DT_Methamphetamine Definitive Test   • MM DT_Morphine Definitive Test   • MM DT_Oxazepam Definitive Test   • MM DT_Oxycodone Definitive Test   • MM DT_Oxymorphone Definitive Test   • MM DT_Phentermine Definitive Test   • MM DT_Secobarbital Definitive Test   • MM DT_Tapentadol Definitive Test   • MM DT_Temazapam Definitive Test   • MM DT_THC Definitive Test   • MM DT_Tramadol Definitive Test   • MM DT_Validity Creatinine   • MM DT_Validity Oxidant   • MM DT_Validity pH   • MM DT_Validity Specific     New Medications Ordered This Visit   Medications   • nortriptyline (PAMELOR) 10 mg capsule     Sig: Take 1 capsule (10 mg total) by mouth daily at bedtime     Dispense:  90 capsule     Refill:  1   • HYDROcodone-acetaminophen (NORCO)  mg per tablet     Si tablet every 4-6 hours prn for ongoing therapy     Dispense:  130 tablet     Refill:  0     Do not fill before 22   • HYDROcodone-acetaminophen (NORCO)  mg per tablet     Sig: Take 1 tablet every 4-6 hours prn  For ongoing therapy  Do not fill before 22     Dispense:  130 tablet     Refill:  0     Do not fill before 22       History of Present Illness:  Emy Peñaloza is a 67 y o  female who presents for a follow up office visit in regards to Back Pain and neck pain  The patient’s current symptoms include chronic low back pain that she currently rates an 8/10 on the pain scale  The pain is described as a constant burning, dull, aching, sharp, cramping, pressure-like and shooting pain that radiates into bilateral lower extremities  She admits to intermittent numbness and paresthesias as well as weakness of the legs  Unfortunately she has failed to respond to injection therapy in the past   She is maintained on the combination of medication which include Lyrica 200 mg t i d , baclofen 10 mg t i d  and Norco 10/325 which she takes 1 tablet every 4-6 hours as needed  She finds about 30% relief with this medication regimen however she is still describing quite a bit of neuropathic pain at nighttime has difficulty sleeping as a result      Opioid contract date 22    Last UDS Date 22    norco  last taken on 10 AM    I have personally reviewed and/or updated the patient's past medical history, past surgical history, family history, social history, current medications, allergies, and vital signs today  Review of Systems   Respiratory: Negative for shortness of breath  Cardiovascular: Negative for chest pain  Gastrointestinal: Positive for diarrhea  Negative for constipation, nausea and vomiting  Musculoskeletal: Positive for arthralgias, back pain and joint swelling  Negative for gait problem and myalgias  Skin: Negative for rash  Neurological: Positive for dizziness  Negative for seizures and weakness  All other systems reviewed and are negative        Patient Active Problem List   Diagnosis   • Chronic pain syndrome   • Cervical radiculopathy   • Myofascial pain syndrome   • Spondylosis of cervical region without myelopathy or radiculopathy   • Fibromyalgia   • Diabetic peripheral neuropathy (HCC)   • Long-term current use of opiate analgesic   • MGUS (monoclonal gammopathy of unknown significance)   • Iron deficiency anemia following bariatric surgery   • Light headedness   • Uncomplicated opioid dependence ([facility])   • Rheumatoid arthritis of hand ([facility] )   • Pulmonary emphysema ([facility])   • Primary osteoarthritis of right knee   • Pseudogout of left knee   • Lumbar radiculitis   • Chronic bilateral low back pain with bilateral sciatica   • Rotator cuff syndrome, left   • Left shoulder pain   • Dizziness   • Other fatigue   • Biceps tendinitis of left shoulder   • Adhesive capsulitis of left shoulder   • Hypoglycemia   • Hypothyroidism due to Hashimoto's thyroiditis   • Type 2 diabetes mellitus with hyperlipidemia ([facility])   • Iron deficiency anemia, unspecified       Past Medical History:   Diagnosis Date   • Anemia    • Anxiety     hx of panic attacks (now under control)    • Arthritis    • Asthma     resolved (no problems in a decade)    • Baker's cyst of knee    • Bronchitis    • Bunion, left foot    • Cervical pain    • Chronic GERD    • Chronic pain    • Chronic pain disorder    • Depression    • Diabetes mellitus, type 2 ([facility] ) • Diabetic peripheral neuropathy ([facility] )    • Endometriosis    • Fibromyalgia    • Hyperlipidemia    • Hypertension    • Joint pain    • Low back pain    • Low back pain    • Lung nodules    • Macular degeneration    • Pulmonary emphysema ([facility] ) 01/16/2020   • Spondylosis    • Uterine cancer ([facility] )        Past Surgical History:   Procedure Laterality Date   • BACK SURGERY  1996    L5, S1- laser surgery fusion of c5 and c6    • BREAST CYST EXCISION Right    • CHOLECYSTECTOMY  2004   • FOOT SURGERY Left 2005    fusion    • FRACTURE SURGERY     • GASTRIC BYPASS  2010    Dr Philip Blevins    • Yañez Leeport    just uterus    • KNEE ARTHROSCOPY     • LAMINECTOMY     • ORTHOPEDIC SURGERY     • OVARIAN CYST REMOVAL  1975   • PELVIC LAPAROSCOPY      ovaries (x10)    • PLEURAL SCARIFICATION  1967   • SHOULDER OPEN ROTATOR CUFF REPAIR Left 2008   • TONSILLECTOMY     • VAGINAL PROLAPSE REPAIR         Family History   Problem Relation Age of Onset   • Hypertension Mother    • Lung cancer Mother    • Hypertension Father    • Heart disease Father    • Heart attack Father    • Cancer Father    • No Known Problems Sister    • No Known Problems Daughter    • No Known Problems Maternal Grandmother    • No Known Problems Maternal Grandfather    • No Known Problems Paternal Grandmother    • No Known Problems Paternal Grandfather    • Breast cancer Paternal Aunt 44   • Breast cancer Paternal Aunt 40   • Breast cancer Maternal Aunt 50       Social History     Occupational History   • Not on file   Tobacco Use   • Smoking status: Current Every Day Smoker     Packs/day: 0 25     Years: 40 00     Pack years: 10 00     Types: Cigarettes   • Smokeless tobacco: Current User   Vaping Use   • Vaping Use: Never used   Substance and Sexual Activity   • Alcohol use: Not Currently     Alcohol/week: 1 0 standard drink     Types: 1 Shots of liquor per week     Comment: rarely   • Drug use: No   • Sexual activity: Not Currently     Partners: Male Current Outpatient Medications on File Prior to Visit   Medication Sig   • albuterol (PROVENTIL HFA,VENTOLIN HFA) 90 mcg/act inhaler    • albuterol (PROVENTIL HFA,VENTOLIN HFA) 90 mcg/act inhaler Inhale 2 puffs every 4 (four) hours as needed for wheezing   • amLODIPine (NORVASC) 5 mg tablet Take 1 tablet (5 mg total) by mouth daily   • baclofen 10 mg tablet Take 1 tablet (10 mg total) by mouth 3 (three) times a day   • Calcium Carb-Cholecalciferol (Caltrate 600+D3) 600-800 MG-UNIT TABS Take 1 tablet by mouth 2 (two) times a day with meals   • cetirizine (ZyrTEC) 10 mg tablet TAKE 1/2 TABLET BY MOUTH EVERY DAY   • fluticasone-umeclidinium-vilanterol (TRELEGY) 100-62 5-25 MCG/INH inhaler Inhale 1 puff daily Rinse mouth after use     • levothyroxine 25 mcg tablet TAKE 1 TABLET (25 MCG TOTAL) BY MOUTH EVERY OTHER DAY   • levothyroxine 50 mcg tablet Take 1 tablet (50 mcg total) by mouth every other day   • losartan (COZAAR) 25 mg tablet Take 1 tablet (25 mg total) by mouth daily   • nitrofurantoin (MACROBID) 100 mg capsule Take 1 capsule (100 mg total) by mouth daily With food/Lunch (Patient not taking: No sig reported)   • pantoprazole (PROTONIX) 40 mg tablet Take 1 tablet (40 mg total) by mouth 2 (two) times a day   • Pediatric Multiple Vit-C-FA (Childrens Chew Multivitamin) CHEW CHEW AND SWALLOW 1 TABLET BY MOUTH EVERY DAY   • pregabalin (LYRICA) 200 MG capsule Take 1 capsule (200 mg total) by mouth 3 (three) times a day   • sucralfate (CARAFATE) 1 g tablet TAKE 1 TABLET BY MOUTH 4 TIMES EVERY DAY ON AN EMPTY STOMACH 1 HOUR BEFORE MEALS AND AT BEDTIME   • traZODone (DESYREL) 50 mg tablet TAKE 1 TABLET BY MOUTH EVERY DAY AT BEDTIME AS NEEDED   • vitamin A 2400 MCG (8000 UT) capsule TAKE 1 CAPSULE (8,000 UNITS TOTAL) BY MOUTH DAILY   • [DISCONTINUED] HYDROcodone-acetaminophen (NORCO)  mg per tablet Take 4-5 tablets daily PRN   • [DISCONTINUED] HYDROcodone-acetaminophen (NORCO)  mg per tablet Take 4-5 tablets daily as needed   • Bempedoic Acid 180 MG TABS Take 180 mg by mouth in the morning (Patient not taking: No sig reported)   • cholestyramine (QUESTRAN) 4 g packet Take 1 packet (4 g total) by mouth daily with breakfast Take 60 minutes after your thyroid medication (Patient not taking: No sig reported)   • cimetidine (TAGAMET) 200 mg tablet Take 1 tablet (200 mg total) by mouth 2 (two) times a day (Patient not taking: No sig reported)   • diphenhydrAMINE (BENADRYL) 25 mg capsule Take 50 mg by mouth daily    • ergocalciferol (VITAMIN D2) 50,000 units Take 1 capsule (50,000 Units total) by mouth once a week (Patient not taking: No sig reported)   • glucose blood (OneTouch Verio) test strip Use 1 each 2 (two) times a day Use to test (Patient not taking: Reported on 10/20/2022)   • Lancets Misc  (ACCU-CHEK FASTCLIX LANCET) KIT 3 (three) times a day (Patient not taking: Reported on 10/20/2022)   • MICROLET LANCETS MISC Microlet Lancet (Patient not taking: Reported on 10/20/2022)   • predniSONE 10 mg tablet Take 3 tabs daily with breakfast for 3 days then Take 2 tabs daily for 3 Days then take one tab daily for 3 days then stop    (Patient not taking: No sig reported)   • Vitamin A 2400 MCG (8000 UT) TABS TAKE 1 CAPSULE (8,000 UNITS TOTAL) BY MOUTH DAILY (Patient not taking: Reported on 10/20/2022)   • vitamin B-12 (VITAMIN B-12) 1,000 mcg tablet Take 1 tablet (1,000 mcg total) by mouth daily (Patient not taking: No sig reported)     Current Facility-Administered Medications on File Prior to Visit   Medication   • cyanocobalamin injection 1,000 mcg   • cyanocobalamin injection 1,000 mcg   • cyanocobalamin injection 1,000 mcg   • cyanocobalamin injection 1,000 mcg   • cyanocobalamin injection 1,000 mcg   • cyanocobalamin injection 1,000 mcg       Allergies   Allergen Reactions   • Cephalosporins Hives   • Erythromycin GI Intolerance   • Fentanyl Itching     Occurred during surgery    • Paxil [Paroxetine] Hives After 2 weeks   • Penicillins Itching   • Pravastatin Myalgia   • Statins Itching   • Sulfa Antibiotics Diarrhea   • Wellbutrin [Bupropion] Hives     After 2 weeks    • Marzena's Wort Rash       Physical Exam:    /63   Pulse 60   Ht 5' 5" (1 651 m)   Wt 80 3 kg (177 lb)   LMP  (LMP Unknown)   BMI 29 45 kg/m²     Constitutional:normal, well developed, well nourished, alert, in no distress and non-toxic and no overt pain behavior    Eyes:anicteric  HEENT:grossly intact  Neck:supple, symmetric, trachea midline and no masses   Pulmonary:even and unlabored  Cardiovascular:No edema or pitting edema present  Skin:Normal without rashes or lesions and well hydrated  Psychiatric:Mood and affect appropriate  Neurologic:Cranial Nerves II-XII grossly intact  Musculoskeletal:normal    Imaging

## 2022-10-20 NOTE — TELEPHONE ENCOUNTER
Patient left voicemail wanting to know who we recommend to see for Refractory Celiac Disease  Please Advise

## 2022-10-21 RX ORDER — PREGABALIN 200 MG/1
200 CAPSULE ORAL 3 TIMES DAILY
Qty: 90 CAPSULE | Refills: 1 | Status: SHIPPED | OUTPATIENT
Start: 2022-10-21

## 2022-10-21 NOTE — TELEPHONE ENCOUNTER
Caller: Ramonita Gould    Doctor: Hood Sloan    Reason for call: patient called tating that her script for pregabalin (LYRICA) 200 MG capsule  never made it to the pharmacy     Call back#: [phone]

## 2022-10-21 NOTE — TELEPHONE ENCOUNTER
**JE/MMG PT**    Lyrica unable to be refilled due to end date of script 10/20/22  Lyrica had 1 refill remaining 1 tab (200mg) TID  Pls send refill to pharm on file  Pls advise

## 2022-10-22 LAB
6MAM UR QL CFM: NEGATIVE NG/ML
7AMINOCLONAZEPAM UR QL CFM: NEGATIVE NG/ML
A-OH ALPRAZ UR QL CFM: NEGATIVE NG/ML
ACCEPTABLE CREAT UR QL: NORMAL MG/DL
ACCEPTIBLE SP GR UR QL: NORMAL
AMITRIP UR QL CFM: NEGATIVE NG/ML
AMPHET UR QL CFM: NEGATIVE NG/ML
AMPHET UR QL CFM: NEGATIVE NG/ML
BUPRENORPHINE UR QL CFM: NEGATIVE NG/ML
BZE UR QL CFM: NEGATIVE NG/ML
CARISOPRODOL UR QL CFM: NEGATIVE NG/ML
CODEINE UR QL CFM: NEGATIVE NG/ML
EDDP UR QL CFM: NEGATIVE NG/ML
ETHYL GLUCURONIDE UR QL CFM: NEGATIVE NG/ML
ETHYL SULFATE UR QL SCN: NEGATIVE NG/ML
FENTANYL UR QL CFM: NEGATIVE NG/ML
GLIADIN IGG SER IA-ACNC: NORMAL NG/ML
HYDROCODONE UR QL CFM: NORMAL NG/ML
HYDROCODONE UR QL CFM: NORMAL NG/ML
HYDROMORPHONE UR QL CFM: NORMAL NG/ML
LORAZEPAM UR QL CFM: NEGATIVE NG/ML
MDMA UR QL CFM: NEGATIVE NG/ML
MEPROBAMATE UR QL CFM: NEGATIVE NG/ML
METHADONE UR QL CFM: NEGATIVE NG/ML
METHAMPHET UR QL CFM: NEGATIVE NG/ML
MORPHINE UR QL CFM: NEGATIVE NG/ML
MORPHINE UR QL CFM: NEGATIVE NG/ML
NITRITE UR QL: NORMAL UG/ML
NORBUPRENORPHINE UR QL CFM: NEGATIVE NG/ML
NORDIAZEPAM UR QL CFM: NEGATIVE NG/ML
NORFENTANYL UR QL CFM: NEGATIVE NG/ML
NORHYDROCODONE UR QL CFM: NORMAL NG/ML
NORHYDROCODONE UR QL CFM: NORMAL NG/ML
NOROXYCODONE UR QL CFM: NEGATIVE NG/ML
NORTRIP UR QL CFM: NEGATIVE NG/ML
OXAZEPAM UR QL CFM: NEGATIVE NG/ML
OXYCODONE UR QL CFM: NEGATIVE NG/ML
OXYMORPHONE UR QL CFM: NEGATIVE NG/ML
OXYMORPHONE UR QL CFM: NEGATIVE NG/ML
PARA-FLUOROFENTANYL QUANTIFICATION: NORMAL NG/ML
RESULT ALL_PRESCRIBED MEDS AND SPECIAL INSTRUCTIONS: NORMAL
SECOBARBITAL UR QL CFM: NEGATIVE NG/ML
SL AMB 4-ANPP QUANTIFICATION: NORMAL NG/ML
SL AMB 7-OH-MITRAGYNINE (KRATOM ALKALOID) QUANTIFICATION: NORMAL NG/ML
SL AMB ACETYL FENTANYL QUANTIFICATION: NORMAL NG/ML
SL AMB ACETYL NORFENTANYL QUANTIFICATION: NORMAL NG/ML
SL AMB ACRYL FENTANYL QUANTIFICATION: NORMAL NG/ML
SL AMB CARFENTANIL QUANTIFICATION: NORMAL NG/ML
SL AMB CTHC (MARIJUANA METABOLITE) QUANTIFICATION: NEGATIVE NG/ML
SL AMB N-DESMETHYL-TRAMADOL QUANTIFICATION: NEGATIVE NG/ML
SL AMB PHENTERMINE QUANTIFICATION: NEGATIVE NG/ML
SPECIMEN PH ACCEPTABLE UR: NORMAL
TAPENTADOL UR QL CFM: NEGATIVE NG/ML
TEMAZEPAM UR QL CFM: NEGATIVE NG/ML
TEMAZEPAM UR QL CFM: NEGATIVE NG/ML
TRAMADOL UR QL CFM: NEGATIVE NG/ML
URATE/CREAT 24H UR: NEGATIVE NG/ML

## 2022-10-31 ENCOUNTER — VBI (OUTPATIENT)
Dept: ADMINISTRATIVE | Facility: OTHER | Age: 72
End: 2022-10-31

## 2022-11-14 ENCOUNTER — TELEPHONE (OUTPATIENT)
Dept: PAIN MEDICINE | Facility: MEDICAL CENTER | Age: 72
End: 2022-11-14

## 2022-11-14 DIAGNOSIS — G89.29 CHRONIC BILATERAL LOW BACK PAIN WITH BILATERAL SCIATICA: ICD-10-CM

## 2022-11-14 DIAGNOSIS — M54.42 CHRONIC BILATERAL LOW BACK PAIN WITH BILATERAL SCIATICA: ICD-10-CM

## 2022-11-14 DIAGNOSIS — M54.41 CHRONIC BILATERAL LOW BACK PAIN WITH BILATERAL SCIATICA: ICD-10-CM

## 2022-11-14 DIAGNOSIS — G89.4 CHRONIC PAIN SYNDROME: ICD-10-CM

## 2022-11-14 DIAGNOSIS — M54.16 LUMBAR RADICULITIS: ICD-10-CM

## 2022-11-14 RX ORDER — HYDROCODONE BITARTRATE AND ACETAMINOPHEN 10; 325 MG/1; MG/1
TABLET ORAL
Qty: 130 TABLET | Refills: 0 | Status: SHIPPED | OUTPATIENT
Start: 2022-11-14

## 2022-11-14 NOTE — TELEPHONE ENCOUNTER
RN s/w pharmacist who stated the last time the pt picked up her hydrocodone was 10/20 for a quantity of 130 tablets which is more that 4 times a day on some days  Pt should have enough pills until 11/20  RN attempted to reach pt regarding same left a VMMOM with call back number office hours and location provided

## 2022-11-14 NOTE — TELEPHONE ENCOUNTER
Caller: Elana Wu    Doctor/Office: Chelsey Bryant asked to speak to: nurse     Call was transferred to: nurse

## 2022-11-14 NOTE — TELEPHONE ENCOUNTER
RN s/w pt regarding previous  Per pt she s/w GARETHG when she was in to see her in the office  Per pt she always fills her scripts every 28 days or every 4 weeks and always has done it this way  Pt cannot fill it on 11/17 because KASSIDY wrote for DNF date of 11/20 and the pharmacy stella't fill this earlier than written for  Per pt she takes 4-5 pills a day as written and comes in every three months and does not go over what she is supposed to take and follows all of the rules      --please advise thank you--  KASSIDY can you resend that last hydrocodone script so it can be filled on 11/17 instead of 11/20 the pt then sees you in the office on 12/15

## 2022-11-14 NOTE — TELEPHONE ENCOUNTER
Caller: Jovana Gonsalves   Doctor: Hernando Edmond    Reason for call: patient gets her HYDROcodone-acetaminophen [facility])  mg per tablet      every 28 days please change date to 11/17/22 last time she saw Hernando Edmond was on 10/20/22  Thank you     Call back#: [phone]

## 2022-11-17 ENCOUNTER — TELEPHONE (OUTPATIENT)
Dept: GASTROENTEROLOGY | Facility: HOSPITAL | Age: 72
End: 2022-11-17

## 2022-11-23 DIAGNOSIS — J43.9 PULMONARY EMPHYSEMA, UNSPECIFIED EMPHYSEMA TYPE (HCC): ICD-10-CM

## 2022-11-23 RX ORDER — FLUTICASONE FUROATE, UMECLIDINIUM BROMIDE AND VILANTEROL TRIFENATATE 100; 62.5; 25 UG/1; UG/1; UG/1
POWDER RESPIRATORY (INHALATION)
Qty: 60 EACH | Refills: 3 | Status: SHIPPED | OUTPATIENT
Start: 2022-11-23

## 2022-11-28 ENCOUNTER — TELEPHONE (OUTPATIENT)
Dept: GASTROENTEROLOGY | Facility: HOSPITAL | Age: 72
End: 2022-11-28

## 2022-11-28 DIAGNOSIS — E03.9 HYPOTHYROIDISM, UNSPECIFIED TYPE: ICD-10-CM

## 2022-11-28 RX ORDER — LEVOTHYROXINE SODIUM 0.05 MG/1
50 TABLET ORAL EVERY OTHER DAY
Qty: 45 TABLET | Refills: 2 | Status: SHIPPED | OUTPATIENT
Start: 2022-11-28

## 2022-11-30 ENCOUNTER — TELEPHONE (OUTPATIENT)
Dept: GASTROENTEROLOGY | Facility: HOSPITAL | Age: 72
End: 2022-11-30

## 2022-12-01 ENCOUNTER — HOSPITAL ENCOUNTER (OUTPATIENT)
Dept: GASTROENTEROLOGY | Facility: HOSPITAL | Age: 72
Discharge: HOME | End: 2022-11-30

## 2022-12-01 VITALS
HEART RATE: 64 BPM | RESPIRATION RATE: 16 BRPM | TEMPERATURE: 98.4 F | DIASTOLIC BLOOD PRESSURE: 74 MMHG | SYSTOLIC BLOOD PRESSURE: 177 MMHG | OXYGEN SATURATION: 94 %

## 2022-12-01 DIAGNOSIS — K21.9 GERD (GASTROESOPHAGEAL REFLUX DISEASE): ICD-10-CM

## 2022-12-01 NOTE — PERIOPERATIVE NURSING NOTE
Patient brought in the room and explained the esophageal manometry procedure  After the confirmation of allergies, lidocaine 2% inserted via right /left nostril and  a transnasal insertion of the High Resolution esophageal manometry catheter was inserted via right nostril  by Kb Marcus RN  Patient given water to drink during the insertion and once the catheter inserted pressure bands of both Upper esophageal sphincter  (UES) and Lower esophageal sphincter ( LES) were observed on the color contour  Patient instructed to take a deep breath to verify placement of the catheter, diaphragmatic pinch noted on inspiration  Catheter was secured to right/left cheek  Patient was assisted to supine position   Patient was instructed to relax  while acclimating the catheter for about 5 minutes  A 30 second baseline resting pressure was obtained to identify the UES and LES followed by a series of 7 liquid swallows using 5 cc room temperature normal saline to assess esophageal motility and bolus transit  No viscous swallows using 5 cc viscous solution, 1 multiple rapid drink swallow using 2 cc room temperature normal saline given a total of 5 drinks  Patient instructed to sit up at the edge of the stretcher and given 5 upright liquid swallows using 5 cc room temperature normal saline and 1 rapid drink challenge using 100 cc room temperature water  At the end of the procedure the high resolution esophageal manometry catheter was removed from the nostril intact  Dual LPR  sensor PH probe inserted via right nostril and secured by Kb Marcus RN   Zephr recorder teachback performed and patient verbalized understanding  Patient instructed to return next day to have probe remove  Discharge instructions given and patient ambulated out of room in stable condition

## 2022-12-09 ENCOUNTER — TELEPHONE (OUTPATIENT)
Dept: FAMILY MEDICINE CLINIC | Facility: CLINIC | Age: 72
End: 2022-12-09

## 2022-12-09 DIAGNOSIS — J06.9 ACUTE URI: Primary | ICD-10-CM

## 2022-12-09 DIAGNOSIS — I10 ESSENTIAL HYPERTENSION: ICD-10-CM

## 2022-12-09 RX ORDER — LEVOFLOXACIN 500 MG/1
500 TABLET, FILM COATED ORAL EVERY 24 HOURS
Qty: 10 TABLET | Refills: 0 | Status: SHIPPED | OUTPATIENT
Start: 2022-12-09 | End: 2022-12-19

## 2022-12-09 RX ORDER — LOSARTAN POTASSIUM 25 MG/1
25 TABLET ORAL DAILY
Qty: 90 TABLET | Refills: 1 | Status: SHIPPED | OUTPATIENT
Start: 2022-12-09

## 2022-12-09 RX ORDER — BENZONATATE 100 MG/1
100 CAPSULE ORAL 3 TIMES DAILY PRN
Qty: 30 CAPSULE | Refills: 0 | Status: SHIPPED | OUTPATIENT
Start: 2022-12-09

## 2022-12-09 NOTE — TELEPHONE ENCOUNTER
Patient c/o URI, cough with green/yellow mucous, HA, sore throat, and it's going down into her chest   X 6 days  She is asking if you can send in Levaquin, for 10 days and Tessalon Perles  Please advise

## 2022-12-14 ENCOUNTER — TELEPHONE (OUTPATIENT)
Dept: PAIN MEDICINE | Facility: MEDICAL CENTER | Age: 72
End: 2022-12-14

## 2022-12-14 NOTE — TELEPHONE ENCOUNTER
Caller: Riley Monet    Doctor: Usman Wilson    Reason for call: she is clear on no virtual but she is not clear on why we can't send her Medication to pharmacy today? She wants to speak to a nurse to express her concerns with driving in the snow at 67 yrs old, please call her home telephone number  I read message verbatim from [address]  She did not wish to reschedule her appt       Call back#: [phone]

## 2022-12-14 NOTE — TELEPHONE ENCOUNTER
Caller: Opal Maxin ( PT )     Doctor: Jose Antonio HOOK    Reason for call: Virtual visit for the appt tomorrow due to the weather     Call back#: [phone]

## 2022-12-14 NOTE — TELEPHONE ENCOUNTER
Caller: Leila James    Doctor: Ozzie/Cory    Reason for call: patient states its going to [facility] she is asking can she have her script sent over today and change her appt to Virtual since its going to snow tomorrow morning   Please advise     Call back#: [phone]

## 2022-12-14 NOTE — TELEPHONE ENCOUNTER
RN s/w pt regarding previous  Dr Hussein Carey still in the office stated would send the lyrica but needs an [facility] for the hydrocodone  Pt very upset over having to not only go to sovs but also the pharmacy  with the weather that is expected tomorrow  Pt  aware of previous responses  Emotional support provided

## 2022-12-14 NOTE — TELEPHONE ENCOUNTER
Attempted to reach pt  VMMLOM with CB# and [place]    Pt has appt 12/15 Thursday at 9:45 with a 9:30 arrival  This included on the message and rx can be filled at that   To call back with any questions

## 2022-12-14 NOTE — TELEPHONE ENCOUNTER
No virtual appt, if patient cannot come to office, please have her reschedule in office visit  Weather forecast is not always predictable

## 2022-12-14 NOTE — TELEPHONE ENCOUNTER
Pt contacted Call Center requested refill of their medication  Medication Name: pregabalin (LYRICA)    Dosage of Med: 200 mg       Frequency of Med: Take 1 capsule (200 mg total) by mouth 3 (three) times a day      Remaining Medication:until Friday       Pharmacy and Location: [address]         Pt  Preferred Callback Phone Number: [phone]      Thank you

## 2022-12-14 NOTE — TELEPHONE ENCOUNTER
Caller: Ac Gil     Doctor:      Reason for call: Patient calling again regarding medication and appointment I did advise of messages below patient would like a call back from Nurse    Call back#: [phone]

## 2022-12-14 NOTE — TELEPHONE ENCOUNTER
Pt contacted Call Center requested refill of their medication  Medication Name: HYDROcodone-acetaminophen (NORCO          Dosage of Med:  mg      Frequency of Med: Take 1 tablet every 4-6 hours prn   For ongoing therapy      Remaining Medication: 4      Pharmacy and Location:     [place] [address]   Phone:  [phone]        Pt  Preferred Callback Phone Number: [phone]      Thank you

## 2022-12-15 ENCOUNTER — OFFICE VISIT (OUTPATIENT)
Dept: PAIN MEDICINE | Facility: MEDICAL CENTER | Age: 72
End: 2022-12-15

## 2022-12-15 VITALS
HEIGHT: 65 IN | HEART RATE: 56 BPM | WEIGHT: 181 LBS | SYSTOLIC BLOOD PRESSURE: 126 MMHG | DIASTOLIC BLOOD PRESSURE: 78 MMHG | BODY MASS INDEX: 30.16 KG/M2

## 2022-12-15 DIAGNOSIS — M54.2 NECK PAIN: ICD-10-CM

## 2022-12-15 DIAGNOSIS — M54.41 CHRONIC BILATERAL LOW BACK PAIN WITH BILATERAL SCIATICA: ICD-10-CM

## 2022-12-15 DIAGNOSIS — M54.16 LUMBAR RADICULITIS: ICD-10-CM

## 2022-12-15 DIAGNOSIS — M47.816 LUMBAR SPONDYLOSIS: ICD-10-CM

## 2022-12-15 DIAGNOSIS — G89.4 CHRONIC PAIN SYNDROME: Primary | ICD-10-CM

## 2022-12-15 DIAGNOSIS — M54.42 CHRONIC BILATERAL LOW BACK PAIN WITH BILATERAL SCIATICA: ICD-10-CM

## 2022-12-15 DIAGNOSIS — M79.18 MYOFASCIAL PAIN SYNDROME: ICD-10-CM

## 2022-12-15 DIAGNOSIS — F11.20 UNCOMPLICATED OPIOID DEPENDENCE (HCC): ICD-10-CM

## 2022-12-15 DIAGNOSIS — G89.29 CHRONIC BILATERAL LOW BACK PAIN WITH BILATERAL SCIATICA: ICD-10-CM

## 2022-12-15 RX ORDER — METHOCARBAMOL 750 MG/1
750 TABLET, FILM COATED ORAL EVERY 8 HOURS PRN
Qty: 90 TABLET | Refills: 2 | Status: SHIPPED | OUTPATIENT
Start: 2022-12-15 | End: 2022-12-15

## 2022-12-15 RX ORDER — PREGABALIN 200 MG/1
200 CAPSULE ORAL 3 TIMES DAILY
Qty: 90 CAPSULE | Refills: 1 | Status: SHIPPED | OUTPATIENT
Start: 2022-12-15

## 2022-12-15 RX ORDER — NORTRIPTYLINE HYDROCHLORIDE 10 MG/1
10 CAPSULE ORAL
Qty: 90 CAPSULE | Refills: 1 | Status: SHIPPED | OUTPATIENT
Start: 2022-12-15

## 2022-12-15 RX ORDER — TIZANIDINE 2 MG/1
2 TABLET ORAL EVERY 8 HOURS PRN
Qty: 90 TABLET | Refills: 2 | Status: SHIPPED | OUTPATIENT
Start: 2022-12-15

## 2022-12-15 RX ORDER — HYDROCODONE BITARTRATE AND ACETAMINOPHEN 10; 325 MG/1; MG/1
TABLET ORAL
Qty: 130 TABLET | Refills: 0 | Status: SHIPPED | OUTPATIENT
Start: 2022-12-15

## 2022-12-15 NOTE — PROGRESS NOTES
Assessment:  1  Chronic pain syndrome    2  Uncomplicated opioid dependence ([address] )    3  Myofascial pain syndrome    4  Chronic bilateral low back pain with bilateral sciatica    5  Lumbar radiculitis    6  Lumbar spondylosis    7  Neck pain        Plan:  While the patient was in the office today, I did have a thorough conversation regarding their chronic pain syndrome, medication management, and treatment plan options  The patient remains clinically stable on the current medication regimen of pregabalin 200 mg 3 times daily, nortriptyline 10 mg nightly and Norco 10/325 which she takes every 4-6 hours on as-needed basis  She does not feel the baclofen is providing adequate reduction of the muscle spasms and tightness  I have switched that medication over to methocarbamol 750 mg 3 times daily as needed tightness and spasm  I have provided refills of all of her medications and the patient will follow-up in the office in 3 months or sooner if needed if the pain changes or worsens  There are risks associated with opioid medications, including dependence, addiction and tolerance  The patient understands and agrees to use these medications only as prescribed  Potential side effects of the medications include, but are not limited to, constipation, drowsiness, addiction, impaired judgment and risk of fatal overdose if not taken as prescribed  The patient was warned against driving while taking sedation medications  Sharing medications is a felony  At this point in time, the patient is showing no signs of addiction, abuse, diversion or suicidal ideation  [place] Prescription Drug Monitoring Program report was reviewed and was appropriate     My impressions and treatment recommendations were discussed in detail with the patient who verbalized understanding and had no further questions  Discharge instructions were provided   I personally saw and examined the patient and I agree with the above discussed plan of care     No orders of the defined types were placed in this encounter  New Medications Ordered This Visit   Medications   • methocarbamol (Robaxin-750) 750 mg tablet     Sig: Take 1 tablet (750 mg total) by mouth every 8 (eight) hours as needed for muscle spasms     Dispense:  90 tablet     Refill:  2   • pregabalin (LYRICA) 200 MG capsule     Sig: Take 1 capsule (200 mg total) by mouth 3 (three) times a day     Dispense:  90 capsule     Refill:  1     Brand necessary, no generic  • nortriptyline (PAMELOR) 10 mg capsule     Sig: Take 1 capsule (10 mg total) by mouth daily at bedtime     Dispense:  90 capsule     Refill:  1   • HYDROcodone-acetaminophen (NORCO)  mg per tablet     Sig: Take 1 tablet every 4-6 hours prn  For ongoing therapy  Dispense:  130 tablet     Refill:  0   • HYDROcodone-acetaminophen (NORCO)  mg per tablet     Sig: Take 1 tablet every 4-6 hours prn; ongoing therapy     Dispense:  130 tablet     Refill:  0     Do not fill before 01/13/2023   • HYDROcodone-acetaminophen (NORCO)  mg per tablet     Sig: Take 1 tablet every 4-6 hours prn, ongoing therapy     Dispense:  130 tablet     Refill:  0     Do not fill before 02/11/2023       History of Present Illness:  Angelique Aguilar is a 67 y o  female who presents for a follow up office visit in regards to Back Pain (Lower back pain shooting down both legs), Leg Pain, Neck Pain, Shoulder Pain, Hand Pain, Knee Pain, Foot Pain, and Arm Pain  The patient’s current symptoms include chronic neck pain and low back pain that she presently rates an 8 out of 10 on the pain scale describes it as a constant burning, dull, aching, sharp, throbbing, pressure-like and shooting pain with intermittent numbness and paresthesias into the upper and lower extremities  She has increased pain with walking and also with cold weather changes  She reports moderate relief with the current medication regimen of Norco, amitriptyline and Lyrica    She does not find relief with the baclofen as she continues to report muscle tightness and spasm  Opioid contract date 01/21/2022    Last UDS Date: 10/20/2022    I have personally reviewed and/or updated the patient's past medical history, past surgical history, family history, social history, current medications, allergies, and vital signs today  Review of Systems   Musculoskeletal: Positive for arthralgias, back pain, gait problem, joint swelling, neck pain and neck stiffness  Neurological: Positive for dizziness  All other systems reviewed and are negative        Patient Active Problem List   Diagnosis   • Chronic pain syndrome   • Cervical radiculopathy   • Myofascial pain syndrome   • Spondylosis of cervical region without myelopathy or radiculopathy   • Fibromyalgia   • Diabetic peripheral neuropathy (HCC)   • Long-term current use of opiate analgesic   • MGUS (monoclonal gammopathy of unknown significance)   • Iron deficiency anemia following bariatric surgery   • Light headedness   • Uncomplicated opioid dependence (HCC)   • Rheumatoid arthritis of hand ([address] )   • Pulmonary emphysema (HCC)   • Primary osteoarthritis of right knee   • Pseudogout of left knee   • Lumbar radiculitis   • Chronic bilateral low back pain with bilateral sciatica   • Rotator cuff syndrome, left   • Left shoulder pain   • Dizziness   • Other fatigue   • Biceps tendinitis of left shoulder   • Adhesive capsulitis of left shoulder   • Hypoglycemia   • Hypothyroidism due to Hashimoto's thyroiditis   • Type 2 diabetes mellitus with hyperlipidemia (HCC)   • Iron deficiency anemia, unspecified       Past Medical History:   Diagnosis Date   • Anemia    • Anxiety     hx of panic attacks (now under control)    • Arthritis    • Asthma     resolved (no problems in a decade)    • Baker's cyst of knee    • Bronchitis    • Bunion, left foot    • Cervical pain    • Chronic GERD    • Chronic pain    • Chronic pain disorder    • Depression    • Diabetes mellitus, type 2 ([facility] )    • Diabetic peripheral neuropathy ([facility] )    • Endometriosis    • Fibromyalgia    • Hyperlipidemia    • Hypertension    • Joint pain    • Low back pain    • Low back pain    • Lung nodules    • Macular degeneration    • Pulmonary emphysema ([facility] ) 01/16/2020   • Spondylosis    • Uterine cancer ([facility] )        Past Surgical History:   Procedure Laterality Date   • BACK SURGERY  1996    L5, S1- laser surgery fusion of c5 and c6    • BREAST CYST EXCISION Right    • CHOLECYSTECTOMY  2004   • FOOT SURGERY Left 2005    fusion    • FRACTURE SURGERY     • GASTRIC BYPASS  2010    Dr Jessie Gonzalez    • [facility] [place]    just uterus    • KNEE ARTHROSCOPY     • LAMINECTOMY     • ORTHOPEDIC SURGERY     • OVARIAN CYST REMOVAL  1975   • PELVIC LAPAROSCOPY      ovaries (x10)    • PLEURAL SCARIFICATION  1967   • SHOULDER OPEN ROTATOR CUFF REPAIR Left 2008   • TONSILLECTOMY     • VAGINAL PROLAPSE REPAIR         Family History   Problem Relation Age of Onset   • Hypertension Mother    • Lung cancer Mother    • Hypertension Father    • Heart disease Father    • Heart attack Father    • Cancer Father    • No Known Problems Sister    • No Known Problems Daughter    • No Known Problems Maternal Grandmother    • No Known Problems Maternal Grandfather    • No Known Problems Paternal Grandmother    • No Known Problems Paternal Grandfather    • Breast cancer Paternal Aunt 44   • Breast cancer Paternal Aunt 40   • Breast cancer Maternal Aunt 50       Social History     Occupational History   • Not on file   Tobacco Use   • Smoking status: Every Day     Packs/day: 0 25     Years: 40 00     Pack years: 10 00     Types: Cigarettes   • Smokeless tobacco: Current   Vaping Use   • Vaping Use: Never used   Substance and Sexual Activity   • Alcohol use: Not Currently     Alcohol/week: 1 0 standard drink     Types: 1 Shots of liquor per week     Comment: rarely   • Drug use: No   • Sexual activity: Not Currently     Partners: Male       Current Outpatient Medications on File Prior to Visit   Medication Sig   • albuterol (PROVENTIL HFA,VENTOLIN HFA) 90 mcg/act inhaler    • albuterol (PROVENTIL HFA,VENTOLIN HFA) 90 mcg/act inhaler Inhale 2 puffs every 4 (four) hours as needed for wheezing   • amLODIPine (NORVASC) 5 mg tablet Take 1 tablet (5 mg total) by mouth daily   • benzonatate (TESSALON PERLES) 100 mg capsule Take 1 capsule (100 mg total) by mouth 3 (three) times a day as needed for cough   • Calcium Carb-Cholecalciferol (Caltrate 600+D3) 600-800 MG-UNIT TABS Take 1 tablet by mouth 2 (two) times a day with meals   • cetirizine (ZyrTEC) 10 mg tablet TAKE 1/2 TABLET BY MOUTH EVERY DAY   • diphenhydrAMINE (BENADRYL) 25 mg capsule Take 50 mg by mouth daily    • levofloxacin (LEVAQUIN) 500 mg tablet Take 1 tablet (500 mg total) by mouth every 24 hours for 10 days   • levothyroxine 25 mcg tablet TAKE 1 TABLET (25 MCG TOTAL) BY MOUTH EVERY OTHER DAY   • levothyroxine 50 mcg tablet TAKE 1 TABLET (50 MCG TOTAL) BY MOUTH EVERY OTHER DAY   • losartan (COZAAR) 25 mg tablet TAKE 1 TABLET (25 MG TOTAL) BY MOUTH DAILY  • nitrofurantoin (MACROBID) 100 mg capsule Take 1 capsule (100 mg total) by mouth daily With food/Lunch   • pantoprazole (PROTONIX) 40 mg tablet Take 1 tablet (40 mg total) by mouth 2 (two) times a day   • Pediatric Multiple Vit-C-FA (Childrens Chew Multivitamin) CHEW CHEW AND SWALLOW 1 TABLET BY MOUTH EVERY DAY   • sucralfate (CARAFATE) 1 g tablet TAKE 1 TABLET BY MOUTH 4 TIMES EVERY DAY ON AN EMPTY STOMACH 1 HOUR BEFORE MEALS AND AT BEDTIME   • traZODone (DESYREL) 50 mg tablet TAKE 1 TABLET BY MOUTH EVERY DAY AT BEDTIME AS NEEDED   • Trelegy Ellipta 100-62 5-25 MCG/ACT inhaler INHALE 1 PUFF DAILY RINSE MOUTH AFTER USE  • vitamin A 2400 MCG (8000 UT) capsule TAKE 1 CAPSULE (8,000 UNITS TOTAL) BY MOUTH DAILY   • [DISCONTINUED] HYDROcodone-acetaminophen (NORCO)  mg per tablet Take 1 tablet every 4-6 hours prn    For ongoing therapy  • [DISCONTINUED] nortriptyline (PAMELOR) 10 mg capsule Take 1 capsule (10 mg total) by mouth daily at bedtime   • [DISCONTINUED] pregabalin (LYRICA) 200 MG capsule Take 1 capsule (200 mg total) by mouth 3 (three) times a day   • Bempedoic Acid 180 MG TABS Take 180 mg by mouth in the morning (Patient not taking: Reported on 8/17/2022)   • cholestyramine (QUESTRAN) 4 g packet Take 1 packet (4 g total) by mouth daily with breakfast Take 60 minutes after your thyroid medication (Patient not taking: Reported on 10/5/2022)   • cimetidine (TAGAMET) 200 mg tablet Take 1 tablet (200 mg total) by mouth 2 (two) times a day (Patient not taking: Reported on 12/15/2022)   • ergocalciferol (VITAMIN D2) 50,000 units Take 1 capsule (50,000 Units total) by mouth once a week (Patient not taking: Reported on 10/5/2022)   • glucose blood (OneTouch Verio) test strip Use 1 each 2 (two) times a day Use to test (Patient not taking: Reported on 10/20/2022)   • Lancets Misc  (ACCU-CHEK FASTCLIX LANCET) KIT 3 (three) times a day (Patient not taking: Reported on 10/20/2022)   • MICROLET LANCETS MISC Microlet Lancet (Patient not taking: Reported on 10/20/2022)   • predniSONE 10 mg tablet Take 3 tabs daily with breakfast for 3 days then Take 2 tabs daily for 3 Days then take one tab daily for 3 days then stop    (Patient not taking: Reported on 8/30/2022)   • vitamin A 2400 MCG (8000 UT) capsule TAKE 1 CAPSULE (8,000 UNITS TOTAL) BY MOUTH DAILY (Patient not taking: Reported on 10/20/2022)   • Vitamin A 2400 MCG (8000 UT) TABS TAKE 1 CAPSULE (8,000 UNITS TOTAL) BY MOUTH DAILY (Patient not taking: Reported on 10/20/2022)   • vitamin B-12 (VITAMIN B-12) 1,000 mcg tablet Take 1 tablet (1,000 mcg total) by mouth daily (Patient not taking: Reported on 6/28/2022)   • [DISCONTINUED] baclofen 10 mg tablet Take 1 tablet (10 mg total) by mouth 3 (three) times a day     Current Facility-Administered Medications on File Prior to Visit   Medication • cyanocobalamin injection 1,000 mcg   • cyanocobalamin injection 1,000 mcg   • cyanocobalamin injection 1,000 mcg   • cyanocobalamin injection 1,000 mcg   • cyanocobalamin injection 1,000 mcg   • cyanocobalamin injection 1,000 mcg       Allergies   Allergen Reactions   • Cephalosporins Hives   • Erythromycin GI Intolerance   • Fentanyl Itching     Occurred during surgery    • Paxil [Paroxetine] Hives     After 2 weeks   • Penicillins Itching   • Pravastatin Myalgia   • Statins Itching   • Sulfa Antibiotics Diarrhea   • Wellbutrin [Bupropion] Hives     After 2 weeks    • Marzena's Wort Rash       Physical Exam:    /78   Pulse 56   Ht 5' 5" (1 651 m) Comment: verbal  Wt 82 1 kg (181 lb)   LMP  (LMP Unknown)   BMI 30 12 kg/m²     Constitutional:normal, well developed, well nourished, alert, in no distress and non-toxic and no overt pain behavior  Eyes:anicteric  HEENT:grossly intact  Neck:supple, symmetric, trachea midline and no masses   Pulmonary:even and unlabored  Cardiovascular:No edema or pitting edema present  Skin:Normal without rashes or lesions and well hydrated  Psychiatric:Mood and affect appropriate  Neurologic:Cranial Nerves II-XII grossly intact  Musculoskeletal: Gait is slow but stable      Imaging

## 2023-01-03 DIAGNOSIS — G47.00 INSOMNIA, UNSPECIFIED TYPE: ICD-10-CM

## 2023-01-03 RX ORDER — TRAZODONE HYDROCHLORIDE 50 MG/1
TABLET ORAL
Qty: 90 TABLET | Refills: 1 | Status: SHIPPED | OUTPATIENT
Start: 2023-01-03

## 2023-01-09 ENCOUNTER — TELEPHONE (OUTPATIENT)
Dept: PAIN MEDICINE | Facility: MEDICAL CENTER | Age: 73
End: 2023-01-09

## 2023-01-09 DIAGNOSIS — M79.18 MYOFASCIAL PAIN SYNDROME: ICD-10-CM

## 2023-01-09 DIAGNOSIS — M54.41 CHRONIC BILATERAL LOW BACK PAIN WITH BILATERAL SCIATICA: ICD-10-CM

## 2023-01-09 DIAGNOSIS — G89.29 CHRONIC BILATERAL LOW BACK PAIN WITH BILATERAL SCIATICA: ICD-10-CM

## 2023-01-09 DIAGNOSIS — G89.4 CHRONIC PAIN SYNDROME: ICD-10-CM

## 2023-01-09 DIAGNOSIS — M54.16 LUMBAR RADICULITIS: ICD-10-CM

## 2023-01-09 DIAGNOSIS — M54.42 CHRONIC BILATERAL LOW BACK PAIN WITH BILATERAL SCIATICA: ICD-10-CM

## 2023-01-09 RX ORDER — TIZANIDINE 2 MG/1
TABLET ORAL
Qty: 270 TABLET | Refills: 1 | Status: SHIPPED | OUTPATIENT
Start: 2023-01-09 | End: 2023-01-10

## 2023-01-09 NOTE — TELEPHONE ENCOUNTER
RN s/w pt regarding previous  Per pt she was in to see Sushma Harlan [facility] [id number] 12/15 and explained to her that her norco gets ordered for every 28 days  Per pt [facility] did not have a problem with this  However the January script is in for DNF before 1/13 and should be for 1/12 and the February script is in for DNF before 2/11 and it should be able to be filled by 2/9  [facility] would you be able to correct this for this pt? Also the Tizanidine that was ordered (not trazadone as reflected in previous note)    Per pt she tried the tizanidine (waited for after the holidays) for 4-7 days and made her aggitated, had palpitations and insomnia  Pt stopped the tizanidine and restarted rahul baclofen 10mg TID  Per pt when she wakes up while on the baclofen she is able to go back to sleep  --please advise thank you--  Order baclofen cx tizanidine? Re order norco for January and February with DNF dates of 1/12 and 2/9?

## 2023-01-09 NOTE — TELEPHONE ENCOUNTER
Caller: patient     Doctor: Julisa Pinto    Reason for call: pt states she must discuss her Norco 10 - 325 mg medication script fill dates  She adds she was having side effects with Trazodone 50 mg medication  Patient would like to speak to a nurse regarding theses issues      Call back#: [phone]

## 2023-01-10 RX ORDER — HYDROCODONE BITARTRATE AND ACETAMINOPHEN 10; 325 MG/1; MG/1
TABLET ORAL
Qty: 130 TABLET | Refills: 0 | Status: SHIPPED | OUTPATIENT
Start: 2023-01-10

## 2023-01-10 RX ORDER — BACLOFEN 10 MG/1
10 TABLET ORAL 3 TIMES DAILY
Qty: 90 TABLET | Refills: 2 | Status: SHIPPED | OUTPATIENT
Start: 2023-01-10

## 2023-01-10 NOTE — TELEPHONE ENCOUNTER
RN attempted to reach pt regarding previous  Unable to [facility] due to [facility] being full  If pt calls back please let her know that [facility] fixed her scripts to be filled as per pt requested

## 2023-01-10 NOTE — TELEPHONE ENCOUNTER
Caller: Reinaldo Pink (PT)    Doctor: Tomi HOOK-    Reason for call: Medication refill    Call back#: [phone]

## 2023-01-12 DIAGNOSIS — I10 ESSENTIAL HYPERTENSION: ICD-10-CM

## 2023-01-12 RX ORDER — AMLODIPINE BESYLATE 5 MG/1
5 TABLET ORAL DAILY
Qty: 90 TABLET | Refills: 3 | Status: SHIPPED | OUTPATIENT
Start: 2023-01-12

## 2023-01-13 ENCOUNTER — OFFICE VISIT (OUTPATIENT)
Dept: BARIATRICS | Facility: CLINIC | Age: 73
End: 2023-01-13

## 2023-01-13 ENCOUNTER — OFFICE VISIT (OUTPATIENT)
Dept: GASTROENTEROLOGY | Facility: MEDICAL CENTER | Age: 73
End: 2023-01-13

## 2023-01-13 VITALS
HEIGHT: 65 IN | RESPIRATION RATE: 16 BRPM | SYSTOLIC BLOOD PRESSURE: 130 MMHG | HEART RATE: 55 BPM | DIASTOLIC BLOOD PRESSURE: 60 MMHG | OXYGEN SATURATION: 99 % | BODY MASS INDEX: 29.91 KG/M2 | WEIGHT: 179.5 LBS

## 2023-01-13 VITALS
HEIGHT: 65 IN | HEART RATE: 65 BPM | WEIGHT: 179.6 LBS | DIASTOLIC BLOOD PRESSURE: 78 MMHG | OXYGEN SATURATION: 99 % | BODY MASS INDEX: 29.92 KG/M2 | SYSTOLIC BLOOD PRESSURE: 126 MMHG

## 2023-01-13 DIAGNOSIS — F17.200 SMOKING: Primary | ICD-10-CM

## 2023-01-13 DIAGNOSIS — E66.9 OBESITY, CLASS I, BMI 30-34.9: ICD-10-CM

## 2023-01-13 DIAGNOSIS — J43.9 PULMONARY EMPHYSEMA, UNSPECIFIED EMPHYSEMA TYPE (HCC): ICD-10-CM

## 2023-01-13 DIAGNOSIS — E78.5 TYPE 2 DIABETES MELLITUS WITH HYPERLIPIDEMIA (HCC): ICD-10-CM

## 2023-01-13 DIAGNOSIS — K21.9 GASTROESOPHAGEAL REFLUX DISEASE WITHOUT ESOPHAGITIS: Primary | ICD-10-CM

## 2023-01-13 DIAGNOSIS — E11.69 TYPE 2 DIABETES MELLITUS WITH HYPERLIPIDEMIA (HCC): ICD-10-CM

## 2023-01-13 DIAGNOSIS — R12 FUNCTIONAL HEARTBURN: ICD-10-CM

## 2023-01-13 DIAGNOSIS — Z98.84 BARIATRIC SURGERY STATUS: ICD-10-CM

## 2023-01-13 DIAGNOSIS — K52.9 CHRONIC DIARRHEA: ICD-10-CM

## 2023-01-13 RX ORDER — NORTRIPTYLINE HYDROCHLORIDE 25 MG/1
25 CAPSULE ORAL
Qty: 30 CAPSULE | Refills: 3 | Status: SHIPPED | OUTPATIENT
Start: 2023-01-13

## 2023-01-13 NOTE — PROGRESS NOTES
OFFICE VISIT - BARIATRIC SURGERY  Edinson Brush 67 y o  female MRN: [id number]  Unit/Bed#:  Encounter: [id number]      HPI:  Edinson Brush is a 67 y o  female status post Laparoscopic Sarah-En-Y Gastric Bypass by Dr Jesús Barahona in 2010  Comes to the office today with complaints of reflux for evaluation  Subjective     She is getting associated epigastric pain with food intake after meals along with a lot of pressure  Now sitting up more at night to prevent aspiration, which has happened 4 times  She notes burning in her esophagus and intermittent nausea  Taking pantoprazole twice daily and carafate 4 times per day  Tolerating diet: Yes  Main symptoms: Pain, N, D  Worse with food: Yes  Nausea: Yes  Vomiting: No  Diarrhea: Yes  Duration of symptoms: Since 2015  Smoking: Patient smokes 1 pack every 3 days  Alcohol use: Once a year  NSAID use: Denies  Caffeine use: Drinks caffeine all day long, coffee and iced tea to keep sugar up  Current treatment: PPI BID and carafate  Previous EGD: Yes  Previous Upper GI: Yes   Previous Manometry: Yes  Ambulation is not impaired  Review of Systems   Constitutional: Negative for chills and fever  HENT: Negative for ear pain and sore throat  Eyes: Negative for pain and visual disturbance  Respiratory: Negative for cough and shortness of breath  Cardiovascular: Negative for chest pain and palpitations  Gastrointestinal: Positive for abdominal pain and nausea  Negative for vomiting  Genitourinary: Negative for dysuria and hematuria  Musculoskeletal: Negative for arthralgias and back pain  Skin: Negative for color change and rash  Neurological: Negative for seizures and syncope  All other systems reviewed and are negative        Historical Information   Past Medical History:   Diagnosis Date   • Anemia    • Anxiety     hx of panic attacks (now under control)    • Arthritis    • Asthma     resolved (no problems in a decade)    • Baker's cyst of knee    • Bronchitis    • Bunion, left foot    • Cervical pain    • Chronic GERD    • Chronic pain    • Chronic pain disorder    • Depression    • Diabetes mellitus, type 2 ([facility] [address] )    • Diabetic peripheral neuropathy [facility] )    • Endometriosis    • Fibromyalgia    • Hyperlipidemia    • Hypertension    • Joint pain    • Low back pain    • Low back pain    • Lung nodules    • Macular degeneration    • Pulmonary emphysema ([facility] [address] ) 01/16/2020   • Spondylosis    • Uterine cancer ([facility] )      Past Surgical History:   Procedure Laterality Date   • BACK SURGERY  1996    L5, S1- laser surgery fusion of c5 and c6    • BREAST CYST EXCISION Right    • CHOLECYSTECTOMY  2004   • FOOT SURGERY Left 2005    fusion    • FRACTURE SURGERY     • GASTRIC BYPASS  2010    Dr Jory Martinez    • Yañez Leeport    just uterus    • KNEE ARTHROSCOPY     • LAMINECTOMY     • ORTHOPEDIC SURGERY     • OVARIAN CYST REMOVAL  1975   • PELVIC LAPAROSCOPY      ovaries (x10)    • PLEURAL SCARIFICATION  1967   • SHOULDER OPEN ROTATOR CUFF REPAIR Left 2008   • TONSILLECTOMY     • VAGINAL PROLAPSE REPAIR       Social History   Social History     Substance and Sexual Activity   Alcohol Use Not Currently   • Alcohol/week: 1 0 standard drink   • Types: 1 Shots of liquor per week    Comment: rarely     Social History     Substance and Sexual Activity   Drug Use No     Social History     Tobacco Use   Smoking Status Every Day   • Packs/day: 0 25   • Years: 40 00   • Pack years: 10 00   • Types: Cigarettes   Smokeless Tobacco Never       Objective       Current Vitals:   Blood Pressure: 130/60 (01/13/23 0918)  Pulse: 55 (01/13/23 0918)  Respirations: 16 (01/13/23 0918)  Height: 5' 5" (165 1 cm) (01/13/23 0918)  Weight - Scale: 81 4 kg (179 lb 8 oz) (01/13/23 0918)  SpO2: 99 % (01/13/23 0918)    Invasive Devices     None                 Physical Exam  Constitutional:       Appearance: Normal appearance  HENT:      Head: Normocephalic and atraumatic        Nose: Nose normal  Mouth/Throat:      Mouth: Mucous membranes are moist    Eyes:      Extraocular Movements: Extraocular movements intact  Pupils: Pupils are equal, round, and reactive to light  Cardiovascular:      Rate and Rhythm: Normal rate and regular rhythm  Pulses: Normal pulses  Heart sounds: Normal heart sounds  Pulmonary:      Effort: Pulmonary effort is normal       Breath sounds: Normal breath sounds  Abdominal:      Palpations: Abdomen is soft  Musculoskeletal:      Cervical back: Normal range of motion  Skin:     General: Skin is warm  Neurological:      General: No focal deficit present  Mental Status: She is alert and oriented to person, place, and time  Psychiatric:         Mood and Affect: Mood normal          Behavior: Behavior normal            Pathology, and Other Studies: I have personally reviewed pertinent reports  Assessment/PLAN:    Emy Peñaloza is a 67 y o  female status post Laparoscopic Sarah-En-Y Gastric Bypass by Dr Amanda Grewal in 2010  Comes to the office today with complaints of reflux for evaluation  Workup thus far reviewed and discussed with patient  Workup includes:     UGI  UPPER GI SERIES  SINGLE CONTRAST     INDICATION:  K21 9: Gastro-esophageal reflux disease without esophagitis  Bile reflux  Prior Sarah-en-Y gastric bypass bariatric surgery ("10 years ago")       COMPARISON:  CT dated October 26, 2016     IMAGES:  2  radiographs of the abdomen and pelvis were obtained  A total of 626 fluoroscopic images were recorded as cine loops and single images      FLUOROSCOPY TIME:   3 3 minutes     TECHNIQUE:  The patient ingested both thick and thin barium suspensions by mouth and images of the esophagus, stomach, and small bowel were obtained with varied positioning and multiple degrees of obliquity under direct fluoroscopic observation      FINDINGS:  radiographs demonstrate a normal bowel gas pattern without obstruction    Moderate volumes of formed stool the colon  Postoperative suture lines in the left upper abdomen from prior Sarah-en-Y gastric bypass and also in the left lower   abdomen from the lower small bowel-small bowel anastomosis  Surgical clips in the right upper quadrant from prior cholecystectomy  Visualized lung bases are clear  Bony structures demineralized and degenerative, commensurate with patient's age      The patient was able swallow without difficulty  No aspiration  Posterior indentation from the cricopharyngeus muscle      The esophagus is normal in caliber  Esophageal motility is normal and emptying of contrast from the esophagus is prompt  There is no mucosal mass, ulceration, diverticulum or persistent fold thickening identified  Tiny right-sided laryngocele      Patient has undergone a Sarah-en-Y gastric bypass bariatric surgery  The gastric pouch is average size  No gross gastric mucosal abnormalities although evaluation limited with single contrast technique  No penetrating ulcers or masses      The gastrojejunostomy is widely patent with a small J-shaped pouch protruding laterally/caudally towards the left  Post anastomotic jejunum appears average caliber with unremarkable mucosal pattern  The isolated native stomach and duodenum are not   opacified      The more distal small bowel also demonstrates a normal mucosal pattern  Distal small bowel-small bowel anastomosis is patent in the left lower abdomen      Gastroesophageal reflux was observed        Tiny sliding hiatal hernia which did not change significantly with Valsalva            IMPRESSION:  Expected postoperative findings    Please see above report      Workstation performed: [id number]        EGD  [address] [facility]  [address] [facility]  [phone]        DATE OF SERVICE:  6/27/22     PHYSICIAN(S):  Attending:   Hudson Grewal MD      Fellow:   No Staff Documented         INDICATION:  Gastroesophageal reflux disease, unspecified whether esophagitis present     POST-OP DIAGNOSIS:  See the impression below      PREPROCEDURE:  Informed consent was obtained for the procedure, including sedation  Risks of perforation, hemorrhage, adverse drug reaction and aspiration were discussed  The patient was placed in the left lateral decubitus position      Patient was explained about the risks and benefits of the procedure  Risks including but not limited to bleeding, infection, and perforation were explained in detail  Also explained about less than 100% sensitivity with the exam and other alternatives      DETAILS OF PROCEDURE:  Patient was taken to the procedure room where a time out was performed to confirm correct patient and correct procedure  The patient underwent monitored anesthesia care, which was administered by an anesthesia professional  The patient's blood pressure, heart rate, level of consciousness, respirations and oxygen were monitored throughout the procedure  The scope was advanced to the jejunum  Retroflexion was performed in the fundus  The patient experienced no blood loss  The procedure was not difficult  The patient tolerated the procedure well   There were no apparent complications       ANESTHESIA INFORMATION:  ASA: III  Anesthesia Type: Anesthesia type not filed in the log      MEDICATIONS:  simethicone (MYLICON) 40 mg in sterile water 100 mL 40 mg   (Totals for administrations occurring from 0802 to 0813 on 06/27/22)         FINDINGS:  • Small hiatal hernia  • Regular Z-line  • Biopsies were obtained in the proximal and distal esophagus to evaluate for EOE  • Edematous and erythematous mucosa in the gastric pouch  • The gastrojejunal anastomosis appeared normal   • The jejunum appeared normal  Performed random biopsy using biopsy forceps         SPECIMENS:  ID Type Source Tests Collected by Time Destination   1 : Bx  R/O celiac disease Tissue Jejunum TISSUE EXAM Galilea Jones MD 6/27/2022  8:08 AM     2 : Bx Gastric pouch to r/o H pylolri Tissue Stomach TISSUE EXAM Nikki Sánchez MD 6/27/2022  8:09 AM     3 : Bx  distal esophagus R/O EOE Tissue Esophagus TISSUE EXAM Nikki Sánchez MD 6/27/2022  8:11 AM     4 : Proximal esoph bx R/O EOE Tissue Esophagus TISSUE EXAM Nikki Sánchez MD 6/27/2022  8:12 AM              IMPRESSION:  Small hiatal hernia  Otherwise normal esophagus  Proximal and distal biopsies were obtained     Significant amount of bile reflux in the gastric pouch  Dilated gastric pouch with areas of edema and erythema  Biopsied  Normal gj anastomosis  Normal jejunum  Biopsied     RECOMMENDATION:  Await pathology results  Continue high dose ppi  Obtain upper GI barium swallow  Proceed with colonoscopy     Nikki Sánchez MD       Pathology from EGD         MANOMETRY/pH Study  Indication- Danielito Gin     Esophageal manometry  Esophageal motility-10 and 10 swallows demonstrated normal esophageal contractile pattern with mean DCI of 2670 mmHg  s cm  LES-median IRP is within normal limits  Impedance-100 percent complete clearance of liquid bolus swallows     There appears to be a small sliding hiatal hernia but exact length are difficult to assess in setting of Sarah-en-Y gastric bypass  Rapid shallow index is less than 1 with normal pattern  [place] classification-normal esophageal motility        24 pH study-off PPI for 5 days  LPR study        Acid exposure time in the proximal pH is 0%  Acid exposure time in the distal pH sensor was 0 7% overall  DeMeester scores is 4     5 episodes of LPR reflux  46 episode reflux overall  36 episodes of reflux in the upright position-12 episodes of acid reflux, 24 episodes weakly acid reflux  10 episode reflux in recumbent position-2 episodes of acid reflux, 8 episodes of weakly acid reflux     Symptom correlation with significant reported symptoms of severe abdominal pain associated with reflux episodes     Study most consistent with hypersensitive reflux    --------------------------------------------------------------------    Manometry shows that patient is having reflux, but not pathologic  Therefore, we recommend against surgical intervention  DeMeester is 4  We cannot guarantee that patient's symptoms will be improved with surgery  Smoking cessation and decreasing caffeine intake strongly recommended    Crush carafate and take half hour before eating four times daily, patient was not crushing tablet  Continue protonix BID    Recommend patient seeing dietician to change eating habits to help with reflux    If patient's symptoms continue, we can do 24 pH testing in the future  For now, we will monitor on medication    F/U with Dr Kev Moser in 6 months              Megan Bowman PA-C  Bariatric Surgery  1/13/2023  9:49 AM

## 2023-01-13 NOTE — PROGRESS NOTES
[facility] - Outpatient Follow-up Note  Aurelio Mcdonald 67 y o  female MRN: [id number]  Encounter: [id number]          ASSESSMENT AND PLAN:  17-year-old female with history of Sarah-en-Y gastric bypass, GERD, hypothyroidism, diabetes who presents for follow-up evaluation  1  Gastroesophageal reflux disease without esophagitis  2  Functional heartburn  She has history of chronic GERD and regurgitation symptoms underwent EGD in 2022 showing significant bile reflux into her dilated gastric pouch, no evidence of esophagitis  She underwent upper GI fluoroscopy exam showing small hiatal hernia, gastroesophageal reflux otherwise normal postoperative changes  She subsequently underwent manometry and pH testing demonstrating normal esophageal motility, overall normal acid exposure time consistent with hypersensitive reflux  She continues to follow with bariatrics who recommended no revision of her gastric bypass at this time and continued monitoring of her symptoms  She will continue twice daily PPI, 4 times daily Carafate  We discussed that she is currently on TCA for chronic pain and given her diagnosis of hypersensitive reflux may benefit from increasing her Pamelor from 10 mg to 25 mg   I cautioned that increased doses can cause drowsiness, dry mouth  I Recommend she take the medication at night  We discussed that improvement of symptoms may take 4 to 6 weeks to see effect with increased dose  - nortriptyline (PAMELOR) 25 mg capsule; Take 1 capsule (25 mg total) by mouth daily at bedtime  Dispense: 30 capsule; Refill: 3    3  Chronic diarrhea  She has history of chronic diarrhea related to bile salt diarrhea versus IBS    She did not Tolerate Questran but uses Imodium capsules once weekly with good relief of her symptoms    Follow-up in 6 months      ______________________________________________________________________    SUBJECTIVE: 17-year-old female with history of Sarah-en-Y gastric bypass, GERD, hypothyroidism, diabetes who presents for follow-up evaluation  She was last seen in the GI office August 2022  She had chronic symptoms of gastroesophageal reflux and throat burning  Her symptoms persisted despite twice daily PPI  She underwent EGD June 2022 showing significant bile reflux in her dilated gastric pouch and no evidence of esophagitis  Subsequent upper GI small bowel follow-through showed evidence of gastroesophageal reflux, small hiatal hernia otherwise normal postoperative findings  She was recommended to follow-up with her surgeon obtain gastric emptying study  She also history of chronic diarrhea with prior stool infectious, fecal calprotectin testing and random colon biopsies unremarkable  She was thought to have an element of bile salt diarrhea or diarrhea predominant irritable bowel syndrome and was recommended to start Questran  Interval history: She followed up with bariatric surgery October 2022 was recommend to have pH and manometry testing off PPI  This showed normal esophageal motility, overall acid exposure time 0 to 0 7%, DeMeester score 4, 46 episodes of reflux observed and findings consistent with hypersensitive reflux  She had a visit with the bariatric surgeons today who recommended no surgical intervention at this time given she had no evidence of pathologic reflux on pH testing  Slye  She uses Questran but had worsening abdominal discomfort  Now she uses Imodium usually once weekly for diarrhea with good control of her symptoms  She is using Carafate 4 times daily, twice daily Protonix  She was started on Pamelor a few months ago by her pain doctor    June 2022 colonoscopy showed multiple lipomas, 1 subcentimeter polyp    Pathology was unremarkable she was recommended no additional screening colonoscopies given her age    2014 colonoscopy showed 2 polyps, diverticulosis and hemorrhoids she was recommended to repeat in 5 years   Pathology is not available for review  She reportedly had an EGD in 2014 which was normal, however procedure report is not available for review    REVIEW OF SYSTEMS IS OTHERWISE NEGATIVE    And point review of systems is negative other than stated as per HPI    Historical Information   Past Medical History:   Diagnosis Date   • Anemia    • Anxiety     hx of panic attacks (now under control)    • Arthritis    • Asthma     resolved (no problems in a decade)    • Baker's cyst of knee    • Bronchitis    • Bunion, left foot    • Cervical pain    • Chronic GERD    • Chronic pain    • Chronic pain disorder    • Depression    • Diabetes mellitus, type 2 ([email or web address] 75 )    • Diabetic peripheral neuropathy ([facility] [[email or web address] 75 )    • Endometriosis    • Fibromyalgia    • Hyperlipidemia    • Hypertension    • Joint pain    • Low back pain    • Low back pain    • Lung nodules    • Macular degeneration    • Pulmonary emphysema ([place] [id number] ) 01/16/2020   • Spondylosis    • Uterine cancer ([facility] [[email or web address] 75 )      Past Surgical History:   Procedure Laterality Date   • BACK SURGERY  1996    L5, S1- laser surgery fusion of c5 and c6    • BREAST CYST EXCISION Right    • CHOLECYSTECTOMY  2004   • FOOT SURGERY Left 2005    fusion    • FRACTURE SURGERY     • GASTRIC BYPASS  2010    Dr Lo Kuhn    • Yañez Leeport    just uterus    • KNEE ARTHROSCOPY     • LAMINECTOMY     • ORTHOPEDIC SURGERY     • OVARIAN CYST REMOVAL  1975   • PELVIC LAPAROSCOPY      ovaries (x10)    • PLEURAL SCARIFICATION  1967   • SHOULDER OPEN ROTATOR CUFF REPAIR Left 2008   • TONSILLECTOMY     • VAGINAL PROLAPSE REPAIR       Social History   Social History     Substance and Sexual Activity   Alcohol Use Not Currently   • Alcohol/week: 1 0 standard drink   • Types: 1 Shots of liquor per week    Comment: rarely     Social History     Substance and Sexual Activity   Drug Use No     Social History     Tobacco Use   Smoking Status Every Day   • Packs/day: 0 25   • Years: 40 00   • Pack years: 10 00   • Types: Cigarettes   Smokeless Tobacco Never     Family History   Problem Relation Age of Onset   • Hypertension Mother    • Lung cancer Mother    • Hypertension Father    • Heart disease Father    • Heart attack Father    • Cancer Father    • No Known Problems Sister    • No Known Problems Daughter    • No Known Problems Maternal Grandmother    • No Known Problems Maternal Grandfather    • No Known Problems Paternal Grandmother    • No Known Problems Paternal Grandfather    • Breast cancer Paternal Aunt 36   • Breast cancer Paternal Aunt 39   • Breast cancer Maternal Aunt 50       Meds/Allergies       Current Outpatient Medications:   •  albuterol (PROVENTIL HFA,VENTOLIN HFA) 90 mcg/act inhaler  •  albuterol (PROVENTIL HFA,VENTOLIN HFA) 90 mcg/act inhaler  •  amLODIPine (NORVASC) 5 mg tablet  •  baclofen 10 mg tablet  •  Bempedoic Acid 180 MG TABS  •  benzonatate (TESSALON PERLES) 100 mg capsule  •  Calcium Carb-Cholecalciferol (Caltrate 600+D3) 600-800 MG-UNIT TABS  •  cetirizine (ZyrTEC) 10 mg tablet  •  cholestyramine (QUESTRAN) 4 g packet  •  cimetidine (TAGAMET) 200 mg tablet  •  diphenhydrAMINE (BENADRYL) 25 mg capsule  •  ergocalciferol (VITAMIN D2) 50,000 units  •  glucose blood (OneTouch Verio) test strip  •  HYDROcodone-acetaminophen (NORCO)  mg per tablet  •  HYDROcodone-acetaminophen (NORCO)  mg per tablet  •  HYDROcodone-acetaminophen (NORCO)  mg per tablet  •  Lancets Misc  (ACCU-CHEK FASTCLIX LANCET) KIT  •  levothyroxine 25 mcg tablet  •  levothyroxine 50 mcg tablet  •  losartan (COZAAR) 25 mg tablet  •  MICROLET LANCETS MISC  •  nitrofurantoin (MACROBID) 100 mg capsule  •  nortriptyline (PAMELOR) 10 mg capsule  •  pantoprazole (PROTONIX) 40 mg tablet  •  Pediatric Multiple Vit-C-FA (Childrens Chew Multivitamin) CHEW  •  predniSONE 10 mg tablet  •  pregabalin (LYRICA) 200 MG capsule  •  sucralfate (CARAFATE) 1 g tablet  •  traZODone (DESYREL) 50 mg tablet  •  Trelegy Ellipta 100-62 5-25 MCG/ACT inhaler  • vitamin A 2400 MCG (8000 UT) capsule  •  vitamin A 2400 MCG (8000 UT) capsule  •  Vitamin A 2400 MCG (8000 UT) TABS  •  vitamin B-12 (VITAMIN B-12) 1,000 mcg tablet    Current Facility-Administered Medications:   •  cyanocobalamin injection 1,000 mcg, 1,000 mcg, Intramuscular, Q30 Days, 1,000 mcg at 12/08/20 1118  •  cyanocobalamin injection 1,000 mcg, 1,000 mcg, Intramuscular, Q30 Days, 1,000 mcg at 07/08/20 1036  •  cyanocobalamin injection 1,000 mcg, 1,000 mcg, Intramuscular, Q30 Days, 1,000 mcg at 09/08/20 1210  •  cyanocobalamin injection 1,000 mcg, 1,000 mcg, Intramuscular, Q30 Days  •  cyanocobalamin injection 1,000 mcg, 1,000 mcg, Intramuscular, Q30 Days  •  cyanocobalamin injection 1,000 mcg, 1,000 mcg, Intramuscular, Q30 Days, 1,000 mcg at 08/18/21 1019    Allergies   Allergen Reactions   • Cephalosporins Hives   • Erythromycin GI Intolerance   • Fentanyl Itching     Occurred during surgery    • Paxil [Paroxetine] Hives     After 2 weeks   • Penicillins Itching   • Pravastatin Myalgia   • Statins Itching   • Sulfa Antibiotics Diarrhea   • Wellbutrin [Bupropion] Hives     After 2 weeks    • Marzena's Wort Rash           Objective     Blood pressure 126/78, pulse 65, height 5' 5" (1 651 m), weight 81 5 kg (179 lb 9 6 oz), SpO2 99 %, not currently breastfeeding  Body mass index is 29 89 kg/m²  PHYSICAL EXAM:      General Appearance:   Alert, cooperative, no distress   HEENT:   Normocephalic, atraumatic, anicteric  Neck:  Supple, symmetrical, trachea midline   Lungs:   Clear to auscultation bilaterally; no rales, rhonchi or wheezing; respirations unlabored    Heart[de-identified]   Regular rate and rhythm; no murmur, rub, or gallop     Abdomen:   Soft, non-tender, non-distended; normal bowel sounds; no masses, no organomegaly    Genitalia:   Deferred    Rectal:   Deferred    Extremities:  No cyanosis, clubbing or edema    Pulses:  2+ and symmetric    Skin:  No jaundice, rashes, or lesions    Lymph nodes:  No palpable cervical lymphadenopathy        Lab Results:   No visits with results within 1 Day(s) from this visit  Latest known visit with results is:   Office Visit on 10/20/2022   Component Date Value   • RESULT ALL_PRESC MEDS SP* 41/68/0689 Not Applicable    • Alpha-Hydroxyalprazolam * 10/20/2022 negative    • Amitriptyline Quantifica* 10/20/2022 negative    • Nortriptyline Quantifica* 10/20/2022 negative    • Amphetamine Quantificati* 10/20/2022 negative    • Buprenorphine Quantifica* 10/20/2022 negative    • Norbuprenorphine Quantif* 10/20/2022 negative    • Carisoprodol Quantificat* 10/20/2022 negative    • Meprobamate Quantificati* 10/20/2022 negative    • 7-Amino-Clonazepam Quant* 10/20/2022 negative    • Cocaine metabolite Quant* 10/20/2022 negative    • Codeine Quantification 10/20/2022 negative    • Morphine Quantification 10/20/2022 negative    • Hydrocodone Quantificati* 10/20/2022 positive-4302 [id number]    • Norhydrocodone Quantific* 10/20/2022 positive-2843  224    • Hydromorphone Quantifica* 10/20/2022 positive-300 120    • Nordiazepam Quantificati* 10/20/2022 negative    • Temazepam Quantification 10/20/2022 negative    • Oxazepam Quantification 10/20/2022 negative    • Ethyl Glucuronide Quanti* 10/20/2022 negative    • Ethyl Sulfate Quantifica* 10/20/2022 negative    • Fentanyl Quantification 10/20/2022 negative    • Norfentanyl Quantificati* 10/20/2022 negative    • 4-ANPP Quantification 10/20/2022 Fen Neg    • Acetyl fentanyl Quantifi* 10/20/2022 Fen Neg    • Acetyl norfentanyl Quant* 10/20/2022 Fen Neg    • Acryl fentanyl Quantific* 10/20/2022 Fen Neg    • Carfentanil Quantificati* 10/20/2022 Fen Neg    • Para-fluorofentanyl Tariq* 10/20/2022 Fen Neg    • 6-RODOLFO (Heroin metabolite* 10/20/2022 negative    • Hydrocodone Quantificati* 10/20/2022 positive-4302 748    • Norhydrocodone Quantific* 10/20/2022 positive-2843  224    • Hydromorphone Quantifica* 10/20/2022 positive-300 120    • Hydromorphone Quantifica* 10/20/2022 positive-300 120    • Mitragynine (Kratom jarett* 10/20/2022 Pending    • 2-EW-Qyovzrsnagm (Kratom* 10/20/2022 Pending    • Lorazepam Quantification 10/20/2022 negative    • MDMA Quantification 10/20/2022 negative    • Methadone Quantification 10/20/2022 negative    • EDDP (Methadone metaboli* 10/20/2022 negative    • Amphetamine Quantificati* 10/20/2022 negative    • Methamphetamine Quantifi* 10/20/2022 negative    • Morphine Quantification 10/20/2022 negative    • Hydromorphone Quantifica* 10/20/2022 positive-300 120    • Oxazepam Quantification 10/20/2022 negative    • Oxycodone Quantification 10/20/2022 negative    • Noroxycodone Quantificat* 10/20/2022 negative    • Oxymorphone Quantificati* 10/20/2022 negative    • Oxymorphone Quantificati* 10/20/2022 negative    • PHENTERMINE QUANTIFICATI* 10/20/2022 negative    • Secobarbital Quantificat* 10/20/2022 negative    • Tapentadol Quantification 10/20/2022 negative    • Temazepam Quantification 10/20/2022 negative    • Oxazepam Quantification 10/20/2022 negative    • cTHC (Marijuana metaboli* 10/20/2022 negative    • Tramadol Quantification 10/20/2022 negative    • O-desmethyl-tramadol Kian* 10/20/2022 negative    • N-DESMETHYL-TRAMADOL KIAN* 10/20/2022 negative    • CREATININE 10/20/2022 normal-224 4    • OXIDANT 10/20/2022 normal-0    • pH 10/20/2022 normal-5 6    • SPECIFIC GRAVITY URINE 10/20/2022 normal-1 016          Radiology Results:   No results found

## 2023-02-17 ENCOUNTER — TELEPHONE (OUTPATIENT)
Dept: PAIN MEDICINE | Facility: MEDICAL CENTER | Age: 73
End: 2023-02-17

## 2023-02-17 DIAGNOSIS — G89.29 CHRONIC BILATERAL LOW BACK PAIN WITH BILATERAL SCIATICA: ICD-10-CM

## 2023-02-17 DIAGNOSIS — M54.42 CHRONIC BILATERAL LOW BACK PAIN WITH BILATERAL SCIATICA: ICD-10-CM

## 2023-02-17 DIAGNOSIS — M54.41 CHRONIC BILATERAL LOW BACK PAIN WITH BILATERAL SCIATICA: ICD-10-CM

## 2023-02-17 DIAGNOSIS — M79.18 MYOFASCIAL PAIN SYNDROME: Primary | ICD-10-CM

## 2023-02-17 DIAGNOSIS — R79.89 LOW SERUM VITAMIN A: ICD-10-CM

## 2023-02-20 RX ORDER — MULTIVITAMIN WITH IRON
8000 TABLET ORAL DAILY
Qty: 90 CAPSULE | Refills: 1 | Status: SHIPPED | OUTPATIENT
Start: 2023-02-20

## 2023-02-21 RX ORDER — PREGABALIN 200 MG/1
200 CAPSULE ORAL 3 TIMES DAILY
Qty: 90 CAPSULE | Refills: 1 | Status: SHIPPED | OUTPATIENT
Start: 2023-02-21

## 2023-02-26 DIAGNOSIS — K21.9 GASTROESOPHAGEAL REFLUX DISEASE WITHOUT ESOPHAGITIS: ICD-10-CM

## 2023-02-27 RX ORDER — PANTOPRAZOLE SODIUM 40 MG/1
TABLET, DELAYED RELEASE ORAL
Qty: 180 TABLET | Refills: 3 | Status: SHIPPED | OUTPATIENT
Start: 2023-02-27

## 2023-03-09 ENCOUNTER — OFFICE VISIT (OUTPATIENT)
Dept: PAIN MEDICINE | Facility: MEDICAL CENTER | Age: 73
End: 2023-03-09

## 2023-03-09 VITALS
DIASTOLIC BLOOD PRESSURE: 74 MMHG | OXYGEN SATURATION: 97 % | HEART RATE: 54 BPM | SYSTOLIC BLOOD PRESSURE: 160 MMHG | HEIGHT: 65 IN | BODY MASS INDEX: 29.66 KG/M2 | WEIGHT: 178 LBS

## 2023-03-09 DIAGNOSIS — M54.41 CHRONIC BILATERAL LOW BACK PAIN WITH BILATERAL SCIATICA: ICD-10-CM

## 2023-03-09 DIAGNOSIS — Z79.891 LONG-TERM CURRENT USE OF OPIATE ANALGESIC: ICD-10-CM

## 2023-03-09 DIAGNOSIS — M54.16 LUMBAR RADICULITIS: ICD-10-CM

## 2023-03-09 DIAGNOSIS — M47.816 LUMBAR SPONDYLOSIS: ICD-10-CM

## 2023-03-09 DIAGNOSIS — F11.20 UNCOMPLICATED OPIOID DEPENDENCE (HCC): ICD-10-CM

## 2023-03-09 DIAGNOSIS — M54.12 CERVICAL RADICULOPATHY: ICD-10-CM

## 2023-03-09 DIAGNOSIS — M54.2 NECK PAIN: ICD-10-CM

## 2023-03-09 DIAGNOSIS — M79.18 MYOFASCIAL PAIN SYNDROME: ICD-10-CM

## 2023-03-09 DIAGNOSIS — G89.4 CHRONIC PAIN SYNDROME: Primary | ICD-10-CM

## 2023-03-09 DIAGNOSIS — M54.42 CHRONIC BILATERAL LOW BACK PAIN WITH BILATERAL SCIATICA: ICD-10-CM

## 2023-03-09 DIAGNOSIS — G89.29 CHRONIC BILATERAL LOW BACK PAIN WITH BILATERAL SCIATICA: ICD-10-CM

## 2023-03-09 RX ORDER — BACLOFEN 10 MG/1
10 TABLET ORAL 3 TIMES DAILY
Qty: 90 TABLET | Refills: 2 | Status: SHIPPED | OUTPATIENT
Start: 2023-03-09

## 2023-03-09 RX ORDER — PREGABALIN 200 MG/1
200 CAPSULE ORAL 3 TIMES DAILY
Qty: 90 CAPSULE | Refills: 1 | Status: SHIPPED | OUTPATIENT
Start: 2023-03-09

## 2023-03-09 RX ORDER — HYDROCODONE BITARTRATE AND ACETAMINOPHEN 10; 325 MG/1; MG/1
TABLET ORAL
Qty: 130 TABLET | Refills: 0 | Status: SHIPPED | OUTPATIENT
Start: 2023-03-09

## 2023-03-09 NOTE — PROGRESS NOTES
Assessment:  1  Chronic pain syndrome    2  Uncomplicated opioid dependence ([address] )    3  Long-term current use of opiate analgesic    4  Myofascial pain syndrome    5  Chronic bilateral low back pain with bilateral sciatica    6  Lumbar radiculitis    7  Neck pain    8  Cervical radiculopathy    9  Lumbar spondylosis        Plan:  While the patient was in the office today, I did have a thorough conversation regarding their chronic pain syndrome, medication management, and treatment plan options  The patient remains clinically stable on the current medication regimen of Norco 10/325, baclofen and Lyrica  Patient denies any side effects and reports about 30% reduction in pain with this regimen  She reports no relief with multiple injections  I have provided refills for the medications and we will follow-up with her in 2 months or sooner if needed if the pain changes or worsens  There are risks associated with opioid medications, including dependence, addiction and tolerance  The patient understands and agrees to use these medications only as prescribed  Potential side effects of the medications include, but are not limited to, constipation, drowsiness, addiction, impaired judgment and risk of fatal overdose if not taken as prescribed  The patient was warned against driving while taking sedation medications  Sharing medications is a felony  At this point in time, the patient is showing no signs of addiction, abuse, diversion or suicidal ideation  A urine drug screen was collected at today's office visit as part of our medication management protocol  The point of care testing results were appropriate for what was being prescribed  The specimen will be sent for confirmatory testing  The drug screen is medically necessary because the patient is either dependent on opioid medication or is being considered for opioid medication therapy and the results could impact ongoing or future treatment   The drug screen is to evaluate for the presences or absence of prescribed, non-prescribed, and/or illicit drugs/substances  [place] Prescription Drug Monitoring Program report was reviewed and was appropriate     My impressions and treatment recommendations were discussed in detail with the patient who verbalized understanding and had no further questions  Discharge instructions were provided  I personally saw and examined the patient and I agree with the above discussed plan of care  Orders Placed This Encounter   Procedures   • MM ALL_Prescribed Meds and Special Instructions     Order Specific Question:   [facility] Is HYDROCODONE/APAP prescribed? Answer:   Yes     Order Specific Question:   [facility] Is NORTRIPTYLINE Prescribed? Answer:   Yes     Order Specific Question:   [facility] Is PREGABALIN Prescribed? Answer:   Yes     Order Specific Question:   [facility] Is TRAZODONE prescribed? Answer:    Yes   • MM DT_Alprazolam Definitive Test   • MM DT_Amitriptyline Definitive Test   • MM DT_Amphetamine Definitive Test   • MM DT_Buprenorphine Definitive Test   • MM DT_Carisoprodol Definitive Test   • MM DT_Clonazepam Definitive Test   • MM DT_Cocaine Definitive Test   • MM DT_Codeine Definitive Test   • MM Diazepam Definitive Test   • MM DT_Ethyl Glucuronide/Ethyl Sulfate Definitive Test   • MM DT_Fentanyl Definitive Test   • MM DT_Heroin Definitive Test   • MM DT_Hydrocodone Definitive Test   • MM DT_Hydromorphone Definitive Test   • MM DT_Kratom Definitive Test   • MM Lorazepam Definitive Test   • MM DT_MDMA Definitive Test   • MM DT_Methadone Definitive Test   • MM DT_Methamphetaine-d/l Isomers Definitive   • MM DT_Methamphetamine Definitive Test   • MM DT_Morphine Definitive Test   • MM DT_Oxazepam Definitive Test   • MM DT_Oxycodone Definitive Test   • MM DT_Oxymorphone Definitive Test   • MM DT_Phentermine Definitive Test   • MM DT_Secobarbital Definitive Test   • MM DT_Tapentadol Definitive Test • MM DT_Temazapam Definitive Test   • MM DT_THC Definitive Test   • MM DT_Tramadol Definitive Test   • MM DT_Validity Creatinine   • MM DT_Validity Oxidant   • MM DT_Validity pH   • MM DT_Validity Specific     New Medications Ordered This Visit   Medications   • HYDROcodone-acetaminophen (NORCO)  mg per tablet     Sig: Take 1 tablet every 4-6 hours prn; ongoing therapy     Dispense:  130 tablet     Refill:  0   • baclofen 10 mg tablet     Sig: Take 1 tablet (10 mg total) by mouth 3 (three) times a day     Dispense:  90 tablet     Refill:  2   • pregabalin (LYRICA) 200 MG capsule     Sig: Take 1 capsule (200 mg total) by mouth 3 (three) times a day     Dispense:  90 capsule     Refill:  1   • HYDROcodone-acetaminophen (NORCO)  mg per tablet     Sig: Take 1 tablet every 4-6 hours prn, ongoing therapy     Dispense:  130 tablet     Refill:  0     Do not fill before 04/06/2023   • HYDROcodone-acetaminophen (NORCO)  mg per tablet     Sig: Take 1 tablet every 4-6 hours prn  For ongoing therapy  Dispense:  130 tablet     Refill:  0     Do not fill before 05/02/2023       History of Present Illness:  Leeanne Donis is a 67 y o  female who presents for a follow up office visit in regards to Back Pain  The patient’s current symptoms include chronic neck and back pain that she presently rates an 8 out of 10 on the pain scale and describes it as a constant burning, dull, aching, sharp, cramping, pressure-like and shooting pain that radiates into the upper and lower extremities  She admits to intermittent numbness and paresthesias of the limbs as well  Patient has failed to respond to interventional procedures in the past   She has been maintained on Lyrica 200 mg 3 times daily, baclofen 10 mg 3 times daily as needed and Norco 10/325 every 4-6 hours as needed  Patient reports adequate pain relief and is taking medication as prescribed with no side effects      Opioid contract date 03/09/23    Last UDS Date 03/09/23    Norco last taken 03/09 AM    I have personally reviewed and/or updated the patient's past medical history, past surgical history, family history, social history, current medications, allergies, and vital signs today  Review of Systems   Respiratory: Negative for shortness of breath  Cardiovascular: Negative for chest pain  Gastrointestinal: Positive for nausea  Negative for constipation, diarrhea and vomiting  Musculoskeletal: Positive for arthralgias, back pain, gait problem and joint swelling  Negative for myalgias  Skin: Negative for rash  Neurological: Positive for dizziness  Negative for seizures and weakness  Psychiatric/Behavioral: Positive for decreased concentration  All other systems reviewed and are negative        Patient Active Problem List   Diagnosis   • Chronic pain syndrome   • Cervical radiculopathy   • Myofascial pain syndrome   • Spondylosis of cervical region without myelopathy or radiculopathy   • Fibromyalgia   • Diabetic peripheral neuropathy (HCC)   • Long-term current use of opiate analgesic   • MGUS (monoclonal gammopathy of unknown significance)   • Iron deficiency anemia following bariatric surgery   • Light headedness   • Uncomplicated opioid dependence (HCC)   • Rheumatoid arthritis of hand ([address] )   • Pulmonary emphysema ([facility])   • Primary osteoarthritis of right knee   • Pseudogout of left knee   • Lumbar radiculitis   • Chronic bilateral low back pain with bilateral sciatica   • Rotator cuff syndrome, left   • Left shoulder pain   • Dizziness   • Other fatigue   • Biceps tendinitis of left shoulder   • Adhesive capsulitis of left shoulder   • Hypoglycemia   • Hypothyroidism due to Hashimoto's thyroiditis   • Type 2 diabetes mellitus with hyperlipidemia (HCC)   • Iron deficiency anemia, unspecified       Past Medical History:   Diagnosis Date   • Anemia    • Anxiety     hx of panic attacks (now under control)    • Arthritis    • Asthma     resolved (no problems in a decade)    • Baker's cyst of knee    • Bronchitis    • Bunion, left foot    • Cervical pain    • Chronic GERD    • Chronic pain    • Chronic pain disorder    • Depression    • Diabetes mellitus, type 2 ([facility] )    • Diabetic peripheral neuropathy ([facility] )    • Endometriosis    • Fibromyalgia    • Hyperlipidemia    • Hypertension    • Joint pain    • Low back pain    • Low back pain    • Lung nodules    • Macular degeneration    • Pulmonary emphysema ([facility] ) 01/16/2020   • Spondylosis    • Uterine cancer ([facility] )        Past Surgical History:   Procedure Laterality Date   • BACK SURGERY  1996    L5, S1- laser surgery fusion of c5 and c6    • BREAST CYST EXCISION Right    • CHOLECYSTECTOMY  2004   • FOOT SURGERY Left 2005    fusion    • FRACTURE SURGERY     • GASTRIC BYPASS  2010    Dr Halina Olmos    • Yañez Leeport    just uterus    • KNEE ARTHROSCOPY     • LAMINECTOMY     • ORTHOPEDIC SURGERY     • OVARIAN CYST REMOVAL  1975   • PELVIC LAPAROSCOPY      ovaries (x10)    • PLEURAL SCARIFICATION  1967   • SHOULDER OPEN ROTATOR CUFF REPAIR Left 2008   • TONSILLECTOMY     • VAGINAL PROLAPSE REPAIR         Family History   Problem Relation Age of Onset   • Hypertension Mother    • Lung cancer Mother    • Hypertension Father    • Heart disease Father    • Heart attack Father    • Cancer Father    • No Known Problems Sister    • No Known Problems Daughter    • No Known Problems Maternal Grandmother    • No Known Problems Maternal Grandfather    • No Known Problems Paternal Grandmother    • No Known Problems Paternal Grandfather    • Breast cancer Paternal Aunt 44   • Breast cancer Paternal Aunt 40   • Breast cancer Maternal Aunt 50       Social History     Occupational History   • Not on file   Tobacco Use   • Smoking status: Every Day     Packs/day: 0 25     Years: 40 00     Pack years: 10 00     Types: Cigarettes   • Smokeless tobacco: Never   Vaping Use   • Vaping Use: Never used   Substance and Sexual Activity • Alcohol use: Not Currently     Alcohol/week: 1 0 standard drink     Types: 1 Shots of liquor per week     Comment: rarely   • Drug use: No   • Sexual activity: Not Currently     Partners: Male       Current Outpatient Medications on File Prior to Visit   Medication Sig   • albuterol (PROVENTIL HFA,VENTOLIN HFA) 90 mcg/act inhaler    • albuterol (PROVENTIL HFA,VENTOLIN HFA) 90 mcg/act inhaler Inhale 2 puffs every 4 (four) hours as needed for wheezing   • amLODIPine (NORVASC) 5 mg tablet TAKE 1 TABLET (5 MG TOTAL) BY MOUTH DAILY  • Calcium Carb-Cholecalciferol (Caltrate 600+D3) 600-800 MG-UNIT TABS Take 1 tablet by mouth 2 (two) times a day with meals   • cetirizine (ZyrTEC) 10 mg tablet TAKE 1/2 TABLET BY MOUTH EVERY DAY   • cimetidine (TAGAMET) 200 mg tablet Take 1 tablet (200 mg total) by mouth 2 (two) times a day   • diphenhydrAMINE (BENADRYL) 25 mg capsule Take 50 mg by mouth daily    • levothyroxine 25 mcg tablet TAKE 1 TABLET (25 MCG TOTAL) BY MOUTH EVERY OTHER DAY   • levothyroxine 50 mcg tablet TAKE 1 TABLET (50 MCG TOTAL) BY MOUTH EVERY OTHER DAY   • losartan (COZAAR) 25 mg tablet TAKE 1 TABLET (25 MG TOTAL) BY MOUTH DAILY  • nitrofurantoin (MACROBID) 100 mg capsule Take 1 capsule (100 mg total) by mouth daily With food/Lunch   • nortriptyline (PAMELOR) 25 mg capsule TAKE 1 CAPSULE BY MOUTH DAILY AT BEDTIME   • pantoprazole (PROTONIX) 40 mg tablet TAKE 1 TABLET BY MOUTH TWICE A DAY   • Pediatric Multiple Vit-C-FA (Childrens Chew Multivitamin) CHEW CHEW AND SWALLOW 1 TABLET BY MOUTH EVERY DAY   • sucralfate (CARAFATE) 1 g tablet TAKE 1 TABLET BY MOUTH 4 TIMES EVERY DAY ON AN EMPTY STOMACH 1 HOUR BEFORE MEALS AND AT BEDTIME   • traZODone (DESYREL) 50 mg tablet TAKE 1 TABLET BY MOUTH EVERY DAY AT BEDTIME AS NEEDED   • Trelegy Ellipta 100-62 5-25 MCG/ACT inhaler INHALE 1 PUFF DAILY RINSE MOUTH AFTER USE     • vitamin A 2400 MCG (8000 UT) capsule TAKE 1 CAPSULE (8,000 UNITS TOTAL) BY MOUTH DAILY   • [DISCONTINUED] baclofen 10 mg tablet Take 1 tablet (10 mg total) by mouth 3 (three) times a day   • [DISCONTINUED] HYDROcodone-acetaminophen (NORCO)  mg per tablet Take 1 tablet every 4-6 hours prn  For ongoing therapy  • [DISCONTINUED] pregabalin (LYRICA) 200 MG capsule Take 1 capsule (200 mg total) by mouth 3 (three) times a day   • Bempedoic Acid 180 MG TABS Take 180 mg by mouth in the morning (Patient not taking: Reported on 8/17/2022)   • benzonatate (TESSALON PERLES) 100 mg capsule Take 1 capsule (100 mg total) by mouth 3 (three) times a day as needed for cough (Patient not taking: Reported on 3/9/2023)   • ergocalciferol (VITAMIN D2) 50,000 units Take 1 capsule (50,000 Units total) by mouth once a week (Patient not taking: Reported on 10/5/2022)   • glucose blood (OneTouch Verio) test strip Use 1 each 2 (two) times a day Use to test   • Lancets Misc  (ACCU-CHEK FASTCLIX LANCET) KIT 3 (three) times a day   • MICROLET LANCETS MISC    • predniSONE 10 mg tablet Take 3 tabs daily with breakfast for 3 days then Take 2 tabs daily for 3 Days then take one tab daily for 3 days then stop    (Patient not taking: Reported on 3/9/2023)   • vitamin A 2400 MCG (8000 UT) capsule    • Vitamin A 2400 MCG (8000 UT) TABS    • vitamin B-12 (VITAMIN B-12) 1,000 mcg tablet Take 1 tablet (1,000 mcg total) by mouth daily (Patient not taking: Reported on 6/28/2022)   • [DISCONTINUED] HYDROcodone-acetaminophen (NORCO)  mg per tablet Take 1 tablet every 4-6 hours prn; ongoing therapy   • [DISCONTINUED] HYDROcodone-acetaminophen (NORCO)  mg per tablet Take 1 tablet every 4-6 hours prn, ongoing therapy     Current Facility-Administered Medications on File Prior to Visit   Medication   • cyanocobalamin injection 1,000 mcg   • cyanocobalamin injection 1,000 mcg   • cyanocobalamin injection 1,000 mcg   • cyanocobalamin injection 1,000 mcg   • cyanocobalamin injection 1,000 mcg   • cyanocobalamin injection 1,000 mcg Allergies   Allergen Reactions   • Cephalosporins Hives   • Erythromycin GI Intolerance   • Fentanyl Itching     Occurred during surgery    • Paxil [Paroxetine] Hives     After 2 weeks   • Penicillins Itching   • Pravastatin Myalgia   • Statins Itching   • Sulfa Antibiotics Diarrhea   • Wellbutrin [Bupropion] Hives     After 2 weeks    • Marzena's Wort Rash       Physical Exam:    /74   Pulse (!) 54   Ht 5' 5" (1 651 m)   Wt 80 7 kg (178 lb)   LMP  (LMP Unknown)   SpO2 97%   BMI 29 62 kg/m²     Constitutional:normal, well developed, well nourished, alert, in no distress and non-toxic and no overt pain behavior    Eyes:anicteric  HEENT:grossly intact  Neck:supple, symmetric, trachea midline and no masses   Pulmonary:even and unlabored  Cardiovascular:No edema or pitting edema present  Skin:Normal without rashes or lesions and well hydrated  Psychiatric:Mood and affect appropriate  Neurologic:Cranial Nerves II-XII grossly intact  Musculoskeletal:normal    Imaging

## 2023-03-11 LAB
6MAM UR QL CFM: NEGATIVE NG/ML
7AMINOCLONAZEPAM UR QL CFM: NEGATIVE NG/ML
A-OH ALPRAZ UR QL CFM: NEGATIVE NG/ML
ACCEPTABLE CREAT UR QL: NORMAL MG/DL
ACCEPTIBLE SP GR UR QL: NORMAL
AMITRIP UR QL CFM: NEGATIVE NG/ML
AMPHET UR QL CFM: NEGATIVE NG/ML
AMPHET UR QL CFM: NEGATIVE NG/ML
BUPRENORPHINE UR QL CFM: NEGATIVE NG/ML
BZE UR QL CFM: NEGATIVE NG/ML
CARISOPRODOL UR QL CFM: NEGATIVE NG/ML
CODEINE UR QL CFM: NEGATIVE NG/ML
EDDP UR QL CFM: NEGATIVE NG/ML
ETHYL GLUCURONIDE UR QL CFM: NEGATIVE NG/ML
ETHYL SULFATE UR QL SCN: NEGATIVE NG/ML
FENTANYL UR QL CFM: NEGATIVE NG/ML
GLIADIN IGG SER IA-ACNC: NEGATIVE NG/ML
HYDROCODONE UR QL CFM: NORMAL NG/ML
HYDROCODONE UR QL CFM: NORMAL NG/ML
HYDROMORPHONE UR QL CFM: NORMAL NG/ML
LORAZEPAM UR QL CFM: NEGATIVE NG/ML
MDMA UR QL CFM: NEGATIVE NG/ML
MEPROBAMATE UR QL CFM: NEGATIVE NG/ML
METHADONE UR QL CFM: NEGATIVE NG/ML
METHAMPHET UR QL CFM: NEGATIVE NG/ML
MORPHINE UR QL CFM: NEGATIVE NG/ML
MORPHINE UR QL CFM: NEGATIVE NG/ML
NITRITE UR QL: NORMAL UG/ML
NORBUPRENORPHINE UR QL CFM: NEGATIVE NG/ML
NORDIAZEPAM UR QL CFM: NEGATIVE NG/ML
NORFENTANYL UR QL CFM: NEGATIVE NG/ML
NORHYDROCODONE UR QL CFM: NORMAL NG/ML
NORHYDROCODONE UR QL CFM: NORMAL NG/ML
NOROXYCODONE UR QL CFM: NEGATIVE NG/ML
NORTRIP UR QL CFM: NORMAL NG/ML
OXAZEPAM UR QL CFM: NEGATIVE NG/ML
OXYCODONE UR QL CFM: NEGATIVE NG/ML
OXYMORPHONE UR QL CFM: NEGATIVE NG/ML
OXYMORPHONE UR QL CFM: NEGATIVE NG/ML
PARA-FLUOROFENTANYL QUANTIFICATION: NORMAL NG/ML
RESULT ALL_PRESCRIBED MEDS AND SPECIAL INSTRUCTIONS: NORMAL
SECOBARBITAL UR QL CFM: NEGATIVE NG/ML
SL AMB 4-ANPP QUANTIFICATION: NORMAL NG/ML
SL AMB 7-OH-MITRAGYNINE (KRATOM ALKALOID) QUANTIFICATION: NEGATIVE NG/ML
SL AMB ACETYL FENTANYL QUANTIFICATION: NORMAL NG/ML
SL AMB ACETYL NORFENTANYL QUANTIFICATION: NORMAL NG/ML
SL AMB ACRYL FENTANYL QUANTIFICATION: NORMAL NG/ML
SL AMB CARFENTANIL QUANTIFICATION: NORMAL NG/ML
SL AMB CTHC (MARIJUANA METABOLITE) QUANTIFICATION: NEGATIVE NG/ML
SL AMB N-DESMETHYL-TRAMADOL QUANTIFICATION: NEGATIVE NG/ML
SL AMB PHENTERMINE QUANTIFICATION: NEGATIVE NG/ML
SPECIMEN PH ACCEPTABLE UR: NORMAL
TAPENTADOL UR QL CFM: NEGATIVE NG/ML
TEMAZEPAM UR QL CFM: NEGATIVE NG/ML
TEMAZEPAM UR QL CFM: NEGATIVE NG/ML
TRAMADOL UR QL CFM: NEGATIVE NG/ML
URATE/CREAT 24H UR: NEGATIVE NG/ML

## 2023-03-21 ENCOUNTER — CONSULT (OUTPATIENT)
Dept: FAMILY MEDICINE CLINIC | Facility: CLINIC | Age: 73
End: 2023-03-21

## 2023-03-21 VITALS
SYSTOLIC BLOOD PRESSURE: 142 MMHG | RESPIRATION RATE: 14 BRPM | HEART RATE: 55 BPM | HEIGHT: 65 IN | BODY MASS INDEX: 29.62 KG/M2 | DIASTOLIC BLOOD PRESSURE: 90 MMHG | TEMPERATURE: 97.6 F | OXYGEN SATURATION: 98 %

## 2023-03-21 DIAGNOSIS — J43.9 PULMONARY EMPHYSEMA, UNSPECIFIED EMPHYSEMA TYPE (HCC): ICD-10-CM

## 2023-03-21 DIAGNOSIS — Z01.818 PRE-OP EXAM: ICD-10-CM

## 2023-03-21 DIAGNOSIS — R91.8 LUNG NODULES: ICD-10-CM

## 2023-03-21 DIAGNOSIS — I10 ESSENTIAL HYPERTENSION: ICD-10-CM

## 2023-03-21 DIAGNOSIS — E06.3 HYPOTHYROIDISM DUE TO HASHIMOTO'S THYROIDITIS: ICD-10-CM

## 2023-03-21 DIAGNOSIS — M81.8 IDIOPATHIC OSTEOPOROSIS: ICD-10-CM

## 2023-03-21 DIAGNOSIS — E78.5 HYPERLIPIDEMIA ASSOCIATED WITH TYPE 2 DIABETES MELLITUS (HCC): ICD-10-CM

## 2023-03-21 DIAGNOSIS — F17.210 CIGARETTE SMOKER: ICD-10-CM

## 2023-03-21 DIAGNOSIS — E11.69 HYPERLIPIDEMIA ASSOCIATED WITH TYPE 2 DIABETES MELLITUS (HCC): ICD-10-CM

## 2023-03-21 DIAGNOSIS — E78.5 TYPE 2 DIABETES MELLITUS WITH HYPERLIPIDEMIA (HCC): ICD-10-CM

## 2023-03-21 DIAGNOSIS — E53.8 LOW VITAMIN B12 LEVEL: ICD-10-CM

## 2023-03-21 DIAGNOSIS — E03.9 HYPOTHYROIDISM, UNSPECIFIED TYPE: ICD-10-CM

## 2023-03-21 DIAGNOSIS — E11.69 TYPE 2 DIABETES MELLITUS WITH HYPERLIPIDEMIA (HCC): ICD-10-CM

## 2023-03-21 DIAGNOSIS — E04.2 MULTIPLE THYROID NODULES: ICD-10-CM

## 2023-03-21 DIAGNOSIS — Z12.11 SCREEN FOR COLON CANCER: ICD-10-CM

## 2023-03-21 DIAGNOSIS — Z98.84 BARIATRIC SURGERY STATUS: Primary | ICD-10-CM

## 2023-03-21 DIAGNOSIS — E03.8 HYPOTHYROIDISM DUE TO HASHIMOTO'S THYROIDITIS: ICD-10-CM

## 2023-03-21 LAB — SL AMB POCT HEMOGLOBIN AIC: 5.3 (ref ?–6.5)

## 2023-03-21 RX ORDER — BENZONATATE 100 MG/1
100 CAPSULE ORAL 3 TIMES DAILY PRN
Qty: 30 CAPSULE | Refills: 1 | Status: SHIPPED | OUTPATIENT
Start: 2023-03-21

## 2023-03-21 RX ORDER — LEVOTHYROXINE SODIUM 0.03 MG/1
25 TABLET ORAL EVERY OTHER DAY
Qty: 45 TABLET | Refills: 3 | Status: SHIPPED | OUTPATIENT
Start: 2023-03-21

## 2023-03-21 RX ORDER — GINGER ROOT/GINGER ROOT EXT 262.5 MG
1 CAPSULE ORAL 2 TIMES DAILY WITH MEALS
Qty: 200 TABLET | Refills: 3 | Status: SHIPPED | OUTPATIENT
Start: 2023-03-21

## 2023-03-21 RX ORDER — CYANOCOBALAMIN 1000 UG/ML
1000 INJECTION, SOLUTION INTRAMUSCULAR; SUBCUTANEOUS
Status: SHIPPED | OUTPATIENT
Start: 2023-03-21

## 2023-03-21 RX ADMIN — CYANOCOBALAMIN 1000 MCG: 1000 INJECTION, SOLUTION INTRAMUSCULAR; SUBCUTANEOUS at 11:17

## 2023-03-21 NOTE — PATIENT INSTRUCTIONS
Osteoporosis Education   Osteoporosis  is a long-term medical condition that causes your bones to become weak, brittle, and more likely to fracture  Osteoporosis occurs when your body absorbs more bone than it makes  It is also caused by a lack of calcium and estrogen (female hormone)  Common symptoms include the following: You may not have any signs or symptoms  You may break a bone after a muscle strain, bump, or fall  A break usually occurs in the hip, spine, or wrist  A collapsed vertebra (bone in your spine) may cause severe back pain or loss of height from bent posture  Call your doctor if:   ·  You have severe pain  ·  You have increasing pain after a fall  ·  You have pain when you do your daily activities  ·  You have questions or concerns about your condition or care  Diagnosis of osteoporosis:   · Blood and urine tests  measure your calcium, vitamin D, and estrogen levels  · An x-ray or CT may show thinned bones or a fracture  You may be given contrast liquid to help the bones show up better in the pictures  Tell the healthcare provider if you have ever had an allergic reaction to contrast liquid  Do not enter the MRI room with anything metal  Metal can cause serious injury  Tell the healthcare provider if you have any metal in or on your body  · A bone density test  compares your bone thickness with what is expected for someone of your age, gender, and ethnicity  Treatment for osteoporosis may include medicines to prevent bone loss, build new bone, and increase estrogen  These medicines help prevent fractures and may be given as a pill or injection  Ask your healthcare provider for more information on these medicines  Prevent bone loss:  · Eat healthy foods that are high in calcium  This helps keep your bones strong  Good sources of calcium are milk, cheese, broccoli, tofu, almonds, and canned salmon and sardines  Recommended to get at least 1200mg daily of calcium    · Increase your vitamin D intake  Vitamin D is in fish oils, some vegetables, and fortified milk, cereal, and bread  Vitamin D is also formed in the skin when it is exposed to the sun  Ask your healthcare provider how much sunlight is safe for you  You will require at least 800 units of vitamin D daily taken as a supplement  · Drink liquids as directed  Ask your healthcare provider how much liquid to drink each day and which liquids are best for you  Do not have alcohol or caffeine  They decrease bone mineral density, which can weaken your bones  · Exercise regularly  Ask your healthcare provider about the best exercise plan for you  Weight bearing exercise for 30 minutes, 3 times a week can help build and strengthen bone  · Do not smoke  Nicotine and other chemicals in cigarettes and cigars can cause lung damage  Ask your healthcare provider for information if you currently smoke and need help to quit  E-cigarettes or smokeless tobacco still contain nicotine  Talk to your healthcare provider before you use these products  · Go to physical therapy as directed  A physical therapist teaches you exercises to help improve movement and muscle strength  · Alcohol  It is recommended to avoid heavy alcohol use as increased consumption of alcohol is known to cause bone loss  © Copyright [employer] 2021 Information is for End User's use only and may not be sold, redistributed or otherwise used for commercial purposes  All illustrations and images included in CareNotes® are the copyrighted property of [employer]  or [facility] Katharine Mora     Calcium and vitamin D for bone health (Beyond the Basics)    CALCIUM AND VITAMIN D OVERVIEW  Osteoporosis is a common bone disorder that causes a progressive loss in bone density and mass  As a result, bones become thin, weakened, and easily fractured   It is estimated that more than 1 3 million osteoporosis-associated (or "osteoporotic") fractures occur every year in the [place], primarily of bone within the spine (the vertebrae), the hip, and the forearm near the wrist  =    A number of treatments can help to prevent loss of bone and treat low bone mass  However, the first step in preventing or treating osteoporosis is to consume foods and drinks that provide calcium, a mineral essential for bone strength, and vitamin D, which aids in calcium breakdown and absorption  =    CALCIUM AND VITAMIN D BENEFITS  Good nutrition is important at all ages to keep the bones healthy  · Taking calcium reduces bone loss and decreases the risk of fracturing the vertebrae (the bones that surround the spinal cord)  · Consuming calcium during childhood (eg, in milk) can lead to higher bone mass in adulthood  This increase in bone density can reduce the risk of fractures later in life  · Calcium may also have benefits in other body systems by reducing blood pressure and cholesterol levels  · Calcium and vitamin D supplements may help prevent tooth loss in older adults  RECOMMENDATIONS FOR CALCIUM    General recommendations -- Premenopausal women and men should consume at least 1000 mg of calcium, while postmenopausal women should consume 1200 mg (total diet plus supplement)  You should not consume more than 2000 mg of calcium per day (total diet plus supplement) due to the risk of side effects  Calcium in the diet -- The primary sources of calcium in the diet include milk and other dairy products, such as hard cheese, cottage cheese, or yogurt, as well as green vegetables, such as kale and broccoli (table 1)  Some cereals, soy products, and fruit juices are fortified with calcium  Calcium supplements -- The body is able to absorb calcium contained in supplements as well as from dietary sources  If it is not possible to get enough calcium from dietary sources, consult a health care provider to determine the best type, dose, and timing of calcium supplements       · Calcium carbonate is effective and is the least expensive form of calcium  It is best absorbed with a low-iron meal (such as breakfast)  Calcium carbonate may not be absorbed well in people who also take a specific medication for gastroesophageal reflux (called a proton pump inhibitor or H2 blocker), which blocks stomach acid  · Many natural calcium carbonate preparations such as oyster shells or bone meal contain some lead  · Calcium citrate is well absorbed in the fasting state as well as with a meal   · Calcium doses above 500 mg are not absorbed as well as smaller doses, so large doses of supplements should be taken in divided doses (eg, in the morning and evening)  · Calcium supplements do not replace other osteoporosis treatments such as hormone replacement, bisphosphonates (eg, risedronate [sample brand name: Actonel] and alendronate [brand name: Fosamax]), and raloxifene (brand name: Evista)  Calcium and vitamin D supplements alone are usually insufficient to prevent age-related bone loss, although they may be beneficial in some subgroups (older adults, those with very low intake)  In most patients with or at risk for osteoporosis, the addition of medication or hormonal therapy is necessary in order to slow the breakdown and removal of bone (ie, resorption)  Underlying gastrointestinal diseases -- Patients who do not adequately absorb nutrients from the gastrointestinal tract (due to malabsorption) may require more than 1000 to 1200 mg of calcium per day  In such cases, a health care provider can help to determine the optimal dose of calcium  Medications -- All medications should be discussed with a health care provider to ensure that possible interactions with calcium are identified  Certain medications change the amount of calcium that is absorbed and/or excreted  As an example, loop diuretics (eg, furosemide [sample brand name: Lasix]) increase the amount of calcium excreted in the urine      On the other hand, thiazide diuretics (eg, hydrochlorothiazide) can reduce levels of calcium in the urine, potentially reducing the risk of bone loss and kidney stones  Side effects of calcium -- Calcium is usually easily tolerated when it is taken in divided doses several times per day  Some people experience side effects related to calcium, including constipation and indigestion  Calcium supplements interfere with the absorption of iron and thyroid hormone, and therefore, these medications should be taken at different times  Kidney stones -- There is no evidence that consuming large amounts of calcium (from foods and drinks) increases the risk of kidney stones, or that avoiding dietary calcium decreases the risk  In fact, avoiding dairy products is likely to increase the risk of kidney stones  However, use of calcium supplements may increase the risk of kidney stones in susceptible individuals by raising the level of calcium in the urine  This is particularly true if the supplement is taken between meals or at bedtime, and this is one of the reasons we prefer for patients to get as much of their calcium requirement through dietary means  IMPORTANCE OF VITAMIN D  Vitamin D decreases bone loss and lowers the risk of fracture, especially in older men and women  Along with calcium, vitamin D also helps to prevent and treat osteoporosis  To absorb calcium efficiently, an adequate amount of vitamin D must be present  Vitamin D is normally made in the skin after exposure to sunlight  Recommendations for vitamin D -- The current recommendation is that men over 70 years and postmenopausal women consume at least 800 international units (20 micrograms) of vitamin D per day  Lower levels of vitamin D are not as effective, while high doses can be toxic, especially if taken for long periods of time   Although the optimal intake has not been clearly established in premenopausal women or in younger men with osteoporosis, 600 international units (15 micrograms) of vitamin D daily is generally suggested  Vitamin D is available as an individual supplement and is included in most multivitamins and some calcium supplements (table 2)  Milk is a relatively good dietary source of vitamin D, with approximately 100 international units (2 5 micrograms) per cup (240 mL), and salmon has 800 to 1000 international units (20 to 25 micrograms) of vitamin D per serving  Most healthy people don't need to check with their health care provider before taking standard doses of vitamin D and don't need monitoring of vitamin D levels if they are not being treated for vitamin D deficiency  However, recommendations for vitamin D supplementation may be different in people with certain underlying medical conditions, such as sarcoidosis or lymphoma  In these situations, a provider will determine if supplements are needed; if so, the person's vitamin D and calcium levels should be monitored with regular tests  ©2197 [employer], [address] rights reserved

## 2023-03-21 NOTE — PROGRESS NOTES
Diabetic Foot Exam    Patient's shoes and socks removed  Right Foot/Ankle   Right Foot Inspection  Skin Exam: skin normal and skin intact  No dry skin, no warmth, no callus, no erythema, no maceration, no abnormal color, no pre-ulcer, no ulcer and no callus  Toe Exam: ROM and strength within normal limits  Sensory   Vibration: diminished  Proprioception: diminished  Monofilament testing: diminished    Vascular  Capillary refills: < 3 seconds  The right DP pulse is 1+  The right PT pulse is 0  Left Foot/Ankle  Left Foot Inspection  Skin Exam: skin normal and skin intact  No dry skin, no warmth, no erythema, no maceration, normal color, no pre-ulcer, no ulcer and no callus  Toe Exam: ROM and strength within normal limits  Sensory   Vibration: diminished  Proprioception: diminished  Monofilament testing: diminished    Vascular  Capillary refills: < 3 seconds  The left DP pulse is 1+  The left PT pulse is 0       Assign Risk Category  No deformity present  Loss of protective sensation  Weak pulses  Risk: 2

## 2023-03-21 NOTE — PROGRESS NOTES
Name: Jeffery Khan      : 1950      MRN: [id number]  Encounter Provider: Nafisa Cervantes MD  Encounter Date: 3/21/2023   Encounter department: [address] [place] [address]  Bariatric surgery status  -     cyanocobalamin injection 1,000 mcg    2  Type 2 diabetes mellitus with hyperlipidemia (HCC)  -     Urine Microalbumin/creatinine ratio; Future  -     POCT hemoglobin A1c  -     POCT ECG  -     UA (URINE) with reflex to Scope; Future; Expected date: 2023  -     Comprehensive metabolic panel; Future  -     CBC and differential; Future  -     Magnesium; Future    3  Hypothyroidism, unspecified type  -     levothyroxine 25 mcg tablet; Take 1 tablet (25 mcg total) by mouth every other day    4  Idiopathic osteoporosis  -     Calcium Carb-Cholecalciferol (Caltrate 600+D3) 600-20 MG-MCG TABS; Take 1 tablet by mouth 2 (two) times a day with meals  -     denosumab (PROLIA) 60 mg/mL; Inject 1 mL (60 mg total) under the skin once for 1 dose    5  Pre-op exam  -     Vitamin D 25 hydroxy; Future; Expected date: 2023    6  Hypothyroidism due to Hashimoto's thyroiditis  -     Quantiferon TB Gold Plus; Future  -     TSH, 3rd generation; Future; Expected date: 2023  -     T4, free; Future; Expected date: 2023  -     US thyroid; Future; Expected date: 2023  -     Thyroid Antibodies Panel; Future    7  Lung nodules  -     Quantiferon TB Gold Plus; Future  -     CT chest wo contrast; Future; Expected date: 2023    8  Multiple thyroid nodules  -     Thyroid Antibodies Panel; Future    9  Cigarette smoker  -     Quantiferon TB Gold Plus; Future  -     CT chest wo contrast; Future; Expected date: 2023  -     benzonatate (TESSALON PERLES) 100 mg capsule; Take 1 capsule (100 mg total) by mouth 3 (three) times a day as needed for cough    10  Essential hypertension    11  Screen for colon cancer  -     Cologuard    12   Hyperlipidemia associated with type 2 diabetes mellitus ([facility] [address] )  -     Lipid panel; Future    13  Low vitamin B12 level  -     Vitamin B12; Future    14  Pulmonary emphysema, unspecified emphysema type (HCC)  -     benzonatate (TESSALON PERLES) 100 mg capsule; Take 1 capsule (100 mg total) by mouth 3 (three) times a day as needed for cough    Pt was advised to quit smoking  Life style Mod  Pt is clinically stable at this time for cataract surgery  RTC in 2 mos w Blood work       Subjective      67 Y O lady is here for regular check Up, and Pre Op clearance for eye surgery, she still smokes, recent blood work and med list reviewed w pt in detail    Review of Systems   Constitutional: Negative for chills, fatigue and fever  HENT: Negative for congestion, facial swelling, sore throat, trouble swallowing and voice change  Eyes: Negative for pain, discharge and visual disturbance  Respiratory: Negative for cough, shortness of breath and wheezing  Cardiovascular: Negative for chest pain, palpitations and leg swelling  Gastrointestinal: Negative for abdominal pain, blood in stool, constipation, diarrhea and nausea  Endocrine: Negative for polydipsia, polyphagia and polyuria  Genitourinary: Negative for difficulty urinating, hematuria and urgency  Musculoskeletal: Negative for arthralgias and myalgias  Skin: Negative for rash  Neurological: Negative for dizziness, tremors, weakness and headaches  Hematological: Negative for adenopathy  Does not bruise/bleed easily  Psychiatric/Behavioral: Negative for dysphoric mood, sleep disturbance and suicidal ideas  Current Outpatient Medications on File Prior to Visit   Medication Sig   • albuterol (PROVENTIL HFA,VENTOLIN HFA) 90 mcg/act inhaler    • albuterol (PROVENTIL HFA,VENTOLIN HFA) 90 mcg/act inhaler Inhale 2 puffs every 4 (four) hours as needed for wheezing   • amLODIPine (NORVASC) 5 mg tablet TAKE 1 TABLET (5 MG TOTAL) BY MOUTH DAILY     • baclofen 10 mg tablet Take 1 tablet (10 mg total) by mouth 3 (three) times a day   • cetirizine (ZyrTEC) 10 mg tablet TAKE 1/2 TABLET BY MOUTH EVERY DAY   • cimetidine (TAGAMET) 200 mg tablet Take 1 tablet (200 mg total) by mouth 2 (two) times a day   • glucose blood (OneTouch Verio) test strip Use 1 each 2 (two) times a day Use to test   • HYDROcodone-acetaminophen (NORCO)  mg per tablet Take 1 tablet every 4-6 hours prn  For ongoing therapy  • Lancets Misc  (ACCU-CHEK FASTCLIX LANCET) KIT 3 (three) times a day   • levothyroxine 50 mcg tablet TAKE 1 TABLET (50 MCG TOTAL) BY MOUTH EVERY OTHER DAY   • losartan (COZAAR) 25 mg tablet TAKE 1 TABLET (25 MG TOTAL) BY MOUTH DAILY  • MICROLET LANCETS MISC    • nitrofurantoin (MACROBID) 100 mg capsule Take 1 capsule (100 mg total) by mouth daily With food/Lunch   • nortriptyline (PAMELOR) 25 mg capsule TAKE 1 CAPSULE BY MOUTH DAILY AT BEDTIME   • pantoprazole (PROTONIX) 40 mg tablet TAKE 1 TABLET BY MOUTH TWICE A DAY   • Pediatric Multiple Vit-C-FA (Childrens Chew Multivitamin) CHEW CHEW AND SWALLOW 1 TABLET BY MOUTH EVERY DAY   • pregabalin (LYRICA) 200 MG capsule Take 1 capsule (200 mg total) by mouth 3 (three) times a day   • sucralfate (CARAFATE) 1 g tablet TAKE 1 TABLET BY MOUTH 4 TIMES EVERY DAY ON AN EMPTY STOMACH 1 HOUR BEFORE MEALS AND AT BEDTIME   • traZODone (DESYREL) 50 mg tablet TAKE 1 TABLET BY MOUTH EVERY DAY AT BEDTIME AS NEEDED   • Trelegy Ellipta 100-62 5-25 MCG/ACT inhaler INHALE 1 PUFF DAILY RINSE MOUTH AFTER USE     • vitamin A 2400 MCG (8000 UT) capsule    • [DISCONTINUED] Calcium Carb-Cholecalciferol (Caltrate 600+D3) 600-800 MG-UNIT TABS Take 1 tablet by mouth 2 (two) times a day with meals   • [DISCONTINUED] levothyroxine 25 mcg tablet TAKE 1 TABLET (25 MCG TOTAL) BY MOUTH EVERY OTHER DAY   • ergocalciferol (VITAMIN D2) 50,000 units Take 1 capsule (50,000 Units total) by mouth once a week   • vitamin B-12 (VITAMIN B-12) 1,000 mcg tablet Take 1 tablet (1,000 mcg total) by mouth daily   • [DISCONTINUED] Bempedoic Acid 180 MG TABS Take 180 mg by mouth in the morning   • [DISCONTINUED] benzonatate (TESSALON PERLES) 100 mg capsule Take 1 capsule (100 mg total) by mouth 3 (three) times a day as needed for cough   • [DISCONTINUED] diphenhydrAMINE (BENADRYL) 25 mg capsule Take 50 mg by mouth daily    • [DISCONTINUED] HYDROcodone-acetaminophen (NORCO)  mg per tablet Take 1 tablet every 4-6 hours prn; ongoing therapy   • [DISCONTINUED] HYDROcodone-acetaminophen (NORCO)  mg per tablet Take 1 tablet every 4-6 hours prn, ongoing therapy   • [DISCONTINUED] predniSONE 10 mg tablet Take 3 tabs daily with breakfast for 3 days then Take 2 tabs daily for 3 Days then take one tab daily for 3 days then stop      • [DISCONTINUED] vitamin A 2400 MCG (8000 UT) capsule TAKE 1 CAPSULE (8,000 UNITS TOTAL) BY MOUTH DAILY   • [DISCONTINUED] Vitamin A 2400 MCG (8000 UT) TABS        Objective     /90 (BP Location: Left arm, Patient Position: Sitting, Cuff Size: Standard)   Pulse 55   Temp 97 6 °F (36 4 °C) (Tympanic)   Resp 14   Ht 5' 5" (1 651 m)   LMP  (LMP Unknown)   SpO2 98%   BMI 29 62 kg/m²     Physical Exam  Constitutional:       General: She is not in acute distress  HENT:      Head: Normocephalic  Mouth/Throat:      Pharynx: No oropharyngeal exudate  Eyes:      General: No scleral icterus  Conjunctiva/sclera: Conjunctivae normal       Pupils: Pupils are equal, round, and reactive to light  Neck:      Thyroid: No thyromegaly  Cardiovascular:      Rate and Rhythm: Normal rate and regular rhythm  Heart sounds: Murmur heard  Pulmonary:      Effort: Pulmonary effort is normal  No respiratory distress  Breath sounds: Normal breath sounds  No wheezing or rales  Abdominal:      General: Bowel sounds are normal  There is no distension  Palpations: Abdomen is soft  Tenderness: There is no abdominal tenderness  There is no guarding or rebound  Musculoskeletal:         General: No tenderness  Cervical back: Neck supple  Lymphadenopathy:      Cervical: No cervical adenopathy  Skin:     Coloration: Skin is not pale  Findings: No rash  Neurological:      Mental Status: She is alert and oriented to person, place, and time  Sensory: No sensory deficit  Motor: No weakness         Tara Benito MD 1.7

## 2023-04-03 ENCOUNTER — TELEPHONE (OUTPATIENT)
Dept: PAIN MEDICINE | Facility: MEDICAL CENTER | Age: 73
End: 2023-04-03

## 2023-04-03 DIAGNOSIS — M54.16 LUMBAR RADICULITIS: ICD-10-CM

## 2023-04-03 DIAGNOSIS — G89.4 CHRONIC PAIN SYNDROME: ICD-10-CM

## 2023-04-03 DIAGNOSIS — M54.41 CHRONIC BILATERAL LOW BACK PAIN WITH BILATERAL SCIATICA: ICD-10-CM

## 2023-04-03 DIAGNOSIS — M54.42 CHRONIC BILATERAL LOW BACK PAIN WITH BILATERAL SCIATICA: ICD-10-CM

## 2023-04-03 DIAGNOSIS — G89.29 CHRONIC BILATERAL LOW BACK PAIN WITH BILATERAL SCIATICA: ICD-10-CM

## 2023-04-03 RX ORDER — HYDROCODONE BITARTRATE AND ACETAMINOPHEN 10; 325 MG/1; MG/1
TABLET ORAL
Qty: 130 TABLET | Refills: 0 | Status: SHIPPED | OUTPATIENT
Start: 2023-04-03

## 2023-04-03 NOTE — TELEPHONE ENCOUNTER
Caller: Marcy Galindo PT    Doctor: Fermin HOOK-    Reason for call: Medication script for the medication HYDROcodone-acetaminophen [facility])  mg per tablet     Needs to be updated to April instead of May     Call back#: [phone]

## 2023-04-03 NOTE — TELEPHONE ENCOUNTER
RN s/w Julian Mendoza from [facility] and they did receive and fill 3/9 script for 130 tablets and they have another script o n file with a DNF date of 5/2, they do not have a script for April    --please advise thank you--  Send script for April?

## 2023-04-04 LAB
LEFT EYE DIABETIC RETINOPATHY: NORMAL
RIGHT EYE DIABETIC RETINOPATHY: NORMAL

## 2023-04-04 NOTE — TELEPHONE ENCOUNTER
Caller: meme     Doctor: Michael Rubio    Reason for call: thank you very much and     MICHAEL Nunez's script should be 5/4

## 2023-04-18 LAB
LEFT EYE DIABETIC RETINOPATHY: NORMAL
RIGHT EYE DIABETIC RETINOPATHY: NORMAL

## 2023-04-18 PROCEDURE — 2023F DILAT RTA XM W/O RTNOPTHY: CPT | Performed by: INTERNAL MEDICINE

## 2023-04-22 DIAGNOSIS — J43.9 PULMONARY EMPHYSEMA, UNSPECIFIED EMPHYSEMA TYPE (HCC): ICD-10-CM

## 2023-04-24 RX ORDER — FLUTICASONE FUROATE, UMECLIDINIUM BROMIDE AND VILANTEROL TRIFENATATE 100; 62.5; 25 UG/1; UG/1; UG/1
POWDER RESPIRATORY (INHALATION)
Qty: 60 EACH | Refills: 1 | Status: SHIPPED | OUTPATIENT
Start: 2023-04-24

## 2023-04-26 ENCOUNTER — RA CDI HCC (OUTPATIENT)
Dept: OTHER | Facility: HOSPITAL | Age: 73
End: 2023-04-26

## 2023-04-26 NOTE — PROGRESS NOTES
E11 [id number]  [facility]  coding opportunities          Chart Reviewed number of suggestions sent to Provider: 1     Patients Insurance     Medicare Insurance: [employer]

## 2023-04-28 ENCOUNTER — TELEPHONE (OUTPATIENT)
Dept: PAIN MEDICINE | Facility: MEDICAL CENTER | Age: 73
End: 2023-04-28

## 2023-04-28 DIAGNOSIS — G89.4 CHRONIC PAIN SYNDROME: ICD-10-CM

## 2023-04-28 DIAGNOSIS — G89.29 CHRONIC BILATERAL LOW BACK PAIN WITH BILATERAL SCIATICA: ICD-10-CM

## 2023-04-28 DIAGNOSIS — M54.16 LUMBAR RADICULITIS: ICD-10-CM

## 2023-04-28 DIAGNOSIS — M54.41 CHRONIC BILATERAL LOW BACK PAIN WITH BILATERAL SCIATICA: ICD-10-CM

## 2023-04-28 DIAGNOSIS — I10 ESSENTIAL HYPERTENSION: ICD-10-CM

## 2023-04-28 DIAGNOSIS — M54.42 CHRONIC BILATERAL LOW BACK PAIN WITH BILATERAL SCIATICA: ICD-10-CM

## 2023-04-28 RX ORDER — HYDROCODONE BITARTRATE AND ACETAMINOPHEN 10; 325 MG/1; MG/1
TABLET ORAL
Qty: 130 TABLET | Refills: 0 | Status: SHIPPED | OUTPATIENT
Start: 2023-04-28

## 2023-04-28 NOTE — TELEPHONE ENCOUNTER
S/w pt  Per pt the pharmacist at [facility] advised pt that when script was sent for April the may script was deleted for her Hydrocodone  Please advise- can script for May be resent?  Thank you

## 2023-04-28 NOTE — TELEPHONE ENCOUNTER
Caller: Cheyanne Yun    Doctor: Alicia    Reason for call: patient states her Pharmacy [facility] does not have her Hydrocodone for May please resubmit       Thank you     Call back#: [phone]

## 2023-05-01 RX ORDER — LOSARTAN POTASSIUM 25 MG/1
25 TABLET ORAL DAILY
Qty: 90 TABLET | Refills: 1 | Status: SHIPPED | OUTPATIENT
Start: 2023-05-01

## 2023-05-03 DIAGNOSIS — R31.9 HEMATURIA, UNSPECIFIED TYPE: Primary | ICD-10-CM

## 2023-05-03 DIAGNOSIS — R80.8 OTHER PROTEINURIA: ICD-10-CM

## 2023-05-03 LAB
ALBUMIN/CREAT UR: 12 MCG/MG CREAT
APPEARANCE UR: ABNORMAL
BACTERIA UR QL AUTO: ABNORMAL /HPF
BILIRUB UR QL STRIP: NEGATIVE
COLOR UR: YELLOW
CREAT UR-MCNC: 34 MG/DL (ref 20–275)
GLUCOSE UR QL STRIP: NEGATIVE
HGB UR QL STRIP: ABNORMAL
HYALINE CASTS #/AREA URNS LPF: ABNORMAL /LPF
KETONES UR QL STRIP: NEGATIVE
LEUKOCYTE ESTERASE UR QL STRIP: ABNORMAL
MICROALBUMIN UR-MCNC: 0.4 MG/DL
NITRITE UR QL STRIP: POSITIVE
PH UR STRIP: 6.5 [PH] (ref 5–8)
PROT UR QL STRIP: NEGATIVE
RBC #/AREA URNS HPF: ABNORMAL /HPF
SP GR UR STRIP: 1 (ref 1–1.03)
SQUAMOUS #/AREA URNS HPF: ABNORMAL /HPF
WBC #/AREA URNS HPF: ABNORMAL /HPF

## 2023-05-05 DIAGNOSIS — M17.11 PRIMARY OSTEOARTHRITIS OF RIGHT KNEE: Primary | ICD-10-CM

## 2023-05-05 LAB
25(OH)D3 SERPL-MCNC: 49 NG/ML (ref 30–100)
ALBUMIN SERPL-MCNC: 3.8 G/DL (ref 3.6–5.1)
ALBUMIN/GLOB SERPL: 1.8 (CALC) (ref 1–2.5)
ALP SERPL-CCNC: 109 U/L (ref 37–153)
ALT SERPL-CCNC: 16 U/L (ref 6–29)
AST SERPL-CCNC: 21 U/L (ref 10–35)
BASOPHILS # BLD AUTO: 9 CELLS/UL (ref 0–200)
BASOPHILS NFR BLD AUTO: 0.2 %
BILIRUB SERPL-MCNC: 0.4 MG/DL (ref 0.2–1.2)
BUN SERPL-MCNC: 7 MG/DL (ref 7–25)
BUN/CREAT SERPL: ABNORMAL (CALC) (ref 6–22)
CALCIUM SERPL-MCNC: 8.9 MG/DL (ref 8.6–10.4)
CHLORIDE SERPL-SCNC: 106 MMOL/L (ref 98–110)
CHOLEST SERPL-MCNC: 224 MG/DL
CHOLEST/HDLC SERPL: 3.6 (CALC)
CO2 SERPL-SCNC: 29 MMOL/L (ref 20–32)
CREAT SERPL-MCNC: 0.71 MG/DL (ref 0.6–1)
EOSINOPHIL # BLD AUTO: 132 CELLS/UL (ref 15–500)
EOSINOPHIL NFR BLD AUTO: 2.8 %
ERYTHROCYTE [DISTWIDTH] IN BLOOD BY AUTOMATED COUNT: 13.2 % (ref 11–15)
GFR/BSA.PRED SERPLBLD CYS-BASED-ARV: 90 ML/MIN/1.73M2
GLOBULIN SER CALC-MCNC: 2.1 G/DL (CALC) (ref 1.9–3.7)
GLUCOSE SERPL-MCNC: 73 MG/DL (ref 65–99)
HCT VFR BLD AUTO: 37.2 % (ref 35–45)
HDLC SERPL-MCNC: 63 MG/DL
HGB BLD-MCNC: 12.4 G/DL (ref 11.7–15.5)
LDLC SERPL CALC-MCNC: 138 MG/DL (CALC)
LYMPHOCYTES # BLD AUTO: 1189 CELLS/UL (ref 850–3900)
LYMPHOCYTES NFR BLD AUTO: 25.3 %
M TB IFN-G CD4+ BCKGRND COR BLD-ACNC: 0.03 IU/ML
M TB IFN-G CD4+ BCKGRND COR BLD-ACNC: 0.03 IU/ML
M TB IFN-G CD4+ T-CELLS BLD-ACNC: 0.01 IU/ML
M TB TUBERC IFN-G BLD QL: NEGATIVE
M TB TUBERC IGNF/MITOGEN IGNF CONTROL: >10 IU/ML
MAGNESIUM SERPL-MCNC: 2 MG/DL (ref 1.5–2.5)
MCH RBC QN AUTO: 32.6 PG (ref 27–33)
MCHC RBC AUTO-ENTMCNC: 33.3 G/DL (ref 32–36)
MCV RBC AUTO: 97.9 FL (ref 80–100)
MONOCYTES # BLD AUTO: 404 CELLS/UL (ref 200–950)
MONOCYTES NFR BLD AUTO: 8.6 %
NEUTROPHILS # BLD AUTO: 2966 CELLS/UL (ref 1500–7800)
NEUTROPHILS NFR BLD AUTO: 63.1 %
NONHDLC SERPL-MCNC: 161 MG/DL (CALC)
PLATELET # BLD AUTO: 199 THOUSAND/UL (ref 140–400)
PMV BLD REES-ECKER: 10.4 FL (ref 7.5–12.5)
POTASSIUM SERPL-SCNC: 3.6 MMOL/L (ref 3.5–5.3)
PROT SERPL-MCNC: 5.9 G/DL (ref 6.1–8.1)
RBC # BLD AUTO: 3.8 MILLION/UL (ref 3.8–5.1)
SODIUM SERPL-SCNC: 144 MMOL/L (ref 135–146)
T4 FREE SERPL-MCNC: 0.9 NG/DL (ref 0.8–1.8)
THYROGLOB AB SERPL-ACNC: <1 IU/ML
THYROPEROXIDASE AB SERPL-ACNC: 2 IU/ML
TRIGL SERPL-MCNC: 114 MG/DL
TSH SERPL-ACNC: 4.01 MIU/L (ref 0.4–4.5)
VIT B12 SERPL-MCNC: 329 PG/ML (ref 200–1100)
WBC # BLD AUTO: 4.7 THOUSAND/UL (ref 3.8–10.8)

## 2023-05-19 DIAGNOSIS — G89.29 CHRONIC PAIN OF RIGHT KNEE: Primary | ICD-10-CM

## 2023-05-19 DIAGNOSIS — M54.41 CHRONIC BILATERAL LOW BACK PAIN WITH BILATERAL SCIATICA: ICD-10-CM

## 2023-05-19 DIAGNOSIS — G89.29 CHRONIC BILATERAL LOW BACK PAIN WITH BILATERAL SCIATICA: ICD-10-CM

## 2023-05-19 DIAGNOSIS — M25.561 CHRONIC PAIN OF RIGHT KNEE: Primary | ICD-10-CM

## 2023-05-19 DIAGNOSIS — M79.18 MYOFASCIAL PAIN SYNDROME: ICD-10-CM

## 2023-05-19 DIAGNOSIS — M54.42 CHRONIC BILATERAL LOW BACK PAIN WITH BILATERAL SCIATICA: ICD-10-CM

## 2023-05-22 ENCOUNTER — TELEPHONE (OUTPATIENT)
Dept: GASTROENTEROLOGY | Facility: MEDICAL CENTER | Age: 73
End: 2023-05-22

## 2023-05-22 RX ORDER — PREGABALIN 200 MG/1
CAPSULE ORAL
Qty: 90 CAPSULE | Refills: 1 | Status: SHIPPED | OUTPATIENT
Start: 2023-05-22

## 2023-05-22 NOTE — TELEPHONE ENCOUNTER
Pt called and is asking if she can go anywhere else than [facility] because she stated they will have her go through all the testing again and she is being treated like she would for [facility]  She is very frustrated about that and is looking for an alternative  Please advise

## 2023-05-24 ENCOUNTER — OFFICE VISIT (OUTPATIENT)
Dept: PAIN MEDICINE | Facility: MEDICAL CENTER | Age: 73
End: 2023-05-24

## 2023-05-24 VITALS
SYSTOLIC BLOOD PRESSURE: 157 MMHG | DIASTOLIC BLOOD PRESSURE: 64 MMHG | BODY MASS INDEX: 30.49 KG/M2 | WEIGHT: 183 LBS | HEART RATE: 69 BPM | HEIGHT: 65 IN

## 2023-05-24 DIAGNOSIS — M54.42 CHRONIC BILATERAL LOW BACK PAIN WITH BILATERAL SCIATICA: ICD-10-CM

## 2023-05-24 DIAGNOSIS — M54.41 CHRONIC BILATERAL LOW BACK PAIN WITH BILATERAL SCIATICA: ICD-10-CM

## 2023-05-24 DIAGNOSIS — M54.16 LUMBAR RADICULOPATHY: ICD-10-CM

## 2023-05-24 DIAGNOSIS — G89.4 CHRONIC PAIN SYNDROME: Primary | ICD-10-CM

## 2023-05-24 DIAGNOSIS — G89.29 CHRONIC BILATERAL LOW BACK PAIN WITH BILATERAL SCIATICA: ICD-10-CM

## 2023-05-24 DIAGNOSIS — M54.12 CERVICAL RADICULOPATHY: ICD-10-CM

## 2023-05-24 DIAGNOSIS — F11.20 UNCOMPLICATED OPIOID DEPENDENCE (HCC): ICD-10-CM

## 2023-05-24 DIAGNOSIS — M47.816 LUMBAR SPONDYLOSIS: ICD-10-CM

## 2023-05-24 DIAGNOSIS — M79.18 MYOFASCIAL PAIN SYNDROME: ICD-10-CM

## 2023-05-24 RX ORDER — HYDROCODONE BITARTRATE AND ACETAMINOPHEN 10; 325 MG/1; MG/1
TABLET ORAL
Qty: 130 TABLET | Refills: 0 | Status: SHIPPED | OUTPATIENT
Start: 2023-05-24

## 2023-05-24 RX ORDER — PREDNISOLONE ACETATE 10 MG/ML
SUSPENSION/ DROPS OPHTHALMIC
COMMUNITY
Start: 2023-03-28

## 2023-05-24 RX ORDER — OFLOXACIN 3 MG/ML
SOLUTION/ DROPS OPHTHALMIC
COMMUNITY
Start: 2023-03-28

## 2023-05-24 NOTE — PROGRESS NOTES
Assessment:  1  Chronic pain syndrome    2  Uncomplicated opioid dependence ([address] )    3  Chronic bilateral low back pain with bilateral sciatica    4  Cervical radiculopathy    5  Lumbar spondylosis    6  Lumbar radiculopathy    7  Myofascial pain syndrome        Plan:  While the patient was in the office today, I did have a thorough conversation regarding their chronic pain syndrome, medication management, and treatment plan options  The patient remains clinically stable and moderately controlled on the current medication regimen of Norco 10/325 every 4 to 6 hours as needed pain and pregabalin 200 mg 3 times daily  Patient is taking medication as prescribed with no side effects  Follow-up in 2 months or sooner if needed if the pain changes or worsens  There are risks associated with opioid medications, including dependence, addiction and tolerance  The patient understands and agrees to use these medications only as prescribed  Potential side effects of the medications include, but are not limited to, constipation, drowsiness, addiction, impaired judgment and risk of fatal overdose if not taken as prescribed  The patient was warned against driving while taking sedation medications  Sharing medications is a felony  At this point in time, the patient is showing no signs of addiction, abuse, diversion or suicidal ideation  [place] Pres[facility] Drug Monitoring Program report was reviewed and was appropriate     My impressions and treatment recommendations were discussed in detail with the patient who verbalized understanding and had no further questions  Discharge instructions were provided  I personally saw and examined the patient and I agree with the above discussed plan of care  No orders of the defined types were placed in this encounter      New Medications Ordered This Visit   Medications   • ofloxacin (OCUFLOX) 0 3 % ophthalmic solution     Sig: INSTILL 1 DROP INTO AFFECTED EYE 4 TIMES A DAY AS DIRECTED   • prednisoLONE acetate (PRED FORTE) 1 % ophthalmic suspension     Sig: INSTILL 1 DROP INTO AFFECTED EYE 4 TIMES A DAY AS DIRECTED   • HYDROcodone-acetaminophen (NORCO)  mg per tablet     Sig: Take 1 tablet every 4-6 hours prn  For ongoing therapy  Dispense:  130 tablet     Refill:  0   • HYDROcodone-acetaminophen (NORCO)  mg per tablet     Si tab every 4-6 hrs prn pain, for ongoing therapy     Dispense:  130 tablet     Refill:  0     Do not fill before 2023       History of Present Illness:  Troy Ruiz is a 67 y o  female who presents for a follow up office visit in regards to chronic pain  The patient’s current symptoms include chronic multisite pain including neck pain and low back pain as well as knee pain  She rates her overall pain an 8 out of 10 on the scale and describes it as constant burning, dull, aching, sharp, throbbing and shooting pain with occasional numbness and paresthesias in the lower extremities  She is maintained on Norco 10/325 every 4 to 6 hours as needed pain and pregabalin 200 mg 3 times daily and she reports approximately 30% reduction in pain and denies side effects  Patient has no new symptoms and no acute issues on today's visit  Opioid contract date 3/09/23    Last UDS Date 3/09/23    Norco last taken on 5 AM    I have personally reviewed and/or updated the patient's past medical history, past surgical history, family history, social history, current medications, allergies, and vital signs today  Review of Systems   Respiratory: Negative for shortness of breath  Cardiovascular: Negative for chest pain  Gastrointestinal: Positive for diarrhea and nausea  Negative for constipation and vomiting  Musculoskeletal: Positive for back pain, gait problem, joint swelling and myalgias  Negative for arthralgias  Skin: Negative for rash  Neurological: Negative for dizziness, seizures and weakness     Psychiatric/Behavioral: Positive for decreased concentration  All other systems reviewed and are negative        Patient Active Problem List   Diagnosis   • Chronic pain syndrome   • Cervical radiculopathy   • Myofascial pain syndrome   • Spondylosis of cervical region without myelopathy or radiculopathy   • Fibromyalgia   • Diabetic peripheral neuropathy (HCC)   • Long-term current use of opiate analgesic   • MGUS (monoclonal gammopathy of unknown significance)   • Iron deficiency anemia following bariatric surgery   • Light headedness   • Uncomplicated opioid dependence ([facility])   • Rheumatoid arthritis of hand ([facility] )   • Pulmonary emphysema ([facility])   • Primary osteoarthritis of right knee   • Pseudogout of left knee   • Lumbar radiculitis   • Chronic bilateral low back pain with bilateral sciatica   • Rotator cuff syndrome, left   • Left shoulder pain   • Dizziness   • Other fatigue   • Biceps tendinitis of left shoulder   • Adhesive capsulitis of left shoulder   • Hypoglycemia   • Hypothyroidism due to Hashimoto's thyroiditis   • Type 2 diabetes mellitus with hyperlipidemia ([facility])   • Iron deficiency anemia, unspecified       Past Medical History:   Diagnosis Date   • Anemia    • Anxiety     hx of panic attacks (now under control)    • Arthritis    • Asthma     resolved (no problems in a decade)    • Baker's cyst of knee    • Bronchitis    • Bunion, left foot    • Cervical pain    • Chronic GERD    • Chronic pain    • Chronic pain disorder    • Depression    • Diabetes mellitus, type 2 ([facility])    • Diabetic peripheral neuropathy ([facility])    • Endometriosis    • Fibromyalgia    • Hyperlipidemia    • Hypertension    • Joint pain    • Low back pain    • Low back pain    • Lung nodules    • Macular degeneration    • Pulmonary emphysema ([facility] ) 01/16/2020   • Spondylosis    • Uterine cancer ([facility] )        Past Surgical History:   Procedure Laterality Date   • BACK SURGERY  1996    L5, S1- laser surgery fusion of c5 and c6    • BREAST CYST EXCISION Right • CHOLECYSTECTOMY  2004   • FOOT SURGERY Left 2005    fusion    • FRACTURE SURGERY     • GASTRIC BYPASS  2010    Dr Lennox Fetters    • [facility]    just uterus    • KNEE ARTHROSCOPY     • LAMINECTOMY     • ORTHOPEDIC SURGERY     • OVARIAN CYST REMOVAL  1975   • PELVIC LAPAROSCOPY      ovaries (x10)    • PLEURAL SCARIFICATION  1967   • SHOULDER OPEN ROTATOR CUFF REPAIR Left 2008   • TONSILLECTOMY     • VAGINAL PROLAPSE REPAIR         Family History   Problem Relation Age of Onset   • Hypertension Mother    • Lung cancer Mother    • Hypertension Father    • Heart disease Father    • Heart attack Father    • Cancer Father    • No Known Problems Sister    • No Known Problems Daughter    • No Known Problems Maternal Grandmother    • No Known Problems Maternal Grandfather    • No Known Problems Paternal Grandmother    • No Known Problems Paternal Grandfather    • Breast cancer Paternal Aunt 44   • Breast cancer Paternal Aunt 40   • Breast cancer Maternal Aunt 50       Social History     Occupational History   • Not on file   Tobacco Use   • Smoking status: Every Day     Packs/day: 0 25     Years: 40 00     Total pack years: 10 00     Types: Cigarettes   • Smokeless tobacco: Never   Vaping Use   • Vaping Use: Never used   Substance and Sexual Activity   • Alcohol use: Not Currently     Alcohol/week: 1 0 standard drink of alcohol     Types: 1 Shots of liquor per week     Comment: rarely   • Drug use: No   • Sexual activity: Not Currently     Partners: Male       Current Outpatient Medications on File Prior to Visit   Medication Sig   • albuterol (PROVENTIL HFA,VENTOLIN HFA) 90 mcg/act inhaler Inhale 2 puffs every 4 (four) hours as needed for wheezing   • amLODIPine (NORVASC) 5 mg tablet TAKE 1 TABLET (5 MG TOTAL) BY MOUTH DAILY     • baclofen 10 mg tablet Take 1 tablet (10 mg total) by mouth 3 (three) times a day   • benzonatate (TESSALON PERLES) 100 mg capsule Take 1 capsule (100 mg total) by mouth 3 (three) times a day as needed for cough   • Calcium Carb-Cholecalciferol (Caltrate 600+D3) 600-20 MG-MCG TABS Take 1 tablet by mouth 2 (two) times a day with meals   • cetirizine (ZyrTEC) 10 mg tablet TAKE 1/2 TABLET BY MOUTH EVERY DAY   • cimetidine (TAGAMET) 200 mg tablet Take 1 tablet (200 mg total) by mouth 2 (two) times a day   • ergocalciferol (VITAMIN D2) 50,000 units Take 1 capsule (50,000 Units total) by mouth once a week   • levothyroxine 25 mcg tablet Take 1 tablet (25 mcg total) by mouth every other day   • levothyroxine 50 mcg tablet TAKE 1 TABLET (50 MCG TOTAL) BY MOUTH EVERY OTHER DAY   • losartan (COZAAR) 25 mg tablet TAKE 1 TABLET (25 MG TOTAL) BY MOUTH DAILY  • nortriptyline (PAMELOR) 25 mg capsule TAKE 1 CAPSULE BY MOUTH DAILY AT BEDTIME   • pantoprazole (PROTONIX) 40 mg tablet TAKE 1 TABLET BY MOUTH TWICE A DAY   • Pediatric Multiple Vit-C-FA (Childrens Chew Multivitamin) CHEW CHEW AND SWALLOW 1 TABLET BY MOUTH EVERY DAY   • pregabalin (LYRICA) 200 MG capsule TAKE 1 CAPSULE BY MOUTH 3 TIMES A DAY  • sucralfate (CARAFATE) 1 g tablet TAKE 1 TABLET BY MOUTH 4 TIMES EVERY DAY ON AN EMPTY STOMACH 1 HOUR BEFORE MEALS AND AT BEDTIME   • traZODone (DESYREL) 50 mg tablet TAKE 1 TABLET BY MOUTH EVERY DAY AT BEDTIME AS NEEDED   • Trelegy Ellipta 100-62 5-25 MCG/ACT inhaler INHALE 1 PUFF DAILY RINSE MOUTH AFTER USE  • vitamin A 2400 MCG (8000 UT) capsule    • [DISCONTINUED] HYDROcodone-acetaminophen (NORCO)  mg per tablet Take 1 tablet every 4-6 hours prn  For ongoing therapy     • albuterol (PROVENTIL HFA,VENTOLIN HFA) 90 mcg/act inhaler    • denosumab (PROLIA) 60 mg/mL Inject 1 mL (60 mg total) under the skin once for 1 dose   • glucose blood (OneTouch Verio) test strip Use 1 each 2 (two) times a day Use to test (Patient not taking: Reported on 5/24/2023)   • Lancets Misc  (ACCU-CHEK FASTCLIX LANCET) KIT 3 (three) times a day (Patient not taking: Reported on 5/24/2023)   • [address]  (Patient not "taking: Reported on 5/24/2023)   • nitrofurantoin (MACROBID) 100 mg capsule Take 1 capsule (100 mg total) by mouth daily With food/Lunch (Patient not taking: Reported on 5/24/2023)   • ofloxacin (OCUFLOX) 0 3 % ophthalmic solution INSTILL 1 DROP INTO AFFECTED EYE 4 TIMES A DAY AS DIRECTED (Patient not taking: Reported on 5/24/2023)   • prednisoLONE acetate (PRED FORTE) 1 % ophthalmic suspension INSTILL 1 DROP INTO AFFECTED EYE 4 TIMES A DAY AS DIRECTED (Patient not taking: Reported on 5/24/2023)   • vitamin B-12 (VITAMIN B-12) 1,000 mcg tablet Take 1 tablet (1,000 mcg total) by mouth daily (Patient not taking: Reported on 5/24/2023)     Current Facility-Administered Medications on File Prior to Visit   Medication   • cyanocobalamin injection 1,000 mcg   • cyanocobalamin injection 1,000 mcg   • cyanocobalamin injection 1,000 mcg   • cyanocobalamin injection 1,000 mcg   • cyanocobalamin injection 1,000 mcg   • cyanocobalamin injection 1,000 mcg   • cyanocobalamin injection 1,000 mcg       Allergies   Allergen Reactions   • Cephalosporins Hives   • Erythromycin GI Intolerance   • Fentanyl Itching     Occurred during surgery    • Paxil [Paroxetine] Hives     After 2 weeks   • Penicillins Itching   • Pravastatin Myalgia   • Statins Itching   • Sulfa Antibiotics Diarrhea   • Wellbutrin [Bupropion] Hives     After 2 weeks    • Marzena's Wort Rash       Physical Exam:    /64   Pulse 69   Ht 5' 5\" (1 651 m)   Wt 83 kg (183 lb)   LMP  (LMP Unknown)   BMI 30 45 kg/m²     Constitutional:normal, well developed, well nourished, alert, in no distress and non-toxic and no overt pain behavior    Eyes:anicteric  HEENT:grossly intact  Neck:supple, symmetric, trachea midline and no masses   Pulmonary:even and unlabored  Cardiovascular:No edema or pitting edema present  Skin:Normal without rashes or lesions and well hydrated  Psychiatric:Mood and affect appropriate  Neurologic:Cranial Nerves II-XII grossly " intact  Musculoskeletal:normal       Imaging

## 2023-05-25 ENCOUNTER — TELEPHONE (OUTPATIENT)
Dept: BARIATRICS | Facility: CLINIC | Age: 73
End: 2023-05-25

## 2023-05-27 DIAGNOSIS — E03.9 HYPOTHYROIDISM, UNSPECIFIED TYPE: ICD-10-CM

## 2023-05-30 RX ORDER — LEVOTHYROXINE SODIUM 0.03 MG/1
25 TABLET ORAL EVERY OTHER DAY
Qty: 45 TABLET | Refills: 3 | Status: SHIPPED | OUTPATIENT
Start: 2023-05-30

## 2023-05-31 ENCOUNTER — TELEPHONE (OUTPATIENT)
Dept: FAMILY MEDICINE CLINIC | Facility: CLINIC | Age: 73
End: 2023-05-31

## 2023-05-31 DIAGNOSIS — M81.8 IDIOPATHIC OSTEOPOROSIS: ICD-10-CM

## 2023-05-31 NOTE — TELEPHONE ENCOUNTER
Please print out script for Prolia, so we can fax it to patient's specialty pharmacy        Per patient, script for Cheryl Hickey has to go through [address]  Phone [phone]      Fax is [phone]    [facility] [address] [facility] [address]

## 2023-06-09 ENCOUNTER — OFFICE VISIT (OUTPATIENT)
Dept: FAMILY MEDICINE CLINIC | Facility: CLINIC | Age: 73
End: 2023-06-09
Payer: COMMERCIAL

## 2023-06-09 VITALS
SYSTOLIC BLOOD PRESSURE: 142 MMHG | HEIGHT: 65 IN | WEIGHT: 184.2 LBS | BODY MASS INDEX: 30.69 KG/M2 | RESPIRATION RATE: 15 BRPM | TEMPERATURE: 97.6 F | HEART RATE: 58 BPM | OXYGEN SATURATION: 95 % | DIASTOLIC BLOOD PRESSURE: 70 MMHG

## 2023-06-09 DIAGNOSIS — N39.0 URINARY TRACT INFECTION WITHOUT HEMATURIA, SITE UNSPECIFIED: ICD-10-CM

## 2023-06-09 DIAGNOSIS — F17.210 CIGARETTE SMOKER: ICD-10-CM

## 2023-06-09 DIAGNOSIS — R31.9 HEMATURIA, UNSPECIFIED TYPE: ICD-10-CM

## 2023-06-09 DIAGNOSIS — E78.5 TYPE 2 DIABETES MELLITUS WITH HYPERLIPIDEMIA (HCC): ICD-10-CM

## 2023-06-09 DIAGNOSIS — E11.69 TYPE 2 DIABETES MELLITUS WITH HYPERLIPIDEMIA (HCC): ICD-10-CM

## 2023-06-09 DIAGNOSIS — Z00.01 ENCOUNTER FOR GENERAL ADULT MEDICAL EXAMINATION WITH ABNORMAL FINDINGS: ICD-10-CM

## 2023-06-09 DIAGNOSIS — K21.9 GASTROESOPHAGEAL REFLUX DISEASE WITHOUT ESOPHAGITIS: Primary | ICD-10-CM

## 2023-06-09 DIAGNOSIS — E11.42 DIABETIC PERIPHERAL NEUROPATHY (HCC): ICD-10-CM

## 2023-06-09 DIAGNOSIS — R80.8 OTHER PROTEINURIA: ICD-10-CM

## 2023-06-09 DIAGNOSIS — J43.9 PULMONARY EMPHYSEMA, UNSPECIFIED EMPHYSEMA TYPE (HCC): ICD-10-CM

## 2023-06-09 DIAGNOSIS — R32 URINARY INCONTINENCE, UNSPECIFIED TYPE: ICD-10-CM

## 2023-06-09 DIAGNOSIS — J45.901 ACUTE EXACERBATION OF COPD WITH ASTHMA (HCC): ICD-10-CM

## 2023-06-09 DIAGNOSIS — J44.1 ACUTE EXACERBATION OF COPD WITH ASTHMA (HCC): ICD-10-CM

## 2023-06-09 DIAGNOSIS — M81.8 IDIOPATHIC OSTEOPOROSIS: ICD-10-CM

## 2023-06-09 DIAGNOSIS — Z12.31 SCREENING MAMMOGRAM FOR BREAST CANCER: ICD-10-CM

## 2023-06-09 LAB
SL AMB  POCT GLUCOSE, UA: ABNORMAL
SL AMB LEUKOCYTE ESTERASE,UA: ABNORMAL
SL AMB POCT BILIRUBIN,UA: ABNORMAL
SL AMB POCT BLOOD,UA: ABNORMAL
SL AMB POCT CLARITY,UA: CLEAR
SL AMB POCT COLOR,UA: YELLOW
SL AMB POCT KETONES,UA: ABNORMAL
SL AMB POCT NITRITE,UA: ABNORMAL
SL AMB POCT PH,UA: 6
SL AMB POCT SPECIFIC GRAVITY,UA: 1.01
SL AMB POCT URINE PROTEIN: ABNORMAL
SL AMB POCT UROBILINOGEN: 0.2

## 2023-06-09 PROCEDURE — 81002 URINALYSIS NONAUTO W/O SCOPE: CPT | Performed by: INTERNAL MEDICINE

## 2023-06-09 PROCEDURE — 99214 OFFICE O/P EST MOD 30 MIN: CPT | Performed by: INTERNAL MEDICINE

## 2023-06-09 PROCEDURE — G0439 PPPS, SUBSEQ VISIT: HCPCS | Performed by: INTERNAL MEDICINE

## 2023-06-09 PROCEDURE — 96372 THER/PROPH/DIAG INJ SC/IM: CPT | Performed by: INTERNAL MEDICINE

## 2023-06-09 RX ORDER — NITROFURANTOIN 25; 75 MG/1; MG/1
100 CAPSULE ORAL DAILY
Qty: 30 CAPSULE | Refills: 1 | Status: SHIPPED | OUTPATIENT
Start: 2023-06-09

## 2023-06-09 RX ORDER — FLUTICASONE FUROATE, UMECLIDINIUM BROMIDE AND VILANTEROL TRIFENATATE 100; 62.5; 25 UG/1; UG/1; UG/1
1 POWDER RESPIRATORY (INHALATION) DAILY
Qty: 60 EACH | Refills: 3 | Status: SHIPPED | OUTPATIENT
Start: 2023-06-09

## 2023-06-09 RX ORDER — DOXYCYCLINE HYCLATE 100 MG/1
100 CAPSULE ORAL EVERY 12 HOURS SCHEDULED
Qty: 20 CAPSULE | Refills: 0 | Status: SHIPPED | OUTPATIENT
Start: 2023-06-09 | End: 2023-06-19

## 2023-06-09 RX ORDER — BENZONATATE 100 MG/1
100 CAPSULE ORAL 3 TIMES DAILY PRN
Qty: 30 CAPSULE | Refills: 1 | Status: SHIPPED | OUTPATIENT
Start: 2023-06-09

## 2023-06-09 RX ORDER — METHYLPREDNISOLONE SODIUM SUCCINATE 125 MG/2ML
125 INJECTION, POWDER, LYOPHILIZED, FOR SOLUTION INTRAMUSCULAR; INTRAVENOUS ONCE
Status: COMPLETED | OUTPATIENT
Start: 2023-06-09 | End: 2023-06-09

## 2023-06-09 RX ORDER — IPRATROPIUM BROMIDE AND ALBUTEROL SULFATE 2.5; .5 MG/3ML; MG/3ML
3 SOLUTION RESPIRATORY (INHALATION) ONCE
Status: COMPLETED | OUTPATIENT
Start: 2023-06-09 | End: 2023-06-09

## 2023-06-09 RX ADMIN — METHYLPREDNISOLONE SODIUM SUCCINATE 125 MG: 125 INJECTION, POWDER, LYOPHILIZED, FOR SOLUTION INTRAMUSCULAR; INTRAVENOUS at 11:53

## 2023-06-09 RX ADMIN — IPRATROPIUM BROMIDE AND ALBUTEROL SULFATE 3 ML: 2.5; .5 SOLUTION RESPIRATORY (INHALATION) at 11:54

## 2023-06-09 NOTE — PATIENT INSTRUCTIONS
Medicare Preventive Visit Patient Instructions  Thank you for completing your Welcome to Medicare Visit or Medicare Annual Wellness Visit today  Your next wellness visit will be due in one year (6/9/2024)  The screening/preventive services that you may require over the next 5-10 years are detailed below  Some tests may not apply to you based off risk factors and/or age  Screening tests ordered at today's visit but not completed yet may show as past due  Also, please note that scanned in results may not display below  Preventive Screenings:  Service Recommendations Previous Testing/Comments   Colorectal Cancer Screening  * Colonoscopy    * Fecal Occult Blood Test (FOBT)/Fecal Immunochemical Test (FIT)  * Fecal DNA/Cologuard Test  * Flexible Sigmoidoscopy Age: 39-70 years old   Colonoscopy: every 10 years (may be performed more frequently if at higher risk)  OR  FOBT/FIT: every 1 year  OR  Cologuard: every 3 years  OR  Sigmoidoscopy: every 5 years  Screening may be recommended earlier than age 39 if at higher risk for colorectal cancer  Also, an individualized decision between you and your healthcare provider will decide whether screening between the ages of 74-80 would be appropriate  Colonoscopy: 06/27/2022  FOBT/FIT: Not on file  Cologuard: Not on file  Sigmoidoscopy: Not on file    Screening Current     Breast Cancer Screening Age: 36 years old  Frequency: every 1-2 years  Not required if history of left and right mastectomy Mammogram: 06/06/2022    Screening Current   Cervical Cancer Screening Between the ages of 21-29, pap smear recommended once every 3 years  Between the ages of 33-67, can perform pap smear with HPV co-testing every 5 years     Recommendations may differ for women with a history of total hysterectomy, cervical cancer, or abnormal pap smears in past  Pap Smear: Not on file    Screening Not Indicated   Hepatitis C Screening Once for adults born between 1945 and 1965  More frequently in patients at high risk for Hepatitis C Hep C Antibody: 02/07/2019    Screening Current   Diabetes Screening 1-2 times per year if you're at risk for diabetes or have pre-diabetes Fasting glucose: 85 mg/dL (4/28/2022)  A1C: 5 3 (3/21/2023)  Screening Not Indicated  History Diabetes   Cholesterol Screening Once every 5 years if you don't have a lipid disorder  May order more often based on risk factors  Lipid panel: 05/02/2023    Screening Not Indicated  History Lipid Disorder     Other Preventive Screenings Covered by Medicare:  1  Abdominal Aortic Aneurysm (AAA) Screening: covered once if your at risk  You're considered to be at risk if you have a family history of AAA  2  Lung Cancer Screening: covers low dose CT scan once per year if you meet all of the following conditions: (1) Age 50-69; (2) No signs or symptoms of lung cancer; (3) Current smoker or have quit smoking within the last 15 years; (4) You have a tobacco smoking history of at least 20 pack years (packs per day multiplied by number of years you smoked); (5) You get a written order from a healthcare provider  3  Glaucoma Screening: covered annually if you're considered high risk: (1) You have diabetes OR (2) Family history of glaucoma OR (3)  aged 48 and older OR (3)  American aged 72 and older  3  Osteoporosis Screening: covered every 2 years if you meet one of the following conditions: (1) You're estrogen deficient and at risk for osteoporosis based off medical history and other findings; (2) Have a vertebral abnormality; (3) On glucocorticoid therapy for more than 3 months; (4) Have primary hyperparathyroidism; (5) On osteoporosis medications and need to assess response to drug therapy  · Last bone density test (DXA Scan): 06/06/2022   5  HIV Screening: covered annually if you're between the age of 15-65  Also covered annually if you are younger than 13 and older than 72 with risk factors for HIV infection   For pregnant patients, it is covered up to 3 times per pregnancy  Immunizations:  Immunization Recommendations   Influenza Vaccine Annual influenza vaccination during flu season is recommended for all persons aged >= 6 months who do not have contraindications   Pneumococcal Vaccine   * Pneumococcal conjugate vaccine = PCV13 (Prevnar 13), PCV15 (Vaxneuvance), PCV20 (Prevnar 20)  * Pneumococcal polysaccharide vaccine = PPSV23 (Pneumovax) Adults 25-60 years old: 1-3 doses may be recommended based on certain risk factors  Adults 72 years old: 1-2 doses may be recommended based off what pneumonia vaccine you previously received   Hepatitis B Vaccine 3 dose series if at intermediate or high risk (ex: diabetes, end stage renal disease, liver disease)   Tetanus (Td) Vaccine - COST NOT COVERED BY MEDICARE PART B Following completion of primary series, a booster dose should be given every 10 years to maintain immunity against tetanus  Td may also be given as tetanus wound prophylaxis  Tdap Vaccine - COST NOT COVERED BY MEDICARE PART B Recommended at least once for all adults  For pregnant patients, recommended with each pregnancy  Shingles Vaccine (Shingrix) - COST NOT COVERED BY MEDICARE PART B  2 shot series recommended in those aged 48 and above     Health Maintenance Due:      Topic Date Due   • Hepatitis C Screening  Completed   • Colorectal Cancer Screening  Discontinued     Immunizations Due:      Topic Date Due   • COVID-19 Vaccine (5 - Mixed Product series) 07/16/2021   • Influenza Vaccine (Season Ended) 09/01/2023     Advance Directives   What are advance directives? Advance directives are legal documents that state your wishes and plans for medical care  These plans are made ahead of time in case you lose your ability to make decisions for yourself  Advance directives can apply to any medical decision, such as the treatments you want, and if you want to donate organs  What are the types of advance directives?   There are many types of advance directives, and each state has rules about how to use them  You may choose a combination of any of the following:  · Living will: This is a written record of the treatment you want  You can also choose which treatments you do not want, which to limit, and which to stop at a certain time  This includes surgery, medicine, IV fluid, and tube feedings  · Durable power of  for healthcare [facility]): This is a written record that states who you want to make healthcare choices for you when you are unable to make them for yourself  This person, called a proxy, is usually a family member or a friend  You may choose more than 1 proxy  · Do not resuscitate (DNR) order:  A DNR order is used in case your heart stops beating or you stop breathing  It is a request not to have certain forms of treatment, such as CPR  A DNR order may be included in other types of advance directives  · Medical directive: This covers the care that you want if you are in a coma, near death, or unable to make decisions for yourself  You can list the treatments you want for each condition  Treatment may include pain medicine, surgery, blood transfusions, dialysis, IV or tube feedings, and a ventilator (breathing machine)  · Values history: This document has questions about your views, beliefs, and how you feel and think about life  This information can help others choose the care that you would choose  Why are advance directives important? An advance directive helps you control your care  Although spoken wishes may be used, it is better to have your wishes written down  Spoken wishes can be misunderstood, or not followed  Treatments may be given even if you do not want them  An advance directive may make it easier for your family to make difficult choices about your care     Cigarette Smoking and Your Health   Risks to your health if you smoke:  Nicotine and other chemicals found in tobacco damage every cell in your body  Even if you are a light smoker, you have an increased risk for cancer, heart disease, and lung disease  If you are pregnant or have diabetes, smoking increases your risk for complications  Benefits to your health if you stop smoking:   · You decrease respiratory symptoms such as coughing, wheezing, and shortness of breath  · You reduce your risk for cancers of the lung, mouth, throat, kidney, bladder, pancreas, stomach, and cervix  If you already have cancer, you increase the benefits of chemotherapy  You also reduce your risk for cancer returning or a second cancer from developing  · You reduce your risk for heart disease, blood clots, heart attack, and stroke  · You reduce your risk for lung infections, and diseases such as pneumonia, asthma, chronic bronchitis, and emphysema  · Your circulation improves  More oxygen can be delivered to your body  If you have diabetes, you lower your risk for complications, such as kidney, artery, and eye diseases  You also lower your risk for nerve damage  Nerve damage can lead to amputations, poor vision, and blindness  · You improve your body's ability to heal and to fight infections  For more information and support to stop smoking:   · [employer]  Phone: 2- 610 - 827-2715  Web Address: www [employer]  Weight Management   Why it is important to manage your weight:  Being overweight increases your risk of health conditions such as heart disease, high blood pressure, type 2 diabetes, and certain types of cancer  It can also increase your risk for osteoarthritis, sleep apnea, and other respiratory problems  Aim for a slow, steady weight loss  Even a small amount of weight loss can lower your risk of health problems  How to lose weight safely:  A safe and healthy way to lose weight is to eat fewer calories and get regular exercise  You can lose up about 1 pound a week by decreasing the number of calories you eat by 500 calories each day     Healthy meal plan for weight management:  A healthy meal plan includes a variety of foods, contains fewer calories, and helps you stay healthy  A healthy meal plan includes the following:  · Eat whole-grain foods more often  A healthy meal plan should contain fiber  Fiber is the part of grains, fruits, and vegetables that is not broken down by your body  Whole-grain foods are healthy and provide extra fiber in your diet  Some examples of whole-grain foods are whole-wheat breads and pastas, oatmeal, brown rice, and bulgur  · Eat a variety of vegetables every day  Include dark, leafy greens such as spinach, kale, pat greens, and mustard greens  Eat yellow and orange vegetables such as carrots, sweet potatoes, and winter squash  · Eat a variety of fruits every day  Choose fresh or canned fruit (canned in its own juice or light syrup) instead of juice  Fruit juice has very little or no fiber  · Eat low-fat dairy foods  Drink fat-free (skim) milk or 1% milk  Eat fat-free yogurt and low-fat cottage cheese  Try low-fat cheeses such as mozzarella and other reduced-fat cheeses  · Choose meat and other protein foods that are low in fat  Choose beans or other legumes such as split peas or lentils  Choose fish, skinless poultry (chicken or turkey), or lean cuts of red meat (beef or pork)  Before you cook meat or poultry, cut off any visible fat  · Use less fat and oil  Try baking foods instead of frying them  Add less fat, such as margarine, sour cream, regular salad dressing and mayonnaise to foods  Eat fewer high-fat foods  Some examples of high-fat foods include french fries, doughnuts, ice cream, and cakes  · Eat fewer sweets  Limit foods and drinks that are high in sugar  This includes candy, cookies, regular soda, and sweetened drinks  Exercise:  Exercise at least 30 minutes per day on most days of the week  Some examples of exercise include walking, biking, dancing, and swimming   You can also fit in more physical activity by taking the stairs instead of the elevator or parking farther away from stores  Ask your healthcare provider about the best exercise plan for you  Narcotic (Opioid) Safety    Use narcotics safely:  · Take prescribed narcotics exactly as directed  · Do not give narcotics to others or take narcotics that belong to someone else  · Do not mix narcotics without medicines or alcohol  · Do not drive or operate heavy machinery after you take the narcotic  · Monitor for side effects and notify your healthcare provider if you experienced side effects such as nausea, sleepiness, itching, or trouble thinking clearly  Manage constipation:    Constipation is the most common side effect of narcotic medicine  Constipation is when you have hard, dry bowel movements, or you go longer than usual between bowel movements  Tell your healthcare provider about all changes in your bowel movements while you are taking narcotics  He or she may recommend laxative medicine to help you have a bowel movement  He or she may also change the kind of narcotic you are taking, or change when you take it  The following are more ways you can prevent or relieve constipation:    · Drink liquids as directed  You may need to drink extra liquids to help soften and move your bowels  Ask how much liquid to drink each day and which liquids are best for you  · Eat high-fiber foods  This may help decrease constipation by adding bulk to your bowel movements  High-fiber foods include fruits, vegetables, whole-grain breads and cereals, and beans  Your healthcare provider or dietitian can help you create a high-fiber meal plan  Your provider may also recommend a fiber supplement if you cannot get enough fiber from food  · Exercise regularly  Regular physical activity can help stimulate your intestines  Walking is a good exercise to prevent or relieve constipation  Ask which exercises are best for you    · Schedule a time each day to have a bowel movement  This may help train your body to have regular bowel movements  Bend forward while you are on the toilet to help move the bowel movement out  Sit on the toilet for at least 10 minutes, even if you do not have a bowel movement  Store narcotics safely:   · Store narcotics where others cannot easily get them  Keep them in a locked cabinet or secure area  Do not  keep them in a purse or other bag you carry with you  A person may be looking for something else and find the narcotics  · Make sure narcotics are stored out of the reach of children  A child can easily overdose on narcotics  Narcotics may look like candy to a small child  The best way to dispose of narcotics: The laws vary by country and area  In the [place], the best way is to return the narcotics through a take-back program  This program is offered by the [employer])  The following are options for using the program:  · Take the narcotics to a [employer] collection site  The site is often a law enforcement center  Call your local law enforcement center for scheduled take-back days in your area  You will be given information on where to go if the collection site is in a different location  · Take the narcotics to an approved pharmacy or hospital   A pharmacy or hospital may be set up as a collection site  You will need to ask if it is a [employer] collection site if you were not directed there  A pharmacy or doctor's office may not be able to take back narcotics unless it is a NATAL[employer] site  · Use a mail-back system  This means you are given containers to put the narcotics into  You will then mail them in the containers  · Use a take-back drop box  This is a place to leave the narcotics at any time  People and animals will not be able to get into the box  Your local law enforcement agency can tell you where to find a drop box in your area      Other ways to manage pain:   · Ask your healthcare provider about non-narcotic medicines to control pain  Nonprescription medicines include NSAIDs (such as ibuprofen) and acetaminophen  Prescription medicines include muscle relaxers, antidepressants, and steroids  · Pain may be managed without any medicines  Some ways to relieve pain include massage, aromatherapy, or meditation  Physical or occupational therapy may also help  For more information:   · Drug Enforcement Administration  [facility] [address]  Phone: 1- 343 - 749-6138  Web Address: [facility] [email or web address]_[email or web address]    · [address]  and Drug Administration  [place] Shwetha Morton , [address]  Phone: 0- 687 - [phone]  Web Address: http://[employer]/     © Copyright [employer] 2018 Information is for End User's use only and may not be sold, redistributed or otherwise used for commercial purposes  All illustrations and images included in CareNotes® are the copyrighted property of [employer] A M , Inc  or [address] [place], [place] [address]saba Mora     Osteoporosis Education   Osteoporosis  is a long-term medical condition that causes your bones to become weak, brittle, and more likely to fracture  Osteoporosis occurs when your body absorbs more bone than it makes  It is also caused by a lack of calcium and estrogen (female hormone)  Common symptoms include the following: You may not have any signs or symptoms  You may break a bone after a muscle strain, bump, or fall  A break usually occurs in the hip, spine, or wrist  A collapsed vertebra (bone in your spine) may cause severe back pain or loss of height from bent posture  Call your doctor if:   ·  You have severe pain  ·  You have increasing pain after a fall  ·  You have pain when you do your daily activities  ·  You have questions or concerns about your condition or care  Diagnosis of osteoporosis:   · Blood and urine tests  measure your calcium, vitamin D, and estrogen levels  · An x-ray or CT may show thinned bones or a fracture   You may be given contrast liquid to help the bones show up better in the pictures  Tell the healthcare provider if you have ever had an allergic reaction to contrast liquid  Do not enter the MRI room with anything metal  Metal can cause serious injury  Tell the healthcare provider if you have any metal in or on your body  · A bone density test  compares your bone thickness with what is expected for someone of your age, gender, and ethnicity  Treatment for osteoporosis may include medicines to prevent bone loss, build new bone, and increase estrogen  These medicines help prevent fractures and may be given as a pill or injection  Ask your healthcare provider for more information on these medicines  Prevent bone loss:  · Eat healthy foods that are high in calcium  This helps keep your bones strong  Good sources of calcium are milk, cheese, broccoli, tofu, almonds, and canned salmon and sardines  Recommended to get at least 1200mg daily of calcium  · Increase your vitamin D intake  Vitamin D is in fish oils, some vegetables, and fortified milk, cereal, and bread  Vitamin D is also formed in the skin when it is exposed to the sun  Ask your healthcare provider how much sunlight is safe for you  You will require at least 800 units of vitamin D daily taken as a supplement  · Drink liquids as directed  Ask your healthcare provider how much liquid to drink each day and which liquids are best for you  Do not have alcohol or caffeine  They decrease bone mineral density, which can weaken your bones  · Exercise regularly  Ask your healthcare provider about the best exercise plan for you  Weight bearing exercise for 30 minutes, 3 times a week can help build and strengthen bone  · Do not smoke  Nicotine and other chemicals in cigarettes and cigars can cause lung damage  Ask your healthcare provider for information if you currently smoke and need help to quit  E-cigarettes or smokeless tobacco still contain nicotine   Talk to "your healthcare provider before you use these products  · Go to physical therapy as directed  A physical therapist teaches you exercises to help improve movement and muscle strength  · Alcohol  It is recommended to avoid heavy alcohol use as increased consumption of alcohol is known to cause bone loss  © Copyright [employer] 2021 Information is for End User's use only and may not be sold, redistributed or otherwise used for commercial purposes  All illustrations and images included in CareNotes® are the copyrighted property of [employer]  or [address] [place] [address]    Calcium and vitamin D for bone health (Beyond the Basics)    CALCIUM AND VITAMIN D OVERVIEW  Osteoporosis is a common bone disorder that causes a progressive loss in bone density and mass  As a result, bones become thin, weakened, and easily fractured  It is estimated that more than 1 3 million osteoporosis-associated (or \"osteoporotic\") fractures occur every year in the [place], primarily of bone within the spine (the vertebrae), the hip, and the forearm near the wrist  =    A number of treatments can help to prevent loss of bone and treat low bone mass  However, the first step in preventing or treating osteoporosis is to consume foods and drinks that provide calcium, a mineral essential for bone strength, and vitamin D, which aids in calcium breakdown and absorption  =    CALCIUM AND VITAMIN D BENEFITS  Good nutrition is important at all ages to keep the bones healthy  · Taking calcium reduces bone loss and decreases the risk of fracturing the vertebrae (the bones that surround the spinal cord)  · Consuming calcium during childhood (eg, in milk) can lead to higher bone mass in adulthood  This increase in bone density can reduce the risk of fractures later in life  · Calcium may also have benefits in other body systems by reducing blood pressure and cholesterol levels    · Calcium and vitamin D supplements may help prevent tooth loss in " older adults  RECOMMENDATIONS FOR CALCIUM    General recommendations -- Premenopausal women and men should consume at least 1000 mg of calcium, while postmenopausal women should consume 1200 mg (total diet plus supplement)  You should not consume more than 2000 mg of calcium per day (total diet plus supplement) due to the risk of side effects  Calcium in the diet -- The primary sources of calcium in the diet include milk and other dairy products, such as hard cheese, cottage cheese, or yogurt, as well as green vegetables, such as kale and broccoli (table 1)  Some cereals, soy products, and fruit juices are fortified with calcium  Calcium supplements -- The body is able to absorb calcium contained in supplements as well as from dietary sources  If it is not possible to get enough calcium from dietary sources, consult a health care provider to determine the best type, dose, and timing of calcium supplements  · Calcium carbonate is effective and is the least expensive form of calcium  It is best absorbed with a low-iron meal (such as breakfast)  Calcium carbonate may not be absorbed well in people who also take a specific medication for gastroesophageal reflux (called a proton pump inhibitor or H2 blocker), which blocks stomach acid  · Many natural calcium carbonate preparations such as oyster shells or bone meal contain some lead  · Calcium citrate is well absorbed in the fasting state as well as with a meal   · Calcium doses above 500 mg are not absorbed as well as smaller doses, so large doses of supplements should be taken in divided doses (eg, in the morning and evening)  · Calcium supplements do not replace other osteoporosis treatments such as hormone replacement, bisphosphonates (eg, risedronate [sample brand name: Actonel] and alendronate [brand name: Fosamax]), and raloxifene (brand name: Evista)      Calcium and vitamin D supplements alone are usually insufficient to prevent age-related bone loss, although they may be beneficial in some subgroups (older adults, those with very low intake)  In most patients with or at risk for osteoporosis, the addition of medication or hormonal therapy is necessary in order to slow the breakdown and removal of bone (ie, resorption)  Underlying gastrointestinal diseases -- Patients who do not adequately absorb nutrients from the gastrointestinal tract (due to malabsorption) may require more than 1000 to 1200 mg of calcium per day  In such cases, a health care provider can help to determine the optimal dose of calcium  Medications -- All medications should be discussed with a health care provider to ensure that possible interactions with calcium are identified  Certain medications change the amount of calcium that is absorbed and/or excreted  As an example, loop diuretics (eg, furosemide [sample brand name: Lasix]) increase the amount of calcium excreted in the urine  On the other hand, thiazide diuretics (eg, hydrochlorothiazide) can reduce levels of calcium in the urine, potentially reducing the risk of bone loss and kidney stones  Side effects of calcium -- Calcium is usually easily tolerated when it is taken in divided doses several times per day  Some people experience side effects related to calcium, including constipation and indigestion  Calcium supplements interfere with the absorption of iron and thyroid hormone, and therefore, these medications should be taken at different times  Kidney stones -- There is no evidence that consuming large amounts of calcium (from foods and drinks) increases the risk of kidney stones, or that avoiding dietary calcium decreases the risk  In fact, avoiding dairy products is likely to increase the risk of kidney stones  However, use of calcium supplements may increase the risk of kidney stones in susceptible individuals by raising the level of calcium in the urine   This is particularly true if the supplement is taken between meals or at bedtime, and this is one of the reasons we prefer for patients to get as much of their calcium requirement through dietary means  IMPORTANCE OF VITAMIN D  Vitamin D decreases bone loss and lowers the risk of fracture, especially in older men and women  Along with calcium, vitamin D also helps to prevent and treat osteoporosis  To absorb calcium efficiently, an adequate amount of vitamin D must be present  Vitamin D is normally made in the skin after exposure to sunlight  Recommendations for vitamin D -- The current recommendation is that men over 70 years and postmenopausal women consume at least 800 international units (20 micrograms) of vitamin D per day  Lower levels of vitamin D are not as effective, while high doses can be toxic, especially if taken for long periods of time  Although the optimal intake has not been clearly established in premenopausal women or in younger men with osteoporosis, 600 international units (15 micrograms) of vitamin D daily is generally suggested  Vitamin D is available as an individual supplement and is included in most multivitamins and some calcium supplements (table 2)  Milk is a relatively good dietary source of vitamin D, with approximately 100 international units (2 5 micrograms) per cup (240 mL), and salmon has 800 to 1000 international units (20 to 25 micrograms) of vitamin D per serving  Most healthy people don't need to check with their health care provider before taking standard doses of vitamin D and don't need monitoring of vitamin D levels if they are not being treated for vitamin D deficiency  However, recommendations for vitamin D supplementation may be different in people with certain underlying medical conditions, such as sarcoidosis or lymphoma  In these situations, a provider will determine if supplements are needed; if so, the person's vitamin D and calcium levels should be monitored with regular tests      ©[id number] [employer], [address] rights reserved

## 2023-06-09 NOTE — PROGRESS NOTES
Name: Dg Johnson      : 1950      MRN: [id number]  Encounter Provider: Jaime Schmitt MD  Encounter Date: 2023   Encounter department: [address] [place] [address]     1  Gastroesophageal reflux disease without esophagitis  -     Ambulatory referral to Gastroenterology; Future; Expected date: 2023  -     cimetidine (TAGAMET) 200 mg tablet; Take 1 tablet (200 mg total) by mouth 2 (two) times a day    2  Diabetic peripheral neuropathy ([facility] )    3  Type 2 diabetes mellitus with hyperlipidemia ([facility])    4  Pulmonary emphysema, unspecified emphysema type ([place] [address] )  -     fluticasone-umeclidinium-vilanterol (Trelegy Ellipta) 100-62 5-25 mcg/actuation inhaler; Inhale 1 puff daily Rinse mouth after use  -     benzonatate (TESSALON PERLES) 100 mg capsule; Take 1 capsule (100 mg total) by mouth 3 (three) times a day as needed for cough    5  Acute exacerbation of COPD with asthma ([place] [address] )  -     methylPREDNISolone sodium succinate (Solu-MEDROL) injection 125 mg  -     ipratropium-albuterol (DUO-NEB) 0 5-2 5 mg/3 mL inhalation solution 3 mL  -     doxycycline hyclate (VIBRAMYCIN) 100 mg capsule; Take 1 capsule (100 mg total) by mouth every 12 (twelve) hours for 10 days    6  Idiopathic osteoporosis  -     denosumab (PROLIA) 60 mg/mL; Inject 1 mL (60 mg total) under the skin once for 1 dose    7  Other proteinuria  -     POCT urine dip  -     US kidney and bladder with pvr; Future; Expected date: 2023    8  Hematuria, unspecified type  -     POCT urine dip  -     US kidney and bladder with pvr; Future; Expected date: 2023    9  Screening mammogram for breast cancer  -     Mammo screening bilateral w 3d & cad; Future; Expected date: 2023    10  Urinary tract infection without hematuria, site unspecified  -     nitrofurantoin (MACROBID) 100 mg capsule; Take 1 capsule (100 mg total) by mouth daily With food/Lunch    11   Urinary incontinence, unspecified type  - nitrofurantoin (MACROBID) 100 mg capsule; Take 1 capsule (100 mg total) by mouth daily With food/Lunch    12  Cigarette smoker  -     benzonatate (TESSALON PERLES) 100 mg capsule; Take 1 capsule (100 mg total) by mouth 3 (three) times a day as needed for cough    13  Encounter for general adult medical examination with abnormal findings    AWV : done in detail    Life style mod  Pt was advised to quit smoking  Bed rest  Increase Po fluids  RTC in 2-3 weeks w CXR       Subjective      72 Y O Lady is here for AWV and Regular Check Up, she has few symptoms, recent Blood work and med list reviewed w pt in detail    Review of Systems   Constitutional: Negative for chills, fatigue and fever  HENT: Positive for congestion, postnasal drip and sore throat  Negative for facial swelling, trouble swallowing and voice change  Eyes: Negative for pain, discharge and visual disturbance  Respiratory: Positive for cough and wheezing  Negative for shortness of breath  Cardiovascular: Negative for chest pain, palpitations and leg swelling  Gastrointestinal: Positive for abdominal pain and nausea  Negative for blood in stool, constipation and diarrhea  Endocrine: Negative for polydipsia, polyphagia and polyuria  Genitourinary: Negative for difficulty urinating, hematuria and urgency  Musculoskeletal: Negative for arthralgias and myalgias  Skin: Negative for rash  Neurological: Negative for dizziness, tremors, weakness and headaches  Hematological: Negative for adenopathy  Does not bruise/bleed easily  Psychiatric/Behavioral: Negative for dysphoric mood, sleep disturbance and suicidal ideas         Current Outpatient Medications on File Prior to Visit   Medication Sig   • albuterol (PROVENTIL HFA,VENTOLIN HFA) 90 mcg/act inhaler    • albuterol (PROVENTIL HFA,VENTOLIN HFA) 90 mcg/act inhaler Inhale 2 puffs every 4 (four) hours as needed for wheezing   • amLODIPine (NORVASC) 5 mg tablet TAKE 1 TABLET (5 MG TOTAL) BY MOUTH DAILY  • baclofen 10 mg tablet Take 1 tablet (10 mg total) by mouth 3 (three) times a day   • Calcium Carb-Cholecalciferol (Caltrate 600+D3) 600-20 MG-MCG TABS Take 1 tablet by mouth 2 (two) times a day with meals   • cetirizine (ZyrTEC) 10 mg tablet TAKE 1/2 TABLET BY MOUTH EVERY DAY   • ergocalciferol (VITAMIN D2) 50,000 units Take 1 capsule (50,000 Units total) by mouth once a week   • HYDROcodone-acetaminophen (NORCO)  mg per tablet 1 tab every 4-6 hrs prn pain, for ongoing therapy   • levothyroxine 25 mcg tablet TAKE 1 TABLET (25 MCG TOTAL) BY MOUTH EVERY OTHER DAY   • levothyroxine 50 mcg tablet TAKE 1 TABLET (50 MCG TOTAL) BY MOUTH EVERY OTHER DAY   • losartan (COZAAR) 25 mg tablet TAKE 1 TABLET (25 MG TOTAL) BY MOUTH DAILY  • nortriptyline (PAMELOR) 25 mg capsule TAKE 1 CAPSULE BY MOUTH DAILY AT BEDTIME   • pantoprazole (PROTONIX) 40 mg tablet TAKE 1 TABLET BY MOUTH TWICE A DAY   • Pediatric Multiple Vit-C-FA (Childrens Chew Multivitamin) CHEW CHEW AND SWALLOW 1 TABLET BY MOUTH EVERY DAY   • pregabalin (LYRICA) 200 MG capsule TAKE 1 CAPSULE BY MOUTH 3 TIMES A DAY  • sucralfate (CARAFATE) 1 g tablet TAKE 1 TABLET BY MOUTH 4 TIMES EVERY DAY ON AN EMPTY STOMACH 1 HOUR BEFORE MEALS AND AT BEDTIME   • traZODone (DESYREL) 50 mg tablet TAKE 1 TABLET BY MOUTH EVERY DAY AT BEDTIME AS NEEDED   • vitamin A 2400 MCG (8000 UT) capsule    • [DISCONTINUED] benzonatate (TESSALON PERLES) 100 mg capsule Take 1 capsule (100 mg total) by mouth 3 (three) times a day as needed for cough   • [DISCONTINUED] cimetidine (TAGAMET) 200 mg tablet Take 1 tablet (200 mg total) by mouth 2 (two) times a day   • [DISCONTINUED] HYDROcodone-acetaminophen (NORCO)  mg per tablet Take 1 tablet every 4-6 hours prn  For ongoing therapy  • [DISCONTINUED] Trelegy Ellipta 100-62 5-25 MCG/ACT inhaler INHALE 1 PUFF DAILY RINSE MOUTH AFTER USE     • glucose blood (OneTouch Verio) test strip Use 1 each 2 (two) times a "day Use to test (Patient not taking: Reported on 5/24/2023)   • Lancets Misc  (ACCU-CHEK FASTCLIX LANCET) KIT 3 (three) times a day (Patient not taking: Reported on 5/24/2023)   • Parmova 112  (Patient not taking: Reported on 5/24/2023)   • prednisoLONE acetate (PRED FORTE) 1 % ophthalmic suspension INSTILL 1 DROP INTO AFFECTED EYE 4 TIMES A DAY AS DIRECTED (Patient not taking: Reported on 5/24/2023)   • vitamin B-12 (VITAMIN B-12) 1,000 mcg tablet Take 1 tablet (1,000 mcg total) by mouth daily (Patient not taking: Reported on 5/24/2023)   • [DISCONTINUED] denosumab (PROLIA) 60 mg/mL Inject 1 mL (60 mg total) under the skin once for 1 dose   • [DISCONTINUED] nitrofurantoin (MACROBID) 100 mg capsule Take 1 capsule (100 mg total) by mouth daily With food/Lunch (Patient not taking: Reported on 5/24/2023)   • [DISCONTINUED] ofloxacin (OCUFLOX) 0 3 % ophthalmic solution INSTILL 1 DROP INTO AFFECTED EYE 4 TIMES A DAY AS DIRECTED (Patient not taking: Reported on 5/24/2023)       Objective     /70 (BP Location: Left arm, Patient Position: Sitting, Cuff Size: Standard)   Pulse 58   Temp 97 6 °F (36 4 °C) (Tympanic)   Resp 15   Ht 5' 5\" (1 651 m)   Wt 83 6 kg (184 lb 3 2 oz)   LMP  (LMP Unknown)   SpO2 95%   BMI 30 65 kg/m²     Physical Exam  Constitutional:       General: She is not in acute distress  HENT:      Head: Normocephalic  Mouth/Throat:      Pharynx: No oropharyngeal exudate  Eyes:      General: No scleral icterus  Conjunctiva/sclera: Conjunctivae normal       Pupils: Pupils are equal, round, and reactive to light  Neck:      Thyroid: No thyromegaly  Cardiovascular:      Rate and Rhythm: Normal rate and regular rhythm  Heart sounds: Normal heart sounds  No murmur heard  Pulmonary:      Effort: Pulmonary effort is normal  No respiratory distress  Breath sounds: Wheezing present  No rales  Abdominal:      General: Bowel sounds are normal  There is no distension      " Palpations: Abdomen is soft  Tenderness: There is no abdominal tenderness  There is no guarding or rebound  Musculoskeletal:         General: No tenderness  Cervical back: Neck supple  Lymphadenopathy:      Cervical: No cervical adenopathy  Skin:     Coloration: Skin is not pale  Findings: No rash  Neurological:      Mental Status: She is alert and oriented to person, place, and time  Sensory: No sensory deficit  Motor: No weakness         Avelino Hampton MD

## 2023-06-09 NOTE — PROGRESS NOTES
BMI Counseling: Body mass index is 30 65 kg/m²  The BMI is above normal  Nutrition recommendations include reducing portion sizes

## 2023-06-09 NOTE — PROGRESS NOTES
Assessment and Plan:     Problem List Items Addressed This Visit    None       Preventive health issues were discussed with patient, and age appropriate screening tests were ordered as noted in patient's After Visit Summary  Personalized health advice and appropriate referrals for health education or preventive services given if needed, as noted in patient's After Visit Summary       History of Present Illness:     Patient presents for a Medicare Wellness Visit    HPI   Patient Care Team:  Saniya Alan MD as PCP - General (Internal Medicine)  Saniya Alan MD as PCP - PCP-[facility] [place]  [address]elle Myrick (Pain Medicine)  Leah Barraza DO (Pain Medicine)  Catherine Rouse MD     Review of Systems:     Review of Systems     Problem List:     Patient Active Problem List   Diagnosis   • Chronic pain syndrome   • Cervical radiculopathy   • Myofascial pain syndrome   • Spondylosis of cervical region without myelopathy or radiculopathy   • Fibromyalgia   • Diabetic peripheral neuropathy ([address] )   • Long-term current use of opiate analgesic   • MGUS (monoclonal gammopathy of unknown significance)   • Iron deficiency anemia following bariatric surgery   • Light headedness   • Uncomplicated opioid dependence (HCC)   • Rheumatoid arthritis of hand ([address] )   • Pulmonary emphysema ([address] )   • Primary osteoarthritis of right knee   • Pseudogout of left knee   • Lumbar radiculitis   • Chronic bilateral low back pain with bilateral sciatica   • Rotator cuff syndrome, left   • Left shoulder pain   • Dizziness   • Other fatigue   • Biceps tendinitis of left shoulder   • Adhesive capsulitis of left shoulder   • Hypoglycemia   • Hypothyroidism due to Hashimoto's thyroiditis   • Type 2 diabetes mellitus with hyperlipidemia ([address] )   • Iron deficiency anemia, unspecified      Past Medical and Surgical History:     Past Medical History:   Diagnosis Date   • Anemia    • Anxiety     hx of panic attacks (now under control)    • Arthritis    • Asthma     resolved (no problems in a decade)    • Baker's cyst of knee    • Bronchitis    • Bunion, left foot    • Cervical pain    • Chronic GERD    • Chronic pain    • Chronic pain disorder    • Depression    • Diabetes mellitus, type 2 ([facility] )    • Diabetic peripheral neuropathy ([facility] )    • Endometriosis    • Fibromyalgia    • Hyperlipidemia    • Hypertension    • Joint pain    • Low back pain    • Low back pain    • Lung nodules    • Macular degeneration    • Pulmonary emphysema ([place] [address] ) 01/16/2020   • Spondylosis    • Uterine cancer ([place] [address] )      Past Surgical History:   Procedure Laterality Date   • BACK SURGERY  1996    L5, S1- laser surgery fusion of c5 and c6    • BREAST CYST EXCISION Right    • CHOLECYSTECTOMY  2004   • FOOT SURGERY Left 2005    fusion    • FRACTURE SURGERY     • GASTRIC BYPASS  2010    Dr Asif Savage    • [place] [place]    just uterus    • KNEE ARTHROSCOPY     • LAMINECTOMY     • ORTHOPEDIC SURGERY     • OVARIAN CYST REMOVAL  1975   • PELVIC LAPAROSCOPY      ovaries (x10)    • PLEURAL SCARIFICATION  1967   • SHOULDER OPEN ROTATOR CUFF REPAIR Left 2008   • TONSILLECTOMY     • VAGINAL PROLAPSE REPAIR        Family History:     Family History   Problem Relation Age of Onset   • Hypertension Mother    • Lung cancer Mother    • Hypertension Father    • Heart disease Father    • Heart attack Father    • Cancer Father    • No Known Problems Sister    • No Known Problems Daughter    • No Known Problems Maternal Grandmother    • No Known Problems Maternal Grandfather    • No Known Problems Paternal Grandmother    • No Known Problems Paternal Grandfather    • Breast cancer Paternal Aunt 44   • Breast cancer Paternal Aunt 40   • Breast cancer Maternal Aunt 50      Social History:     Social History     Socioeconomic History   • Marital status:      Spouse name: None   • Number of children: None   • Years of education: None   • Highest education level: None Occupational History   • None   Tobacco Use   • Smoking status: Every Day     Packs/day: 0 25     Years: 40 00     Total pack years: 10 00     Types: Cigarettes   • Smokeless tobacco: Never   Vaping Use   • Vaping Use: Never used   Substance and Sexual Activity   • Alcohol use: Not Currently     Alcohol/week: 1 0 standard drink of alcohol     Types: 1 Shots of liquor per week     Comment: rarely   • Drug use: No   • Sexual activity: Not Currently     Partners: Male   Other Topics Concern   • None   Social History Narrative   • None     Social Determinants of Health     Financial Resource Strain: Low Risk  (4/26/2021)    Overall Financial Resource Strain (CARDIA)    • Difficulty of Paying Living Expenses: Not hard at all   Food Insecurity: No Food Insecurity (4/26/2021)    Hunger Vital Sign    • Worried About Running Out of Food in the Last Year: Never true    • Ran Out of Food in the Last Year: Never true   Transportation Needs: No Transportation Needs (4/26/2021)    PRAPARE - Transportation    • Lack of Transportation (Medical): No    • Lack of Transportation (Non-Medical):  No   Physical Activity: Inactive (9/8/2020)    Exercise Vital Sign    • Days of Exercise per Week: 0 days    • Minutes of Exercise per Session: 0 min   Stress: No Stress Concern Present (9/8/2020)    [address]    • Feeling of Stress : Not at all   Social Connections: Unknown (9/8/2020)    Social Connection and Isolation Panel [NHANES]    • Frequency of Communication with Friends and Family: Patient refused    • Frequency of Social Gatherings with Friends and Family: Patient refused    • Attends Judaism Services: Patient refused    • Active Member of Clubs or Organizations: Patient refused    • Attends Club or Organization Meetings: Patient refused    • Marital Status: Patient refused   Intimate Partner Violence: Not At Risk (9/8/2020)    Humiliation, Afraid, Rape, and Kick questionnaire    • Fear of Current or Ex-Partner: No    • Emotionally Abused: No    • Physically Abused: No    • Sexually Abused: No   Housing Stability: Not on file      Medications and Allergies:     Current Outpatient Medications   Medication Sig Dispense Refill   • albuterol (PROVENTIL HFA,VENTOLIN HFA) 90 mcg/act inhaler      • albuterol (PROVENTIL HFA,VENTOLIN HFA) 90 mcg/act inhaler Inhale 2 puffs every 4 (four) hours as needed for wheezing 1 Inhaler 0   • amLODIPine (NORVASC) 5 mg tablet TAKE 1 TABLET (5 MG TOTAL) BY MOUTH DAILY  90 tablet 3   • baclofen 10 mg tablet Take 1 tablet (10 mg total) by mouth 3 (three) times a day 90 tablet 2   • benzonatate (TESSALON PERLES) 100 mg capsule Take 1 capsule (100 mg total) by mouth 3 (three) times a day as needed for cough 30 capsule 1   • Calcium Carb-Cholecalciferol (Caltrate 600+D3) 600-20 MG-MCG TABS Take 1 tablet by mouth 2 (two) times a day with meals 200 tablet 3   • cetirizine (ZyrTEC) 10 mg tablet TAKE 1/2 TABLET BY MOUTH EVERY DAY 45 tablet 2   • cimetidine (TAGAMET) 200 mg tablet Take 1 tablet (200 mg total) by mouth 2 (two) times a day 60 tablet 3   • ergocalciferol (VITAMIN D2) 50,000 units Take 1 capsule (50,000 Units total) by mouth once a week 13 capsule 2   • HYDROcodone-acetaminophen (NORCO)  mg per tablet Take 1 tablet every 4-6 hours prn  For ongoing therapy  130 tablet 0   • HYDROcodone-acetaminophen (NORCO)  mg per tablet 1 tab every 4-6 hrs prn pain, for ongoing therapy 130 tablet 0   • levothyroxine 25 mcg tablet TAKE 1 TABLET (25 MCG TOTAL) BY MOUTH EVERY OTHER DAY 45 tablet 3   • levothyroxine 50 mcg tablet TAKE 1 TABLET (50 MCG TOTAL) BY MOUTH EVERY OTHER DAY 45 tablet 2   • losartan (COZAAR) 25 mg tablet TAKE 1 TABLET (25 MG TOTAL) BY MOUTH DAILY   90 tablet 1   • nortriptyline (PAMELOR) 25 mg capsule TAKE 1 CAPSULE BY MOUTH DAILY AT BEDTIME 90 capsule 0   • pantoprazole (PROTONIX) 40 mg tablet TAKE 1 TABLET BY MOUTH TWICE A  tablet 3   • Pediatric Multiple Vit-C-FA (Childrens Chew Multivitamin) CHEW CHEW AND SWALLOW 1 TABLET BY MOUTH EVERY  tablet 5   • pregabalin (LYRICA) 200 MG capsule TAKE 1 CAPSULE BY MOUTH 3 TIMES A DAY  90 capsule 1   • sucralfate (CARAFATE) 1 g tablet TAKE 1 TABLET BY MOUTH 4 TIMES EVERY DAY ON AN EMPTY STOMACH 1 HOUR BEFORE MEALS AND AT BEDTIME 360 tablet 1   • traZODone (DESYREL) 50 mg tablet TAKE 1 TABLET BY MOUTH EVERY DAY AT BEDTIME AS NEEDED 90 tablet 1   • Trelegy Ellipta 100-62 5-25 MCG/ACT inhaler INHALE 1 PUFF DAILY RINSE MOUTH AFTER USE   60 each 1   • vitamin A 2400 MCG (8000 UT) capsule      • denosumab (PROLIA) 60 mg/mL Inject 1 mL (60 mg total) under the skin once for 1 dose 1 mL 1   • glucose blood (OneTouch Verio) test strip Use 1 each 2 (two) times a day Use to test (Patient not taking: Reported on 5/24/2023) 100 strip 5   • Lancets Misc  (ACCU-CHEK FASTCLIX LANCET) KIT 3 (three) times a day (Patient not taking: Reported on 5/24/2023)     • Parmova 112  (Patient not taking: Reported on 5/24/2023)     • nitrofurantoin (MACROBID) 100 mg capsule Take 1 capsule (100 mg total) by mouth daily With food/Lunch (Patient not taking: Reported on 5/24/2023) 30 capsule 1   • ofloxacin (OCUFLOX) 0 3 % ophthalmic solution INSTILL 1 DROP INTO AFFECTED EYE 4 TIMES A DAY AS DIRECTED (Patient not taking: Reported on 5/24/2023)     • prednisoLONE acetate (PRED FORTE) 1 % ophthalmic suspension INSTILL 1 DROP INTO AFFECTED EYE 4 TIMES A DAY AS DIRECTED (Patient not taking: Reported on 5/24/2023)     • vitamin B-12 (VITAMIN B-12) 1,000 mcg tablet Take 1 tablet (1,000 mcg total) by mouth daily (Patient not taking: Reported on 5/24/2023) 90 tablet 3     Current Facility-Administered Medications   Medication Dose Route Frequency Provider Last Rate Last Admin   • cyanocobalamin injection 1,000 mcg  1,000 mcg Intramuscular Q30 Days Patrica Goldstein MD   1,000 mcg at 12/08/20 1118   • cyanocobalamin injection 1,000 mcg  1,000 mcg Intramuscular Q30 Days Dorcas Leon MD   1,000 mcg at 07/08/20 1036   • cyanocobalamin injection 1,000 mcg  1,000 mcg Intramuscular Q30 Days Dorcas Leon MD   1,000 mcg at 09/08/20 1210   • cyanocobalamin injection 1,000 mcg  1,000 mcg Intramuscular Q30 Days Dorcas Leon MD       • cyanocobalamin injection 1,000 mcg  1,000 mcg Intramuscular Q30 Days Dorcas Leon MD       • cyanocobalamin injection 1,000 mcg  1,000 mcg Intramuscular Q30 Days Dorcas Leon MD   1,000 mcg at 08/18/21 1019   • cyanocobalamin injection 1,000 mcg  1,000 mcg Intramuscular Q30 Days Dorcas Leon MD   1,000 mcg at 03/21/23 1117     Allergies   Allergen Reactions   • Cephalosporins Hives   • Erythromycin GI Intolerance   • Fentanyl Itching     Occurred during surgery    • Paxil [Paroxetine] Hives     After 2 weeks   • Penicillins Itching   • Pravastatin Myalgia   • Statins Itching   • Sulfa Antibiotics Diarrhea   • Wellbutrin [Bupropion] Hives     After 2 weeks    • Marzena's Wort Rash      Immunizations:     Immunization History   Administered Date(s) Administered   • COVID-19 MODERNA VACC 0 5 ML IM 04/23/2021, 05/21/2021   • COVID-19, unspecified 04/23/2021, 05/21/2021   • INFLUENZA 10/27/2018, 10/22/2021   • Influenza Split High Dose Preservative Free IM 10/27/2018, 11/11/2019   • Influenza, high dose seasonal 0 7 mL 10/13/2020   • Pneumococcal Conjugate 13-Valent 11/29/2017   • Pneumococcal Polysaccharide PPV23 01/08/2013, 05/02/2019   • Tdap 02/07/2019   • Zoster Vaccine Recombinant 02/07/2019      Health Maintenance:         Topic Date Due   • Hepatitis C Screening  Completed   • Colorectal Cancer Screening  Discontinued         Topic Date Due   • COVID-19 Vaccine (5 - Mixed Product series) 07/16/2021   • Influenza Vaccine (Season Ended) 09/01/2023      Medicare Screening Tests and Risk Assessments:     Wayne Best is here for her Subsequent Wellness visit       Health Risk Assessment:   Patient rates overall health as good  Patient feels that their physical health rating is same  Patient is satisfied with their life  Eyesight was rated as same  Hearing was rated as same  Patient feels that their emotional and mental health rating is same  Patients states they are never, rarely angry  Patient states they are never, rarely unusually tired/fatigued  Pain experienced in the last 7 days has been none  Patient states that she has experienced no weight loss or gain in last 6 months  Depression Screening:   PHQ-2 Score: 0      Fall Risk Screening: In the past year, patient has experienced: no history of falling in past year      Urinary Incontinence Screening:   Patient has not leaked urine accidently in the last six months  Home Safety:  Patient does not have trouble with stairs inside or outside of their home  Patient has working smoke alarms and has working carbon monoxide detector  Home safety hazards include: none  Nutrition:   Current diet is Regular  Medications:   Patient is not currently taking any over-the-counter supplements  Patient is able to manage medications  Activities of Daily Living (ADLs)/Instrumental Activities of Daily Living (IADLs):   Walk and transfer into and out of bed and chair?: Yes  Dress and groom yourself?: Yes    Bathe or shower yourself?: Yes    Feed yourself? Yes  Do your laundry/housekeeping?: Yes  Manage your money, pay your bills and track your expenses?: Yes  Make your own meals?: Yes    Do your own shopping?: Yes    Previous Hospitalizations:   Any hospitalizations or ED visits within the last 12 months?: No      Advance Care Planning:   Living will: Yes    Durable POA for healthcare:  Yes    Advanced directive: Yes    Advanced directive counseling given: Yes    Five wishes given: Yes      PREVENTIVE SCREENINGS      Cardiovascular Screening:    General: Screening Not Indicated and History Lipid Disorder      Diabetes Screening:     General: Screening Not Indicated, "History Diabetes, Risks and Benefits Discussed and Screening Current      Colorectal Cancer Screening:     General: Screening Current      Breast Cancer Screening:     General: Screening Current and Risks and Benefits Discussed      Cervical Cancer Screening:    General: Screening Not Indicated and Risks and Benefits Discussed      Osteoporosis Screening:    General: Screening Not Indicated, History Osteoporosis and Risks and Benefits Discussed      Abdominal Aortic Aneurysm (AAA) Screening:        General: Risks and Benefits Discussed      Lung Cancer Screening:     General: Screening Not Indicated and Risks and Benefits Discussed      Hepatitis C Screening:    General: Screening Current and Risks and Benefits Discussed    Screening, Brief Intervention, and Referral to Treatment (SBIRT)    Screening      Single Item Drug Screening:  How often have you used an illegal drug (including marijuana) or a prescription medication for non-medical reasons in the past year? never    Single Item Drug Screen Score: 0  Interpretation: Negative screen for possible drug use disorder    Review of Current Opioid Use    Opioid Risk Tool (ORT) Interpretation: Complete Opioid Risk Tool (ORT)    Other Counseling Topics:   Car/seat belt/driving safety, skin self-exam, sunscreen and calcium and vitamin D intake and regular weightbearing exercise  No results found       Physical Exam:     Ht 5' 5\" (1 651 m)   LMP  (LMP Unknown)   BMI 30 45 kg/m²     Physical Exam     Kim Syed MD  "

## 2023-06-19 ENCOUNTER — OFFICE VISIT (OUTPATIENT)
Dept: OBGYN CLINIC | Facility: OTHER | Age: 73
End: 2023-06-19
Payer: COMMERCIAL

## 2023-06-19 VITALS
WEIGHT: 183 LBS | BODY MASS INDEX: 30.49 KG/M2 | HEIGHT: 65 IN | HEART RATE: 51 BPM | SYSTOLIC BLOOD PRESSURE: 130 MMHG | DIASTOLIC BLOOD PRESSURE: 72 MMHG

## 2023-06-19 DIAGNOSIS — K21.9 GASTROESOPHAGEAL REFLUX DISEASE WITHOUT ESOPHAGITIS: ICD-10-CM

## 2023-06-19 DIAGNOSIS — G47.00 INSOMNIA, UNSPECIFIED TYPE: ICD-10-CM

## 2023-06-19 DIAGNOSIS — R12 FUNCTIONAL HEARTBURN: ICD-10-CM

## 2023-06-19 DIAGNOSIS — J30.9 ALLERGIC RHINITIS, UNSPECIFIED SEASONALITY, UNSPECIFIED TRIGGER: ICD-10-CM

## 2023-06-19 DIAGNOSIS — M17.11 PRIMARY OSTEOARTHRITIS OF RIGHT KNEE: Primary | ICD-10-CM

## 2023-06-19 PROCEDURE — 99213 OFFICE O/P EST LOW 20 MIN: CPT | Performed by: ORTHOPAEDIC SURGERY

## 2023-06-19 RX ORDER — NORTRIPTYLINE HYDROCHLORIDE 25 MG/1
CAPSULE ORAL
Qty: 90 CAPSULE | Refills: 0 | Status: SHIPPED | OUTPATIENT
Start: 2023-06-19

## 2023-06-19 RX ORDER — CETIRIZINE HYDROCHLORIDE 10 MG/1
TABLET ORAL
Qty: 45 TABLET | Refills: 2 | Status: SHIPPED | OUTPATIENT
Start: 2023-06-19

## 2023-06-19 RX ORDER — TRAZODONE HYDROCHLORIDE 50 MG/1
TABLET ORAL
Qty: 90 TABLET | Refills: 1 | Status: SHIPPED | OUTPATIENT
Start: 2023-06-19

## 2023-06-19 NOTE — PROGRESS NOTES
Chief Complaint: Right knee pain  HPI:    Joann Cortez is a 67y o  year old female  who presents today for new evaluation and treatment of right knee pain              Description of symptoms:  Occupation: [employer]: December 20th 2019     Patient had previously knee pain prior to fall at work  Fall on December 20th 2019, pain worsen  Patient was by urgent care initally and then PCP and ortho on 03/16/2029  Denies any falls since 12/20/2019 but feels like the knee is unstable  Patient reports continued pain of the right knee which is worsening  She currently uses a cane for ambulatory assistance  Symptom intensity is severe and has not been adequately controlled with conservative management including previous cortisone and viscosupplementation injections  I have personally reviewed pertinent films in PACS      Patient Active Problem List   Diagnosis   • Chronic pain syndrome   • Cervical radiculopathy   • Myofascial pain syndrome   • Spondylosis of cervical region without myelopathy or radiculopathy   • Fibromyalgia   • Diabetic peripheral neuropathy (HCC)   • Long-term current use of opiate analgesic   • MGUS (monoclonal gammopathy of unknown significance)   • Iron deficiency anemia following bariatric surgery   • Light headedness   • Uncomplicated opioid dependence (HCC)   • Rheumatoid arthritis of hand ([facility] [address] )   • Pulmonary emphysema (HCC)   • Primary osteoarthritis of right knee   • Pseudogout of left knee   • Lumbar radiculitis   • Chronic bilateral low back pain with bilateral sciatica   • Rotator cuff syndrome, left   • Left shoulder pain   • Dizziness   • Other fatigue   • Biceps tendinitis of left shoulder   • Adhesive capsulitis of left shoulder   • Hypoglycemia   • Hypothyroidism due to Hashimoto's thyroiditis   • Type 2 diabetes mellitus with hyperlipidemia ([facility])   • Iron deficiency anemia, unspecified        Current Outpatient Medications on File Prior to Visit   Medication Sig Dispense Refill   • albuterol (PROVENTIL HFA,VENTOLIN HFA) 90 mcg/act inhaler      • albuterol (PROVENTIL HFA,VENTOLIN HFA) 90 mcg/act inhaler Inhale 2 puffs every 4 (four) hours as needed for wheezing 1 Inhaler 0   • amLODIPine (NORVASC) 5 mg tablet TAKE 1 TABLET (5 MG TOTAL) BY MOUTH DAILY  90 tablet 3   • baclofen 10 mg tablet Take 1 tablet (10 mg total) by mouth 3 (three) times a day 90 tablet 2   • benzonatate (TESSALON PERLES) 100 mg capsule Take 1 capsule (100 mg total) by mouth 3 (three) times a day as needed for cough 30 capsule 1   • Calcium Carb-Cholecalciferol (Caltrate 600+D3) 600-20 MG-MCG TABS Take 1 tablet by mouth 2 (two) times a day with meals 200 tablet 3   • cetirizine (ZyrTEC) 10 mg tablet TAKE 1/2 TABLET BY MOUTH EVERY DAY 45 tablet 2   • cimetidine (TAGAMET) 200 mg tablet Take 1 tablet (200 mg total) by mouth 2 (two) times a day 60 tablet 3   • denosumab (PROLIA) 60 mg/mL Inject 1 mL (60 mg total) under the skin once for 1 dose 1 mL 1   • doxycycline hyclate (VIBRAMYCIN) 100 mg capsule Take 1 capsule (100 mg total) by mouth every 12 (twelve) hours for 10 days 20 capsule 0   • ergocalciferol (VITAMIN D2) 50,000 units Take 1 capsule (50,000 Units total) by mouth once a week 13 capsule 2   • fluticasone-umeclidinium-vilanterol (Trelegy Ellipta) 100-62 5-25 mcg/actuation inhaler Inhale 1 puff daily Rinse mouth after use   60 each 3   • glucose blood (OneTouch Verio) test strip Use 1 each 2 (two) times a day Use to test (Patient not taking: Reported on 5/24/2023) 100 strip 5   • HYDROcodone-acetaminophen (NORCO)  mg per tablet 1 tab every 4-6 hrs prn pain, for ongoing therapy 130 tablet 0   • Lancets Misc  (ACCU-CHEK FASTCLIX LANCET) KIT 3 (three) times a day (Patient not taking: Reported on 5/24/2023)     • levothyroxine 25 mcg tablet TAKE 1 TABLET (25 MCG TOTAL) BY MOUTH EVERY OTHER DAY 45 tablet 3   • levothyroxine 50 mcg tablet TAKE 1 TABLET (50 MCG TOTAL) BY MOUTH EVERY OTHER DAY 45 tablet 2   • losartan (COZAAR) 25 mg tablet TAKE 1 TABLET (25 MG TOTAL) BY MOUTH DAILY  90 tablet 1   • MICROLET LANCETS MISC  (Patient not taking: Reported on 5/24/2023)     • nitrofurantoin (MACROBID) 100 mg capsule Take 1 capsule (100 mg total) by mouth daily With food/Lunch 30 capsule 1   • nortriptyline (PAMELOR) 25 mg capsule TAKE 1 CAPSULE BY MOUTH DAILY AT BEDTIME 90 capsule 0   • pantoprazole (PROTONIX) 40 mg tablet TAKE 1 TABLET BY MOUTH TWICE A  tablet 3   • Pediatric Multiple Vit-C-FA (Childrens Chew Multivitamin) CHEW CHEW AND SWALLOW 1 TABLET BY MOUTH EVERY  tablet 5   • prednisoLONE acetate (PRED FORTE) 1 % ophthalmic suspension INSTILL 1 DROP INTO AFFECTED EYE 4 TIMES A DAY AS DIRECTED (Patient not taking: Reported on 5/24/2023)     • pregabalin (LYRICA) 200 MG capsule TAKE 1 CAPSULE BY MOUTH 3 TIMES A DAY   90 capsule 1   • sucralfate (CARAFATE) 1 g tablet TAKE 1 TABLET BY MOUTH 4 TIMES EVERY DAY ON AN EMPTY STOMACH 1 HOUR BEFORE MEALS AND AT BEDTIME 360 tablet 1   • traZODone (DESYREL) 50 mg tablet TAKE 1 TABLET BY MOUTH EVERY DAY AT BEDTIME AS NEEDED 90 tablet 1   • vitamin A 2400 MCG (8000 UT) capsule      • vitamin B-12 (VITAMIN B-12) 1,000 mcg tablet Take 1 tablet (1,000 mcg total) by mouth daily (Patient not taking: Reported on 5/24/2023) 90 tablet 3     Current Facility-Administered Medications on File Prior to Visit   Medication Dose Route Frequency Provider Last Rate Last Admin   • cyanocobalamin injection 1,000 mcg  1,000 mcg Intramuscular Q30 Days Johnson Vergara MD   1,000 mcg at 12/08/20 1118   • cyanocobalamin injection 1,000 mcg  1,000 mcg Intramuscular Q30 Days Johnson Vergara MD   1,000 mcg at 07/08/20 1036   • cyanocobalamin injection 1,000 mcg  1,000 mcg Intramuscular Q30 Days Johnson Vergara MD   1,000 mcg at 09/08/20 1210   • cyanocobalamin injection 1,000 mcg  1,000 mcg Intramuscular Q30 Days Sami Valentino Seats, MD       • cyanocobalamin injection 1,000 mcg  1,000 mcg Intramuscular Q30 Days Luiza Granado MD       • cyanocobalamin injection 1,000 mcg  1,000 mcg Intramuscular Q30 Days Luiza Granado MD   1,000 mcg at 08/18/21 1019   • cyanocobalamin injection 1,000 mcg  1,000 mcg Intramuscular Q30 Days Luiza Granado MD   1,000 mcg at 03/21/23 1117        Allergies   Allergen Reactions   • Cephalosporins Hives   • Erythromycin GI Intolerance   • Fentanyl Itching     Occurred during surgery    • Paxil [Paroxetine] Hives     After 2 weeks   • Penicillins Itching   • Pravastatin Myalgia   • Statins Itching   • Sulfa Antibiotics Diarrhea   • Wellbutrin [Bupropion] Hives     After 2 weeks    • Marzena's Wort Rash        Tobacco Use: High Risk (6/9/2023)    Patient History    • Smoking Tobacco Use: Every Day    • Smokeless Tobacco Use: Never    • Passive Exposure: Not on file        Social Determinants of Health     Tobacco Use: High Risk (6/9/2023)    Patient History    • Smoking Tobacco Use: Every Day    • Smokeless Tobacco Use: Never    • Passive Exposure: Not on file   Alcohol Use: Not on file   Financial Resource Strain: Low Risk  (6/9/2023)    Overall Financial Resource Strain (CARDIA)    • Difficulty of Paying Living Expenses: Not hard at all   Food Insecurity: No Food Insecurity (4/26/2021)    Hunger Vital Sign    • Worried About Running Out of Food in the Last Year: Never true    • Ran Out of Food in the Last Year: Never true   Transportation Needs: No Transportation Needs (6/9/2023)    PRAPARE - Transportation    • Lack of Transportation (Medical): No    • Lack of Transportation (Non-Medical):  No   Physical Activity: Inactive (9/8/2020)    Exercise Vital Sign    • Days of Exercise per Week: 0 days    • Minutes of Exercise per Session: 0 min   Stress: No Stress Concern Present (9/8/2020)    Hari7 Rodney Mascorro    • Feeling of Stress : Not at all   Social Connections: Unknown (9/8/2020)    Social Connection and Isolation Panel [NHANES]    • Frequency of Communication with Friends and Family: Patient refused    • Frequency of Social Gatherings with Friends and Family: Patient refused    • Attends Mandaen Services: Patient refused    • Active Member of Clubs or Organizations: Patient refused    • Attends Club or Organization Meetings: Patient refused    • Marital Status: Patient refused   Intimate Partner Violence: Not At Risk (9/8/2020)    Humiliation, Afraid, Rape, and Kick questionnaire    • Fear of Current or Ex-Partner: No    • Emotionally Abused: No    • Physically Abused: No    • Sexually Abused: No   Depression: Not at risk (6/9/2023)    PHQ-2    • PHQ-2 Score: 0   Housing Stability: Not on file               Review of Systems     There is no height or weight on file to calculate BMI  Physical Exam  Vitals and nursing note reviewed  HENT:      Head: Atraumatic  Cardiovascular:      Rate and Rhythm: Normal rate  Pulses: Normal pulses  Pulmonary:      Effort: Pulmonary effort is normal  No respiratory distress  Neurological:      Mental Status: She is alert and oriented to person, place, and time  Psychiatric:         Mood and Affect: Mood normal          Behavior: Behavior normal           Ortho Exam:    Body part: right knee    Inspection: Some genu valgum noted  Joint hypertrophy noted  Palpation: (+) tenderness: Medial joint line tenderness including patellofemoral crepitus and tenderness    Range of motion:   10 degree extensor lag on AROM   5 degree extensor lag on PROM   70 degrees flexion AROM     Special Tests:   Some laxity with varus stress testing  Distal Neurovascular Status: Intact, Yes     Procedures       Assessment:    No diagnosis found  Plan:    We discussed clinical examination and findings with Zulema Sevilla  Reviewed imaging  Clinical presentation and findings consistent with advanced right knee osteoarthritis, refractory to conservative management at this time    We "reviewed anatomical and biomechanics of affected area  Recommend taking a surgical opinion and consideration of total knee replacement arthroplasty  Patient is agreeable to this plan and a referral was made for the same  She did report that her right knee  brace has worn out and a new prescription was placed for the same on today's office visit  Shared decision making, patient agreeable to plan  Follow-Up:    No follow-ups on file  Portions of the record may have been created with voice recognition software  Occasional wrong word or \"sound alike\" substitutions may have occurred due to the inherent limitations of voice recognition software  Please review the chart carefully and recognize, using context, where substitutions/typographical errors may have occurred           "

## 2023-07-02 DIAGNOSIS — M54.42 CHRONIC BILATERAL LOW BACK PAIN WITH BILATERAL SCIATICA: ICD-10-CM

## 2023-07-02 DIAGNOSIS — M79.18 MYOFASCIAL PAIN SYNDROME: ICD-10-CM

## 2023-07-02 DIAGNOSIS — G89.29 CHRONIC BILATERAL LOW BACK PAIN WITH BILATERAL SCIATICA: ICD-10-CM

## 2023-07-02 DIAGNOSIS — M54.16 LUMBAR RADICULITIS: ICD-10-CM

## 2023-07-02 DIAGNOSIS — G89.4 CHRONIC PAIN SYNDROME: ICD-10-CM

## 2023-07-02 DIAGNOSIS — M54.41 CHRONIC BILATERAL LOW BACK PAIN WITH BILATERAL SCIATICA: ICD-10-CM

## 2023-07-03 RX ORDER — BACLOFEN 10 MG/1
TABLET ORAL
Qty: 270 TABLET | Refills: 1 | Status: SHIPPED | OUTPATIENT
Start: 2023-07-03

## 2023-07-05 ENCOUNTER — NURSE TRIAGE (OUTPATIENT)
Age: 73
End: 2023-07-05

## 2023-07-05 NOTE — TELEPHONE ENCOUNTER
Spoke with patient. She is placed on a wait list.     Patient original Berlin Bellamy did not feel patient needed surgery at this time and wait 6 months, and patient rescheduled OV with him.

## 2023-07-05 NOTE — TELEPHONE ENCOUNTER
----- Message from Truitt Claude sent at 7/5/2023  4:19 PM EDT -----  Pt wants some clarification on whether she should come to GI for her Medical issues, patient wants clarification if she should listen to her bariatric provider, is there a sooner appt.

## 2023-07-06 DIAGNOSIS — K21.9 GASTROESOPHAGEAL REFLUX DISEASE WITHOUT ESOPHAGITIS: ICD-10-CM

## 2023-07-07 ENCOUNTER — OFFICE VISIT (OUTPATIENT)
Dept: PAIN MEDICINE | Facility: MEDICAL CENTER | Age: 73
End: 2023-07-07
Payer: COMMERCIAL

## 2023-07-07 VITALS
SYSTOLIC BLOOD PRESSURE: 134 MMHG | HEIGHT: 65 IN | DIASTOLIC BLOOD PRESSURE: 61 MMHG | WEIGHT: 181 LBS | BODY MASS INDEX: 30.16 KG/M2 | HEART RATE: 69 BPM

## 2023-07-07 DIAGNOSIS — M54.42 CHRONIC BILATERAL LOW BACK PAIN WITH BILATERAL SCIATICA: ICD-10-CM

## 2023-07-07 DIAGNOSIS — G89.29 CHRONIC BILATERAL LOW BACK PAIN WITH BILATERAL SCIATICA: ICD-10-CM

## 2023-07-07 DIAGNOSIS — M47.816 LUMBAR SPONDYLOSIS: ICD-10-CM

## 2023-07-07 DIAGNOSIS — M54.12 CERVICAL RADICULOPATHY: ICD-10-CM

## 2023-07-07 DIAGNOSIS — E11.42 DIABETIC PERIPHERAL NEUROPATHY (HCC): ICD-10-CM

## 2023-07-07 DIAGNOSIS — F11.20 UNCOMPLICATED OPIOID DEPENDENCE (HCC): ICD-10-CM

## 2023-07-07 DIAGNOSIS — G89.4 CHRONIC PAIN SYNDROME: Primary | ICD-10-CM

## 2023-07-07 DIAGNOSIS — M54.41 CHRONIC BILATERAL LOW BACK PAIN WITH BILATERAL SCIATICA: ICD-10-CM

## 2023-07-07 DIAGNOSIS — M54.2 NECK PAIN: ICD-10-CM

## 2023-07-07 DIAGNOSIS — Z79.891 LONG-TERM CURRENT USE OF OPIATE ANALGESIC: ICD-10-CM

## 2023-07-07 PROCEDURE — 99214 OFFICE O/P EST MOD 30 MIN: CPT | Performed by: PHYSICIAN ASSISTANT

## 2023-07-07 RX ORDER — HYDROCODONE BITARTRATE AND ACETAMINOPHEN 10; 325 MG/1; MG/1
TABLET ORAL
Qty: 130 TABLET | Refills: 0 | Status: SHIPPED | OUTPATIENT
Start: 2023-07-07

## 2023-07-07 RX ORDER — NALOXONE HYDROCHLORIDE 4 MG/.1ML
SPRAY NASAL
Qty: 1 EACH | Refills: 1 | Status: SHIPPED | OUTPATIENT
Start: 2023-07-07 | End: 2024-07-06

## 2023-07-07 NOTE — PROGRESS NOTES
Assessment:  1. Chronic pain syndrome    2. Long-term current use of opiate analgesic    3. Uncomplicated opioid dependence ([address])    4. Chronic bilateral low back pain with bilateral sciatica    5. Lumbar spondylosis    6. Neck pain    7. Cervical radiculopathy    8. Diabetic peripheral neuropathy ([address])        Plan:  While the patient was in the office today, I did have a thorough conversation regarding their chronic pain syndrome, medication management, and treatment plan options. The patient remains clinically stable and moderately controlled on the current medication regimen of Norco 10/325 every 4 to 6 hours as needed, Lyrica 200 mg 3 times daily and baclofen 10 mg 3 times daily as needed. I have provided refills for her opioid prescriptions with do not fill dates of 7/12/2023 and 8/9/2023. She will follow-up in the office in 2 months or sooner if needed if the pain changes or worsens. There are risks associated with opioid medications, including dependence, addiction and tolerance. The patient understands and agrees to use these medications only as prescribed. Potential side effects of the medications include, but are not limited to, constipation, drowsiness, addiction, impaired judgment and risk of fatal overdose if not taken as prescribed. The patient was warned against driving while taking sedation medications. Sharing medications is a felony. At this point in time, the patient is showing no signs of addiction, abuse, diversion or suicidal ideation. A oral drug screen was collected at today's office visit as part of our medication management protocol. The point of care testing results were appropriate for what was being prescribed. The specimen will be sent for confirmatory testing. The drug screen is medically necessary because the patient is either dependent on opioid medication or is being considered for opioid medication therapy and the results could impact ongoing or future treatment.  The drug screen is to evaluate for the presences or absence of prescribed, non-prescribed, and/or illicit drugs/substances. [place] Prescription Drug Monitoring Program report was reviewed and was appropriate     My impressions and treatment recommendations were discussed in detail with the patient who verbalized understanding and had no further questions. Discharge instructions were provided. I personally saw and examined the patient and I agree with the above discussed plan of care. Orders Placed This Encounter   Procedures   • MM ALL_Prescribed Meds and Special Instructions     Order Specific Question:   [facility] Is HYDROCODONE/APAP prescribed? Answer:   Yes     Order Specific Question:   [facility] Is NORTRIPTYLINE Prescribed? Answer:   Yes     Order Specific Question:   [facility] Is PREGABALIN Prescribed? Answer:   Yes     Order Specific Question:   [facility] Is TRAZODONE prescribed? Answer:    Yes   • MM DT_Alprazolam Definitive Test   • MM DT_Amitriptyline Definitive Test   • MM DT_Amphetamine Definitive Test   • MM DT_Buprenorphine Definitive Test   • MM DT_Carisoprodol Definitive Test   • MM DT_Clonazepam Definitive Test   • MM DT_Cocaine Definitive Test   • MM DT_Codeine Definitive Test   • MM Diazepam Definitive Test   • MM DT_Ethyl Glucuronide/Ethyl Sulfate Definitive Test   • MM DT_Fentanyl Definitive Test   • MM DT_Heroin Definitive Test   • MM DT_Hydrocodone Definitive Test   • MM DT_Hydromorphone Definitive Test   • MM DT_Kratom Definitive Test   • MM Lorazepam Definitive Test   • MM DT_MDMA Definitive Test   • MM DT_Methadone Definitive Test   • MM DT_Methamphetaine-d/l Isomers Definitive   • MM DT_Methamphetamine Definitive Test   • MM DT_Morphine Definitive Test   • MM DT_Oxazepam Definitive Test   • MM DT_Oxycodone Definitive Test   • MM DT_Oxymorphone Definitive Test   • MM DT_Phentermine Definitive Test   • MM DT_Secobarbital Definitive Test   • MM DT_Tapentadol Definitive Test   • MM DT_Temazapam Definitive Test   • MM DT_THC Definitive Test   • MM DT_Tramadol Definitive Test   • MM DT_Validity Creatinine   • MM DT_Validity Oxidant   • MM DT_Validity pH   • MM DT_Validity Specific     New Medications Ordered This Visit   Medications   • diphenhydrAMINE-PSE-APAP (BENADRYL ALLERGY/COLD PO)     Sig: Take by mouth daily at bedtime   • HYDROcodone-acetaminophen (NORCO)  mg per tablet     Si tab every 4-6 hrs prn pain, for ongoing therapy     Dispense:  130 tablet     Refill:  0     Do not fill before 23   • HYDROcodone-acetaminophen (NORCO)  mg per tablet     Sig: Take 1 tab q 4-6 hrs prn pain, for ongoing therapy     Dispense:  130 tablet     Refill:  0     Do not fill before 23   • naloxone (NARCAN) 4 mg/0.1 mL nasal spray     Sig: Administer 1 spray into a nostril. If no response after 2-3 minutes, give another dose in the other nostril using a new spray. Dispense:  1 each     Refill:  1       History of Present Illness:  Tiff Zavala is a 68 y.o. female who presents for a follow up office visit in regards to chronic pain. The patient’s current symptoms include chronic multisite pain including neck pain, low back pain and neuropathic pain of the lower extremities. She rates her pain an 8 out of 10 on the pain scale describes it as a constant burning, dull, aching, sharp, throbbing, pressure-like and shooting pain with intermittent numbness and paresthesias. She is maintained on the current medication regimen of Norco, baclofen and Lyrica and reports approximately 50% reduction in pain and without side effects. Patient has no new symptoms or acute issues on today's visit.     Opioid contract date 23    Last UDS Date 23    Hydrocodone last taken on 7/07 AM    I have personally reviewed and/or updated the patient's past medical history, past surgical history, family history, social history, current medications, allergies, and vital signs today. Review of Systems   Respiratory: Positive for shortness of breath. Cardiovascular: Negative for chest pain. Gastrointestinal: Negative for constipation, diarrhea, nausea and vomiting. Musculoskeletal: Positive for gait problem, joint swelling and myalgias. Negative for arthralgias. Skin: Negative for rash. Neurological: Negative for dizziness, seizures and weakness. Psychiatric/Behavioral: Positive for decreased concentration. All other systems reviewed and are negative.       Patient Active Problem List   Diagnosis   • Chronic pain syndrome   • Cervical radiculopathy   • Myofascial pain syndrome   • Spondylosis of cervical region without myelopathy or radiculopathy   • Fibromyalgia   • Diabetic peripheral neuropathy (HCC)   • Long-term current use of opiate analgesic   • MGUS (monoclonal gammopathy of unknown significance)   • Iron deficiency anemia following bariatric surgery   • Light headedness   • Uncomplicated opioid dependence (HCC)   • Rheumatoid arthritis of hand ([address])   • Pulmonary emphysema (HCC)   • Primary osteoarthritis of right knee   • Pseudogout of left knee   • Lumbar radiculitis   • Chronic bilateral low back pain with bilateral sciatica   • Rotator cuff syndrome, left   • Left shoulder pain   • Dizziness   • Other fatigue   • Biceps tendinitis of left shoulder   • Adhesive capsulitis of left shoulder   • Hypoglycemia   • Hypothyroidism due to Hashimoto's thyroiditis   • Type 2 diabetes mellitus with hyperlipidemia ([facility])   • Iron deficiency anemia, unspecified       Past Medical History:   Diagnosis Date   • Anemia    • Anxiety     hx of panic attacks (now under control)    • Arthritis    • Asthma     resolved (no problems in a decade)    • Baker's cyst of knee    • Bronchitis    • Bunion, left foot    • Cervical pain    • Chronic GERD    • Chronic pain    • Chronic pain disorder    • Depression    • Diabetes mellitus, type 2 ([address])    • Diabetic peripheral neuropathy ([address])    • Endometriosis    • Fibromyalgia    • Hyperlipidemia    • Hypertension    • Joint pain    • Low back pain    • Low back pain    • Lung nodules    • Macular degeneration    • Pulmonary emphysema ([address]) 01/16/2020   • Spondylosis    • Uterine cancer ([address])        Past Surgical History:   Procedure Laterality Date   • BACK SURGERY  1996    L5, S1- laser surgery fusion of c5 and c6    • BREAST CYST EXCISION Right    • CHOLECYSTECTOMY  2004   • FOOT SURGERY Left 2005    fusion    • FRACTURE SURGERY     • GASTRIC BYPASS  2010    Dr. Kemp [facility]    • [address] [facility] [address]    just uterus    • KNEE ARTHROSCOPY     • LAMINECTOMY     • ORTHOPEDIC SURGERY     • OVARIAN CYST REMOVAL  1975   • PELVIC LAPAROSCOPY      ovaries (x10)    • PLEURAL SCARIFICATION  1967   • SHOULDER OPEN ROTATOR CUFF REPAIR Left 2008   • TONSILLECTOMY     • VAGINAL PROLAPSE REPAIR         Family History   Problem Relation Age of Onset   • Hypertension Mother    • Lung cancer Mother    • Hypertension Father    • Heart disease Father    • Heart attack Father    • Cancer Father    • No Known Problems Sister    • No Known Problems Daughter    • No Known Problems Maternal Grandmother    • No Known Problems Maternal Grandfather    • No Known Problems Paternal Grandmother    • No Known Problems Paternal Grandfather    • Breast cancer Paternal Aunt 44   • Breast cancer Paternal Aunt 40   • Breast cancer Maternal Aunt 50       Social History     Occupational History   • Not on file   Tobacco Use   • Smoking status: Every Day     Packs/day: 0.25     Years: 40.00     Total pack years: 10.00     Types: Cigarettes   • Smokeless tobacco: Never   Vaping Use   • Vaping Use: Never used   Substance and Sexual Activity   • Alcohol use: Not Currently     Alcohol/week: 1.0 standard drink of alcohol     Types: 1 Shots of liquor per week     Comment: rarely   • Drug use: No   • Sexual activity: Not Currently     Partners: Male       Current Outpatient Medications on File Prior to Visit   Medication Sig   • albuterol (PROVENTIL HFA,VENTOLIN HFA) 90 mcg/act inhaler    • albuterol (PROVENTIL HFA,VENTOLIN HFA) 90 mcg/act inhaler Inhale 2 puffs every 4 (four) hours as needed for wheezing   • amLODIPine (NORVASC) 5 mg tablet TAKE 1 TABLET (5 MG TOTAL) BY MOUTH DAILY. • baclofen 10 mg tablet TAKE 1 TABLET BY MOUTH THREE TIMES A DAY   • Calcium Carb-Cholecalciferol (Caltrate 600+D3) 600-20 MG-MCG TABS Take 1 tablet by mouth 2 (two) times a day with meals   • cetirizine (ZyrTEC) 10 mg tablet TAKE 1/2 TABLET BY MOUTH EVERY DAY   • cimetidine (TAGAMET) 200 mg tablet TAKE 1 TABLET (200 MG TOTAL) BY MOUTH 2 TIMES A DAY. • diphenhydrAMINE-PSE-APAP (BENADRYL ALLERGY/COLD PO) Take by mouth daily at bedtime   • ergocalciferol (VITAMIN D2) 50,000 units Take 1 capsule (50,000 Units total) by mouth once a week   • fluticasone-umeclidinium-vilanterol (Trelegy Ellipta) 100-62.5-25 mcg/actuation inhaler Inhale 1 puff daily Rinse mouth after use. • levothyroxine 25 mcg tablet TAKE 1 TABLET (25 MCG TOTAL) BY MOUTH EVERY OTHER DAY   • levothyroxine 50 mcg tablet TAKE 1 TABLET (50 MCG TOTAL) BY MOUTH EVERY OTHER DAY   • losartan (COZAAR) 25 mg tablet TAKE 1 TABLET (25 MG TOTAL) BY MOUTH DAILY. • nitrofurantoin (MACROBID) 100 mg capsule Take 1 capsule (100 mg total) by mouth daily With food/Lunch   • nortriptyline (PAMELOR) 25 mg capsule TAKE 1 CAPSULE BY MOUTH EVERYDAY AT BEDTIME   • pantoprazole (PROTONIX) 40 mg tablet TAKE 1 TABLET BY MOUTH TWICE A DAY   • Pediatric Multiple Vit-C-FA (Childrens Chew Multivitamin) CHEW CHEW AND SWALLOW 1 TABLET BY MOUTH EVERY DAY   • pregabalin (LYRICA) 200 MG capsule TAKE 1 CAPSULE BY MOUTH 3 TIMES A DAY.    • sucralfate (CARAFATE) 1 g tablet TAKE 1 TABLET BY MOUTH 4 TIMES EVERY DAY ON AN EMPTY STOMACH 1 HOUR BEFORE MEALS AND AT BEDTIME   • traZODone (DESYREL) 50 mg tablet TAKE 1 TABLET BY MOUTH EVERY DAY AT BEDTIME AS NEEDED   • vitamin A 2400 MCG (8000 UT) capsule    • [DISCONTINUED] HYDROcodone-acetaminophen (NORCO)  mg per tablet 1 tab every 4-6 hrs prn pain, for ongoing therapy   • benzonatate (TESSALON PERLES) 100 mg capsule Take 1 capsule (100 mg total) by mouth 3 (three) times a day as needed for cough (Patient not taking: Reported on 7/7/2023)   • denosumab (PROLIA) 60 mg/mL Inject 1 mL (60 mg total) under the skin once for 1 dose   • glucose blood (OneTouch Verio) test strip Use 1 each 2 (two) times a day Use to test (Patient not taking: Reported on 5/24/2023)   • Lancets Misc. (ACCU-CHEK FASTCLIX LANCET) KIT 3 (three) times a day (Patient not taking: Reported on 5/24/2023)   • [address]  (Patient not taking: Reported on 5/24/2023)   • prednisoLONE acetate (PRED FORTE) 1 % ophthalmic suspension INSTILL 1 DROP INTO AFFECTED EYE 4 TIMES A DAY AS DIRECTED (Patient not taking: Reported on 5/24/2023)   • vitamin B-12 (VITAMIN B-12) 1,000 mcg tablet Take 1 tablet (1,000 mcg total) by mouth daily (Patient not taking: Reported on 5/24/2023)     Current Facility-Administered Medications on File Prior to Visit   Medication   • cyanocobalamin injection 1,000 mcg   • cyanocobalamin injection 1,000 mcg   • cyanocobalamin injection 1,000 mcg   • cyanocobalamin injection 1,000 mcg   • cyanocobalamin injection 1,000 mcg   • cyanocobalamin injection 1,000 mcg   • cyanocobalamin injection 1,000 mcg       Allergies   Allergen Reactions   • Cephalosporins Hives   • Erythromycin GI Intolerance   • Fentanyl Itching     Occurred during surgery    • Paxil [Paroxetine] Hives     After 2 weeks   • Penicillins Itching   • Pravastatin Myalgia   • Statins Itching   • Sulfa Antibiotics Diarrhea   • Wellbutrin [Bupropion] Hives     After 2 weeks    • Marzena's Wort Rash       Physical Exam:    /61   Pulse 69   Ht 5' 5" (1.651 m)   Wt 82.1 kg (181 lb)   LMP  (LMP Unknown)   BMI 30.12 kg/m²     Constitutional:normal, well developed, well nourished, alert, in no distress and non-toxic and no overt pain behavior.   Eyes:anicteric  HEENT:grossly intact  Neck:supple, symmetric, trachea midline and no masses   Pulmonary:even and unlabored  Cardiovascular:No edema or pitting edema present  Skin:Normal without rashes or lesions and well hydrated  Psychiatric:Mood and affect appropriate  Neurologic:Cranial Nerves II-XII grossly intact  Musculoskeletal: Gait is slow but stable    Imaging

## 2023-07-11 ENCOUNTER — TELEPHONE (OUTPATIENT)
Dept: FAMILY MEDICINE CLINIC | Facility: CLINIC | Age: 73
End: 2023-07-11

## 2023-07-11 NOTE — TELEPHONE ENCOUNTER
I scheduled  for her prolia for this Thursday 7/13/2023. She also wanted to know if she is able to receive a b12 inj. She stated that she had spoken with you and that you had suggested B12 injections monthly, please advise.

## 2023-07-12 ENCOUNTER — TELEPHONE (OUTPATIENT)
Age: 73
End: 2023-07-12

## 2023-07-13 ENCOUNTER — CLINICAL SUPPORT (OUTPATIENT)
Dept: FAMILY MEDICINE CLINIC | Facility: CLINIC | Age: 73
End: 2023-07-13
Payer: COMMERCIAL

## 2023-07-13 ENCOUNTER — NURSE TRIAGE (OUTPATIENT)
Age: 73
End: 2023-07-13

## 2023-07-13 DIAGNOSIS — E53.8 LOW VITAMIN B12 LEVEL: Primary | ICD-10-CM

## 2023-07-13 DIAGNOSIS — M81.8 IDIOPATHIC OSTEOPOROSIS: ICD-10-CM

## 2023-07-13 PROCEDURE — 99211 OFF/OP EST MAY X REQ PHY/QHP: CPT

## 2023-07-13 PROCEDURE — 96372 THER/PROPH/DIAG INJ SC/IM: CPT

## 2023-07-13 RX ORDER — CYANOCOBALAMIN 1000 UG/ML
1000 INJECTION, SOLUTION INTRAMUSCULAR; SUBCUTANEOUS
Status: SHIPPED | OUTPATIENT
Start: 2023-07-13

## 2023-07-13 RX ADMIN — CYANOCOBALAMIN 1000 MCG: 1000 INJECTION, SOLUTION INTRAMUSCULAR; SUBCUTANEOUS at 10:04

## 2023-07-13 NOTE — TELEPHONE ENCOUNTER
----- Message from Yady Casas sent at 7/13/2023  8:58 AM EDT -----  Spoke with patient this morning regarding appt with Dr. Corazon Alves. Patient would like a call back from the doctor regarding issues relating to medical issues that Dr. Elio Celeste office is holding up. Told the doctor is completely aware of the situation.    I did offer to schedule appt but no availability

## 2023-07-17 NOTE — TELEPHONE ENCOUNTER
Patients GI provider:  Dr. Richardson Luthers.     Number to return call: (    Reason for call: Pt returning office call please call pt to [facility] OV

## 2023-07-21 DIAGNOSIS — G89.29 CHRONIC BILATERAL LOW BACK PAIN WITH BILATERAL SCIATICA: ICD-10-CM

## 2023-07-21 DIAGNOSIS — K27.9 PUD (PEPTIC ULCER DISEASE): ICD-10-CM

## 2023-07-21 DIAGNOSIS — M54.42 CHRONIC BILATERAL LOW BACK PAIN WITH BILATERAL SCIATICA: ICD-10-CM

## 2023-07-21 DIAGNOSIS — M54.41 CHRONIC BILATERAL LOW BACK PAIN WITH BILATERAL SCIATICA: ICD-10-CM

## 2023-07-21 DIAGNOSIS — M79.18 MYOFASCIAL PAIN SYNDROME: ICD-10-CM

## 2023-07-21 RX ORDER — SUCRALFATE 1 G/1
TABLET ORAL
Qty: 360 TABLET | Refills: 1 | Status: SHIPPED | OUTPATIENT
Start: 2023-07-21

## 2023-07-21 NOTE — TELEPHONE ENCOUNTER
Pt of ARTIE and KASSIDY--  VS would you be able to refill this for this pt she is out and doesn't see [facility] until September

## 2023-07-21 NOTE — TELEPHONE ENCOUNTER
Caller: patient    Doctor:     Reason for call: pharmacy did not have the Lyrica.  Patient thought the script would be there from her last OV when she seen [facility] 7/7  [facility] [address] [facility]    Call back#:

## 2023-07-22 RX ORDER — PREGABALIN 200 MG/1
200 CAPSULE ORAL 3 TIMES DAILY
Qty: 90 CAPSULE | Refills: 1 | Status: SHIPPED | OUTPATIENT
Start: 2023-07-22 | End: 2023-09-06 | Stop reason: SDUPTHER

## 2023-07-24 DIAGNOSIS — Z98.84 BARIATRIC SURGERY STATUS: ICD-10-CM

## 2023-07-24 DIAGNOSIS — E16.2 HYPOGLYCEMIA: Primary | ICD-10-CM

## 2023-07-24 RX ORDER — MULTIVITAMIN WITH IRON
8000 TABLET ORAL DAILY
Qty: 30 CAPSULE | Refills: 3 | Status: SHIPPED | OUTPATIENT
Start: 2023-07-24

## 2023-08-16 DIAGNOSIS — R32 URINARY INCONTINENCE, UNSPECIFIED TYPE: ICD-10-CM

## 2023-08-16 DIAGNOSIS — N39.0 URINARY TRACT INFECTION WITHOUT HEMATURIA, SITE UNSPECIFIED: ICD-10-CM

## 2023-08-16 RX ORDER — NITROFURANTOIN 25; 75 MG/1; MG/1
100 CAPSULE ORAL DAILY
Qty: 30 CAPSULE | Refills: 1 | Status: SHIPPED | OUTPATIENT
Start: 2023-08-16

## 2023-08-17 DIAGNOSIS — Z98.84 BARIATRIC SURGERY STATUS: ICD-10-CM

## 2023-08-17 DIAGNOSIS — E16.2 HYPOGLYCEMIA: ICD-10-CM

## 2023-08-17 DIAGNOSIS — M17.11 PRIMARY OSTEOARTHRITIS OF RIGHT KNEE: Primary | ICD-10-CM

## 2023-08-17 RX ORDER — NEOMYCN/BACITRC/POLYMYX/PRAMOX 3.5-10K-1
OINTMENT (GRAM) TOPICAL
Qty: 90 CAPSULE | Refills: 2 | Status: SHIPPED | OUTPATIENT
Start: 2023-08-17

## 2023-08-21 DIAGNOSIS — M25.561 RIGHT KNEE PAIN, UNSPECIFIED CHRONICITY: Primary | ICD-10-CM

## 2023-08-27 DIAGNOSIS — R12 FUNCTIONAL HEARTBURN: ICD-10-CM

## 2023-08-27 DIAGNOSIS — K21.9 GASTROESOPHAGEAL REFLUX DISEASE WITHOUT ESOPHAGITIS: ICD-10-CM

## 2023-08-28 ENCOUNTER — TELEPHONE (OUTPATIENT)
Dept: FAMILY MEDICINE CLINIC | Facility: CLINIC | Age: 73
End: 2023-08-28

## 2023-08-28 RX ORDER — NORTRIPTYLINE HYDROCHLORIDE 25 MG/1
CAPSULE ORAL
Qty: 90 CAPSULE | Refills: 1 | Status: SHIPPED | OUTPATIENT
Start: 2023-08-28

## 2023-08-28 NOTE — TELEPHONE ENCOUNTER
Pt called stating she has a lump on her left forearm and is concerned. Pt says it has been growing larger since. Only has pain when putting pressure on lump. Pt also says she has been coughing up phlegm with blood. Pt would like to be seen asap. Please advise.

## 2023-08-29 DIAGNOSIS — K21.9 GASTROESOPHAGEAL REFLUX DISEASE WITHOUT ESOPHAGITIS: ICD-10-CM

## 2023-08-30 RX ORDER — FAMOTIDINE 20 MG/1
20 TABLET, FILM COATED ORAL 2 TIMES DAILY
Qty: 60 TABLET | Refills: 0 | Status: SHIPPED | OUTPATIENT
Start: 2023-08-30 | End: 2023-08-31

## 2023-08-31 ENCOUNTER — OFFICE VISIT (OUTPATIENT)
Dept: FAMILY MEDICINE CLINIC | Facility: CLINIC | Age: 73
End: 2023-08-31
Payer: COMMERCIAL

## 2023-08-31 VITALS
OXYGEN SATURATION: 97 % | BODY MASS INDEX: 30.66 KG/M2 | RESPIRATION RATE: 14 BRPM | DIASTOLIC BLOOD PRESSURE: 64 MMHG | TEMPERATURE: 98.1 F | HEART RATE: 70 BPM | HEIGHT: 65 IN | SYSTOLIC BLOOD PRESSURE: 124 MMHG | WEIGHT: 184 LBS

## 2023-08-31 DIAGNOSIS — J45.901 ACUTE EXACERBATION OF COPD WITH ASTHMA (HCC): ICD-10-CM

## 2023-08-31 DIAGNOSIS — K21.9 GASTROESOPHAGEAL REFLUX DISEASE WITHOUT ESOPHAGITIS: ICD-10-CM

## 2023-08-31 DIAGNOSIS — K21.9 GASTROESOPHAGEAL REFLUX DISEASE WITHOUT ESOPHAGITIS: Primary | ICD-10-CM

## 2023-08-31 DIAGNOSIS — Z98.84 BARIATRIC SURGERY STATUS: ICD-10-CM

## 2023-08-31 DIAGNOSIS — E11.69 TYPE 2 DIABETES MELLITUS WITH HYPERLIPIDEMIA (HCC): ICD-10-CM

## 2023-08-31 DIAGNOSIS — J43.9 PULMONARY EMPHYSEMA, UNSPECIFIED EMPHYSEMA TYPE (HCC): ICD-10-CM

## 2023-08-31 DIAGNOSIS — E03.8 HYPOTHYROIDISM DUE TO HASHIMOTO'S THYROIDITIS: ICD-10-CM

## 2023-08-31 DIAGNOSIS — J44.1 ACUTE EXACERBATION OF COPD WITH ASTHMA (HCC): ICD-10-CM

## 2023-08-31 DIAGNOSIS — E53.8 LOW VITAMIN B12 LEVEL: ICD-10-CM

## 2023-08-31 DIAGNOSIS — E06.3 HYPOTHYROIDISM DUE TO HASHIMOTO'S THYROIDITIS: ICD-10-CM

## 2023-08-31 DIAGNOSIS — F17.210 CIGARETTE SMOKER: ICD-10-CM

## 2023-08-31 DIAGNOSIS — E78.5 TYPE 2 DIABETES MELLITUS WITH HYPERLIPIDEMIA (HCC): ICD-10-CM

## 2023-08-31 DIAGNOSIS — J30.9 ALLERGIC RHINITIS, UNSPECIFIED SEASONALITY, UNSPECIFIED TRIGGER: ICD-10-CM

## 2023-08-31 DIAGNOSIS — E11.42 DIABETIC PERIPHERAL NEUROPATHY (HCC): ICD-10-CM

## 2023-08-31 DIAGNOSIS — M25.522 LEFT ELBOW PAIN: ICD-10-CM

## 2023-08-31 DIAGNOSIS — M06.9 RHEUMATOID ARTHRITIS OF HAND, UNSPECIFIED LATERALITY, UNSPECIFIED WHETHER RHEUMATOID FACTOR PRESENT (HCC): ICD-10-CM

## 2023-08-31 PROBLEM — F11.20 UNCOMPLICATED OPIOID DEPENDENCE (HCC): Status: RESOLVED | Noted: 2019-07-01 | Resolved: 2023-08-31

## 2023-08-31 LAB — SL AMB POCT HEMOGLOBIN AIC: 5.3 (ref ?–6.5)

## 2023-08-31 PROCEDURE — 99214 OFFICE O/P EST MOD 30 MIN: CPT | Performed by: INTERNAL MEDICINE

## 2023-08-31 PROCEDURE — 83036 HEMOGLOBIN GLYCOSYLATED A1C: CPT | Performed by: INTERNAL MEDICINE

## 2023-08-31 RX ORDER — CETIRIZINE HYDROCHLORIDE 10 MG/1
5 TABLET ORAL DAILY
Qty: 45 TABLET | Refills: 2 | Status: SHIPPED | OUTPATIENT
Start: 2023-08-31

## 2023-08-31 RX ORDER — IPRATROPIUM BROMIDE AND ALBUTEROL SULFATE 2.5; .5 MG/3ML; MG/3ML
3 SOLUTION RESPIRATORY (INHALATION) EVERY 8 HOURS PRN
Qty: 270 ML | Refills: 3 | Status: SHIPPED | OUTPATIENT
Start: 2023-08-31

## 2023-08-31 RX ORDER — BENZONATATE 100 MG/1
100 CAPSULE ORAL 3 TIMES DAILY PRN
Qty: 30 CAPSULE | Refills: 1 | Status: SHIPPED | OUTPATIENT
Start: 2023-08-31

## 2023-08-31 RX ORDER — IPRATROPIUM BROMIDE AND ALBUTEROL SULFATE 2.5; .5 MG/3ML; MG/3ML
3 SOLUTION RESPIRATORY (INHALATION) ONCE
Status: COMPLETED | OUTPATIENT
Start: 2023-08-31 | End: 2023-08-31

## 2023-08-31 RX ORDER — METHYLPREDNISOLONE SODIUM SUCCINATE 125 MG/2ML
125 INJECTION, POWDER, LYOPHILIZED, FOR SOLUTION INTRAMUSCULAR; INTRAVENOUS ONCE
Status: COMPLETED | OUTPATIENT
Start: 2023-08-31 | End: 2023-08-31

## 2023-08-31 RX ORDER — LANOLIN ALCOHOL/MO/W.PET/CERES
1000 CREAM (GRAM) TOPICAL DAILY
Qty: 90 TABLET | Refills: 3 | Status: SHIPPED | OUTPATIENT
Start: 2023-08-31

## 2023-08-31 RX ORDER — FAMOTIDINE 20 MG/1
TABLET, FILM COATED ORAL
Refills: 0 | OUTPATIENT
Start: 2023-08-31

## 2023-08-31 RX ORDER — LEVOFLOXACIN 500 MG/1
500 TABLET, FILM COATED ORAL EVERY 24 HOURS
Qty: 10 TABLET | Refills: 0 | Status: SHIPPED | OUTPATIENT
Start: 2023-08-31 | End: 2023-09-10

## 2023-08-31 RX ADMIN — METHYLPREDNISOLONE SODIUM SUCCINATE 125 MG: 125 INJECTION, POWDER, LYOPHILIZED, FOR SOLUTION INTRAMUSCULAR; INTRAVENOUS at 13:46

## 2023-08-31 RX ADMIN — IPRATROPIUM BROMIDE AND ALBUTEROL SULFATE 3 ML: 2.5; .5 SOLUTION RESPIRATORY (INHALATION) at 13:47

## 2023-08-31 NOTE — PROGRESS NOTES
Name: Rachel Cornell      : 1950      MRN: [id number]  Encounter Provider: Janae Duncan MD  Encounter Date: 2023   Encounter department: [address] [place] [address]     1. Gastroesophageal reflux disease without esophagitis  -     cimetidine (TAGAMET) 200 mg tablet; Take 1 tablet (200 mg total) by mouth 2 (two) times a day    2. Low vitamin B12 level  -     vitamin B-12 (VITAMIN B-12) 1,000 mcg tablet; Take 1 tablet (1,000 mcg total) by mouth daily    3. Pulmonary emphysema, unspecified emphysema type (HCC)  -     benzonatate (TESSALON PERLES) 100 mg capsule; Take 1 capsule (100 mg total) by mouth 3 (three) times a day as needed for cough  -     ipratropium-albuterol (DUO-NEB) 0.5-2.5 mg/3 mL nebulizer solution; Take 3 mL by nebulization every 8 (eight) hours as needed for wheezing or shortness of breath    4. Cigarette smoker  -     benzonatate (TESSALON PERLES) 100 mg capsule; Take 1 capsule (100 mg total) by mouth 3 (three) times a day as needed for cough    5. Allergic rhinitis, unspecified seasonality, unspecified trigger  -     cetirizine (ZyrTEC) 10 mg tablet; Take 0.5 tablets (5 mg total) by mouth daily    6. Hypothyroidism due to Hashimoto's thyroiditis  -     TSH, 3rd generation with Free T4 reflex; Future; Expected date: 2023    7. Bariatric surgery status    8. Type 2 diabetes mellitus with hyperlipidemia (HCC)  -     POCT hemoglobin A1c  -     Comprehensive metabolic panel; Future; Expected date: 2023  -     CBC and differential; Future; Expected date: 2023  -     Lipid Panel with Direct LDL reflex; Future; Expected date: 2023  -     TSH, 3rd generation with Free T4 reflex; Future; Expected date: 2023  -     Microalbumin, Random Urine (W/Creatinine) ([facility] ONLY); Future; Expected date: 2023    9.  Acute exacerbation of COPD with asthma ([address])  -     methylPREDNISolone sodium succinate (Solu-MEDROL) injection 125 mg  -     levofloxacin (LEVAQUIN) 500 mg tablet; Take 1 tablet (500 mg total) by mouth every 24 hours for 10 days  -     ipratropium-albuterol (DUO-NEB) 0.5-2.5 mg/3 mL inhalation solution 3 mL  -     ipratropium-albuterol (DUO-NEB) 0.5-2.5 mg/3 mL nebulizer solution; Take 3 mL by nebulization every 8 (eight) hours as needed for wheezing or shortness of breath    10. Left elbow pain  -     Diclofenac Sodium (VOLTAREN) 1 %; Apply 2 g topically 4 (four) times a day  -     XR elbow 3+ vw left; Future; Expected date: 08/31/2023    11. Diabetic peripheral neuropathy ([address])    12. Rheumatoid arthritis of hand, unspecified laterality, unspecified whether rheumatoid factor present ([address])     bed rest  Increase Po fluids  Life style mod  Stop smoking  RTC in 3 mos w Blood work       Subjective      [address] [facility] is here for Regular Check up, she has few symptoms and still smokes, med list reviewed,... Review of Systems   Constitutional: Negative for chills, fatigue and fever. HENT: Positive for congestion, postnasal drip and sinus pressure. Negative for facial swelling, sore throat, trouble swallowing and voice change. Eyes: Negative for pain, discharge and visual disturbance. Respiratory: Positive for cough and wheezing. Negative for shortness of breath. Cardiovascular: Negative for chest pain, palpitations and leg swelling. Gastrointestinal: Negative for abdominal pain, blood in stool, constipation, diarrhea and nausea. Endocrine: Negative for polydipsia, polyphagia and polyuria. Genitourinary: Negative for difficulty urinating, hematuria and urgency. Musculoskeletal: Positive for arthralgias. Negative for myalgias. Skin: Negative for rash. Neurological: Negative for dizziness, tremors, weakness and headaches. Hematological: Negative for adenopathy. Does not bruise/bleed easily. Psychiatric/Behavioral: Negative for dysphoric mood, sleep disturbance and suicidal ideas.        Current Outpatient Medications on File Prior to Visit   Medication Sig   • albuterol (PROVENTIL HFA,VENTOLIN HFA) 90 mcg/act inhaler Inhale 2 puffs every 4 (four) hours as needed for wheezing   • amLODIPine (NORVASC) 5 mg tablet TAKE 1 TABLET (5 MG TOTAL) BY MOUTH DAILY. • baclofen 10 mg tablet TAKE 1 TABLET BY MOUTH THREE TIMES A DAY   • Calcium Carb-Cholecalciferol (Caltrate 600+D3) 600-20 MG-MCG TABS Take 1 tablet by mouth 2 (two) times a day with meals   • [facility] Vitamin A 2400 MCG (8000 UT) capsule TAKE 1 CAPSULE (8,000 UNITS TOTAL) BY MOUTH DAILY   • diphenhydrAMINE-PSE-APAP (BENADRYL ALLERGY/COLD PO) Take by mouth daily at bedtime   • ergocalciferol (VITAMIN D2) 50,000 units Take 1 capsule (50,000 Units total) by mouth once a week   • fluticasone-umeclidinium-vilanterol (Trelegy Ellipta) 100-62.5-25 mcg/actuation inhaler Inhale 1 puff daily Rinse mouth after use. • levothyroxine 25 mcg tablet TAKE 1 TABLET (25 MCG TOTAL) BY MOUTH EVERY OTHER DAY   • levothyroxine 50 mcg tablet TAKE 1 TABLET (50 MCG TOTAL) BY MOUTH EVERY OTHER DAY   • losartan (COZAAR) 25 mg tablet TAKE 1 TABLET (25 MG TOTAL) BY MOUTH DAILY. • naloxone (NARCAN) 4 mg/0.1 mL nasal spray Administer 1 spray into a nostril. If no response after 2-3 minutes, give another dose in the other nostril using a new spray.    • nitrofurantoin (MACROBID) 100 mg capsule TAKE 1 CAPSULE (100 MG TOTAL) BY MOUTH DAILY WITH FOOD/LUNCH   • nortriptyline (PAMELOR) 25 mg capsule TAKE 1 CAPSULE BY MOUTH EVERYDAY AT BEDTIME   • pantoprazole (PROTONIX) 40 mg tablet TAKE 1 TABLET BY MOUTH TWICE A DAY   • Pediatric Multiple Vit-C-FA (Childrens Chew Multivitamin) CHEW CHEW AND SWALLOW 1 TABLET BY MOUTH EVERY DAY   • pregabalin (LYRICA) 200 MG capsule TAKE 1 CAPSULE BY MOUTH THREE TIMES A DAY   • sucralfate (CARAFATE) 1 g tablet TAKE 1 TABLET BY MOUTH 4 TIMES EVERY DAY ON AN EMPTY STOMACH 1 HOUR BEFORE MEALS AND AT BEDTIME   • traZODone (DESYREL) 50 mg tablet TAKE 1 TABLET BY MOUTH EVERY DAY AT BEDTIME AS NEEDED   • [DISCONTINUED] albuterol (PROVENTIL HFA,VENTOLIN HFA) 90 mcg/act inhaler    • [DISCONTINUED] cetirizine (ZyrTEC) 10 mg tablet TAKE 1/2 TABLET BY MOUTH EVERY DAY   • [DISCONTINUED] famotidine (PEPCID) 20 mg tablet Take 1 tablet (20 mg total) by mouth 2 (two) times a day   • denosumab (PROLIA) 60 mg/mL Inject 1 mL (60 mg total) under the skin once for 1 dose   • [DISCONTINUED] benzonatate (TESSALON PERLES) 100 mg capsule Take 1 capsule (100 mg total) by mouth 3 (three) times a day as needed for cough (Patient not taking: Reported on 7/7/2023)   • [DISCONTINUED] glucose blood (OneTouch Verio) test strip Use 1 each 2 (two) times a day Use to test (Patient not taking: Reported on 5/24/2023)   • [DISCONTINUED] HYDROcodone-acetaminophen (NORCO)  mg per tablet 1 tab every 4-6 hrs prn pain, for ongoing therapy (Patient not taking: Reported on 8/31/2023)   • [DISCONTINUED] HYDROcodone-acetaminophen (NORCO)  mg per tablet Take 1 tab q 4-6 hrs prn pain, for ongoing therapy (Patient not taking: Reported on 8/31/2023)   • [DISCONTINUED] Lancets Misc. (ACCU-CHEK FASTCLIX LANCET) KIT 3 (three) times a day (Patient not taking: Reported on 5/24/2023)   • [DISCONTINUED] MICROLET LANCETS MISC  (Patient not taking: Reported on 5/24/2023)   • [DISCONTINUED] prednisoLONE acetate (PRED FORTE) 1 % ophthalmic suspension INSTILL 1 DROP INTO AFFECTED EYE 4 TIMES A DAY AS DIRECTED (Patient not taking: Reported on 5/24/2023)   • [DISCONTINUED] vitamin B-12 (VITAMIN B-12) 1,000 mcg tablet Take 1 tablet (1,000 mcg total) by mouth daily (Patient not taking: Reported on 5/24/2023)       Objective     /64 (BP Location: Left arm, Patient Position: Sitting, Cuff Size: Standard)   Pulse 70   Temp 98.1 °F (36.7 °C) (Tympanic)   Resp 14   Ht 5' 5" (1.651 m)   Wt 83.5 kg (184 lb)   LMP  (LMP Unknown)   SpO2 97%   BMI 30.62 kg/m²     Physical Exam  Constitutional:       General: She is not in acute distress. HENT:      Head: Normocephalic. Mouth/Throat:      Pharynx: No oropharyngeal exudate. Eyes:      General: No scleral icterus. Conjunctiva/sclera: Conjunctivae normal.      Pupils: Pupils are equal, round, and reactive to light. Neck:      Thyroid: No thyromegaly. Cardiovascular:      Rate and Rhythm: Normal rate and regular rhythm. Heart sounds: Normal heart sounds. No murmur heard. Pulmonary:      Effort: Pulmonary effort is normal. No respiratory distress. Breath sounds: Wheezing present. No rales. Abdominal:      General: Bowel sounds are normal. There is no distension. Palpations: Abdomen is soft. Tenderness: There is no abdominal tenderness. There is no guarding or rebound. Musculoskeletal:         General: Tenderness present. Cervical back: Neck supple. Lymphadenopathy:      Cervical: No cervical adenopathy. Skin:     Coloration: Skin is not pale. Findings: No rash. Neurological:      Mental Status: She is alert and oriented to person, place, and time. Sensory: Sensory deficit present. Motor: Weakness present.       Comments: Pt using cane to support gait       Rasta Lucio MD

## 2023-08-31 NOTE — PROGRESS NOTES
BMI Counseling: Body mass index is 30.62 kg/m². The BMI is above normal. Nutrition recommendations include reducing portion sizes.

## 2023-09-01 LAB
DME PARACHUTE DELIVERY DATE REQUESTED: NORMAL
DME PARACHUTE ITEM DESCRIPTION: NORMAL
DME PARACHUTE ITEM DESCRIPTION: NORMAL
DME PARACHUTE ORDER STATUS: NORMAL
DME PARACHUTE SUPPLIER NAME: NORMAL
DME PARACHUTE SUPPLIER PHONE: NORMAL

## 2023-09-01 RX ORDER — FAMOTIDINE 20 MG/1
TABLET, FILM COATED ORAL
Refills: 0 | OUTPATIENT
Start: 2023-09-01

## 2023-09-05 DIAGNOSIS — M25.522 LEFT ELBOW PAIN: ICD-10-CM

## 2023-09-06 ENCOUNTER — OFFICE VISIT (OUTPATIENT)
Dept: PAIN MEDICINE | Facility: MEDICAL CENTER | Age: 73
End: 2023-09-06
Payer: COMMERCIAL

## 2023-09-06 VITALS
BODY MASS INDEX: 29.99 KG/M2 | OXYGEN SATURATION: 96 % | WEIGHT: 180 LBS | DIASTOLIC BLOOD PRESSURE: 65 MMHG | HEIGHT: 65 IN | SYSTOLIC BLOOD PRESSURE: 117 MMHG | HEART RATE: 76 BPM

## 2023-09-06 DIAGNOSIS — M54.42 CHRONIC BILATERAL LOW BACK PAIN WITH BILATERAL SCIATICA: ICD-10-CM

## 2023-09-06 DIAGNOSIS — G89.4 CHRONIC PAIN SYNDROME: Primary | ICD-10-CM

## 2023-09-06 DIAGNOSIS — M47.812 SPONDYLOSIS OF CERVICAL REGION WITHOUT MYELOPATHY OR RADICULOPATHY: ICD-10-CM

## 2023-09-06 DIAGNOSIS — E11.42 DIABETIC PERIPHERAL NEUROPATHY (HCC): ICD-10-CM

## 2023-09-06 DIAGNOSIS — F11.20 OPIOID DEPENDENCE, UNCOMPLICATED (HCC): ICD-10-CM

## 2023-09-06 DIAGNOSIS — G89.29 CHRONIC BILATERAL LOW BACK PAIN WITH BILATERAL SCIATICA: ICD-10-CM

## 2023-09-06 DIAGNOSIS — M79.18 MYOFASCIAL PAIN SYNDROME: ICD-10-CM

## 2023-09-06 DIAGNOSIS — M47.816 LUMBAR SPONDYLOSIS: ICD-10-CM

## 2023-09-06 DIAGNOSIS — Z79.891 LONG-TERM CURRENT USE OF OPIATE ANALGESIC: ICD-10-CM

## 2023-09-06 DIAGNOSIS — M54.41 CHRONIC BILATERAL LOW BACK PAIN WITH BILATERAL SCIATICA: ICD-10-CM

## 2023-09-06 PROCEDURE — 99214 OFFICE O/P EST MOD 30 MIN: CPT | Performed by: PHYSICIAN ASSISTANT

## 2023-09-06 RX ORDER — HYDROCODONE BITARTRATE AND ACETAMINOPHEN 10; 325 MG/1; MG/1
TABLET ORAL
Qty: 130 TABLET | Refills: 0 | Status: SHIPPED | OUTPATIENT
Start: 2023-09-06 | End: 2023-09-15

## 2023-09-06 RX ORDER — PREGABALIN 200 MG/1
200 CAPSULE ORAL 3 TIMES DAILY
Qty: 90 CAPSULE | Refills: 1 | Status: SHIPPED | OUTPATIENT
Start: 2023-09-06

## 2023-09-06 RX ORDER — HYDROCODONE BITARTRATE AND ACETAMINOPHEN 10; 325 MG/1; MG/1
TABLET ORAL
Qty: 130 TABLET | Refills: 0 | Status: SHIPPED | OUTPATIENT
Start: 2023-09-06

## 2023-09-06 RX ORDER — HYDROCODONE BITARTRATE AND ACETAMINOPHEN 10; 325 MG/1; MG/1
1 TABLET ORAL EVERY 6 HOURS PRN
COMMUNITY
End: 2023-09-06 | Stop reason: SDUPTHER

## 2023-09-06 NOTE — PROGRESS NOTES
Assessment:  1. Chronic pain syndrome    2. Lumbar spondylosis    3. Chronic bilateral low back pain with bilateral sciatica    4. Diabetic peripheral neuropathy (HCC)    5. Spondylosis of cervical region without myelopathy or radiculopathy    6. Myofascial pain syndrome    7. Opioid dependence, uncomplicated ([address])    8. Long-term current use of opiate analgesic        Plan:  While the patient was in the office today, I did have a thorough conversation regarding their chronic pain syndrome, medication management, and treatment plan options. The patient remains clinically stable and moderately controlled on the current medication regimen. On today's visit I have electronically sent her opioid prescriptions to her pharmacy with fill dates of today 9/6/2023 and a do not fill date of 10/4/2023. I have also sent refills for her pregabalin as well as her baclofen. She will follow-up in 2 months or sooner if needed if the pain changes or worsens. There are risks associated with opioid medications, including dependence, addiction and tolerance. The patient understands and agrees to use these medications only as prescribed. Potential side effects of the medications include, but are not limited to, constipation, drowsiness, addiction, impaired judgment and risk of fatal overdose if not taken as prescribed. The patient was warned against driving while taking sedation medications. Sharing medications is a felony. At this point in time, the patient is showing no signs of addiction, abuse, diversion or suicidal ideation. [place] Prescription Drug Monitoring Program report was reviewed and was appropriate     My impressions and treatment recommendations were discussed in detail with the patient who verbalized understanding and had no further questions. Discharge instructions were provided. I personally saw and examined the patient and I agree with the above discussed plan of care.     No orders of the defined types were placed in this encounter. New Medications Ordered This Visit   Medications   • HYDROcodone-acetaminophen (NORCO)  mg per tablet     Sig: Take 1 tab q4-6 hrs as needed for pain, For ongoing therapy     Dispense:  130 tablet     Refill:  0   • HYDROcodone-acetaminophen (NORCO)  mg per tablet     Sig: Take 1 tab q4-6 hrs prn For ongoing therapy, DO NOT FILL BEFORE: 10/04/23     Dispense:  130 tablet     Refill:  0   • pregabalin (LYRICA) 200 MG capsule     Sig: Take 1 capsule (200 mg total) by mouth 3 (three) times a day     Dispense:  90 capsule     Refill:  1     . History of Present Illness:  Matt Royal is a 68 y.o. female who presents for a follow up office visit in regards to chronic pain. The patient’s current symptoms include chronic multisite pain including neck pain, low back pain and polyarthralgias. She rates her current pain score an 8 out of 10 on the scale describes it as a constant burning, dull, aching, sharp, throbbing, cramping and shooting type of pain. She has numbness and paresthesias throughout the upper and lower extremities. Overall the patient feels that she is moderately controlled on the current medication regimen without any side effects or issues. Opioid contract date 03/09/23    Last UDS Date 07/07/23    Norco last taken on 09/06 AM        I have personally reviewed and/or updated the patient's past medical history, past surgical history, family history, social history, current medications, allergies, and vital signs today. Review of Systems   Respiratory: Negative for shortness of breath. Cardiovascular: Negative for chest pain. Gastrointestinal: Positive for nausea. Negative for constipation, diarrhea and vomiting. Musculoskeletal: Positive for arthralgias, back pain, gait problem, joint swelling, myalgias and neck pain. Skin: Negative for rash. Neurological: Positive for headaches.  Negative for dizziness, seizures and weakness. Psychiatric/Behavioral: Positive for decreased concentration. All other systems reviewed and are negative.       Patient Active Problem List   Diagnosis   • Chronic pain syndrome   • Cervical radiculopathy   • Myofascial pain syndrome   • Spondylosis of cervical region without myelopathy or radiculopathy   • Fibromyalgia   • Diabetic peripheral neuropathy (HCC)   • Long-term current use of opiate analgesic   • MGUS (monoclonal gammopathy of unknown significance)   • Iron deficiency anemia following bariatric surgery   • Light headedness   • Rheumatoid arthritis of hand ([address])   • Pulmonary emphysema (HCC)   • Primary osteoarthritis of right knee   • Pseudogout of left knee   • Lumbar radiculitis   • Chronic bilateral low back pain with bilateral sciatica   • Rotator cuff syndrome, left   • Left shoulder pain   • Dizziness   • Other fatigue   • Biceps tendinitis of left shoulder   • Adhesive capsulitis of left shoulder   • Hypoglycemia   • Hypothyroidism due to Hashimoto's thyroiditis   • Type 2 diabetes mellitus with hyperlipidemia (HCC)   • Iron deficiency anemia, unspecified       Past Medical History:   Diagnosis Date   • Anemia    • Anxiety     hx of panic attacks (now under control)    • Arthritis    • Asthma     resolved (no problems in a decade)    • Baker's cyst of knee    • Bronchitis    • Bunion, left foot    • Cervical pain    • Chronic GERD    • Chronic pain    • Chronic pain disorder    • Depression    • Diabetes mellitus, type 2 ([address])    • Diabetic peripheral neuropathy (HCC)    • Endometriosis    • Fibromyalgia    • Hyperlipidemia    • Hypertension    • Joint pain    • Low back pain    • Low back pain    • Lung nodules    • Macular degeneration    • Pulmonary emphysema ([address]) 01/16/2020   • Spondylosis    • Uterine cancer ([address])        Past Surgical History:   Procedure Laterality Date   • BACK SURGERY  1996    L5, S1- laser surgery fusion of c5 and c6    • BREAST CYST EXCISION Right    • CHOLECYSTECTOMY  2004   • FOOT SURGERY Left 2005    fusion    • FRACTURE SURGERY     • GASTRIC BYPASS  2010    Dr. Antonio Mcclure    • [address] [facility] [address]    just uterus    • KNEE ARTHROSCOPY     • LAMINECTOMY     • ORTHOPEDIC SURGERY     • OVARIAN CYST REMOVAL  1975   • PELVIC LAPAROSCOPY      ovaries (x10)    • PLEURAL SCARIFICATION  1967   • SHOULDER OPEN ROTATOR CUFF REPAIR Left 2008   • TONSILLECTOMY     • VAGINAL PROLAPSE REPAIR         Family History   Problem Relation Age of Onset   • Hypertension Mother    • Lung cancer Mother    • Hypertension Father    • Heart disease Father    • Heart attack Father    • Cancer Father    • No Known Problems Sister    • No Known Problems Daughter    • No Known Problems Maternal Grandmother    • No Known Problems Maternal Grandfather    • No Known Problems Paternal Grandmother    • No Known Problems Paternal Grandfather    • Breast cancer Paternal Aunt 44   • Breast cancer Paternal Aunt 40   • Breast cancer Maternal Aunt 50       Social History     Occupational History   • Not on file   Tobacco Use   • Smoking status: Every Day     Packs/day: 0.25     Years: 40.00     Total pack years: 10.00     Types: Cigarettes   • Smokeless tobacco: Never   Vaping Use   • Vaping Use: Never used   Substance and Sexual Activity   • Alcohol use: Not Currently     Alcohol/week: 1.0 standard drink of alcohol     Types: 1 Shots of liquor per week     Comment: rarely   • Drug use: No   • Sexual activity: Not Currently     Partners: Male       Current Outpatient Medications on File Prior to Visit   Medication Sig   • albuterol (PROVENTIL HFA,VENTOLIN HFA) 90 mcg/act inhaler Inhale 2 puffs every 4 (four) hours as needed for wheezing   • amLODIPine (NORVASC) 5 mg tablet TAKE 1 TABLET (5 MG TOTAL) BY MOUTH DAILY.    • baclofen 10 mg tablet TAKE 1 TABLET BY MOUTH THREE TIMES A DAY   • benzonatate (TESSALON PERLES) 100 mg capsule Take 1 capsule (100 mg total) by mouth 3 (three) times a day as needed for cough • Calcium Carb-Cholecalciferol (Caltrate 600+D3) 600-20 MG-MCG TABS Take 1 tablet by mouth 2 (two) times a day with meals   • cetirizine (ZyrTEC) 10 mg tablet Take 0.5 tablets (5 mg total) by mouth daily   • cimetidine (TAGAMET) 200 mg tablet Take 1 tablet (200 mg total) by mouth 2 (two) times a day   • [facility] Vitamin A 2400 MCG (8000 UT) capsule TAKE 1 CAPSULE (8,000 UNITS TOTAL) BY MOUTH DAILY   • Diclofenac Sodium (VOLTAREN) 1 % APPLY 2 GRAMS TO AFFECTED AREA 4 TIMES A DAY   • diphenhydrAMINE-PSE-APAP (BENADRYL ALLERGY/COLD PO) Take by mouth daily at bedtime   • ergocalciferol (VITAMIN D2) 50,000 units Take 1 capsule (50,000 Units total) by mouth once a week   • fluticasone-umeclidinium-vilanterol (Trelegy Ellipta) 100-62.5-25 mcg/actuation inhaler Inhale 1 puff daily Rinse mouth after use. • ipratropium-albuterol (DUO-NEB) 0.5-2.5 mg/3 mL nebulizer solution Take 3 mL by nebulization every 8 (eight) hours as needed for wheezing or shortness of breath   • levofloxacin (LEVAQUIN) 500 mg tablet Take 1 tablet (500 mg total) by mouth every 24 hours for 10 days   • levothyroxine 25 mcg tablet TAKE 1 TABLET (25 MCG TOTAL) BY MOUTH EVERY OTHER DAY   • levothyroxine 50 mcg tablet TAKE 1 TABLET (50 MCG TOTAL) BY MOUTH EVERY OTHER DAY   • losartan (COZAAR) 25 mg tablet TAKE 1 TABLET (25 MG TOTAL) BY MOUTH DAILY.    • nitrofurantoin (MACROBID) 100 mg capsule TAKE 1 CAPSULE (100 MG TOTAL) BY MOUTH DAILY WITH FOOD/LUNCH   • nortriptyline (PAMELOR) 25 mg capsule TAKE 1 CAPSULE BY MOUTH EVERYDAY AT BEDTIME   • pantoprazole (PROTONIX) 40 mg tablet TAKE 1 TABLET BY MOUTH TWICE A DAY   • Pediatric Multiple Vit-C-FA (Childrens Chew Multivitamin) CHEW CHEW AND SWALLOW 1 TABLET BY MOUTH EVERY DAY   • sucralfate (CARAFATE) 1 g tablet TAKE 1 TABLET BY MOUTH 4 TIMES EVERY DAY ON AN EMPTY STOMACH 1 HOUR BEFORE MEALS AND AT BEDTIME   • traZODone (DESYREL) 50 mg tablet TAKE 1 TABLET BY MOUTH EVERY DAY AT BEDTIME AS NEEDED   • vitamin B-12 (VITAMIN B-12) 1,000 mcg tablet Take 1 tablet (1,000 mcg total) by mouth daily   • [DISCONTINUED] HYDROcodone-acetaminophen (NORCO)  mg per tablet Take 1 tablet by mouth every 6 (six) hours as needed for moderate pain   • [DISCONTINUED] pregabalin (LYRICA) 200 MG capsule TAKE 1 CAPSULE BY MOUTH THREE TIMES A DAY   • denosumab (PROLIA) 60 mg/mL Inject 1 mL (60 mg total) under the skin once for 1 dose   • naloxone (NARCAN) 4 mg/0.1 mL nasal spray Administer 1 spray into a nostril. If no response after 2-3 minutes, give another dose in the other nostril using a new spray. (Patient not taking: Reported on 9/6/2023)     Current Facility-Administered Medications on File Prior to Visit   Medication   • cyanocobalamin injection 1,000 mcg   • cyanocobalamin injection 1,000 mcg   • cyanocobalamin injection 1,000 mcg   • cyanocobalamin injection 1,000 mcg   • cyanocobalamin injection 1,000 mcg   • cyanocobalamin injection 1,000 mcg   • cyanocobalamin injection 1,000 mcg   • cyanocobalamin injection 1,000 mcg       Allergies   Allergen Reactions   • Cephalosporins Hives   • Erythromycin GI Intolerance   • Fentanyl Itching     Occurred during surgery    • Paxil [Paroxetine] Hives     After 2 weeks   • Penicillins Itching   • Pravastatin Myalgia   • Statins Itching   • Sulfa Antibiotics Diarrhea   • Wellbutrin [Bupropion] Hives     After 2 weeks    • Marzena's Wort Rash       Physical Exam:    /65   Pulse 76   Ht 5' 5" (1.651 m)   Wt 81.6 kg (180 lb)   LMP  (LMP Unknown)   SpO2 96%   BMI 29.95 kg/m²     Constitutional:normal, well developed, well nourished, alert, in no distress and non-toxic and no overt pain behavior.   Eyes:anicteric  HEENT:grossly intact  Neck:supple, symmetric, trachea midline and no masses   Pulmonary:even and unlabored  Cardiovascular:No edema or pitting edema present  Skin:Normal without rashes or lesions and well hydrated  Psychiatric:Mood and affect appropriate  Neurologic:Cranial Nerves II-XII grossly intact  Musculoskeletal: Gait is antalgic and slow but stable.     Imaging

## 2023-09-15 ENCOUNTER — TELEPHONE (OUTPATIENT)
Dept: FAMILY MEDICINE CLINIC | Facility: CLINIC | Age: 73
End: 2023-09-15

## 2023-09-15 DIAGNOSIS — R05.3 CHRONIC COUGH: ICD-10-CM

## 2023-09-15 RX ORDER — FAMOTIDINE 20 MG/1
20 TABLET, FILM COATED ORAL 2 TIMES DAILY
Qty: 180 TABLET | Refills: 1 | Status: SHIPPED | OUTPATIENT
Start: 2023-09-15

## 2023-09-15 RX ORDER — ALBUTEROL SULFATE 90 UG/1
2 AEROSOL, METERED RESPIRATORY (INHALATION) EVERY 6 HOURS PRN
Qty: 18 G | Refills: 3 | Status: SHIPPED | OUTPATIENT
Start: 2023-09-15

## 2023-09-15 NOTE — TELEPHONE ENCOUNTER
Patient called stating there is an interaction with Tagamet. She is asking if you can prescribe something else for her. Also, patient uses Trelegy, but is asking if you can prescribe a rescue inhaler. Please advise.

## 2023-09-22 ENCOUNTER — TELEPHONE (OUTPATIENT)
Dept: FAMILY MEDICINE CLINIC | Facility: CLINIC | Age: 73
End: 2023-09-22

## 2023-09-22 DIAGNOSIS — F17.210 CIGARETTE SMOKER: ICD-10-CM

## 2023-09-22 DIAGNOSIS — J43.9 PULMONARY EMPHYSEMA, UNSPECIFIED EMPHYSEMA TYPE (HCC): ICD-10-CM

## 2023-09-22 RX ORDER — DOXYCYCLINE HYCLATE 100 MG/1
100 CAPSULE ORAL EVERY 12 HOURS SCHEDULED
Qty: 14 CAPSULE | Refills: 0 | Status: SHIPPED | OUTPATIENT
Start: 2023-09-22 | End: 2023-09-29

## 2023-09-22 RX ORDER — BENZONATATE 100 MG/1
100 CAPSULE ORAL 3 TIMES DAILY PRN
Qty: 30 CAPSULE | Refills: 1 | Status: SHIPPED | OUTPATIENT
Start: 2023-09-22

## 2023-09-22 NOTE — TELEPHONE ENCOUNTER
Pt called stating that she was seen about 3 weeks ago and was given antibiotics. She reports that she finished the antibiotic and the medication helped but now the cough is back and still bringing up green mucus. Can you please send in another round of antibiotics for her.  Please advise - as

## 2023-09-25 ENCOUNTER — HOSPITAL ENCOUNTER (OUTPATIENT)
Dept: ULTRASOUND IMAGING | Facility: HOSPITAL | Age: 73
Discharge: HOME | End: 2023-09-25
Payer: COMMERCIAL

## 2023-09-25 ENCOUNTER — HOSPITAL ENCOUNTER (OUTPATIENT)
Dept: RADIOLOGY | Facility: HOSPITAL | Age: 73
Discharge: HOME | End: 2023-09-25
Payer: COMMERCIAL

## 2023-09-25 ENCOUNTER — HOSPITAL ENCOUNTER (OUTPATIENT)
Dept: CT IMAGING | Facility: HOSPITAL | Age: 73
Discharge: HOME | End: 2023-09-25
Payer: COMMERCIAL

## 2023-09-25 DIAGNOSIS — R91.8 LUNG NODULES: ICD-10-CM

## 2023-09-25 DIAGNOSIS — E03.8 HYPOTHYROIDISM DUE TO HASHIMOTO'S THYROIDITIS: ICD-10-CM

## 2023-09-25 DIAGNOSIS — E06.3 HYPOTHYROIDISM DUE TO HASHIMOTO'S THYROIDITIS: ICD-10-CM

## 2023-09-25 DIAGNOSIS — F17.210 CIGARETTE SMOKER: ICD-10-CM

## 2023-09-25 DIAGNOSIS — M25.522 LEFT ELBOW PAIN: ICD-10-CM

## 2023-09-25 DIAGNOSIS — Z98.84 BARIATRIC SURGERY STATUS: ICD-10-CM

## 2023-09-25 PROCEDURE — 76536 US EXAM OF HEAD AND NECK: CPT

## 2023-09-25 PROCEDURE — G1004 CDSM NDSC: HCPCS

## 2023-09-25 PROCEDURE — 71250 CT THORAX DX C-: CPT

## 2023-09-25 PROCEDURE — 73080 X-RAY EXAM OF ELBOW: CPT

## 2023-09-25 RX ORDER — PEDIATRIC MULTIVITAMIN NO.17
TABLET,CHEWABLE ORAL
Qty: 100 TABLET | Refills: 5 | Status: SHIPPED | OUTPATIENT
Start: 2023-09-25

## 2023-09-26 DIAGNOSIS — E04.2 MULTIPLE THYROID NODULES: Primary | ICD-10-CM

## 2023-09-27 ENCOUNTER — TELEPHONE (OUTPATIENT)
Dept: ENDOCRINOLOGY | Facility: CLINIC | Age: 73
End: 2023-09-27

## 2023-09-27 ENCOUNTER — OFFICE VISIT (OUTPATIENT)
Dept: GASTROENTEROLOGY | Facility: MEDICAL CENTER | Age: 73
End: 2023-09-27
Payer: COMMERCIAL

## 2023-09-27 VITALS
HEIGHT: 65 IN | DIASTOLIC BLOOD PRESSURE: 64 MMHG | BODY MASS INDEX: 29.97 KG/M2 | WEIGHT: 179.9 LBS | HEART RATE: 60 BPM | TEMPERATURE: 97.9 F | SYSTOLIC BLOOD PRESSURE: 140 MMHG

## 2023-09-27 DIAGNOSIS — R10.13 EPIGASTRIC PAIN: ICD-10-CM

## 2023-09-27 DIAGNOSIS — K21.9 GASTROESOPHAGEAL REFLUX DISEASE WITHOUT ESOPHAGITIS: Primary | ICD-10-CM

## 2023-09-27 PROCEDURE — 99214 OFFICE O/P EST MOD 30 MIN: CPT | Performed by: INTERNAL MEDICINE

## 2023-09-27 RX ORDER — SUCRALFATE ORAL 1 G/10ML
1 SUSPENSION ORAL 4 TIMES DAILY
Qty: 414 ML | Refills: 0 | Status: SHIPPED | OUTPATIENT
Start: 2023-09-27

## 2023-09-27 NOTE — TELEPHONE ENCOUNTER
Received referral for Justus Mcdonnell. Left voicemail for patient to contact office to schedule Consult/New Patient Appointment.

## 2023-10-02 DIAGNOSIS — F17.210 CIGARETTE SMOKER: ICD-10-CM

## 2023-10-02 DIAGNOSIS — R91.8 LUNG NODULES: ICD-10-CM

## 2023-10-02 DIAGNOSIS — E04.2 MULTIPLE THYROID NODULES: Primary | ICD-10-CM

## 2023-10-09 DIAGNOSIS — M79.602 LEFT ARM PAIN: Primary | ICD-10-CM

## 2023-10-09 DIAGNOSIS — M25.532 LEFT WRIST PAIN: ICD-10-CM

## 2023-10-09 DIAGNOSIS — R22.32 SKIN LUMP OF ARM, LEFT: ICD-10-CM

## 2023-10-10 ENCOUNTER — HOSPITAL ENCOUNTER (OUTPATIENT)
Dept: RADIOLOGY | Facility: HOSPITAL | Age: 73
Discharge: HOME | End: 2023-10-10
Payer: COMMERCIAL

## 2023-10-10 DIAGNOSIS — R22.32 SKIN LUMP OF ARM, LEFT: ICD-10-CM

## 2023-10-10 DIAGNOSIS — M79.602 LEFT ARM PAIN: Primary | ICD-10-CM

## 2023-10-10 DIAGNOSIS — M79.602 LEFT ARM PAIN: ICD-10-CM

## 2023-10-10 DIAGNOSIS — M25.532 LEFT WRIST PAIN: ICD-10-CM

## 2023-10-10 DIAGNOSIS — M25.561 RIGHT KNEE PAIN, UNSPECIFIED CHRONICITY: ICD-10-CM

## 2023-10-10 PROCEDURE — 73090 X-RAY EXAM OF FOREARM: CPT

## 2023-10-12 DIAGNOSIS — K21.9 GASTROESOPHAGEAL REFLUX DISEASE WITHOUT ESOPHAGITIS: ICD-10-CM

## 2023-10-12 RX ORDER — SUCRALFATE ORAL 1 G/10ML
SUSPENSION ORAL
Qty: 1260 ML | Refills: 0 | Status: SHIPPED | OUTPATIENT
Start: 2023-10-12

## 2023-10-24 DIAGNOSIS — J43.9 PULMONARY EMPHYSEMA, UNSPECIFIED EMPHYSEMA TYPE (HCC): ICD-10-CM

## 2023-10-24 RX ORDER — FLUTICASONE FUROATE, UMECLIDINIUM BROMIDE AND VILANTEROL TRIFENATATE 100; 62.5; 25 UG/1; UG/1; UG/1
1 POWDER RESPIRATORY (INHALATION) DAILY
Qty: 60 EACH | Refills: 3 | Status: SHIPPED | OUTPATIENT
Start: 2023-10-24

## 2023-10-28 ENCOUNTER — NURSE TRIAGE (OUTPATIENT)
Dept: OTHER | Facility: OTHER | Age: 73
End: 2023-10-28

## 2023-10-28 NOTE — TELEPHONE ENCOUNTER
Regarding: Aspirated lungs/ couging up bile  ----- Message from Candida Jacobsen sent at 10/28/2023 11:29 AM EDT -----  "I aspirated my lungs again. I do this quite often. I'm still coughing up bile and Dr. Kimball Ohs usually prescribes Prednisone and Levothyroxine for 10 days. "

## 2023-10-28 NOTE — TELEPHONE ENCOUNTER
Reason for Disposition  • Third attempt to contact caller AND no contact made. Phone number verified.     Protocols used: No Contact or Duplicate Contact Call-ADULT-

## 2023-10-30 ENCOUNTER — TELEPHONE (OUTPATIENT)
Dept: FAMILY MEDICINE CLINIC | Facility: CLINIC | Age: 73
End: 2023-10-30

## 2023-10-30 DIAGNOSIS — J43.9 PULMONARY EMPHYSEMA, UNSPECIFIED EMPHYSEMA TYPE (HCC): ICD-10-CM

## 2023-10-30 DIAGNOSIS — F17.210 CIGARETTE SMOKER: ICD-10-CM

## 2023-10-30 DIAGNOSIS — J44.1 ACUTE EXACERBATION OF CHRONIC OBSTRUCTIVE PULMONARY DISEASE (COPD) (HCC): Primary | ICD-10-CM

## 2023-10-30 RX ORDER — BENZONATATE 100 MG/1
100 CAPSULE ORAL 3 TIMES DAILY PRN
Qty: 30 CAPSULE | Refills: 1 | Status: SHIPPED | OUTPATIENT
Start: 2023-10-30

## 2023-10-30 RX ORDER — LEVOFLOXACIN 500 MG/1
500 TABLET, FILM COATED ORAL EVERY 24 HOURS
Qty: 10 TABLET | Refills: 0 | Status: SHIPPED | OUTPATIENT
Start: 2023-10-30 | End: 2023-11-09

## 2023-10-30 NOTE — TELEPHONE ENCOUNTER
Patient aspirated her lungs again. She is asking for Levaquin for 10 day and prednisone . Please advis. e

## 2023-11-01 ENCOUNTER — OFFICE VISIT (OUTPATIENT)
Dept: PAIN MEDICINE | Facility: MEDICAL CENTER | Age: 73
End: 2023-11-01
Payer: COMMERCIAL

## 2023-11-01 VITALS
WEIGHT: 182 LBS | OXYGEN SATURATION: 97 % | DIASTOLIC BLOOD PRESSURE: 67 MMHG | HEART RATE: 64 BPM | SYSTOLIC BLOOD PRESSURE: 146 MMHG | HEIGHT: 65 IN | BODY MASS INDEX: 30.32 KG/M2

## 2023-11-01 DIAGNOSIS — M47.816 LUMBAR SPONDYLOSIS: ICD-10-CM

## 2023-11-01 DIAGNOSIS — G89.29 CHRONIC BILATERAL LOW BACK PAIN WITH BILATERAL SCIATICA: Primary | ICD-10-CM

## 2023-11-01 DIAGNOSIS — G89.4 CHRONIC PAIN SYNDROME: ICD-10-CM

## 2023-11-01 DIAGNOSIS — Z79.891 LONG-TERM CURRENT USE OF OPIATE ANALGESIC: ICD-10-CM

## 2023-11-01 DIAGNOSIS — M54.41 CHRONIC BILATERAL LOW BACK PAIN WITH BILATERAL SCIATICA: Primary | ICD-10-CM

## 2023-11-01 DIAGNOSIS — M54.42 CHRONIC BILATERAL LOW BACK PAIN WITH BILATERAL SCIATICA: Primary | ICD-10-CM

## 2023-11-01 DIAGNOSIS — F11.20 UNCOMPLICATED OPIOID DEPENDENCE (HCC): ICD-10-CM

## 2023-11-01 DIAGNOSIS — M79.18 MYOFASCIAL PAIN SYNDROME: ICD-10-CM

## 2023-11-01 DIAGNOSIS — M17.11 PRIMARY OSTEOARTHRITIS OF RIGHT KNEE: ICD-10-CM

## 2023-11-01 DIAGNOSIS — E11.42 DIABETIC PERIPHERAL NEUROPATHY (HCC): ICD-10-CM

## 2023-11-01 DIAGNOSIS — M47.812 SPONDYLOSIS OF CERVICAL REGION WITHOUT MYELOPATHY OR RADICULOPATHY: ICD-10-CM

## 2023-11-01 PROCEDURE — 99214 OFFICE O/P EST MOD 30 MIN: CPT | Performed by: PHYSICIAN ASSISTANT

## 2023-11-01 RX ORDER — PREGABALIN 200 MG/1
200 CAPSULE ORAL 3 TIMES DAILY
Qty: 90 CAPSULE | Refills: 1 | Status: SHIPPED | OUTPATIENT
Start: 2023-11-01

## 2023-11-01 RX ORDER — HYDROCODONE BITARTRATE AND ACETAMINOPHEN 10; 325 MG/1; MG/1
TABLET ORAL
Qty: 130 TABLET | Refills: 0 | Status: SHIPPED | OUTPATIENT
Start: 2023-11-01

## 2023-11-01 NOTE — PROGRESS NOTES
Assessment:  1. Chronic bilateral low back pain with bilateral sciatica    2. Diabetic peripheral neuropathy ([address])    3. Primary osteoarthritis of right knee    4. Lumbar spondylosis    5. Spondylosis of cervical region without myelopathy or radiculopathy    6. Myofascial pain syndrome    7. Chronic pain syndrome    8. Uncomplicated opioid dependence ([address])    9. Long-term current use of opiate analgesic        Plan:  While the patient was in the office today, I did have a thorough conversation regarding their chronic pain syndrome, medication management, and treatment plan options. The patient remains clinically stable and well-controlled on the current medication regimen without any side effects or issues. I have electronically sent her prescription to her pharmacy with a fill date of today, a do not fill date of 11/29/2023 and 12/27/2023. I have provided refills for the Lyrica which is at 200 mg 3 times daily. She will follow-up in 3 months or sooner if needed. There are risks associated with opioid medications, including dependence, addiction and tolerance. The patient understands and agrees to use these medications only as prescribed. Potential side effects of the medications include, but are not limited to, constipation, drowsiness, addiction, impaired judgment and risk of fatal overdose if not taken as prescribed. The patient was warned against driving while taking sedation medications. Sharing medications is a felony. At this point in time, the patient is showing no signs of addiction, abuse, diversion or suicidal ideation. A urine drug screen was collected at today's office visit as part of our medication management protocol. The point of care testing results were appropriate for what was being prescribed. The specimen will be sent for confirmatory testing.  The drug screen is medically necessary because the patient is either dependent on opioid medication or is being considered for opioid medication therapy and the results could impact ongoing or future treatment. The drug screen is to evaluate for the presences or absence of prescribed, non-prescribed, and/or illicit drugs/substances. [place] Prescription Drug Monitoring Program report was reviewed and was appropriate     My impressions and treatment recommendations were discussed in detail with the patient who verbalized understanding and had no further questions. Discharge instructions were provided. I personally saw and examined the patient and I agree with the above discussed plan of care. Orders Placed This Encounter   Procedures   • MM ALL_Prescribed Meds and Special Instructions     Order Specific Question:   [facility] Is HYDROCODONE/APAP prescribed? Answer:   Yes     Order Specific Question:   [facility] Is NALOXONE Prescribed? Answer:   Yes     Order Specific Question:   [facility] Is NORTRIPTYLINE Prescribed? Answer:   Yes     Order Specific Question:   [facility] Is PREGABALIN Prescribed? Answer:   Yes     Order Specific Question:   [facility] Is TRAZODONE prescribed? Answer:    Yes   • MM DT_Alprazolam Definitive Test   • MM DT_Amitriptyline Definitive Test   • MM DT_Amphetamine Definitive Test   • MM DT_Buprenorphine Definitive Test   • MM DT_Carisoprodol Definitive Test   • MM DT_Clonazepam Definitive Test   • MM DT_Cocaine Definitive Test   • MM DT_Codeine Definitive Test   • MM Diazepam Definitive Test   • MM DT_Ethyl Glucuronide/Ethyl Sulfate Definitive Test   • MM DT_Fentanyl Definitive Test   • MM DT_Heroin Definitive Test   • MM DT_Hydrocodone Definitive Test   • MM DT_Hydromorphone Definitive Test   • MM DT_Kratom Definitive Test   • MM Lorazepam Definitive Test   • MM DT_MDMA Definitive Test   • MM DT_Methadone Definitive Test   • MM DT_Methamphetaine-d/l Isomers Definitive   • MM DT_Methamphetamine Definitive Test   • MM DT_Morphine Definitive Test   • MM DT_Oxazepam Definitive Test   • MM DT_Oxycodone Definitive Test   • MM DT_Oxymorphone Definitive Test   • MM DT_Phentermine Definitive Test   • MM DT_Secobarbital Definitive Test   • MM DT_Tapentadol Definitive Test   • MM DT_Temazapam Definitive Test   • MM DT_THC Definitive Test   • MM DT_Tramadol Definitive Test   • MM DT_Validity Creatinine   • MM DT_Validity Oxidant   • MM DT_Validity pH   • MM DT_Validity Specific     New Medications Ordered This Visit   Medications   • HYDROcodone-acetaminophen (NORCO)  mg per tablet     Sig: Take 1 tab q4-6 hrs prn For ongoing therapy     Dispense:  130 tablet     Refill:  0   • HYDROcodone-acetaminophen (NORCO)  mg per tablet     Sig: Take 1 tab every 4-6 hours prn pain for ongoing therapy DO NOT FILL BEFORE 11/29/23     Dispense:  130 tablet     Refill:  0   • pregabalin (LYRICA) 200 MG capsule     Sig: Take 1 capsule (200 mg total) by mouth 3 (three) times a day     Dispense:  90 capsule     Refill:  1     .   • HYDROcodone-acetaminophen (NORCO)  mg per tablet     Sig: Take 1 tab every 4-6 hours prn, for ongoing therapy DO NOT FILL BEFORE: 12/27/23     Dispense:  130 tablet     Refill:  0       History of Present Illness:  Gopal Osei is a 68 y.o. female who presents for a follow up office visit in regards to chronic pain. The patient’s current symptoms include chronic multisite joint pain including neck pain, low back pain as well as neuropathic pain of the lower extremities. She is maintained on Norco and Lyrica and reports about 30 to 50% relief depending upon the day. Patient denies any side effects of medication overall her pain remains unchanged since her last visit and she has no new symptoms or acute issues on today's visit.     Opioid contract date 03/09/23    Last UDS Date 07/07/23    Norco  last taken on 11/01 AM    I have personally reviewed and/or updated the patient's past medical history, past surgical history, family history, social history, current medications, allergies, and vital signs today. Review of Systems   Respiratory:  Negative for shortness of breath. Cardiovascular:  Negative for chest pain. Gastrointestinal:  Positive for diarrhea and nausea. Negative for constipation and vomiting. Musculoskeletal:  Positive for arthralgias, gait problem, joint swelling and myalgias. Skin:  Negative for rash. Neurological:  Positive for dizziness. Negative for seizures and weakness. All other systems reviewed and are negative.       Patient Active Problem List   Diagnosis   • Chronic pain syndrome   • Cervical radiculopathy   • Myofascial pain syndrome   • Spondylosis of cervical region without myelopathy or radiculopathy   • Fibromyalgia   • Diabetic peripheral neuropathy (HCC)   • Long-term current use of opiate analgesic   • MGUS (monoclonal gammopathy of unknown significance)   • Iron deficiency anemia following bariatric surgery   • Light headedness   • Rheumatoid arthritis of hand ([address])   • Pulmonary emphysema (HCC)   • Primary osteoarthritis of right knee   • Pseudogout of left knee   • Lumbar radiculitis   • Chronic bilateral low back pain with bilateral sciatica   • Rotator cuff syndrome, left   • Left shoulder pain   • Dizziness   • Other fatigue   • Biceps tendinitis of left shoulder   • Adhesive capsulitis of left shoulder   • Hypoglycemia   • Hypothyroidism due to Hashimoto's thyroiditis   • Type 2 diabetes mellitus with hyperlipidemia    • Iron deficiency anemia, unspecified       Past Medical History:   Diagnosis Date   • Anemia    • Anxiety     hx of panic attacks (now under control)    • Arthritis    • Asthma     resolved (no problems in a decade)    • Baker's cyst of knee    • Bronchitis    • Bunion, left foot    • Cervical pain    • Chronic GERD    • Chronic pain    • Chronic pain disorder    • Depression    • Diabetes mellitus, type 2 ([address])    • Diabetic peripheral neuropathy (HCC)    • Endometriosis    • Fibromyalgia    • Hyperlipidemia    • Hypertension    • Joint pain    • Low back pain    • Low back pain    • Lung nodules    • Macular degeneration    • Pulmonary emphysema ([address]) 01/16/2020   • Spondylosis    • Uterine cancer [facility])        Past Surgical History:   Procedure Laterality Date   • BACK SURGERY  1996    L5, S1- laser surgery fusion of c5 and c6    • BREAST CYST EXCISION Right    • CHOLECYSTECTOMY  2004   • FOOT SURGERY Left 2005    fusion    • FRACTURE SURGERY     • GASTRIC BYPASS  2010    Dr. Lesa Myers    • [address]    just uterus    • KNEE ARTHROSCOPY     • LAMINECTOMY     • ORTHOPEDIC SURGERY     • OVARIAN CYST REMOVAL  1975   • PELVIC LAPAROSCOPY      ovaries (x10)    • PLEURAL SCARIFICATION  1967   • SHOULDER OPEN ROTATOR CUFF REPAIR Left 2008   • TONSILLECTOMY     • VAGINAL PROLAPSE REPAIR         Family History   Problem Relation Age of Onset   • Hypertension Mother    • Lung cancer Mother    • Hypertension Father    • Heart disease Father    • Heart attack Father    • Cancer Father    • No Known Problems Sister    • No Known Problems Daughter    • No Known Problems Maternal Grandmother    • No Known Problems Maternal Grandfather    • No Known Problems Paternal Grandmother    • No Known Problems Paternal Grandfather    • Breast cancer Paternal Aunt 44   • Breast cancer Paternal Aunt 40   • Breast cancer Maternal Aunt 50       Social History     Occupational History   • Not on file   Tobacco Use   • Smoking status: Every Day     Packs/day: 0.25     Years: 40.00     Total pack years: 10.00     Types: Cigarettes   • Smokeless tobacco: Never   Vaping Use   • Vaping Use: Never used   Substance and Sexual Activity   • Alcohol use: Not Currently     Alcohol/week: 1.0 standard drink of alcohol     Types: 1 Shots of liquor per week     Comment: rarely   • Drug use: No   • Sexual activity: Not Currently     Partners: Male       Current Outpatient Medications on File Prior to Visit   Medication Sig   • albuterol (PROVENTIL HFA,VENTOLIN HFA) 90 mcg/act inhaler Inhale 2 puffs every 6 (six) hours as needed for wheezing   • amLODIPine (NORVASC) 5 mg tablet TAKE 1 TABLET (5 MG TOTAL) BY MOUTH DAILY. • baclofen 10 mg tablet TAKE 1 TABLET BY MOUTH THREE TIMES A DAY   • benzonatate (TESSALON PERLES) 100 mg capsule Take 1 capsule (100 mg total) by mouth 3 (three) times a day as needed for cough   • Calcium Carb-Cholecalciferol (Caltrate 600+D3) 600-20 MG-MCG TABS Take 1 tablet by mouth 2 (two) times a day with meals   • cetirizine (ZyrTEC) 10 mg tablet Take 0.5 tablets (5 mg total) by mouth daily   • [facility] Vitamin A 2400 MCG (8000 UT) capsule TAKE 1 CAPSULE (8,000 UNITS TOTAL) BY MOUTH DAILY   • Diclofenac Sodium (VOLTAREN) 1 % APPLY 2 GRAMS TO AFFECTED AREA 4 TIMES A DAY   • diphenhydrAMINE-PSE-APAP (BENADRYL ALLERGY/COLD PO) Take by mouth daily at bedtime   • ergocalciferol (VITAMIN D2) 50,000 units Take 1 capsule (50,000 Units total) by mouth once a week   • famotidine (PEPCID) 20 mg tablet Take 1 tablet (20 mg total) by mouth 2 (two) times a day Please Stop Tagamet   • ipratropium-albuterol (DUO-NEB) 0.5-2.5 mg/3 mL nebulizer solution Take 3 mL by nebulization every 8 (eight) hours as needed for wheezing or shortness of breath   • levofloxacin (LEVAQUIN) 500 mg tablet Take 1 tablet (500 mg total) by mouth every 24 hours for 10 days   • levothyroxine 25 mcg tablet TAKE 1 TABLET (25 MCG TOTAL) BY MOUTH EVERY OTHER DAY   • levothyroxine 50 mcg tablet TAKE 1 TABLET (50 MCG TOTAL) BY MOUTH EVERY OTHER DAY   • losartan (COZAAR) 25 mg tablet TAKE 1 TABLET (25 MG TOTAL) BY MOUTH DAILY.    • nortriptyline (PAMELOR) 25 mg capsule TAKE 1 CAPSULE BY MOUTH EVERYDAY AT BEDTIME   • pantoprazole (PROTONIX) 40 mg tablet TAKE 1 TABLET BY MOUTH TWICE A DAY   • Pediatric Multiple Vitamins (Childrens Chew Multivitamin) CHEW CHEW AND SWALLOW 1 TABLET BY MOUTH EVERY DAY   • sucralfate (CARAFATE) 1 g/10 mL suspension TAKE 10 ML (1 G TOTAL) BY MOUTH 4 TIMES A DAY   • traZODone (DESYREL) 50 mg tablet TAKE 1 TABLET BY MOUTH EVERY DAY AT BEDTIME AS NEEDED   • Trelegy Ellipta 100-62.5-25 MCG/ACT inhaler INHALE 1 PUFF DAILY RINSE MOUTH AFTER USE. • vitamin B-12 (VITAMIN B-12) 1,000 mcg tablet Take 1 tablet (1,000 mcg total) by mouth daily   • [DISCONTINUED] HYDROcodone-acetaminophen (NORCO)  mg per tablet Take 1 tab q4-6 hrs prn For ongoing therapy, DO NOT FILL BEFORE: 10/04/23   • [DISCONTINUED] pregabalin (LYRICA) 200 MG capsule Take 1 capsule (200 mg total) by mouth 3 (three) times a day   • denosumab (PROLIA) 60 mg/mL Inject 1 mL (60 mg total) under the skin once for 1 dose   • naloxone (NARCAN) 4 mg/0.1 mL nasal spray Administer 1 spray into a nostril. If no response after 2-3 minutes, give another dose in the other nostril using a new spray.  (Patient not taking: Reported on 9/6/2023)   • nitrofurantoin (MACROBID) 100 mg capsule TAKE 1 CAPSULE (100 MG TOTAL) BY MOUTH DAILY WITH FOOD/LUNCH (Patient not taking: Reported on 11/1/2023)     Current Facility-Administered Medications on File Prior to Visit   Medication   • cyanocobalamin injection 1,000 mcg   • cyanocobalamin injection 1,000 mcg   • cyanocobalamin injection 1,000 mcg   • cyanocobalamin injection 1,000 mcg   • cyanocobalamin injection 1,000 mcg   • cyanocobalamin injection 1,000 mcg   • cyanocobalamin injection 1,000 mcg   • cyanocobalamin injection 1,000 mcg       Allergies   Allergen Reactions   • Cephalosporins Hives   • Erythromycin GI Intolerance   • Fentanyl Itching     Occurred during surgery    • Paxil [Paroxetine] Hives     After 2 weeks   • Penicillins Itching   • Pravastatin Myalgia   • Statins Itching   • Sulfa Antibiotics Diarrhea   • Wellbutrin [Bupropion] Hives     After 2 weeks    • Marzena's Wort Rash       Physical Exam:    /67   Pulse 64   Ht 5' 5" (1.651 m)   Wt 82.6 kg (182 lb)   LMP  (LMP Unknown)   SpO2 97%   BMI 30.29 kg/m²     Constitutional:normal, well developed, well nourished, alert, in no distress and non-toxic and no overt pain behavior.   Eyes:anicteric  HEENT:grossly intact  Neck:supple, symmetric, trachea midline and no masses   Pulmonary:even and unlabored  Cardiovascular:No edema or pitting edema present  Skin:Normal without rashes or lesions and well hydrated  Psychiatric:Mood and affect appropriate  Neurologic:Cranial Nerves II-XII grossly intact  Musculoskeletal: Stooped posture, ambulates with cane    Imaging

## 2023-11-08 ENCOUNTER — TELEPHONE (OUTPATIENT)
Dept: FAMILY MEDICINE CLINIC | Facility: CLINIC | Age: 73
End: 2023-11-08

## 2023-11-08 NOTE — TELEPHONE ENCOUNTER
Pt called stating that she aspirated her lungs on Friday did finish antibiotics and did improve but since Friday its been getting worse she has green mucus and low grade fever. She was wondering if more antibiotics can be called in.  Please advise - as

## 2023-11-08 NOTE — TELEPHONE ENCOUNTER
I called the pt and let her know she stated that she can't afford to go to either so she denied going.  I spoke with her letting her know that I will speak with you for further instructions - as

## 2023-11-09 DIAGNOSIS — J44.1 ACUTE EXACERBATION OF CHRONIC OBSTRUCTIVE PULMONARY DISEASE (COPD) (HCC): Primary | ICD-10-CM

## 2023-11-09 RX ORDER — LEVOFLOXACIN 500 MG/1
500 TABLET, FILM COATED ORAL EVERY 24 HOURS
Qty: 10 TABLET | Refills: 0 | Status: SHIPPED | OUTPATIENT
Start: 2023-11-09 | End: 2023-11-19

## 2023-11-09 RX ORDER — PREDNISONE 10 MG/1
TABLET ORAL
Qty: 20 TABLET | Refills: 0 | Status: SHIPPED | OUTPATIENT
Start: 2023-11-09

## 2023-11-11 DIAGNOSIS — K21.9 GASTROESOPHAGEAL REFLUX DISEASE WITHOUT ESOPHAGITIS: ICD-10-CM

## 2023-11-13 RX ORDER — SUCRALFATE ORAL 1 G/10ML
SUSPENSION ORAL
Qty: 1260 ML | Refills: 1 | Status: SHIPPED | OUTPATIENT
Start: 2023-11-13

## 2023-11-15 LAB
6MAM UR QL CFM: NEGATIVE NG/ML
7AMINOCLONAZEPAM UR QL CFM: NEGATIVE NG/ML
A-OH ALPRAZ UR QL CFM: NEGATIVE NG/ML
ACCEPTABLE CREAT UR QL: NORMAL MG/DL
ACCEPTIBLE SP GR UR QL: NORMAL
AMITRIP UR QL CFM: NEGATIVE NG/ML
AMPHET UR QL CFM: NEGATIVE NG/ML
BUPRENORPHINE UR QL CFM: NEGATIVE NG/ML
BZE UR QL CFM: NEGATIVE NG/ML
CARISOPRODOL UR QL CFM: NEGATIVE NG/ML
CODEINE UR QL CFM: NEGATIVE NG/ML
EDDP UR QL CFM: NEGATIVE NG/ML
ETHYL GLUCURONIDE UR QL CFM: NEGATIVE NG/ML
ETHYL SULFATE UR QL SCN: NEGATIVE NG/ML
FENTANYL UR QL CFM: NEGATIVE NG/ML
GLIADIN IGG SER IA-ACNC: NEGATIVE NG/ML
HYDROCODONE UR QL CFM: NORMAL NG/ML
HYDROMORPHONE UR QL CFM: NORMAL NG/ML
LORAZEPAM UR QL CFM: NEGATIVE NG/ML
MDMA UR QL CFM: NEGATIVE NG/ML
MEPROBAMATE UR QL CFM: NEGATIVE NG/ML
METHADONE UR QL CFM: NEGATIVE NG/ML
METHAMPHET UR QL CFM: NEGATIVE NG/ML
MORPHINE UR QL CFM: NEGATIVE NG/ML
NITRITE UR QL: NORMAL UG/ML
NORBUPRENORPHINE UR QL CFM: NEGATIVE NG/ML
NORDIAZEPAM UR QL CFM: NEGATIVE NG/ML
NORFENTANYL UR QL CFM: NEGATIVE NG/ML
NORHYDROCODONE UR QL CFM: NORMAL NG/ML
NOROXYCODONE UR QL CFM: NEGATIVE NG/ML
NORTRIP UR QL CFM: NORMAL NG/ML
OXAZEPAM UR QL CFM: NEGATIVE NG/ML
OXYCODONE UR QL CFM: NEGATIVE NG/ML
OXYMORPHONE UR QL CFM: NEGATIVE NG/ML
PARA-FLUOROFENTANYL QUANTIFICATION: NORMAL NG/ML
SECOBARBITAL UR QL CFM: NEGATIVE NG/ML
SL AMB 4-ANPP QUANTIFICATION: NORMAL NG/ML
SL AMB 7-OH-MITRAGYNINE (KRATOM ALKALOID) QUANTIFICATION: NEGATIVE NG/ML
SL AMB ACETYL FENTANYL QUANTIFICATION: NORMAL NG/ML
SL AMB ACETYL NORFENTANYL QUANTIFICATION: NORMAL NG/ML
SL AMB ACRYL FENTANYL QUANTIFICATION: NORMAL NG/ML
SL AMB CARFENTANIL QUANTIFICATION: NORMAL NG/ML
SL AMB CTHC (MARIJUANA METABOLITE) QUANTIFICATION: NEGATIVE NG/ML
SL AMB N-DESMETHYL-TRAMADOL QUANTIFICATION: NEGATIVE NG/ML
SL AMB PHENTERMINE QUANTIFICATION: NEGATIVE NG/ML
SPECIMEN PH ACCEPTABLE UR: NORMAL
TAPENTADOL UR QL CFM: NEGATIVE NG/ML
TEMAZEPAM UR QL CFM: NEGATIVE NG/ML
TRAMADOL UR QL CFM: NEGATIVE NG/ML
URATE/CREAT 24H UR: NEGATIVE NG/ML

## 2023-11-20 ENCOUNTER — TELEPHONE (OUTPATIENT)
Age: 73
End: 2023-11-20

## 2023-11-20 DIAGNOSIS — G89.4 CHRONIC PAIN SYNDROME: Primary | ICD-10-CM

## 2023-11-20 RX ORDER — HYDROCODONE BITARTRATE AND ACETAMINOPHEN 10; 325 MG/1; MG/1
TABLET ORAL
Qty: 130 TABLET | Refills: 0 | Status: SHIPPED | OUTPATIENT
Start: 2023-11-20 | End: 2023-11-20

## 2023-11-20 RX ORDER — HYDROCODONE BITARTRATE AND ACETAMINOPHEN 10; 325 MG/1; MG/1
TABLET ORAL
Qty: 130 TABLET | Refills: 0 | Status: SHIPPED | OUTPATIENT
Start: 2023-11-20

## 2023-11-20 NOTE — TELEPHONE ENCOUNTER
Cherrie, I am sorry can you cx that script and resend it to [facility] she doesn't get this filled at [facility] any longer she thought it would keep things more in order

## 2023-11-20 NOTE — TELEPHONE ENCOUNTER
Caller: Areli Luciano     Doctor: Cherrie     Reason for call: Patient calling stating medication Hydrocodone sent to [facility] and Nov 29th script was cancelled please resend script please advise     Call back#: [phone]

## 2023-11-20 NOTE — TELEPHONE ENCOUNTER
N s/w pt who waws at her pharmacy over the weekend and was told that her upcoming script for 11/29 fill of hydrocodone was cx. When RN called the pharmacy and s/w Marvin the pharmacist he confirmed same. Per Marvin on there end they can't see who cx the script but it is cx on 11/9.    When RN looked further into the pt's chart pt did have an antibiotic ordered per her PCP on 11/9 and it does appear that possibly Dr Dorsey cx this script in error?    --please advise thank you--  Resend hydrocodone script with a DNF date of 11/29?  Per the pharmacist they still have the active DNF of 12/27.

## 2023-11-21 ENCOUNTER — OFFICE VISIT (OUTPATIENT)
Dept: FAMILY MEDICINE CLINIC | Facility: CLINIC | Age: 73
End: 2023-11-21
Payer: COMMERCIAL

## 2023-11-21 VITALS
OXYGEN SATURATION: 98 % | TEMPERATURE: 98.5 F | WEIGHT: 182.3 LBS | BODY MASS INDEX: 30.37 KG/M2 | SYSTOLIC BLOOD PRESSURE: 140 MMHG | HEART RATE: 60 BPM | HEIGHT: 65 IN | RESPIRATION RATE: 14 BRPM | DIASTOLIC BLOOD PRESSURE: 70 MMHG

## 2023-11-21 DIAGNOSIS — F17.210 CIGARETTE SMOKER: ICD-10-CM

## 2023-11-21 DIAGNOSIS — R91.8 LUNG NODULES: ICD-10-CM

## 2023-11-21 DIAGNOSIS — H60.311 ACUTE DIFFUSE OTITIS EXTERNA OF RIGHT EAR: Primary | ICD-10-CM

## 2023-11-21 DIAGNOSIS — J44.1 ACUTE EXACERBATION OF CHRONIC OBSTRUCTIVE PULMONARY DISEASE (COPD) (HCC): ICD-10-CM

## 2023-11-21 DIAGNOSIS — E04.2 MULTIPLE THYROID NODULES: ICD-10-CM

## 2023-11-21 DIAGNOSIS — R21 RASH: ICD-10-CM

## 2023-11-21 PROCEDURE — 99214 OFFICE O/P EST MOD 30 MIN: CPT | Performed by: INTERNAL MEDICINE

## 2023-11-21 RX ORDER — FLUCONAZOLE 150 MG/1
150 TABLET ORAL DAILY
Qty: 3 TABLET | Refills: 0 | Status: SHIPPED | OUTPATIENT
Start: 2023-11-21 | End: 2023-11-24

## 2023-11-21 RX ORDER — CLOTRIMAZOLE AND BETAMETHASONE DIPROPIONATE 10; .64 MG/G; MG/G
CREAM TOPICAL 3 TIMES DAILY
Qty: 60 G | Refills: 1 | Status: SHIPPED | OUTPATIENT
Start: 2023-11-21

## 2023-11-21 RX ORDER — DOXYCYCLINE HYCLATE 100 MG/1
100 CAPSULE ORAL EVERY 12 HOURS SCHEDULED
Qty: 20 CAPSULE | Refills: 0 | Status: SHIPPED | OUTPATIENT
Start: 2023-11-21 | End: 2023-12-01

## 2023-11-21 RX ORDER — OFLOXACIN 3 MG/ML
10 SOLUTION AURICULAR (OTIC) 2 TIMES DAILY
Qty: 5 ML | Refills: 0 | Status: SHIPPED | OUTPATIENT
Start: 2023-11-21

## 2023-11-21 NOTE — PROGRESS NOTES
BMI Counseling: Body mass index is 30.34 kg/m². The BMI is above normal. Nutrition recommendations include reducing portion sizes.

## 2023-11-21 NOTE — PROGRESS NOTES
Name: Dalila Duenas      : 1950      MRN: [id number]  Encounter Provider: Raul Logan MD  Encounter Date: 2023   Encounter department: [address] [place] [address]     1. Acute diffuse otitis externa of right ear  -     ofloxacin (FLOXIN) 0.3 % otic solution; Administer 10 drops to the right ear 2 (two) times a day    2. Lung nodules  Comments:  stop smoking  Yearly Ct Lungs  Orders:  -     UA (URINE) with reflex to Scope; Future; Expected date: 2023  -     C-reactive protein; Future  -     Comprehensive metabolic panel; Future  -     CBC and differential; Future  -     Magnesium; Future  -     Lipid panel; Future    3. Multiple thyroid nodules  Comments:  yearly US Thyroid  Orders:  -     UA (URINE) with reflex to Scope; Future; Expected date: 2023  -     C-reactive protein; Future  -     Comprehensive metabolic panel; Future  -     CBC and differential; Future  -     Magnesium; Future  -     Lipid panel; Future  -     T4, free; Future; Expected date: 2023  -     TSH, 3rd generation; Future  -     Vitamin B12; Future    4. Cigarette smoker  Comments:  advised to quit smoking  RTC in 3 mos w Blood work  Orders:  -     doxycycline hyclate (VIBRAMYCIN) 100 mg capsule; Take 1 capsule (100 mg total) by mouth every 12 (twelve) hours for 10 days  -     clotrimazole-betamethasone (LOTRISONE) 1-0.05 % cream; Apply topically 3 (three) times a day  -     fluconazole (DIFLUCAN) 150 mg tablet; Take 1 tablet (150 mg total) by mouth daily for 3 doses With Lunch/Food  -     UA (URINE) with reflex to Scope; Future; Expected date: 2023  -     C-reactive protein; Future  -     Comprehensive metabolic panel; Future  -     CBC and differential; Future  -     Magnesium; Future  -     Lipid panel; Future  -     T4, free; Future; Expected date: 2023  -     TSH, 3rd generation; Future  -     Vitamin B12; Future    5.  Rash  Comments:  left upper abd wall; Candidaiasis? ?  Orders:  -     doxycycline hyclate (VIBRAMYCIN) 100 mg capsule; Take 1 capsule (100 mg total) by mouth every 12 (twelve) hours for 10 days  -     clotrimazole-betamethasone (LOTRISONE) 1-0.05 % cream; Apply topically 3 (three) times a day  -     fluconazole (DIFLUCAN) 150 mg tablet; Take 1 tablet (150 mg total) by mouth daily for 3 doses With Lunch/Food  -     T4, free; Future; Expected date: 11/21/2023  -     TSH, 3rd generation; Future  -     Vitamin B12; Future    6. Acute exacerbation of chronic obstructive pulmonary disease (COPD) ([address])  Comments:  stop smoking  Bed rest  Increase Po fluids  RTC in 3-4 weeks  Orders:  -     doxycycline hyclate (VIBRAMYCIN) 100 mg capsule; Take 1 capsule (100 mg total) by mouth every 12 (twelve) hours for 10 days  -     clotrimazole-betamethasone (LOTRISONE) 1-0.05 % cream; Apply topically 3 (three) times a day  -     fluconazole (DIFLUCAN) 150 mg tablet; Take 1 tablet (150 mg total) by mouth daily for 3 doses With Lunch/Food    Life style mod  Pt was advised to quit smoking  Rtc in 3 mos w Blood work    Depression Screening and Follow-up Plan: Patient was screened for depression during today's encounter. They screened negative with a PHQ-2 score of 0. Subjective      [facility] is here for regular check up, and has few symptoms, she still smokes, med list Reviewed w Pt,. .      Review of Systems   Constitutional:  Negative for chills, fatigue and fever. HENT:  Positive for congestion, ear pain, hearing loss, postnasal drip, sinus pressure and sore throat. Negative for facial swelling, trouble swallowing and voice change. Eyes:  Negative for pain, discharge and visual disturbance. Respiratory:  Positive for cough and wheezing. Negative for shortness of breath. Cardiovascular:  Negative for chest pain, palpitations and leg swelling. Gastrointestinal:  Negative for abdominal pain, blood in stool, constipation, diarrhea and nausea.    Endocrine: Negative for polydipsia, polyphagia and polyuria. Genitourinary:  Negative for difficulty urinating, hematuria and urgency. Musculoskeletal:  Negative for arthralgias and myalgias. Skin:  Positive for rash. Neurological:  Negative for dizziness, tremors, weakness and headaches. Hematological:  Negative for adenopathy. Does not bruise/bleed easily. Psychiatric/Behavioral:  Negative for dysphoric mood, sleep disturbance and suicidal ideas. Current Outpatient Medications on File Prior to Visit   Medication Sig    albuterol (PROVENTIL HFA,VENTOLIN HFA) 90 mcg/act inhaler Inhale 2 puffs every 6 (six) hours as needed for wheezing    amLODIPine (NORVASC) 5 mg tablet TAKE 1 TABLET (5 MG TOTAL) BY MOUTH DAILY.     baclofen 10 mg tablet TAKE 1 TABLET BY MOUTH THREE TIMES A DAY    benzonatate (TESSALON PERLES) 100 mg capsule Take 1 capsule (100 mg total) by mouth 3 (three) times a day as needed for cough    Calcium Carb-Cholecalciferol (Caltrate 600+D3) 600-20 MG-MCG TABS Take 1 tablet by mouth 2 (two) times a day with meals    cetirizine (ZyrTEC) 10 mg tablet Take 0.5 tablets (5 mg total) by mouth daily    [employer] Vitamin A 2400 MCG (8000 UT) capsule TAKE 1 CAPSULE (8,000 UNITS TOTAL) BY MOUTH DAILY    Diclofenac Sodium (VOLTAREN) 1 % APPLY 2 GRAMS TO AFFECTED AREA 4 TIMES A DAY    diphenhydrAMINE-PSE-APAP (BENADRYL ALLERGY/COLD PO) Take by mouth daily at bedtime    ergocalciferol (VITAMIN D2) 50,000 units Take 1 capsule (50,000 Units total) by mouth once a week    famotidine (PEPCID) 20 mg tablet Take 1 tablet (20 mg total) by mouth 2 (two) times a day Please Stop Tagamet    HYDROcodone-acetaminophen (NORCO)  mg per tablet Take 1 tab every 4-6 hours prn, for ongoing therapy DO NOT FILL BEFORE: 12/27/23    HYDROcodone-acetaminophen (NORCO)  mg per tablet Take 1 tab q 4-6 hours prn  for ongoing therapy DO NOT FILL BEFORE: 11/29/23    ipratropium-albuterol (DUO-NEB) 0.5-2.5 mg/3 mL nebulizer solution Take 3 mL by nebulization every 8 (eight) hours as needed for wheezing or shortness of breath    levothyroxine 25 mcg tablet TAKE 1 TABLET (25 MCG TOTAL) BY MOUTH EVERY OTHER DAY    levothyroxine 50 mcg tablet TAKE 1 TABLET (50 MCG TOTAL) BY MOUTH EVERY OTHER DAY    losartan (COZAAR) 25 mg tablet TAKE 1 TABLET (25 MG TOTAL) BY MOUTH DAILY. nortriptyline (PAMELOR) 25 mg capsule TAKE 1 CAPSULE BY MOUTH EVERYDAY AT BEDTIME    pantoprazole (PROTONIX) 40 mg tablet TAKE 1 TABLET BY MOUTH TWICE A DAY    Pediatric Multiple Vitamins (Childrens Chew Multivitamin) CHEW CHEW AND SWALLOW 1 TABLET BY MOUTH EVERY DAY    predniSONE 10 mg tablet Take 3 tabs daily with breakfast for 3 days then Take 2 tabs daily for 3 Days then take one tab daily for 3 days then stop. .    pregabalin (LYRICA) 200 MG capsule Take 1 capsule (200 mg total) by mouth 3 (three) times a day    sucralfate (CARAFATE) 1 g/10 mL suspension TAKE 10 ML (1 G TOTAL) BY MOUTH 4 TIMES A DAY    traZODone (DESYREL) 50 mg tablet TAKE 1 TABLET BY MOUTH EVERY DAY AT BEDTIME AS NEEDED    Trelegy Ellipta 100-62.5-25 MCG/ACT inhaler INHALE 1 PUFF DAILY RINSE MOUTH AFTER USE.    vitamin B-12 (VITAMIN B-12) 1,000 mcg tablet Take 1 tablet (1,000 mcg total) by mouth daily    denosumab (PROLIA) 60 mg/mL Inject 1 mL (60 mg total) under the skin once for 1 dose    naloxone (NARCAN) 4 mg/0.1 mL nasal spray Administer 1 spray into a nostril. If no response after 2-3 minutes, give another dose in the other nostril using a new spray.  (Patient not taking: Reported on 9/6/2023)    [DISCONTINUED] nitrofurantoin (MACROBID) 100 mg capsule TAKE 1 CAPSULE (100 MG TOTAL) BY MOUTH DAILY WITH FOOD/LUNCH (Patient not taking: Reported on 11/1/2023)       Objective     /70 (BP Location: Left arm, Patient Position: Sitting, Cuff Size: Standard)   Pulse 60   Temp 98.5 °F (36.9 °C) (Tympanic)   Resp 14   Ht 5' 5" (1.651 m)   Wt 82.7 kg (182 lb 4.8 oz)   LMP  (LMP Unknown)   SpO2 98% BMI 30.34 kg/m²     Physical Exam  Constitutional:       General: She is not in acute distress. HENT:      Head: Normocephalic. Mouth/Throat:      Pharynx: No oropharyngeal exudate. Eyes:      General: No scleral icterus. Conjunctiva/sclera: Conjunctivae normal.      Pupils: Pupils are equal, round, and reactive to light. Neck:      Thyroid: No thyromegaly. Cardiovascular:      Rate and Rhythm: Normal rate and regular rhythm. Heart sounds: Normal heart sounds. No murmur heard. Pulmonary:      Effort: Pulmonary effort is normal. No respiratory distress. Breath sounds: Wheezing present. No rales. Abdominal:      General: Bowel sounds are normal. There is no distension. Palpations: Abdomen is soft. Tenderness: There is no abdominal tenderness. There is no guarding or rebound. Musculoskeletal:         General: Tenderness present. Cervical back: Neck supple. Lymphadenopathy:      Cervical: No cervical adenopathy. Skin:     Coloration: Skin is not pale. Findings: Rash present. Neurological:      Mental Status: She is alert and oriented to person, place, and time. Sensory: No sensory deficit. Motor: No weakness.        Joel Armstrong MD

## 2023-11-29 ENCOUNTER — TELEPHONE (OUTPATIENT)
Dept: FAMILY MEDICINE CLINIC | Facility: CLINIC | Age: 73
End: 2023-11-29

## 2023-11-29 DIAGNOSIS — J44.1 ACUTE EXACERBATION OF CHRONIC OBSTRUCTIVE PULMONARY DISEASE (COPD) (HCC): Primary | ICD-10-CM

## 2023-11-29 RX ORDER — LEVOFLOXACIN 500 MG/1
500 TABLET, FILM COATED ORAL EVERY 24 HOURS
Qty: 7 TABLET | Refills: 0 | Status: SHIPPED | OUTPATIENT
Start: 2023-11-29 | End: 2023-12-06

## 2023-11-29 NOTE — TELEPHONE ENCOUNTER
Since patient has been taking Vibramycin, she is having sores/thrush in her mouth. She stopped the medication, and she is still out of breath. Please advise.

## 2023-12-01 ENCOUNTER — TELEPHONE (OUTPATIENT)
Age: 73
End: 2023-12-01

## 2023-12-04 NOTE — TELEPHONE ENCOUNTER
Scheduled 1/22/24 with tia Smith     She will have new insurance [employer] will call later with new updated insurance ID info

## 2023-12-05 ENCOUNTER — VBI (OUTPATIENT)
Dept: ADMINISTRATIVE | Facility: OTHER | Age: 73
End: 2023-12-05

## 2023-12-05 LAB
LEFT EYE DIABETIC RETINOPATHY: NORMAL
RIGHT EYE DIABETIC RETINOPATHY: NORMAL

## 2023-12-05 NOTE — TELEPHONE ENCOUNTER
Upon review of the In Basket request we were able to locate, review, and update the patient chart as requested for Diabetic Eye Exam.    Any additional questions or concerns should be emailed to the Practice Liaisons via the appropriate education email address, please do not reply via In Basket.     Thank you  Jevon Hinojosa

## 2023-12-14 ENCOUNTER — RA CDI HCC (OUTPATIENT)
Dept: OTHER | Facility: HOSPITAL | Age: 73
End: 2023-12-14

## 2023-12-14 NOTE — PROGRESS NOTES
[facility] coding opportunities       Chart reviewed, no opportunity found: CHART REVIEWED, NO OPPORTUNITY FOUND      Previous suggestion used (E11.42)    Patients Insurance     Medicare Insurance: [employer]

## 2023-12-23 DIAGNOSIS — I10 ESSENTIAL HYPERTENSION: ICD-10-CM

## 2023-12-26 RX ORDER — LOSARTAN POTASSIUM 25 MG/1
25 TABLET ORAL DAILY
Qty: 90 TABLET | Refills: 1 | Status: SHIPPED | OUTPATIENT
Start: 2023-12-26

## 2024-01-08 ENCOUNTER — CONSULT (OUTPATIENT)
Dept: PULMONOLOGY | Facility: CLINIC | Age: 74
End: 2024-01-08
Payer: COMMERCIAL

## 2024-01-08 VITALS
BODY MASS INDEX: 29.99 KG/M2 | SYSTOLIC BLOOD PRESSURE: 138 MMHG | HEIGHT: 65 IN | DIASTOLIC BLOOD PRESSURE: 68 MMHG | WEIGHT: 180 LBS | TEMPERATURE: 97.7 F | HEART RATE: 74 BPM | OXYGEN SATURATION: 98 %

## 2024-01-08 DIAGNOSIS — J42 CHRONIC BRONCHITIS, UNSPECIFIED CHRONIC BRONCHITIS TYPE (HCC): ICD-10-CM

## 2024-01-08 DIAGNOSIS — J43.9 PULMONARY EMPHYSEMA, UNSPECIFIED EMPHYSEMA TYPE (HCC): Primary | ICD-10-CM

## 2024-01-08 DIAGNOSIS — R05.3 CHRONIC COUGH: ICD-10-CM

## 2024-01-08 DIAGNOSIS — Z12.2 ENCOUNTER FOR SCREENING FOR MALIGNANT NEOPLASM OF LUNG IN CURRENT SMOKER WITH 30 PACK YEAR HISTORY OR GREATER: ICD-10-CM

## 2024-01-08 DIAGNOSIS — M81.8 IDIOPATHIC OSTEOPOROSIS: ICD-10-CM

## 2024-01-08 DIAGNOSIS — M54.16 LUMBAR RADICULITIS: ICD-10-CM

## 2024-01-08 DIAGNOSIS — R09.82 POSTNASAL DRIP: ICD-10-CM

## 2024-01-08 DIAGNOSIS — G89.4 CHRONIC PAIN SYNDROME: ICD-10-CM

## 2024-01-08 DIAGNOSIS — J43.9 PULMONARY EMPHYSEMA, UNSPECIFIED EMPHYSEMA TYPE (HCC): ICD-10-CM

## 2024-01-08 DIAGNOSIS — Z23 NEED FOR PROPHYLACTIC VACCINATION AGAINST STREPTOCOCCUS PNEUMONIAE (PNEUMOCOCCUS): ICD-10-CM

## 2024-01-08 DIAGNOSIS — F17.210 CIGARETTE SMOKER: ICD-10-CM

## 2024-01-08 DIAGNOSIS — Z23 NEEDS FLU SHOT: ICD-10-CM

## 2024-01-08 DIAGNOSIS — F17.200 ENCOUNTER FOR SCREENING FOR MALIGNANT NEOPLASM OF LUNG IN CURRENT SMOKER WITH 30 PACK YEAR HISTORY OR GREATER: ICD-10-CM

## 2024-01-08 DIAGNOSIS — M54.41 CHRONIC BILATERAL LOW BACK PAIN WITH BILATERAL SCIATICA: ICD-10-CM

## 2024-01-08 DIAGNOSIS — M54.42 CHRONIC BILATERAL LOW BACK PAIN WITH BILATERAL SCIATICA: ICD-10-CM

## 2024-01-08 DIAGNOSIS — G89.29 CHRONIC BILATERAL LOW BACK PAIN WITH BILATERAL SCIATICA: ICD-10-CM

## 2024-01-08 DIAGNOSIS — M79.18 MYOFASCIAL PAIN SYNDROME: ICD-10-CM

## 2024-01-08 DIAGNOSIS — M15.9 OSTEOARTHRITIS OF MULTIPLE JOINTS, UNSPECIFIED OSTEOARTHRITIS TYPE: ICD-10-CM

## 2024-01-08 DIAGNOSIS — F17.200 NICOTINE DEPENDENCE WITH CURRENT USE: ICD-10-CM

## 2024-01-08 DIAGNOSIS — R91.8 LUNG NODULES: ICD-10-CM

## 2024-01-08 DIAGNOSIS — M06.9 RHEUMATOID ARTHRITIS OF HAND, UNSPECIFIED LATERALITY, UNSPECIFIED WHETHER RHEUMATOID FACTOR PRESENT (HCC): ICD-10-CM

## 2024-01-08 DIAGNOSIS — K21.9 GASTROESOPHAGEAL REFLUX DISEASE, UNSPECIFIED WHETHER ESOPHAGITIS PRESENT: ICD-10-CM

## 2024-01-08 PROBLEM — M19.90 OSTEOARTHRITIS: Status: ACTIVE | Noted: 2024-01-08

## 2024-01-08 PROCEDURE — G0008 ADMIN INFLUENZA VIRUS VAC: HCPCS

## 2024-01-08 PROCEDURE — 90677 PCV20 VACCINE IM: CPT

## 2024-01-08 PROCEDURE — 90662 IIV NO PRSV INCREASED AG IM: CPT

## 2024-01-08 PROCEDURE — 99204 OFFICE O/P NEW MOD 45 MIN: CPT | Performed by: INTERNAL MEDICINE

## 2024-01-08 PROCEDURE — 99406 BEHAV CHNG SMOKING 3-10 MIN: CPT | Performed by: INTERNAL MEDICINE

## 2024-01-08 PROCEDURE — G0009 ADMIN PNEUMOCOCCAL VACCINE: HCPCS

## 2024-01-08 RX ORDER — NICOTINE 21 MG/24HR
1 PATCH, TRANSDERMAL 24 HOURS TRANSDERMAL EVERY 24 HOURS
Qty: 28 PATCH | Refills: 2 | Status: SHIPPED | OUTPATIENT
Start: 2024-01-08

## 2024-01-08 RX ORDER — FLUTICASONE FUROATE, UMECLIDINIUM BROMIDE AND VILANTEROL TRIFENATATE 100; 62.5; 25 UG/1; UG/1; UG/1
1 POWDER RESPIRATORY (INHALATION) DAILY
Qty: 60 EACH | Refills: 5 | Status: SHIPPED | OUTPATIENT
Start: 2024-01-08

## 2024-01-08 RX ORDER — FLUTICASONE FUROATE, UMECLIDINIUM BROMIDE AND VILANTEROL TRIFENATATE 100; 62.5; 25 UG/1; UG/1; UG/1
1 POWDER RESPIRATORY (INHALATION) DAILY
Qty: 60 EACH | Refills: 3 | Status: CANCELLED | OUTPATIENT
Start: 2024-01-08

## 2024-01-08 NOTE — TELEPHONE ENCOUNTER
Reason for call:   [x] Refill   [] Prior Auth  [] Other:     Office:   [] PCP/Provider -   [x] Specialty/Provider - S&P    PATIENT HAS AN APPT ON 01/22 WITH , THIS WILL BE PATIENTS FIRST APPT WITH NEW DR.    Medication: BACLOFEN    Dose/Frequency: 10 MG    Quantity: 270    Pharmacy: [place] [place]    Does the patient have enough for 3 days?   [] Yes   [x] No - Send as HP to POD

## 2024-01-08 NOTE — ASSESSMENT & PLAN NOTE
We will continue lung cancer screening, neck CT scan will be September 2024 and I will order next visit

## 2024-01-08 NOTE — PROGRESS NOTES
Consultation - Pulmonary Medicine   Areli Luciano 73 y.o. female MRN: [id number]    Physician Requesting Consult: Aleksandar Garduno MD  Reason for Consult: COPD    Chronic bronchitis (HCC)  I did not see much emphysema on CT scan and her history and lung exam is more consistent with chronic bronchitis.  Regardless we will continue current inhalers with Trelegy and as needed albuterol.  Counseled to quit smoking as below.  Will check PFTs for evaluation and to have as baseline.    GERD (gastroesophageal reflux disease)  I think her GERD and recurrent aspiration is playing a factor in her respiratory complaints and she agrees with that.  Continue to follow with GI and bariatric surgery evaluation and continue current management with PPI, H2 blocker and sucralfate.      Rheumatoid arthritis of hand (HCC)  Given the fact that patient has DIP involvement of her arthritis with Heberden's nodes, then this is not rheumatoid arthritis but osteoarthritis.  Her serology workup in the past was negative including RF, LEONCIO and anti-CCP antibodies.  Will cancel this diagnosis and add osteoarthritis    Osteoarthritis  Continue pain management as per PCP    Postnasal drip  Offered patient Flonase but she tried in the past without any benefit, she wants to try over-the-counter Xlear, was recommended to her by her friend.    Nicotine dependence with current use  Counseled patient for 6 minutes about smoking cessation, she is willing to try nicotine patch, also we talked about strategies to quit, she is considering to quit by the end of February.    Encounter for screening for malignant neoplasm of lung in current smoker with 30 pack year history or greater  We will continue lung cancer screening, neck CT scan will be September 2024 and I will order next visit    Needs flu shot  Given influenza vaccine today    Need for prophylactic vaccination against Streptococcus pneumoniae (pneumococcus)  Given Prevnar 20 today      Return in about  4 months (around 5/8/2024).    Diagnoses and all orders for this visit:    Pulmonary emphysema, unspecified emphysema type (HCC)  -     Complete PFT with post bronchodilator; Future  -     fluticasone-umeclidinium-vilanterol (Trelegy Ellipta) 100-62.5-25 mcg/actuation inhaler; Inhale 1 puff daily Rinse mouth after use.    Cigarette smoker  -     Ambulatory Referral to Pulmonology    Lung nodules  -     Ambulatory Referral to Pulmonology    Gastroesophageal reflux disease, unspecified whether esophagitis present    Nicotine dependence with current use  -     nicotine (NICODERM CQ) 14 mg/24hr TD 24 hr patch; Place 1 patch on the skin over 24 hours every 24 hours  -     Complete PFT with post bronchodilator; Future  -     nicotine (NICODERM CQ) 7 mg/24hr TD 24 hr patch; Place 1 patch on the skin over 24 hours every 24 hours    Rheumatoid arthritis of hand, unspecified laterality, unspecified whether rheumatoid factor present (HCC)    Chronic cough    Needs flu shot  -     influenza vaccine, high-dose, PF 0.7 mL (FLUZONE HIGH-DOSE)    Need for prophylactic vaccination against Streptococcus pneumoniae (pneumococcus)  -     Pneumococcal Conjugate Vaccine 20-valent (Pcv20)    Chronic bronchitis, unspecified chronic bronchitis type (HCC)    Osteoarthritis of multiple joints, unspecified osteoarthritis type    Postnasal drip    Encounter for screening for malignant neoplasm of lung in current smoker with 30 pack year history or greater      ______________________________________________________________________    HPI:    Areli Luciano is a 73 y.o. female who presents for evaluation of COPD.  Patient is a smoker and was told she has COPD years ago, she is currently on Trelegy 100.  She rarely needs her as needed albuterol.  She reports chronic symptoms of dyspnea on exertion, chronic cough with sputum production specially in the morning sometimes green in color, denies chronic wheezing or chest tightness but she gets  exacerbations almost every year with viral infection or due to aspiration.  She denies chest pain, denies fever chills or night sweats.  She denies weight loss.  Denies hemoptysis.  Patient has postnasal drip currently not treated.    Other significant pertinent medical conditions include the following:  -Severe GERD symptoms even on maximal medical management with Protonix twice daily, Pepcid twice daily and sucralfate associated with bile regurgitation confirmed on studies and patient reports bouts of aspiration at night sometimes for which she raised the bed head, but sometimes this aspiration events lead to infection/pneumonia as requiring antibiotics.  -History of gastric bypass surgery many years ago and currently following with bariatric surgery for possible need of surgical intervention due abnormality leading to bile regurgitation  -Osteoarthritis including multiple joints mainly hands and shoulders but also knees and ankles.  She was told at some point she has rheumatoid arthritis but all tests were negative as she states.  She is not on any specific treatment.  She is currently on opioids for pain management      Patient lives with her significant other, no pets at home and no exposure to birds, no history of occupational exposure, she is currently retired.  She smoked 1 pack/day for 25 years then she managed to cut down to 2.3 pack/day until now, total smoking history so for is about 30-35-pack-year history        Review of Systems:  Review of Systems   Constitutional: Negative.    HENT:  Positive for postnasal drip.    Eyes: Negative.    Respiratory:  Positive for cough and shortness of breath.    Cardiovascular: Negative.    Gastrointestinal:         Severe GERD   Endocrine: Negative.    Genitourinary: Negative.    Musculoskeletal:  Positive for arthralgias.   Skin: Negative.    Allergic/Immunologic: Negative.    Neurological: Negative.    Hematological: Negative.    Psychiatric/Behavioral: Negative.        Aside from what is mentioned in the HPI, the review of systems otherwise negative.    Current Medications:    Current Outpatient Medications:     albuterol (PROVENTIL HFA,VENTOLIN HFA) 90 mcg/act inhaler, Inhale 2 puffs every 6 (six) hours as needed for wheezing, Disp: 18 g, Rfl: 3    amLODIPine (NORVASC) 5 mg tablet, TAKE 1 TABLET (5 MG TOTAL) BY MOUTH DAILY., Disp: 90 tablet, Rfl: 3    baclofen 10 mg tablet, TAKE 1 TABLET BY MOUTH THREE TIMES A DAY, Disp: 270 tablet, Rfl: 1    benzonatate (TESSALON PERLES) 100 mg capsule, Take 1 capsule (100 mg total) by mouth 3 (three) times a day as needed for cough, Disp: 30 capsule, Rfl: 1    Calcium Carb-Cholecalciferol (Caltrate 600+D3) 600-20 MG-MCG TABS, Take 1 tablet by mouth 2 (two) times a day with meals, Disp: 200 tablet, Rfl: 3    cetirizine (ZyrTEC) 10 mg tablet, Take 0.5 tablets (5 mg total) by mouth daily, Disp: 45 tablet, Rfl: 2    clotrimazole-betamethasone (LOTRISONE) 1-0.05 % cream, Apply topically 3 (three) times a day, Disp: 60 g, Rfl: 1    [facility] Vitamin A 2400 MCG (8000 UT) capsule, TAKE 1 CAPSULE (8,000 UNITS TOTAL) BY MOUTH DAILY, Disp: 90 capsule, Rfl: 2    denosumab (PROLIA) 60 mg/mL, Inject 1 mL (60 mg total) under the skin once for 1 dose, Disp: 1 mL, Rfl: 1    Diclofenac Sodium (VOLTAREN) 1 %, APPLY 2 GRAMS TO AFFECTED AREA 4 TIMES A DAY, Disp: 200 g, Rfl: 0    diphenhydrAMINE-PSE-APAP (BENADRYL ALLERGY/COLD PO), Take by mouth daily at bedtime, Disp: , Rfl:     famotidine (PEPCID) 20 mg tablet, Take 1 tablet (20 mg total) by mouth 2 (two) times a day Please Stop Tagamet, Disp: 180 tablet, Rfl: 1    HYDROcodone-acetaminophen (NORCO)  mg per tablet, Take 1 tab every 4-6 hours prn, for ongoing therapy DO NOT FILL BEFORE: 12/27/23, Disp: 130 tablet, Rfl: 0    HYDROcodone-acetaminophen (NORCO)  mg per tablet, Take 1 tab q 4-6 hours prn  for ongoing therapy DO NOT FILL BEFORE: 11/29/23, Disp: 130 tablet, Rfl: 0    ipratropium-albuterol  (DUO-NEB) 0.5-2.5 mg/3 mL nebulizer solution, Take 3 mL by nebulization every 8 (eight) hours as needed for wheezing or shortness of breath, Disp: 270 mL, Rfl: 3    levothyroxine 25 mcg tablet, TAKE 1 TABLET (25 MCG TOTAL) BY MOUTH EVERY OTHER DAY, Disp: 45 tablet, Rfl: 3    levothyroxine 50 mcg tablet, TAKE 1 TABLET (50 MCG TOTAL) BY MOUTH EVERY OTHER DAY, Disp: 45 tablet, Rfl: 2    losartan (COZAAR) 25 mg tablet, TAKE 1 TABLET (25 MG TOTAL) BY MOUTH DAILY., Disp: 90 tablet, Rfl: 1    nortriptyline (PAMELOR) 25 mg capsule, TAKE 1 CAPSULE BY MOUTH EVERYDAY AT BEDTIME, Disp: 90 capsule, Rfl: 1    ofloxacin (FLOXIN) 0.3 % otic solution, Administer 10 drops to the right ear 2 (two) times a day, Disp: 5 mL, Rfl: 0    pantoprazole (PROTONIX) 40 mg tablet, TAKE 1 TABLET BY MOUTH TWICE A DAY, Disp: 180 tablet, Rfl: 3    Pediatric Multiple Vitamins (Childrens Chew Multivitamin) CHEW, CHEW AND SWALLOW 1 TABLET BY MOUTH EVERY DAY, Disp: 100 tablet, Rfl: 5    pregabalin (LYRICA) 200 MG capsule, Take 1 capsule (200 mg total) by mouth 3 (three) times a day, Disp: 90 capsule, Rfl: 1    sucralfate (CARAFATE) 1 g/10 mL suspension, TAKE 10 ML (1 G TOTAL) BY MOUTH 4 TIMES A DAY, Disp: 1260 mL, Rfl: 1    traZODone (DESYREL) 50 mg tablet, TAKE 1 TABLET BY MOUTH EVERY DAY AT BEDTIME AS NEEDED, Disp: 90 tablet, Rfl: 1    Trelegy Ellipta 100-62.5-25 MCG/ACT inhaler, INHALE 1 PUFF DAILY RINSE MOUTH AFTER USE., Disp: 60 each, Rfl: 3    vitamin B-12 (VITAMIN B-12) 1,000 mcg tablet, Take 1 tablet (1,000 mcg total) by mouth daily, Disp: 90 tablet, Rfl: 3    ergocalciferol (VITAMIN D2) 50,000 units, Take 1 capsule (50,000 Units total) by mouth once a week (Patient not taking: Reported on 1/8/2024), Disp: 13 capsule, Rfl: 2    naloxone (NARCAN) 4 mg/0.1 mL nasal spray, Administer 1 spray into a nostril. If no response after 2-3 minutes, give another dose in the other nostril using a new spray. (Patient not taking: Reported on 9/6/2023), Disp: 1  each, Rfl: 1    predniSONE 10 mg tablet, Take 3 tabs daily with breakfast for 3 days then Take 2 tabs daily for 3 Days then take one tab daily for 3 days then stop.. (Patient not taking: Reported on 1/8/2024), Disp: 20 tablet, Rfl: 0    Current Facility-Administered Medications:     cyanocobalamin injection 1,000 mcg, 1,000 mcg, Intramuscular, Q30 Days, Aleksandar Garduno MD, 1,000 mcg at 12/08/20 1118    cyanocobalamin injection 1,000 mcg, 1,000 mcg, Intramuscular, Q30 Days, Aleksandar Garduno MD, 1,000 mcg at 07/08/20 1036    cyanocobalamin injection 1,000 mcg, 1,000 mcg, Intramuscular, Q30 Days, Aleksandar Garduno MD, 1,000 mcg at 09/08/20 1210    cyanocobalamin injection 1,000 mcg, 1,000 mcg, Intramuscular, Q30 Days, Aleksandar Garduno MD    cyanocobalamin injection 1,000 mcg, 1,000 mcg, Intramuscular, Q30 Days, Aleksandar Garduno MD    cyanocobalamin injection 1,000 mcg, 1,000 mcg, Intramuscular, Q30 Days, Aleksandar Garduno MD, 1,000 mcg at 08/18/21 1019    cyanocobalamin injection 1,000 mcg, 1,000 mcg, Intramuscular, Q30 Days, Aleksandar Garduno MD, 1,000 mcg at 03/21/23 1117    cyanocobalamin injection 1,000 mcg, 1,000 mcg, Intramuscular, Q30 Days, Aleksandar Garduno MD, 1,000 mcg at 07/13/23 1004    Historical Information   Past Medical History:   Diagnosis Date    Anemia     Anxiety     hx of panic attacks (now under control)     Arthritis     Asthma     resolved (no problems in a decade)     Baker's cyst of knee     Bronchitis     Bunion, left foot     Cervical pain     Chronic GERD     Chronic pain     Chronic pain disorder     Depression     Diabetes mellitus, type 2 (HCC)     Diabetic peripheral neuropathy (HCC)     Endometriosis     Fibromyalgia     Hyperlipidemia     Hypertension     Joint pain     Low back pain     Low back pain     Lung nodules     Macular degeneration     Pulmonary emphysema (HCC) 01/16/2020    Spondylosis     Uterine cancer (HCC)      Past Surgical History:   Procedure Laterality Date    BACK SURGERY  1996    L5, S1- laser  "surgery fusion of c5 and c6     BREAST CYST EXCISION Right     CHOLECYSTECTOMY  2004    FOOT SURGERY Left 2005    fusion     FRACTURE SURGERY      GASTRIC BYPASS  2010    Dr. Oropeza     HYSTERECTOMY  1985    just uterus     KNEE ARTHROSCOPY      LAMINECTOMY      ORTHOPEDIC SURGERY      OVARIAN CYST REMOVAL  1975    PELVIC LAPAROSCOPY      ovaries (x10)     PLEURAL SCARIFICATION  1967    SHOULDER OPEN ROTATOR CUFF REPAIR Left 2008    TONSILLECTOMY      VAGINAL PROLAPSE REPAIR       Social History   Social History     Tobacco Use   Smoking Status Every Day    Current packs/day: 0.25    Average packs/day: 0.3 packs/day for 40.0 years (10.0 ttl pk-yrs)    Types: Cigarettes   Smokeless Tobacco Never       Occupational history:  No occupational exposure    Family History:   Family History   Problem Relation Age of Onset    Hypertension Mother     Lung cancer Mother     Hypertension Father     Heart disease Father     Heart attack Father     Cancer Father     No Known Problems Sister     No Known Problems Daughter     No Known Problems Maternal Grandmother     No Known Problems Maternal Grandfather     No Known Problems Paternal Grandmother     No Known Problems Paternal Grandfather     Breast cancer Paternal Aunt 40    Breast cancer Paternal Aunt 45    Breast cancer Maternal Aunt 48         PhysicalExamination:  Vitals:   /68 (BP Location: Left arm, Patient Position: Sitting, Cuff Size: Standard)   Pulse 74   Temp 97.7 °F (36.5 °C) (Tympanic)   Ht 5' 5\" (1.651 m)   Wt 81.6 kg (180 lb)   LMP  (LMP Unknown)   SpO2 98%   BMI 29.95 kg/m²     Gen:  Comfortable on room air.  No conversational dyspnea  HEENT:  Conjugate gaze.  sclerae anicteric.  Oropharynx moist  Neck: Trachea is midline. No JVD. No adenopathy  Chest: Equal but coarse breath sounds bilaterally, no crackles or wheezing, good air movement  Cardiac: S1-S2 regular. no murmur  Abdomen:  benign, soft and nontender  Extremities: No edema, no clubbing or " "cyanosis, osteoarthritis nodules in both hands especially DIP  Neuro:  Normal speech and mentation      Diagnostic Data:  Labs:  I personally reviewed the most recent laboratory data pertinent to today's visit    Lab Results   Component Value Date    WBC 4.7 05/02/2023    HGB 12.4 05/02/2023    HCT 37.2 05/02/2023    MCV 97.9 05/02/2023     05/02/2023     Lab Results   Component Value Date    CALCIUM 8.9 05/02/2023    K 3.6 05/02/2023    CO2 29 05/02/2023     05/02/2023    BUN 7 05/02/2023    CREATININE 0.71 05/02/2023     No results found for: \"IGE\"  Lab Results   Component Value Date    ALT 16 05/02/2023    AST 21 05/02/2023    ALKPHOS 109 05/02/2023           Imaging:  I personally reviewed the images on the PAC system pertinent to today's visit  Chest CT scan reviewed on PACS: No significant emphysematous changes, clear parenchyma except for tiny nodule especially in the right apex with a cluster of nodules measuring together about 6 mm    Other studies:  EGD:IMPRESSION:  Small hiatal hernia  Otherwise normal esophagus. Proximal and distal biopsies were obtained   Significant amount of bile reflux in the gastric pouch. Dilated gastric pouch with areas of edema and erythema. Biopsied  Normal gj anastomosis  Normal jejunum. Biopsied      Echocardiogram: LVEF 60%, normal RV    Cicik Demetri Robles MD  "

## 2024-01-08 NOTE — ASSESSMENT & PLAN NOTE
I did not see much emphysema on CT scan and her history and lung exam is more consistent with chronic bronchitis.  Regardless we will continue current inhalers with Trelegy and as needed albuterol.  Counseled to quit smoking as below.  Will check PFTs for evaluation and to have as baseline.

## 2024-01-08 NOTE — ASSESSMENT & PLAN NOTE
I think her GERD and recurrent aspiration is playing a factor in her respiratory complaints and she agrees with that.  Continue to follow with GI and bariatric surgery evaluation and continue current management with PPI, H2 blocker and sucralfate.

## 2024-01-08 NOTE — ASSESSMENT & PLAN NOTE
Given the fact that patient has DIP involvement of her arthritis with Heberden's nodes, then this is not rheumatoid arthritis but osteoarthritis.  Her serology workup in the past was negative including RF, LEONCIO and anti-CCP antibodies.  Will cancel this diagnosis and add osteoarthritis

## 2024-01-08 NOTE — ASSESSMENT & PLAN NOTE
Counseled patient for 6 minutes about smoking cessation, she is willing to try nicotine patch, also we talked about strategies to quit, she is considering to quit by the end of February.

## 2024-01-08 NOTE — ASSESSMENT & PLAN NOTE
Offered patient Flonase but she tried in the past without any benefit, she wants to try over-the-counter Xlear, was recommended to her by her friend.

## 2024-01-09 ENCOUNTER — TELEPHONE (OUTPATIENT)
Age: 74
End: 2024-01-09

## 2024-01-09 DIAGNOSIS — G89.29 CHRONIC BILATERAL LOW BACK PAIN WITH BILATERAL SCIATICA: ICD-10-CM

## 2024-01-09 DIAGNOSIS — M54.16 LUMBAR RADICULITIS: ICD-10-CM

## 2024-01-09 DIAGNOSIS — M79.18 MYOFASCIAL PAIN SYNDROME: ICD-10-CM

## 2024-01-09 DIAGNOSIS — M54.41 CHRONIC BILATERAL LOW BACK PAIN WITH BILATERAL SCIATICA: ICD-10-CM

## 2024-01-09 DIAGNOSIS — G89.4 CHRONIC PAIN SYNDROME: ICD-10-CM

## 2024-01-09 DIAGNOSIS — M54.42 CHRONIC BILATERAL LOW BACK PAIN WITH BILATERAL SCIATICA: ICD-10-CM

## 2024-01-09 RX ORDER — BACLOFEN 10 MG/1
10 TABLET ORAL 3 TIMES DAILY
Qty: 270 TABLET | Refills: 1 | Status: SHIPPED | OUTPATIENT
Start: 2024-01-09 | End: 2024-01-22

## 2024-01-09 RX ORDER — BACLOFEN 10 MG/1
10 TABLET ORAL 3 TIMES DAILY
Qty: 270 TABLET | Refills: 1 | Status: CANCELLED | OUTPATIENT
Start: 2024-01-09

## 2024-01-09 RX ORDER — BACLOFEN 10 MG/1
10 TABLET ORAL 3 TIMES DAILY
Qty: 270 TABLET | Refills: 1 | OUTPATIENT
Start: 2024-01-09

## 2024-01-09 NOTE — TELEPHONE ENCOUNTER
"Unable to close task due to message \"You must address all unsigned medications to complete this message.\"    Pt has 2 tasks.   "

## 2024-01-09 NOTE — TELEPHONE ENCOUNTER
Reason for call:   [x] Refill   [] Prior Auth  [] Other:     Office:   [] PCP/Provider -   [x] Specialty/Provider - Roxanna Mercado    Medication: Baclofen    Dose/Frequency: 10 mg TID    Quantity: 270    [facility] [address]    Does the patient have enough for 3 days?   [] Yes   [x] No - Send as HP to POD    Pharmacy Sent to wrong office. this is correct office not duplicate, never filled needs to be filled, out of med's since mid December

## 2024-01-09 NOTE — TELEPHONE ENCOUNTER
Copied from other RX request:    Mirna Quintero     KM    1/9/24  9:59 AM  Note      Reason for call:   [x] Refill   [] Prior Auth  [] Other:      Office:   [] PCP/Provider -   [x] Specialty/Provider - Roxanna Mercado     Medication: Baclofen     Dose/Frequency: 10 mg TID     Quantity: 270     Pharmacy: [facility] in [facility] [address]     Does the patient have enough for 3 days?   [] Yes   [x] No - Send as HP to POD     Pharmacy Sent to wrong office. this is correct office not duplicate, never filled needs to be filled, out of med's since mid December 1/9/24  9:56 AM  Areli Luciano contacted Mirna Quintero

## 2024-01-10 ENCOUNTER — TELEPHONE (OUTPATIENT)
Dept: FAMILY MEDICINE CLINIC | Facility: CLINIC | Age: 74
End: 2024-01-10

## 2024-01-10 ENCOUNTER — TELEPHONE (OUTPATIENT)
Age: 74
End: 2024-01-10

## 2024-01-10 DIAGNOSIS — M79.18 MYOFASCIAL PAIN SYNDROME: ICD-10-CM

## 2024-01-10 DIAGNOSIS — G89.29 CHRONIC BILATERAL LOW BACK PAIN WITH BILATERAL SCIATICA: ICD-10-CM

## 2024-01-10 DIAGNOSIS — M54.42 CHRONIC BILATERAL LOW BACK PAIN WITH BILATERAL SCIATICA: ICD-10-CM

## 2024-01-10 DIAGNOSIS — M54.41 CHRONIC BILATERAL LOW BACK PAIN WITH BILATERAL SCIATICA: ICD-10-CM

## 2024-01-10 NOTE — TELEPHONE ENCOUNTER
Left pt voicemail and is aware Prolia is every 6 months. Stated if she had any other questions to give the office a call back.

## 2024-01-10 NOTE — TELEPHONE ENCOUNTER
Reason for call:   [x] Refill   [] Prior Auth  [] Other:     Office:   [] PCP/Provider -   [x] Specialty/Provider - SPA / Alonso    Medication: pregabalin    Dose/Frequency: 200mg tid    Quantity: 90    [id number] [place] [address]     Does the patient have enough for 3 days?   [x] Yes   [] No - Send as HP to POD

## 2024-01-10 NOTE — TELEPHONE ENCOUNTER
Sherrie with [facility] pharmacy contacted the office this afternoon looking to get clarification on Denosumab (prolia) 60 mg/mL. Sig: Inject 1mL (60 mg total) under the skin once for 1 dose with Refill of 1. Needing clarification on frequency if it this will be monthly.     Verbal can be given by phone: [phone]  Or New script can be faxed electronically with the frequency of injection.

## 2024-01-11 ENCOUNTER — APPOINTMENT (OUTPATIENT)
Dept: LAB | Facility: HOSPITAL | Age: 74
End: 2024-01-11
Payer: COMMERCIAL

## 2024-01-11 DIAGNOSIS — E03.1 CONGENITAL HYPOTHYROIDISM: ICD-10-CM

## 2024-01-11 DIAGNOSIS — R21 RASH: ICD-10-CM

## 2024-01-11 DIAGNOSIS — E04.1 NONTOXIC UNINODULAR GOITER: ICD-10-CM

## 2024-01-11 DIAGNOSIS — R91.8 LUNG NODULES: ICD-10-CM

## 2024-01-11 DIAGNOSIS — M81.8 IDIOPATHIC OSTEOPOROSIS: ICD-10-CM

## 2024-01-11 DIAGNOSIS — E34.9 ENDOCRINE DISORDER RELATED TO PUBERTY: ICD-10-CM

## 2024-01-11 DIAGNOSIS — E04.2 MULTIPLE THYROID NODULES: ICD-10-CM

## 2024-01-11 DIAGNOSIS — E16.2 HYPOGLYCEMIA, UNSPECIFIED: ICD-10-CM

## 2024-01-11 DIAGNOSIS — F17.210 CIGARETTE SMOKER: ICD-10-CM

## 2024-01-11 DIAGNOSIS — E11.8 DIABETIC COMPLICATION (HCC): ICD-10-CM

## 2024-01-11 LAB
ALBUMIN SERPL BCP-MCNC: 3.8 G/DL (ref 3.5–5)
ALP SERPL-CCNC: 83 U/L (ref 34–104)
ALT SERPL W P-5'-P-CCNC: 15 U/L (ref 7–52)
AMYLASE SERPL-CCNC: 40 IU/L (ref 29–103)
ANION GAP SERPL CALCULATED.3IONS-SCNC: 11 MMOL/L
AST SERPL W P-5'-P-CCNC: 28 U/L (ref 13–39)
BASOPHILS # BLD AUTO: 0.02 THOUSANDS/ÂΜL (ref 0–0.1)
BASOPHILS NFR BLD AUTO: 1 % (ref 0–1)
BILIRUB SERPL-MCNC: 0.44 MG/DL (ref 0.2–1)
BUN SERPL-MCNC: 7 MG/DL (ref 5–25)
CALCIUM SERPL-MCNC: 8.9 MG/DL (ref 8.4–10.2)
CEA SERPL-MCNC: 3 NG/ML (ref 0–3)
CHLORIDE SERPL-SCNC: 103 MMOL/L (ref 96–108)
CHOLEST SERPL-MCNC: 204 MG/DL
CO2 SERPL-SCNC: 27 MMOL/L (ref 21–32)
CORTIS AM PEAK SERPL-MCNC: 12.8 UG/DL (ref 6.7–22.6)
CREAT SERPL-MCNC: 0.73 MG/DL (ref 0.6–1.3)
CRP SERPL QL: 8.3 MG/L
EOSINOPHIL # BLD AUTO: 0.18 THOUSAND/ÂΜL (ref 0–0.61)
EOSINOPHIL NFR BLD AUTO: 5 % (ref 0–6)
ERYTHROCYTE [DISTWIDTH] IN BLOOD BY AUTOMATED COUNT: 13.9 % (ref 11.6–15.1)
EST. AVERAGE GLUCOSE BLD GHB EST-MCNC: 108 MG/DL
GFR SERPL CREATININE-BSD FRML MDRD: 81 ML/MIN/1.73SQ M
GLUCOSE P FAST SERPL-MCNC: 77 MG/DL (ref 65–99)
HBA1C MFR BLD: 5.4 %
HCT VFR BLD AUTO: 37.7 % (ref 34.8–46.1)
HDLC SERPL-MCNC: 57 MG/DL
HGB BLD-MCNC: 12.3 G/DL (ref 11.5–15.4)
IMM GRANULOCYTES # BLD AUTO: 0.01 THOUSAND/UL (ref 0–0.2)
IMM GRANULOCYTES NFR BLD AUTO: 0 % (ref 0–2)
LDLC SERPL CALC-MCNC: 122 MG/DL (ref 0–100)
LIPASE SERPL-CCNC: 11 U/L (ref 11–82)
LYMPHOCYTES # BLD AUTO: 0.68 THOUSANDS/ÂΜL (ref 0.6–4.47)
LYMPHOCYTES NFR BLD AUTO: 18 % (ref 14–44)
MAGNESIUM SERPL-MCNC: 1.8 MG/DL (ref 1.9–2.7)
MCH RBC QN AUTO: 32 PG (ref 26.8–34.3)
MCHC RBC AUTO-ENTMCNC: 32.6 G/DL (ref 31.4–37.4)
MCV RBC AUTO: 98 FL (ref 82–98)
MONOCYTES # BLD AUTO: 0.35 THOUSAND/ÂΜL (ref 0.17–1.22)
MONOCYTES NFR BLD AUTO: 9 % (ref 4–12)
NEUTROPHILS # BLD AUTO: 2.48 THOUSANDS/ÂΜL (ref 1.85–7.62)
NEUTS SEG NFR BLD AUTO: 67 % (ref 43–75)
NONHDLC SERPL-MCNC: 147 MG/DL
NRBC BLD AUTO-RTO: 0 /100 WBCS
PHOSPHATE SERPL-MCNC: 3.8 MG/DL (ref 2.3–4.1)
PLATELET # BLD AUTO: 195 THOUSANDS/UL (ref 149–390)
PMV BLD AUTO: 10 FL (ref 8.9–12.7)
POTASSIUM SERPL-SCNC: 3.6 MMOL/L (ref 3.5–5.3)
PROLACTIN SERPL-MCNC: 9.53 NG/ML (ref 2.74–19.64)
PROT SERPL-MCNC: 6.1 G/DL (ref 6.4–8.4)
RBC # BLD AUTO: 3.84 MILLION/UL (ref 3.81–5.12)
SODIUM SERPL-SCNC: 141 MMOL/L (ref 135–147)
T4 FREE SERPL-MCNC: 0.93 NG/DL (ref 0.61–1.12)
TRIGL SERPL-MCNC: 123 MG/DL
TSH SERPL DL<=0.05 MIU/L-ACNC: 1.89 UIU/ML (ref 0.45–4.5)
VIT B12 SERPL-MCNC: 1000 PG/ML (ref 180–914)
WBC # BLD AUTO: 3.72 THOUSAND/UL (ref 4.31–10.16)

## 2024-01-11 PROCEDURE — 83690 ASSAY OF LIPASE: CPT

## 2024-01-11 PROCEDURE — 82533 TOTAL CORTISOL: CPT

## 2024-01-11 PROCEDURE — 83735 ASSAY OF MAGNESIUM: CPT

## 2024-01-11 PROCEDURE — 84681 ASSAY OF C-PEPTIDE: CPT

## 2024-01-11 PROCEDURE — 36415 COLL VENOUS BLD VENIPUNCTURE: CPT

## 2024-01-11 PROCEDURE — 83835 ASSAY OF METANEPHRINES: CPT

## 2024-01-11 PROCEDURE — 80053 COMPREHEN METABOLIC PANEL: CPT

## 2024-01-11 PROCEDURE — 85025 COMPLETE CBC W/AUTO DIFF WBC: CPT

## 2024-01-11 PROCEDURE — 84146 ASSAY OF PROLACTIN: CPT

## 2024-01-11 PROCEDURE — 82308 ASSAY OF CALCITONIN: CPT

## 2024-01-11 PROCEDURE — 84100 ASSAY OF PHOSPHORUS: CPT

## 2024-01-11 PROCEDURE — 82607 VITAMIN B-12: CPT

## 2024-01-11 PROCEDURE — 84439 ASSAY OF FREE THYROXINE: CPT

## 2024-01-11 PROCEDURE — 84443 ASSAY THYROID STIM HORMONE: CPT

## 2024-01-11 PROCEDURE — 82024 ASSAY OF ACTH: CPT

## 2024-01-11 PROCEDURE — 86140 C-REACTIVE PROTEIN: CPT

## 2024-01-11 PROCEDURE — 83036 HEMOGLOBIN GLYCOSYLATED A1C: CPT

## 2024-01-11 PROCEDURE — 82150 ASSAY OF AMYLASE: CPT

## 2024-01-11 PROCEDURE — 80061 LIPID PANEL: CPT

## 2024-01-11 PROCEDURE — 86800 THYROGLOBULIN ANTIBODY: CPT

## 2024-01-11 PROCEDURE — 82378 CARCINOEMBRYONIC ANTIGEN: CPT

## 2024-01-11 RX ORDER — PREGABALIN 200 MG/1
200 CAPSULE ORAL 3 TIMES DAILY
Qty: 90 CAPSULE | Refills: 1 | Status: SHIPPED | OUTPATIENT
Start: 2024-01-11

## 2024-01-11 NOTE — TELEPHONE ENCOUNTER
RN s/w pt regarding previous.  Per pt she has 15 pills left and takes 3 a day they do help and no side effects noted.  Pt will run out of these prior to seeing CG on 1/22 and would like [facility]    --please advise thank you--  Refill lyrica 200mg TID, pt to see you in [facility] on 1/22

## 2024-01-12 LAB
ACTH PLAS-MCNC: 5.1 PG/ML (ref 7.2–63.3)
C PEPTIDE SERPL-MCNC: 2.5 NG/ML (ref 1.1–4.4)
THYROGLOB AB SERPL-ACNC: <1 IU/ML (ref 0–0.9)

## 2024-01-13 LAB — CALCIT SERPL-MCNC: <2 PG/ML (ref 0–5)

## 2024-01-15 DIAGNOSIS — M81.8 IDIOPATHIC OSTEOPOROSIS: ICD-10-CM

## 2024-01-15 NOTE — TELEPHONE ENCOUNTER
Patient needs next injection in June 2024- [facility] requested patient call office for new script- as it may need prior authorization.      Reason for call:   [x] Refill   [] Prior Auth  [] Other:     Office:   [x] PCP/Provider - Moussa  [] Specialty/Provider -     Medication: denosumab (PROLIA) 60 mg/mL     Dose/Frequency: : Inject 1 mL (60 mg total) under the skin once for 1 dose     Quantity: 1 mL     [facility]    Does the patient have enough for 3 days?   [x] Yes   [] No - Send as HP to POD

## 2024-01-16 DIAGNOSIS — E03.9 HYPOTHYROIDISM, UNSPECIFIED TYPE: ICD-10-CM

## 2024-01-16 RX ORDER — LEVOTHYROXINE SODIUM 0.05 MG/1
50 TABLET ORAL EVERY OTHER DAY
Qty: 45 TABLET | Refills: 1 | Status: SHIPPED | OUTPATIENT
Start: 2024-01-16

## 2024-01-17 DIAGNOSIS — K21.9 GASTROESOPHAGEAL REFLUX DISEASE WITHOUT ESOPHAGITIS: ICD-10-CM

## 2024-01-17 RX ORDER — SUCRALFATE ORAL 1 G/10ML
SUSPENSION ORAL
Qty: 1260 ML | Refills: 1 | Status: SHIPPED | OUTPATIENT
Start: 2024-01-17

## 2024-01-19 LAB
METANEPH FREE SERPL-MCNC: 44.7 PG/ML (ref 0–88)
NORMETANEPHRINE SERPL-MCNC: 220.2 PG/ML (ref 0–285.2)

## 2024-01-22 ENCOUNTER — OFFICE VISIT (OUTPATIENT)
Dept: PAIN MEDICINE | Facility: MEDICAL CENTER | Age: 74
End: 2024-01-22
Payer: COMMERCIAL

## 2024-01-22 VITALS
OXYGEN SATURATION: 98 % | DIASTOLIC BLOOD PRESSURE: 72 MMHG | HEIGHT: 65 IN | WEIGHT: 175 LBS | BODY MASS INDEX: 29.16 KG/M2 | SYSTOLIC BLOOD PRESSURE: 149 MMHG | HEART RATE: 65 BPM

## 2024-01-22 DIAGNOSIS — G89.4 CHRONIC PAIN SYNDROME: ICD-10-CM

## 2024-01-22 DIAGNOSIS — Z79.891 LONG-TERM CURRENT USE OF OPIATE ANALGESIC: ICD-10-CM

## 2024-01-22 DIAGNOSIS — M79.18 MYOFASCIAL PAIN SYNDROME: Primary | ICD-10-CM

## 2024-01-22 DIAGNOSIS — M54.16 LUMBAR RADICULITIS: ICD-10-CM

## 2024-01-22 DIAGNOSIS — M54.12 CERVICAL RADICULOPATHY: ICD-10-CM

## 2024-01-22 PROCEDURE — 99214 OFFICE O/P EST MOD 30 MIN: CPT

## 2024-01-22 RX ORDER — HYDROCODONE BITARTRATE AND ACETAMINOPHEN 10; 325 MG/1; MG/1
TABLET ORAL
Qty: 130 TABLET | Refills: 0 | Status: SHIPPED | OUTPATIENT
Start: 2024-01-22

## 2024-01-22 RX ORDER — CYCLOBENZAPRINE HCL 10 MG
10 TABLET ORAL 3 TIMES DAILY PRN
Qty: 90 TABLET | Refills: 1 | Status: SHIPPED | OUTPATIENT
Start: 2024-01-22 | End: 2024-01-26

## 2024-01-22 NOTE — PROGRESS NOTES
Assessment:  1. Myofascial pain syndrome    2. Chronic pain syndrome    3. Lumbar radiculitis    4. Cervical radiculopathy    5. Long-term current use of opiate analgesic        Plan:  The patient remains clinically stable on hydrocodone-acetaminophen 10/325 mg 1 tablet every 4-6 hours as needed.  Two 1 month supplies of this medication were sent to the patient's pharmacy today with do not fill dates of today, 1/22/2024, and 2/19/2024.     There are risks associated with opioid medications, including dependence, addiction and tolerance. The patient understands and agrees to use these medications only as prescribed. Potential side effects of the medications include, but are not limited to, constipation, drowsiness, addiction, impaired judgment and risk of fatal overdose if not taken as prescribed. The patient was warned against driving while taking sedation medications.  Sharing medications is a felony. At this point in time, the patient is showing no signs of addiction, abuse, diversion or suicidal ideation.    [place] Prescription Drug Monitoring Program report was reviewed and was appropriate     Continue pregabalin 200 mg 1 capsule 3 times daily.  The patient did not require refills of this medication at today's visit.  Discontinue baclofen 10 mg due to lack of therapeutic effect. Initiate cyclobenzaprine 10 mg 1 tablet 3 times daily as needed for muscle spasms.  I advised the patient that they should not drive or operate machinery while on this medication until they see how it affects their mentation, as it has the potential to cause lethargy and mental cloudyness. I advised the patient to call our office if they experience any adverse effects from the medication. The patient verbalized understanding.  Follow-up in 8 weeks or sooner if needed.    My impressions and treatment recommendations were discussed in detail with the patient who verbalized understanding and had no further questions.  Discharge  instructions were provided. I personally saw and examined the patient and I agree with the above discussed plan of care.    No orders of the defined types were placed in this encounter.    New Medications Ordered This Visit   Medications    HYDROcodone-acetaminophen (NORCO)  mg per tablet     Sig: Take 1 tab q 4-6 hours prn  for ongoing therapy DO NOT FILL BEFORE: 01/22/2024     Dispense:  130 tablet     Refill:  0    HYDROcodone-acetaminophen (NORCO)  mg per tablet     Sig: Take 1 tab every 4-6 hours prn, for ongoing therapy DO NOT FILL BEFORE: 02/19/2024     Dispense:  130 tablet     Refill:  0    cyclobenzaprine (FLEXERIL) 10 mg tablet     Sig: Take 1 tablet (10 mg total) by mouth 3 (three) times a day as needed for muscle spasms     Dispense:  90 tablet     Refill:  1       History of Present Illness:  Areli Luciano is a 73 y.o. female who presents for a follow up office visit in regards to ongoing low back and bilateral lower extremity pain as well as neck and bilateral upper extremity pain.  The patient reports that her pain is constant, and is currently rating it an 8/10 on the numeric pain rating scale.  She describes the quality of her pain as burning, dull/aching, sharp, throbbing, pressure-like, shooting, and pins-and-needles.  She denies any saddle anesthesia or bowel/bladder incontinence.  The patient reports that she has been in some increased pain lately due to cold weather.    The patient's pain is well-controlled on current pain medication regimen of hydrocodone-acetaminophen 10/325 mg 1 tablet every 4-6 hours as needed and pregabalin 200 mg 1 capsule 3 times daily.  The patient was started on baclofen 10 mg 1 tablet 3 times daily as needed for muscle spasms, however she has not had much relief while using this medication. She tolerates this regimen well without side effects and reports that it reduces her pain by about 30%.    I have personally reviewed and/or updated the patient's  past medical history, past surgical history, family history, social history, current medications, allergies, and vital signs today.     Review of Systems   Respiratory:  Positive for shortness of breath.    Cardiovascular:  Negative for chest pain.   Gastrointestinal:  Positive for diarrhea and nausea. Negative for constipation and vomiting.   Musculoskeletal:  Positive for joint swelling and myalgias. Negative for arthralgias and gait problem.   Skin:  Negative for rash.   Neurological:  Positive for dizziness. Negative for seizures and weakness.   All other systems reviewed and are negative.      Patient Active Problem List   Diagnosis    Chronic pain syndrome    Cervical radiculopathy    Myofascial pain syndrome    Spondylosis of cervical region without myelopathy or radiculopathy    Fibromyalgia    Diabetic peripheral neuropathy (HCC)    Long-term current use of opiate analgesic    MGUS (monoclonal gammopathy of unknown significance)    Iron deficiency anemia following bariatric surgery    Light headedness    Chronic bronchitis (HCC)    Primary osteoarthritis of right knee    Pseudogout of left knee    Lumbar radiculitis    Chronic bilateral low back pain with bilateral sciatica    Rotator cuff syndrome, left    Left shoulder pain    Dizziness    Other fatigue    Biceps tendinitis of left shoulder    Adhesive capsulitis of left shoulder    Hypoglycemia    Hypothyroidism due to Hashimoto's thyroiditis    Type 2 diabetes mellitus with hyperlipidemia     Iron deficiency anemia, unspecified    GERD (gastroesophageal reflux disease)    Nicotine dependence with current use    Need for prophylactic vaccination against Streptococcus pneumoniae (pneumococcus)    Needs flu shot    Osteoarthritis    Postnasal drip    Encounter for screening for malignant neoplasm of lung in current smoker with 30 pack year history or greater       Past Medical History:   Diagnosis Date    Anemia     Anxiety     hx of panic attacks (now  under control)     Arthritis     Asthma     resolved (no problems in a decade)     Baker's cyst of knee     Bronchitis     Bunion, left foot     Cervical pain     Chronic GERD     Chronic pain     Chronic pain disorder     Depression     Diabetes mellitus, type 2 (HCC)     Diabetic peripheral neuropathy (HCC)     Endometriosis     Fibromyalgia     Hyperlipidemia     Hypertension     Joint pain     Low back pain     Low back pain     Lung nodules     Macular degeneration     Pulmonary emphysema (HCC) 01/16/2020    Spondylosis     Spontaneous pneumothorax     Uterine cancer (HCC)        Past Surgical History:   Procedure Laterality Date    BACK SURGERY  1996    L5, S1- laser surgery fusion of c5 and c6     BREAST CYST EXCISION Right     CHOLECYSTECTOMY  2004    FOOT SURGERY Left 2005    fusion     FRACTURE SURGERY      GASTRIC BYPASS  2010    Dr. Oropeza     HYSTERECTOMY  1985    just uterus     KNEE ARTHROSCOPY      LAMINECTOMY      ORTHOPEDIC SURGERY      OVARIAN CYST REMOVAL  1975    PELVIC LAPAROSCOPY      ovaries (x10)     PLEURAL SCARIFICATION  1967    SHOULDER OPEN ROTATOR CUFF REPAIR Left 2008    TONSILLECTOMY      VAGINAL PROLAPSE REPAIR         Family History   Problem Relation Age of Onset    Hypertension Mother     Lung cancer Mother     Hypertension Father     Heart disease Father     Heart attack Father     Cancer Father     No Known Problems Sister     No Known Problems Daughter     No Known Problems Maternal Grandmother     No Known Problems Maternal Grandfather     No Known Problems Paternal Grandmother     No Known Problems Paternal Grandfather     Breast cancer Paternal Aunt 40    Breast cancer Paternal Aunt 45    Breast cancer Maternal Aunt 48       Social History     Occupational History    Not on file   Tobacco Use    Smoking status: Every Day     Current packs/day: 0.30     Average packs/day: 0.6 packs/day for 53.1 years (33.4 ttl pk-yrs)     Types: Cigarettes     Start date: 1971    Smokeless  tobacco: Never   Vaping Use    Vaping status: Never Used   Substance and Sexual Activity    Alcohol use: Not Currently     Alcohol/week: 1.0 standard drink of alcohol     Types: 1 Shots of liquor per week     Comment: rarely    Drug use: No    Sexual activity: Not Currently     Partners: Male       Current Outpatient Medications on File Prior to Visit   Medication Sig    albuterol (PROVENTIL HFA,VENTOLIN HFA) 90 mcg/act inhaler Inhale 2 puffs every 6 (six) hours as needed for wheezing    amLODIPine (NORVASC) 5 mg tablet TAKE 1 TABLET (5 MG TOTAL) BY MOUTH DAILY.    Calcium Carb-Cholecalciferol (Caltrate 600+D3) 600-20 MG-MCG TABS Take 1 tablet by mouth 2 (two) times a day with meals    cetirizine (ZyrTEC) 10 mg tablet Take 0.5 tablets (5 mg total) by mouth daily    clotrimazole-betamethasone (LOTRISONE) 1-0.05 % cream Apply topically 3 (three) times a day    [employer] Vitamin A 2400 MCG (8000 UT) capsule TAKE 1 CAPSULE (8,000 UNITS TOTAL) BY MOUTH DAILY    Diclofenac Sodium (VOLTAREN) 1 % APPLY 2 GRAMS TO AFFECTED AREA 4 TIMES A DAY    diphenhydrAMINE-PSE-APAP (BENADRYL ALLERGY/COLD PO) Take by mouth daily at bedtime    famotidine (PEPCID) 20 mg tablet Take 1 tablet (20 mg total) by mouth 2 (two) times a day Please Stop Tagamet    fluticasone-umeclidinium-vilanterol (Trelegy Ellipta) 100-62.5-25 mcg/actuation inhaler Inhale 1 puff daily Rinse mouth after use.    ipratropium-albuterol (DUO-NEB) 0.5-2.5 mg/3 mL nebulizer solution Take 3 mL by nebulization every 8 (eight) hours as needed for wheezing or shortness of breath    levothyroxine 25 mcg tablet TAKE 1 TABLET (25 MCG TOTAL) BY MOUTH EVERY OTHER DAY    levothyroxine 50 mcg tablet TAKE 1 TABLET (50 MCG TOTAL) BY MOUTH EVERY OTHER DAY    losartan (COZAAR) 25 mg tablet TAKE 1 TABLET (25 MG TOTAL) BY MOUTH DAILY.    nortriptyline (PAMELOR) 25 mg capsule TAKE 1 CAPSULE BY MOUTH EVERYDAY AT BEDTIME    pantoprazole (PROTONIX) 40 mg tablet TAKE 1 TABLET BY MOUTH TWICE A DAY     Pediatric Multiple Vitamins (Childrens Chew Multivitamin) CHEW CHEW AND SWALLOW 1 TABLET BY MOUTH EVERY DAY    pregabalin (LYRICA) 200 MG capsule Take 1 capsule (200 mg total) by mouth 3 (three) times a day    sucralfate (CARAFATE) 1 g/10 mL suspension TAKE 10 ML (1 G TOTAL) BY MOUTH 4 TIMES A DAY    traZODone (DESYREL) 50 mg tablet TAKE 1 TABLET BY MOUTH EVERY DAY AT BEDTIME AS NEEDED    vitamin B-12 (VITAMIN B-12) 1,000 mcg tablet Take 1 tablet (1,000 mcg total) by mouth daily    [DISCONTINUED] baclofen 10 mg tablet Take 1 tablet (10 mg total) by mouth 3 (three) times a day    [DISCONTINUED] HYDROcodone-acetaminophen (NORCO)  mg per tablet Take 1 tab every 4-6 hours prn, for ongoing therapy DO NOT FILL BEFORE: 12/27/23    benzonatate (TESSALON PERLES) 100 mg capsule Take 1 capsule (100 mg total) by mouth 3 (three) times a day as needed for cough (Patient not taking: Reported on 1/22/2024)    denosumab (PROLIA) 60 mg/mL Inject 1 mL (60 mg total) under the skin once for 1 dose    ergocalciferol (VITAMIN D2) 50,000 units Take 1 capsule (50,000 Units total) by mouth once a week (Patient not taking: Reported on 1/8/2024)    naloxone (NARCAN) 4 mg/0.1 mL nasal spray Administer 1 spray into a nostril. If no response after 2-3 minutes, give another dose in the other nostril using a new spray. (Patient not taking: Reported on 9/6/2023)    nicotine (NICODERM CQ) 14 mg/24hr TD 24 hr patch Place 1 patch on the skin over 24 hours every 24 hours    nicotine (NICODERM CQ) 7 mg/24hr TD 24 hr patch Place 1 patch on the skin over 24 hours every 24 hours    [DISCONTINUED] HYDROcodone-acetaminophen (NORCO)  mg per tablet Take 1 tab q 4-6 hours prn  for ongoing therapy DO NOT FILL BEFORE: 11/29/23 (Patient not taking: Reported on 1/22/2024)     Current Facility-Administered Medications on File Prior to Visit   Medication    cyanocobalamin injection 1,000 mcg    cyanocobalamin injection 1,000 mcg    cyanocobalamin  "injection 1,000 mcg    cyanocobalamin injection 1,000 mcg    cyanocobalamin injection 1,000 mcg    cyanocobalamin injection 1,000 mcg    cyanocobalamin injection 1,000 mcg    cyanocobalamin injection 1,000 mcg       Allergies   Allergen Reactions    Cephalosporins Hives    Erythromycin GI Intolerance    Fentanyl Itching     Occurred during surgery     Paxil [Paroxetine] Hives     After 2 weeks    Penicillins Itching    Pravastatin Myalgia    Statins Itching    Sulfa Antibiotics Diarrhea    Wellbutrin [Bupropion] Hives     After 2 weeks     Marzena's Wort Rash       Physical Exam:    /72   Pulse 65   Ht 5' 5\" (1.651 m)   Wt 79.4 kg (175 lb)   LMP  (LMP Unknown)   SpO2 98%   BMI 29.12 kg/m²     Constitutional:normal, well developed, well nourished, alert, in no distress and non-toxic and no overt pain behavior.  Eyes:anicteric  HEENT:grossly intact  Neck:symmetric, trachea midline and no masses   Pulmonary:even and unlabored  Cardiovascular:No edema or pitting edema present  Skin:Normal without rashes or lesions and well hydrated  Psychiatric:Mood and affect appropriate  Neurologic:Cranial Nerves II-XII grossly intact  Musculoskeletal:normal gait.  Diffuse tenderness to palpation over the cervical, thoracic, and lumbar paraspinal musculature.     "

## 2024-02-08 DIAGNOSIS — M81.8 IDIOPATHIC OSTEOPOROSIS: ICD-10-CM

## 2024-02-08 RX ORDER — DIPHENHYDRAMINE HCL 25 MG
1 TABLET,DISINTEGRATING ORAL 2 TIMES DAILY WITH MEALS
Qty: 180 TABLET | Refills: 4 | Status: SHIPPED | OUTPATIENT
Start: 2024-02-08

## 2024-02-16 DIAGNOSIS — I10 ESSENTIAL HYPERTENSION: ICD-10-CM

## 2024-02-16 DIAGNOSIS — K21.9 GASTROESOPHAGEAL REFLUX DISEASE WITHOUT ESOPHAGITIS: ICD-10-CM

## 2024-02-16 RX ORDER — AMLODIPINE BESYLATE 5 MG/1
5 TABLET ORAL DAILY
Qty: 90 TABLET | Refills: 1 | Status: SHIPPED | OUTPATIENT
Start: 2024-02-16

## 2024-02-16 RX ORDER — PANTOPRAZOLE SODIUM 40 MG/1
TABLET, DELAYED RELEASE ORAL
Qty: 180 TABLET | Refills: 1 | Status: SHIPPED | OUTPATIENT
Start: 2024-02-16

## 2024-02-26 ENCOUNTER — OFFICE VISIT (OUTPATIENT)
Dept: FAMILY MEDICINE CLINIC | Facility: CLINIC | Age: 74
End: 2024-02-26
Payer: COMMERCIAL

## 2024-02-26 VITALS
OXYGEN SATURATION: 98 % | BODY MASS INDEX: 29.16 KG/M2 | HEART RATE: 82 BPM | SYSTOLIC BLOOD PRESSURE: 116 MMHG | DIASTOLIC BLOOD PRESSURE: 62 MMHG | TEMPERATURE: 97.5 F | HEIGHT: 65 IN | RESPIRATION RATE: 15 BRPM | WEIGHT: 175 LBS

## 2024-02-26 DIAGNOSIS — F17.210 CIGARETTE SMOKER: ICD-10-CM

## 2024-02-26 DIAGNOSIS — J43.9 PULMONARY EMPHYSEMA, UNSPECIFIED EMPHYSEMA TYPE (HCC): ICD-10-CM

## 2024-02-26 DIAGNOSIS — F43.9 STRESS: Primary | ICD-10-CM

## 2024-02-26 DIAGNOSIS — J44.1 ACUTE EXACERBATION OF CHRONIC OBSTRUCTIVE PULMONARY DISEASE (COPD) (HCC): ICD-10-CM

## 2024-02-26 PROBLEM — E11.69 TYPE 2 DIABETES MELLITUS WITH HYPERLIPIDEMIA: Status: RESOLVED | Noted: 2021-03-03 | Resolved: 2024-02-26

## 2024-02-26 PROBLEM — E11.42 DIABETIC PERIPHERAL NEUROPATHY (HCC): Status: RESOLVED | Noted: 2018-02-14 | Resolved: 2024-02-26

## 2024-02-26 PROBLEM — E78.5 TYPE 2 DIABETES MELLITUS WITH HYPERLIPIDEMIA: Status: RESOLVED | Noted: 2021-03-03 | Resolved: 2024-02-26

## 2024-02-26 PROCEDURE — 99214 OFFICE O/P EST MOD 30 MIN: CPT | Performed by: INTERNAL MEDICINE

## 2024-02-26 RX ORDER — ESCITALOPRAM OXALATE 5 MG/1
5 TABLET ORAL DAILY
Qty: 30 TABLET | Refills: 2 | Status: SHIPPED | OUTPATIENT
Start: 2024-02-26

## 2024-02-26 RX ORDER — BENZONATATE 100 MG/1
100 CAPSULE ORAL 3 TIMES DAILY PRN
Qty: 30 CAPSULE | Refills: 1 | Status: SHIPPED | OUTPATIENT
Start: 2024-02-26

## 2024-02-26 RX ORDER — LEVOFLOXACIN 500 MG/1
500 TABLET, FILM COATED ORAL EVERY 24 HOURS
Qty: 10 TABLET | Refills: 0 | Status: SHIPPED | OUTPATIENT
Start: 2024-02-26 | End: 2024-03-07

## 2024-02-26 NOTE — PROGRESS NOTES
Name: Areli Luciano      : 1950      MRN: [id number]  Encounter Provider: Aleksandar Garduno MD  Encounter Date: 2024   Encounter department: [facility] CARE [facility]    Assessment & Plan     1. Stress  Comments:  Try; Lexapro 5 mg daily  Pt has allergy to paxil  if any reactions Pt was advised to stop medication immediately and start; benadryl...  Orders:  -     escitalopram (LEXAPRO) 5 mg tablet; Take 1 tablet (5 mg total) by mouth daily    2. Cigarette smoker  Comments:  advised to quit smoking  Yearly Ct Lungs  Orders:  -     benzonatate (TESSALON PERLES) 100 mg capsule; Take 1 capsule (100 mg total) by mouth 3 (three) times a day as needed for cough    3. Pulmonary emphysema, unspecified emphysema type (HCC)  -     benzonatate (TESSALON PERLES) 100 mg capsule; Take 1 capsule (100 mg total) by mouth 3 (three) times a day as needed for cough    4. Acute exacerbation of chronic obstructive pulmonary disease (COPD) (HCC)  Comments:  bed rest  Increase Po fluids   Pt was advised to quit smoking  Orders:  -     levofloxacin (LEVAQUIN) 500 mg tablet; Take 1 tablet (500 mg total) by mouth every 24 hours for 10 days    RTC in 3 mos w Blood work       Subjective      73 Y O Lady is here for Regular check up, she has few symptoms, No Homicidal Nor suicidal ideas,...      Review of Systems   Constitutional:  Negative for chills, fatigue and fever.   HENT:  Positive for congestion, postnasal drip, sinus pressure and sore throat. Negative for facial swelling, trouble swallowing and voice change.    Eyes:  Negative for pain, discharge and visual disturbance.   Respiratory:  Positive for cough. Negative for shortness of breath and wheezing.    Cardiovascular:  Negative for chest pain, palpitations and leg swelling.   Gastrointestinal:  Negative for abdominal pain, blood in stool, constipation, diarrhea and nausea.   Endocrine: Negative for polydipsia, polyphagia and polyuria.   Genitourinary:   Negative for difficulty urinating, hematuria and urgency.   Musculoskeletal:  Negative for arthralgias and myalgias.   Skin:  Negative for rash.   Neurological:  Negative for dizziness, tremors, weakness and headaches.   Hematological:  Negative for adenopathy. Does not bruise/bleed easily.   Psychiatric/Behavioral:  Negative for dysphoric mood, sleep disturbance and suicidal ideas. The patient is nervous/anxious.        Current Outpatient Medications on File Prior to Visit   Medication Sig    albuterol (PROVENTIL HFA,VENTOLIN HFA) 90 mcg/act inhaler Inhale 2 puffs every 6 (six) hours as needed for wheezing    amLODIPine (NORVASC) 5 mg tablet TAKE 1 TABLET (5 MG TOTAL) BY MOUTH DAILY.    baclofen 10 mg tablet Take 1 tablet (10 mg total) by mouth 3 (three) times a day    Calcium 600+D3 600-20 MG-MCG TABS TAKE 1 TABLET BY MOUTH TWICE A DAY WITH MEALS    cetirizine (ZyrTEC) 10 mg tablet Take 0.5 tablets (5 mg total) by mouth daily    clotrimazole-betamethasone (LOTRISONE) 1-0.05 % cream Apply topically 3 (three) times a day    [employer] Vitamin A 2400 MCG (8000 UT) capsule TAKE 1 CAPSULE (8,000 UNITS TOTAL) BY MOUTH DAILY    Diclofenac Sodium (VOLTAREN) 1 % APPLY 2 GRAMS TO AFFECTED AREA 4 TIMES A DAY    diphenhydrAMINE-PSE-APAP (BENADRYL ALLERGY/COLD PO) Take by mouth daily at bedtime    famotidine (PEPCID) 20 mg tablet Take 1 tablet (20 mg total) by mouth 2 (two) times a day Please Stop Tagamet    fluticasone-umeclidinium-vilanterol (Trelegy Ellipta) 100-62.5-25 mcg/actuation inhaler Inhale 1 puff daily Rinse mouth after use.    HYDROcodone-acetaminophen (NORCO)  mg per tablet Take 1 tab q 4-6 hours prn  for ongoing therapy DO NOT FILL BEFORE: 01/22/2024    HYDROcodone-acetaminophen (NORCO)  mg per tablet Take 1 tab every 4-6 hours prn, for ongoing therapy DO NOT FILL BEFORE: 02/19/2024    ipratropium-albuterol (DUO-NEB) 0.5-2.5 mg/3 mL nebulizer solution Take 3 mL by nebulization every 8 (eight) hours as  needed for wheezing or shortness of breath    levothyroxine 25 mcg tablet TAKE 1 TABLET (25 MCG TOTAL) BY MOUTH EVERY OTHER DAY    levothyroxine 50 mcg tablet TAKE 1 TABLET (50 MCG TOTAL) BY MOUTH EVERY OTHER DAY    losartan (COZAAR) 25 mg tablet TAKE 1 TABLET (25 MG TOTAL) BY MOUTH DAILY.    nicotine (NICODERM CQ) 14 mg/24hr TD 24 hr patch Place 1 patch on the skin over 24 hours every 24 hours    nicotine (NICODERM CQ) 7 mg/24hr TD 24 hr patch Place 1 patch on the skin over 24 hours every 24 hours    nortriptyline (PAMELOR) 25 mg capsule TAKE 1 CAPSULE BY MOUTH EVERYDAY AT BEDTIME    pantoprazole (PROTONIX) 40 mg tablet TAKE 1 TABLET BY MOUTH TWICE A DAY    Pediatric Multiple Vitamins (Childrens Chew Multivitamin) CHEW CHEW AND SWALLOW 1 TABLET BY MOUTH EVERY DAY    pregabalin (LYRICA) 200 MG capsule Take 1 capsule (200 mg total) by mouth 3 (three) times a day    sucralfate (CARAFATE) 1 g/10 mL suspension TAKE 10 ML (1 G TOTAL) BY MOUTH 4 TIMES A DAY    traZODone (DESYREL) 50 mg tablet TAKE 1 TABLET BY MOUTH EVERY DAY AT BEDTIME AS NEEDED    Vitamin A 2400 MCG (8000 UT) TABS TAKE 1 CAPSULE (8,000 UNITS TOTAL) BY MOUTH DAILY    vitamin B-12 (VITAMIN B-12) 1,000 mcg tablet Take 1 tablet (1,000 mcg total) by mouth daily    denosumab (PROLIA) 60 mg/mL Inject 1 mL (60 mg total) under the skin once for 1 dose    ergocalciferol (VITAMIN D2) 50,000 units Take 1 capsule (50,000 Units total) by mouth once a week (Patient not taking: Reported on 1/8/2024)    naloxone (NARCAN) 4 mg/0.1 mL nasal spray Administer 1 spray into a nostril. If no response after 2-3 minutes, give another dose in the other nostril using a new spray. (Patient not taking: Reported on 9/6/2023)    [DISCONTINUED] benzonatate (TESSALON PERLES) 100 mg capsule Take 1 capsule (100 mg total) by mouth 3 (three) times a day as needed for cough (Patient not taking: Reported on 1/22/2024)       Objective     /62 (BP Location: Left arm, Patient Position:  "Sitting, Cuff Size: Standard)   Pulse 82   Temp 97.5 °F (36.4 °C) (Tympanic)   Resp 15   Ht 5' 5\" (1.651 m)   Wt 79.4 kg (175 lb)   LMP  (LMP Unknown)   SpO2 98%   BMI 29.12 kg/m²     Physical Exam  Constitutional:       General: She is not in acute distress.  HENT:      Head: Normocephalic.      Mouth/Throat:      Pharynx: No oropharyngeal exudate.   Eyes:      General: No scleral icterus.     Conjunctiva/sclera: Conjunctivae normal.      Pupils: Pupils are equal, round, and reactive to light.   Neck:      Thyroid: No thyromegaly.   Cardiovascular:      Rate and Rhythm: Normal rate and regular rhythm.      Heart sounds: Murmur heard.   Pulmonary:      Effort: Pulmonary effort is normal. No respiratory distress.      Breath sounds: Rhonchi present. No wheezing or rales.   Abdominal:      General: Bowel sounds are normal. There is no distension.      Palpations: Abdomen is soft.      Tenderness: There is no abdominal tenderness. There is no guarding or rebound.   Musculoskeletal:         General: No tenderness.      Cervical back: Neck supple.   Lymphadenopathy:      Cervical: No cervical adenopathy.   Skin:     Coloration: Skin is not pale.      Findings: No rash.   Neurological:      Mental Status: She is alert and oriented to person, place, and time.      Sensory: No sensory deficit.      Motor: No weakness.       Aleksandar Garduno MD    "

## 2024-02-29 ENCOUNTER — HOSPITAL ENCOUNTER (OUTPATIENT)
Dept: RADIOLOGY | Facility: HOSPITAL | Age: 74
End: 2024-02-29
Attending: INTERNAL MEDICINE
Payer: COMMERCIAL

## 2024-02-29 DIAGNOSIS — K21.9 GASTROESOPHAGEAL REFLUX DISEASE WITHOUT ESOPHAGITIS: ICD-10-CM

## 2024-02-29 PROCEDURE — G1004 CDSM NDSC: HCPCS

## 2024-02-29 PROCEDURE — 78264 GASTRIC EMPTYING IMG STUDY: CPT

## 2024-02-29 PROCEDURE — A9541 TC99M SULFUR COLLOID: HCPCS

## 2024-03-03 DIAGNOSIS — G47.00 INSOMNIA, UNSPECIFIED TYPE: ICD-10-CM

## 2024-03-03 RX ORDER — TRAZODONE HYDROCHLORIDE 50 MG/1
TABLET ORAL
Qty: 90 TABLET | Refills: 1 | Status: SHIPPED | OUTPATIENT
Start: 2024-03-03

## 2024-03-10 DIAGNOSIS — K21.9 GASTROESOPHAGEAL REFLUX DISEASE WITHOUT ESOPHAGITIS: ICD-10-CM

## 2024-03-10 DIAGNOSIS — R05.3 CHRONIC COUGH: ICD-10-CM

## 2024-03-10 RX ORDER — FAMOTIDINE 20 MG/1
20 TABLET, FILM COATED ORAL 2 TIMES DAILY
Qty: 180 TABLET | Refills: 1 | Status: SHIPPED | OUTPATIENT
Start: 2024-03-10

## 2024-03-11 RX ORDER — SUCRALFATE ORAL 1 G/10ML
SUSPENSION ORAL
Qty: 1260 ML | Refills: 1 | Status: SHIPPED | OUTPATIENT
Start: 2024-03-11

## 2024-03-18 ENCOUNTER — OFFICE VISIT (OUTPATIENT)
Dept: PAIN MEDICINE | Facility: MEDICAL CENTER | Age: 74
End: 2024-03-18
Payer: COMMERCIAL

## 2024-03-18 VITALS
BODY MASS INDEX: 29.66 KG/M2 | WEIGHT: 178 LBS | HEIGHT: 65 IN | SYSTOLIC BLOOD PRESSURE: 164 MMHG | HEART RATE: 69 BPM | DIASTOLIC BLOOD PRESSURE: 70 MMHG

## 2024-03-18 DIAGNOSIS — M54.42 CHRONIC BILATERAL LOW BACK PAIN WITH BILATERAL SCIATICA: ICD-10-CM

## 2024-03-18 DIAGNOSIS — G89.29 CHRONIC BILATERAL LOW BACK PAIN WITH BILATERAL SCIATICA: ICD-10-CM

## 2024-03-18 DIAGNOSIS — G89.4 CHRONIC PAIN SYNDROME: Primary | ICD-10-CM

## 2024-03-18 DIAGNOSIS — F11.20 UNCOMPLICATED OPIOID DEPENDENCE (HCC): ICD-10-CM

## 2024-03-18 DIAGNOSIS — M54.16 LUMBAR RADICULITIS: ICD-10-CM

## 2024-03-18 DIAGNOSIS — M79.18 MYOFASCIAL PAIN SYNDROME: ICD-10-CM

## 2024-03-18 DIAGNOSIS — M54.41 CHRONIC BILATERAL LOW BACK PAIN WITH BILATERAL SCIATICA: ICD-10-CM

## 2024-03-18 DIAGNOSIS — Z79.891 LONG-TERM CURRENT USE OF OPIATE ANALGESIC: ICD-10-CM

## 2024-03-18 PROCEDURE — 99214 OFFICE O/P EST MOD 30 MIN: CPT

## 2024-03-18 PROCEDURE — G2211 COMPLEX E/M VISIT ADD ON: HCPCS

## 2024-03-18 RX ORDER — HYDROCODONE BITARTRATE AND ACETAMINOPHEN 10; 325 MG/1; MG/1
TABLET ORAL
Qty: 130 TABLET | Refills: 0 | Status: SHIPPED | OUTPATIENT
Start: 2024-03-18

## 2024-03-18 RX ORDER — PREGABALIN 200 MG/1
200 CAPSULE ORAL 3 TIMES DAILY
Qty: 90 CAPSULE | Refills: 1 | Status: SHIPPED | OUTPATIENT
Start: 2024-03-18

## 2024-03-18 NOTE — PROGRESS NOTES
Assessment:  1. Chronic pain syndrome    2. Uncomplicated opioid dependence (HCC)    3. Long-term current use of opiate analgesic    4. Myofascial pain syndrome    5. Chronic bilateral low back pain with bilateral sciatica    6. Lumbar radiculitis      Plan:  The patient remains clinically stable on hydrocodone-acetaminophen 10/325 mg 1 tablet every 4-6 hours as needed. Two 1 month supplies of this medication were sent to the patient's pharmacy today with do not fill dates of today, 03/18/2024, and 04/14/2024.   The patient does have a naloxone nasal spray (prescription < 1 year old) available for use should she ever need it.   Continue pregabalin as prescribed refills of this medication were sent to the patient's pharmacy today.  Continue baclofen 10 mg 3 times daily as needed for muscle spasms.  Patient did not require refills of this medication at today's visit.  Follow-up in 8 weeks, or sooner if needed.    There are risks associated with opioid medications, including dependence, addiction and tolerance. The patient understands and agrees to use these medications only as prescribed. Potential side effects of the medications include, but are not limited to, constipation, drowsiness, addiction, impaired judgment and risk of fatal overdose if not taken as prescribed. The patient was warned against driving while taking sedation medications.  Sharing medications is a felony. At this point in time, the patient is showing no signs of addiction, abuse, diversion or suicidal ideation.    A urine drug screen was collected at today's office visit as part of our medication management protocol. The point of care testing results were appropriate for what was being prescribed. The specimen will be sent for confirmatory testing. The drug screen is medically necessary because the patient is either dependent on opioid medication or is being considered for opioid medication therapy and the results could impact ongoing or future  treatment. The drug screen is to evaluate for the presences or absence of prescribed, non-prescribed, and/or illicit drugs/substances.    [place] Prescription Drug Monitoring Program report was reviewed and was appropriate     My impressions and treatment recommendations were discussed in detail with the patient who verbalized understanding and had no further questions.  Discharge instructions were provided. I personally saw and examined the patient and I agree with the above discussed plan of care.    Orders Placed This Encounter   Procedures    MM DT_Codeine Definitive Test    MM DT_Desipramine Definitive Test    MM DT_Dextromethorphan Definitive Test    MM Diazepam Definitive Test    MM DT_Ethyl Glucuronide/Ethyl Sulfate Definitive Test    MM DT_Heroin Definitive Test    MM DT_Fentanyl Definitive Test    MM DT_Hydrocodone Definitive Test    MM DT_Hydromorphone Definitive Test    MM DT_Kratom Definitive Test    MM DT_Alprazolam Definitive Test    MM DT_Levorphanol Definitive Test    MM Lorazepam Definitive Test    MM DT_MDMA Definitive Test    MM DT_Meperidine Definitive Test    MM DT_Methadone Definitive Test    MM DT_Methamphetamine Definitive Test    MM DT_Methylphenidate Definitive Test    MM DT_Morphine Definitive Test    MM DT_Olanzapine Definitive Test    MM DT_Oxazepam Definitive Test    MM DT_Amphetamine Definitive Test    MM DT_Oxycodone Definitive Test    MM DT_Oxymorphone Definitive Test    MM DT_Phentermine Definitive Test    MM DT_Quetiapine Definitive Test    MM DT_Risperidone Definitive Test    MM DT_Spice Definitive Test    MM DT_Tapentadol Definitive Test    MM DT_Temazapam Definitive Test    MM DT_THC Definitive Test    MM DT_Tramadol Definitive Test    MM DT_Aripiprazole Definitive Test    MM DT_Validity Creatinine    MM DT_Validity Oxidant    MM DT_Validity pH    MM DT_Validity Specific    MM DT_Buprenorphine Definitive Test    MM DT_Butalbital Definitive Test    MM DT_Clonazepam  Definitive Test    MM DT_Clozapine Definitive Test    MM DT_Cocaine Definitive Test     New Medications Ordered This Visit   Medications    HYDROcodone-acetaminophen (NORCO)  mg per tablet     Sig: Take 1 tab every 4-6 hours prn, for ongoing therapy.     Dispense:  130 tablet     Refill:  0    HYDROcodone-acetaminophen (NORCO)  mg per tablet     Sig: Take 1 tab q 4-6 hours prn  for ongoing therapy DO NOT FILL BEFORE: 04/14/2024     Dispense:  130 tablet     Refill:  0    pregabalin (LYRICA) 200 MG capsule     Sig: Take 1 capsule (200 mg total) by mouth 3 (three) times a day     Dispense:  90 capsule     Refill:  1     .       History of Present Illness:  Areli Luciano is a 73 y.o. female who presents for a follow up office visit for medication refill and reevaluation.  The patient continues to have low back and bilateral lower extremity pain as well as neck and bilateral upper extremity pain.  She reports that her pain is constant, and is currently rating it a 7/10 on the numeric pain rating scale.  She describes the quality of her pain as burning, sharp, throbbing, cramping, shooting, and pins-and-needles.  She denies any saddle anesthesia or bowel/bladder incontinence.    The patient reports that her pain remains well-controlled on her current medication regimen consisting of hydrocodone-acetaminophen 10/325 mg 1 tablet every 4-6 hours as needed, pregabalin 200 mg 1 capsule 3 times daily, and baclofen 10 mg 1 tablet 3 times daily.  She tolerates these medications well without side effects and reports that they reduce her pain by about 30%.    Opioid contract date 11/22/2024  Last UDS Date 11/15/2023  Norco  last taken on 03/18/2024 Am  Narcan up-to-date.    I have personally reviewed and/or updated the patient's past medical history, past surgical history, family history, social history, current medications, allergies, and vital signs today.     Review of Systems   Constitutional:  Negative for  fever.   HENT:  Negative for hearing loss.    Eyes:  Negative for visual disturbance.   Respiratory:  Negative for cough.    Cardiovascular:  Negative for leg swelling.   Gastrointestinal:  Positive for nausea. Negative for abdominal pain.   Endocrine: Negative for polydipsia.   Genitourinary:  Negative for difficulty urinating.   Musculoskeletal:  Positive for back pain, gait problem and neck pain. Negative for myalgias.   Skin:  Negative for rash.   Neurological:  Negative for numbness.   Hematological:  Does not bruise/bleed easily.   Psychiatric/Behavioral:  Positive for decreased concentration. Negative for dysphoric mood and sleep disturbance.        Patient Active Problem List   Diagnosis    Chronic pain syndrome    Cervical radiculopathy    Myofascial pain syndrome    Spondylosis of cervical region without myelopathy or radiculopathy    Fibromyalgia    Long-term current use of opiate analgesic    MGUS (monoclonal gammopathy of unknown significance)    Iron deficiency anemia following bariatric surgery    Light headedness    Chronic bronchitis (HCC)    Primary osteoarthritis of right knee    Pseudogout of left knee    Lumbar radiculitis    Chronic bilateral low back pain with bilateral sciatica    Rotator cuff syndrome, left    Left shoulder pain    Dizziness    Other fatigue    Biceps tendinitis of left shoulder    Adhesive capsulitis of left shoulder    Hypoglycemia    Hypothyroidism due to Hashimoto's thyroiditis    Iron deficiency anemia, unspecified    GERD (gastroesophageal reflux disease)    Nicotine dependence with current use    Need for prophylactic vaccination against Streptococcus pneumoniae (pneumococcus)    Needs flu shot    Osteoarthritis    Postnasal drip    Encounter for screening for malignant neoplasm of lung in current smoker with 30 pack year history or greater       Past Medical History:   Diagnosis Date    Anemia     Anxiety     hx of panic attacks (now under control)     Arthritis      Asthma     resolved (no problems in a decade)     Baker's cyst of knee     Bronchitis     Bunion, left foot     Cervical pain     Chronic GERD     Chronic pain     Chronic pain disorder     Depression     Diabetes mellitus, type 2 (HCC)     Diabetic peripheral neuropathy (HCC)     Endometriosis     Fibromyalgia     Hyperlipidemia     Hypertension     Joint pain     Low back pain     Low back pain     Lung nodules     Macular degeneration     Pulmonary emphysema (HCC) 01/16/2020    Spondylosis     Spontaneous pneumothorax     Uterine cancer (HCC)        Past Surgical History:   Procedure Laterality Date    BACK SURGERY  1996    L5, S1- laser surgery fusion of c5 and c6     BREAST CYST EXCISION Right     CHOLECYSTECTOMY  2004    FOOT SURGERY Left 2005    fusion     FRACTURE SURGERY      GASTRIC BYPASS  2010    Dr. Oropeza     HYSTERECTOMY  1985    just uterus     KNEE ARTHROSCOPY      LAMINECTOMY      ORTHOPEDIC SURGERY      OVARIAN CYST REMOVAL  1975    PELVIC LAPAROSCOPY      ovaries (x10)     PLEURAL SCARIFICATION  1967    SHOULDER OPEN ROTATOR CUFF REPAIR Left 2008    TONSILLECTOMY      VAGINAL PROLAPSE REPAIR         Family History   Problem Relation Age of Onset    Hypertension Mother     Lung cancer Mother     Hypertension Father     Heart disease Father     Heart attack Father     Cancer Father     No Known Problems Sister     No Known Problems Daughter     No Known Problems Maternal Grandmother     No Known Problems Maternal Grandfather     No Known Problems Paternal Grandmother     No Known Problems Paternal Grandfather     Breast cancer Paternal Aunt 40    Breast cancer Paternal Aunt 45    Breast cancer Maternal Aunt 48       Social History     Occupational History    Not on file   Tobacco Use    Smoking status: Every Day     Current packs/day: 0.30     Average packs/day: 0.6 packs/day for 53.2 years (33.5 ttl pk-yrs)     Types: Cigarettes     Start date: 1971    Smokeless tobacco: Never   Vaping Use     Vaping status: Never Used   Substance and Sexual Activity    Alcohol use: Not Currently     Alcohol/week: 1.0 standard drink of alcohol     Types: 1 Shots of liquor per week     Comment: rarely    Drug use: No    Sexual activity: Not Currently     Partners: Male       Current Outpatient Medications on File Prior to Visit   Medication Sig    albuterol (PROVENTIL HFA,VENTOLIN HFA) 90 mcg/act inhaler Inhale 2 puffs every 6 (six) hours as needed for wheezing    amLODIPine (NORVASC) 5 mg tablet TAKE 1 TABLET (5 MG TOTAL) BY MOUTH DAILY.    baclofen 10 mg tablet Take 1 tablet (10 mg total) by mouth 3 (three) times a day    benzonatate (TESSALON PERLES) 100 mg capsule Take 1 capsule (100 mg total) by mouth 3 (three) times a day as needed for cough    Calcium 600+D3 600-20 MG-MCG TABS TAKE 1 TABLET BY MOUTH TWICE A DAY WITH MEALS    cetirizine (ZyrTEC) 10 mg tablet Take 0.5 tablets (5 mg total) by mouth daily    clotrimazole-betamethasone (LOTRISONE) 1-0.05 % cream Apply topically 3 (three) times a day    [employer] Vitamin A 2400 MCG (8000 UT) capsule TAKE 1 CAPSULE (8,000 UNITS TOTAL) BY MOUTH DAILY    Diclofenac Sodium (VOLTAREN) 1 % APPLY 2 GRAMS TO AFFECTED AREA 4 TIMES A DAY    diphenhydrAMINE-PSE-APAP (BENADRYL ALLERGY/COLD PO) Take by mouth daily at bedtime    escitalopram (LEXAPRO) 5 mg tablet Take 1 tablet (5 mg total) by mouth daily    famotidine (PEPCID) 20 mg tablet TAKE 1 TABLET (20 MG TOTAL) BY MOUTH 2 (TWO) TIMES A DAY PLEASE STOP TAGAMET    fluticasone-umeclidinium-vilanterol (Trelegy Ellipta) 100-62.5-25 mcg/actuation inhaler Inhale 1 puff daily Rinse mouth after use.    ipratropium-albuterol (DUO-NEB) 0.5-2.5 mg/3 mL nebulizer solution Take 3 mL by nebulization every 8 (eight) hours as needed for wheezing or shortness of breath    levothyroxine 25 mcg tablet TAKE 1 TABLET (25 MCG TOTAL) BY MOUTH EVERY OTHER DAY    levothyroxine 50 mcg tablet TAKE 1 TABLET (50 MCG TOTAL) BY MOUTH EVERY OTHER DAY    losartan  (COZAAR) 25 mg tablet TAKE 1 TABLET (25 MG TOTAL) BY MOUTH DAILY.    nicotine (NICODERM CQ) 14 mg/24hr TD 24 hr patch Place 1 patch on the skin over 24 hours every 24 hours    nicotine (NICODERM CQ) 7 mg/24hr TD 24 hr patch Place 1 patch on the skin over 24 hours every 24 hours    nortriptyline (PAMELOR) 25 mg capsule TAKE 1 CAPSULE BY MOUTH EVERYDAY AT BEDTIME    pantoprazole (PROTONIX) 40 mg tablet TAKE 1 TABLET BY MOUTH TWICE A DAY    Pediatric Multiple Vitamins (Childrens Chew Multivitamin) CHEW CHEW AND SWALLOW 1 TABLET BY MOUTH EVERY DAY    sucralfate (CARAFATE) 1 g/10 mL suspension TAKE 10 ML (1 G TOTAL) BY MOUTH 4 TIMES A DAY    traZODone (DESYREL) 50 mg tablet TAKE 1 TABLET BY MOUTH EVERY DAY AT BEDTIME AS NEEDED    Vitamin A 2400 MCG (8000 UT) TABS TAKE 1 CAPSULE (8,000 UNITS TOTAL) BY MOUTH DAILY    vitamin B-12 (VITAMIN B-12) 1,000 mcg tablet Take 1 tablet (1,000 mcg total) by mouth daily    [DISCONTINUED] HYDROcodone-acetaminophen (NORCO)  mg per tablet Take 1 tab q 4-6 hours prn  for ongoing therapy DO NOT FILL BEFORE: 01/22/2024    [DISCONTINUED] HYDROcodone-acetaminophen (NORCO)  mg per tablet Take 1 tab every 4-6 hours prn, for ongoing therapy DO NOT FILL BEFORE: 02/19/2024    [DISCONTINUED] pregabalin (LYRICA) 200 MG capsule Take 1 capsule (200 mg total) by mouth 3 (three) times a day    denosumab (PROLIA) 60 mg/mL Inject 1 mL (60 mg total) under the skin once for 1 dose    ergocalciferol (VITAMIN D2) 50,000 units Take 1 capsule (50,000 Units total) by mouth once a week (Patient not taking: Reported on 1/8/2024)    naloxone (NARCAN) 4 mg/0.1 mL nasal spray Administer 1 spray into a nostril. If no response after 2-3 minutes, give another dose in the other nostril using a new spray. (Patient not taking: Reported on 9/6/2023)     Current Facility-Administered Medications on File Prior to Visit   Medication    cyanocobalamin injection 1,000 mcg    cyanocobalamin injection 1,000 mcg     "cyanocobalamin injection 1,000 mcg    cyanocobalamin injection 1,000 mcg    cyanocobalamin injection 1,000 mcg    cyanocobalamin injection 1,000 mcg    cyanocobalamin injection 1,000 mcg    cyanocobalamin injection 1,000 mcg       Allergies   Allergen Reactions    Cephalosporins Hives    Erythromycin GI Intolerance    Fentanyl Itching     Occurred during surgery     Paxil [Paroxetine] Hives     After 2 weeks    Penicillins Itching    Pravastatin Myalgia    Statins Itching    Sulfa Antibiotics Diarrhea    Wellbutrin [Bupropion] Hives     After 2 weeks     Marzena's Wort Rash       Physical Exam:    /70   Pulse 69   Ht 5' 5\" (1.651 m)   Wt 80.7 kg (178 lb)   LMP  (LMP Unknown)   BMI 29.62 kg/m²     Constitutional:normal, well developed, well nourished, alert, in no distress and non-toxic and no overt pain behavior.  Eyes:anicteric  HEENT:grossly intact  Neck:symmetric, trachea midline and no masses   Pulmonary:even and unlabored  Cardiovascular:No edema or pitting edema present  Skin:Normal without rashes or lesions and well hydrated  Psychiatric:Mood and affect appropriate  Neurologic:Cranial Nerves II-XII grossly intact  Musculoskeletal:ambulates with cane    "

## 2024-03-18 NOTE — PROGRESS NOTES
Cardiology Follow Up    Areli Luciano  1950  [id number]  [facility]  [address]  [address]  [place] [address] [phone]    1. Sinus bradycardia  POCT ECG      2. Cigarette smoker  Ambulatory Referral to Cardiology      3. Lung nodules  Ambulatory Referral to Cardiology      4. Hypothyroidism due to Hashimoto's thyroiditis        5. Nicotine dependence with current use        6. Other fatigue        7. Dizziness        8. MGUS (monoclonal gammopathy of unknown significance)        9. High coronary artery calcium score            Summary of my recommendation for the patient    Patient gives history of occasional lightheadedness-prior history of bradycardia-get  2-week of event monitor    Ejection systolic murmur, soft, heard both in aortic area as well as pulmonic area-echocardiogram to assess EF and valvular function    Coronary artery calcium-refer to general cardiology-whoever has appropriate appointment -earliest available    Patient is an active smoker, multiple pulmonary nodules-following up with pulmonary    Follow-up with me in 1 year    If you find significant abnormality in the event monitor we will reach out to the patient          Interval History:   Patient does give a history of intermittent lightheadedness in the setting of bradycardia    She was recently being evaluated by primary, chest CT revealed pulmonary nodules as well as increased calcification of coronary vessels    She is not complaining of anginal-like chest pain or pressure at this time     Interval History:   The patient has been referred to me by  for evaluation of bradycardia  In addition the patient complains of exertional intolerance, postural lightheadedness, and significant fatigue     Episodes have been going on for quite some time and at least worsening over the last 2 years  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[employer]  She has to work for 5 hours a day  Once she reaches home she feels so fatigued that she cannot do anything  She prepares food over 30 minutes and thereafter can hardly do anything else     She informs prior  physician office visit where her heart rate is in the 30s and 40s  She has curtailed her activity and does everything very slowly     The patient is not complaining of anginal like chest pain or chest pressure  There is no worsening orthopnea, paroxysmal nocturnal dyspnea  There is no leg swelling     Patient does not get palpitation   There is no history of presyncope or syncope  There is rare history of transient  lightheadedness  There is significant exertional intolerance     There is history of snoring at night  There is history of morning fatigability  There is occasional history of daytime sleepiness       Patient Active Problem List   Diagnosis    Chronic pain syndrome    Cervical radiculopathy    Myofascial pain syndrome    Spondylosis of cervical region without myelopathy or radiculopathy    Fibromyalgia    Long-term current use of opiate analgesic    MGUS (monoclonal gammopathy of unknown significance)    Iron deficiency anemia following bariatric surgery    Light headedness    Chronic bronchitis (HCC)    Primary osteoarthritis of right knee    Pseudogout of left knee    Lumbar radiculitis    Chronic bilateral low back pain with bilateral sciatica    Rotator cuff syndrome, left    Left shoulder pain    Dizziness    Other fatigue    Biceps tendinitis of left shoulder    Adhesive capsulitis of left shoulder    Hypoglycemia    Hypothyroidism due to Hashimoto's thyroiditis    Iron deficiency anemia, unspecified    GERD (gastroesophageal reflux disease)    Nicotine dependence with current use    Need for prophylactic vaccination against Streptococcus pneumoniae (pneumococcus)    Needs flu shot    Osteoarthritis    Postnasal drip    Encounter for screening for malignant neoplasm of lung in current  smoker with 30 pack year history or greater    High coronary artery calcium score     Past Medical History:   Diagnosis Date    Anemia     Anxiety     hx of panic attacks (now under control)     Arthritis     Asthma     resolved (no problems in a decade)     Baker's cyst of knee     Bronchitis     Bunion, left foot     Cervical pain     Chronic GERD     Chronic pain     Chronic pain disorder     Depression     Diabetes mellitus, type 2 (HCC)     Diabetic peripheral neuropathy (HCC)     Endometriosis     Fibromyalgia     Hyperlipidemia     Hypertension     Joint pain     Low back pain     Low back pain     Lung nodules     Macular degeneration     Pulmonary emphysema (HCC) 01/16/2020    Spondylosis     Spontaneous pneumothorax     Uterine cancer (HCC)      Social History     Socioeconomic History    Marital status:      Spouse name: Not on file    Number of children: Not on file    Years of education: Not on file    Highest education level: Not on file   Occupational History    Not on file   Tobacco Use    Smoking status: Every Day     Current packs/day: 0.30     Average packs/day: 0.6 packs/day for 53.2 years (33.5 ttl pk-yrs)     Types: Cigarettes     Start date: 1971    Smokeless tobacco: Never   Vaping Use    Vaping status: Never Used   Substance and Sexual Activity    Alcohol use: Not Currently     Alcohol/week: 1.0 standard drink of alcohol     Types: 1 Shots of liquor per week     Comment: rarely    Drug use: No    Sexual activity: Not Currently     Partners: Male   Other Topics Concern    Not on file   Social History Narrative    Not on file     Social Determinants of Health     Financial Resource Strain: Low Risk  (6/9/2023)    Overall Financial Resource Strain (CARDIA)     Difficulty of Paying Living Expenses: Not hard at all   Food Insecurity: No Food Insecurity (4/26/2021)    Hunger Vital Sign     Worried About Running Out of Food in the Last Year: Never true     Ran Out of Food in the Last Year:  Never true   Transportation Needs: No Transportation Needs (6/9/2023)    PRAPARE - Transportation     Lack of Transportation (Medical): No     Lack of Transportation (Non-Medical): No   Physical Activity: Inactive (9/8/2020)    Exercise Vital Sign     Days of Exercise per Week: 0 days     Minutes of Exercise per Session: 0 min   Stress: No Stress Concern Present (9/8/2020)    [employer] - Occupational Stress Questionnaire     Feeling of Stress : Not at all   Social Connections: Unknown (9/8/2020)    Social Connection and Isolation Panel [NHANES]     Frequency of Communication with Friends and Family: Patient declined     Frequency of Social Gatherings with Friends and Family: Patient declined     Attends Faith Services: Patient declined     Active Member of Clubs or Organizations: Patient declined     Attends Club or Organization Meetings: Patient declined     Marital Status: Patient declined   Intimate Partner Violence: Not At Risk (9/8/2020)    Humiliation, Afraid, Rape, and Kick questionnaire     Fear of Current or Ex-Partner: No     Emotionally Abused: No     Physically Abused: No     Sexually Abused: No   Housing Stability: Not on file      Family History   Problem Relation Age of Onset    Hypertension Mother     Lung cancer Mother     Hypertension Father     Heart disease Father     Heart attack Father     Cancer Father     No Known Problems Sister     No Known Problems Daughter     No Known Problems Maternal Grandmother     No Known Problems Maternal Grandfather     No Known Problems Paternal Grandmother     No Known Problems Paternal Grandfather     Breast cancer Paternal Aunt 40    Breast cancer Paternal Aunt 45    Breast cancer Maternal Aunt 48     Past Surgical History:   Procedure Laterality Date    BACK SURGERY  1996    L5, S1- laser surgery fusion of c5 and c6     BREAST CYST EXCISION Right     CHOLECYSTECTOMY  2004    FOOT SURGERY Left 2005    fusion     FRACTURE  SURGERY      GASTRIC BYPASS  2010    Dr. Oropeza     HYSTERECTOMY  1985    just uterus     KNEE ARTHROSCOPY      LAMINECTOMY      ORTHOPEDIC SURGERY      OVARIAN CYST REMOVAL  1975    PELVIC LAPAROSCOPY      ovaries (x10)     PLEURAL SCARIFICATION  1967    SHOULDER OPEN ROTATOR CUFF REPAIR Left 2008    TONSILLECTOMY      VAGINAL PROLAPSE REPAIR         Current Outpatient Medications:     albuterol (PROVENTIL HFA,VENTOLIN HFA) 90 mcg/act inhaler, Inhale 2 puffs every 6 (six) hours as needed for wheezing, Disp: 18 g, Rfl: 3    amLODIPine (NORVASC) 5 mg tablet, TAKE 1 TABLET (5 MG TOTAL) BY MOUTH DAILY., Disp: 90 tablet, Rfl: 1    baclofen 10 mg tablet, Take 1 tablet (10 mg total) by mouth 3 (three) times a day, Disp: 270 tablet, Rfl: 0    benzonatate (TESSALON PERLES) 100 mg capsule, Take 1 capsule (100 mg total) by mouth 3 (three) times a day as needed for cough, Disp: 30 capsule, Rfl: 1    Calcium 600+D3 600-20 MG-MCG TABS, TAKE 1 TABLET BY MOUTH TWICE A DAY WITH MEALS, Disp: 180 tablet, Rfl: 4    cetirizine (ZyrTEC) 10 mg tablet, Take 0.5 tablets (5 mg total) by mouth daily, Disp: 45 tablet, Rfl: 2    clotrimazole-betamethasone (LOTRISONE) 1-0.05 % cream, Apply topically 3 (three) times a day, Disp: 60 g, Rfl: 1    [employer] Vitamin A 2400 MCG (8000 UT) capsule, TAKE 1 CAPSULE (8,000 UNITS TOTAL) BY MOUTH DAILY, Disp: 90 capsule, Rfl: 2    Diclofenac Sodium (VOLTAREN) 1 %, APPLY 2 GRAMS TO AFFECTED AREA 4 TIMES A DAY, Disp: 200 g, Rfl: 0    diphenhydrAMINE-PSE-APAP (BENADRYL ALLERGY/COLD PO), Take by mouth daily at bedtime, Disp: , Rfl:     famotidine (PEPCID) 20 mg tablet, TAKE 1 TABLET (20 MG TOTAL) BY MOUTH 2 (TWO) TIMES A DAY PLEASE STOP TAGAMET, Disp: 180 tablet, Rfl: 1    fluticasone-umeclidinium-vilanterol (Trelegy Ellipta) 100-62.5-25 mcg/actuation inhaler, Inhale 1 puff daily Rinse mouth after use., Disp: 60 each, Rfl: 5    HYDROcodone-acetaminophen (NORCO)  mg per tablet, Take 1 tab every 4-6 hours prn,  for ongoing therapy., Disp: 130 tablet, Rfl: 0    HYDROcodone-acetaminophen (NORCO)  mg per tablet, Take 1 tab q 4-6 hours prn  for ongoing therapy DO NOT FILL BEFORE: 04/14/2024, Disp: 130 tablet, Rfl: 0    ipratropium-albuterol (DUO-NEB) 0.5-2.5 mg/3 mL nebulizer solution, Take 3 mL by nebulization every 8 (eight) hours as needed for wheezing or shortness of breath, Disp: 270 mL, Rfl: 3    levothyroxine 25 mcg tablet, TAKE 1 TABLET (25 MCG TOTAL) BY MOUTH EVERY OTHER DAY, Disp: 45 tablet, Rfl: 3    levothyroxine 50 mcg tablet, TAKE 1 TABLET (50 MCG TOTAL) BY MOUTH EVERY OTHER DAY, Disp: 45 tablet, Rfl: 1    losartan (COZAAR) 25 mg tablet, TAKE 1 TABLET (25 MG TOTAL) BY MOUTH DAILY., Disp: 90 tablet, Rfl: 1    nortriptyline (PAMELOR) 25 mg capsule, TAKE 1 CAPSULE BY MOUTH EVERYDAY AT BEDTIME, Disp: 90 capsule, Rfl: 1    pantoprazole (PROTONIX) 40 mg tablet, TAKE 1 TABLET BY MOUTH TWICE A DAY, Disp: 180 tablet, Rfl: 1    Pediatric Multiple Vitamins (Childrens Chew Multivitamin) CHEW, CHEW AND SWALLOW 1 TABLET BY MOUTH EVERY DAY, Disp: 100 tablet, Rfl: 5    pregabalin (LYRICA) 200 MG capsule, Take 1 capsule (200 mg total) by mouth 3 (three) times a day, Disp: 90 capsule, Rfl: 1    sucralfate (CARAFATE) 1 g/10 mL suspension, TAKE 10 ML (1 G TOTAL) BY MOUTH 4 TIMES A DAY, Disp: 1260 mL, Rfl: 1    traZODone (DESYREL) 50 mg tablet, TAKE 1 TABLET BY MOUTH EVERY DAY AT BEDTIME AS NEEDED, Disp: 90 tablet, Rfl: 1    Vitamin A 2400 MCG (8000 UT) TABS, TAKE 1 CAPSULE (8,000 UNITS TOTAL) BY MOUTH DAILY, Disp: , Rfl:     vitamin B-12 (VITAMIN B-12) 1,000 mcg tablet, Take 1 tablet (1,000 mcg total) by mouth daily, Disp: 90 tablet, Rfl: 3    denosumab (PROLIA) 60 mg/mL, Inject 1 mL (60 mg total) under the skin once for 1 dose, Disp: 1 mL, Rfl: 0    ergocalciferol (VITAMIN D2) 50,000 units, Take 1 capsule (50,000 Units total) by mouth once a week (Patient not taking: Reported on 1/8/2024), Disp: 13 capsule, Rfl: 2     escitalopram (LEXAPRO) 5 mg tablet, Take 1 tablet (5 mg total) by mouth daily (Patient not taking: Reported on 3/20/2024), Disp: 30 tablet, Rfl: 2    naloxone (NARCAN) 4 mg/0.1 mL nasal spray, Administer 1 spray into a nostril. If no response after 2-3 minutes, give another dose in the other nostril using a new spray. (Patient not taking: Reported on 9/6/2023), Disp: 1 each, Rfl: 1    nicotine (NICODERM CQ) 14 mg/24hr TD 24 hr patch, Place 1 patch on the skin over 24 hours every 24 hours (Patient not taking: Reported on 3/20/2024), Disp: 28 patch, Rfl: 2    nicotine (NICODERM CQ) 7 mg/24hr TD 24 hr patch, Place 1 patch on the skin over 24 hours every 24 hours (Patient not taking: Reported on 3/20/2024), Disp: 14 patch, Rfl: 0    Current Facility-Administered Medications:     cyanocobalamin injection 1,000 mcg, 1,000 mcg, Intramuscular, Q30 Days, Aleksandar Garduno MD, 1,000 mcg at 12/08/20 1118    cyanocobalamin injection 1,000 mcg, 1,000 mcg, Intramuscular, Q30 Days, Aleksandar Garduno MD, 1,000 mcg at 07/08/20 1036    cyanocobalamin injection 1,000 mcg, 1,000 mcg, Intramuscular, Q30 Days, Aleksandar Garduno MD, 1,000 mcg at 09/08/20 1210    cyanocobalamin injection 1,000 mcg, 1,000 mcg, Intramuscular, Q30 Days, Aleksandar Garduno MD    cyanocobalamin injection 1,000 mcg, 1,000 mcg, Intramuscular, Q30 Days, Aleksandar Garduno MD    cyanocobalamin injection 1,000 mcg, 1,000 mcg, Intramuscular, Q30 Days, Aleksandar Garduno MD, 1,000 mcg at 08/18/21 1019    cyanocobalamin injection 1,000 mcg, 1,000 mcg, Intramuscular, Q30 Days, Aleksandar Garduno MD, 1,000 mcg at 03/21/23 1117    cyanocobalamin injection 1,000 mcg, 1,000 mcg, Intramuscular, Q30 Days, Aleksandar Garduno MD, 1,000 mcg at 07/13/23 1004  Allergies   Allergen Reactions    Cephalosporins Hives    Erythromycin GI Intolerance    Fentanyl Itching     Occurred during surgery     Paxil [Paroxetine] Hives     After 2 weeks    Penicillins Itching    Pravastatin Myalgia    Statins Itching    Sulfa Antibiotics  Diarrhea    Wellbutrin [Bupropion] Hives     After 2 weeks     Marzena's Wort Rash         LAB RESULTS:    CBC:  WBC   Date Value Ref Range Status   01/11/2024 3.72 (L) 4.31 - 10.16 Thousand/uL Final     Hemoglobin   Date Value Ref Range Status   01/11/2024 12.3 11.5 - 15.4 g/dL Final     Hematocrit   Date Value Ref Range Status   01/11/2024 37.7 34.8 - 46.1 % Final     MCV   Date Value Ref Range Status   01/11/2024 98 82 - 98 fL Final     Platelets   Date Value Ref Range Status   01/11/2024 195 149 - 390 Thousands/uL Final     RBC   Date Value Ref Range Status   01/11/2024 3.84 3.81 - 5.12 Million/uL Final     MCH   Date Value Ref Range Status   01/11/2024 32.0 26.8 - 34.3 pg Final     MCHC   Date Value Ref Range Status   01/11/2024 32.6 31.4 - 37.4 g/dL Final     RDW   Date Value Ref Range Status   01/11/2024 13.9 11.6 - 15.1 % Final     MPV   Date Value Ref Range Status   01/11/2024 10.0 8.9 - 12.7 fL Final     nRBC   Date Value Ref Range Status   01/11/2024 0 /100 WBCs Final       CMP:  Potassium   Date Value Ref Range Status   01/11/2024 3.6 3.5 - 5.3 mmol/L Final   05/02/2023 3.6 3.5 - 5.3 mmol/L Final     Chloride   Date Value Ref Range Status   01/11/2024 103 96 - 108 mmol/L Final   05/02/2023 106 98 - 110 mmol/L Final     CO2   Date Value Ref Range Status   01/11/2024 27 21 - 32 mmol/L Final   05/02/2023 29 20 - 32 mmol/L Final     BUN   Date Value Ref Range Status   01/11/2024 7 5 - 25 mg/dL Final   05/02/2023 7 7 - 25 mg/dL Final     Creatinine   Date Value Ref Range Status   01/11/2024 0.73 0.60 - 1.30 mg/dL Final     Comment:     Standardized to IDMS reference method     Calcium   Date Value Ref Range Status   01/11/2024 8.9 8.4 - 10.2 mg/dL Final   05/02/2023 8.9 8.6 - 10.4 mg/dL Final     AST   Date Value Ref Range Status   01/11/2024 28 13 - 39 U/L Final     Comment:     Slightly Hemolyzed:Results may be affected.   05/02/2023 21 10 - 35 U/L Final     ALT   Date Value Ref Range Status   01/11/2024 15  7 - 52 U/L Final     Comment:     Specimen collection should occur prior to Sulfasalazine administration due to the potential for falsely depressed results.    05/02/2023 16 6 - 29 U/L Final     Alkaline Phosphatase   Date Value Ref Range Status   01/11/2024 83 34 - 104 U/L Final   05/02/2023 109 37 - 153 U/L Final     eGFR   Date Value Ref Range Status   01/11/2024 81 ml/min/1.73sq m Final        Magnesium:   Magnesium   Date Value Ref Range Status   01/11/2024 1.8 (L) 1.9 - 2.7 mg/dL Final        A1C:  Hemoglobin A1C   Date Value Ref Range Status   01/11/2024 5.4 Normal 4.0-5.6%; PreDiabetic 5.7-6.4%; Diabetic >=6.5%; Glycemic control for adults with diabetes <7.0% % Final   09/10/2019 5.1 <5.7 % of total Hgb Final     Comment:     For the purpose of screening for the presence of  diabetes:     <5.7%       Consistent with the absence of diabetes  5.7-6.4%    Consistent with increased risk for diabetes              (prediabetes)  > or =6.5%  Consistent with diabetes     This assay result is consistent with a decreased risk  of diabetes.     Currently, no consensus exists regarding use of  hemoglobin A1c for diagnosis of diabetes in children.     According to American Diabetes Association (ADA)  guidelines, hemoglobin A1c <7.0% represents optimal  control in non-pregnant diabetic patients. Different  metrics may apply to specific patient populations.   Standards of Medical Care in Diabetes(ADA).              TSH:  TSH   Date Value Ref Range Status   05/02/2023 4.01 0.40 - 4.50 mIU/L Final     TSH 3RD GENERATON   Date Value Ref Range Status   01/11/2024 1.890 0.450 - 4.500 uIU/mL Final     Comment:     The recommended reference ranges for TSH during pregnancy are as follows:   First trimester 0.100 to 2.500 uIU/mL   Second trimester  0.200 to 3.000 uIU/mL   Third trimester 0.300 to 3.000 uIU/m    Note: Normal ranges may not apply to patients who are transgender, non-binary, or whose legal sex, sex at birth, and  "gender identity differ.  Adult TSH (3rd generation) reference range follows the recommended guidelines of the American Thyroid Association, .        PT/INR:  No results found for: \"PROTIME\", \"INR\"    Lipid Panel:  Cholesterol   Date Value Ref Range Status   2024 204 (H) See Comment mg/dL Final     Comment:     Cholesterol:         Pediatric <18 Years        Desirable          <170 mg/dL      Borderline High    170-199 mg/dL      High               >=200 mg/dL        Adult >=18 Years            Desirable        <200 mg/dL      Borderline High  200-239 mg/dL      High             >239 mg/dL       Total Cholesterol   Date Value Ref Range Status   2023 224 (H) <200 mg/dL Final     Triglycerides   Date Value Ref Range Status   2024 123 See Comment mg/dL Final     Comment:     Triglyceride:     0-9Y            <75mg/dL     10Y-17Y         <90 mg/dL       >=18Y     Normal          <150 mg/dL     Borderline High 150-199 mg/dL     High            200-499 mg/dL        Very High       >499 mg/dL    Specimen collection should occur prior to Metamizole administration due to the potential for falsely depressed results.   2023 114 <150 mg/dL Final     HDL   Date Value Ref Range Status   2023 63 > OR = 50 mg/dL Final     HDL, Direct   Date Value Ref Range Status   2024 57 >=50 mg/dL Final     Non-HDL-Chol (CHOL-HDL)   Date Value Ref Range Status   2024 147 mg/dl Final       Troponin:  Troponin I   Date Value Ref Range Status   10/27/2020 <0.01 0.00 - 0.03 ng/mL Final     Comment:     Negative: 0.00-0.03  Indeterminate: 0.04-0.12  Positive: >0.12           Imaging:    [id number]        ELVIRA:  No results found for this or any previous visit.      Echocardiogram:    Echo Complete w Contrast if Indicated  2019    SUMMARY     SUMMARY:  1. Normal sinus rhythm  2. Excellent technical quality      Stress Test:     Stress Test Only, Exercise  2019     SUMMARY:  -  " Stress results: Duration of exercise was 12 min. Target heart rate was not achieved. There was normal resting blood pressure with a hypertensive response to stress. There was no chest pain during stress.     IMPRESSIONS:     1. Submaximal stress test with maximum heart rate only 77% of predicted heart rate. This sub maximal heart rate reduces the sensitivity of this test.  2. Hypertensive response to exercise  3. Negative for symptoms of exertional angina pectoris or electrocardiographic changes of ischemia at the heart rate achieved. Diagnostic sensitivity was limited by submaximal stress.      Cardiac Catheterization:  No results found for this or any previous visit.      HOLTER MONITOR: 24 HOUR/48 HOUR MONITORS    48 Hour  02/26/2019    IMPRESSION:  The patient had predominantly sinus rhythm.   Heart rate varied from 38 bpm to 107 bpm.  The patient’s average heart rate was 55 bpm.    The patient had a holter monitor tracing done for 47 hours and 59 minutes.  The patient had 7 single ventricular ectopic activity versus [employer]ha Phenomenon.  The patient had 382 or 0.2% supraventricular ectopy.    There were 5 atrial runs with the longest being 11 beats and the fastest being 135 beats per minute.    There were 12 atrial pairs and 9 episodes of atrial bigeminy and 9 episodes of atrial trigeminy  The longest R/R interval was 2.0 seconds.  No other major arrhythmia was noted.   The diary made 3 comments of left arm pain and left-sided chest pain.  During each episode there was a PAC but no ST change.  There were other times in which patient had PACs and made no comment making it unlikely that there was a relationship between the chest pain and the PAC.      AMB Extended Holter Monitor: Zio XT/AT or BioTel  No results found for this or any previous visit.      Tilt Table Study:    TILT TABLE STUDY   04/17/2019     I have reviewed the patient's history and physical     Preprocedure symptoms  Palpitations   Light  headedness     Postprocedure diagnosis  Palpitations   Light headedness     Procedures  Complete electrophysiologic tilt study     Physician reviewing the study  Florentin Bain MD     Symptoms during the study  As noted     Complications  None     Details of the study           Interpretation  No POTS  No orthostatic hypotension         DEVICE CHECK:     No results found for this or any previous visit.         Review of Systems:  Review of Systems   All other systems reviewed and are negative.  As described in my history of present illness        Physical Exam:  Physical Exam  Vitals reviewed.   Constitutional:       General: She is not in acute distress.     Appearance: Normal appearance. She is well-developed. She is not ill-appearing.      Comments: Not in any distress at the current time       HENT:      Head: Normocephalic and atraumatic.      Right Ear: External ear normal.      Left Ear: External ear normal.      Nose: Nose normal.      Mouth/Throat:      Pharynx: Uvula midline.      Comments: Posterior pharynx is crowded  Eyes:      General: Lids are normal. No scleral icterus.     Extraocular Movements: Extraocular movements intact.      Conjunctiva/sclera: Conjunctivae normal.      Pupils: Pupils are equal, round, and reactive to light.      Comments: No pallor  No cyanosis  No icterus   Neck:      Thyroid: No thyromegaly.      Vascular: No carotid bruit or JVD.      Trachea: Trachea normal.      Comments: No jugular lymphadenopathy  Short thick neck  Cardiovascular:      Rate and Rhythm: Regular rhythm. Bradycardia present.      Chest Wall: PMI is not displaced.      Pulses: Normal pulses.      Heart sounds: S1 normal and S2 normal. Murmur heard.      No friction rub. No gallop. No S3 or S4 sounds.      Comments: Ejection systolic murmur heard both in aortic area and pulmonary area  Pulmonary:      Effort: Pulmonary effort is normal. No accessory muscle usage or respiratory distress.      Breath sounds:  Normal breath sounds. No decreased breath sounds, wheezing, rhonchi or rales.   Chest:      Chest wall: No tenderness.   Abdominal:      General: Bowel sounds are normal. There is no distension.      Palpations: Abdomen is soft. There is no hepatomegaly, splenomegaly or mass.      Tenderness: There is no abdominal tenderness.   Musculoskeletal:         General: No swelling, tenderness or deformity. Normal range of motion.      Cervical back: Normal range of motion.      Right lower leg: No edema.      Left lower leg: No edema.   Lymphadenopathy:      Cervical: No cervical adenopathy.   Skin:     Coloration: Skin is not jaundiced.      Findings: No abrasion, bruising, erythema, lesion or rash.      Nails: There is no clubbing.   Neurological:      Mental Status: She is alert and oriented to person, place, and time. Mental status is at baseline.      Motor: No weakness.      Comments: Facial symmetry is retained  Extraocular movements are retained  Head neck tongue and palate movement are retained and symmetric   Psychiatric:         Mood and Affect: Mood normal.         Speech: Speech normal.         Behavior: Behavior normal.         Thought Content: Thought content normal.         Judgment: Judgment normal.         Discussion/Summary:      sinus bradycardia  Patient does complain of intermittent lightheadedness  Have an event monitor for 2 weeks to look for symptom rhythm correlation      Increased calcification of coronary vessels  Ejection systolic murmur in aortic and pulmonary area  Patient is a smoker  Referred to general cardiology for evaluation and management of same      Pulmonary nodules  Patient is a smoker  Patient has pulmonary follow-up      Exertional intolerance  Dizziness, Orthostatic lightheadedness  Fatigue  The patient is complaining of symptoms for at least the last 2 years  There has been progressive decline in physical activity  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  After 5 hours of work  when she is back home, she is so fatigued as unwilling to move around     She reports multiple hospital or physician office visits, where heart rate was in the 40s     A summary of her testing is as follows  Till study -  there is no evidence of POTS, vasovagal syncope  Holter study -  Average heart rate is 55, minimum heart rate during sleeping is 38, no pauses greater than 2 seconds     Will need repeat evaluation, now that we are 4 years since the last evaluation           Chest pain  The chest pain is not suggestive of angina  She has had it for close to 5 years  It is episode a week  There is no obvious precipitating factor  Patient can walk and the pain does not worsen  Pain usually is resolved by 5-10 minutes

## 2024-03-19 ENCOUNTER — TELEPHONE (OUTPATIENT)
Age: 74
End: 2024-03-19

## 2024-03-19 NOTE — TELEPHONE ENCOUNTER
Patient states that she aspirated yesterday morning and temp is 99.2 patient does have a pft test on Monday. Patient is having shortness of breathe and is going to use her nebulizer. Patient is refusing an appointment and nurse triage at this time she states this happens frequently.

## 2024-03-20 ENCOUNTER — OFFICE VISIT (OUTPATIENT)
Dept: CARDIOLOGY CLINIC | Facility: CLINIC | Age: 74
End: 2024-03-20
Payer: COMMERCIAL

## 2024-03-20 VITALS
HEIGHT: 65 IN | WEIGHT: 177 LBS | HEART RATE: 59 BPM | BODY MASS INDEX: 29.49 KG/M2 | DIASTOLIC BLOOD PRESSURE: 62 MMHG | SYSTOLIC BLOOD PRESSURE: 138 MMHG

## 2024-03-20 DIAGNOSIS — R91.8 LUNG NODULES: ICD-10-CM

## 2024-03-20 DIAGNOSIS — F17.210 CIGARETTE SMOKER: ICD-10-CM

## 2024-03-20 DIAGNOSIS — E06.3 HYPOTHYROIDISM DUE TO HASHIMOTO'S THYROIDITIS: ICD-10-CM

## 2024-03-20 DIAGNOSIS — R53.83 OTHER FATIGUE: ICD-10-CM

## 2024-03-20 DIAGNOSIS — F17.200 NICOTINE DEPENDENCE WITH CURRENT USE: ICD-10-CM

## 2024-03-20 DIAGNOSIS — R00.1 SINUS BRADYCARDIA: Primary | ICD-10-CM

## 2024-03-20 DIAGNOSIS — D47.2 MGUS (MONOCLONAL GAMMOPATHY OF UNKNOWN SIGNIFICANCE): ICD-10-CM

## 2024-03-20 DIAGNOSIS — R42 DIZZINESS: ICD-10-CM

## 2024-03-20 DIAGNOSIS — E03.8 HYPOTHYROIDISM DUE TO HASHIMOTO'S THYROIDITIS: ICD-10-CM

## 2024-03-20 DIAGNOSIS — R93.1 HIGH CORONARY ARTERY CALCIUM SCORE: ICD-10-CM

## 2024-03-20 LAB
6MAM UR QL CFM: NEGATIVE NG/ML
7AMINOCLONAZEPAM UR QL CFM: NEGATIVE NG/ML
A-OH ALPRAZ UR QL CFM: NEGATIVE NG/ML
ACCEPTABLE CREAT UR QL: NORMAL MG/DL
ACCEPTIBLE SP GR UR QL: NORMAL
AMPHET UR QL CFM: NEGATIVE NG/ML
AMPHET UR QL CFM: NEGATIVE NG/ML
BUPRENORPHINE UR QL CFM: NEGATIVE NG/ML
BUTALBITAL UR QL CFM: NEGATIVE NG/ML
BZE UR QL CFM: NEGATIVE NG/ML
CODEINE UR QL CFM: NEGATIVE NG/ML
DESIPRAMINE UR QL CFM: NEGATIVE NG/ML
EDDP UR QL CFM: NEGATIVE NG/ML
ETHYL GLUCURONIDE UR QL CFM: NEGATIVE NG/ML
ETHYL SULFATE UR QL SCN: NEGATIVE NG/ML
FENTANYL UR QL CFM: NEGATIVE NG/ML
GLIADIN IGG SER IA-ACNC: NEGATIVE NG/ML
GLUCOSE 30M P 50 G LAC PO SERPL-MCNC: NEGATIVE NG/ML
HYDROCODONE UR QL CFM: ABNORMAL NG/ML
HYDROCODONE UR QL CFM: ABNORMAL NG/ML
HYDROMORPHONE UR QL CFM: NEGATIVE NG/ML
LORAZEPAM UR QL CFM: NEGATIVE NG/ML
MDMA UR QL CFM: NEGATIVE NG/ML
ME-PHENIDATE UR QL CFM: NEGATIVE NG/ML
MEPERIDINE UR QL CFM: NEGATIVE NG/ML
METHADONE UR QL CFM: NEGATIVE NG/ML
METHAMPHET UR QL CFM: NEGATIVE NG/ML
MORPHINE UR QL CFM: NEGATIVE NG/ML
MORPHINE UR QL CFM: NEGATIVE NG/ML
NITRITE UR QL: NORMAL UG/ML
NORBUPRENORPHINE UR QL CFM: NEGATIVE NG/ML
NORDIAZEPAM UR QL CFM: NEGATIVE NG/ML
NORFENTANYL UR QL CFM: NEGATIVE NG/ML
NORHYDROCODONE UR QL CFM: ABNORMAL NG/ML
NORHYDROCODONE UR QL CFM: ABNORMAL NG/ML
NORMEPERIDINE UR QL CFM: NEGATIVE NG/ML
NOROXYCODONE UR QL CFM: NEGATIVE NG/ML
OLANZAPINE QUANTIFICATION: NEGATIVE NG/ML
OPC-3373 QUANTIFICATION: NEGATIVE
OXAZEPAM UR QL CFM: NEGATIVE NG/ML
OXYCODONE UR QL CFM: NEGATIVE NG/ML
OXYMORPHONE UR QL CFM: NEGATIVE NG/ML
OXYMORPHONE UR QL CFM: NEGATIVE NG/ML
PARA-FLUOROFENTANYL QUANTIFICATION: NORMAL NG/ML
SL AMB 4-ANPP QUANTIFICATION: NORMAL NG/ML
SL AMB 5F-ADB-M7 METABOLITE QUANTIFICATION: NEGATIVE NG/ML
SL AMB 7-OH-MITRAGYNINE (KRATOM ALKALOID) QUANTIFICATION: NEGATIVE NG/ML
SL AMB AB-FUBINACA-M3 METABOLITE QUANTIFICATION: NEGATIVE NG/ML
SL AMB ACETYL FENTANYL QUANTIFICATION: NORMAL NG/ML
SL AMB ACETYL NORFENTANYL QUANTIFICATION: NORMAL NG/ML
SL AMB ACRYL FENTANYL QUANTIFICATION: NORMAL NG/ML
SL AMB CARFENTANIL QUANTIFICATION: NORMAL NG/ML
SL AMB CLOZAPINE QUANTIFICATION: NEGATIVE NG/ML
SL AMB CTHC (MARIJUANA METABOLITE) QUANTIFICATION: NEGATIVE NG/ML
SL AMB DEXTROMETHORPHAN QUANTIFICATION: NEGATIVE NG/ML
SL AMB DEXTRORPHAN (DEXTROMETHORPHAN METABOLITE) QUANT: NEGATIVE NG/ML
SL AMB DEXTRORPHAN (DEXTROMETHORPHAN METABOLITE) QUANT: NEGATIVE NG/ML
SL AMB HYDROXYRISPERIDONE QUANTIFICATION: NEGATIVE NG/ML
SL AMB JWH018 METABOLITE QUANTIFICATION: NEGATIVE NG/ML
SL AMB JWH073 METABOLITE QUANTIFICATION: NEGATIVE NG/ML
SL AMB MDMB-FUBINACA-M1 METABOLITE QUANTIFICATION: NEGATIVE NG/ML
SL AMB N-DESMETHYL-TRAMADOL QUANTIFICATION: NEGATIVE NG/ML
SL AMB N-DESMETHYLCLOZAPINE QUANTIFICATION: NEGATIVE NG/ML
SL AMB NORQUETIAPINE QUANTIFICATION: NEGATIVE NG/ML
SL AMB PHENTERMINE QUANTIFICATION: NEGATIVE NG/ML
SL AMB QUETIAPINE QUANTIFICATION: NEGATIVE NG/ML
SL AMB RCS4 METABOLITE QUANTIFICATION: NEGATIVE NG/ML
SL AMB RISPERIDONE QUANTIFICATION: NEGATIVE NG/ML
SL AMB RITALINIC ACID QUANTIFICATION: NEGATIVE NG/ML
SPECIMEN DRAWN SERPL: NEGATIVE NG/ML
SPECIMEN PH ACCEPTABLE UR: NORMAL
TAPENTADOL UR QL CFM: NEGATIVE NG/ML
TEMAZEPAM UR QL CFM: NEGATIVE NG/ML
TEMAZEPAM UR QL CFM: NEGATIVE NG/ML
TRAMADOL UR QL CFM: NEGATIVE NG/ML
URATE/CREAT 24H UR: NEGATIVE NG/ML

## 2024-03-20 PROCEDURE — 99214 OFFICE O/P EST MOD 30 MIN: CPT | Performed by: INTERNAL MEDICINE

## 2024-03-20 PROCEDURE — 93000 ELECTROCARDIOGRAM COMPLETE: CPT | Performed by: INTERNAL MEDICINE

## 2024-03-20 NOTE — LETTER
March 20, 2024     Aleksandar Garduno MD  [address] [place] [id number]    Patient: Areli Luciano   YOB: 1950   Date of Visit: 3/20/2024       Dear Dr. Garduno:    Thank you for referring Areli Luciano to me for evaluation. Below are my notes for this consultation.    If you have questions, please do not hesitate to call me. I look forward to following your patient along with you.         Sincerely,        Florentin Bain MD        CC: Areli Luciano    Florentin Bain MD  3/20/2024 10:25 AM  Sign when Signing Visit                                             Cardiology Follow Up    Areli Luciano  1950  [id number]  [facility] [place]  [address]  [address]  [facility] [address]  [phone]    1. Sinus bradycardia  POCT ECG      2. Cigarette smoker  Ambulatory Referral to Cardiology      3. Lung nodules  Ambulatory Referral to Cardiology      4. Hypothyroidism due to Hashimoto's thyroiditis        5. Nicotine dependence with current use        6. Other fatigue        7. Dizziness        8. MGUS (monoclonal gammopathy of unknown significance)        9. High coronary artery calcium score            Summary of my recommendation for the patient    Patient gives history of occasional lightheadedness-prior history of bradycardia-get  2-week of event monitor    Ejection systolic murmur, soft, heard both in aortic area as well as pulmonic area-echocardiogram to assess EF and valvular function    Coronary artery calcium-refer to general cardiology-whoever has appropriate appointment -earliest available    Patient is an active smoker, multiple pulmonary nodules-following up with pulmonary    Follow-up with me in 1 year    If you find significant abnormality in the event monitor we will reach out to the patient          Interval History:   Patient does give a history of intermittent lightheadedness in the setting of bradycardia    She was recently being evaluated by  primary, chest CT revealed pulmonary nodules as well as increased calcification of coronary vessels    She is not complaining of anginal-like chest pain or pressure at this time     Interval History:   The patient has been referred to me by  for evaluation of bradycardia  In addition the patient complains of exertional intolerance, postural lightheadedness, and significant fatigue     Episodes have been going on for quite some time and at least worsening over the last 2 years  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[facility] [employer]  She has to work for 5 hours a day  Once she reaches home she feels so fatigued that she cannot do anything  She prepares food over 30 minutes and thereafter can hardly do anything else     She informs prior  physician office visit where her heart rate is in the 30s and 40s  She has curtailed her activity and does everything very slowly     The patient is not complaining of anginal like chest pain or chest pressure  There is no worsening orthopnea, paroxysmal nocturnal dyspnea  There is no leg swelling     Patient does not get palpitation   There is no history of presyncope or syncope  There is rare history of transient  lightheadedness  There is significant exertional intolerance     There is history of snoring at night  There is history of morning fatigability  There is occasional history of daytime sleepiness       Patient Active Problem List   Diagnosis   • Chronic pain syndrome   • Cervical radiculopathy   • Myofascial pain syndrome   • Spondylosis of cervical region without myelopathy or radiculopathy   • Fibromyalgia   • Long-term current use of opiate analgesic   • MGUS (monoclonal gammopathy of unknown significance)   • Iron deficiency anemia following bariatric surgery   • Light headedness   • Chronic bronchitis (HCC)   • Primary osteoarthritis of right knee   • Pseudogout of left knee   • Lumbar radiculitis   • Chronic bilateral low back pain with bilateral sciatica   • Rotator  cuff syndrome, left   • Left shoulder pain   • Dizziness   • Other fatigue   • Biceps tendinitis of left shoulder   • Adhesive capsulitis of left shoulder   • Hypoglycemia   • Hypothyroidism due to Hashimoto's thyroiditis   • Iron deficiency anemia, unspecified   • GERD (gastroesophageal reflux disease)   • Nicotine dependence with current use   • Need for prophylactic vaccination against Streptococcus pneumoniae (pneumococcus)   • Needs flu shot   • Osteoarthritis   • Postnasal drip   • Encounter for screening for malignant neoplasm of lung in current smoker with 30 pack year history or greater   • High coronary artery calcium score     Past Medical History:   Diagnosis Date   • Anemia    • Anxiety     hx of panic attacks (now under control)    • Arthritis    • Asthma     resolved (no problems in a decade)    • Baker's cyst of knee    • Bronchitis    • Bunion, left foot    • Cervical pain    • Chronic GERD    • Chronic pain    • Chronic pain disorder    • Depression    • Diabetes mellitus, type 2 (HCC)    • Diabetic peripheral neuropathy (HCC)    • Endometriosis    • Fibromyalgia    • Hyperlipidemia    • Hypertension    • Joint pain    • Low back pain    • Low back pain    • Lung nodules    • Macular degeneration    • Pulmonary emphysema (HCC) 01/16/2020   • Spondylosis    • Spontaneous pneumothorax    • Uterine cancer (HCC)      Social History     Socioeconomic History   • Marital status:      Spouse name: Not on file   • Number of children: Not on file   • Years of education: Not on file   • Highest education level: Not on file   Occupational History   • Not on file   Tobacco Use   • Smoking status: Every Day     Current packs/day: 0.30     Average packs/day: 0.6 packs/day for 53.2 years (33.5 ttl pk-yrs)     Types: Cigarettes     Start date: 1971   • Smokeless tobacco: Never   Vaping Use   • Vaping status: Never Used   Substance and Sexual Activity   • Alcohol use: Not Currently     Alcohol/week: 1.0  standard drink of alcohol     Types: 1 Shots of liquor per week     Comment: rarely   • Drug use: No   • Sexual activity: Not Currently     Partners: Male   Other Topics Concern   • Not on file   Social History Narrative   • Not on file     Social Determinants of Health     Financial Resource Strain: Low Risk  (6/9/2023)    Overall Financial Resource Strain (CARDIA)    • Difficulty of Paying Living Expenses: Not hard at all   Food Insecurity: No Food Insecurity (4/26/2021)    Hunger Vital Sign    • Worried About Running Out of Food in the Last Year: Never true    • Ran Out of Food in the Last Year: Never true   Transportation Needs: No Transportation Needs (6/9/2023)    PRAPARE - Transportation    • Lack of Transportation (Medical): No    • Lack of Transportation (Non-Medical): No   Physical Activity: Inactive (9/8/2020)    Exercise Vital Sign    • Days of Exercise per Week: 0 days    • Minutes of Exercise per Session: 0 min   Stress: No Stress Concern Present (9/8/2020)    [employer] - Occupational Stress Questionnaire    • Feeling of Stress : Not at all   Social Connections: Unknown (9/8/2020)    Social Connection and Isolation Panel [NHANES]    • Frequency of Communication with Friends and Family: Patient declined    • Frequency of Social Gatherings with Friends and Family: Patient declined    • Attends Synagogue Services: Patient declined    • Active Member of Clubs or Organizations: Patient declined    • Attends Club or Organization Meetings: Patient declined    • Marital Status: Patient declined   Intimate Partner Violence: Not At Risk (9/8/2020)    Humiliation, Afraid, Rape, and Kick questionnaire    • Fear of Current or Ex-Partner: No    • Emotionally Abused: No    • Physically Abused: No    • Sexually Abused: No   Housing Stability: Not on file      Family History   Problem Relation Age of Onset   • Hypertension Mother    • Lung cancer Mother    • Hypertension Father    • Heart  disease Father    • Heart attack Father    • Cancer Father    • No Known Problems Sister    • No Known Problems Daughter    • No Known Problems Maternal Grandmother    • No Known Problems Maternal Grandfather    • No Known Problems Paternal Grandmother    • No Known Problems Paternal Grandfather    • Breast cancer Paternal Aunt 40   • Breast cancer Paternal Aunt 45   • Breast cancer Maternal Aunt 48     Past Surgical History:   Procedure Laterality Date   • BACK SURGERY  1996    L5, S1- laser surgery fusion of c5 and c6    • BREAST CYST EXCISION Right    • CHOLECYSTECTOMY  2004   • FOOT SURGERY Left 2005    fusion    • FRACTURE SURGERY     • GASTRIC BYPASS  2010    Dr. Oropeza    • HYSTERECTOMY  1985    just uterus    • KNEE ARTHROSCOPY     • LAMINECTOMY     • ORTHOPEDIC SURGERY     • OVARIAN CYST REMOVAL  1975   • PELVIC LAPAROSCOPY      ovaries (x10)    • PLEURAL SCARIFICATION  1967   • SHOULDER OPEN ROTATOR CUFF REPAIR Left 2008   • TONSILLECTOMY     • VAGINAL PROLAPSE REPAIR         Current Outpatient Medications:   •  albuterol (PROVENTIL HFA,VENTOLIN HFA) 90 mcg/act inhaler, Inhale 2 puffs every 6 (six) hours as needed for wheezing, Disp: 18 g, Rfl: 3  •  amLODIPine (NORVASC) 5 mg tablet, TAKE 1 TABLET (5 MG TOTAL) BY MOUTH DAILY., Disp: 90 tablet, Rfl: 1  •  baclofen 10 mg tablet, Take 1 tablet (10 mg total) by mouth 3 (three) times a day, Disp: 270 tablet, Rfl: 0  •  benzonatate (TESSALON PERLES) 100 mg capsule, Take 1 capsule (100 mg total) by mouth 3 (three) times a day as needed for cough, Disp: 30 capsule, Rfl: 1  •  Calcium 600+D3 600-20 MG-MCG TABS, TAKE 1 TABLET BY MOUTH TWICE A DAY WITH MEALS, Disp: 180 tablet, Rfl: 4  •  cetirizine (ZyrTEC) 10 mg tablet, Take 0.5 tablets (5 mg total) by mouth daily, Disp: 45 tablet, Rfl: 2  •  clotrimazole-betamethasone (LOTRISONE) 1-0.05 % cream, Apply topically 3 (three) times a day, Disp: 60 g, Rfl: 1  •  [employer] Vitamin A 2400 MCG (8000 UT) capsule, TAKE 1 CAPSULE  (8,000 UNITS TOTAL) BY MOUTH DAILY, Disp: 90 capsule, Rfl: 2  •  Diclofenac Sodium (VOLTAREN) 1 %, APPLY 2 GRAMS TO AFFECTED AREA 4 TIMES A DAY, Disp: 200 g, Rfl: 0  •  diphenhydrAMINE-PSE-APAP (BENADRYL ALLERGY/COLD PO), Take by mouth daily at bedtime, Disp: , Rfl:   •  famotidine (PEPCID) 20 mg tablet, TAKE 1 TABLET (20 MG TOTAL) BY MOUTH 2 (TWO) TIMES A DAY PLEASE STOP TAGAMET, Disp: 180 tablet, Rfl: 1  •  fluticasone-umeclidinium-vilanterol (Trelegy Ellipta) 100-62.5-25 mcg/actuation inhaler, Inhale 1 puff daily Rinse mouth after use., Disp: 60 each, Rfl: 5  •  HYDROcodone-acetaminophen (NORCO)  mg per tablet, Take 1 tab every 4-6 hours prn, for ongoing therapy., Disp: 130 tablet, Rfl: 0  •  HYDROcodone-acetaminophen (NORCO)  mg per tablet, Take 1 tab q 4-6 hours prn  for ongoing therapy DO NOT FILL BEFORE: 04/14/2024, Disp: 130 tablet, Rfl: 0  •  ipratropium-albuterol (DUO-NEB) 0.5-2.5 mg/3 mL nebulizer solution, Take 3 mL by nebulization every 8 (eight) hours as needed for wheezing or shortness of breath, Disp: 270 mL, Rfl: 3  •  levothyroxine 25 mcg tablet, TAKE 1 TABLET (25 MCG TOTAL) BY MOUTH EVERY OTHER DAY, Disp: 45 tablet, Rfl: 3  •  levothyroxine 50 mcg tablet, TAKE 1 TABLET (50 MCG TOTAL) BY MOUTH EVERY OTHER DAY, Disp: 45 tablet, Rfl: 1  •  losartan (COZAAR) 25 mg tablet, TAKE 1 TABLET (25 MG TOTAL) BY MOUTH DAILY., Disp: 90 tablet, Rfl: 1  •  nortriptyline (PAMELOR) 25 mg capsule, TAKE 1 CAPSULE BY MOUTH EVERYDAY AT BEDTIME, Disp: 90 capsule, Rfl: 1  •  pantoprazole (PROTONIX) 40 mg tablet, TAKE 1 TABLET BY MOUTH TWICE A DAY, Disp: 180 tablet, Rfl: 1  •  Pediatric Multiple Vitamins (Childrens Chew Multivitamin) CHEW, CHEW AND SWALLOW 1 TABLET BY MOUTH EVERY DAY, Disp: 100 tablet, Rfl: 5  •  pregabalin (LYRICA) 200 MG capsule, Take 1 capsule (200 mg total) by mouth 3 (three) times a day, Disp: 90 capsule, Rfl: 1  •  sucralfate (CARAFATE) 1 g/10 mL suspension, TAKE 10 ML (1 G TOTAL) BY  MOUTH 4 TIMES A DAY, Disp: 1260 mL, Rfl: 1  •  traZODone (DESYREL) 50 mg tablet, TAKE 1 TABLET BY MOUTH EVERY DAY AT BEDTIME AS NEEDED, Disp: 90 tablet, Rfl: 1  •  Vitamin A 2400 MCG (8000 UT) TABS, TAKE 1 CAPSULE (8,000 UNITS TOTAL) BY MOUTH DAILY, Disp: , Rfl:   •  vitamin B-12 (VITAMIN B-12) 1,000 mcg tablet, Take 1 tablet (1,000 mcg total) by mouth daily, Disp: 90 tablet, Rfl: 3  •  denosumab (PROLIA) 60 mg/mL, Inject 1 mL (60 mg total) under the skin once for 1 dose, Disp: 1 mL, Rfl: 0  •  ergocalciferol (VITAMIN D2) 50,000 units, Take 1 capsule (50,000 Units total) by mouth once a week (Patient not taking: Reported on 1/8/2024), Disp: 13 capsule, Rfl: 2  •  escitalopram (LEXAPRO) 5 mg tablet, Take 1 tablet (5 mg total) by mouth daily (Patient not taking: Reported on 3/20/2024), Disp: 30 tablet, Rfl: 2  •  naloxone (NARCAN) 4 mg/0.1 mL nasal spray, Administer 1 spray into a nostril. If no response after 2-3 minutes, give another dose in the other nostril using a new spray. (Patient not taking: Reported on 9/6/2023), Disp: 1 each, Rfl: 1  •  nicotine (NICODERM CQ) 14 mg/24hr TD 24 hr patch, Place 1 patch on the skin over 24 hours every 24 hours (Patient not taking: Reported on 3/20/2024), Disp: 28 patch, Rfl: 2  •  nicotine (NICODERM CQ) 7 mg/24hr TD 24 hr patch, Place 1 patch on the skin over 24 hours every 24 hours (Patient not taking: Reported on 3/20/2024), Disp: 14 patch, Rfl: 0    Current Facility-Administered Medications:   •  cyanocobalamin injection 1,000 mcg, 1,000 mcg, Intramuscular, Q30 Days, Aleksandar Garduno MD, 1,000 mcg at 12/08/20 1118  •  cyanocobalamin injection 1,000 mcg, 1,000 mcg, Intramuscular, Q30 Days, Aleksandar Garduno MD, 1,000 mcg at 07/08/20 1036  •  cyanocobalamin injection 1,000 mcg, 1,000 mcg, Intramuscular, Q30 Days, Aleksandar Garduno MD, 1,000 mcg at 09/08/20 1210  •  cyanocobalamin injection 1,000 mcg, 1,000 mcg, Intramuscular, Q30 Days, Aleksandar Garduno MD  •  cyanocobalamin injection 1,000 mcg,  1,000 mcg, Intramuscular, Q30 Days, Aleksandar Garduno MD  •  cyanocobalamin injection 1,000 mcg, 1,000 mcg, Intramuscular, Q30 Days, Aleksandar Garduno MD, 1,000 mcg at 08/18/21 1019  •  cyanocobalamin injection 1,000 mcg, 1,000 mcg, Intramuscular, Q30 Days, Aleksandar Garduno MD, 1,000 mcg at 03/21/23 1117  •  cyanocobalamin injection 1,000 mcg, 1,000 mcg, Intramuscular, Q30 Days, Aleksandar Garduno MD, 1,000 mcg at 07/13/23 1004  Allergies   Allergen Reactions   • Cephalosporins Hives   • Erythromycin GI Intolerance   • Fentanyl Itching     Occurred during surgery    • Paxil [Paroxetine] Hives     After 2 weeks   • Penicillins Itching   • Pravastatin Myalgia   • Statins Itching   • Sulfa Antibiotics Diarrhea   • Wellbutrin [Bupropion] Hives     After 2 weeks    • Marzena's Wort Rash         LAB RESULTS:    CBC:  WBC   Date Value Ref Range Status   01/11/2024 3.72 (L) 4.31 - 10.16 Thousand/uL Final     Hemoglobin   Date Value Ref Range Status   01/11/2024 12.3 11.5 - 15.4 g/dL Final     Hematocrit   Date Value Ref Range Status   01/11/2024 37.7 34.8 - 46.1 % Final     MCV   Date Value Ref Range Status   01/11/2024 98 82 - 98 fL Final     Platelets   Date Value Ref Range Status   01/11/2024 195 149 - 390 Thousands/uL Final     RBC   Date Value Ref Range Status   01/11/2024 3.84 3.81 - 5.12 Million/uL Final     MCH   Date Value Ref Range Status   01/11/2024 32.0 26.8 - 34.3 pg Final     MCHC   Date Value Ref Range Status   01/11/2024 32.6 31.4 - 37.4 g/dL Final     RDW   Date Value Ref Range Status   01/11/2024 13.9 11.6 - 15.1 % Final     MPV   Date Value Ref Range Status   01/11/2024 10.0 8.9 - 12.7 fL Final     nRBC   Date Value Ref Range Status   01/11/2024 0 /100 WBCs Final       CMP:  Potassium   Date Value Ref Range Status   01/11/2024 3.6 3.5 - 5.3 mmol/L Final   05/02/2023 3.6 3.5 - 5.3 mmol/L Final     Chloride   Date Value Ref Range Status   01/11/2024 103 96 - 108 mmol/L Final   05/02/2023 106 98 - 110 mmol/L Final     CO2    Date Value Ref Range Status   01/11/2024 27 21 - 32 mmol/L Final   05/02/2023 29 20 - 32 mmol/L Final     BUN   Date Value Ref Range Status   01/11/2024 7 5 - 25 mg/dL Final   05/02/2023 7 7 - 25 mg/dL Final     Creatinine   Date Value Ref Range Status   01/11/2024 0.73 0.60 - 1.30 mg/dL Final     Comment:     Standardized to IDMS reference method     Calcium   Date Value Ref Range Status   01/11/2024 8.9 8.4 - 10.2 mg/dL Final   05/02/2023 8.9 8.6 - 10.4 mg/dL Final     AST   Date Value Ref Range Status   01/11/2024 28 13 - 39 U/L Final     Comment:     Slightly Hemolyzed:Results may be affected.   05/02/2023 21 10 - 35 U/L Final     ALT   Date Value Ref Range Status   01/11/2024 15 7 - 52 U/L Final     Comment:     Specimen collection should occur prior to Sulfasalazine administration due to the potential for falsely depressed results.    05/02/2023 16 6 - 29 U/L Final     Alkaline Phosphatase   Date Value Ref Range Status   01/11/2024 83 34 - 104 U/L Final   05/02/2023 109 37 - 153 U/L Final     eGFR   Date Value Ref Range Status   01/11/2024 81 ml/min/1.73sq m Final        Magnesium:   Magnesium   Date Value Ref Range Status   01/11/2024 1.8 (L) 1.9 - 2.7 mg/dL Final        A1C:  Hemoglobin A1C   Date Value Ref Range Status   01/11/2024 5.4 Normal 4.0-5.6%; PreDiabetic 5.7-6.4%; Diabetic >=6.5%; Glycemic control for adults with diabetes <7.0% % Final   09/10/2019 5.1 <5.7 % of total Hgb Final     Comment:     For the purpose of screening for the presence of  diabetes:     <5.7%       Consistent with the absence of diabetes  5.7-6.4%    Consistent with increased risk for diabetes              (prediabetes)  > or =6.5%  Consistent with diabetes     This assay result is consistent with a decreased risk  of diabetes.     Currently, no consensus exists regarding use of  hemoglobin A1c for diagnosis of diabetes in children.     According to American Diabetes Association (ADA)  guidelines, hemoglobin A1c <7.0%  "represents optimal  control in non-pregnant diabetic patients. Different  metrics may apply to specific patient populations.   Standards of Medical Care in Diabetes(ADA).              TSH:  TSH   Date Value Ref Range Status   05/02/2023 4.01 0.40 - 4.50 mIU/L Final     TSH 3RD GENERATON   Date Value Ref Range Status   01/11/2024 1.890 0.450 - 4.500 uIU/mL Final     Comment:     The recommended reference ranges for TSH during pregnancy are as follows:   First trimester 0.100 to 2.500 uIU/mL   Second trimester  0.200 to 3.000 uIU/mL   Third trimester 0.300 to 3.000 uIU/m    Note: Normal ranges may not apply to patients who are transgender, non-binary, or whose legal sex, sex at birth, and gender identity differ.  Adult TSH (3rd generation) reference range follows the recommended guidelines of the American Thyroid Association, January, 2020.        PT/INR:  No results found for: \"PROTIME\", \"INR\"    Lipid Panel:  Cholesterol   Date Value Ref Range Status   01/11/2024 204 (H) See Comment mg/dL Final     Comment:     Cholesterol:         Pediatric <18 Years        Desirable          <170 mg/dL      Borderline High    170-199 mg/dL      High               >=200 mg/dL        Adult >=18 Years            Desirable        <200 mg/dL      Borderline High  200-239 mg/dL      High             >239 mg/dL       Total Cholesterol   Date Value Ref Range Status   05/02/2023 224 (H) <200 mg/dL Final     Triglycerides   Date Value Ref Range Status   01/11/2024 123 See Comment mg/dL Final     Comment:     Triglyceride:     0-9Y            <75mg/dL     10Y-17Y         <90 mg/dL       >=18Y     Normal          <150 mg/dL     Borderline High 150-199 mg/dL     High            200-499 mg/dL        Very High       >499 mg/dL    Specimen collection should occur prior to Metamizole administration due to the potential for falsely depressed results.   05/02/2023 114 <150 mg/dL Final     HDL   Date Value Ref Range Status   05/02/2023 63 > OR = 50 " mg/dL Final     HDL, Direct   Date Value Ref Range Status   2024 57 >=50 mg/dL Final     Non-HDL-Chol (CHOL-HDL)   Date Value Ref Range Status   2024 147 mg/dl Final       Troponin:  Troponin I   Date Value Ref Range Status   10/27/2020 <0.01 0.00 - 0.03 ng/mL Final     Comment:     Negative: 0.00-0.03  Indeterminate: 0.04-0.12  Positive: >0.12           Imaging:    [id number]        ELVIRA:  No results found for this or any previous visit.      Echocardiogram:    Echo Complete w Contrast if Indicated  2019    SUMMARY     SUMMARY:  1. Normal sinus rhythm  2. Excellent technical quality      Stress Test:     Stress Test Only, Exercise  2019     SUMMARY:  -  Stress results: Duration of exercise was 12 min. Target heart rate was not achieved. There was normal resting blood pressure with a hypertensive response to stress. There was no chest pain during stress.     IMPRESSIONS:     1. Submaximal stress test with maximum heart rate only 77% of predicted heart rate. This sub maximal heart rate reduces the sensitivity of this test.  2. Hypertensive response to exercise  3. Negative for symptoms of exertional angina pectoris or electrocardiographic changes of ischemia at the heart rate achieved. Diagnostic sensitivity was limited by submaximal stress.      Cardiac Catheterization:  No results found for this or any previous visit.      HOLTER MONITOR: 24 HOUR/48 HOUR MONITORS    48 Hour  2019    IMPRESSION:  The patient had predominantly sinus rhythm.   Heart rate varied from 38 bpm to 107 bpm.  The patient’s average heart rate was 55 bpm.    The patient had a holter monitor tracing done for 47 hours and 59 minutes.  The patient had 7 single ventricular ectopic activity versus V[employer]ha Phenomenon.  The patient had 382 or 0.2% supraventricular ectopy.    There were 5 atrial runs with the longest being 11 beats and the fastest being 135 beats per minute.    There were 12 atrial pairs and 9  episodes of atrial bigeminy and 9 episodes of atrial trigeminy  The longest R/R interval was 2.0 seconds.  No other major arrhythmia was noted.   The diary made 3 comments of left arm pain and left-sided chest pain.  During each episode there was a PAC but no ST change.  There were other times in which patient had PACs and made no comment making it unlikely that there was a relationship between the chest pain and the PAC.      AMB Extended Holter Monitor: Zio XT/AT or BioTel  No results found for this or any previous visit.      Tilt Table Study:    TILT TABLE STUDY   04/17/2019     I have reviewed the patient's history and physical     Preprocedure symptoms  Palpitations   Light headedness     Postprocedure diagnosis  Palpitations   Light headedness     Procedures  Complete electrophysiologic tilt study     Physician reviewing the study  Florentin Bain MD     Symptoms during the study  As noted     Complications  None     Details of the study           Interpretation  No POTS  No orthostatic hypotension         DEVICE CHECK:     No results found for this or any previous visit.         Review of Systems:  Review of Systems   All other systems reviewed and are negative.  As described in my history of present illness        Physical Exam:  Physical Exam  Vitals reviewed.   Constitutional:       General: She is not in acute distress.     Appearance: Normal appearance. She is well-developed. She is not ill-appearing.      Comments: Not in any distress at the current time       HENT:      Head: Normocephalic and atraumatic.      Right Ear: External ear normal.      Left Ear: External ear normal.      Nose: Nose normal.      Mouth/Throat:      Pharynx: Uvula midline.      Comments: Posterior pharynx is crowded  Eyes:      General: Lids are normal. No scleral icterus.     Extraocular Movements: Extraocular movements intact.      Conjunctiva/sclera: Conjunctivae normal.      Pupils: Pupils are equal, round, and reactive to  light.      Comments: No pallor  No cyanosis  No icterus   Neck:      Thyroid: No thyromegaly.      Vascular: No carotid bruit or JVD.      Trachea: Trachea normal.      Comments: No jugular lymphadenopathy  Short thick neck  Cardiovascular:      Rate and Rhythm: Regular rhythm. Bradycardia present.      Chest Wall: PMI is not displaced.      Pulses: Normal pulses.      Heart sounds: S1 normal and S2 normal. Murmur heard.      No friction rub. No gallop. No S3 or S4 sounds.      Comments: Ejection systolic murmur heard both in aortic area and pulmonary area  Pulmonary:      Effort: Pulmonary effort is normal. No accessory muscle usage or respiratory distress.      Breath sounds: Normal breath sounds. No decreased breath sounds, wheezing, rhonchi or rales.   Chest:      Chest wall: No tenderness.   Abdominal:      General: Bowel sounds are normal. There is no distension.      Palpations: Abdomen is soft. There is no hepatomegaly, splenomegaly or mass.      Tenderness: There is no abdominal tenderness.   Musculoskeletal:         General: No swelling, tenderness or deformity. Normal range of motion.      Cervical back: Normal range of motion.      Right lower leg: No edema.      Left lower leg: No edema.   Lymphadenopathy:      Cervical: No cervical adenopathy.   Skin:     Coloration: Skin is not jaundiced.      Findings: No abrasion, bruising, erythema, lesion or rash.      Nails: There is no clubbing.   Neurological:      Mental Status: She is alert and oriented to person, place, and time. Mental status is at baseline.      Motor: No weakness.      Comments: Facial symmetry is retained  Extraocular movements are retained  Head neck tongue and palate movement are retained and symmetric   Psychiatric:         Mood and Affect: Mood normal.         Speech: Speech normal.         Behavior: Behavior normal.         Thought Content: Thought content normal.         Judgment: Judgment normal.          Discussion/Summary:      sinus bradycardia  Patient does complain of intermittent lightheadedness  Have an event monitor for 2 weeks to look for symptom rhythm correlation      Increased calcification of coronary vessels  Ejection systolic murmur in aortic and pulmonary area  Patient is a smoker  Referred to general cardiology for evaluation and management of same      Pulmonary nodules  Patient is a smoker  Patient has pulmonary follow-up      Exertional intolerance  Dizziness, Orthostatic lightheadedness  Fatigue  The patient is complaining of symptoms for at least the last 2 years  There has been progressive decline in physical activity  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  After 5 hours of work when she is back home, she is so fatigued as unwilling to move around     She reports multiple hospital or physician office visits, where heart rate was in the 40s     A summary of her testing is as follows  Till study -  there is no evidence of POTS, vasovagal syncope  Holter study -  Average heart rate is 55, minimum heart rate during sleeping is 38, no pauses greater than 2 seconds     Will need repeat evaluation, now that we are 4 years since the last evaluation           Chest pain  The chest pain is not suggestive of angina  She has had it for close to 5 years  It is episode a week  There is no obvious precipitating factor  Patient can walk and the pain does not worsen  Pain usually is resolved by 5-10 minutes

## 2024-03-21 ENCOUNTER — TELEPHONE (OUTPATIENT)
Age: 74
End: 2024-03-21

## 2024-03-21 DIAGNOSIS — J44.1 ACUTE EXACERBATION OF CHRONIC OBSTRUCTIVE PULMONARY DISEASE (COPD) (HCC): ICD-10-CM

## 2024-03-21 DIAGNOSIS — F17.210 CIGARETTE SMOKER: ICD-10-CM

## 2024-03-21 DIAGNOSIS — J43.9 PULMONARY EMPHYSEMA, UNSPECIFIED EMPHYSEMA TYPE (HCC): ICD-10-CM

## 2024-03-21 DIAGNOSIS — J44.1 ACUTE EXACERBATION OF CHRONIC OBSTRUCTIVE PULMONARY DISEASE (COPD) (HCC): Primary | ICD-10-CM

## 2024-03-21 RX ORDER — LEVOFLOXACIN 500 MG/1
500 TABLET, FILM COATED ORAL EVERY 24 HOURS
Qty: 10 TABLET | Refills: 0 | Status: SHIPPED | OUTPATIENT
Start: 2024-03-21 | End: 2024-03-31

## 2024-03-21 RX ORDER — LEVOFLOXACIN 500 MG/1
500 TABLET, FILM COATED ORAL EVERY 24 HOURS
Qty: 10 TABLET | Refills: 0 | Status: SHIPPED | OUTPATIENT
Start: 2024-03-21 | End: 2024-03-21 | Stop reason: SDUPTHER

## 2024-03-21 RX ORDER — PREDNISONE 10 MG/1
TABLET ORAL
Qty: 20 TABLET | Refills: 0 | Status: SHIPPED | OUTPATIENT
Start: 2024-03-21 | End: 2024-03-21 | Stop reason: SDUPTHER

## 2024-03-21 RX ORDER — PREDNISONE 10 MG/1
TABLET ORAL
Qty: 20 TABLET | Refills: 0 | Status: SHIPPED | OUTPATIENT
Start: 2024-03-21

## 2024-03-21 RX ORDER — BENZONATATE 100 MG/1
100 CAPSULE ORAL 3 TIMES DAILY PRN
Qty: 30 CAPSULE | Refills: 1 | Status: SHIPPED | OUTPATIENT
Start: 2024-03-21 | End: 2024-03-21 | Stop reason: SDUPTHER

## 2024-03-21 RX ORDER — BENZONATATE 100 MG/1
100 CAPSULE ORAL 3 TIMES DAILY PRN
Qty: 30 CAPSULE | Refills: 1 | Status: SHIPPED | OUTPATIENT
Start: 2024-03-21

## 2024-03-21 NOTE — TELEPHONE ENCOUNTER
Patient calling back after leaving message on Tuesday about having aspirated on Monday, no PCP response and patient refused office visit at that time, patient wanted to have abx but states she is coughing up yellow bile. Told patient abx will not help or treat aspiration and coughing up bile. Called over to office, no appointments available to day, per PCP patient needs to go to ER. Patient refused states she cannot afford to go. Suggested to patient maybe trying UC, patient states she will think about it.

## 2024-03-26 ENCOUNTER — TELEPHONE (OUTPATIENT)
Dept: CARDIOLOGY CLINIC | Facility: CLINIC | Age: 74
End: 2024-03-26

## 2024-03-26 NOTE — LETTER
[facility] CARDIOLOG[facility]  [address] [facility]  [place] [address]  Phone#  [phone]  Fax#  [phone]      March 29, 2024      Dear:   Areli Luciano         Our office has attempted to contact you several times regarding the  cardiac monitor Dr. Bain would like you to wear .  Could you please contact our office at [phone].    Thank you.     Sincerely,    [facility]

## 2024-03-26 NOTE — TELEPHONE ENCOUNTER
TIFFANIEOV for pt to give us a call back to discuss scheduling or mailing the pt a 2 week Zio XT per Dr. Bain. Auth is not required.

## 2024-03-27 DIAGNOSIS — F43.9 STRESS: ICD-10-CM

## 2024-03-27 RX ORDER — ESCITALOPRAM OXALATE 5 MG/1
5 TABLET ORAL DAILY
Qty: 90 TABLET | Refills: 1 | Status: SHIPPED | OUTPATIENT
Start: 2024-03-27

## 2024-03-28 NOTE — TELEPHONE ENCOUNTER
Called patient's home phone number on file where I was able to speak to patient's family member. Left a message with him asking patient to call us back at [phone] to discuss about the monitor Dr. Bain mentioned during her last OV.

## 2024-03-29 NOTE — TELEPHONE ENCOUNTER
Patient called office and was able to set-up a time for zio placement.    Will be coming in on Monday, 4/1 at 10:30 AM for a 2W ZIO XT placement

## 2024-04-01 ENCOUNTER — CLINICAL SUPPORT (OUTPATIENT)
Dept: CARDIOLOGY CLINIC | Facility: CLINIC | Age: 74
End: 2024-04-01
Payer: COMMERCIAL

## 2024-04-01 DIAGNOSIS — R00.1 SINUS BRADYCARDIA: Primary | ICD-10-CM

## 2024-04-01 PROCEDURE — 93246 EXT ECG>7D<15D RECORDING: CPT | Performed by: INTERNAL MEDICINE

## 2024-04-01 NOTE — PROGRESS NOTES
Pt presents in the office today for a 2 week Zio XT placement. Pt verbalized understanding as all instructions were given.

## 2024-04-02 ENCOUNTER — OFFICE VISIT (OUTPATIENT)
Dept: GASTROENTEROLOGY | Facility: MEDICAL CENTER | Age: 74
End: 2024-04-02
Payer: COMMERCIAL

## 2024-04-02 VITALS
HEIGHT: 65 IN | TEMPERATURE: 98.1 F | HEART RATE: 54 BPM | DIASTOLIC BLOOD PRESSURE: 64 MMHG | BODY MASS INDEX: 29.46 KG/M2 | WEIGHT: 176.8 LBS | SYSTOLIC BLOOD PRESSURE: 141 MMHG

## 2024-04-02 DIAGNOSIS — Z98.84 HISTORY OF GASTRIC BYPASS: Primary | ICD-10-CM

## 2024-04-02 DIAGNOSIS — K30 DELAYED GASTRIC EMPTYING: ICD-10-CM

## 2024-04-02 DIAGNOSIS — K21.9 GASTROESOPHAGEAL REFLUX DISEASE WITHOUT ESOPHAGITIS: ICD-10-CM

## 2024-04-02 PROCEDURE — 99214 OFFICE O/P EST MOD 30 MIN: CPT | Performed by: INTERNAL MEDICINE

## 2024-04-02 RX ORDER — OMEPRAZOLE 40 MG/1
40 CAPSULE, DELAYED RELEASE ORAL
Qty: 60 CAPSULE | Refills: 3 | Status: SHIPPED | OUTPATIENT
Start: 2024-04-02

## 2024-04-02 RX ORDER — METOCLOPRAMIDE 10 MG/1
10 TABLET ORAL 3 TIMES DAILY
Qty: 42 TABLET | Refills: 0 | Status: SHIPPED | OUTPATIENT
Start: 2024-04-02 | End: 2024-04-16

## 2024-04-02 NOTE — PROGRESS NOTES
[facility] - Outpatient Follow-up Note  Areli Luciano 73 y.o. female MRN: [id number]  Encounter: [id number]          ASSESSMENT AND PLAN:  73-year-old female with history of Sarah-en-Y gastric bypass, chronic pain on opiates, hypothyroidism, diabetes who presents for follow-up evaluation.      1. History of gastric bypass  2. Delayed gastric emptying  3. Gastroesophageal reflux disease without esophagitis  She has a history of chronic reflux and regurgitation symptoms underwent EGD in 2022 showing significant bile reflux into her dilated gastric pouch with no evidence of esophagitis.  She then underwent GI fluoroscopy exam showing small hiatal hernia, gastroesophageal reflux otherwise normal postoperative changes.  Manometry and pH testing showed normal motility and normal acid exposure time consistent with hypersensitive reflux.  She has followed up with bariatric surgery who did not recommend revising her gastric bypass.  After her last office visit she underwent a gastric emptying study showing delayed gastric emptying which may be contributed by her use of regular narcotic medicines for her chronic pain  I recommend transitioning her PPI to omeprazole 40 mg daily.  Given her delay in gastric emptying, we discussed a trial of Reglan to be taken 30 minutes before meals.  I discussed the potential EPS side effects of Reglan.  If she has improvement of her symptoms, consider Reglan as a maintenance medication at the lowest effective dose given her ongoing symptoms.  If she has no improvement of her symptoms with the above changes consider repeat EGD for up-to-date evaluation.  - omeprazole (PriLOSEC) 40 MG capsule; Take 1 capsule (40 mg total) by mouth 2 (two) times a day before meals  Dispense: 60 capsule; Refill: 3  - metoclopramide (Reglan) 10 mg tablet; Take 1 tablet (10 mg total) by mouth 3 (three) times a day for 14 days 30 minutes prior to meals  Dispense: 42 tablet; Refill:  0    ______________________________________________________________________    SUBJECTIVE: 73-year-old female with history of Sraah-en-Y gastric bypass, chronic pain on opiates, hypothyroidism, diabetes who presents for follow-up evaluation.    She was last seen in the GI office of [facility] 2023.  She had history of chronic GERD and regurgitation symptoms and underwent EGD in 2022 showing significant bile reflux and her dilated gastric pouch without evidence of esophagitis.  She then underwent upper GI fluoroscopy exam showing a small hiatal hernia, gastroesophageal reflux otherwise normal postoperative changes.  Subsequent manometry and pH testing showed normal esophageal motility and normal acid exposure time consistent with hypersensitive reflux.  She was seen by bariatric surgery who recommended no revision of her bypass due to no evidence of pathologic reflux.  She was started on Pamelor 25 mg nightly.    Interval history: She underwent gastric emptying study showing mild delayed gastric emptying at the 3h idalia 63% was empty (normal greater than 70%) and at the 4h idalia 83% was empty (normal is greater than 90%)    She states that her symptoms are unchanged.  She continues to take pantoprazole 40 mg twice daily, Pepcid twice daily, Carafate 4 times daily and Pamelor 25 mg nightly.  She has regurgitation of acidic type liquid in her mouth and abdominal pain after eating.  She reports early satiety as well.  She can also have intermittent loose stools for which she uses Imodium        Prior EGD/colonoscopy   was unremarkable she was recommended no additional screening colonoscopies given her age     2014 colonoscopy showed 2 polyps, diverticulosis and hemorrhoids she was recommended to repeat in 5 years.  Pathology is not available for review  She reportedly had an EGD in 2014 which was normal, however procedure report is not available for review  2022 EGD showing significant bile reflux into her dilated gastric pouch  and no evidence of esophagitis. She then underwent upper GI fluoroscopy exam showing hernia, gastroesophageal reflux otherwise normal postoperative changes. Subsequent manometry and pH testing demonstrated normal esophageal motility and normal acid exposure time consistent with hypersensitive reflux.     REVIEW OF SYSTEMS IS OTHERWISE NEGATIVE.  10 point review of systems is negative other than stated as per HPI    Historical Information   Past Medical History:   Diagnosis Date    Anemia     Anxiety     hx of panic attacks (now under control)     Arthritis     Asthma     resolved (no problems in a decade)     Baker's cyst of knee     Bronchitis     Bunion, left foot     Cervical pain     Chronic GERD     Chronic pain     Chronic pain disorder     Depression     Diabetes mellitus, type 2 (HCC)     Diabetic peripheral neuropathy (HCC)     Endometriosis     Fibromyalgia     Hyperlipidemia     Hypertension     Joint pain     Low back pain     Low back pain     Lung nodules     Macular degeneration     Pulmonary emphysema (HCC) 01/16/2020    Spondylosis     Spontaneous pneumothorax     Uterine cancer (HCC)      Past Surgical History:   Procedure Laterality Date    BACK SURGERY  1996    L5, S1- laser surgery fusion of c5 and c6     BREAST CYST EXCISION Right     CHOLECYSTECTOMY  2004    FOOT SURGERY Left 2005    fusion     FRACTURE SURGERY      GASTRIC BYPASS  2010    Dr. Oropeza     HYSTERECTOMY  1985    just uterus     KNEE ARTHROSCOPY      LAMINECTOMY      ORTHOPEDIC SURGERY      OVARIAN CYST REMOVAL  1975    PELVIC LAPAROSCOPY      ovaries (x10)     PLEURAL SCARIFICATION  1967    SHOULDER OPEN ROTATOR CUFF REPAIR Left 2008    TONSILLECTOMY      VAGINAL PROLAPSE REPAIR       Social History   Social History     Substance and Sexual Activity   Alcohol Use Not Currently    Alcohol/week: 1.0 standard drink of alcohol    Types: 1 Shots of liquor per week    Comment: rarely     Social History     Substance and Sexual  Activity   Drug Use No     Social History     Tobacco Use   Smoking Status Every Day    Current packs/day: 0.30    Average packs/day: 0.6 packs/day for 53.3 years (33.5 ttl pk-yrs)    Types: Cigarettes    Start date: 1971   Smokeless Tobacco Never     Family History   Problem Relation Age of Onset    Hypertension Mother     Lung cancer Mother     Hypertension Father     Heart disease Father     Heart attack Father     Cancer Father     No Known Problems Sister     No Known Problems Daughter     No Known Problems Maternal Grandmother     No Known Problems Maternal Grandfather     No Known Problems Paternal Grandmother     No Known Problems Paternal Grandfather     Breast cancer Paternal Aunt 40    Breast cancer Paternal Aunt 45    Breast cancer Maternal Aunt 48       Meds/Allergies       Current Outpatient Medications:     albuterol (PROVENTIL HFA,VENTOLIN HFA) 90 mcg/act inhaler    amLODIPine (NORVASC) 5 mg tablet    baclofen 10 mg tablet    benzonatate (TESSALON PERLES) 100 mg capsule    Calcium 600+D3 600-20 MG-MCG TABS    cetirizine (ZyrTEC) 10 mg tablet    clotrimazole-betamethasone (LOTRISONE) 1-0.05 % cream    [facility] Vitamin A 2400 MCG (8000 UT) capsule    Diclofenac Sodium (VOLTAREN) 1 %    diphenhydrAMINE-PSE-APAP (BENADRYL ALLERGY/COLD PO)    escitalopram (LEXAPRO) 5 mg tablet    famotidine (PEPCID) 20 mg tablet    fluticasone-umeclidinium-vilanterol (Trelegy Ellipta) 100-62.5-25 mcg/actuation inhaler    HYDROcodone-acetaminophen (NORCO)  mg per tablet    HYDROcodone-acetaminophen (NORCO)  mg per tablet    ipratropium-albuterol (DUO-NEB) 0.5-2.5 mg/3 mL nebulizer solution    levothyroxine 25 mcg tablet    levothyroxine 50 mcg tablet    losartan (COZAAR) 25 mg tablet    nortriptyline (PAMELOR) 25 mg capsule    pantoprazole (PROTONIX) 40 mg tablet    Pediatric Multiple Vitamins (Childrens Chew Multivitamin) CHEW    pregabalin (LYRICA) 200 MG capsule    sucralfate (CARAFATE) 1 g/10 mL suspension     "traZODone (DESYREL) 50 mg tablet    Vitamin A 2400 MCG (8000 UT) TABS    vitamin B-12 (VITAMIN B-12) 1,000 mcg tablet    denosumab (PROLIA) 60 mg/mL    ergocalciferol (VITAMIN D2) 50,000 units    naloxone (NARCAN) 4 mg/0.1 mL nasal spray    nicotine (NICODERM CQ) 14 mg/24hr TD 24 hr patch    nicotine (NICODERM CQ) 7 mg/24hr TD 24 hr patch    predniSONE 10 mg tablet    Current Facility-Administered Medications:     cyanocobalamin injection 1,000 mcg, 1,000 mcg, Intramuscular, Q30 Days, 1,000 mcg at 12/08/20 1118    cyanocobalamin injection 1,000 mcg, 1,000 mcg, Intramuscular, Q30 Days, 1,000 mcg at 07/08/20 1036    cyanocobalamin injection 1,000 mcg, 1,000 mcg, Intramuscular, Q30 Days, 1,000 mcg at 09/08/20 1210    cyanocobalamin injection 1,000 mcg, 1,000 mcg, Intramuscular, Q30 Days    cyanocobalamin injection 1,000 mcg, 1,000 mcg, Intramuscular, Q30 Days    cyanocobalamin injection 1,000 mcg, 1,000 mcg, Intramuscular, Q30 Days, 1,000 mcg at 08/18/21 1019    cyanocobalamin injection 1,000 mcg, 1,000 mcg, Intramuscular, Q30 Days, 1,000 mcg at 03/21/23 1117    cyanocobalamin injection 1,000 mcg, 1,000 mcg, Intramuscular, Q30 Days, 1,000 mcg at 07/13/23 1004    Allergies   Allergen Reactions    Cephalosporins Hives    Erythromycin GI Intolerance    Fentanyl Itching     Occurred during surgery     Paxil [Paroxetine] Hives     After 2 weeks    Penicillins Itching    Pravastatin Myalgia    Statins Itching    Sulfa Antibiotics Diarrhea    Wellbutrin [Bupropion] Hives     After 2 weeks     Marzena's Wort Rash           Objective     Blood pressure 141/64, pulse (!) 54, temperature 98.1 °F (36.7 °C), temperature source Tympanic, height 5' 5\" (1.651 m), weight 80.2 kg (176 lb 12.8 oz), not currently breastfeeding. Body mass index is 29.42 kg/m².      PHYSICAL EXAM:      General Appearance:   Alert, cooperative, no distress   HEENT:   Normocephalic, atraumatic, anicteric.     Neck:  Supple, symmetrical, trachea midline "   Lungs:   Clear to auscultation bilaterally; no rales, rhonchi or wheezing; respirations unlabored    Heart::   Regular rate and rhythm; no murmur, rub, or gallop.   Abdomen:   Soft, non-tender, non-distended; normal bowel sounds; no masses, no organomegaly    Genitalia:   Deferred    Rectal:   Deferred    Extremities:  No cyanosis, clubbing or edema    Pulses:  2+ and symmetric    Skin:  No jaundice, rashes, or lesions    Lymph nodes:  No palpable cervical lymphadenopathy        Lab Results:   No visits with results within 1 Day(s) from this visit.   Latest known visit with results is:   Office Visit on 03/18/2024   Component Date Value    Codeine Quantification 03/18/2024 negative     Morphine Quantification 03/18/2024 negative     Hydrocodone Quantificati* 03/18/2024 positive-879.907-I (A)     Norhydrocodone Quantific* 03/18/2024 positive-357.865-I (A)     Hydromorphone Quantifica* 03/18/2024 negative     Desipramine Quantificati* 03/18/2024 negative     Dextromethorphan Quantif* 03/18/2024 negative     Dextrorphan (Dextrometho* 03/18/2024 negative     Nordiazepam Quantificati* 03/18/2024 negative     Temazepam Quantification 03/18/2024 negative     Oxazepam Quantification 03/18/2024 negative     Ethyl Glucuronide Quanti* 03/18/2024 negative     Ethyl Sulfate Quantifica* 03/18/2024 negative     6-RODOLFO (Heroin metabolite* 03/18/2024 negative     Fentanyl Quantification 03/18/2024 negative     Norfentanyl Quantificati* 03/18/2024 negative     4-ANPP Quantification 03/18/2024 Fen Neg     Acetyl fentanyl Quantifi* 03/18/2024 Fen Neg     Acetyl norfentanyl Quant* 03/18/2024 Fen Neg     Acryl fentanyl Quantific* 03/18/2024 Fen Neg     Carfentanil Quantificati* 03/18/2024 Fen Neg     Para-fluorofentanyl Tariq* 03/18/2024 Fen Neg     Hydrocodone Quantificati* 03/18/2024 positive-879.907-I (A)     Norhydrocodone Quantific* 03/18/2024 positive-357.865-I (A)     Hydromorphone Quantifica* 03/18/2024 negative     Hydromorphone  Quantifica* 03/18/2024 negative     Mitragynine (Kratom jarett* 03/18/2024 negative     1-UM-Lkbxqttqwce (Kratom* 03/18/2024 negative     Alpha-Hydroxyalprazolam * 03/18/2024 negative     Dextrorphan (Dextrometho* 03/18/2024 negative     Lorazepam Quantification 03/18/2024 negative     MDMA Quantification 03/18/2024 negative     Meperidine Quantification 03/18/2024 negative     Normeperidine Quantifica* 03/18/2024 negative     Methadone Quantification 03/18/2024 negative     EDDP (Methadone metaboli* 03/18/2024 negative     Amphetamine Quantificati* 03/18/2024 negative     Methamphetamine Quantifi* 03/18/2024 negative     Methylphenidate Quantifi* 03/18/2024 negative     RITALINIC ACID QUANTIFIC* 03/18/2024 negative     Morphine Quantification 03/18/2024 negative     Hydromorphone Quantifica* 03/18/2024 negative     OLANZAPINE QUANTIFICATION 03/18/2024 negative     Oxazepam Quantification 03/18/2024 negative     Amphetamine Quantificati* 03/18/2024 negative     Oxycodone Quantification 03/18/2024 negative     Noroxycodone Quantificat* 03/18/2024 negative     Oxymorphone Quantificati* 03/18/2024 negative     Oxymorphone Quantificati* 03/18/2024 negative     PHENTERMINE QUANTIFICATI* 03/18/2024 negative     QUETIAPINE QUANTIFICATION 03/18/2024 negative     NORQUETIAPINE QUANTIFICA* 03/18/2024 negative     Risperidone Quantificati* 03/18/2024 negative     Hydroxyrisperidone Quant* 03/18/2024 negative     5F-ADB-M7 03/18/2024 negative     UW-UDOCLOJD-M7 METABOLIT* 03/18/2024 negative     QZUC-CBZLILVC-O1 METABOL* 03/18/2024 negative     HYD429 metabolite Quanti* 03/18/2024 negative     ZQM006 metabolite Quanti* 03/18/2024 negative     RCS4 METABOLITE QUANTIFI* 03/18/2024 negative     XLR11/ METABOLITE Q* 03/18/2024 negative     Tapentadol Quantification 03/18/2024 negative     Temazepam Quantification 03/18/2024 negative     Oxazepam Quantification 03/18/2024 negative     cTHC (Marijuana metaboli* 03/18/2024 negative      Tramadol Quantification 03/18/2024 negative     O-desmethyl-tramadol Kian* 03/18/2024 negative     N-DESMETHYL-TRAMADOL KIAN* 03/18/2024 negative     Aripiprazole Quantificat* 03/18/2024 negative     OPC-3373 QUANTIFICATION 03/18/2024 negative     CREATININE 03/18/2024 normal-31.2     OXIDANT 03/18/2024 normal-0     pH 03/18/2024 normal-6.5     SPECIFIC GRAVITY URINE 03/18/2024 normal-1.003     Buprenorphine Quantifica* 03/18/2024 negative     Norbuprenorphine Quantif* 03/18/2024 negative     Butalbital Quantification 03/18/2024 negative     7-Amino-Clonazepam Quant* 03/18/2024 negative     Clozapine Quantification 03/18/2024 negative     N-desmethylclozapine Kian* 03/18/2024 negative     Cocaine metabolite Quant* 03/18/2024 negative          Radiology Results:   No results found.

## 2024-04-04 ENCOUNTER — TELEPHONE (OUTPATIENT)
Dept: PAIN MEDICINE | Facility: CLINIC | Age: 74
End: 2024-04-04

## 2024-04-04 NOTE — TELEPHONE ENCOUNTER
FLORENTIN, RN s/w rep from [employer]. Per Yissel, pt has auth that was started on 3/18 for the norco that had a transitional fill.    Per Yissel it appears that the pt started this authorization.  Per Yissel this is medicare and the auth has been faxed to an 888# and has not been answered and was due by 10 this AM.    Now this has to be appealed (because this is Medicare)  The form was refaxed to [phone] and will need to be filled out and faxed back to FAX# [phone]  The form will have to have the case # [id number]  What monitoring plan is in place for possible drug to drug interactions  Specifically the patients RADHA and her Fredy

## 2024-04-04 NOTE — TELEPHONE ENCOUNTER
Caller: [employer]     Doctor: Marian Gupta    Reason for call: is there a plan to monitor drug interaction with HYDROcodone-acetaminophen (NORCO)  mg per tablet     Call back#: [phone]  Fax # [phone]

## 2024-04-09 ENCOUNTER — TELEPHONE (OUTPATIENT)
Age: 74
End: 2024-04-09

## 2024-04-09 DIAGNOSIS — G89.29 CHRONIC BILATERAL LOW BACK PAIN WITH BILATERAL SCIATICA: ICD-10-CM

## 2024-04-09 DIAGNOSIS — M54.41 CHRONIC BILATERAL LOW BACK PAIN WITH BILATERAL SCIATICA: ICD-10-CM

## 2024-04-09 DIAGNOSIS — M54.16 LUMBAR RADICULITIS: ICD-10-CM

## 2024-04-09 DIAGNOSIS — M54.42 CHRONIC BILATERAL LOW BACK PAIN WITH BILATERAL SCIATICA: ICD-10-CM

## 2024-04-09 DIAGNOSIS — M79.18 MYOFASCIAL PAIN SYNDROME: ICD-10-CM

## 2024-04-09 DIAGNOSIS — G89.4 CHRONIC PAIN SYNDROME: ICD-10-CM

## 2024-04-09 NOTE — TELEPHONE ENCOUNTER
Reason for call:   [] Refill   [x] Prior Auth  [] Other: pt called. Pharmacy told her med needed PA    Office:   [] PCP/Provider -   [x] Specialty/Provider - spine and pain    Medication: HYDROcodone-acetaminophen (NORCO)  mg     Dose/Frequency: Take 1 tab every 4-6 hours prn, for ongoing therapy.     Quantity: #130    Pharmacy: [facility] - [address] [place] [address]

## 2024-04-09 NOTE — TELEPHONE ENCOUNTER
PA for HYDRO-ACET    Submitted via    [x]CMM-KEY [id number]    []Surescripts-Case ID #   []Faxed to plan   []Other website   []Phone call Case ID #     Office notes sent, clinical questions answered. Awaiting determination    Turnaround time for your insurance to make a decision on your Prior Authorization can take 7-21 business days.

## 2024-04-09 NOTE — TELEPHONE ENCOUNTER
Reason for call:   [x] Refill   [] Prior Auth  [] Other:     Office:   [] PCP/Provider -   [x] Specialty/Provider - spine and pain    Medication: baclofen 10 mg tablet     Dose/Frequency:   Take 1 tablet (10 mg total) by mouth 3 (three) times a day        Quantity: #270    Ph[id number] [place] [address] [phone]     Does the patient have enough for 3 days?   [] Yes   [x] No - Send as HP to POD

## 2024-04-11 ENCOUNTER — HOSPITAL ENCOUNTER (OUTPATIENT)
Dept: NON INVASIVE DIAGNOSTICS | Facility: HOSPITAL | Age: 74
Discharge: HOME | End: 2024-04-11
Attending: INTERNAL MEDICINE
Payer: COMMERCIAL

## 2024-04-11 VITALS
HEIGHT: 65 IN | BODY MASS INDEX: 29.32 KG/M2 | DIASTOLIC BLOOD PRESSURE: 64 MMHG | HEART RATE: 65 BPM | SYSTOLIC BLOOD PRESSURE: 141 MMHG | WEIGHT: 176 LBS

## 2024-04-11 DIAGNOSIS — R91.8 LUNG NODULES: ICD-10-CM

## 2024-04-11 DIAGNOSIS — F17.210 CIGARETTE SMOKER: ICD-10-CM

## 2024-04-11 DIAGNOSIS — R00.1 SINUS BRADYCARDIA: ICD-10-CM

## 2024-04-11 PROCEDURE — 93306 TTE W/DOPPLER COMPLETE: CPT

## 2024-04-11 RX ORDER — BACLOFEN 10 MG/1
10 TABLET ORAL 3 TIMES DAILY
Qty: 270 TABLET | Refills: 0 | Status: SHIPPED | OUTPATIENT
Start: 2024-04-11

## 2024-04-11 NOTE — TELEPHONE ENCOUNTER
S/w pt. Pt requesting a refill of her Baclofen 10 mg tid. 90 day supply. Per pt med is helping and denies side effects. Med last ordered 1/26/24.    [facility] [employer].

## 2024-04-14 LAB
AORTIC ROOT: 3.2 CM
AORTIC VALVE MEAN VELOCITY: 12.7 M/S
APICAL FOUR CHAMBER EJECTION FRACTION: 65 %
ASCENDING AORTA: 3.5 CM
AV LVOT MEAN GRADIENT: 3 MMHG
AV LVOT PEAK GRADIENT: 7 MMHG
AV MEAN GRADIENT: 7 MMHG
AV PEAK GRADIENT: 15 MMHG
AV VELOCITY RATIO: 0.67
BSA FOR ECHO PROCEDURE: 1.87 M2
DOP CALC AO PEAK VEL: 1.92 M/S
DOP CALC AO VTI: 41.94 CM
DOP CALC LVOT PEAK VEL VTI: 27.93 CM
DOP CALC LVOT PEAK VEL: 1.29 M/S
E WAVE DECELERATION TIME: 267 MS
E/A RATIO: 0.53
FRACTIONAL SHORTENING: 38 (ref 28–44)
INTERVENTRICULAR SEPTUM IN DIASTOLE (PARASTERNAL SHORT AXIS VIEW): 1.2 CM
INTERVENTRICULAR SEPTUM: 1.2 CM (ref 0.6–1.1)
LAAS-AP2: 23 CM2
LAAS-AP4: 23.1 CM2
LEFT ATRIUM SIZE: 4.6 CM
LEFT ATRIUM VOLUME (MOD BIPLANE): 75 ML
LEFT ATRIUM VOLUME INDEX (MOD BIPLANE): 39.9 ML/M2
LEFT INTERNAL DIMENSION IN SYSTOLE: 2.8 CM (ref 2.1–4)
LEFT VENTRICULAR INTERNAL DIMENSION IN DIASTOLE: 4.5 CM (ref 3.5–6)
LEFT VENTRICULAR POSTERIOR WALL IN END DIASTOLE: 1.1 CM
LEFT VENTRICULAR STROKE VOLUME: 60 ML
LVSV (TEICH): 60 ML
MV E'TISSUE VEL-SEP: 7 CM/S
MV PEAK A VEL: 0.92 M/S
MV PEAK E VEL: 49 CM/S
MV STENOSIS PRESSURE HALF TIME: 77 MS
MV VALVE AREA P 1/2 METHOD: 2.86
RIGHT VENTRICLE ID DIMENSION: 3.8 CM
SL CV LEFT ATRIUM LENGTH A2C: 5.7 CM
SL CV PED ECHO LEFT VENTRICLE DIASTOLIC VOLUME (MOD BIPLANE) 2D: 91 ML
SL CV PED ECHO LEFT VENTRICLE SYSTOLIC VOLUME (MOD BIPLANE) 2D: 30 ML
TR MAX PG: 42 MMHG
TR PEAK VELOCITY: 3.2 M/S
TRICUSPID ANNULAR PLANE SYSTOLIC EXCURSION: 1.8 CM
TRICUSPID VALVE PEAK REGURGITATION VELOCITY: 3.23 M/S

## 2024-04-15 NOTE — PROGRESS NOTES
Cardiology Follow Up    Areli Luciano  1950  [id number]  [facility]  [address]  [place] [address] [phone]    1. Chest pain, unspecified type  NM myocardial perfusion spect (rx stress and/or rest)    atorvastatin (LIPITOR) 10 mg tablet    Lipid panel    LDL cholesterol, direct    Hepatic function panel    varenicline (CHANTIX) 0.5 mg tablet      2. Primary hypertension  NM myocardial perfusion spect (rx stress and/or rest)    atorvastatin (LIPITOR) 10 mg tablet    Lipid panel    LDL cholesterol, direct    Hepatic function panel    varenicline (CHANTIX) 0.5 mg tablet      3. Nicotine dependence with current use  NM myocardial perfusion spect (rx stress and/or rest)    atorvastatin (LIPITOR) 10 mg tablet    Lipid panel    LDL cholesterol, direct    Hepatic function panel    varenicline (CHANTIX) 0.5 mg tablet      4. Pure hypercholesterolemia  NM myocardial perfusion spect (rx stress and/or rest)    atorvastatin (LIPITOR) 10 mg tablet    Lipid panel    LDL cholesterol, direct    Hepatic function panel    varenicline (CHANTIX) 0.5 mg tablet          Interval History: Patient here for f/u visit.  Patient seen by EP service 3/20/2024.  She was evaluated for lightheadedness, fatigue and exertional intolerance in the setting of bradycardia.  Patient also complains to me of intermittent chest heaviness.  Zio patch ordered.  Echocardiogram 4/11/2024 demonstrated LVEF of 65%.  There was LAE.  There was mild AV leaflet sclerosis.  There was mild to moderate TR with a mild elevation in PASP.  Chest CT 9/25/2023 demonstrated extensive coronary arterial calcifications.  There was no thoracic aortic aneurysm.  Patient is a long-term tobacco user.  She has smoked since the age of 21.  She smoked 1-1/2 pack of cigarettes per day but is now on half a pack.  She previously used Chantix successfully but then started smoking again.   She would like to try Chantix again.  She has had gastric bypass.  In reference to family history she has 2 maternal cousins who  in their 40s from heart disease.  A maternal aunt had heart disease in her 70s.  Her father had heart disease and  at the age of 76.  Lipid profile 2024 demonstrated total cholesterol of 204 with an HDL of 57 and a calculated LDL of 122.    Patient Active Problem List   Diagnosis   • Chronic pain syndrome   • Cervical radiculopathy   • Myofascial pain syndrome   • Spondylosis of cervical region without myelopathy or radiculopathy   • Fibromyalgia   • Long-term current use of opiate analgesic   • MGUS (monoclonal gammopathy of unknown significance)   • Iron deficiency anemia following bariatric surgery   • Light headedness   • Chronic bronchitis (HCC)   • Primary osteoarthritis of right knee   • Pseudogout of left knee   • Lumbar radiculitis   • Chronic bilateral low back pain with bilateral sciatica   • Rotator cuff syndrome, left   • Left shoulder pain   • Dizziness   • Other fatigue   • Biceps tendinitis of left shoulder   • Adhesive capsulitis of left shoulder   • Hypoglycemia   • Hypothyroidism due to Hashimoto's thyroiditis   • Iron deficiency anemia, unspecified   • GERD (gastroesophageal reflux disease)   • Nicotine dependence with current use   • Need for prophylactic vaccination against Streptococcus pneumoniae (pneumococcus)   • Needs flu shot   • Osteoarthritis   • Postnasal drip   • Encounter for screening for malignant neoplasm of lung in current smoker with 30 pack year history or greater   • High coronary artery calcium score     Past Medical History:   Diagnosis Date   • Anemia    • Anxiety     hx of panic attacks (now under control)    • Arthritis    • Asthma     resolved (no problems in a decade)    • Baker's cyst of knee    • Bronchitis    • Bunion, left foot    • Cervical pain    • Chronic GERD    • Chronic pain    • Chronic pain disorder    •  Depression    • Diabetes mellitus, type 2 (HCC)    • Diabetic peripheral neuropathy (HCC)    • Endometriosis    • Fibromyalgia    • Hyperlipidemia    • Hypertension    • Joint pain    • Low back pain    • Low back pain    • Lung nodules    • Macular degeneration    • Pulmonary emphysema (HCC) 01/16/2020   • Spondylosis    • Spontaneous pneumothorax    • Uterine cancer (HCC)      Social History     Socioeconomic History   • Marital status:      Spouse name: Not on file   • Number of children: Not on file   • Years of education: Not on file   • Highest education level: Not on file   Occupational History   • Not on file   Tobacco Use   • Smoking status: Every Day     Current packs/day: 0.30     Average packs/day: 0.6 packs/day for 53.3 years (33.5 ttl pk-yrs)     Types: Cigarettes     Start date: 1971   • Smokeless tobacco: Never   Vaping Use   • Vaping status: Never Used   Substance and Sexual Activity   • Alcohol use: Not Currently     Alcohol/week: 1.0 standard drink of alcohol     Types: 1 Shots of liquor per week     Comment: rarely   • Drug use: No   • Sexual activity: Not Currently     Partners: Male   Other Topics Concern   • Not on file   Social History Narrative   • Not on file     Social Determinants of Health     Financial Resource Strain: Low Risk  (6/9/2023)    Overall Financial Resource Strain (CARDIA)    • Difficulty of Paying Living Expenses: Not hard at all   Food Insecurity: No Food Insecurity (4/26/2021)    Hunger Vital Sign    • Worried About Running Out of Food in the Last Year: Never true    • Ran Out of Food in the Last Year: Never true   Transportation Needs: No Transportation Needs (6/9/2023)    PRAPARE - Transportation    • Lack of Transportation (Medical): No    • Lack of Transportation (Non-Medical): No   Physical Activity: Inactive (9/8/2020)    Exercise Vital Sign    • Days of Exercise per Week: 0 days    • Minutes of Exercise per Session: 0 min   Stress: No Stress Concern Present  (9/8/2020)    [employer] [place] [employer] - Occupational Stress Questionnaire    • Feeling of Stress : Not at all   Social Connections: Unknown (9/8/2020)    Social Connection and Isolation Panel [NHANES]    • Frequency of Communication with Friends and Family: Patient declined    • Frequency of Social Gatherings with Friends and Family: Patient declined    • Attends Rastafarian Services: Patient declined    • Active Member of Clubs or Organizations: Patient declined    • Attends Club or Organization Meetings: Patient declined    • Marital Status: Patient declined   Intimate Partner Violence: Not At Risk (9/8/2020)    Humiliation, Afraid, Rape, and Kick questionnaire    • Fear of Current or Ex-Partner: No    • Emotionally Abused: No    • Physically Abused: No    • Sexually Abused: No   Housing Stability: Not on file      Family History   Problem Relation Age of Onset   • Hypertension Mother    • Lung cancer Mother    • Hypertension Father    • Heart disease Father    • Heart attack Father    • Cancer Father    • No Known Problems Sister    • No Known Problems Daughter    • No Known Problems Maternal Grandmother    • No Known Problems Maternal Grandfather    • No Known Problems Paternal Grandmother    • No Known Problems Paternal Grandfather    • Breast cancer Paternal Aunt 40   • Breast cancer Paternal Aunt 45   • Breast cancer Maternal Aunt 48     Past Surgical History:   Procedure Laterality Date   • BACK SURGERY  1996    L5, S1- laser surgery fusion of c5 and c6    • BREAST CYST EXCISION Right    • CHOLECYSTECTOMY  2004   • FOOT SURGERY Left 2005    fusion    • FRACTURE SURGERY     • GASTRIC BYPASS  2010    Dr. Oropeza    • HYSTERECTOMY  1985    just uterus    • KNEE ARTHROSCOPY     • LAMINECTOMY     • ORTHOPEDIC SURGERY     • OVARIAN CYST REMOVAL  1975   • PELVIC LAPAROSCOPY      ovaries (x10)    • PLEURAL SCARIFICATION  1967   • SHOULDER OPEN ROTATOR CUFF REPAIR Left 2008   • TONSILLECTOMY     •  VAGINAL PROLAPSE REPAIR         Current Outpatient Medications:   •  albuterol (PROVENTIL HFA,VENTOLIN HFA) 90 mcg/act inhaler, Inhale 2 puffs every 6 (six) hours as needed for wheezing, Disp: 18 g, Rfl: 3  •  amLODIPine (NORVASC) 5 mg tablet, TAKE 1 TABLET (5 MG TOTAL) BY MOUTH DAILY., Disp: 90 tablet, Rfl: 1  •  atorvastatin (LIPITOR) 10 mg tablet, Take 1 tablet (10 mg total) by mouth daily, Disp: 90 tablet, Rfl: 3  •  baclofen 10 mg tablet, Take 1 tablet (10 mg total) by mouth 3 (three) times a day, Disp: 270 tablet, Rfl: 0  •  benzonatate (TESSALON PERLES) 100 mg capsule, Take 1 capsule (100 mg total) by mouth 3 (three) times a day as needed for cough, Disp: 30 capsule, Rfl: 1  •  Calcium 600+D3 600-20 MG-MCG TABS, TAKE 1 TABLET BY MOUTH TWICE A DAY WITH MEALS, Disp: 180 tablet, Rfl: 4  •  cetirizine (ZyrTEC) 10 mg tablet, Take 0.5 tablets (5 mg total) by mouth daily, Disp: 45 tablet, Rfl: 2  •  clotrimazole-betamethasone (LOTRISONE) 1-0.05 % cream, Apply topically 3 (three) times a day, Disp: 60 g, Rfl: 1  •  [employer] Vitamin A 2400 MCG (8000 UT) capsule, TAKE 1 CAPSULE (8,000 UNITS TOTAL) BY MOUTH DAILY, Disp: 90 capsule, Rfl: 2  •  Diclofenac Sodium (VOLTAREN) 1 %, APPLY 2 GRAMS TO AFFECTED AREA 4 TIMES A DAY, Disp: 200 g, Rfl: 0  •  diphenhydrAMINE-PSE-APAP (BENADRYL ALLERGY/COLD PO), Take by mouth daily at bedtime, Disp: , Rfl:   •  escitalopram (LEXAPRO) 5 mg tablet, TAKE 1 TABLET (5 MG TOTAL) BY MOUTH DAILY., Disp: 90 tablet, Rfl: 1  •  famotidine (PEPCID) 20 mg tablet, TAKE 1 TABLET (20 MG TOTAL) BY MOUTH 2 (TWO) TIMES A DAY PLEASE STOP TAGAMET, Disp: 180 tablet, Rfl: 1  •  fluticasone-umeclidinium-vilanterol (Trelegy Ellipta) 100-62.5-25 mcg/actuation inhaler, Inhale 1 puff daily Rinse mouth after use., Disp: 60 each, Rfl: 5  •  HYDROcodone-acetaminophen (NORCO)  mg per tablet, Take 1 tab every 4-6 hours prn, for ongoing therapy., Disp: 130 tablet, Rfl: 0  •  HYDROcodone-acetaminophen (NORCO)  mg  per tablet, Take 1 tab q 4-6 hours prn  for ongoing therapy DO NOT FILL BEFORE: 04/14/2024, Disp: 130 tablet, Rfl: 0  •  ipratropium-albuterol (DUO-NEB) 0.5-2.5 mg/3 mL nebulizer solution, Take 3 mL by nebulization every 8 (eight) hours as needed for wheezing or shortness of breath, Disp: 270 mL, Rfl: 3  •  levothyroxine 25 mcg tablet, TAKE 1 TABLET (25 MCG TOTAL) BY MOUTH EVERY OTHER DAY, Disp: 45 tablet, Rfl: 3  •  levothyroxine 50 mcg tablet, TAKE 1 TABLET (50 MCG TOTAL) BY MOUTH EVERY OTHER DAY, Disp: 45 tablet, Rfl: 1  •  losartan (COZAAR) 25 mg tablet, TAKE 1 TABLET (25 MG TOTAL) BY MOUTH DAILY., Disp: 90 tablet, Rfl: 1  •  nortriptyline (PAMELOR) 25 mg capsule, TAKE 1 CAPSULE BY MOUTH EVERYDAY AT BEDTIME, Disp: 90 capsule, Rfl: 1  •  omeprazole (PriLOSEC) 40 MG capsule, Take 1 capsule (40 mg total) by mouth 2 (two) times a day before meals, Disp: 60 capsule, Rfl: 3  •  Pediatric Multiple Vitamins (Childrens Chew Multivitamin) CHEW, CHEW AND SWALLOW 1 TABLET BY MOUTH EVERY DAY, Disp: 100 tablet, Rfl: 5  •  pregabalin (LYRICA) 200 MG capsule, Take 1 capsule (200 mg total) by mouth 3 (three) times a day, Disp: 90 capsule, Rfl: 1  •  sucralfate (CARAFATE) 1 g/10 mL suspension, TAKE 10 ML (1 G TOTAL) BY MOUTH 4 TIMES A DAY, Disp: 1260 mL, Rfl: 1  •  traZODone (DESYREL) 50 mg tablet, TAKE 1 TABLET BY MOUTH EVERY DAY AT BEDTIME AS NEEDED, Disp: 90 tablet, Rfl: 1  •  varenicline (CHANTIX) 0.5 mg tablet, Take 1 tablet (0.5 mg total) by mouth 2 (two) times a day, Disp: 180 tablet, Rfl: 1  •  Vitamin A 2400 MCG (8000 UT) TABS, TAKE 1 CAPSULE (8,000 UNITS TOTAL) BY MOUTH DAILY, Disp: , Rfl:   •  denosumab (PROLIA) 60 mg/mL, Inject 1 mL (60 mg total) under the skin once for 1 dose, Disp: 1 mL, Rfl: 0  •  ergocalciferol (VITAMIN D2) 50,000 units, Take 1 capsule (50,000 Units total) by mouth once a week (Patient not taking: Reported on 1/8/2024), Disp: 13 capsule, Rfl: 2  •  naloxone (NARCAN) 4 mg/0.1 mL nasal spray,  Administer 1 spray into a nostril. If no response after 2-3 minutes, give another dose in the other nostril using a new spray. (Patient not taking: Reported on 9/6/2023), Disp: 1 each, Rfl: 1  •  nicotine (NICODERM CQ) 14 mg/24hr TD 24 hr patch, Place 1 patch on the skin over 24 hours every 24 hours (Patient not taking: Reported on 3/20/2024), Disp: 28 patch, Rfl: 2  •  nicotine (NICODERM CQ) 7 mg/24hr TD 24 hr patch, Place 1 patch on the skin over 24 hours every 24 hours (Patient not taking: Reported on 3/20/2024), Disp: 14 patch, Rfl: 0  •  predniSONE 10 mg tablet, Take 3 tabs daily with breakfast for 3 days then Take 2 tabs daily for 3 Days then take one tab daily for 3 days then stop.. (Patient not taking: Reported on 4/2/2024), Disp: 20 tablet, Rfl: 0  •  vitamin B-12 (VITAMIN B-12) 1,000 mcg tablet, Take 1 tablet (1,000 mcg total) by mouth daily (Patient not taking: Reported on 4/25/2024), Disp: 90 tablet, Rfl: 3    Current Facility-Administered Medications:   •  cyanocobalamin injection 1,000 mcg, 1,000 mcg, Intramuscular, Q30 Days, Aleksandar Garduno MD, 1,000 mcg at 12/08/20 1118  •  cyanocobalamin injection 1,000 mcg, 1,000 mcg, Intramuscular, Q30 Days, Aleksandar Garduno MD, 1,000 mcg at 07/08/20 1036  •  cyanocobalamin injection 1,000 mcg, 1,000 mcg, Intramuscular, Q30 Days, Aleksandar Garduno MD, 1,000 mcg at 09/08/20 1210  •  cyanocobalamin injection 1,000 mcg, 1,000 mcg, Intramuscular, Q30 Days, Aleksandar Garduno MD  •  cyanocobalamin injection 1,000 mcg, 1,000 mcg, Intramuscular, Q30 Days, Aleksandar Garduno MD  •  cyanocobalamin injection 1,000 mcg, 1,000 mcg, Intramuscular, Q30 Days, Aleksandar Garduno MD, 1,000 mcg at 08/18/21 1019  •  cyanocobalamin injection 1,000 mcg, 1,000 mcg, Intramuscular, Q30 Days, Aleksandar Garduno MD, 1,000 mcg at 03/21/23 1117  •  cyanocobalamin injection 1,000 mcg, 1,000 mcg, Intramuscular, Q30 Days, Aleksandar Garduno MD, 1,000 mcg at 07/13/23 1004  Allergies   Allergen Reactions   • Cephalosporins Hives   •  "Erythromycin GI Intolerance   • Fentanyl Itching     Occurred during surgery    • Paxil [Paroxetine] Hives     After 2 weeks   • Penicillins Itching   • Pravastatin Myalgia   • Statins Itching   • Sulfa Antibiotics Diarrhea   • Wellbutrin [Bupropion] Hives     After 2 weeks    • Marzena's Wort Rash       Labs:not applicable  Imaging: Echo complete w/ contrast if indicated    Result Date: 4/14/2024  Narrative: Technically good quality transthoracic echocardiogram study. Mild asymmetric hypertrophy of basal septum, otherwise normal left ventricular size and systolic function.  Early compensated grade 1 diastolic dysfunction.  Left ventricular ejection fraction is estimated as around 65%. Normal right ventricle size and systolic function. Mild to moderate left atrial cavity enlargement, normal right atrial cavity size. Tricuspid aortic valve with mild leaflet sclerosis and some focal calcification.  No aortic valve stenosis.   mild aortic valve regurgitation. Mild mitral valve leaflet sclerosis, trace mitral valve regurgitation. Mild approaching moderate tricuspid valve regurgitation.  Trace pulmonic valve regurgitation. Mild pulmonary hypertension.  Estimated RVSP/PASP is 45 mmHg. No pericardial effusion. No previous echocardiogram is available for comparison.       Review of Systems:  Review of Systems   All other systems reviewed and are negative.      Physical Exam:  /56 (BP Location: Left arm, Patient Position: Sitting, Cuff Size: Standard)   Pulse 57   Ht 5' 5\" (1.651 m)   Wt 80.2 kg (176 lb 14.4 oz)   LMP  (LMP Unknown)   SpO2 96%   BMI 29.44 kg/m²   Physical Exam  Vitals reviewed.   Constitutional:       Appearance: She is well-developed.   HENT:      Head: Normocephalic and atraumatic.   Eyes:      Conjunctiva/sclera: Conjunctivae normal.      Pupils: Pupils are equal, round, and reactive to light.   Cardiovascular:      Rate and Rhythm: Normal rate.      Heart sounds: Murmur heard.   Pulmonary:    "   Effort: Pulmonary effort is normal.      Breath sounds: Normal breath sounds.   Musculoskeletal:      Cervical back: Normal range of motion and neck supple.   Skin:     General: Skin is warm and dry.   Neurological:      Mental Status: She is alert and oriented to person, place, and time.         Discussion/Summary: Patient has had intermittent chest heaviness.  She has multiple risk factors for CAD including ongoing tobacco use.  CAT scan demonstrated coronary artery calcification.  She agrees to starting a statin.  Itchiness was mentioned as an allergy but she is uncertain that the statin caused this as she was on a different medication at the same time which was more likely the cause.  Will start atorvastatin 10 mg/day with follow-up blood work.  Will check a pharmacologic nuclear stress test to exclude severe obstructive CAD.  Will start Chantix 0.5 mg twice daily in reference to tobacco cessation.  Have asked her to call if there is a problem in the interim.  I will see her in follow-up in 6 months and sooner as is necessary.

## 2024-04-24 ENCOUNTER — CLINICAL SUPPORT (OUTPATIENT)
Dept: CARDIOLOGY CLINIC | Facility: CLINIC | Age: 74
End: 2024-04-24
Payer: COMMERCIAL

## 2024-04-24 DIAGNOSIS — R00.1 SINUS BRADYCARDIA: ICD-10-CM

## 2024-04-24 PROCEDURE — 93248 EXT ECG>7D<15D REV&INTERPJ: CPT | Performed by: INTERNAL MEDICINE

## 2024-04-25 ENCOUNTER — OFFICE VISIT (OUTPATIENT)
Dept: CARDIOLOGY CLINIC | Facility: CLINIC | Age: 74
End: 2024-04-25
Payer: COMMERCIAL

## 2024-04-25 VITALS
HEART RATE: 57 BPM | WEIGHT: 176.9 LBS | HEIGHT: 65 IN | SYSTOLIC BLOOD PRESSURE: 120 MMHG | OXYGEN SATURATION: 96 % | BODY MASS INDEX: 29.47 KG/M2 | DIASTOLIC BLOOD PRESSURE: 56 MMHG

## 2024-04-25 DIAGNOSIS — F17.200 NICOTINE DEPENDENCE WITH CURRENT USE: ICD-10-CM

## 2024-04-25 DIAGNOSIS — E78.00 PURE HYPERCHOLESTEROLEMIA: ICD-10-CM

## 2024-04-25 DIAGNOSIS — I10 PRIMARY HYPERTENSION: ICD-10-CM

## 2024-04-25 DIAGNOSIS — R07.9 CHEST PAIN, UNSPECIFIED TYPE: Primary | ICD-10-CM

## 2024-04-25 PROCEDURE — 99214 OFFICE O/P EST MOD 30 MIN: CPT | Performed by: INTERNAL MEDICINE

## 2024-04-25 RX ORDER — ATORVASTATIN CALCIUM 10 MG/1
10 TABLET, FILM COATED ORAL DAILY
Qty: 90 TABLET | Refills: 3 | Status: SHIPPED | OUTPATIENT
Start: 2024-04-25

## 2024-04-25 RX ORDER — VARENICLINE TARTRATE 0.5 MG/1
0.5 TABLET, FILM COATED ORAL 2 TIMES DAILY
Qty: 180 TABLET | Refills: 1 | Status: SHIPPED | OUTPATIENT
Start: 2024-04-25

## 2024-04-26 DIAGNOSIS — K21.9 GASTROESOPHAGEAL REFLUX DISEASE WITHOUT ESOPHAGITIS: ICD-10-CM

## 2024-04-26 RX ORDER — OMEPRAZOLE 40 MG/1
CAPSULE, DELAYED RELEASE ORAL
Qty: 180 CAPSULE | Refills: 1 | Status: SHIPPED | OUTPATIENT
Start: 2024-04-26

## 2024-05-01 DIAGNOSIS — R12 FUNCTIONAL HEARTBURN: ICD-10-CM

## 2024-05-01 DIAGNOSIS — K21.9 GASTROESOPHAGEAL REFLUX DISEASE WITHOUT ESOPHAGITIS: ICD-10-CM

## 2024-05-01 RX ORDER — NORTRIPTYLINE HYDROCHLORIDE 25 MG/1
CAPSULE ORAL
Qty: 90 CAPSULE | Refills: 1 | Status: SHIPPED | OUTPATIENT
Start: 2024-05-01

## 2024-05-02 ENCOUNTER — HOSPITAL ENCOUNTER (OUTPATIENT)
Dept: NON INVASIVE DIAGNOSTICS | Facility: CLINIC | Age: 74
Discharge: HOME | End: 2024-05-02

## 2024-05-02 ENCOUNTER — VBI (OUTPATIENT)
Dept: ADMINISTRATIVE | Facility: OTHER | Age: 74
End: 2024-05-02

## 2024-05-02 DIAGNOSIS — R07.9 CHEST PAIN, UNSPECIFIED TYPE: ICD-10-CM

## 2024-05-02 DIAGNOSIS — E78.00 PURE HYPERCHOLESTEROLEMIA: ICD-10-CM

## 2024-05-02 DIAGNOSIS — I10 PRIMARY HYPERTENSION: ICD-10-CM

## 2024-05-02 DIAGNOSIS — F17.200 NICOTINE DEPENDENCE WITH CURRENT USE: ICD-10-CM

## 2024-05-08 DIAGNOSIS — K21.9 GASTROESOPHAGEAL REFLUX DISEASE WITHOUT ESOPHAGITIS: ICD-10-CM

## 2024-05-08 RX ORDER — SUCRALFATE ORAL 1 G/10ML
SUSPENSION ORAL
Qty: 1260 ML | Refills: 1 | Status: SHIPPED | OUTPATIENT
Start: 2024-05-08

## 2024-05-08 NOTE — RESULT ENCOUNTER NOTE
Normal Device Function  Close to 13 days of recording    Predominant rhythm is sinus rhythm  Minimum heart rate 39/min  Average heart rate 60/min  Maximum heart rate 152/min    SVT  8 episodes  Fastest is 4 beats  Longest is 18 seconds    No atrial fibrillation    No sustained VT    PAC 1.2%  PVC less than 1%          Summary of my recommendation  No significant arrhythmias at this time

## 2024-05-13 ENCOUNTER — OFFICE VISIT (OUTPATIENT)
Dept: PAIN MEDICINE | Facility: MEDICAL CENTER | Age: 74
End: 2024-05-13
Payer: COMMERCIAL

## 2024-05-13 VITALS
DIASTOLIC BLOOD PRESSURE: 78 MMHG | BODY MASS INDEX: 29.32 KG/M2 | HEART RATE: 54 BPM | WEIGHT: 176 LBS | HEIGHT: 65 IN | SYSTOLIC BLOOD PRESSURE: 150 MMHG

## 2024-05-13 DIAGNOSIS — G89.4 CHRONIC PAIN SYNDROME: Primary | ICD-10-CM

## 2024-05-13 DIAGNOSIS — M54.16 LUMBAR RADICULITIS: ICD-10-CM

## 2024-05-13 DIAGNOSIS — M79.18 MYOFASCIAL PAIN SYNDROME: ICD-10-CM

## 2024-05-13 DIAGNOSIS — M54.42 CHRONIC BILATERAL LOW BACK PAIN WITH BILATERAL SCIATICA: ICD-10-CM

## 2024-05-13 DIAGNOSIS — G89.29 CHRONIC BILATERAL LOW BACK PAIN WITH BILATERAL SCIATICA: ICD-10-CM

## 2024-05-13 DIAGNOSIS — M54.41 CHRONIC BILATERAL LOW BACK PAIN WITH BILATERAL SCIATICA: ICD-10-CM

## 2024-05-13 PROCEDURE — 99214 OFFICE O/P EST MOD 30 MIN: CPT

## 2024-05-13 PROCEDURE — G2211 COMPLEX E/M VISIT ADD ON: HCPCS

## 2024-05-13 RX ORDER — PREGABALIN 200 MG/1
200 CAPSULE ORAL 3 TIMES DAILY
Qty: 90 CAPSULE | Refills: 1 | Status: SHIPPED | OUTPATIENT
Start: 2024-05-13

## 2024-05-13 RX ORDER — HYDROCODONE BITARTRATE AND ACETAMINOPHEN 10; 325 MG/1; MG/1
TABLET ORAL
Qty: 130 TABLET | Refills: 0 | Status: SHIPPED | OUTPATIENT
Start: 2024-05-13

## 2024-05-13 RX ORDER — BACLOFEN 10 MG/1
10 TABLET ORAL 3 TIMES DAILY
Qty: 270 TABLET | Refills: 0 | Status: SHIPPED | OUTPATIENT
Start: 2024-05-13

## 2024-05-13 NOTE — PATIENT INSTRUCTIONS
Opioid Safety   WHAT YOU NEED TO KNOW:   An opioid medicine is used to treat pain. Examples are oxycodone, morphine, fentanyl, or codeine. Pain control and management may help you rest, heal, and return to your daily activities. You and your family will receive information about how to manage your pain at home. The instructions will include what to do if you have side effects as your pain is managed. You will get information on how to handle opioid medicine safely. You will also get suggestions on how to control pain without opioids. It is important to follow all instructions so your pain is managed effectively.  DISCHARGE INSTRUCTIONS:   Call your local emergency number (911 in the ), or have someone else call if:   You have a seizure.    You cannot be woken.    You have trouble staying awake and your breathing is slow or shallow.    Your speech is slurred, or you are confused.    You are dizzy or stumble when you walk.    Call your doctor, or have someone close to you call if:   You are extremely drowsy, or you have trouble staying awake or speaking.    You have pale or clammy skin.    You have blue fingernails or lips.    Your heartbeat is slower than normal.    You cannot stop vomiting.    You have questions or concerns about your condition or care.    Use opioids safely:   Take prescribed opioids exactly as directed.  Opioids come with directions based on the kind and how it is given. Talk to your healthcare provider or a pharmacist if you have any questions. Do not take more than the recommended amount. Too much can cause a life-threatening overdose. Do not continue to take it after your pain stops. You may develop tolerance. This means you keep needing higher doses to get the same effect. You may also develop opioid use disorder. This means you are not able to control your opioid use.    Do not give opioids to others or take opioids that belong to someone else.  The kind or amount one person takes may not  be right for another. The person you share them with may also be taking medicines that do not mix with opioids. The person may drink alcohol or use other drugs that can cause life-threatening problems when mixed with opioids.    Do not mix opioids with other medicines or alcohol.  The combination can cause an overdose, or cause you to stop breathing. Alcohol, sleeping pills, and medicines such as antihistamines can make you sleepy. A combination with opioids can lead to a coma.    Do not drive or operate heavy machinery after you use an opioid.  You may feel drowsy or have trouble concentrating. You can injure yourself or others if you drive or use heavy machinery when you are not alert. Your provider or pharmacist can tell you how long to wait after a dose before you do these activities.    Talk to your healthcare provider if you have any side effects.  Side effects include nausea, sleepiness, itching, and trouble thinking clearly. Your provider may need to make changes to the kind or amount of opioid you are taking. Your provider can also help you find ways to prevent or relieve side effects.    Manage constipation:  Constipation is the most common side effect of opioid medicine. Constipation is when you have hard, dry bowel movements, or you go longer than usual between bowel movements. Tell your healthcare provider about all changes in your bowel movements while you are taking opioids. Your provider may recommend laxative medicine to help you have a bowel movement. Your provider may also change the kind of opioid you are taking, or change when you take it. The following are more ways you can prevent or relieve constipation:  Drink liquids as directed.  You may need to drink extra liquids to help soften and move your bowels. Ask how much liquid to drink each day and which liquids are best for you.    Eat high-fiber foods.  This may help decrease constipation by adding bulk to your bowel movements. High-fiber  foods include fruits, vegetables, whole-grain breads and cereals, and beans. Your healthcare provider or dietitian can help you create a high-fiber meal plan. Your provider may also recommend a fiber supplement if you cannot get enough fiber from food.         Exercise regularly.  Regular physical activity can help stimulate your intestines. Walking is a good exercise to prevent or relieve constipation. Ask which exercises are best for you.         Schedule a time each day to have a bowel movement.  This may help train your body to have regular bowel movements. Bend forward while you are on the toilet to help move the bowel movement out. Sit on the toilet for at least 10 minutes, even if you do not have a bowel movement.    Store opioids safely:   Store opioids where others cannot easily get them.  Keep them in a locked cabinet or secure area. Do not  keep them in a purse or other bag you carry with you. A person may be looking for something else and find the opioids.         Make sure opioids are stored out of the reach of children.  A child can easily overdose on opioids. Opioids may look like candy to a small child.    The best way to dispose of opioids:  The laws vary by country and area. In the [place], the best way is to return the opioids through a take-back program. This program is offered by the [place] Drug Enforcement Agency ([employer]). The following are options for using the program:  Take the opioids to a [employer] collection site.  The site is often a law enforcement center. Call your local law enforcement center for scheduled take-back days in your area. You will be given information on where to go if the collection site is in a different location.    Take the opioids to an approved pharmacy or hospital.  A pharmacy or hospital may be set up as a collection site. You will need to ask if it is a [employer] collection site if you were not directed there. A pharmacy or doctor's office may not be able to take back opioids  unless it is a [employer] site.    Use a mail-back system.  This means you are given containers to put the opioids into. You will then mail them in the containers.    Use a take-back drop box.  This is a place to leave the opioids at any time. People and animals will not be able to get into the box. Your local law enforcement agency can tell you where to find a drop box in your area.    Other safe ways to dispose of opioids:  The medicine may come with disposal instructions. The instructions may vary depending on the brand of medicine you are using. Instructions may come in a Medication Guide, but not every medicine has one. You may instead get instructions from your pharmacy or doctor. Follow instructions carefully. The following are general guidelines to follow:  Find out if you can flush the opioid.  Some opioids can be flushed down the toilet or poured into the sink. You will need to contact authorities in your area to see if this is an option for you. The FDA also offers a list of medicines that are safe to flush down the toilet. You can check the list if you cannot get the information for your local area.    Ask your waste management company about rules for putting opioids in the trash.  The company will be able to give you specific directions. Scratch out personal information on the original medicine label so it cannot be read. Then put it in the trash. Do not label the trash or put any information on it about the opioids. It should look like regular household trash so no one is tempted to look for the opioids. Keep the trash out of the reach of children and animals. Always make sure trash is secure.    Talk to officials if you live in a facility.  If you live in a nursing home or assisted living center, talk to an official. The person will know the rules for your area.    Other ways to manage pain:   Ask your healthcare provider about non-opioid medicines to control pain.  Some medicines may even work better than  opioids, depending on the cause of your pain. Nonprescription medicines include NSAIDs (such as ibuprofen) and acetaminophen. Prescription medicines include muscle relaxers, antidepressants, and steroids.    Pain may be managed without any medicines.  Some ways to relieve pain include massage, aromatherapy, or meditation. Physical or occupational therapy may also help.    Follow up with your doctor or pain specialist as directed:  You may need to have your dose adjusted. Your doctor or pain specialist can also help you find ways to manage pain without opioids. Write down your questions so you remember to ask them during your visits.  For more information:   Drug Enforcement Administration  [address] [place] [address]  Phone: 9- 555 - 836-7042  Web Address: https://www.deadiversion.[facility].gov/drug_disposal/    US Food and Drug Administration  [address] [place] [address]  Phone: 1- 413 - 780-4607  Web Address: http://www.fda.gov  © Copyright [employer] 2023 Information is for End User's use only and may not be sold, redistributed or otherwise used for commercial purposes.  The above information is an  only. It is not intended as medical advice for individual conditions or treatments. Talk to your doctor, nurse or pharmacist before following any medical regimen to see if it is safe and effective for you.

## 2024-05-13 NOTE — PROGRESS NOTES
Assessment:  1. Chronic pain syndrome    2. Myofascial pain syndrome    3. Chronic bilateral low back pain with bilateral sciatica    4. Lumbar radiculitis        Plan:  The patient remains clinically stable on hydrocodone-acetaminophen 10/325 mg 1 tablet every 4-6 hours as needed. Two 1 month supplies of this medication were sent to the patient's pharmacy today with do not fill dates of today, 05/13/2024, and 06/11/2024.  Continue pregabalin and baclofen as prescribed. Refills of these medications were sent to the patient's pharmacy today.  Follow-up in 8 weeks, or sooner if needed.    This patient is being managed for a complex and serious condition that requires ongoing, intensive medical management. The nature of this condition demands constant vigilance and a nuanced approach to treatment. The condition necessitates an in-depth and focused approach to management, including regular monitoring and potential coordination with other healthcare professionals.     Detailed Description of Visit Complexity: This visit involved an intricate evaluation and management of the patient's condition. The complexity of the visit was due to the need for a detailed assessment of the current state, consideration of potential complications, and a careful balancing of treatment options to manage the condition effectively.     Patient's Health Status and History: This patient has a significant pain history which requires regular and detailed management. The condition's impact on their life and health is substantial, necessitating a comprehensive and tailored approach to chronic ongoing care.       My impressions and treatment recommendations were discussed in detail with the patient who verbalized understanding and had no further questions.  Discharge instructions were provided. I personally saw and examined the patient and I agree with the above discussed plan of care.    No orders of the defined types were placed in this  encounter.    New Medications Ordered This Visit   Medications    HYDROcodone-acetaminophen (NORCO)  mg per tablet     Sig: Take 1 tab q 4-6 hours prn  for ongoing therapy DO NOT FILL BEFORE: 06/11/2024     Dispense:  130 tablet     Refill:  0    HYDROcodone-acetaminophen (NORCO)  mg per tablet     Sig: Take 1 tab every 4-6 hours prn, for ongoing therapy.     Dispense:  130 tablet     Refill:  0    baclofen 10 mg tablet     Sig: Take 1 tablet (10 mg total) by mouth 3 (three) times a day     Dispense:  270 tablet     Refill:  0    pregabalin (LYRICA) 200 MG capsule     Sig: Take 1 capsule (200 mg total) by mouth 3 (three) times a day     Dispense:  90 capsule     Refill:  1     .       History of Present Illness:  Areli Luciano is a 73 y.o. female who presents for a follow up office visit i for medication refill and reevaluation.  Pain remains well-controlled on current regimen consisting of hydrocodone-acetaminophen, pregabalin, and baclofen.  The patient tolerates these medications well without side effects and reports that they reduce her pain by about 30%.  She has no complaints today.      Opioid contract date 1/22/2024  Last UDS Date 3/20/2024  NORCO last taken on 5/13/2024  Narcan up-to-date    I have personally reviewed and/or updated the patient's past medical history, past surgical history, family history, social history, current medications, allergies, and vital signs today.     Review of Systems   Constitutional:  Positive for activity change. Negative for unexpected weight change.   HENT:  Negative for hearing loss and sinus pain.    Eyes:  Negative for visual disturbance.   Respiratory:  Negative for shortness of breath.    Cardiovascular:  Negative for palpitations.   Gastrointestinal:  Positive for vomiting. Negative for abdominal pain.   Genitourinary:  Negative for difficulty urinating.   Musculoskeletal:  Positive for arthralgias, back pain, joint swelling, myalgias, neck pain and  neck stiffness. Negative for gait problem.   Neurological:  Positive for dizziness. Negative for weakness and numbness.   Psychiatric/Behavioral:  Negative for decreased concentration.        Patient Active Problem List   Diagnosis    Chronic pain syndrome    Cervical radiculopathy    Myofascial pain syndrome    Spondylosis of cervical region without myelopathy or radiculopathy    Fibromyalgia    Long-term current use of opiate analgesic    MGUS (monoclonal gammopathy of unknown significance)    Iron deficiency anemia following bariatric surgery    Light headedness    Chronic bronchitis (HCC)    Primary osteoarthritis of right knee    Pseudogout of left knee    Lumbar radiculitis    Chronic bilateral low back pain with bilateral sciatica    Rotator cuff syndrome, left    Left shoulder pain    Dizziness    Other fatigue    Biceps tendinitis of left shoulder    Adhesive capsulitis of left shoulder    Hypoglycemia    Hypothyroidism due to Hashimoto's thyroiditis    Iron deficiency anemia, unspecified    GERD (gastroesophageal reflux disease)    Nicotine dependence with current use    Need for prophylactic vaccination against Streptococcus pneumoniae (pneumococcus)    Needs flu shot    Osteoarthritis    Postnasal drip    Encounter for screening for malignant neoplasm of lung in current smoker with 30 pack year history or greater    High coronary artery calcium score       Past Medical History:   Diagnosis Date    Anemia     Anxiety     hx of panic attacks (now under control)     Arthritis     Asthma     resolved (no problems in a decade)     Baker's cyst of knee     Bronchitis     Bunion, left foot     Cervical pain     Chronic GERD     Chronic pain     Chronic pain disorder     Depression     Diabetes mellitus, type 2 (HCC)     Diabetic peripheral neuropathy (HCC)     Endometriosis     Fibromyalgia     Hyperlipidemia     Hypertension     Joint pain     Low back pain     Low back pain     Lung nodules     Macular  degeneration     Pulmonary emphysema (HCC) 01/16/2020    Spondylosis     Spontaneous pneumothorax     Uterine cancer (HCC)        Past Surgical History:   Procedure Laterality Date    BACK SURGERY  1996    L5, S1- laser surgery fusion of c5 and c6     BREAST CYST EXCISION Right     CHOLECYSTECTOMY  2004    FOOT SURGERY Left 2005    fusion     FRACTURE SURGERY      GASTRIC BYPASS  2010    Dr. Oropeza     HYSTERECTOMY  1985    just uterus     KNEE ARTHROSCOPY      LAMINECTOMY      ORTHOPEDIC SURGERY      OVARIAN CYST REMOVAL  1975    PELVIC LAPAROSCOPY      ovaries (x10)     PLEURAL SCARIFICATION  1967    SHOULDER OPEN ROTATOR CUFF REPAIR Left 2008    TONSILLECTOMY      VAGINAL PROLAPSE REPAIR         Family History   Problem Relation Age of Onset    Hypertension Mother     Lung cancer Mother     Hypertension Father     Heart disease Father     Heart attack Father     Cancer Father     No Known Problems Sister     No Known Problems Daughter     No Known Problems Maternal Grandmother     No Known Problems Maternal Grandfather     No Known Problems Paternal Grandmother     No Known Problems Paternal Grandfather     Breast cancer Paternal Aunt 40    Breast cancer Paternal Aunt 45    Breast cancer Maternal Aunt 48       Social History     Occupational History    Not on file   Tobacco Use    Smoking status: Every Day     Current packs/day: 0.30     Average packs/day: 0.6 packs/day for 53.4 years (33.5 ttl pk-yrs)     Types: Cigarettes     Start date: 1971    Smokeless tobacco: Never   Vaping Use    Vaping status: Never Used   Substance and Sexual Activity    Alcohol use: Not Currently     Alcohol/week: 1.0 standard drink of alcohol     Types: 1 Shots of liquor per week     Comment: rarely    Drug use: No    Sexual activity: Not Currently     Partners: Male       Current Outpatient Medications on File Prior to Visit   Medication Sig    albuterol (PROVENTIL HFA,VENTOLIN HFA) 90 mcg/act inhaler Inhale 2 puffs every 6 (six)  hours as needed for wheezing    amLODIPine (NORVASC) 5 mg tablet TAKE 1 TABLET (5 MG TOTAL) BY MOUTH DAILY.    atorvastatin (LIPITOR) 10 mg tablet Take 1 tablet (10 mg total) by mouth daily    benzonatate (TESSALON PERLES) 100 mg capsule Take 1 capsule (100 mg total) by mouth 3 (three) times a day as needed for cough    Calcium 600+D3 600-20 MG-MCG TABS TAKE 1 TABLET BY MOUTH TWICE A DAY WITH MEALS    cetirizine (ZyrTEC) 10 mg tablet Take 0.5 tablets (5 mg total) by mouth daily    clotrimazole-betamethasone (LOTRISONE) 1-0.05 % cream Apply topically 3 (three) times a day    [employer] Vitamin A 2400 MCG (8000 UT) capsule TAKE 1 CAPSULE (8,000 UNITS TOTAL) BY MOUTH DAILY    Diclofenac Sodium (VOLTAREN) 1 % APPLY 2 GRAMS TO AFFECTED AREA 4 TIMES A DAY    diphenhydrAMINE-PSE-APAP (BENADRYL ALLERGY/COLD PO) Take by mouth daily at bedtime    escitalopram (LEXAPRO) 5 mg tablet TAKE 1 TABLET (5 MG TOTAL) BY MOUTH DAILY.    famotidine (PEPCID) 20 mg tablet TAKE 1 TABLET (20 MG TOTAL) BY MOUTH 2 (TWO) TIMES A DAY PLEASE STOP TAGAMET    fluticasone-umeclidinium-vilanterol (Trelegy Ellipta) 100-62.5-25 mcg/actuation inhaler Inhale 1 puff daily Rinse mouth after use.    ipratropium-albuterol (DUO-NEB) 0.5-2.5 mg/3 mL nebulizer solution Take 3 mL by nebulization every 8 (eight) hours as needed for wheezing or shortness of breath    levothyroxine 25 mcg tablet TAKE 1 TABLET (25 MCG TOTAL) BY MOUTH EVERY OTHER DAY    levothyroxine 50 mcg tablet TAKE 1 TABLET (50 MCG TOTAL) BY MOUTH EVERY OTHER DAY    losartan (COZAAR) 25 mg tablet TAKE 1 TABLET (25 MG TOTAL) BY MOUTH DAILY.    nortriptyline (PAMELOR) 25 mg capsule TAKE 1 CAPSULE BY MOUTH EVERYDAY AT BEDTIME    omeprazole (PriLOSEC) 40 MG capsule TAKE 1 CAPSULE BY MOUTH 2 TIMES A DAY BEFORE MEALS.    Pediatric Multiple Vitamins (Childrens Chew Multivitamin) CHEW CHEW AND SWALLOW 1 TABLET BY MOUTH EVERY DAY    sucralfate (CARAFATE) 1 g/10 mL suspension TAKE 10 ML (1 G TOTAL) BY MOUTH 4  TIMES A DAY    traZODone (DESYREL) 50 mg tablet TAKE 1 TABLET BY MOUTH EVERY DAY AT BEDTIME AS NEEDED    varenicline (CHANTIX) 0.5 mg tablet Take 1 tablet (0.5 mg total) by mouth 2 (two) times a day    Vitamin A 2400 MCG (8000 UT) TABS TAKE 1 CAPSULE (8,000 UNITS TOTAL) BY MOUTH DAILY    [DISCONTINUED] baclofen 10 mg tablet Take 1 tablet (10 mg total) by mouth 3 (three) times a day    [DISCONTINUED] HYDROcodone-acetaminophen (NORCO)  mg per tablet Take 1 tab every 4-6 hours prn, for ongoing therapy.    [DISCONTINUED] HYDROcodone-acetaminophen (NORCO)  mg per tablet Take 1 tab q 4-6 hours prn  for ongoing therapy DO NOT FILL BEFORE: 04/14/2024    [DISCONTINUED] pregabalin (LYRICA) 200 MG capsule Take 1 capsule (200 mg total) by mouth 3 (three) times a day    denosumab (PROLIA) 60 mg/mL Inject 1 mL (60 mg total) under the skin once for 1 dose    ergocalciferol (VITAMIN D2) 50,000 units Take 1 capsule (50,000 Units total) by mouth once a week (Patient not taking: Reported on 1/8/2024)    naloxone (NARCAN) 4 mg/0.1 mL nasal spray Administer 1 spray into a nostril. If no response after 2-3 minutes, give another dose in the other nostril using a new spray. (Patient not taking: Reported on 9/6/2023)    nicotine (NICODERM CQ) 14 mg/24hr TD 24 hr patch Place 1 patch on the skin over 24 hours every 24 hours (Patient not taking: Reported on 3/20/2024)    nicotine (NICODERM CQ) 7 mg/24hr TD 24 hr patch Place 1 patch on the skin over 24 hours every 24 hours (Patient not taking: Reported on 3/20/2024)    predniSONE 10 mg tablet Take 3 tabs daily with breakfast for 3 days then Take 2 tabs daily for 3 Days then take one tab daily for 3 days then stop.. (Patient not taking: Reported on 4/2/2024)    vitamin B-12 (VITAMIN B-12) 1,000 mcg tablet Take 1 tablet (1,000 mcg total) by mouth daily (Patient not taking: Reported on 4/25/2024)     Current Facility-Administered Medications on File Prior to Visit   Medication     "cyanocobalamin injection 1,000 mcg    cyanocobalamin injection 1,000 mcg    cyanocobalamin injection 1,000 mcg    cyanocobalamin injection 1,000 mcg    cyanocobalamin injection 1,000 mcg    cyanocobalamin injection 1,000 mcg    cyanocobalamin injection 1,000 mcg    cyanocobalamin injection 1,000 mcg       Allergies   Allergen Reactions    Cephalosporins Hives    Erythromycin GI Intolerance    Fentanyl Itching     Occurred during surgery     Paxil [Paroxetine] Hives     After 2 weeks    Penicillins Itching    Pravastatin Myalgia    Statins Itching    Sulfa Antibiotics Diarrhea    Wellbutrin [Bupropion] Hives     After 2 weeks     Marzena's Wort Rash       Physical Exam:    /78   Pulse (!) 54   Ht 5' 5\" (1.651 m)   Wt 79.8 kg (176 lb)   LMP  (LMP Unknown)   BMI 29.29 kg/m²     Constitutional:normal, well developed, well nourished, alert, in no distress and non-toxic and no overt pain behavior.  Eyes:anicteric  HEENT:grossly intact  Neck:supple, symmetric, trachea midline and no masses   Pulmonary:even and unlabored  Cardiovascular:No edema or pitting edema present  Skin:Normal without rashes or lesions and well hydrated  Psychiatric:Mood and affect appropriate  Neurologic:Cranial Nerves II-XII grossly intact  Musculoskeletal:normal, ambulates with a cane    "

## 2024-05-14 ENCOUNTER — HOSPITAL ENCOUNTER (OUTPATIENT)
Dept: MAMMOGRAPHY | Facility: MEDICAL CENTER | Age: 74
Discharge: HOME | End: 2024-05-14
Payer: COMMERCIAL

## 2024-05-14 VITALS — WEIGHT: 176 LBS | HEIGHT: 65 IN | BODY MASS INDEX: 29.32 KG/M2

## 2024-05-14 DIAGNOSIS — Z12.31 SCREENING MAMMOGRAM FOR BREAST CANCER: ICD-10-CM

## 2024-05-14 PROCEDURE — 77067 SCR MAMMO BI INCL CAD: CPT

## 2024-05-14 PROCEDURE — 77063 BREAST TOMOSYNTHESIS BI: CPT

## 2024-05-15 DIAGNOSIS — N63.11 MASS OF UPPER OUTER QUADRANT OF RIGHT BREAST: Primary | ICD-10-CM

## 2024-05-16 ENCOUNTER — TELEPHONE (OUTPATIENT)
Dept: HEMATOLOGY ONCOLOGY | Facility: CLINIC | Age: 74
End: 2024-05-16

## 2024-05-16 ENCOUNTER — TELEPHONE (OUTPATIENT)
Dept: FAMILY MEDICINE CLINIC | Facility: CLINIC | Age: 74
End: 2024-05-16

## 2024-05-16 NOTE — TELEPHONE ENCOUNTER
Spoke with the patient.  Informed her of results.  She will call to schedule Additional views and Breast surgery consult.

## 2024-05-16 NOTE — TELEPHONE ENCOUNTER
----- Message from Aleksandar Garduno MD sent at 5/15/2024  6:50 PM EDT -----  Additional Views AND ; Breast Surgery consult ASAP  ----- Message -----  From: Anna Marie Adrian MD  Sent: 5/15/2024   1:05 PM EDT  To: Aleksandar Garduno MD

## 2024-05-16 NOTE — TELEPHONE ENCOUNTER
Areli called in response to a referral that was received for patient to establish care with Surgical Oncology.     Outreach was made to schedule a consultation.    A consultation was scheduled for patient during this call. Patient is scheduled on 6/4/24 at 9:30 with Sera Dunn at the [facility]

## 2024-05-17 ENCOUNTER — HOSPITAL ENCOUNTER (OUTPATIENT)
Dept: PULMONOLOGY | Facility: HOSPITAL | Age: 74
Discharge: HOME | End: 2024-05-17
Attending: INTERNAL MEDICINE

## 2024-05-17 ENCOUNTER — DOCUMENTATION (OUTPATIENT)
Dept: HEMATOLOGY ONCOLOGY | Facility: CLINIC | Age: 74
End: 2024-05-17

## 2024-05-17 RX ORDER — ALBUTEROL SULFATE 2.5 MG/3ML
2.5 SOLUTION RESPIRATORY (INHALATION) ONCE AS NEEDED
Status: DISCONTINUED | OUTPATIENT
Start: 2024-05-17 | End: 2024-05-21 | Stop reason: HOSPADM

## 2024-05-17 NOTE — PROGRESS NOTES
Intake received- chart reviewed for external records retrieval.Patient is scheduled on 6-4-2024 with Sera Dunn for Dx: Mass of upper outer quadrant of right breast .Due to Dx on patient's referral, no records retrieval needed by Oncology Care Coordination.

## 2024-05-19 DIAGNOSIS — E16.2 HYPOGLYCEMIA: ICD-10-CM

## 2024-05-19 DIAGNOSIS — Z98.84 BARIATRIC SURGERY STATUS: ICD-10-CM

## 2024-05-20 RX ORDER — MULTIVITAMIN WITH IRON
TABLET ORAL
Qty: 90 CAPSULE | Refills: 2 | Status: SHIPPED | OUTPATIENT
Start: 2024-05-20

## 2024-05-22 DIAGNOSIS — J30.9 ALLERGIC RHINITIS, UNSPECIFIED SEASONALITY, UNSPECIFIED TRIGGER: ICD-10-CM

## 2024-05-22 RX ORDER — CETIRIZINE HYDROCHLORIDE 10 MG/1
5 TABLET ORAL DAILY
Qty: 45 TABLET | Refills: 1 | Status: SHIPPED | OUTPATIENT
Start: 2024-05-22

## 2024-05-28 ENCOUNTER — APPOINTMENT (OUTPATIENT)
Dept: LAB | Facility: HOSPITAL | Age: 74
End: 2024-05-28
Payer: COMMERCIAL

## 2024-05-28 ENCOUNTER — RA CDI HCC (OUTPATIENT)
Dept: OTHER | Facility: HOSPITAL | Age: 74
End: 2024-05-28

## 2024-05-28 DIAGNOSIS — E78.00 PURE HYPERCHOLESTEROLEMIA: ICD-10-CM

## 2024-05-28 DIAGNOSIS — I10 PRIMARY HYPERTENSION: ICD-10-CM

## 2024-05-28 DIAGNOSIS — F17.200 NICOTINE DEPENDENCE WITH CURRENT USE: ICD-10-CM

## 2024-05-28 DIAGNOSIS — R07.9 CHEST PAIN, UNSPECIFIED TYPE: ICD-10-CM

## 2024-05-28 LAB
ALBUMIN SERPL BCP-MCNC: 3.7 G/DL (ref 3.5–5)
ALP SERPL-CCNC: 90 U/L (ref 34–104)
ALT SERPL W P-5'-P-CCNC: 16 U/L (ref 7–52)
AST SERPL W P-5'-P-CCNC: 25 U/L (ref 13–39)
BILIRUB DIRECT SERPL-MCNC: 0.07 MG/DL (ref 0–0.2)
BILIRUB SERPL-MCNC: 0.36 MG/DL (ref 0.2–1)
CHOLEST SERPL-MCNC: 159 MG/DL
HDLC SERPL-MCNC: 62 MG/DL
LDLC SERPL CALC-MCNC: 76 MG/DL (ref 0–100)
LDLC SERPL DIRECT ASSAY-MCNC: 87 MG/DL (ref 0–100)
NONHDLC SERPL-MCNC: 97 MG/DL
PROT SERPL-MCNC: 6.4 G/DL (ref 6.4–8.4)
TRIGL SERPL-MCNC: 106 MG/DL

## 2024-05-28 PROCEDURE — 80076 HEPATIC FUNCTION PANEL: CPT

## 2024-05-28 PROCEDURE — 83721 ASSAY OF BLOOD LIPOPROTEIN: CPT

## 2024-05-28 PROCEDURE — 36415 COLL VENOUS BLD VENIPUNCTURE: CPT

## 2024-05-28 PROCEDURE — 80061 LIPID PANEL: CPT

## 2024-05-31 ENCOUNTER — OFFICE VISIT (OUTPATIENT)
Dept: FAMILY MEDICINE CLINIC | Facility: CLINIC | Age: 74
End: 2024-05-31
Payer: COMMERCIAL

## 2024-05-31 VITALS
TEMPERATURE: 97.6 F | RESPIRATION RATE: 15 BRPM | SYSTOLIC BLOOD PRESSURE: 130 MMHG | HEIGHT: 65 IN | WEIGHT: 176 LBS | HEART RATE: 67 BPM | BODY MASS INDEX: 29.32 KG/M2 | OXYGEN SATURATION: 97 % | DIASTOLIC BLOOD PRESSURE: 64 MMHG

## 2024-05-31 DIAGNOSIS — E78.5 TYPE 2 DIABETES MELLITUS WITH HYPERLIPIDEMIA  (HCC): ICD-10-CM

## 2024-05-31 DIAGNOSIS — R91.8 LUNG NODULES: ICD-10-CM

## 2024-05-31 DIAGNOSIS — J44.1 ACUTE EXACERBATION OF COPD WITH ASTHMA  (HCC): ICD-10-CM

## 2024-05-31 DIAGNOSIS — J45.901 ACUTE EXACERBATION OF COPD WITH ASTHMA  (HCC): ICD-10-CM

## 2024-05-31 DIAGNOSIS — N63.11 MASS OF UPPER OUTER QUADRANT OF RIGHT BREAST: ICD-10-CM

## 2024-05-31 DIAGNOSIS — J43.9 PULMONARY EMPHYSEMA, UNSPECIFIED EMPHYSEMA TYPE (HCC): ICD-10-CM

## 2024-05-31 DIAGNOSIS — E11.69 TYPE 2 DIABETES MELLITUS WITH HYPERLIPIDEMIA  (HCC): ICD-10-CM

## 2024-05-31 DIAGNOSIS — N39.498 OTHER URINARY INCONTINENCE: Primary | ICD-10-CM

## 2024-05-31 DIAGNOSIS — F17.210 CIGARETTE SMOKER: ICD-10-CM

## 2024-05-31 LAB — SL AMB POCT HEMOGLOBIN AIC: 5.3 (ref ?–6.5)

## 2024-05-31 PROCEDURE — 99214 OFFICE O/P EST MOD 30 MIN: CPT | Performed by: INTERNAL MEDICINE

## 2024-05-31 PROCEDURE — 83036 HEMOGLOBIN GLYCOSYLATED A1C: CPT | Performed by: INTERNAL MEDICINE

## 2024-05-31 RX ORDER — BENZONATATE 100 MG/1
100 CAPSULE ORAL 3 TIMES DAILY PRN
Qty: 30 CAPSULE | Refills: 1 | Status: SHIPPED | OUTPATIENT
Start: 2024-05-31

## 2024-05-31 RX ORDER — LEVOFLOXACIN 500 MG/1
500 TABLET, FILM COATED ORAL EVERY 24 HOURS
Qty: 10 TABLET | Refills: 0 | Status: SHIPPED | OUTPATIENT
Start: 2024-05-31 | End: 2024-06-10

## 2024-05-31 NOTE — PROGRESS NOTES
Ambulatory Visit  Name: Areli Luciano      : 1950      MRN: [id number]  Encounter Provider: Aleksandar Garduno MD  Encounter Date: 2024   Encounter department: [facility]    Assessment & Plan   1. Other urinary incontinence  -     Ambulatory Referral to Urogynecology; Future  -      kidney and bladder with pvr; Future; Expected date: 2024  2. Cigarette smoker  Comments:  advised to quit smoking  Yearly Ct Lungs  Orders:  -     benzonatate (TESSALON PERLES) 100 mg capsule; Take 1 capsule (100 mg total) by mouth 3 (three) times a day as needed for cough  -     CT chest wo contrast; Future; Expected date: 2024  3. Pulmonary emphysema, unspecified emphysema type (HCC)  Comments:  stop smoking  Use Inhalers  Yearly ct Chest  Orders:  -     benzonatate (TESSALON PERLES) 100 mg capsule; Take 1 capsule (100 mg total) by mouth 3 (three) times a day as needed for cough  4. Mass of upper outer quadrant of right breast  Comments:  breast surgery Consult ASAP  5. Type 2 diabetes mellitus with hyperlipidemia  (HCC)  Comments:  life style mod  continue same  RTC in 3 mos w Blood work  Orders:  -     POCT hemoglobin A1c  -     Comprehensive metabolic panel; Future; Expected date: 2024  -     CBC and differential; Future; Expected date: 2024  -     Lipid Panel with Direct LDL reflex; Future; Expected date: 2024  -     TSH, 3rd generation with Free T4 reflex; Future; Expected date: 2024  6. Acute exacerbation of COPD with asthma  (HCC)  -     levofloxacin (LEVAQUIN) 500 mg tablet; Take 1 tablet (500 mg total) by mouth every 24 hours for 10 days  7. Lung nodules  -     CT chest wo contrast; Future; Expected date: 2024  Life style Mod  RTC in 3 mos w  Blood work         History of Present Illness     73 Y O Lady is here for Regular Check Up, she has few symptoms, recent Blood work and med list reviewed,....        Review of Systems  "  Constitutional:  Positive for fatigue. Negative for chills and fever.   HENT:  Positive for postnasal drip. Negative for congestion, facial swelling, sore throat, trouble swallowing and voice change.    Eyes:  Negative for pain, discharge and visual disturbance.   Respiratory:  Positive for cough and wheezing. Negative for shortness of breath.    Cardiovascular:  Negative for chest pain, palpitations and leg swelling.   Gastrointestinal:  Negative for abdominal pain, blood in stool, constipation, diarrhea and nausea.   Endocrine: Negative for polydipsia, polyphagia and polyuria.   Genitourinary:  Negative for difficulty urinating, hematuria and urgency.   Musculoskeletal:  Negative for arthralgias and myalgias.   Skin:  Negative for rash.   Neurological:  Positive for dizziness. Negative for tremors, weakness and headaches.   Hematological:  Negative for adenopathy. Does not bruise/bleed easily.   Psychiatric/Behavioral:  Negative for dysphoric mood, sleep disturbance and suicidal ideas.        Objective     /64 (BP Location: Left arm, Patient Position: Sitting, Cuff Size: Standard)   Pulse 67   Temp 97.6 °F (36.4 °C) (Tympanic)   Resp 15   Ht 5' 5\" (1.651 m)   Wt 79.8 kg (176 lb)   LMP  (LMP Unknown)   SpO2 97%   BMI 29.29 kg/m²     Physical Exam  Vitals and nursing note reviewed.   Constitutional:       General: She is not in acute distress.     Appearance: She is well-developed. She is not diaphoretic.   HENT:      Head: Normocephalic and atraumatic.      Right Ear: External ear normal.      Left Ear: External ear normal.      Nose: Nose normal.   Eyes:      General:         Right eye: No discharge.         Left eye: No discharge.      Extraocular Movements: EOM normal.      Conjunctiva/sclera: Conjunctivae normal.      Pupils: Pupils are equal, round, and reactive to light.   Neck:      Thyroid: No thyromegaly.      Trachea: No tracheal deviation.   Cardiovascular:      Rate and Rhythm: Normal " rate and regular rhythm.      Heart sounds: Normal heart sounds. No murmur heard.     No friction rub.   Pulmonary:      Effort: Pulmonary effort is normal. No respiratory distress.      Breath sounds: No stridor. Wheezing present. No rales.   Abdominal:      General: Bowel sounds are normal. There is no distension.      Palpations: Abdomen is soft.      Tenderness: There is no abdominal tenderness. There is no guarding.   Musculoskeletal:         General: Tenderness present. No swelling, deformity or edema. Normal range of motion.      Cervical back: Normal range of motion and neck supple.   Lymphadenopathy:      Cervical: No cervical adenopathy.   Skin:     General: Skin is warm and dry.      Capillary Refill: Capillary refill takes less than 2 seconds.      Coloration: Skin is not pale.      Findings: No erythema or rash.   Neurological:      Mental Status: She is alert and oriented to person, place, and time.      Cranial Nerves: No cranial nerve deficit.      Coordination: Coordination normal.   Psychiatric:         Mood and Affect: Mood and affect and mood normal.         Behavior: Behavior normal.       Administrative Statements

## 2024-06-04 ENCOUNTER — CONSULT (OUTPATIENT)
Dept: SURGICAL ONCOLOGY | Facility: CLINIC | Age: 74
End: 2024-06-04
Payer: COMMERCIAL

## 2024-06-04 VITALS
TEMPERATURE: 98.4 F | WEIGHT: 175.6 LBS | BODY MASS INDEX: 29.26 KG/M2 | OXYGEN SATURATION: 96 % | HEIGHT: 65 IN | HEART RATE: 66 BPM | SYSTOLIC BLOOD PRESSURE: 142 MMHG | DIASTOLIC BLOOD PRESSURE: 60 MMHG

## 2024-06-04 DIAGNOSIS — N63.11 MASS OF UPPER OUTER QUADRANT OF RIGHT BREAST: Primary | ICD-10-CM

## 2024-06-04 PROCEDURE — 99204 OFFICE O/P NEW MOD 45 MIN: CPT

## 2024-06-04 RX ORDER — CHOLECALCIFEROL (VITAMIN D3) 10(400)/ML
DROPS ORAL
COMMUNITY

## 2024-06-04 RX ORDER — ACETAMINOPHEN 160 MG
TABLET,DISINTEGRATING ORAL
COMMUNITY

## 2024-06-04 RX ORDER — ALBUTEROL SULFATE 90 UG/1
AEROSOL, METERED RESPIRATORY (INHALATION)
COMMUNITY

## 2024-06-04 NOTE — PROGRESS NOTES
Surgical Oncology Consult       [facility] [place]note 2[place] [facility] [place]  [address] [facility] [address]  [phone]    Areli Luciano  1950  [id number]  [facility] [place]note 2[place] [facility] [place]  [address] [facility] [address]  [phone]    Diagnoses and all orders for this visit:    Mass of upper outer quadrant of right breast    Other orders  -     Cholecalciferol (Vitamin D3) 50 MCG (2000 UT) capsule  -     cholecalciferol (VITAMIN D) 400 units/1 mL; injections once a week  -     Cyanocobalamin (Vitamin B-12) 50 MCG LOZG  -     HYDROcodone-Acetaminophen (VICODIN ES PO)  -     albuterol (Ventolin HFA) 90 mcg/act inhaler        Chief Complaint   Patient presents with    Consult       Return pending results of upcoming imaging.      History of Present Illness: The patient presents to the office today in consultation for abnormal findings on recent screening mammogram.  Imaging performed on May 14 revealed a mass in the upper outer quadrant of the right breast, and she is scheduled for diagnostic mammogram and ultrasound on June 26.  The patient denies noticing any masses, lumps, skin changes, redness or swelling.  She has not noticed any nipple discharge or changes in the overall appearance of the breast.  She reports having a surgical biopsy of the right breast performed 30 years ago which was benign.  Otherwise she has not had any breast procedures or surgeries.  The patient reports many health issues, but has not noticed any new symptoms recently.  She reports menarche at age 12, and had the first of 3 children at age 25.  She underwent hysterectomy with partial oophorectomy in her 30s, and went through menopause at 42.  She was on hormone replacement therapy for a period of time starting in 1998.  Her father had an unknown cancer at the age of 42 and her mother had lung cancer at the age of  52.  She reports that she had 1 maternal aunt with breast cancer at the age of 50, and multiple paternal relatives including aunts and both male and female cousins with breast cancer in their 40s.  The patient herself has not had any genetic testing, but reports that her granddaughter was tested and is negative.        Review of Systems   Constitutional:  Positive for appetite change and fatigue. Negative for activity change.   HENT: Negative.     Eyes: Negative.    Respiratory:  Positive for cough and shortness of breath.    Cardiovascular:  Positive for leg swelling.   Gastrointestinal:  Positive for abdominal pain, diarrhea and nausea. Negative for vomiting.   Musculoskeletal:  Positive for arthralgias, back pain, neck pain and neck stiffness.   Skin:  Negative for color change and wound.   Neurological:  Positive for dizziness, weakness and numbness. Negative for light-headedness and headaches.   Hematological:  Negative for adenopathy. Bruises/bleeds easily.   Psychiatric/Behavioral:  Positive for sleep disturbance.                Patient Active Problem List   Diagnosis    Chronic pain syndrome    Cervical radiculopathy    Myofascial pain syndrome    Spondylosis of cervical region without myelopathy or radiculopathy    Fibromyalgia    Long-term current use of opiate analgesic    MGUS (monoclonal gammopathy of unknown significance)    Iron deficiency anemia following bariatric surgery    Light headedness    Chronic bronchitis (HCC)    Primary osteoarthritis of right knee    Pseudogout of left knee    Lumbar radiculitis    Chronic bilateral low back pain with bilateral sciatica    Rotator cuff syndrome, left    Left shoulder pain    Dizziness    Other fatigue    Biceps tendinitis of left shoulder    Adhesive capsulitis of left shoulder    Hypoglycemia    Hypothyroidism due to Hashimoto's thyroiditis    Iron deficiency anemia, unspecified    GERD (gastroesophageal reflux disease)    Nicotine dependence with  current use    Need for prophylactic vaccination against Streptococcus pneumoniae (pneumococcus)    Needs flu shot    Osteoarthritis    Postnasal drip    Encounter for screening for malignant neoplasm of lung in current smoker with 30 pack year history or greater    High coronary artery calcium score    Mass of upper outer quadrant of right breast     Past Medical History:   Diagnosis Date    Anemia     Anxiety     hx of panic attacks (now under control)     Arthritis     Asthma     resolved (no problems in a decade)     Baker's cyst of knee     Bronchitis     Bunion, left foot     Cervical pain     Chronic GERD     Chronic pain     Chronic pain disorder     Depression     Diabetes mellitus, type 2 (HCC)     Diabetic peripheral neuropathy (HCC)     Endometriosis     Fibromyalgia     Hyperlipidemia     Hypertension     Joint pain     Low back pain     Low back pain     Lung nodules     Macular degeneration     Pulmonary emphysema (HCC) 01/16/2020    Spondylosis     Spontaneous pneumothorax     Uterine cancer (HCC)      Past Surgical History:   Procedure Laterality Date    BACK SURGERY  1996    L5, S1- laser surgery fusion of c5 and c6     BREAST CYST EXCISION Right     x2 benign    CHOLECYSTECTOMY  2004    FOOT SURGERY Left 2005    fusion     FRACTURE SURGERY      GASTRIC BYPASS  2010    Dr. Oropeza     HYSTERECTOMY  1985    just uterus     KNEE ARTHROSCOPY      LAMINECTOMY      ORTHOPEDIC SURGERY      OVARIAN CYST REMOVAL  1975    PELVIC LAPAROSCOPY      ovaries (x10)     PLEURAL SCARIFICATION  1967    SHOULDER OPEN ROTATOR CUFF REPAIR Left 2008    TONSILLECTOMY      VAGINAL PROLAPSE REPAIR       Family History   Problem Relation Age of Onset    Hypertension Mother     Lung cancer Mother 53    Hypertension Father     Heart disease Father     Heart attack Father     Cancer Father 42    No Known Problems Sister     No Known Problems Sister     No Known Problems Sister     No Known Problems Maternal Grandmother     No  Known Problems Maternal Grandfather     No Known Problems Paternal Grandmother     No Known Problems Paternal Grandfather     No Known Problems Daughter     No Known Problems Daughter     No Known Problems Daughter     Breast cancer Maternal Aunt 48    Breast cancer Paternal Aunt 43    Breast cancer Paternal Aunt 45    Breast cancer Cousin 40    Breast cancer Cousin 42        male     Social History     Socioeconomic History    Marital status:      Spouse name: Not on file    Number of children: Not on file    Years of education: Not on file    Highest education level: Not on file   Occupational History    Not on file   Tobacco Use    Smoking status: Every Day     Current packs/day: 0.30     Average packs/day: 0.6 packs/day for 53.4 years (33.5 ttl pk-yrs)     Types: Cigarettes     Start date: 1971    Smokeless tobacco: Never   Vaping Use    Vaping status: Never Used   Substance and Sexual Activity    Alcohol use: Not Currently     Alcohol/week: 1.0 standard drink of alcohol     Types: 1 Shots of liquor per week     Comment: rarely    Drug use: No    Sexual activity: Not Currently     Partners: Male   Other Topics Concern    Not on file   Social History Narrative    Not on file     Social Determinants of Health     Financial Resource Strain: Low Risk  (6/9/2023)    Overall Financial Resource Strain (CARDIA)     Difficulty of Paying Living Expenses: Not hard at all   Food Insecurity: No Food Insecurity (4/26/2021)    Hunger Vital Sign     Worried About Running Out of Food in the Last Year: Never true     Ran Out of Food in the Last Year: Never true   Transportation Needs: No Transportation Needs (6/9/2023)    PRAPARE - Transportation     Lack of Transportation (Medical): No     Lack of Transportation (Non-Medical): No   Physical Activity: Inactive (9/8/2020)    Exercise Vital Sign     Days of Exercise per Week: 0 days     Minutes of Exercise per Session: 0 min   Stress: No Stress Concern Present (9/8/2020)     [employer] - Occupational Stress Questionnaire     Feeling of Stress : Not at all   Social Connections: Unknown (9/8/2020)    Social Connection and Isolation Panel [NHANES]     Frequency of Communication with Friends and Family: Patient declined     Frequency of Social Gatherings with Friends and Family: Patient declined     Attends Mu-ism Services: Patient declined     Active Member of Clubs or Organizations: Patient declined     Attends Club or Organization Meetings: Patient declined     Marital Status: Patient declined   Intimate Partner Violence: Not At Risk (9/8/2020)    Humiliation, Afraid, Rape, and Kick questionnaire     Fear of Current or Ex-Partner: No     Emotionally Abused: No     Physically Abused: No     Sexually Abused: No   Housing Stability: Not on file       Current Outpatient Medications:     albuterol (PROVENTIL HFA,VENTOLIN HFA) 90 mcg/act inhaler, Inhale 2 puffs every 6 (six) hours as needed for wheezing, Disp: 18 g, Rfl: 3    albuterol (Ventolin HFA) 90 mcg/act inhaler, , Disp: , Rfl:     amLODIPine (NORVASC) 5 mg tablet, TAKE 1 TABLET (5 MG TOTAL) BY MOUTH DAILY., Disp: 90 tablet, Rfl: 1    atorvastatin (LIPITOR) 10 mg tablet, Take 1 tablet (10 mg total) by mouth daily, Disp: 90 tablet, Rfl: 3    baclofen 10 mg tablet, Take 1 tablet (10 mg total) by mouth 3 (three) times a day, Disp: 270 tablet, Rfl: 0    benzonatate (TESSALON PERLES) 100 mg capsule, Take 1 capsule (100 mg total) by mouth 3 (three) times a day as needed for cough, Disp: 30 capsule, Rfl: 1    Calcium 600+D3 600-20 MG-MCG TABS, TAKE 1 TABLET BY MOUTH TWICE A DAY WITH MEALS, Disp: 180 tablet, Rfl: 4    cetirizine (ZyrTEC) 10 mg tablet, TAKE 1/2 TABLET BY MOUTH DAILY, Disp: 45 tablet, Rfl: 1    cholecalciferol (VITAMIN D) 400 units/1 mL, injections once a week, Disp: , Rfl:     Cholecalciferol (Vitamin D3) 50 MCG (2000 UT) capsule, , Disp: , Rfl:     clotrimazole-betamethasone (LOTRISONE) 1-0.05 %  cream, Apply topically 3 (three) times a day, Disp: 60 g, Rfl: 1    Cyanocobalamin (Vitamin B-12) 50 MCG LOZG, , Disp: , Rfl:     Diclofenac Sodium (VOLTAREN) 1 %, APPLY 2 GRAMS TO AFFECTED AREA 4 TIMES A DAY, Disp: 200 g, Rfl: 0    diphenhydrAMINE-PSE-APAP (BENADRYL ALLERGY/COLD PO), Take by mouth daily at bedtime, Disp: , Rfl:     escitalopram (LEXAPRO) 5 mg tablet, TAKE 1 TABLET (5 MG TOTAL) BY MOUTH DAILY., Disp: 90 tablet, Rfl: 1    famotidine (PEPCID) 20 mg tablet, TAKE 1 TABLET (20 MG TOTAL) BY MOUTH 2 (TWO) TIMES A DAY PLEASE STOP TAGAMET, Disp: 180 tablet, Rfl: 1    fluticasone-umeclidinium-vilanterol (Trelegy Ellipta) 100-62.5-25 mcg/actuation inhaler, Inhale 1 puff daily Rinse mouth after use., Disp: 60 each, Rfl: 5    HYDROcodone-acetaminophen (NORCO)  mg per tablet, Take 1 tab q 4-6 hours prn  for ongoing therapy DO NOT FILL BEFORE: 06/11/2024, Disp: 130 tablet, Rfl: 0    HYDROcodone-acetaminophen (NORCO)  mg per tablet, Take 1 tab every 4-6 hours prn, for ongoing therapy., Disp: 130 tablet, Rfl: 0    HYDROcodone-Acetaminophen (VICODIN ES PO), , Disp: , Rfl:     ipratropium-albuterol (DUO-NEB) 0.5-2.5 mg/3 mL nebulizer solution, Take 3 mL by nebulization every 8 (eight) hours as needed for wheezing or shortness of breath, Disp: 270 mL, Rfl: 3    levofloxacin (LEVAQUIN) 500 mg tablet, Take 1 tablet (500 mg total) by mouth every 24 hours for 10 days, Disp: 10 tablet, Rfl: 0    levothyroxine 25 mcg tablet, TAKE 1 TABLET (25 MCG TOTAL) BY MOUTH EVERY OTHER DAY, Disp: 45 tablet, Rfl: 3    levothyroxine 50 mcg tablet, TAKE 1 TABLET (50 MCG TOTAL) BY MOUTH EVERY OTHER DAY, Disp: 45 tablet, Rfl: 1    losartan (COZAAR) 25 mg tablet, TAKE 1 TABLET (25 MG TOTAL) BY MOUTH DAILY., Disp: 90 tablet, Rfl: 1    nortriptyline (PAMELOR) 25 mg capsule, TAKE 1 CAPSULE BY MOUTH EVERYDAY AT BEDTIME, Disp: 90 capsule, Rfl: 1    omeprazole (PriLOSEC) 40 MG capsule, TAKE 1 CAPSULE BY MOUTH 2 TIMES A DAY BEFORE  MEALS., Disp: 180 capsule, Rfl: 1    Pediatric Multiple Vitamins (Childrens Chew Multivitamin) CHEW, CHEW AND SWALLOW 1 TABLET BY MOUTH EVERY DAY, Disp: 100 tablet, Rfl: 5    pregabalin (LYRICA) 200 MG capsule, Take 1 capsule (200 mg total) by mouth 3 (three) times a day, Disp: 90 capsule, Rfl: 1    sucralfate (CARAFATE) 1 g/10 mL suspension, TAKE 10 ML (1 G TOTAL) BY MOUTH 4 TIMES A DAY, Disp: 1260 mL, Rfl: 1    traZODone (DESYREL) 50 mg tablet, TAKE 1 TABLET BY MOUTH EVERY DAY AT BEDTIME AS NEEDED, Disp: 90 tablet, Rfl: 1    varenicline (CHANTIX) 0.5 mg tablet, Take 1 tablet (0.5 mg total) by mouth 2 (two) times a day, Disp: 180 tablet, Rfl: 1    vitamin A 2400 MCG (8000 UT) capsule, TAKE 1 CAPSULE (8,000 UNITS TOTAL) BY MOUTH DAILY, Disp: 90 capsule, Rfl: 2    Vitamin A 2400 MCG (8000 UT) TABS, TAKE 1 CAPSULE (8,000 UNITS TOTAL) BY MOUTH DAILY, Disp: , Rfl:     denosumab (PROLIA) 60 mg/mL, Inject 1 mL (60 mg total) under the skin once for 1 dose, Disp: 1 mL, Rfl: 0    ergocalciferol (VITAMIN D2) 50,000 units, Take 1 capsule (50,000 Units total) by mouth once a week (Patient not taking: Reported on 1/8/2024), Disp: 13 capsule, Rfl: 2    naloxone (NARCAN) 4 mg/0.1 mL nasal spray, Administer 1 spray into a nostril. If no response after 2-3 minutes, give another dose in the other nostril using a new spray. (Patient not taking: Reported on 9/6/2023), Disp: 1 each, Rfl: 1    nicotine (NICODERM CQ) 14 mg/24hr TD 24 hr patch, Place 1 patch on the skin over 24 hours every 24 hours (Patient not taking: Reported on 3/20/2024), Disp: 28 patch, Rfl: 2    nicotine (NICODERM CQ) 7 mg/24hr TD 24 hr patch, Place 1 patch on the skin over 24 hours every 24 hours (Patient not taking: Reported on 3/20/2024), Disp: 14 patch, Rfl: 0    predniSONE 10 mg tablet, Take 3 tabs daily with breakfast for 3 days then Take 2 tabs daily for 3 Days then take one tab daily for 3 days then stop.. (Patient not taking: Reported on 4/2/2024), Disp: 20  tablet, Rfl: 0    vitamin B-12 (VITAMIN B-12) 1,000 mcg tablet, Take 1 tablet (1,000 mcg total) by mouth daily (Patient not taking: Reported on 4/25/2024), Disp: 90 tablet, Rfl: 3    Current Facility-Administered Medications:     cyanocobalamin injection 1,000 mcg, 1,000 mcg, Intramuscular, Q30 Days, Aleksandar Garduno MD, 1,000 mcg at 12/08/20 1118    cyanocobalamin injection 1,000 mcg, 1,000 mcg, Intramuscular, Q30 Days, Aleksandar Garduno MD, 1,000 mcg at 07/08/20 1036    cyanocobalamin injection 1,000 mcg, 1,000 mcg, Intramuscular, Q30 Days, Aleksandar Garduno MD, 1,000 mcg at 09/08/20 1210    cyanocobalamin injection 1,000 mcg, 1,000 mcg, Intramuscular, Q30 Days, Aleksandar Garduno MD    cyanocobalamin injection 1,000 mcg, 1,000 mcg, Intramuscular, Q30 Days, Aleksandar Garduno MD    cyanocobalamin injection 1,000 mcg, 1,000 mcg, Intramuscular, Q30 Days, Aleksandar Garduno MD, 1,000 mcg at 08/18/21 1019    cyanocobalamin injection 1,000 mcg, 1,000 mcg, Intramuscular, Q30 Days, Aleksandar Garduno MD, 1,000 mcg at 03/21/23 1117    cyanocobalamin injection 1,000 mcg, 1,000 mcg, Intramuscular, Q30 Days, Aleksandar Garduno MD, 1,000 mcg at 07/13/23 1004  Allergies   Allergen Reactions    Cephalosporins Hives    Erythromycin GI Intolerance    Fentanyl Itching     Occurred during surgery     Paxil [Paroxetine] Hives     After 2 weeks    Penicillins Itching    Pravastatin Myalgia    Statins Itching    Sulfa Antibiotics Diarrhea    Wellbutrin [Bupropion] Hives     After 2 weeks     Marzena's Wort Rash     Vitals:    06/04/24 0927   BP: 142/60   Pulse: 66   Temp: 98.4 °F (36.9 °C)   SpO2: 96%       Physical Exam  Vitals reviewed.   Constitutional:       General: She is not in acute distress.     Appearance: Normal appearance. She is not ill-appearing or toxic-appearing.   HENT:      Head: Normocephalic and atraumatic.      Nose: Nose normal.      Mouth/Throat:      Mouth: Mucous membranes are moist.   Eyes:      General: No scleral icterus.  Cardiovascular:      Rate  and Rhythm: Normal rate.   Pulmonary:      Effort: Pulmonary effort is normal.   Chest:   Breasts:     Right: Normal.      Left: Normal.      Comments: Breasts are smooth and symmetric bilaterally, with scattered palpable areas of fibroglandular tissue. There are no dominant masses, nodules, skin changes or tenderness on exam. I do not appreciate any adenopathy.    Musculoskeletal:         General: Normal range of motion.      Cervical back: Normal range of motion and neck supple.   Lymphadenopathy:      Cervical: No cervical adenopathy.      Upper Body:      Right upper body: No supraclavicular, axillary or pectoral adenopathy.      Left upper body: No supraclavicular, axillary or pectoral adenopathy.   Skin:     General: Skin is warm and dry.      Coloration: Skin is not jaundiced.      Findings: No erythema.   Neurological:      General: No focal deficit present.      Mental Status: She is alert and oriented to person, place, and time.   Psychiatric:         Mood and Affect: Mood normal.         Behavior: Behavior normal.         Thought Content: Thought content normal.         Judgment: Judgment normal.          Imaging  Mammo screening bilateral w 3d & cad    Result Date: 5/15/2024  Narrative: DIAGNOSIS: Screening mammogram for breast cancer TECHNIQUE: Digital screening mammography was performed. Computer Aided Detection (CAD) analyzed all applicable images. COMPARISONS: Prior breast imaging dated: 06/06/2022, 03/01/2017, and 04/09/2014 RELEVANT HISTORY: Family Breast Cancer History: History of breast cancer in Maternal Aunt, Paternal Aunt, Paternal Aunt. Family Medical History: Family medical history includes breast cancer in 3 relatives (maternal aunt, paternal aunt, paternal aunt). Personal History: Hormone history includes birth control. Surgical history includes breast excisional biopsy and hysterectomy. No known relevant medical history. The patient is scheduled in a reminder system for screening  mammography. 8-10% of cancers will be missed on mammography. Management of a palpable abnormality must be based on clinical grounds.  Patients will be notified of their results via letter from our facility. Accredited by American College of [employer] and FDA. RISK ASSESSMENT: 5 Year Tyrer-Cuzick: 1.97% 10 Year Tyrer-Cuzick: 4.16% Lifetime Tyrer-Cuzick: 5.09% TISSUE DENSITY: There are scattered areas of fibroglandular density. INDICATION: Areli Luciano is a 73 y.o. female presenting for screening mammography. FINDINGS: RIGHT 1) MASS [B]: There is a mass seen in the upper outer quadrant of the right breast in the anterior depth. Left There are no suspicious masses, grouped microcalcifications or areas of unexplained architectural distortion. The skin and nipple areolar complex are unremarkable.      Impression: Additional imaging required. A breast health care nurse from our facility will be contacting the patient regarding the need for additional imaging. ASSESSMENT/BI-RADS CATEGORY: Left: 1 - Negative Right: 0 - Need Additional Imaging Evaluation Overall: No FDA approved assessment is documented. RECOMMENDATION:      - Diagnostic mammogram at the current time for the right breast.      - Ultrasound at the current time for the right breast.      - Routine screening mammogram in 1 year for the left breast. Workstation ID: [id number]     I personally reviewed and interpreted the above imaging data.    Discussion/Summary: This is a 73-year-old female who presents today for consultation for a right breast mass seen on screening mammogram.  She is currently awaiting additional imaging.  I have explained that if that imaging is deemed suspicious, I would recommend she proceed with biopsy.  If her additional imaging shows likely benign findings, I explained that generally this will require imaging at 6-month intervals for a total of 2 years to establish stability.  I will reach out to the patient following her Tracy  26 imaging, and we will discuss a plan moving forward.  She is agreeable, all questions were answered today.

## 2024-06-07 ENCOUNTER — TELEPHONE (OUTPATIENT)
Dept: PAIN MEDICINE | Facility: MEDICAL CENTER | Age: 74
End: 2024-06-07

## 2024-06-07 DIAGNOSIS — G89.4 CHRONIC PAIN SYNDROME: ICD-10-CM

## 2024-06-07 RX ORDER — HYDROCODONE BITARTRATE AND ACETAMINOPHEN 10; 325 MG/1; MG/1
TABLET ORAL
Qty: 130 TABLET | Refills: 0 | Status: SHIPPED | OUTPATIENT
Start: 2024-06-07

## 2024-06-07 NOTE — TELEPHONE ENCOUNTER
Pt called regarding her script for Norco.  She stated that her script is supposed to be for 28 days and should start on a Monday.  The last script was incorrect and she would like Roxanna to please correct

## 2024-06-07 NOTE — TELEPHONE ENCOUNTER
Caller: ly Singleton    Doctor: Marian    Reason for call: pt returning nurses call    Call back#: [phone]

## 2024-06-10 ENCOUNTER — TELEPHONE (OUTPATIENT)
Dept: PAIN MEDICINE | Facility: MEDICAL CENTER | Age: 74
End: 2024-06-10

## 2024-06-10 NOTE — TELEPHONE ENCOUNTER
Patient requesting an order for  HYDROcodone-acetaminophen (NORCO)  mg per table, six (6) tablets, be sent to [place] [id number] [place] [address], as the pharmacy only filled it for #124 tablets and she is concerned that she will run out.     Thank you.

## 2024-06-11 ENCOUNTER — HOSPITAL ENCOUNTER (OUTPATIENT)
Dept: ULTRASOUND IMAGING | Facility: HOSPITAL | Age: 74
Discharge: HOME | End: 2024-06-11
Payer: COMMERCIAL

## 2024-06-11 DIAGNOSIS — N39.498 OTHER URINARY INCONTINENCE: ICD-10-CM

## 2024-06-11 PROCEDURE — 76770 US EXAM ABDO BACK WALL COMP: CPT

## 2024-06-11 NOTE — TELEPHONE ENCOUNTER
"RN s/w April the pharmacist who stated that the pt picked up the nucynta yesterday for Q 124 due to not having 130 tabs available.    Once partial script is filled the script is \"done\" and a new script for the remainder must be sent.      "

## 2024-06-12 DIAGNOSIS — G89.4 CHRONIC PAIN SYNDROME: ICD-10-CM

## 2024-06-12 RX ORDER — HYDROCODONE BITARTRATE AND ACETAMINOPHEN 10; 325 MG/1; MG/1
TABLET ORAL
Qty: 6 TABLET | Refills: 0 | Status: SHIPPED | OUTPATIENT
Start: 2024-06-12

## 2024-06-17 DIAGNOSIS — I10 ESSENTIAL HYPERTENSION: ICD-10-CM

## 2024-06-17 RX ORDER — AMLODIPINE BESYLATE 5 MG/1
5 TABLET ORAL DAILY
Qty: 90 TABLET | Refills: 1 | Status: SHIPPED | OUTPATIENT
Start: 2024-06-17

## 2024-06-18 ENCOUNTER — HOSPITAL ENCOUNTER (OUTPATIENT)
Dept: PULMONOLOGY | Facility: HOSPITAL | Age: 74
Discharge: HOME | End: 2024-06-18
Attending: INTERNAL MEDICINE
Payer: COMMERCIAL

## 2024-06-18 DIAGNOSIS — J43.9 PULMONARY EMPHYSEMA, UNSPECIFIED EMPHYSEMA TYPE (HCC): ICD-10-CM

## 2024-06-18 DIAGNOSIS — F17.200 NICOTINE DEPENDENCE WITH CURRENT USE: ICD-10-CM

## 2024-06-18 PROCEDURE — 94729 DIFFUSING CAPACITY: CPT | Performed by: INTERNAL MEDICINE

## 2024-06-18 PROCEDURE — 94729 DIFFUSING CAPACITY: CPT

## 2024-06-18 PROCEDURE — 94760 N-INVAS EAR/PLS OXIMETRY 1: CPT

## 2024-06-18 PROCEDURE — 94726 PLETHYSMOGRAPHY LUNG VOLUMES: CPT

## 2024-06-18 PROCEDURE — 94060 EVALUATION OF WHEEZING: CPT

## 2024-06-18 PROCEDURE — 94060 EVALUATION OF WHEEZING: CPT | Performed by: INTERNAL MEDICINE

## 2024-06-18 PROCEDURE — 94726 PLETHYSMOGRAPHY LUNG VOLUMES: CPT | Performed by: INTERNAL MEDICINE

## 2024-06-18 RX ORDER — ALBUTEROL SULFATE 2.5 MG/3ML
2.5 SOLUTION RESPIRATORY (INHALATION) ONCE AS NEEDED
Status: COMPLETED | OUTPATIENT
Start: 2024-06-18 | End: 2024-06-18

## 2024-06-18 RX ADMIN — ALBUTEROL SULFATE 2.5 MG: 2.5 SOLUTION RESPIRATORY (INHALATION) at 10:24

## 2024-06-20 LAB
CREAT ?TM UR-SCNC: 41.4 UMOL/L
EXT ALBUMIN URINE RANDOM: 16.3
MICROALBUMIN/CREAT UR: 39.4 MG/G{CREAT}

## 2024-06-22 DIAGNOSIS — I10 ESSENTIAL HYPERTENSION: ICD-10-CM

## 2024-06-22 RX ORDER — LOSARTAN POTASSIUM 25 MG/1
25 TABLET ORAL DAILY
Qty: 90 TABLET | Refills: 1 | Status: SHIPPED | OUTPATIENT
Start: 2024-06-22

## 2024-06-25 ENCOUNTER — TELEPHONE (OUTPATIENT)
Dept: PULMONOLOGY | Facility: CLINIC | Age: 74
End: 2024-06-25

## 2024-06-25 NOTE — TELEPHONE ENCOUNTER
Attempted to call pt to schedule f/u since pt completed her PFT. Dr. Robles is okay with her seeing an AP if he does not have availability for July.    Was unable to leave a message as pt's mailbox was full.

## 2024-06-27 ENCOUNTER — TELEPHONE (OUTPATIENT)
Age: 74
End: 2024-06-27

## 2024-06-27 NOTE — TELEPHONE ENCOUNTER
Leah from [facility] wanting to confirm patient does see pcp and was going to forward labs there were completed by the nurse that came out to patients home. Provided office fax. Nothing else needed at this time.

## 2024-07-05 ENCOUNTER — TELEPHONE (OUTPATIENT)
Age: 74
End: 2024-07-05

## 2024-07-05 NOTE — TELEPHONE ENCOUNTER
Called and left pt detailed message in regards to allergic reaction to atorvastatin per dr mullen he does want her to consult with her cardiologist and if it were up to him to stop the medication for 2 weeks then resume medication with observation of reactions.

## 2024-07-05 NOTE — TELEPHONE ENCOUNTER
Patient calling that she is getting a rash on and off when taking her atorvastatin. I advised her to talk to her Cardiologist due to him being the prescriber of this medication.

## 2024-07-08 ENCOUNTER — OFFICE VISIT (OUTPATIENT)
Dept: PAIN MEDICINE | Facility: MEDICAL CENTER | Age: 74
End: 2024-07-08
Payer: COMMERCIAL

## 2024-07-08 VITALS
WEIGHT: 176 LBS | DIASTOLIC BLOOD PRESSURE: 71 MMHG | BODY MASS INDEX: 29.32 KG/M2 | HEART RATE: 57 BPM | HEIGHT: 65 IN | SYSTOLIC BLOOD PRESSURE: 131 MMHG

## 2024-07-08 DIAGNOSIS — G89.4 CHRONIC PAIN SYNDROME: ICD-10-CM

## 2024-07-08 DIAGNOSIS — M79.18 MYOFASCIAL PAIN SYNDROME: ICD-10-CM

## 2024-07-08 DIAGNOSIS — M54.16 LUMBAR RADICULITIS: ICD-10-CM

## 2024-07-08 DIAGNOSIS — Z79.891 LONG-TERM CURRENT USE OF OPIATE ANALGESIC: ICD-10-CM

## 2024-07-08 DIAGNOSIS — M54.42 CHRONIC BILATERAL LOW BACK PAIN WITH BILATERAL SCIATICA: ICD-10-CM

## 2024-07-08 DIAGNOSIS — G89.29 CHRONIC BILATERAL LOW BACK PAIN WITH BILATERAL SCIATICA: ICD-10-CM

## 2024-07-08 DIAGNOSIS — M54.41 CHRONIC BILATERAL LOW BACK PAIN WITH BILATERAL SCIATICA: ICD-10-CM

## 2024-07-08 DIAGNOSIS — F11.20 UNCOMPLICATED OPIOID DEPENDENCE (HCC): Primary | ICD-10-CM

## 2024-07-08 PROCEDURE — 99214 OFFICE O/P EST MOD 30 MIN: CPT

## 2024-07-08 PROCEDURE — G2211 COMPLEX E/M VISIT ADD ON: HCPCS

## 2024-07-08 RX ORDER — HYDROCODONE BITARTRATE AND ACETAMINOPHEN 10; 325 MG/1; MG/1
TABLET ORAL
Qty: 130 TABLET | Refills: 0 | Status: SHIPPED | OUTPATIENT
Start: 2024-07-08

## 2024-07-08 RX ORDER — PREGABALIN 200 MG/1
200 CAPSULE ORAL 3 TIMES DAILY
Qty: 90 CAPSULE | Refills: 1 | Status: SHIPPED | OUTPATIENT
Start: 2024-07-08

## 2024-07-08 RX ORDER — BACLOFEN 10 MG/1
10 TABLET ORAL 3 TIMES DAILY
Qty: 270 TABLET | Refills: 0 | Status: SHIPPED | OUTPATIENT
Start: 2024-07-08

## 2024-07-08 NOTE — PROGRESS NOTES
Assessment:  1. Uncomplicated opioid dependence (HCC)    2. Myofascial pain syndrome    3. Chronic bilateral low back pain with bilateral sciatica    4. Chronic pain syndrome    5. Lumbar radiculitis    6. Long-term current use of opiate analgesic        Plan:  The patient remains clinically stable on hydrocodone-acetaminophen 10/325 mg 1 tablet every 4-6 hours as needed. Two 1 month supplies of this medication were sent to the patient's pharmacy today with do not fill dates of today, 7/8/2024 and 8/5/2024.  Continue pregabalin and baclofen as prescribed.  Refills of these medications were sent to patient's pharmacy today.  Follow-up in 8 weeks, or sooner if needed for medication refill and reevaluation.    There are risks associated with opioid medications, including dependence, addiction and tolerance. The patient understands and agrees to use these medications only as prescribed. Potential side effects of the medications include, but are not limited to, constipation, drowsiness, addiction, impaired judgment and risk of fatal overdose if not taken as prescribed. The patient was warned against driving while taking sedation medications.  Sharing medications is a felony. At this point in time, the patient is showing no signs of addiction, abuse, diversion or suicidal ideation.    A urine drug screen was collected at today's office visit as part of our medication management protocol. The specimen will be sent for testing. The drug screen is medically necessary because the patient is either dependent on opioid medication or is being considered for opioid medication therapy and the results could impact ongoing or future treatment. The drug screen is to evaluate for the presences or absence of prescribed, non-prescribed, and/or illicit drugs/substances.    [place] Prescription Drug Monitoring Program report was reviewed and was appropriate     This patient is being managed for a complex and serious condition that  requires ongoing, intensive medical management. The nature of this condition demands constant vigilance and a nuanced approach to treatment. The condition necessitates an in-depth and focused approach to management, including regular monitoring and potential coordination with other healthcare professionals.     Detailed Description of Visit Complexity: This visit involved an intricate evaluation and management of the patient's condition. The complexity of the visit was due to the need for a detailed assessment of the current state, consideration of potential complications, and a careful balancing of treatment options to manage the condition effectively.     Patient's Health Status and History: This patient has a significant pain history which requires regular and detailed management. The condition's impact on their life and health is substantial, necessitating a comprehensive and tailored approach to chronic ongoing care.     My impressions and treatment recommendations were discussed in detail with the patient who verbalized understanding and had no further questions.  Discharge instructions were provided. I personally saw and examined the patient and I agree with the above discussed plan of care.    Orders Placed This Encounter   Procedures    MM ALL_Prescribed Meds and Special Instructions     Order Specific Question:   [facility] Is ATORVASTATIN prescribed?     Answer:   Yes     Order Specific Question:   [facility] Is ESCITALOPRAM prescribed?     Answer:   Yes     Order Specific Question:   [facility] Is HYDROCODONE/APAP prescribed?     Answer:   Yes     Order Specific Question:   [facility] Is NORTRIPTYLINE Prescribed?     Answer:   Yes     Order Specific Question:   [facility] Is OMEPRAZOLE prescribed?     Answer:   Yes     Order Specific Question:   [facility] Is PREGABALIN Prescribed?     Answer:   Yes     Order Specific Question:   [facility] Is TRAZODONE prescribed?     Answer:   Yes    MM DT_Codeine  Definitive Test    MM DT_Desipramine Definitive Test    MM DT_Dextromethorphan Definitive Test    MM Diazepam Definitive Test    MM DT_Ethyl Glucuronide/Ethyl Sulfate Definitive Test    MM DT_Heroin Definitive Test    MM DT_Fentanyl Definitive Test    MM DT_Hydrocodone Definitive Test    MM DT_Hydromorphone Definitive Test    MM DT_Kratom Definitive Test    MM DT_Alprazolam Definitive Test    MM DT_Levorphanol Definitive Test    MM Lorazepam Definitive Test    MM DT_MDMA Definitive Test    MM DT_Meperidine Definitive Test    MM DT_Methadone Definitive Test    MM DT_Methamphetamine Definitive Test    MM DT_Methylphenidate Definitive Test    MM DT_Morphine Definitive Test    MM DT_Olanzapine Definitive Test    MM DT_Oxazepam Definitive Test    MM DT_Amphetamine Definitive Test    MM DT_Oxycodone Definitive Test    MM DT_Oxymorphone Definitive Test    MM DT_Phentermine Definitive Test    MM DT_Quetiapine Definitive Test    MM DT_Risperidone Definitive Test    MM DT_Spice Definitive Test    MM DT_Tapentadol Definitive Test    MM DT_Temazapam Definitive Test    MM DT_THC Definitive Test    MM DT_Tramadol Definitive Test    MM DT_Aripiprazole Definitive Test    MM DT_Validity Creatinine    MM DT_Validity Oxidant    MM DT_Validity pH    MM DT_Validity Specific    MM DT_Buprenorphine Definitive Test    MM DT_Butalbital Definitive Test    MM DT_Clonazepam Definitive Test    MM DT_Clozapine Definitive Test    MM DT_Cocaine Definitive Test     New Medications Ordered This Visit   Medications    HYDROcodone-acetaminophen (NORCO)  mg per tablet     Sig: Take 1 tab q 4-6 hours prn  for ongoing therapy DO NOT FILL BEFORE: 08/05/2024     Dispense:  130 tablet     Refill:  0    HYDROcodone-acetaminophen (NORCO)  mg per tablet     Sig: Take 1 tab every 4-6 hours prn, for ongoing therapy.     Dispense:  130 tablet     Refill:  0    pregabalin (LYRICA) 200 MG capsule     Sig: Take 1 capsule (200 mg total) by mouth 3 (three)  times a day     Dispense:  90 capsule     Refill:  1     .    baclofen 10 mg tablet     Sig: Take 1 tablet (10 mg total) by mouth 3 (three) times a day     Dispense:  270 tablet     Refill:  0       History of Present Illness:  Areli Luciano is a 74 y.o. female who presents for a follow up office visit for medication refill and reevaluation. Pain remains well-controlled on current regimen consisting of hydrocodone-acetaminophen, pregabalin, and baclofen. The patient tolerates these medications well without side effects and reports that they reduce her pain by about 30%. She has no complaints today other than a rash on her chest and bilateral upper extremities that is bothersome to her. She believes this is a side effect of Chantix and plans on talking to prescribing physician about this. She is aware that if her symptoms worsen, she should seek out emergent evaluation. She denies any symptoms other than skin rash at this time.    Opioid contract date 01/22/2024  Last UDS Date 03/20/2024  Norco last taken on 07/08/2024 7:00AM    I have personally reviewed and/or updated the patient's past medical history, past surgical history, family history, social history, current medications, allergies, and vital signs today.     Review of Systems   Constitutional:  Negative for fever.   HENT:  Negative for hearing loss.    Eyes:  Negative for visual disturbance.   Respiratory:  Negative for cough.    Cardiovascular:  Negative for leg swelling.   Gastrointestinal:  Positive for nausea. Negative for abdominal pain.   Endocrine: Negative for polydipsia.   Genitourinary:  Negative for difficulty urinating.   Musculoskeletal:  Positive for back pain, gait problem, neck pain and neck stiffness. Negative for myalgias.   Skin:  Negative for rash.   Neurological:  Positive for dizziness. Negative for numbness.   Hematological:  Does not bruise/bleed easily.   Psychiatric/Behavioral:  Positive for decreased concentration. Negative for  dysphoric mood and sleep disturbance.        Patient Active Problem List   Diagnosis    Chronic pain syndrome    Cervical radiculopathy    Myofascial pain syndrome    Spondylosis of cervical region without myelopathy or radiculopathy    Fibromyalgia    Long-term current use of opiate analgesic    MGUS (monoclonal gammopathy of unknown significance)    Iron deficiency anemia following bariatric surgery    Light headedness    Chronic bronchitis (HCC)    Primary osteoarthritis of right knee    Pseudogout of left knee    Lumbar radiculitis    Chronic bilateral low back pain with bilateral sciatica    Rotator cuff syndrome, left    Left shoulder pain    Dizziness    Other fatigue    Biceps tendinitis of left shoulder    Adhesive capsulitis of left shoulder    Hypoglycemia    Hypothyroidism due to Hashimoto's thyroiditis    Iron deficiency anemia, unspecified    GERD (gastroesophageal reflux disease)    Nicotine dependence with current use    Need for prophylactic vaccination against Streptococcus pneumoniae (pneumococcus)    Needs flu shot    Osteoarthritis    Postnasal drip    Encounter for screening for malignant neoplasm of lung in current smoker with 30 pack year history or greater    High coronary artery calcium score    Mass of upper outer quadrant of right breast       Past Medical History:   Diagnosis Date    Anemia     Anxiety     hx of panic attacks (now under control)     Arthritis     Asthma     resolved (no problems in a decade)     Baker's cyst of knee     Bronchitis     Bunion, left foot     Cervical pain     Chronic GERD     Chronic pain     Chronic pain disorder     Depression     Diabetes mellitus, type 2 (HCC)     Diabetic peripheral neuropathy (HCC)     Endometriosis     Fibromyalgia     Hyperlipidemia     Hypertension     Joint pain     Low back pain     Low back pain     Lung nodules     Macular degeneration     Pulmonary emphysema (HCC) 01/16/2020    Spondylosis     Spontaneous pneumothorax      Uterine cancer (HCC)        Past Surgical History:   Procedure Laterality Date    BACK SURGERY  1996    L5, S1- laser surgery fusion of c5 and c6     BREAST CYST EXCISION Right     x2 benign    CHOLECYSTECTOMY  2004    FOOT SURGERY Left 2005    fusion     FRACTURE SURGERY      GASTRIC BYPASS  2010    Dr. Oropeza     HYSTERECTOMY  1985    just uterus     KNEE ARTHROSCOPY      LAMINECTOMY      ORTHOPEDIC SURGERY      OVARIAN CYST REMOVAL  1975    PELVIC LAPAROSCOPY      ovaries (x10)     PLEURAL SCARIFICATION  1967    SHOULDER OPEN ROTATOR CUFF REPAIR Left 2008    TONSILLECTOMY      VAGINAL PROLAPSE REPAIR         Family History   Problem Relation Age of Onset    Hypertension Mother     Lung cancer Mother 53    Hypertension Father     Heart disease Father     Heart attack Father     Cancer Father 42    No Known Problems Sister     No Known Problems Sister     No Known Problems Sister     No Known Problems Maternal Grandmother     No Known Problems Maternal Grandfather     No Known Problems Paternal Grandmother     No Known Problems Paternal Grandfather     No Known Problems Daughter     No Known Problems Daughter     No Known Problems Daughter     Breast cancer Maternal Aunt 48    Breast cancer Paternal Aunt 43    Breast cancer Paternal Aunt 45    Breast cancer Cousin 40    Breast cancer Cousin 42        male       Social History     Occupational History    Not on file   Tobacco Use    Smoking status: Every Day     Current packs/day: 0.30     Average packs/day: 0.6 packs/day for 53.5 years (33.6 ttl pk-yrs)     Types: Cigarettes     Start date: 1971    Smokeless tobacco: Never   Vaping Use    Vaping status: Never Used   Substance and Sexual Activity    Alcohol use: Not Currently     Alcohol/week: 1.0 standard drink of alcohol     Types: 1 Shots of liquor per week     Comment: rarely    Drug use: No    Sexual activity: Not Currently     Partners: Male       Current Outpatient Medications on File Prior to Visit    Medication Sig    albuterol (PROVENTIL HFA,VENTOLIN HFA) 90 mcg/act inhaler Inhale 2 puffs every 6 (six) hours as needed for wheezing    albuterol (Ventolin HFA) 90 mcg/act inhaler     amLODIPine (NORVASC) 5 mg tablet TAKE 1 TABLET (5 MG TOTAL) BY MOUTH DAILY.    atorvastatin (LIPITOR) 10 mg tablet Take 1 tablet (10 mg total) by mouth daily    Calcium 600+D3 600-20 MG-MCG TABS TAKE 1 TABLET BY MOUTH TWICE A DAY WITH MEALS    cetirizine (ZyrTEC) 10 mg tablet TAKE 1/2 TABLET BY MOUTH DAILY    cholecalciferol (VITAMIN D) 400 units/1 mL injections once a week    clotrimazole-betamethasone (LOTRISONE) 1-0.05 % cream Apply topically 3 (three) times a day    Diclofenac Sodium (VOLTAREN) 1 % APPLY 2 GRAMS TO AFFECTED AREA 4 TIMES A DAY    diphenhydrAMINE-PSE-APAP (BENADRYL ALLERGY/COLD PO) Take by mouth daily at bedtime    escitalopram (LEXAPRO) 5 mg tablet TAKE 1 TABLET (5 MG TOTAL) BY MOUTH DAILY.    famotidine (PEPCID) 20 mg tablet TAKE 1 TABLET (20 MG TOTAL) BY MOUTH 2 (TWO) TIMES A DAY PLEASE STOP TAGAMET    fluticasone-umeclidinium-vilanterol (Trelegy Ellipta) 100-62.5-25 mcg/actuation inhaler Inhale 1 puff daily Rinse mouth after use.    HYDROcodone-Acetaminophen (VICODIN ES PO)     ipratropium-albuterol (DUO-NEB) 0.5-2.5 mg/3 mL nebulizer solution Take 3 mL by nebulization every 8 (eight) hours as needed for wheezing or shortness of breath    levothyroxine 25 mcg tablet TAKE 1 TABLET (25 MCG TOTAL) BY MOUTH EVERY OTHER DAY    levothyroxine 50 mcg tablet TAKE 1 TABLET (50 MCG TOTAL) BY MOUTH EVERY OTHER DAY    losartan (COZAAR) 25 mg tablet TAKE 1 TABLET (25 MG TOTAL) BY MOUTH DAILY.    nortriptyline (PAMELOR) 25 mg capsule TAKE 1 CAPSULE BY MOUTH EVERYDAY AT BEDTIME    omeprazole (PriLOSEC) 40 MG capsule TAKE 1 CAPSULE BY MOUTH 2 TIMES A DAY BEFORE MEALS.    Pediatric Multiple Vitamins (Childrens Chew Multivitamin) CHEW CHEW AND SWALLOW 1 TABLET BY MOUTH EVERY DAY    sucralfate (CARAFATE) 1 g/10 mL suspension TAKE  10 ML (1 G TOTAL) BY MOUTH 4 TIMES A DAY    traZODone (DESYREL) 50 mg tablet TAKE 1 TABLET BY MOUTH EVERY DAY AT BEDTIME AS NEEDED    vitamin A 2400 MCG (8000 UT) capsule TAKE 1 CAPSULE (8,000 UNITS TOTAL) BY MOUTH DAILY    Vitamin A 2400 MCG (8000 UT) TABS TAKE 1 CAPSULE (8,000 UNITS TOTAL) BY MOUTH DAILY    [DISCONTINUED] baclofen 10 mg tablet Take 1 tablet (10 mg total) by mouth 3 (three) times a day    [DISCONTINUED] HYDROcodone-acetaminophen (NORCO)  mg per tablet Take 1 tab q 4-6 hours prn  for ongoing therapy DO NOT FILL BEFORE: 06/10/2024    [DISCONTINUED] HYDROcodone-acetaminophen (NORCO)  mg per tablet Take 1 tab every 4-6 hours prn, for ongoing therapy.    [DISCONTINUED] pregabalin (LYRICA) 200 MG capsule Take 1 capsule (200 mg total) by mouth 3 (three) times a day    benzonatate (TESSALON PERLES) 100 mg capsule Take 1 capsule (100 mg total) by mouth 3 (three) times a day as needed for cough (Patient not taking: Reported on 7/8/2024)    Cholecalciferol (Vitamin D3) 50 MCG (2000 UT) capsule  (Patient not taking: Reported on 7/8/2024)    Cyanocobalamin (Vitamin B-12) 50 MCG LOZG     denosumab (PROLIA) 60 mg/mL Inject 1 mL (60 mg total) under the skin once for 1 dose    ergocalciferol (VITAMIN D2) 50,000 units Take 1 capsule (50,000 Units total) by mouth once a week (Patient not taking: Reported on 1/8/2024)    naloxone (NARCAN) 4 mg/0.1 mL nasal spray Administer 1 spray into a nostril. If no response after 2-3 minutes, give another dose in the other nostril using a new spray. (Patient not taking: Reported on 9/6/2023)    nicotine (NICODERM CQ) 14 mg/24hr TD 24 hr patch Place 1 patch on the skin over 24 hours every 24 hours (Patient not taking: Reported on 3/20/2024)    nicotine (NICODERM CQ) 7 mg/24hr TD 24 hr patch Place 1 patch on the skin over 24 hours every 24 hours (Patient not taking: Reported on 3/20/2024)    predniSONE 10 mg tablet Take 3 tabs daily with breakfast for 3 days then Take 2  "tabs daily for 3 Days then take one tab daily for 3 days then stop.. (Patient not taking: Reported on 4/2/2024)    varenicline (CHANTIX) 0.5 mg tablet Take 1 tablet (0.5 mg total) by mouth 2 (two) times a day (Patient not taking: Reported on 7/8/2024)    vitamin B-12 (VITAMIN B-12) 1,000 mcg tablet Take 1 tablet (1,000 mcg total) by mouth daily (Patient not taking: Reported on 4/25/2024)     Current Facility-Administered Medications on File Prior to Visit   Medication    cyanocobalamin injection 1,000 mcg    cyanocobalamin injection 1,000 mcg    cyanocobalamin injection 1,000 mcg    cyanocobalamin injection 1,000 mcg    cyanocobalamin injection 1,000 mcg    cyanocobalamin injection 1,000 mcg    cyanocobalamin injection 1,000 mcg    cyanocobalamin injection 1,000 mcg       Allergies   Allergen Reactions    Cephalosporins Hives    Erythromycin GI Intolerance    Fentanyl Itching     Occurred during surgery     Paxil [Paroxetine] Hives     After 2 weeks    Penicillins Itching    Pravastatin Myalgia    Statins Itching    Sulfa Antibiotics Diarrhea    Wellbutrin [Bupropion] Hives     After 2 weeks     Chantix [Varenicline] Rash and Other (See Comments)     Burning and itching    Marzena's Wort Rash       Physical Exam:    /71   Pulse 57   Ht 5' 5\" (1.651 m)   Wt 79.8 kg (176 lb)   LMP  (LMP Unknown)   BMI 29.29 kg/m²     Constitutional:normal, well developed, well nourished, alert, in no distress and non-toxic and no overt pain behavior.  Eyes:anicteric  HEENT:grossly intact  Neck:supple, symmetric, trachea midline and no masses   Pulmonary:even and unlabored  Cardiovascular:No edema or pitting edema present  Psychiatric:Mood and affect appropriate  Neurologic:Cranial Nerves II-XII grossly intact  Musculoskeletal:normal, ambulates with a cane    "

## 2024-07-09 DIAGNOSIS — E03.9 HYPOTHYROIDISM, UNSPECIFIED TYPE: ICD-10-CM

## 2024-07-09 RX ORDER — LEVOTHYROXINE SODIUM 0.05 MG/1
50 TABLET ORAL EVERY OTHER DAY
Qty: 50 TABLET | Refills: 1 | Status: SHIPPED | OUTPATIENT
Start: 2024-07-09

## 2024-07-10 DIAGNOSIS — K21.9 GASTROESOPHAGEAL REFLUX DISEASE WITHOUT ESOPHAGITIS: ICD-10-CM

## 2024-07-11 RX ORDER — SUCRALFATE ORAL 1 G/10ML
SUSPENSION ORAL
Qty: 3780 ML | Refills: 1 | Status: SHIPPED | OUTPATIENT
Start: 2024-07-11

## 2024-07-12 LAB
6MAM UR QL CFM: NEGATIVE NG/ML
7AMINOCLONAZEPAM UR QL CFM: NEGATIVE NG/ML
A-OH ALPRAZ UR QL CFM: NEGATIVE NG/ML
ACCEPTABLE CREAT UR QL: NORMAL MG/DL
ACCEPTIBLE SP GR UR QL: NORMAL
AMPHET UR QL CFM: NEGATIVE NG/ML
AMPHET UR QL CFM: NEGATIVE NG/ML
BUPRENORPHINE UR QL CFM: NEGATIVE NG/ML
BUTALBITAL UR QL CFM: NEGATIVE NG/ML
BZE UR QL CFM: NEGATIVE NG/ML
CODEINE UR QL CFM: NEGATIVE NG/ML
DESIPRAMINE UR QL CFM: NEGATIVE NG/ML
EDDP UR QL CFM: NEGATIVE NG/ML
ETHYL GLUCURONIDE UR QL CFM: NEGATIVE NG/ML
ETHYL SULFATE UR QL SCN: NEGATIVE NG/ML
FENTANYL UR QL CFM: NEGATIVE NG/ML
GLIADIN IGG SER IA-ACNC: NEGATIVE NG/ML
GLUCOSE 30M P 50 G LAC PO SERPL-MCNC: NEGATIVE NG/ML
HYDROCODONE UR QL CFM: NORMAL NG/ML
HYDROCODONE UR QL CFM: NORMAL NG/ML
HYDROMORPHONE UR QL CFM: NORMAL NG/ML
LORAZEPAM UR QL CFM: NEGATIVE NG/ML
MDMA UR QL CFM: NEGATIVE NG/ML
ME-PHENIDATE UR QL CFM: NEGATIVE NG/ML
MEPERIDINE UR QL CFM: NEGATIVE NG/ML
METHADONE UR QL CFM: NEGATIVE NG/ML
METHAMPHET UR QL CFM: NEGATIVE NG/ML
MORPHINE UR QL CFM: NEGATIVE NG/ML
MORPHINE UR QL CFM: NEGATIVE NG/ML
NITRITE UR QL: NORMAL UG/ML
NORBUPRENORPHINE UR QL CFM: NEGATIVE NG/ML
NORDIAZEPAM UR QL CFM: NEGATIVE NG/ML
NORFENTANYL UR QL CFM: NEGATIVE NG/ML
NORHYDROCODONE UR QL CFM: NORMAL NG/ML
NORHYDROCODONE UR QL CFM: NORMAL NG/ML
NORMEPERIDINE UR QL CFM: NEGATIVE NG/ML
NOROXYCODONE UR QL CFM: NEGATIVE NG/ML
OLANZAPINE QUANTIFICATION: NEGATIVE NG/ML
OPC-3373 QUANTIFICATION: NEGATIVE NG/ML
OXAZEPAM UR QL CFM: NEGATIVE NG/ML
OXYCODONE UR QL CFM: NEGATIVE NG/ML
OXYMORPHONE UR QL CFM: NEGATIVE NG/ML
OXYMORPHONE UR QL CFM: NEGATIVE NG/ML
PARA-FLUOROFENTANYL QUANTIFICATION: NORMAL NG/ML
RESULT ALL_PRESCRIBED MEDS AND SPECIAL INSTRUCTIONS: NORMAL
SL AMB 4-ANPP QUANTIFICATION: NORMAL NG/ML
SL AMB 5F-ADB-M7 METABOLITE QUANTIFICATION: NEGATIVE NG/ML
SL AMB 7-OH-MITRAGYNINE (KRATOM ALKALOID) QUANTIFICATION: NEGATIVE NG/ML
SL AMB AB-FUBINACA-M3 METABOLITE QUANTIFICATION: NEGATIVE NG/ML
SL AMB ACETYL FENTANYL QUANTIFICATION: NORMAL NG/ML
SL AMB ACETYL NORFENTANYL QUANTIFICATION: NORMAL NG/ML
SL AMB ACRYL FENTANYL QUANTIFICATION: NORMAL NG/ML
SL AMB CARFENTANIL QUANTIFICATION: NORMAL NG/ML
SL AMB CLOZAPINE QUANTIFICATION: NEGATIVE NG/ML
SL AMB CTHC (MARIJUANA METABOLITE) QUANTIFICATION: NEGATIVE NG/ML
SL AMB DEXTROMETHORPHAN QUANTIFICATION: NEGATIVE NG/ML
SL AMB DEXTRORPHAN (DEXTROMETHORPHAN METABOLITE) QUANT: NEGATIVE NG/ML
SL AMB DEXTRORPHAN (DEXTROMETHORPHAN METABOLITE) QUANT: NEGATIVE NG/ML
SL AMB HYDROXYRISPERIDONE QUANTIFICATION: NEGATIVE NG/ML
SL AMB JWH018 METABOLITE QUANTIFICATION: NEGATIVE NG/ML
SL AMB JWH073 METABOLITE QUANTIFICATION: NEGATIVE NG/ML
SL AMB MDMB-FUBINACA-M1 METABOLITE QUANTIFICATION: NEGATIVE NG/ML
SL AMB N-DESMETHYL-TRAMADOL QUANTIFICATION: NEGATIVE NG/ML
SL AMB N-DESMETHYLCLOZAPINE QUANTIFICATION: NEGATIVE NG/ML
SL AMB NORQUETIAPINE QUANTIFICATION: NEGATIVE NG/ML
SL AMB PHENTERMINE QUANTIFICATION: NEGATIVE NG/ML
SL AMB QUETIAPINE QUANTIFICATION: NEGATIVE NG/ML
SL AMB RCS4 METABOLITE QUANTIFICATION: NEGATIVE NG/ML
SL AMB RISPERIDONE QUANTIFICATION: NEGATIVE NG/ML
SL AMB RITALINIC ACID QUANTIFICATION: NEGATIVE NG/ML
SPECIMEN DRAWN SERPL: NEGATIVE NG/ML
SPECIMEN PH ACCEPTABLE UR: NORMAL
TAPENTADOL UR QL CFM: NEGATIVE NG/ML
TEMAZEPAM UR QL CFM: NEGATIVE NG/ML
TEMAZEPAM UR QL CFM: NEGATIVE NG/ML
TRAMADOL UR QL CFM: NEGATIVE NG/ML
URATE/CREAT 24H UR: NEGATIVE NG/ML

## 2024-07-13 DIAGNOSIS — J43.9 PULMONARY EMPHYSEMA, UNSPECIFIED EMPHYSEMA TYPE (HCC): ICD-10-CM

## 2024-07-15 RX ORDER — FLUTICASONE FUROATE, UMECLIDINIUM BROMIDE AND VILANTEROL TRIFENATATE 100; 62.5; 25 UG/1; UG/1; UG/1
1 POWDER RESPIRATORY (INHALATION) DAILY
Qty: 60 EACH | Refills: 5 | Status: SHIPPED | OUTPATIENT
Start: 2024-07-15

## 2024-07-22 ENCOUNTER — TELEPHONE (OUTPATIENT)
Dept: FAMILY MEDICINE CLINIC | Facility: CLINIC | Age: 74
End: 2024-07-22

## 2024-07-22 DIAGNOSIS — R53.83 OTHER FATIGUE: Primary | ICD-10-CM

## 2024-07-22 DIAGNOSIS — E08.44 DIABETES MELLITUS DUE TO UNDERLYING CONDITION WITH DIABETIC AMYOTROPHY, WITHOUT LONG-TERM CURRENT USE OF INSULIN (HCC): ICD-10-CM

## 2024-07-22 DIAGNOSIS — E03.8 HYPOTHYROIDISM DUE TO HASHIMOTO'S THYROIDITIS: ICD-10-CM

## 2024-07-22 DIAGNOSIS — E03.9 HYPOTHYROIDISM, UNSPECIFIED TYPE: ICD-10-CM

## 2024-07-22 DIAGNOSIS — E06.3 HYPOTHYROIDISM DUE TO HASHIMOTO'S THYROIDITIS: ICD-10-CM

## 2024-07-22 DIAGNOSIS — I10 ESSENTIAL HYPERTENSION: ICD-10-CM

## 2024-07-22 RX ORDER — LEVOTHYROXINE SODIUM 0.03 MG/1
25 TABLET ORAL EVERY OTHER DAY
Qty: 45 TABLET | Refills: 3 | Status: SHIPPED | OUTPATIENT
Start: 2024-07-22

## 2024-07-22 NOTE — TELEPHONE ENCOUNTER
Patient called again regarding rash.  She states that she stopped Chantix and atorvastatin about 2 weeks ago due to rash.  She believes that the rash was from the Chantix and is going to be restarting the atorvastatin.  She is not going to restart the Chantix.  Reports that rash is improving but not totally gone yet.

## 2024-07-22 NOTE — TELEPHONE ENCOUNTER
Pt would like to have iga for m sanjuanita and ferratin blood work done please add to pts chart so she can go states that she has been very tired and wants to check her levels.

## 2024-07-24 ENCOUNTER — TELEPHONE (OUTPATIENT)
Age: 74
End: 2024-07-24

## 2024-07-24 NOTE — TELEPHONE ENCOUNTER
Patient states she was told to stop her Chantix due to a rash she was developing. She stopped the Chantix 3 weeks ago abut is still experiencing the rash. No appts till Monday. Please advise

## 2024-08-08 ENCOUNTER — HOSPITAL ENCOUNTER (OUTPATIENT)
Dept: MAMMOGRAPHY | Facility: CLINIC | Age: 74
End: 2024-08-08
Payer: COMMERCIAL

## 2024-08-08 ENCOUNTER — TELEPHONE (OUTPATIENT)
Dept: GASTROENTEROLOGY | Facility: MEDICAL CENTER | Age: 74
End: 2024-08-08

## 2024-08-08 DIAGNOSIS — R92.8 ABNORMAL MAMMOGRAM: ICD-10-CM

## 2024-08-08 PROCEDURE — 77065 DX MAMMO INCL CAD UNI: CPT

## 2024-08-08 PROCEDURE — G0279 TOMOSYNTHESIS, MAMMO: HCPCS

## 2024-08-08 PROCEDURE — 76642 ULTRASOUND BREAST LIMITED: CPT

## 2024-08-08 NOTE — TELEPHONE ENCOUNTER
Called and spoke to patient concerning her follow up for OCT 2 which had to be rescheduled due to provider not being in office but doing procedures on this day. Patient agreed to see a PA on Dec 26 and place on a cancellation List.

## 2024-08-12 DIAGNOSIS — E08.44 DIABETES MELLITUS DUE TO UNDERLYING CONDITION WITH DIABETIC AMYOTROPHY, WITHOUT LONG-TERM CURRENT USE OF INSULIN (HCC): ICD-10-CM

## 2024-08-12 DIAGNOSIS — R53.83 OTHER FATIGUE: Primary | ICD-10-CM

## 2024-08-13 ENCOUNTER — TELEPHONE (OUTPATIENT)
Dept: FAMILY MEDICINE CLINIC | Facility: CLINIC | Age: 74
End: 2024-08-13

## 2024-08-13 NOTE — TELEPHONE ENCOUNTER
----- Message from Aleksandar Garduno MD sent at 8/10/2024  8:53 AM EDT -----  US right breast in 6 mos  ----- Message -----  From: Bradley Landon Kocher, MD  Sent: 8/8/2024   1:37 PM EDT  To: Aleksandar Garduno MD

## 2024-08-16 ENCOUNTER — TELEPHONE (OUTPATIENT)
Age: 74
End: 2024-08-16

## 2024-08-16 NOTE — TELEPHONE ENCOUNTER
Patient called to request a refill for their One Touch test strips   Patients advises she test twice a day

## 2024-08-16 NOTE — TELEPHONE ENCOUNTER
Patient called in regards to her rash coming back and would like to know if provider would like her to come in sooner than her appointment.

## 2024-08-25 DIAGNOSIS — G47.00 INSOMNIA, UNSPECIFIED TYPE: ICD-10-CM

## 2024-08-26 ENCOUNTER — OFFICE VISIT (OUTPATIENT)
Dept: FAMILY MEDICINE CLINIC | Facility: CLINIC | Age: 74
End: 2024-08-26
Payer: COMMERCIAL

## 2024-08-26 VITALS
OXYGEN SATURATION: 98 % | HEIGHT: 65 IN | HEART RATE: 64 BPM | BODY MASS INDEX: 29.96 KG/M2 | RESPIRATION RATE: 14 BRPM | DIASTOLIC BLOOD PRESSURE: 78 MMHG | TEMPERATURE: 98.2 F | WEIGHT: 179.8 LBS | SYSTOLIC BLOOD PRESSURE: 140 MMHG

## 2024-08-26 DIAGNOSIS — R21 RASH: ICD-10-CM

## 2024-08-26 DIAGNOSIS — F17.210 CIGARETTE SMOKER: ICD-10-CM

## 2024-08-26 DIAGNOSIS — J06.9 ACUTE URI: ICD-10-CM

## 2024-08-26 DIAGNOSIS — E11.8 TYPE II DIABETES MELLITUS WITH MANIFESTATIONS (HCC): ICD-10-CM

## 2024-08-26 DIAGNOSIS — E03.8 HYPOTHYROIDISM DUE TO HASHIMOTO'S THYROIDITIS: ICD-10-CM

## 2024-08-26 DIAGNOSIS — J30.9 ALLERGIC RHINITIS, UNSPECIFIED SEASONALITY, UNSPECIFIED TRIGGER: ICD-10-CM

## 2024-08-26 DIAGNOSIS — Z00.01 ENCOUNTER FOR GENERAL ADULT MEDICAL EXAMINATION WITH ABNORMAL FINDINGS: Primary | ICD-10-CM

## 2024-08-26 DIAGNOSIS — M81.8 IDIOPATHIC OSTEOPOROSIS: ICD-10-CM

## 2024-08-26 DIAGNOSIS — J44.1 ACUTE EXACERBATION OF CHRONIC OBSTRUCTIVE PULMONARY DISEASE (COPD) (HCC): ICD-10-CM

## 2024-08-26 DIAGNOSIS — R91.8 LUNG NODULES: ICD-10-CM

## 2024-08-26 DIAGNOSIS — E06.3 HYPOTHYROIDISM DUE TO HASHIMOTO'S THYROIDITIS: ICD-10-CM

## 2024-08-26 DIAGNOSIS — J45.20 MILD INTERMITTENT ASTHMA, UNSPECIFIED WHETHER COMPLICATED: ICD-10-CM

## 2024-08-26 DIAGNOSIS — J43.9 PULMONARY EMPHYSEMA, UNSPECIFIED EMPHYSEMA TYPE (HCC): ICD-10-CM

## 2024-08-26 DIAGNOSIS — E04.2 MULTIPLE THYROID NODULES: ICD-10-CM

## 2024-08-26 PROBLEM — J42 CHRONIC BRONCHITIS (HCC): Status: RESOLVED | Noted: 2020-01-16 | Resolved: 2024-08-26

## 2024-08-26 LAB — SL AMB POCT HEMOGLOBIN AIC: 5.2 (ref ?–6.5)

## 2024-08-26 PROCEDURE — 99214 OFFICE O/P EST MOD 30 MIN: CPT | Performed by: INTERNAL MEDICINE

## 2024-08-26 PROCEDURE — G0439 PPPS, SUBSEQ VISIT: HCPCS | Performed by: INTERNAL MEDICINE

## 2024-08-26 PROCEDURE — 83036 HEMOGLOBIN GLYCOSYLATED A1C: CPT | Performed by: INTERNAL MEDICINE

## 2024-08-26 RX ORDER — LEVOTHYROXINE SODIUM 25 UG/1
25 TABLET ORAL EVERY OTHER DAY
Qty: 45 TABLET | Refills: 3 | Status: SHIPPED | OUTPATIENT
Start: 2024-08-26

## 2024-08-26 RX ORDER — NICOTINE 21 MG/24HR
1 PATCH, TRANSDERMAL 24 HOURS TRANSDERMAL EVERY 24 HOURS
Qty: 28 PATCH | Refills: 0 | Status: SHIPPED | OUTPATIENT
Start: 2024-08-26

## 2024-08-26 RX ORDER — PREDNISONE 10 MG/1
TABLET ORAL
Qty: 20 TABLET | Refills: 0 | Status: SHIPPED | OUTPATIENT
Start: 2024-08-26

## 2024-08-26 RX ORDER — TRIAMCINOLONE ACETONIDE 1 MG/G
CREAM TOPICAL
COMMUNITY
Start: 2024-07-25

## 2024-08-26 RX ORDER — LEVOFLOXACIN 500 MG/1
500 TABLET, FILM COATED ORAL EVERY 24 HOURS
Qty: 10 TABLET | Refills: 0 | Status: SHIPPED | OUTPATIENT
Start: 2024-08-26 | End: 2024-09-05

## 2024-08-26 RX ORDER — CETIRIZINE HYDROCHLORIDE 10 MG/1
10 TABLET ORAL DAILY
Qty: 90 TABLET | Refills: 1 | Status: SHIPPED | OUTPATIENT
Start: 2024-08-26

## 2024-08-26 RX ORDER — FEXOFENADINE HCL 180 MG/1
180 TABLET ORAL DAILY
Qty: 90 TABLET | Refills: 3 | Status: SHIPPED | OUTPATIENT
Start: 2024-08-26

## 2024-08-26 NOTE — PATIENT INSTRUCTIONS
Medicare Preventive Visit Patient Instructions  Thank you for completing your Welcome to Medicare Visit or Medicare Annual Wellness Visit today. Your next wellness visit will be due in one year (8/27/2025).  The screening/preventive services that you may require over the next 5-10 years are detailed below. Some tests may not apply to you based off risk factors and/or age. Screening tests ordered at today's visit but not completed yet may show as past due. Also, please note that scanned in results may not display below.  Preventive Screenings:  Service Recommendations Previous Testing/Comments   Colorectal Cancer Screening  * Colonoscopy    * Fecal Occult Blood Test (FOBT)/Fecal Immunochemical Test (FIT)  * Fecal DNA/Cologuard Test  * Flexible Sigmoidoscopy Age: 45-75 years old   Colonoscopy: every 10 years (may be performed more frequently if at higher risk)  OR  FOBT/FIT: every 1 year  OR  Cologuard: every 3 years  OR  Sigmoidoscopy: every 5 years  Screening may be recommended earlier than age 45 if at higher risk for colorectal cancer. Also, an individualized decision between you and your healthcare provider will decide whether screening between the ages of 76-85 would be appropriate. Colonoscopy: 06/27/2022  FOBT/FIT: Not on file  Cologuard: Not on file  Sigmoidoscopy: Not on file    Screening Current     Breast Cancer Screening Age: 40+ years old  Frequency: every 1-2 years  Not required if history of left and right mastectomy Mammogram: 05/14/2024    Screening Current   Cervical Cancer Screening Between the ages of 21-29, pap smear recommended once every 3 years.   Between the ages of 30-65, can perform pap smear with HPV co-testing every 5 years.   Recommendations may differ for women with a history of total hysterectomy, cervical cancer, or abnormal pap smears in past. Pap Smear: Not on file    Screening Not Indicated   Hepatitis C Screening Once for adults born between 1945 and 1965  More frequently in  patients at high risk for Hepatitis C Hep C Antibody: 02/07/2019    Screening Current   Diabetes Screening 1-2 times per year if you're at risk for diabetes or have pre-diabetes Fasting glucose: 77 mg/dL (1/11/2024)  A1C: 5.2 (8/26/2024)  Screening Not Indicated  History Diabetes   Cholesterol Screening Once every 5 years if you don't have a lipid disorder. May order more often based on risk factors. Lipid panel: 05/28/2024    Screening Not Indicated  History Lipid Disorder     Other Preventive Screenings Covered by Medicare:  Abdominal Aortic Aneurysm (AAA) Screening: covered once if your at risk. You're considered to be at risk if you have a family history of AAA.  Lung Cancer Screening: covers low dose CT scan once per year if you meet all of the following conditions: (1) Age 55-77; (2) No signs or symptoms of lung cancer; (3) Current smoker or have quit smoking within the last 15 years; (4) You have a tobacco smoking history of at least 20 pack years (packs per day multiplied by number of years you smoked); (5) You get a written order from a healthcare provider.  Glaucoma Screening: covered annually if you're considered high risk: (1) You have diabetes OR (2) Family history of glaucoma OR (3)  aged 50 and older OR (4)  American aged 65 and older  Osteoporosis Screening: covered every 2 years if you meet one of the following conditions: (1) You're estrogen deficient and at risk for osteoporosis based off medical history and other findings; (2) Have a vertebral abnormality; (3) On glucocorticoid therapy for more than 3 months; (4) Have primary hyperparathyroidism; (5) On osteoporosis medications and need to assess response to drug therapy.   Last bone density test (DXA Scan): 06/06/2022.  HIV Screening: covered annually if you're between the age of 15-65. Also covered annually if you are younger than 15 and older than 65 with risk factors for HIV infection. For pregnant patients, it is  covered up to 3 times per pregnancy.    Immunizations:  Immunization Recommendations   Influenza Vaccine Annual influenza vaccination during flu season is recommended for all persons aged >= 6 months who do not have contraindications   Pneumococcal Vaccine   * Pneumococcal conjugate vaccine = PCV13 (Prevnar 13), PCV15 (Vaxneuvance), PCV20 (Prevnar 20)  * Pneumococcal polysaccharide vaccine = PPSV23 (Pneumovax) Adults 19-63 yo with certain risk factors or if 65+ yo  If never received any pneumonia vaccine: recommend Prevnar 20 (PCV20)  Give PCV20 if previously received 1 dose of PCV13 or PPSV23   Hepatitis B Vaccine 3 dose series if at intermediate or high risk (ex: diabetes, end stage renal disease, liver disease)   Respiratory syncytial virus (RSV) Vaccine - COVERED BY MEDICARE PART D  * RSVPreF3 (Arexvy) CDC recommends that adults 60 years of age and older may receive a single dose of RSV vaccine using shared clinical decision-making (SCDM)   Tetanus (Td) Vaccine - COST NOT COVERED BY MEDICARE PART B Following completion of primary series, a booster dose should be given every 10 years to maintain immunity against tetanus. Td may also be given as tetanus wound prophylaxis.   Tdap Vaccine - COST NOT COVERED BY MEDICARE PART B Recommended at least once for all adults. For pregnant patients, recommended with each pregnancy.   Shingles Vaccine (Shingrix) - COST NOT COVERED BY MEDICARE PART B  2 shot series recommended in those 19 years and older who have or will have weakened immune systems or those 50 years and older     Health Maintenance Due:      Topic Date Due   • Lung Cancer Screening  09/25/2024   • Breast Cancer Screening: Mammogram  05/14/2026   • Hepatitis C Screening  Completed   • Colorectal Cancer Screening  Discontinued     Immunizations Due:      Topic Date Due   • COVID-19 Vaccine (5 - 2023-24 season) 09/01/2023   • Influenza Vaccine (1) 09/01/2024     Advance Directives   What are advance directives?   Advance directives are legal documents that state your wishes and plans for medical care. These plans are made ahead of time in case you lose your ability to make decisions for yourself. Advance directives can apply to any medical decision, such as the treatments you want, and if you want to donate organs.   What are the types of advance directives?  There are many types of advance directives, and each state has rules about how to use them. You may choose a combination of any of the following:  Living will:  This is a written record of the treatment you want. You can also choose which treatments you do not want, which to limit, and which to stop at a certain time. This includes surgery, medicine, IV fluid, and tube feedings.   Durable power of  for healthcare (DPAHC):  This is a written record that states who you want to make healthcare choices for you when you are unable to make them for yourself. This person, called a proxy, is usually a family member or a friend. You may choose more than 1 proxy.  Do not resuscitate (DNR) order:  A DNR order is used in case your heart stops beating or you stop breathing. It is a request not to have certain forms of treatment, such as CPR. A DNR order may be included in other types of advance directives.  Medical directive:  This covers the care that you want if you are in a coma, near death, or unable to make decisions for yourself. You can list the treatments you want for each condition. Treatment may include pain medicine, surgery, blood transfusions, dialysis, IV or tube feedings, and a ventilator (breathing machine).  Values history:  This document has questions about your views, beliefs, and how you feel and think about life. This information can help others choose the care that you would choose.  Why are advance directives important?  An advance directive helps you control your care. Although spoken wishes may be used, it is better to have your wishes written down.  Spoken wishes can be misunderstood, or not followed. Treatments may be given even if you do not want them. An advance directive may make it easier for your family to make difficult choices about your care.   Cigarette Smoking and Your Health   Risks to your health if you smoke:  Nicotine and other chemicals found in tobacco damage every cell in your body. Even if you are a light smoker, you have an increased risk for cancer, heart disease, and lung disease. If you are pregnant or have diabetes, smoking increases your risk for complications.   Benefits to your health if you stop smoking:   You decrease respiratory symptoms such as coughing, wheezing, and shortness of breath.   You reduce your risk for cancers of the lung, mouth, throat, kidney, bladder, pancreas, stomach, and cervix. If you already have cancer, you increase the benefits of chemotherapy. You also reduce your risk for cancer returning or a second cancer from developing.   You reduce your risk for heart disease, blood clots, heart attack, and stroke.   You reduce your risk for lung infections, and diseases such as pneumonia, asthma, chronic bronchitis, and emphysema.  Your circulation improves. More oxygen can be delivered to your body. If you have diabetes, you lower your risk for complications, such as kidney, artery, and eye diseases. You also lower your risk for nerve damage. Nerve damage can lead to amputations, poor vision, and blindness.  You improve your body's ability to heal and to fight infections.  For more information and support to stop smoking:   [email or web address]  Phone: [phone]  Web Address: [email or web address]  Weight Management   Why it is important to manage your weight:  Being overweight increases your risk of health conditions such as heart disease, high blood pressure, type 2 diabetes, and certain types of cancer. It can also increase your risk for osteoarthritis, sleep apnea, and other respiratory problems. Aim for a slow,  steady weight loss. Even a small amount of weight loss can lower your risk of health problems.  How to lose weight safely:  A safe and healthy way to lose weight is to eat fewer calories and get regular exercise. You can lose up about 1 pound a week by decreasing the number of calories you eat by 500 calories each day.   Healthy meal plan for weight management:  A healthy meal plan includes a variety of foods, contains fewer calories, and helps you stay healthy. A healthy meal plan includes the following:  Eat whole-grain foods more often.  A healthy meal plan should contain fiber. Fiber is the part of grains, fruits, and vegetables that is not broken down by your body. Whole-grain foods are healthy and provide extra fiber in your diet. Some examples of whole-grain foods are whole-wheat breads and pastas, oatmeal, brown rice, and bulgur.  Eat a variety of vegetables every day.  Include dark, leafy greens such as spinach, kale, pat greens, and mustard greens. Eat yellow and orange vegetables such as carrots, sweet potatoes, and winter squash.   Eat a variety of fruits every day.  Choose fresh or canned fruit (canned in its own juice or light syrup) instead of juice. Fruit juice has very little or no fiber.  Eat low-fat dairy foods.  Drink fat-free (skim) milk or 1% milk. Eat fat-free yogurt and low-fat cottage cheese. Try low-fat cheeses such as mozzarella and other reduced-fat cheeses.  Choose meat and other protein foods that are low in fat.  Choose beans or other legumes such as split peas or lentils. Choose fish, skinless poultry (chicken or turkey), or lean cuts of red meat (beef or pork). Before you cook meat or poultry, cut off any visible fat.   Use less fat and oil.  Try baking foods instead of frying them. Add less fat, such as margarine, sour cream, regular salad dressing and mayonnaise to foods. Eat fewer high-fat foods. Some examples of high-fat foods include french fries, doughnuts, ice cream, and  cakes.  Eat fewer sweets.  Limit foods and drinks that are high in sugar. This includes candy, cookies, regular soda, and sweetened drinks.  Exercise:  Exercise at least 30 minutes per day on most days of the week. Some examples of exercise include walking, biking, dancing, and swimming. You can also fit in more physical activity by taking the stairs instead of the elevator or parking farther away from stores. Ask your healthcare provider about the best exercise plan for you.      © Copyright [employer] 2018 Information is for End User's use only and may not be sold, redistributed or otherwise used for commercial purposes. All illustrations and images included in CareNotes® are the copyrighted property of [employer] or [employer]      Osteoporosis Education   Osteoporosis  is a long-term medical condition that causes your bones to become weak, brittle, and more likely to fracture. Osteoporosis occurs when your body absorbs more bone than it makes. It is also caused by a lack of calcium and estrogen (female hormone).  Common symptoms include the following:  You may not have any signs or symptoms. You may break a bone after a muscle strain, bump, or fall. A break usually occurs in the hip, spine, or wrist. A collapsed vertebra (bone in your spine) may cause severe back pain or loss of height from bent posture.  Call your doctor if:    You have severe pain.   You have increasing pain after a fall.   You have pain when you do your daily activities.   You have questions or concerns about your condition or care.  Diagnosis of osteoporosis:   Blood and urine tests  measure your calcium, vitamin D, and estrogen levels.    An x-ray or CT may show thinned bones or a fracture. You may be given contrast liquid to help the bones show up better in the pictures. Tell the healthcare provider if you have ever had an allergic reaction to contrast liquid. Do not enter the MRI room with anything metal. Metal can cause  serious injury. Tell the healthcare provider if you have any metal in or on your body.    A bone density test  compares your bone thickness with what is expected for someone of your age, gender, and ethnicity.  Treatment for osteoporosis may include medicines to prevent bone loss, build new bone, and increase estrogen. These medicines help prevent fractures and may be given as a pill or injection. Ask your healthcare provider for more information on these medicines.  Prevent bone loss:  Eat healthy foods that are high in calcium.  This helps keep your bones strong. Good sources of calcium are milk, cheese, broccoli, tofu, almonds, and canned salmon and sardines. Recommended to get at least 1200mg daily of calcium.  Increase your vitamin D intake.  Vitamin D is in fish oils, some vegetables, and fortified milk, cereal, and bread. Vitamin D is also formed in the skin when it is exposed to the sun. Ask your healthcare provider how much sunlight is safe for you. You will require at least 800 units of vitamin D daily taken as a supplement.  Drink liquids as directed.  Ask your healthcare provider how much liquid to drink each day and which liquids are best for you. Do not have alcohol or caffeine. They decrease bone mineral density, which can weaken your bones.  Exercise regularly.  Ask your healthcare provider about the best exercise plan for you. Weight bearing exercise for 30 minutes, 3 times a week can help build and strengthen bone.  Do not smoke.  Nicotine and other chemicals in cigarettes and cigars can cause lung damage. Ask your healthcare provider for information if you currently smoke and need help to quit. E-cigarettes or smokeless tobacco still contain nicotine. Talk to your healthcare provider before you use these products.  Go to physical therapy as directed.  A physical therapist teaches you exercises to help improve movement and muscle strength.  Alcohol. It is recommended to avoid heavy alcohol use as  "increased consumption of alcohol is known to cause bone loss  © Copyright [employer] 2021 Information is for End User's use only and may not be sold, redistributed or otherwise used for commercial purposes. All illustrations and images included in CareNotes® are the copyrighted property of [employer] or [employer]    Calcium and vitamin D for bone health (Beyond the Basics)    CALCIUM AND VITAMIN D OVERVIEW  Osteoporosis is a common bone disorder that causes a progressive loss in bone density and mass. As a result, bones become thin, weakened, and easily fractured. It is estimated that more than 1.3 million osteoporosis-associated (or \"osteoporotic\") fractures occur every year in the [place], primarily of bone within the spine (the vertebrae), the hip, and the forearm near the wrist. =    A number of treatments can help to prevent loss of bone and treat low bone mass. However, the first step in preventing or treating osteoporosis is to consume foods and drinks that provide calcium, a mineral essential for bone strength, and vitamin D, which aids in calcium breakdown and absorption. =    CALCIUM AND VITAMIN D BENEFITS  Good nutrition is important at all ages to keep the bones healthy.    Taking calcium reduces bone loss and decreases the risk of fracturing the vertebrae (the bones that surround the spinal cord).  Consuming calcium during childhood (eg, in milk) can lead to higher bone mass in adulthood. This increase in bone density can reduce the risk of fractures later in life.  Calcium may also have benefits in other body systems by reducing blood pressure and cholesterol levels.  Calcium and vitamin D supplements may help prevent tooth loss in older adults.    RECOMMENDATIONS FOR CALCIUM    General recommendations -- Premenopausal women and men should consume at least 1000 mg of calcium, while postmenopausal women should consume 1200 mg (total diet plus supplement). You should not consume " more than 2000 mg of calcium per day (total diet plus supplement) due to the risk of side effects.    Calcium in the diet -- The primary sources of calcium in the diet include milk and other dairy products, such as hard cheese, cottage cheese, or yogurt, as well as green vegetables, such as kale and broccoli (table 1). Some cereals, soy products, and fruit juices are fortified with calcium.    Calcium supplements -- The body is able to absorb calcium contained in supplements as well as from dietary sources. If it is not possible to get enough calcium from dietary sources, consult a health care provider to determine the best type, dose, and timing of calcium supplements.     Calcium carbonate is effective and is the least expensive form of calcium. It is best absorbed with a low-iron meal (such as breakfast). Calcium carbonate may not be absorbed well in people who also take a specific medication for gastroesophageal reflux (called a proton pump inhibitor or H2 blocker), which blocks stomach acid.  Many natural calcium carbonate preparations such as oyster shells or bone meal contain some lead.  Calcium citrate is well absorbed in the fasting state as well as with a meal.  Calcium doses above 500 mg are not absorbed as well as smaller doses, so large doses of supplements should be taken in divided doses (eg, in the morning and evening).  Calcium supplements do not replace other osteoporosis treatments such as hormone replacement, bisphosphonates (eg, risedronate [sample brand name: Actonel] and alendronate [brand name: Fosamax]), and raloxifene (brand name: Evista).    Calcium and vitamin D supplements alone are usually insufficient to prevent age-related bone loss, although they may be beneficial in some subgroups (older adults, those with very low intake). In most patients with or at risk for osteoporosis, the addition of medication or hormonal therapy is necessary in order to slow the breakdown and removal of bone  (ie, resorption).     Underlying gastrointestinal diseases -- Patients who do not adequately absorb nutrients from the gastrointestinal tract (due to malabsorption) may require more than 1000 to 1200 mg of calcium per day. In such cases, a health care provider can help to determine the optimal dose of calcium.    Medications -- All medications should be discussed with a health care provider to ensure that possible interactions with calcium are identified. Certain medications change the amount of calcium that is absorbed and/or excreted. As an example, loop diuretics (eg, furosemide [sample brand name: Lasix]) increase the amount of calcium excreted in the urine.    On the other hand, thiazide diuretics (eg, hydrochlorothiazide) can reduce levels of calcium in the urine, potentially reducing the risk of bone loss and kidney stones.    Side effects of calcium -- Calcium is usually easily tolerated when it is taken in divided doses several times per day. Some people experience side effects related to calcium, including constipation and indigestion. Calcium supplements interfere with the absorption of iron and thyroid hormone, and therefore, these medications should be taken at different times.    Kidney stones -- There is no evidence that consuming large amounts of calcium (from foods and drinks) increases the risk of kidney stones, or that avoiding dietary calcium decreases the risk. In fact, avoiding dairy products is likely to increase the risk of kidney stones.    However, use of calcium supplements may increase the risk of kidney stones in susceptible individuals by raising the level of calcium in the urine. This is particularly true if the supplement is taken between meals or at bedtime, and this is one of the reasons we prefer for patients to get as much of their calcium requirement through dietary means.     IMPORTANCE OF VITAMIN D  Vitamin D decreases bone loss and lowers the risk of fracture, especially in  older men and women. Along with calcium, vitamin D also helps to prevent and treat osteoporosis. To absorb calcium efficiently, an adequate amount of vitamin D must be present.    Vitamin D is normally made in the skin after exposure to sunlight.    Recommendations for vitamin D -- The current recommendation is that men over 70 years and postmenopausal women consume at least 800 international units (20 micrograms) of vitamin D per day. Lower levels of vitamin D are not as effective, while high doses can be toxic, especially if taken for long periods of time. Although the optimal intake has not been clearly established in premenopausal women or in younger men with osteoporosis, 600 international units (15 micrograms) of vitamin D daily is generally suggested.    Vitamin D is available as an individual supplement and is included in most multivitamins and some calcium supplements (table 2). Milk is a relatively good dietary source of vitamin D, with approximately 100 international units (2.5 micrograms) per cup (240 mL), and salmon has 800 to 1000 international units (20 to 25 micrograms) of vitamin D per serving.    Most healthy people don't need to check with their health care provider before taking standard doses of vitamin D and don't need monitoring of vitamin D levels if they are not being treated for vitamin D deficiency. However, recommendations for vitamin D supplementation may be different in people with certain underlying medical conditions, such as sarcoidosis or lymphoma. In these situations, a provider will determine if supplements are needed; if so, the person's vitamin D and calcium levels should be monitored with regular tests.    ©2021 [employer] All rights reserved.

## 2024-08-26 NOTE — PROGRESS NOTES
Ambulatory Visit  Name: Areli Luciano      : 1950      MRN: [id number]  Encounter Provider: Aleksandar Garduno MD  Encounter Date: 2024   Encounter department: [facility]    Assessment & Plan   1. Encounter for general adult medical examination with abnormal findings  Comments:  done in Detail  Advised to quit smoking  RTC in 3 mos w Blood work  2. Rash  -     fexofenadine (ALLEGRA) 180 MG tablet; Take 1 tablet (180 mg total) by mouth daily In the morning  -     Ambulatory Referral to Dermatology; Future  3. Hypothyroidism due to Hashimoto's thyroiditis  4. Cigarette smoker  -     CT lung screening program; Future; Expected date: 2024  -     predniSONE 10 mg tablet; Take 3 tabs daily with breakfast for 3 days then Take 2 tabs daily for 3 Days then take one tab daily for 3 days then stop..  -     nicotine (NICODERM CQ) 21 mg/24 hr TD 24 hr patch; Place 1 patch on the skin over 24 hours every 24 hours  -     nicotine (NICODERM CQ) 14 mg/24hr TD 24 hr patch; Place 1 patch on the skin over 24 hours every 24 hours  -     nicotine (NICODERM CQ) 7 mg/24hr TD 24 hr patch; Place 1 patch on the skin over 24 hours every 24 hours  5. Lung nodules  6. Multiple thyroid nodules  -     TSH, 3rd generation; Future; Expected date: 2024  -     T4, free; Future; Expected date: 2024  -     Thyroid Antibodies Panel; Future  -     US thyroid; Future; Expected date: 2024  7. Mild intermittent asthma, unspecified whether complicated  8. Allergic rhinitis, unspecified seasonality, unspecified trigger  -     cetirizine (ZyrTEC) 10 mg tablet; Take 1 tablet (10 mg total) by mouth daily  9. Cigarette smoker  Comments:  advised to quit smoking  Yearly Ct Lungs  Orders:  -     CT lung screening program; Future; Expected date: 2024  -     predniSONE 10 mg tablet; Take 3 tabs daily with breakfast for 3 days then Take 2 tabs daily for 3 Days then take one tab daily for 3 days  then stop..  -     nicotine (NICODERM CQ) 21 mg/24 hr TD 24 hr patch; Place 1 patch on the skin over 24 hours every 24 hours  -     nicotine (NICODERM CQ) 14 mg/24hr TD 24 hr patch; Place 1 patch on the skin over 24 hours every 24 hours  -     nicotine (NICODERM CQ) 7 mg/24hr TD 24 hr patch; Place 1 patch on the skin over 24 hours every 24 hours  10. Pulmonary emphysema, unspecified emphysema type (HCC)  -     predniSONE 10 mg tablet; Take 3 tabs daily with breakfast for 3 days then Take 2 tabs daily for 3 Days then take one tab daily for 3 days then stop..  11. Acute exacerbation of chronic obstructive pulmonary disease (COPD) (HCC)  -     predniSONE 10 mg tablet; Take 3 tabs daily with breakfast for 3 days then Take 2 tabs daily for 3 Days then take one tab daily for 3 days then stop..  12. BMI 29.0-29.9,adult  -     levothyroxine 25 mcg tablet; Take 1 tablet (25 mcg total) by mouth every other day  13. Idiopathic osteoporosis  -     DXA bone density spine hip and pelvis; Future; Expected date: 08/26/2024  14. Type II diabetes mellitus with manifestations ([facility])  -     POCT hemoglobin A1c  -     Comprehensive metabolic panel; Future; Expected date: 08/26/2024  -     CBC and differential; Future; Expected date: 08/26/2024  -     Lipid Panel with Direct LDL reflex; Future; Expected date: 08/26/2024  -     TSH, 3rd generation with Free T4 reflex; Future; Expected date: 08/26/2024  -     UA (URINE) with reflex to Scope; Future  -     Magnesium; Future  -     glucose blood test strip; Use as instructed  -     Lancets  15. Acute URI  -     levofloxacin (LEVAQUIN) 500 mg tablet; Take 1 tablet (500 mg total) by mouth every 24 hours for 10 days  AWV ; done in Detail  RTC in 3 mos w Blood work       History of Present Illness     74 Y O Lady is here for AWV and Regular check Up, she still smokes, she stopped Chantix, she has few symptoms, med list reviewed...        Review of Systems   Constitutional:  Positive for fatigue.  "Negative for chills and fever.   HENT:  Positive for postnasal drip, sinus pressure and sore throat. Negative for congestion, facial swelling, trouble swallowing and voice change.    Eyes:  Negative for pain, discharge and visual disturbance.   Respiratory:  Positive for cough. Negative for shortness of breath and wheezing.    Cardiovascular:  Negative for chest pain, palpitations and leg swelling.   Gastrointestinal:  Negative for abdominal pain, blood in stool, constipation, diarrhea and nausea.   Endocrine: Negative for polydipsia, polyphagia and polyuria.   Genitourinary:  Negative for difficulty urinating, hematuria and urgency.   Musculoskeletal:  Positive for arthralgias. Negative for myalgias.   Skin:  Positive for rash.   Neurological:  Negative for dizziness, tremors, weakness and headaches.   Hematological:  Negative for adenopathy. Does not bruise/bleed easily.   Psychiatric/Behavioral:  Negative for dysphoric mood, sleep disturbance and suicidal ideas.        Objective     /78 (BP Location: Left arm, Patient Position: Sitting, Cuff Size: Standard)   Pulse 64   Temp 98.2 °F (36.8 °C) (Tympanic)   Resp 14   Ht 5' 5\" (1.651 m)   Wt 81.6 kg (179 lb 12.8 oz)   LMP  (LMP Unknown)   SpO2 98%   BMI 29.92 kg/m²     Physical Exam  Vitals and nursing note reviewed.   Constitutional:       General: She is not in acute distress.     Appearance: She is well-developed. She is not diaphoretic.   HENT:      Head: Normocephalic and atraumatic.      Right Ear: External ear normal.      Left Ear: External ear normal.      Nose: Nose normal.   Eyes:      General:         Right eye: No discharge.         Left eye: No discharge.      Extraocular Movements: EOM normal.      Conjunctiva/sclera: Conjunctivae normal.      Pupils: Pupils are equal, round, and reactive to light.   Neck:      Thyroid: No thyromegaly.      Trachea: No tracheal deviation.   Cardiovascular:      Rate and Rhythm: Normal rate and regular " rhythm.      Heart sounds: Murmur heard.      No friction rub.   Pulmonary:      Effort: Pulmonary effort is normal. No respiratory distress.      Breath sounds: Normal breath sounds. No stridor. No wheezing or rales.   Abdominal:      General: Bowel sounds are normal. There is no distension.      Palpations: Abdomen is soft.      Tenderness: There is no abdominal tenderness. There is no guarding.   Musculoskeletal:         General: Tenderness present. No swelling, deformity or edema. Normal range of motion.      Cervical back: Normal range of motion and neck supple.   Lymphadenopathy:      Cervical: No cervical adenopathy.   Skin:     General: Skin is warm and dry.      Capillary Refill: Capillary refill takes less than 2 seconds.      Coloration: Skin is not pale.      Findings: Lesion and rash present. No erythema.   Neurological:      Mental Status: She is alert and oriented to person, place, and time.      Cranial Nerves: No cranial nerve deficit.      Coordination: Coordination normal.      Comments: Pt uses a Cane to support gait   Psychiatric:         Mood and Affect: Mood and affect and mood normal.         Behavior: Behavior normal.       Administrative Statements

## 2024-08-26 NOTE — PROGRESS NOTES
Ambulatory Visit  Name: Areli Luciano      : 1950      MRN: [id number]  Encounter Provider: Aleksandar Garduno MD  Encounter Date: 2024   Encounter department: [facility]    Assessment & Plan        Preventive health issues were discussed with patient, and age appropriate screening tests were ordered as noted in patient's After Visit Summary. Personalized health advice and appropriate referrals for health education or preventive services given if needed, as noted in patient's After Visit Summary.    History of Present Illness     HPI   Patient Care Team:  Aleksandar Garduno MD as PCP - General (Internal Medicine)  Aleksandar Garduno MD as PCP - PCP-[facility]  Robert DO Tyler Norton DO (Pain Medicine)  Carlos Posada MD    Review of Systems  Medical History Reviewed by provider this encounter:       Annual Wellness Visit Questionnaire   Areli is here for her Subsequent Wellness visit.     Health Risk Assessment:   Patient rates overall health as good. Patient feels that their physical health rating is same. Patient is satisfied with their life. Eyesight was rated as same. Hearing was rated as same. Patient feels that their emotional and mental health rating is same. Patients states they are never, rarely angry. Patient states they are never, rarely unusually tired/fatigued. Pain experienced in the last 7 days has been none. Patient states that she has experienced no weight loss or gain in last 6 months.     Depression Screening:   PHQ-2 Score: 0      Fall Risk Screening:   In the past year, patient has experienced: no history of falling in past year      Urinary Incontinence Screening:   Patient has not leaked urine accidently in the last six months.     Home Safety:  Patient does not have trouble with stairs inside or outside of their home. Patient has working smoke alarms and has working carbon monoxide detector. Home safety hazards include:  none.     Nutrition:   Current diet is Regular.     Medications:   Patient is not currently taking any over-the-counter supplements. Patient is able to manage medications.     Activities of Daily Living (ADLs)/Instrumental Activities of Daily Living (IADLs):   Walk and transfer into and out of bed and chair?: Yes  Dress and groom yourself?: Yes    Bathe or shower yourself?: Yes    Feed yourself? Yes  Do your laundry/housekeeping?: Yes  Manage your money, pay your bills and track your expenses?: Yes  Make your own meals?: Yes    Do your own shopping?: Yes    Previous Hospitalizations:   Any hospitalizations or ED visits within the last 12 months?: No      Advance Care Planning:   Living will: Yes    Durable POA for healthcare: Yes    Advanced directive: Yes    Advanced directive counseling given: Yes    ACP document given: Yes      PREVENTIVE SCREENINGS      Cardiovascular Screening:    General: Screening Not Indicated, History Lipid Disorder and Risks and Benefits Discussed      Diabetes Screening:     General: Screening Not Indicated, History Diabetes, Risks and Benefits Discussed and Screening Current      Colorectal Cancer Screening:     General: Screening Current and Risks and Benefits Discussed      Breast Cancer Screening:     General: Screening Current and Risks and Benefits Discussed      Cervical Cancer Screening:    General: Screening Not Indicated and Risks and Benefits Discussed      Osteoporosis Screening:    General: Screening Not Indicated, History Osteoporosis and Risks and Benefits Discussed      Abdominal Aortic Aneurysm (AAA) Screening:        General: Risks and Benefits Discussed      Lung Cancer Screening:     General: Screening Current and Risks and Benefits Discussed      Hepatitis C Screening:    General: Screening Current and Risks and Benefits Discussed    Screening, Brief Intervention, and Referral to Treatment (SBIRT)    Screening      Single Item Drug Screening:  How often have you used  an illegal drug (including marijuana) or a prescription medication for non-medical reasons in the past year? never    Single Item Drug Screen Score: 0  Interpretation: Negative screen for possible drug use disorder    Other Counseling Topics:   Car/seat belt/driving safety, skin self-exam, sunscreen and calcium and vitamin D intake and regular weightbearing exercise.     Social Determinants of Health     Financial Resource Strain: Low Risk  (6/9/2023)    Overall Financial Resource Strain (CARDIA)     Difficulty of Paying Living Expenses: Not hard at all   Food Insecurity: No Food Insecurity (4/26/2021)    Hunger Vital Sign     Worried About Running Out of Food in the Last Year: Never true     Ran Out of Food in the Last Year: Never true   Transportation Needs: No Transportation Needs (6/9/2023)    PRAPARE - Transportation     Lack of Transportation (Medical): No     Lack of Transportation (Non-Medical): No    Received from [employer]     No results found.    Objective     LMP  (LMP Unknown)     Physical Exam

## 2024-08-27 RX ORDER — TRAZODONE HYDROCHLORIDE 50 MG/1
TABLET, FILM COATED ORAL
Qty: 90 TABLET | Refills: 1 | Status: SHIPPED | OUTPATIENT
Start: 2024-08-27

## 2024-08-27 RX ORDER — BLOOD SUGAR DIAGNOSTIC
STRIP MISCELLANEOUS
Qty: 100 STRIP | Refills: 6 | Status: SHIPPED | OUTPATIENT
Start: 2024-08-27

## 2024-08-28 ENCOUNTER — APPOINTMENT (OUTPATIENT)
Dept: LAB | Facility: HOSPITAL | Age: 74
End: 2024-08-28
Payer: COMMERCIAL

## 2024-08-28 DIAGNOSIS — E11.8 TYPE II DIABETES MELLITUS WITH MANIFESTATIONS (HCC): ICD-10-CM

## 2024-08-28 DIAGNOSIS — R53.83 OTHER FATIGUE: ICD-10-CM

## 2024-08-28 DIAGNOSIS — E04.2 MULTIPLE THYROID NODULES: ICD-10-CM

## 2024-08-28 DIAGNOSIS — E08.44 DIABETES MELLITUS DUE TO UNDERLYING CONDITION WITH DIABETIC AMYOTROPHY, WITHOUT LONG-TERM CURRENT USE OF INSULIN (HCC): ICD-10-CM

## 2024-08-28 LAB
ALBUMIN SERPL BCG-MCNC: 3.7 G/DL (ref 3.5–5)
ALP SERPL-CCNC: 98 U/L (ref 34–104)
ALT SERPL W P-5'-P-CCNC: 17 U/L (ref 7–52)
ANION GAP SERPL CALCULATED.3IONS-SCNC: 8 MMOL/L (ref 4–13)
AST SERPL W P-5'-P-CCNC: 25 U/L (ref 13–39)
BASOPHILS # BLD AUTO: 0.02 THOUSANDS/ÂΜL (ref 0–0.1)
BASOPHILS NFR BLD AUTO: 1 % (ref 0–1)
BILIRUB SERPL-MCNC: 0.43 MG/DL (ref 0.2–1)
BUN SERPL-MCNC: 6 MG/DL (ref 5–25)
CALCIUM SERPL-MCNC: 9.5 MG/DL (ref 8.4–10.2)
CHLORIDE SERPL-SCNC: 100 MMOL/L (ref 96–108)
CHOLEST SERPL-MCNC: 169 MG/DL
CO2 SERPL-SCNC: 30 MMOL/L (ref 21–32)
CREAT SERPL-MCNC: 0.62 MG/DL (ref 0.6–1.3)
EOSINOPHIL # BLD AUTO: 0.06 THOUSAND/ÂΜL (ref 0–0.61)
EOSINOPHIL NFR BLD AUTO: 1 % (ref 0–6)
ERYTHROCYTE [DISTWIDTH] IN BLOOD BY AUTOMATED COUNT: 13.6 % (ref 11.6–15.1)
FERRITIN SERPL-MCNC: 22 NG/ML (ref 11–307)
GFR SERPL CREATININE-BSD FRML MDRD: 89 ML/MIN/1.73SQ M
GLUCOSE P FAST SERPL-MCNC: 69 MG/DL (ref 65–99)
HCT VFR BLD AUTO: 36 % (ref 34.8–46.1)
HDLC SERPL-MCNC: 70 MG/DL
HGB BLD-MCNC: 12.1 G/DL (ref 11.5–15.4)
IGA SERPL-MCNC: 331 MG/DL (ref 66–433)
IMM GRANULOCYTES # BLD AUTO: 0.01 THOUSAND/UL (ref 0–0.2)
IMM GRANULOCYTES NFR BLD AUTO: 0 % (ref 0–2)
IRON SATN MFR SERPL: 25 % (ref 15–50)
IRON SERPL-MCNC: 79 UG/DL (ref 50–212)
LDLC SERPL CALC-MCNC: 82 MG/DL (ref 0–100)
LYMPHOCYTES # BLD AUTO: 1.57 THOUSANDS/ÂΜL (ref 0.6–4.47)
LYMPHOCYTES NFR BLD AUTO: 38 % (ref 14–44)
MAGNESIUM SERPL-MCNC: 1.7 MG/DL (ref 1.9–2.7)
MCH RBC QN AUTO: 32.9 PG (ref 26.8–34.3)
MCHC RBC AUTO-ENTMCNC: 33.6 G/DL (ref 31.4–37.4)
MCV RBC AUTO: 98 FL (ref 82–98)
MONOCYTES # BLD AUTO: 0.39 THOUSAND/ÂΜL (ref 0.17–1.22)
MONOCYTES NFR BLD AUTO: 9 % (ref 4–12)
NEUTROPHILS # BLD AUTO: 2.1 THOUSANDS/ÂΜL (ref 1.85–7.62)
NEUTS SEG NFR BLD AUTO: 51 % (ref 43–75)
NRBC BLD AUTO-RTO: 0 /100 WBCS
PLATELET # BLD AUTO: 200 THOUSANDS/UL (ref 149–390)
PMV BLD AUTO: 10.2 FL (ref 8.9–12.7)
POTASSIUM SERPL-SCNC: 3.2 MMOL/L (ref 3.5–5.3)
PROT SERPL-MCNC: 6.4 G/DL (ref 6.4–8.4)
RBC # BLD AUTO: 3.68 MILLION/UL (ref 3.81–5.12)
SODIUM SERPL-SCNC: 138 MMOL/L (ref 135–147)
T4 FREE SERPL-MCNC: 0.83 NG/DL (ref 0.61–1.12)
TIBC SERPL-MCNC: 313 UG/DL (ref 250–450)
TRIGL SERPL-MCNC: 87 MG/DL
TSH SERPL DL<=0.05 MIU/L-ACNC: 1.91 UIU/ML (ref 0.45–4.5)
UIBC SERPL-MCNC: 234 UG/DL (ref 155–355)
WBC # BLD AUTO: 4.15 THOUSAND/UL (ref 4.31–10.16)

## 2024-08-28 PROCEDURE — 83540 ASSAY OF IRON: CPT

## 2024-08-28 PROCEDURE — 83735 ASSAY OF MAGNESIUM: CPT

## 2024-08-28 PROCEDURE — 36415 COLL VENOUS BLD VENIPUNCTURE: CPT

## 2024-08-28 PROCEDURE — 82728 ASSAY OF FERRITIN: CPT

## 2024-08-28 PROCEDURE — 86800 THYROGLOBULIN ANTIBODY: CPT

## 2024-08-28 PROCEDURE — 84443 ASSAY THYROID STIM HORMONE: CPT

## 2024-08-28 PROCEDURE — 84439 ASSAY OF FREE THYROXINE: CPT

## 2024-08-28 PROCEDURE — 86376 MICROSOMAL ANTIBODY EACH: CPT

## 2024-08-28 PROCEDURE — 80061 LIPID PANEL: CPT

## 2024-08-28 PROCEDURE — 85025 COMPLETE CBC W/AUTO DIFF WBC: CPT

## 2024-08-28 PROCEDURE — 80053 COMPREHEN METABOLIC PANEL: CPT

## 2024-08-28 PROCEDURE — 83550 IRON BINDING TEST: CPT

## 2024-08-28 PROCEDURE — 82784 ASSAY IGA/IGD/IGG/IGM EACH: CPT

## 2024-08-29 ENCOUNTER — TELEPHONE (OUTPATIENT)
Dept: FAMILY MEDICINE CLINIC | Facility: CLINIC | Age: 74
End: 2024-08-29

## 2024-08-29 DIAGNOSIS — E87.6 HYPOKALEMIA: Primary | ICD-10-CM

## 2024-08-29 DIAGNOSIS — E83.42 HYPOMAGNESEMIA: ICD-10-CM

## 2024-08-29 LAB
THYROGLOB AB SERPL-ACNC: <1 IU/ML (ref 0–0.9)
THYROPEROXIDASE AB SERPL-ACNC: 10 IU/ML (ref 0–34)

## 2024-08-29 RX ORDER — LANOLIN ALCOHOL/MO/W.PET/CERES
400 CREAM (GRAM) TOPICAL DAILY
Qty: 90 TABLET | Refills: 0 | Status: SHIPPED | OUTPATIENT
Start: 2024-08-29

## 2024-08-29 RX ORDER — POTASSIUM CHLORIDE 750 MG/1
10 CAPSULE, EXTENDED RELEASE ORAL DAILY
Qty: 30 CAPSULE | Refills: 1 | Status: SHIPPED | OUTPATIENT
Start: 2024-08-29

## 2024-08-29 NOTE — TELEPHONE ENCOUNTER
Called and spoke with pt she is aware of levels and supplements that she needs to start taking for the next two months.

## 2024-08-29 NOTE — TELEPHONE ENCOUNTER
----- Message from Aleksandar Garduno MD sent at 8/29/2024  7:35 AM EDT -----  Kcl and mag Supplements for 2 mos  ----- Message -----  From: Lab, Background User  Sent: 8/28/2024   9:58 AM EDT  To: Aleksandar Garduno MD   Yes

## 2024-08-30 ENCOUNTER — OFFICE VISIT (OUTPATIENT)
Dept: PAIN MEDICINE | Facility: MEDICAL CENTER | Age: 74
End: 2024-08-30
Payer: COMMERCIAL

## 2024-08-30 VITALS
SYSTOLIC BLOOD PRESSURE: 120 MMHG | DIASTOLIC BLOOD PRESSURE: 58 MMHG | HEIGHT: 65 IN | HEART RATE: 63 BPM | BODY MASS INDEX: 29.82 KG/M2 | WEIGHT: 179 LBS

## 2024-08-30 DIAGNOSIS — M54.42 CHRONIC BILATERAL LOW BACK PAIN WITH BILATERAL SCIATICA: ICD-10-CM

## 2024-08-30 DIAGNOSIS — M79.18 MYOFASCIAL PAIN SYNDROME: ICD-10-CM

## 2024-08-30 DIAGNOSIS — G89.29 CHRONIC BILATERAL LOW BACK PAIN WITH BILATERAL SCIATICA: ICD-10-CM

## 2024-08-30 DIAGNOSIS — F11.20 UNCOMPLICATED OPIOID DEPENDENCE (HCC): ICD-10-CM

## 2024-08-30 DIAGNOSIS — M54.16 LUMBAR RADICULITIS: ICD-10-CM

## 2024-08-30 DIAGNOSIS — G89.4 CHRONIC PAIN SYNDROME: Primary | ICD-10-CM

## 2024-08-30 DIAGNOSIS — M54.41 CHRONIC BILATERAL LOW BACK PAIN WITH BILATERAL SCIATICA: ICD-10-CM

## 2024-08-30 DIAGNOSIS — Z79.891 LONG-TERM CURRENT USE OF OPIATE ANALGESIC: ICD-10-CM

## 2024-08-30 PROCEDURE — G2211 COMPLEX E/M VISIT ADD ON: HCPCS

## 2024-08-30 PROCEDURE — 99214 OFFICE O/P EST MOD 30 MIN: CPT

## 2024-08-30 RX ORDER — PREGABALIN 200 MG/1
200 CAPSULE ORAL 3 TIMES DAILY
Qty: 90 CAPSULE | Refills: 1 | Status: SHIPPED | OUTPATIENT
Start: 2024-08-30

## 2024-08-30 RX ORDER — HYDROCODONE BITARTRATE AND ACETAMINOPHEN 10; 325 MG/1; MG/1
1 TABLET ORAL EVERY 4 HOURS PRN
Qty: 130 TABLET | Refills: 0 | Status: SHIPPED | OUTPATIENT
Start: 2024-08-30

## 2024-08-30 RX ORDER — BACLOFEN 10 MG/1
10 TABLET ORAL 3 TIMES DAILY
Qty: 270 TABLET | Refills: 0 | Status: SHIPPED | OUTPATIENT
Start: 2024-08-30

## 2024-08-30 NOTE — PROGRESS NOTES
Assessment:  1. Chronic pain syndrome    2. Uncomplicated opioid dependence (HCC)    3. Long-term current use of opiate analgesic    4. Myofascial pain syndrome    5. Chronic bilateral low back pain with bilateral sciatica    6. Lumbar radiculitis        Plan:  The patient remains clinically stable on hydrocodone-acetaminophen 10/325 mg 1 tablet every 4-6 hours as needed. Two 1 month supplies of this medication were sent to the patient's pharmacy today.   Continue pregabalin and baclofen as prescribed.  Refills of these medications were sent to patient's pharmacy today.  Follow-up in 8 weeks, or sooner if needed for medication refill and reevaluation.    There are risks associated with opioid medications, including dependence, addiction and tolerance. The patient understands and agrees to use these medications only as prescribed. Potential side effects of the medications include, but are not limited to, constipation, drowsiness, addiction, impaired judgment and risk of fatal overdose if not taken as prescribed. The patient was warned against driving while taking sedation medications.  Sharing medications is a felony. At this point in time, the patient is showing no signs of addiction, abuse, diversion or suicidal ideation.    [place] Prescription Drug Monitoring Program report was reviewed and was appropriate     This patient is being managed for a complex and serious condition that requires ongoing, intensive medical management. The nature of this condition demands constant vigilance and a nuanced approach to treatment. The condition necessitates an in-depth and focused approach to management, including regular monitoring and potential coordination with other healthcare professionals.     Detailed Description of Visit Complexity: This visit involved an intricate evaluation and management of the patient's condition. The complexity of the visit was due to the need for a detailed assessment of the current state,  consideration of potential complications, and a careful balancing of treatment options to manage the condition effectively.     Patient's Health Status and History: This patient has a significant pain history which requires regular and detailed management. The condition's impact on their life and health is substantial, necessitating a comprehensive and tailored approach to chronic ongoing care.     My impressions and treatment recommendations were discussed in detail with the patient who verbalized understanding and had no further questions.  Discharge instructions were provided. I personally saw and examined the patient and I agree with the above discussed plan of care.    No orders of the defined types were placed in this encounter.    New Medications Ordered This Visit   Medications    HYDROcodone-acetaminophen (NORCO)  mg per tablet     Sig: Take 1 tablet by mouth every 4 (four) hours as needed for moderate pain Max Daily Amount: 6 tablets     Dispense:  130 tablet     Refill:  0    HYDROcodone-acetaminophen (NORCO)  mg per tablet     Sig: Take 1 tablet by mouth every 4 (four) hours as needed for moderate pain DO NOT FILL BEFORE 09/30/2024 Max Daily Amount: 6 tablets     Dispense:  130 tablet     Refill:  0    pregabalin (LYRICA) 200 MG capsule     Sig: Take 1 capsule (200 mg total) by mouth 3 (three) times a day     Dispense:  90 capsule     Refill:  1     .    baclofen 10 mg tablet     Sig: Take 1 tablet (10 mg total) by mouth 3 (three) times a day     Dispense:  270 tablet     Refill:  0       History of Present Illness:  Areli Luciano is a 74 y.o. female who presents for a follow up office visit  for medication refill and reevaluation. Pain remains well-controlled on current pain medication regimen consisting of hydrocodone-acetaminophen, pregabalin, and baclofen. The patient tolerates these medications well without side effects and reports that they reduce her pain by about 30%. She has no  complaints today.  Patient states that the rash noted at her previous office visit has resolved somewhat after stopping Chantix.    Opioid contract date 1/22/24  Last UDS Date  7/08/24  Norco last taken on 8/30 AM    I have personally reviewed and/or updated the patient's past medical history, past surgical history, family history, social history, current medications, allergies, and vital signs today.     Review of Systems   Respiratory:  Negative for shortness of breath.    Cardiovascular:  Negative for chest pain.   Gastrointestinal:  Negative for constipation, diarrhea, nausea and vomiting.   Musculoskeletal:  Positive for arthralgias, gait problem, joint swelling and myalgias.   Skin:  Negative for rash.   Neurological:  Positive for dizziness. Negative for seizures and weakness.   Psychiatric/Behavioral:  Positive for decreased concentration.    All other systems reviewed and are negative.      Patient Active Problem List   Diagnosis    Chronic pain syndrome    Cervical radiculopathy    Myofascial pain syndrome    Spondylosis of cervical region without myelopathy or radiculopathy    Fibromyalgia    Long-term current use of opiate analgesic    MGUS (monoclonal gammopathy of unknown significance)    Iron deficiency anemia following bariatric surgery    Light headedness    Primary osteoarthritis of right knee    Pseudogout of left knee    Lumbar radiculitis    Chronic bilateral low back pain with bilateral sciatica    Rotator cuff syndrome, left    Left shoulder pain    Dizziness    Other fatigue    Biceps tendinitis of left shoulder    Adhesive capsulitis of left shoulder    Hypoglycemia    Hypothyroidism due to Hashimoto's thyroiditis    Iron deficiency anemia, unspecified    GERD (gastroesophageal reflux disease)    Nicotine dependence with current use    Need for prophylactic vaccination against Streptococcus pneumoniae (pneumococcus)    Needs flu shot    Osteoarthritis    Postnasal drip    Encounter for  screening for malignant neoplasm of lung in current smoker with 30 pack year history or greater    High coronary artery calcium score    Mass of upper outer quadrant of right breast    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)       Past Medical History:   Diagnosis Date    Anemia     Anxiety     hx of panic attacks (now under control)     Arthritis     Asthma     resolved (no problems in a decade)     Baker's cyst of knee     Bronchitis     Bunion, left foot     Cervical pain     Chronic GERD     Chronic pain     Chronic pain disorder     Depression     Diabetes mellitus, type 2 (HCC)     Diabetic peripheral neuropathy (HCC)     Endometriosis     Fibromyalgia     Hyperlipidemia     Hypertension     Joint pain     Low back pain     Low back pain     Lung nodules     Macular degeneration     Pulmonary emphysema (HCC) 01/16/2020    Spondylosis     Spontaneous pneumothorax     Uterine cancer (HCC)        Past Surgical History:   Procedure Laterality Date    BACK SURGERY  1996    L5, S1- laser surgery fusion of c5 and c6     BREAST CYST EXCISION Right     x2 benign    CHOLECYSTECTOMY  2004    FOOT SURGERY Left 2005    fusion     FRACTURE SURGERY      GASTRIC BYPASS  2010    Dr. Oropeza     HYSTERECTOMY  1985    just uterus     KNEE ARTHROSCOPY      LAMINECTOMY      ORTHOPEDIC SURGERY      OVARIAN CYST REMOVAL  1975    PELVIC LAPAROSCOPY      ovaries (x10)     PLEURAL SCARIFICATION  1967    SHOULDER OPEN ROTATOR CUFF REPAIR Left 2008    TONSILLECTOMY      VAGINAL PROLAPSE REPAIR         Family History   Problem Relation Age of Onset    Hypertension Mother     Lung cancer Mother 53    Hypertension Father     Heart disease Father     Heart attack Father     Cancer Father 42    No Known Problems Sister     No Known Problems Sister     No Known Problems Sister     No Known Problems Maternal Grandmother     No Known Problems Maternal Grandfather     No Known Problems Paternal Grandmother     No Known Problems  Paternal Grandfather     No Known Problems Daughter     No Known Problems Daughter     No Known Problems Daughter     Breast cancer Maternal Aunt 48    Breast cancer Paternal Aunt 43    Breast cancer Paternal Aunt 45    Breast cancer Cousin 40    Breast cancer Cousin 42        male       Social History     Occupational History    Not on file   Tobacco Use    Smoking status: Every Day     Current packs/day: 0.30     Average packs/day: 0.6 packs/day for 53.7 years (33.6 ttl pk-yrs)     Types: Cigarettes     Start date: 1971    Smokeless tobacco: Never   Vaping Use    Vaping status: Never Used   Substance and Sexual Activity    Alcohol use: Not Currently     Alcohol/week: 1.0 standard drink of alcohol     Types: 1 Shots of liquor per week     Comment: rarely    Drug use: No    Sexual activity: Not Currently     Partners: Male       Current Outpatient Medications on File Prior to Visit   Medication Sig    albuterol (PROVENTIL HFA,VENTOLIN HFA) 90 mcg/act inhaler Inhale 2 puffs every 6 (six) hours as needed for wheezing    albuterol (Ventolin HFA) 90 mcg/act inhaler     amLODIPine (NORVASC) 5 mg tablet TAKE 1 TABLET (5 MG TOTAL) BY MOUTH DAILY.    atorvastatin (LIPITOR) 10 mg tablet Take 1 tablet (10 mg total) by mouth daily    Calcium 600+D3 600-20 MG-MCG TABS TAKE 1 TABLET BY MOUTH TWICE A DAY WITH MEALS    cetirizine (ZyrTEC) 10 mg tablet Take 1 tablet (10 mg total) by mouth daily    cholecalciferol (VITAMIN D) 400 units/1 mL injections once a week    clotrimazole-betamethasone (LOTRISONE) 1-0.05 % cream Apply topically 3 (three) times a day    Cyanocobalamin (Vitamin B-12) 50 MCG LOZG     Diclofenac Sodium (VOLTAREN) 1 % APPLY 2 GRAMS TO AFFECTED AREA 4 TIMES A DAY    diphenhydrAMINE-PSE-APAP (BENADRYL ALLERGY/COLD PO) Take by mouth daily at bedtime    escitalopram (LEXAPRO) 5 mg tablet TAKE 1 TABLET (5 MG TOTAL) BY MOUTH DAILY.    famotidine (PEPCID) 20 mg tablet TAKE 1 TABLET (20 MG TOTAL) BY MOUTH 2 (TWO) TIMES A  DAY PLEASE STOP TAGAMET    fexofenadine (ALLEGRA) 180 MG tablet Take 1 tablet (180 mg total) by mouth daily In the morning    ipratropium-albuterol (DUO-NEB) 0.5-2.5 mg/3 mL nebulizer solution Take 3 mL by nebulization every 8 (eight) hours as needed for wheezing or shortness of breath    levofloxacin (LEVAQUIN) 500 mg tablet Take 1 tablet (500 mg total) by mouth every 24 hours for 10 days    levothyroxine 25 mcg tablet Take 1 tablet (25 mcg total) by mouth every other day    levothyroxine 50 mcg tablet TAKE 1 TABLET (50 MCG TOTAL) BY MOUTH EVERY OTHER DAY    losartan (COZAAR) 25 mg tablet TAKE 1 TABLET (25 MG TOTAL) BY MOUTH DAILY.    magnesium Oxide (MAG-OX) 400 mg TABS Take 1 tablet (400 mg total) by mouth daily    nortriptyline (PAMELOR) 25 mg capsule TAKE 1 CAPSULE BY MOUTH EVERYDAY AT BEDTIME    omeprazole (PriLOSEC) 40 MG capsule TAKE 1 CAPSULE BY MOUTH 2 TIMES A DAY BEFORE MEALS.    Pediatric Multiple Vitamins (Childrens Chew Multivitamin) CHEW CHEW AND SWALLOW 1 TABLET BY MOUTH EVERY DAY    potassium chloride (MICRO-K) 10 MEQ CR capsule Take 1 capsule (10 mEq total) by mouth daily    predniSONE 10 mg tablet Take 3 tabs daily with breakfast for 3 days then Take 2 tabs daily for 3 Days then take one tab daily for 3 days then stop..    sucralfate (CARAFATE) 1 g/10 mL suspension TAKE 10 ML (1 G TOTAL) BY MOUTH 4 TIMES A DAY    traZODone (DESYREL) 50 mg tablet TAKE 1 TABLET BY MOUTH EVERY DAY AT BEDTIME AS NEEDED    Trelegy Ellipta 100-62.5-25 MCG/ACT inhaler INHALE 1 PUFF DAILY RINSE MOUTH AFTER USE.    triamcinolone (KENALOG) 0.1 % cream APPLY TO AFFECTED AREA 2 OR 3 TIMES DAILY AS NEEDED    Vitamin A 2400 MCG (8000 UT) TABS TAKE 1 CAPSULE (8,000 UNITS TOTAL) BY MOUTH DAILY    [DISCONTINUED] baclofen 10 mg tablet Take 1 tablet (10 mg total) by mouth 3 (three) times a day    [DISCONTINUED] HYDROcodone-acetaminophen (NORCO)  mg per tablet Take 1 tab every 4-6 hours prn, for ongoing therapy.     [DISCONTINUED] pregabalin (LYRICA) 200 MG capsule Take 1 capsule (200 mg total) by mouth 3 (three) times a day    benzonatate (TESSALON PERLES) 100 mg capsule Take 1 capsule (100 mg total) by mouth 3 (three) times a day as needed for cough (Patient not taking: Reported on 7/8/2024)    Cholecalciferol (Vitamin D3) 50 MCG (2000 UT) capsule  (Patient not taking: Reported on 7/8/2024)    denosumab (PROLIA) 60 mg/mL Inject 1 mL (60 mg total) under the skin once for 1 dose    ergocalciferol (VITAMIN D2) 50,000 units Take 1 capsule (50,000 Units total) by mouth once a week (Patient not taking: Reported on 1/8/2024)    glucose blood (ReliOn Prime Test) test strip BID (Patient not taking: Reported on 8/30/2024)    naloxone (NARCAN) 4 mg/0.1 mL nasal spray Administer 1 spray into a nostril. If no response after 2-3 minutes, give another dose in the other nostril using a new spray. (Patient not taking: Reported on 9/6/2023)    nicotine (NICODERM CQ) 14 mg/24hr TD 24 hr patch Place 1 patch on the skin over 24 hours every 24 hours (Patient not taking: Reported on 8/30/2024)    nicotine (NICODERM CQ) 21 mg/24 hr TD 24 hr patch Place 1 patch on the skin over 24 hours every 24 hours (Patient not taking: Reported on 8/30/2024)    nicotine (NICODERM CQ) 7 mg/24hr TD 24 hr patch Place 1 patch on the skin over 24 hours every 24 hours (Patient not taking: Reported on 8/30/2024)    vitamin A 2400 MCG (8000 UT) capsule TAKE 1 CAPSULE (8,000 UNITS TOTAL) BY MOUTH DAILY (Patient not taking: Reported on 8/30/2024)    vitamin B-12 (VITAMIN B-12) 1,000 mcg tablet Take 1 tablet (1,000 mcg total) by mouth daily (Patient not taking: Reported on 4/25/2024)     Current Facility-Administered Medications on File Prior to Visit   Medication    cyanocobalamin injection 1,000 mcg    cyanocobalamin injection 1,000 mcg    cyanocobalamin injection 1,000 mcg    cyanocobalamin injection 1,000 mcg    cyanocobalamin injection 1,000 mcg    cyanocobalamin  "injection 1,000 mcg    cyanocobalamin injection 1,000 mcg    cyanocobalamin injection 1,000 mcg       Allergies   Allergen Reactions    Cephalosporins Hives    Erythromycin GI Intolerance    Fentanyl Itching     Occurred during surgery     Paxil [Paroxetine] Hives     After 2 weeks    Penicillins Itching    Pravastatin Myalgia    Statins Itching    Sulfa Antibiotics Diarrhea    Wellbutrin [Bupropion] Hives     After 2 weeks     Chantix [Varenicline] Rash and Other (See Comments)     Burning and itching    Marzena's Wort (Hypericum Perforatum) Rash       Physical Exam:    /58   Pulse 63   Ht 5' 5\" (1.651 m)   Wt 81.2 kg (179 lb)   LMP  (LMP Unknown)   BMI 29.79 kg/m²     Constitutional:normal, well developed, well nourished, alert, in no distress and non-toxic and no overt pain behavior.  Eyes:anicteric  HEENT:grossly intact  Neck:supple, symmetric, trachea midline and no masses   Pulmonary:even and unlabored  Cardiovascular:No edema or pitting edema present  Skin:Normal without rashes or lesions and well hydrated  Psychiatric:Mood and affect appropriate  Neurologic:Cranial Nerves II-XII grossly intact  Musculoskeletal:normal gait    "

## 2024-09-01 DIAGNOSIS — R05.3 CHRONIC COUGH: ICD-10-CM

## 2024-09-01 RX ORDER — FAMOTIDINE 20 MG/1
20 TABLET, FILM COATED ORAL 2 TIMES DAILY
Qty: 180 TABLET | Refills: 1 | Status: SHIPPED | OUTPATIENT
Start: 2024-09-01

## 2024-09-04 ENCOUNTER — TELEPHONE (OUTPATIENT)
Dept: HEMATOLOGY ONCOLOGY | Facility: CLINIC | Age: 74
End: 2024-09-04

## 2024-09-04 ENCOUNTER — OFFICE VISIT (OUTPATIENT)
Dept: HEMATOLOGY ONCOLOGY | Facility: CLINIC | Age: 74
End: 2024-09-04
Payer: COMMERCIAL

## 2024-09-04 VITALS
DIASTOLIC BLOOD PRESSURE: 62 MMHG | WEIGHT: 177 LBS | HEART RATE: 62 BPM | OXYGEN SATURATION: 97 % | HEIGHT: 65 IN | SYSTOLIC BLOOD PRESSURE: 130 MMHG | RESPIRATION RATE: 17 BRPM | BODY MASS INDEX: 29.49 KG/M2 | TEMPERATURE: 97.6 F

## 2024-09-04 DIAGNOSIS — E53.8 B12 DEFICIENCY: ICD-10-CM

## 2024-09-04 DIAGNOSIS — Z98.84 HISTORY OF ROUX-EN-Y GASTRIC BYPASS: ICD-10-CM

## 2024-09-04 DIAGNOSIS — D47.2 MGUS (MONOCLONAL GAMMOPATHY OF UNKNOWN SIGNIFICANCE): Primary | ICD-10-CM

## 2024-09-04 DIAGNOSIS — D50.8 OTHER IRON DEFICIENCY ANEMIA: ICD-10-CM

## 2024-09-04 DIAGNOSIS — D50.8 IRON DEFICIENCY ANEMIA FOLLOWING BARIATRIC SURGERY: ICD-10-CM

## 2024-09-04 DIAGNOSIS — R53.83 OTHER FATIGUE: ICD-10-CM

## 2024-09-04 DIAGNOSIS — K95.89 IRON DEFICIENCY ANEMIA FOLLOWING BARIATRIC SURGERY: ICD-10-CM

## 2024-09-04 PROCEDURE — 99204 OFFICE O/P NEW MOD 45 MIN: CPT | Performed by: NURSE PRACTITIONER

## 2024-09-04 PROCEDURE — 1160F RVW MEDS BY RX/DR IN RCRD: CPT | Performed by: NURSE PRACTITIONER

## 2024-09-04 PROCEDURE — 1159F MED LIST DOCD IN RCRD: CPT | Performed by: NURSE PRACTITIONER

## 2024-09-04 RX ORDER — SODIUM CHLORIDE 9 MG/ML
20 INJECTION, SOLUTION INTRAVENOUS ONCE
OUTPATIENT
Start: 2024-09-18

## 2024-09-04 NOTE — PROGRESS NOTES
[facility] [place]  [address] [address]  [phone]  Hematology Ambulatory Consult  Areli Luciano, 1950, [id number]  9/4/2024      Assessment and Plan   1. MGUS (monoclonal gammopathy of unknown significance)  2. Other fatigue  3. History of Sarah-en-Y gastric bypass  4. Iron deficiency anemia following bariatric surgery  5. B12 deficiency  6. Other iron deficiency anemia  Patient is a 74-year-old female with a history of hypothyroid, current every day smoker, intermittent asthma, pulmonary emphysema, COPD, type 2 diabetes, osteoporosis, and gastric bypass surgery.  She has followed with our practice in the past for iron deficiency anemia postsurgical malabsorption and monoclonal gammopathy of unknown significance.  She has not been seen since August 2020.  She has received Feraheme infusions and tolerated without difficulty.  She was referred back for management of iron deficiency and MGUS.  Most recent labs from 8/28/2024 show a WBC of 4.15, RBC 3.68, normal hemoglobin and hematocrit, MCV 98 otherwise normal CBC and differential.  IgA 331, creatinine 0.62, calcium 9.5, total protein 6.4, EGFR 89.  Iron saturation 25% with a ferritin level of 22.  Most recent last Feraheme infusion was in July 2021.  We we will reinitiate Feraheme infusions 510 mg x 2.  Patient understands that Feraheme may not be approved by insurance and we will need to transition to Venofer which she has also had in the past.  If Venofer is required we will plan for 300 mg weekly x 3.  We will also request an updated vitamin B12 level and add if indicated.  SPEP is from 6/22/2021 that showed a peak of 0.14, identified as IgA kappa.  We discussed getting updated myeloma panel with the next set of blood work.  We will plan to see her in 3 months, virtual appointment is appropriate.    - CBC and differential; Future  - Comprehensive metabolic panel; Future  - IgG, IgA,  IgM; Future  - Immunoglobulin free LT chains blood; Future  - Protein electrophoresis, serum; Future  - Iron Panel (Includes Ferritin, Iron Sat%, Iron, and TIBC); Future  - Vitamin B12; Future    Patient verbalized understanding and is in agreement to the plan. Patient knows to call the Hematology/Oncology office with any questions and concerns regarding the above.    Barrier(s) to care: None.   The patient is able to self care.    Teri MACHADO  Medical Oncology/Hematology  [facility]    Subjective     Chief Complaint   Patient presents with    Consult       Referring provider    Referral Self  No address on file    History of present illness:   Patient is a 74-year-old female with a history of hypothyroid, current every day smoker, intermittent asthma, pulmonary emphysema, COPD, type 2 diabetes, osteoporosis, and gastric bypass surgery.  She has followed with our practice in the past for iron deficiency anemia postsurgical malabsorption and monoclonal gammopathy of unknown significance.  She has not been seen since August 2020.  She has received Feraheme infusions and tolerated without difficulty.  She was referred back for management of iron deficiency and MGUS.    09/04/24: clinically stable    Review of Systems   Constitutional:  Positive for fatigue. Negative for activity change, appetite change, fever and unexpected weight change.   Respiratory:  Negative for cough and shortness of breath.    Cardiovascular:  Negative for chest pain and leg swelling.   Gastrointestinal:  Negative for abdominal pain, constipation, diarrhea and nausea.   Endocrine: Negative for cold intolerance and heat intolerance.   Musculoskeletal:  Positive for myalgias. Negative for arthralgias.   Skin: Negative.    Neurological:  Positive for headaches. Negative for dizziness and weakness.   Hematological:  Negative for adenopathy. Bruises/bleeds easily.     Past Medical History:   Diagnosis Date    Anemia      Anxiety     hx of panic attacks (now under control)     Arthritis     Asthma     resolved (no problems in a decade)     Baker's cyst of knee     Bronchitis     Bunion, left foot     Cervical pain     Chronic GERD     Chronic pain     Chronic pain disorder     Depression     Diabetes mellitus, type 2 (HCC)     Diabetic peripheral neuropathy (HCC)     Endometriosis     Fibromyalgia     Hyperlipidemia     Hypertension     Joint pain     Low back pain     Low back pain     Lung nodules     Macular degeneration     Pulmonary emphysema (HCC) 01/16/2020    Spondylosis     Spontaneous pneumothorax     Uterine cancer (HCC)      Past Surgical History:   Procedure Laterality Date    BACK SURGERY  1996    L5, S1- laser surgery fusion of c5 and c6     BREAST CYST EXCISION Right     x2 benign    CHOLECYSTECTOMY  2004    FOOT SURGERY Left 2005    fusion     FRACTURE SURGERY      GASTRIC BYPASS  2010    Dr. Oropeza     HYSTERECTOMY  1985    just uterus     KNEE ARTHROSCOPY      LAMINECTOMY      ORTHOPEDIC SURGERY      OVARIAN CYST REMOVAL  1975    PELVIC LAPAROSCOPY      ovaries (x10)     PLEURAL SCARIFICATION  1967    SHOULDER OPEN ROTATOR CUFF REPAIR Left 2008    TONSILLECTOMY      VAGINAL PROLAPSE REPAIR       Family History   Problem Relation Age of Onset    Hypertension Mother     Lung cancer Mother 53    Hypertension Father     Heart disease Father     Heart attack Father     Cancer Father 42    No Known Problems Sister     No Known Problems Sister     No Known Problems Sister     No Known Problems Maternal Grandmother     No Known Problems Maternal Grandfather     No Known Problems Paternal Grandmother     No Known Problems Paternal Grandfather     No Known Problems Daughter     No Known Problems Daughter     No Known Problems Daughter     Breast cancer Maternal Aunt 48    Breast cancer Paternal Aunt 43    Breast cancer Paternal Aunt 45    Breast cancer Cousin 40    Breast cancer Cousin 42        male     Social History      Socioeconomic History    Marital status:      Spouse name: Not on file    Number of children: Not on file    Years of education: Not on file    Highest education level: Not on file   Occupational History    Not on file   Tobacco Use    Smoking status: Every Day     Current packs/day: 0.30     Average packs/day: 0.6 packs/day for 53.7 years (33.6 ttl pk-yrs)     Types: Cigarettes     Start date: 1971    Smokeless tobacco: Never   Vaping Use    Vaping status: Never Used   Substance and Sexual Activity    Alcohol use: Not Currently     Alcohol/week: 1.0 standard drink of alcohol     Types: 1 Shots of liquor per week     Comment: rarely    Drug use: No    Sexual activity: Not Currently     Partners: Male   Other Topics Concern    Not on file   Social History Narrative    Not on file     Social Determinants of Health     Financial Resource Strain: Low Risk  (6/9/2023)    Overall Financial Resource Strain (CARDIA)     Difficulty of Paying Living Expenses: Not hard at all   Food Insecurity: No Food Insecurity (8/26/2024)    Hunger Vital Sign     Worried About Running Out of Food in the Last Year: Never true     Ran Out of Food in the Last Year: Never true   Transportation Needs: No Transportation Needs (8/26/2024)    PRAPARE - Transportation     Lack of Transportation (Medical): No     Lack of Transportation (Non-Medical): No   Physical Activity: Inactive (9/8/2020)    Exercise Vital Sign     Days of Exercise per Week: 0 days     Minutes of Exercise per Session: 0 min   Stress: No Stress Concern Present (9/8/2020)    [employer] - Occupational Stress Questionnaire     Feeling of Stress : Not at all   Social Connections: Unknown (6/18/2024)    Received from [employer]    Social [email or web address]     How often do you feel lonely or isolated from those around you? (Adult - for ages 18 years and over): Not on file   Intimate Partner Violence: Not At Risk (9/8/2020)    Humiliation, Afraid,  Rape, and Kick questionnaire     Fear of Current or Ex-Partner: No     Emotionally Abused: No     Physically Abused: No     Sexually Abused: No   Housing Stability: Low Risk  (8/26/2024)    Housing Stability Vital Sign     Unable to Pay for Housing in the Last Year: No     Number of Times Moved in the Last Year: 0     Homeless in the Last Year: No     Current Outpatient Medications:     amLODIPine (NORVASC) 5 mg tablet, TAKE 1 TABLET (5 MG TOTAL) BY MOUTH DAILY., Disp: 90 tablet, Rfl: 1    atorvastatin (LIPITOR) 10 mg tablet, Take 1 tablet (10 mg total) by mouth daily, Disp: 90 tablet, Rfl: 3    baclofen 10 mg tablet, Take 1 tablet (10 mg total) by mouth 3 (three) times a day, Disp: 270 tablet, Rfl: 0    Calcium 600+D3 600-20 MG-MCG TABS, TAKE 1 TABLET BY MOUTH TWICE A DAY WITH MEALS, Disp: 180 tablet, Rfl: 4    cetirizine (ZyrTEC) 10 mg tablet, Take 1 tablet (10 mg total) by mouth daily, Disp: 90 tablet, Rfl: 1    cholecalciferol (VITAMIN D) 400 units/1 mL, injections once a week, Disp: , Rfl:     clotrimazole-betamethasone (LOTRISONE) 1-0.05 % cream, Apply topically 3 (three) times a day, Disp: 60 g, Rfl: 1    Diclofenac Sodium (VOLTAREN) 1 %, APPLY 2 GRAMS TO AFFECTED AREA 4 TIMES A DAY, Disp: 200 g, Rfl: 0    diphenhydrAMINE-PSE-APAP (BENADRYL ALLERGY/COLD PO), Take by mouth daily at bedtime, Disp: , Rfl:     escitalopram (LEXAPRO) 5 mg tablet, TAKE 1 TABLET (5 MG TOTAL) BY MOUTH DAILY., Disp: 90 tablet, Rfl: 1    famotidine (PEPCID) 20 mg tablet, TAKE 1 TABLET (20 MG TOTAL) BY MOUTH 2 (TWO) TIMES A DAY PLEASE STOP TAGAMET, Disp: 180 tablet, Rfl: 1    fexofenadine (ALLEGRA) 180 MG tablet, Take 1 tablet (180 mg total) by mouth daily In the morning, Disp: 90 tablet, Rfl: 3    HYDROcodone-acetaminophen (NORCO)  mg per tablet, Take 1 tablet by mouth every 4 (four) hours as needed for moderate pain Max Daily Amount: 6 tablets, Disp: 130 tablet, Rfl: 0    HYDROcodone-acetaminophen (NORCO)  mg per tablet,  Take 1 tablet by mouth every 4 (four) hours as needed for moderate pain DO NOT FILL BEFORE 09/30/2024 Max Daily Amount: 6 tablets, Disp: 130 tablet, Rfl: 0    ipratropium-albuterol (DUO-NEB) 0.5-2.5 mg/3 mL nebulizer solution, Take 3 mL by nebulization every 8 (eight) hours as needed for wheezing or shortness of breath, Disp: 270 mL, Rfl: 3    levofloxacin (LEVAQUIN) 500 mg tablet, Take 1 tablet (500 mg total) by mouth every 24 hours for 10 days, Disp: 10 tablet, Rfl: 0    levothyroxine 25 mcg tablet, Take 1 tablet (25 mcg total) by mouth every other day, Disp: 45 tablet, Rfl: 3    levothyroxine 50 mcg tablet, TAKE 1 TABLET (50 MCG TOTAL) BY MOUTH EVERY OTHER DAY, Disp: 50 tablet, Rfl: 1    losartan (COZAAR) 25 mg tablet, TAKE 1 TABLET (25 MG TOTAL) BY MOUTH DAILY., Disp: 90 tablet, Rfl: 1    magnesium Oxide (MAG-OX) 400 mg TABS, Take 1 tablet (400 mg total) by mouth daily, Disp: 90 tablet, Rfl: 0    nortriptyline (PAMELOR) 25 mg capsule, TAKE 1 CAPSULE BY MOUTH EVERYDAY AT BEDTIME, Disp: 90 capsule, Rfl: 1    omeprazole (PriLOSEC) 40 MG capsule, TAKE 1 CAPSULE BY MOUTH 2 TIMES A DAY BEFORE MEALS., Disp: 180 capsule, Rfl: 1    Pediatric Multiple Vitamins (Childrens Chew Multivitamin) CHEW, CHEW AND SWALLOW 1 TABLET BY MOUTH EVERY DAY, Disp: 100 tablet, Rfl: 5    potassium chloride (MICRO-K) 10 MEQ CR capsule, Take 1 capsule (10 mEq total) by mouth daily, Disp: 30 capsule, Rfl: 1    predniSONE 10 mg tablet, Take 3 tabs daily with breakfast for 3 days then Take 2 tabs daily for 3 Days then take one tab daily for 3 days then stop.., Disp: 20 tablet, Rfl: 0    pregabalin (LYRICA) 200 MG capsule, Take 1 capsule (200 mg total) by mouth 3 (three) times a day, Disp: 90 capsule, Rfl: 1    sucralfate (CARAFATE) 1 g/10 mL suspension, TAKE 10 ML (1 G TOTAL) BY MOUTH 4 TIMES A DAY, Disp: 3780 mL, Rfl: 1    traZODone (DESYREL) 50 mg tablet, TAKE 1 TABLET BY MOUTH EVERY DAY AT BEDTIME AS NEEDED, Disp: 90 tablet, Rfl: 1    Trelegy  Ellipta 100-62.5-25 MCG/ACT inhaler, INHALE 1 PUFF DAILY RINSE MOUTH AFTER USE., Disp: 60 each, Rfl: 5    triamcinolone (KENALOG) 0.1 % cream, APPLY TO AFFECTED AREA 2 OR 3 TIMES DAILY AS NEEDED, Disp: , Rfl:     Vitamin A 2400 MCG (8000 UT) TABS, TAKE 1 CAPSULE (8,000 UNITS TOTAL) BY MOUTH DAILY, Disp: , Rfl:     albuterol (PROVENTIL HFA,VENTOLIN HFA) 90 mcg/act inhaler, Inhale 2 puffs every 6 (six) hours as needed for wheezing (Patient not taking: Reported on 9/4/2024), Disp: 18 g, Rfl: 3    albuterol (Ventolin HFA) 90 mcg/act inhaler, , Disp: , Rfl:     benzonatate (TESSALON PERLES) 100 mg capsule, Take 1 capsule (100 mg total) by mouth 3 (three) times a day as needed for cough (Patient not taking: Reported on 7/8/2024), Disp: 30 capsule, Rfl: 1    Cholecalciferol (Vitamin D3) 50 MCG (2000 UT) capsule, , Disp: , Rfl:     denosumab (PROLIA) 60 mg/mL, Inject 1 mL (60 mg total) under the skin once for 1 dose, Disp: 1 mL, Rfl: 0    ergocalciferol (VITAMIN D2) 50,000 units, Take 1 capsule (50,000 Units total) by mouth once a week (Patient not taking: Reported on 1/8/2024), Disp: 13 capsule, Rfl: 2    glucose blood (ReliOn Prime Test) test strip, BID (Patient not taking: Reported on 8/30/2024), Disp: 100 strip, Rfl: 6    naloxone (NARCAN) 4 mg/0.1 mL nasal spray, Administer 1 spray into a nostril. If no response after 2-3 minutes, give another dose in the other nostril using a new spray. (Patient not taking: Reported on 9/6/2023), Disp: 1 each, Rfl: 1    nicotine (NICODERM CQ) 14 mg/24hr TD 24 hr patch, Place 1 patch on the skin over 24 hours every 24 hours (Patient not taking: Reported on 8/30/2024), Disp: 28 patch, Rfl: 0    nicotine (NICODERM CQ) 21 mg/24 hr TD 24 hr patch, Place 1 patch on the skin over 24 hours every 24 hours (Patient not taking: Reported on 8/30/2024), Disp: 28 patch, Rfl: 0    nicotine (NICODERM CQ) 7 mg/24hr TD 24 hr patch, Place 1 patch on the skin over 24 hours every 24 hours (Patient not  "taking: Reported on 8/30/2024), Disp: 28 patch, Rfl: 0    vitamin A 2400 MCG (8000 UT) capsule, TAKE 1 CAPSULE (8,000 UNITS TOTAL) BY MOUTH DAILY (Patient not taking: Reported on 8/30/2024), Disp: 90 capsule, Rfl: 2    vitamin B-12 (VITAMIN B-12) 1,000 mcg tablet, Take 1 tablet (1,000 mcg total) by mouth daily (Patient not taking: Reported on 4/25/2024), Disp: 90 tablet, Rfl: 3    Current Facility-Administered Medications:     cyanocobalamin injection 1,000 mcg, 1,000 mcg, Intramuscular, Q30 Days, Aleksandar Garduno MD, 1,000 mcg at 12/08/20 1118    cyanocobalamin injection 1,000 mcg, 1,000 mcg, Intramuscular, Q30 Days, Aleksandar Garduno MD, 1,000 mcg at 07/08/20 1036    cyanocobalamin injection 1,000 mcg, 1,000 mcg, Intramuscular, Q30 Days, Aleksandar Garduno MD, 1,000 mcg at 09/08/20 1210    cyanocobalamin injection 1,000 mcg, 1,000 mcg, Intramuscular, Q30 Days, Aleksandar Garduno MD    cyanocobalamin injection 1,000 mcg, 1,000 mcg, Intramuscular, Q30 Days, Aleksandar Garduno MD    cyanocobalamin injection 1,000 mcg, 1,000 mcg, Intramuscular, Q30 Days, Aleksandar Garduno MD, 1,000 mcg at 08/18/21 1019    cyanocobalamin injection 1,000 mcg, 1,000 mcg, Intramuscular, Q30 Days, Aleksandar Garduno MD, 1,000 mcg at 03/21/23 1117    cyanocobalamin injection 1,000 mcg, 1,000 mcg, Intramuscular, Q30 Days, Aleksandar Garduno MD, 1,000 mcg at 07/13/23 1004  Allergies   Allergen Reactions    Cephalosporins Hives    Erythromycin GI Intolerance    Fentanyl Itching     Occurred during surgery     Paxil [Paroxetine] Hives     After 2 weeks    Penicillins Itching    Pravastatin Myalgia    Statins Itching    Sulfa Antibiotics Diarrhea    Wellbutrin [Bupropion] Hives     After 2 weeks     Chantix [Varenicline] Rash and Other (See Comments)     Burning and itching    Marzena's Wort (Hypericum Perforatum) Rash     Objective   /62 (BP Location: Left arm, Patient Position: Sitting, Cuff Size: Adult)   Pulse 62   Temp 97.6 °F (36.4 °C)   Resp 17   Ht 5' 5\" (1.651 m)   Wt " 80.3 kg (177 lb)   LMP  (LMP Unknown)   SpO2 97%   BMI 29.45 kg/m²   Physical Exam  Vitals reviewed.   Constitutional:       Appearance: Normal appearance. She is well-developed.   HENT:      Head: Normocephalic and atraumatic.   Eyes:      Conjunctiva/sclera: Conjunctivae normal.      Pupils: Pupils are equal, round, and reactive to light.   Pulmonary:      Effort: Pulmonary effort is normal. No respiratory distress.   Musculoskeletal:         General: Normal range of motion.      Cervical back: Normal range of motion.   Lymphadenopathy:      Cervical: No cervical adenopathy.   Skin:     General: Skin is dry.   Neurological:      Mental Status: She is alert and oriented to person, place, and time.   Psychiatric:         Behavior: Behavior normal.     Result Review  Labs:  Lab Results   Component Value Date    WBC 4.15 (L) 08/28/2024    HGB 12.1 08/28/2024    HCT 36.0 08/28/2024    MCV 98 08/28/2024     08/28/2024     Lab Results   Component Value Date    SODIUM 138 08/28/2024    K 3.2 (L) 08/28/2024     08/28/2024    CO2 30 08/28/2024    AGAP 8 08/28/2024    BUN 6 08/28/2024    CREATININE 0.62 08/28/2024    GLUC 73 05/02/2023    GLUF 69 08/28/2024    CALCIUM 9.5 08/28/2024    AST 25 08/28/2024    ALT 17 08/28/2024    ALKPHOS 98 08/28/2024    TP 6.4 08/28/2024    TBILI 0.43 08/28/2024    EGFR 89 08/28/2024     Please note:  This report has been generated by a voice recognition software system. Therefore there may be syntax, spelling, and/or grammatical errors. Please call if you have any questions.

## 2024-09-14 DIAGNOSIS — F43.9 STRESS: ICD-10-CM

## 2024-09-14 DIAGNOSIS — R79.89 LOW VITAMIN B12 LEVEL: ICD-10-CM

## 2024-09-16 RX ORDER — CYANOCOBALAMIN (VITAMIN B-12) 1000 MCG
1 TABLET ORAL DAILY
Qty: 90 TABLET | Refills: 3 | Status: SHIPPED | OUTPATIENT
Start: 2024-09-16

## 2024-09-16 RX ORDER — ESCITALOPRAM OXALATE 5 MG/1
5 TABLET ORAL DAILY
Qty: 90 TABLET | Refills: 1 | Status: SHIPPED | OUTPATIENT
Start: 2024-09-16

## 2024-09-17 DIAGNOSIS — J30.9 ALLERGIC RHINITIS, UNSPECIFIED SEASONALITY, UNSPECIFIED TRIGGER: ICD-10-CM

## 2024-09-17 RX ORDER — CETIRIZINE HYDROCHLORIDE 10 MG/1
10 TABLET ORAL DAILY
Qty: 90 TABLET | Refills: 2 | Status: SHIPPED | OUTPATIENT
Start: 2024-09-17

## 2024-09-18 ENCOUNTER — HOSPITAL ENCOUNTER (OUTPATIENT)
Dept: INFUSION CENTER | Facility: HOSPITAL | Age: 74
Discharge: HOME | End: 2024-09-18
Attending: INTERNAL MEDICINE
Payer: COMMERCIAL

## 2024-09-18 VITALS
HEART RATE: 52 BPM | RESPIRATION RATE: 18 BRPM | TEMPERATURE: 96.9 F | DIASTOLIC BLOOD PRESSURE: 76 MMHG | SYSTOLIC BLOOD PRESSURE: 127 MMHG

## 2024-09-18 DIAGNOSIS — D50.8 OTHER IRON DEFICIENCY ANEMIA: ICD-10-CM

## 2024-09-18 DIAGNOSIS — K95.89 IRON DEFICIENCY ANEMIA FOLLOWING BARIATRIC SURGERY: ICD-10-CM

## 2024-09-18 DIAGNOSIS — D50.8 IRON DEFICIENCY ANEMIA FOLLOWING BARIATRIC SURGERY: ICD-10-CM

## 2024-09-18 DIAGNOSIS — Z98.84 HISTORY OF ROUX-EN-Y GASTRIC BYPASS: Primary | ICD-10-CM

## 2024-09-18 PROCEDURE — 96365 THER/PROPH/DIAG IV INF INIT: CPT

## 2024-09-18 RX ORDER — SODIUM CHLORIDE 9 MG/ML
20 INJECTION, SOLUTION INTRAVENOUS ONCE
Status: COMPLETED | OUTPATIENT
Start: 2024-09-18 | End: 2024-09-18

## 2024-09-18 RX ORDER — SODIUM CHLORIDE 9 MG/ML
20 INJECTION, SOLUTION INTRAVENOUS ONCE
Status: CANCELLED | OUTPATIENT
Start: 2024-09-25

## 2024-09-18 RX ADMIN — FERUMOXYTOL 510 MG: 510 INJECTION INTRAVENOUS at 12:08

## 2024-09-18 RX ADMIN — SODIUM CHLORIDE 20 ML/HR: 9 INJECTION, SOLUTION INTRAVENOUS at 12:08

## 2024-09-18 NOTE — PROGRESS NOTES
Areli Luciano  tolerated treatment well with no complications.      Areli Luciano is aware of future appt on 9/25/24 at 1000.     AVS declined by Areli Luciano.

## 2024-09-19 ENCOUNTER — TELEPHONE (OUTPATIENT)
Age: 74
End: 2024-09-19

## 2024-09-19 DIAGNOSIS — E78.5 TYPE 2 DIABETES MELLITUS WITH HYPERLIPIDEMIA  (HCC): Primary | ICD-10-CM

## 2024-09-19 DIAGNOSIS — E11.69 TYPE 2 DIABETES MELLITUS WITH HYPERLIPIDEMIA  (HCC): Primary | ICD-10-CM

## 2024-09-19 NOTE — TELEPHONE ENCOUNTER
Patient called to ask if a prescription for One Touch Verio test strips can be sent to the [facility] #[id number] (in the [employer]). Patient was accidentally ordered One Touch Ultra. If there are any questions then please call the patient at [phone].

## 2024-09-20 DIAGNOSIS — E83.42 HYPOMAGNESEMIA: ICD-10-CM

## 2024-09-20 DIAGNOSIS — E87.6 HYPOKALEMIA: ICD-10-CM

## 2024-09-20 RX ORDER — POTASSIUM CHLORIDE 750 MG/1
10 CAPSULE, EXTENDED RELEASE ORAL DAILY
Qty: 90 CAPSULE | Refills: 1 | Status: SHIPPED | OUTPATIENT
Start: 2024-09-20

## 2024-09-23 DIAGNOSIS — R21 RASH: ICD-10-CM

## 2024-09-23 DIAGNOSIS — J44.1 ACUTE EXACERBATION OF CHRONIC OBSTRUCTIVE PULMONARY DISEASE (COPD) (HCC): ICD-10-CM

## 2024-09-23 DIAGNOSIS — F17.210 CIGARETTE SMOKER: ICD-10-CM

## 2024-09-23 RX ORDER — CLOTRIMAZOLE AND BETAMETHASONE DIPROPIONATE 10; .64 MG/G; MG/G
CREAM TOPICAL 3 TIMES DAILY
Qty: 60 G | Refills: 1 | Status: SHIPPED | OUTPATIENT
Start: 2024-09-23

## 2024-09-25 ENCOUNTER — HOSPITAL ENCOUNTER (OUTPATIENT)
Dept: INFUSION CENTER | Facility: HOSPITAL | Age: 74
Discharge: HOME | End: 2024-09-25
Attending: INTERNAL MEDICINE
Payer: COMMERCIAL

## 2024-09-25 VITALS
TEMPERATURE: 97 F | SYSTOLIC BLOOD PRESSURE: 135 MMHG | HEART RATE: 72 BPM | RESPIRATION RATE: 16 BRPM | DIASTOLIC BLOOD PRESSURE: 75 MMHG

## 2024-09-25 DIAGNOSIS — D50.8 OTHER IRON DEFICIENCY ANEMIA: ICD-10-CM

## 2024-09-25 DIAGNOSIS — D50.8 IRON DEFICIENCY ANEMIA FOLLOWING BARIATRIC SURGERY: ICD-10-CM

## 2024-09-25 DIAGNOSIS — Z98.84 HISTORY OF ROUX-EN-Y GASTRIC BYPASS: Primary | ICD-10-CM

## 2024-09-25 DIAGNOSIS — K95.89 IRON DEFICIENCY ANEMIA FOLLOWING BARIATRIC SURGERY: ICD-10-CM

## 2024-09-25 PROCEDURE — 96365 THER/PROPH/DIAG IV INF INIT: CPT

## 2024-09-25 RX ORDER — SODIUM CHLORIDE 9 MG/ML
20 INJECTION, SOLUTION INTRAVENOUS ONCE
Status: COMPLETED | OUTPATIENT
Start: 2024-09-25 | End: 2024-09-25

## 2024-09-25 RX ORDER — SODIUM CHLORIDE 9 MG/ML
20 INJECTION, SOLUTION INTRAVENOUS ONCE
Status: CANCELLED | OUTPATIENT
Start: 2024-09-25

## 2024-09-25 RX ADMIN — FERUMOXYTOL 510 MG: 510 INJECTION INTRAVENOUS at 10:26

## 2024-09-25 RX ADMIN — SODIUM CHLORIDE 20 ML/HR: 0.9 INJECTION, SOLUTION INTRAVENOUS at 10:26

## 2024-10-07 ENCOUNTER — TELEPHONE (OUTPATIENT)
Age: 74
End: 2024-10-07

## 2024-10-07 DIAGNOSIS — N39.0 ACUTE UTI: Primary | ICD-10-CM

## 2024-10-07 DIAGNOSIS — F17.210 CIGARETTE SMOKER: ICD-10-CM

## 2024-10-07 RX ORDER — LEVOFLOXACIN 250 MG/1
250 TABLET, FILM COATED ORAL DAILY
Qty: 7 TABLET | Refills: 0 | Status: SHIPPED | OUTPATIENT
Start: 2024-10-07 | End: 2024-10-14

## 2024-10-07 NOTE — TELEPHONE ENCOUNTER
Frequent urination, unable to void, chills, painful urination last night.    Decline Office visit, stated Dr. Garduno usually call something in for hr.  Pharmacy [facility]

## 2024-10-08 DIAGNOSIS — K21.9 GASTROESOPHAGEAL REFLUX DISEASE WITHOUT ESOPHAGITIS: ICD-10-CM

## 2024-10-08 DIAGNOSIS — Z98.84 BARIATRIC SURGERY STATUS: ICD-10-CM

## 2024-10-08 RX ORDER — OMEPRAZOLE 40 MG/1
CAPSULE, DELAYED RELEASE ORAL
Qty: 180 CAPSULE | Refills: 1 | Status: SHIPPED | OUTPATIENT
Start: 2024-10-08

## 2024-10-09 RX ORDER — PEDIATRIC MULTIVITAMIN NO.17
TABLET,CHEWABLE ORAL
Qty: 100 TABLET | Refills: 1 | Status: SHIPPED | OUTPATIENT
Start: 2024-10-09

## 2024-10-21 ENCOUNTER — TELEPHONE (OUTPATIENT)
Age: 74
End: 2024-10-21

## 2024-10-21 NOTE — TELEPHONE ENCOUNTER
Pt called in stating that she has an appointment tomorrow with Dr. Escobar and she would like to know if he she needs any labs done. Advised that unfortunately her appointment was canceled. Advised that the pending labs she has ordered by other doctors are what Dr. Escobar would order so if she has those done he can see the results.     Rescheduled 6 month follow up with Dg MACHADO d/t Dr. Escobar not having appointments until April.

## 2024-10-25 DIAGNOSIS — I10 ESSENTIAL HYPERTENSION: ICD-10-CM

## 2024-10-25 RX ORDER — LOSARTAN POTASSIUM 25 MG/1
25 TABLET ORAL DAILY
Qty: 90 TABLET | Refills: 1 | Status: SHIPPED | OUTPATIENT
Start: 2024-10-25

## 2024-10-28 ENCOUNTER — OFFICE VISIT (OUTPATIENT)
Dept: PAIN MEDICINE | Facility: MEDICAL CENTER | Age: 74
End: 2024-10-28
Payer: COMMERCIAL

## 2024-10-28 ENCOUNTER — TELEPHONE (OUTPATIENT)
Age: 74
End: 2024-10-28

## 2024-10-28 VITALS
HEIGHT: 65 IN | WEIGHT: 179 LBS | BODY MASS INDEX: 29.82 KG/M2 | DIASTOLIC BLOOD PRESSURE: 68 MMHG | HEART RATE: 66 BPM | SYSTOLIC BLOOD PRESSURE: 127 MMHG

## 2024-10-28 DIAGNOSIS — Z79.891 LONG-TERM CURRENT USE OF OPIATE ANALGESIC: ICD-10-CM

## 2024-10-28 DIAGNOSIS — M54.42 CHRONIC BILATERAL LOW BACK PAIN WITH BILATERAL SCIATICA: ICD-10-CM

## 2024-10-28 DIAGNOSIS — M54.16 LUMBAR RADICULITIS: ICD-10-CM

## 2024-10-28 DIAGNOSIS — G89.4 CHRONIC PAIN SYNDROME: Primary | ICD-10-CM

## 2024-10-28 DIAGNOSIS — M79.18 MYOFASCIAL PAIN SYNDROME: ICD-10-CM

## 2024-10-28 DIAGNOSIS — G89.29 CHRONIC BILATERAL LOW BACK PAIN WITH BILATERAL SCIATICA: ICD-10-CM

## 2024-10-28 DIAGNOSIS — F11.20 UNCOMPLICATED OPIOID DEPENDENCE (HCC): ICD-10-CM

## 2024-10-28 DIAGNOSIS — M54.41 CHRONIC BILATERAL LOW BACK PAIN WITH BILATERAL SCIATICA: ICD-10-CM

## 2024-10-28 PROCEDURE — 99214 OFFICE O/P EST MOD 30 MIN: CPT

## 2024-10-28 PROCEDURE — G2211 COMPLEX E/M VISIT ADD ON: HCPCS

## 2024-10-28 RX ORDER — PREGABALIN 200 MG/1
200 CAPSULE ORAL 3 TIMES DAILY
Qty: 90 CAPSULE | Refills: 1 | Status: SHIPPED | OUTPATIENT
Start: 2024-10-28

## 2024-10-28 RX ORDER — HYDROCODONE BITARTRATE AND ACETAMINOPHEN 10; 325 MG/1; MG/1
1 TABLET ORAL EVERY 4 HOURS PRN
Qty: 130 TABLET | Refills: 0 | Status: SHIPPED | OUTPATIENT
Start: 2024-10-28

## 2024-10-28 RX ORDER — BACLOFEN 10 MG/1
10 TABLET ORAL 3 TIMES DAILY
Qty: 270 TABLET | Refills: 0 | Status: SHIPPED | OUTPATIENT
Start: 2024-10-28

## 2024-10-28 NOTE — TELEPHONE ENCOUNTER
S/w pt regarding previous request. Pt said to cancel this request. She was able to get her medications.

## 2024-10-28 NOTE — PROGRESS NOTES
Assessment:  1. Chronic pain syndrome    2. Uncomplicated opioid dependence (HCC)    3. Long-term current use of opiate analgesic    4. Myofascial pain syndrome    5. Chronic bilateral low back pain with bilateral sciatica    6. Lumbar radiculitis        Plan:  The patient remains clinically stable and her pain is well controlled on hydrocodone-acetaminophen 10/325 mg 1 tablet every 4-6 hours as needed. Two 1 month supplies of this medication were sent to the patient's pharmacy today with do not fill dates of today 10/28/2024 and 11/25/2024.   Continue pregabalin and baclofen as prescribed.  Refills of these medications were sent to patient's pharmacy today.  Follow-up in 8 weeks, or sooner if needed for medication refill and reevaluation.    There are risks associated with opioid medications, including dependence, addiction and tolerance. The patient understands and agrees to use these medications only as prescribed. Potential side effects of the medications include, but are not limited to, constipation, drowsiness, addiction, impaired judgment and risk of fatal overdose if not taken as prescribed. The patient was warned against driving while taking sedation medications.  Sharing medications is a felony. At this point in time, the patient is showing no signs of addiction, abuse, diversion or suicidal ideation.    A urine drug screen was collected at today's office visit as part of our medication management protocol. The specimen will be sent for confirmatory testing. The drug screen is medically necessary because the patient is either dependent on opioid medication or is being considered for opioid medication therapy and the results could impact ongoing or future treatment. The drug screen is to evaluate for the presences or absence of prescribed, non-prescribed, and/or illicit drugs/substances.    [place] Prescription Drug Monitoring Program report was reviewed and was appropriate     This patient is being  managed for a complex and serious condition that requires ongoing, intensive medical management. The nature of this condition demands constant vigilance and a nuanced approach to treatment. The condition necessitates an in-depth and focused approach to management, including regular monitoring and potential coordination with other healthcare professionals.     Detailed Description of Visit Complexity: This visit involved an intricate evaluation and management of the patient's condition. The complexity of the visit was due to the need for a detailed assessment of the current state, consideration of potential complications, and a careful balancing of treatment options to manage the condition effectively.     Patient's Health Status and History: This patient has a significant pain history which requires regular and detailed management. The condition's impact on their life and health is substantial, necessitating a comprehensive and tailored approach to chronic ongoing care.     My impressions and treatment recommendations were discussed in detail with the patient who verbalized understanding and had no further questions.  Discharge instructions were provided. I personally saw and examined the patient and I agree with the above discussed plan of care.    Orders Placed This Encounter   Procedures    MM ALL_Prescribed Meds and Special Instructions     Order Specific Question:   [facility] Is ATORVASTATIN prescribed?     Answer:   Yes     Order Specific Question:   [facility] Is ESCITALOPRAM prescribed?     Answer:   Yes     Order Specific Question:   [facility] Is HYDROCODONE/APAP prescribed?     Answer:   Yes     Order Specific Question:   [facility] Is NORTRIPTYLINE Prescribed?     Answer:   Yes     Order Specific Question:   [facility] Is OMEPRAZOLE prescribed?     Answer:   Yes     Order Specific Question:   [facility] Is PREGABALIN Prescribed?     Answer:   Yes     Order Specific Question:   [facility] Is TRAZODONE  prescribed?     Answer:   Yes    MM DT_Codeine Definitive Test    MM DT_Desipramine Definitive Test    MM DT_Dextromethorphan Definitive Test    MM Diazepam Definitive Test    MM DT_Ethyl Glucuronide/Ethyl Sulfate Definitive Test    MM DT_Fentanyl Definitive Test    MM DT_Heroin Definitive Test    MM DT_Hydrocodone Definitive Test    MM DT_Haloperidol Definitive Test    MM DT_Hydromorphone Definitive Test    MM DT_Alprazolam Definitive Test    MM DT_Kratom Definitive Test    MM DT_Levorphanol Definitive Test    MM Lorazepam Definitive Test    MM DT_MDMA Definitive Test    MM DT_Meperidine Definitive Test    MM DT_Methadone Definitive Test    MM DT_Methamphetaine-d/l Isomers Definitive    MM DT_Methamphetamine Definitive Test    MM DT_Methylphenidate Definitive Test    MM DT_Morphine Definitive Test    MM DT_Amphetamine Definitive Test    MM DT_Olanzapine Definitive Test    MM DT_Oxazepam Definitive Test    MM DT_Oxycodone Definitive Test    MM DT_Oxymorphone Definitive Test    MM DT_Phenobarbital Definitive Test    MM DT_Phentermine Definitive Test    MM DT_Quetiapine Definitive Test    MM DT_Risperidone Definitive Test    MM DT_Secobarbital Definitive Test    MM DT_Tapentadol Definitive Test    MM DT_Aripiprazole Definitive Test    MM DT_Temazapam Definitive Test    MM DT_THC Definitive Test    MM DT_Tramadol Definitive Test    MM DT_Validity Creatinine    MM DT_Validity Oxidant    MM DT_Validity pH    MM DT_Validity Specific    MM DT_Buprenorphine Definitive Test    MM DT_Butalbital Definitive Test    MM DT_Clonazepam Definitive Test    MM DT_Clozapine Definitive Test    MM DT_Cocaine Definitive Test     New Medications Ordered This Visit   Medications    HYDROcodone-acetaminophen (NORCO)  mg per tablet     Sig: Take 1 tablet by mouth every 4 (four) hours as needed for moderate pain DO NOT FILL BEFORE 11/25/2024 Max Daily Amount: 6 tablets     Dispense:  130 tablet     Refill:  0    HYDROcodone-acetaminophen  (NORCO)  mg per tablet     Sig: Take 1 tablet by mouth every 4 (four) hours as needed for moderate pain Max Daily Amount: 6 tablets     Dispense:  130 tablet     Refill:  0    pregabalin (LYRICA) 200 MG capsule     Sig: Take 1 capsule (200 mg total) by mouth 3 (three) times a day     Dispense:  90 capsule     Refill:  1     .    baclofen 10 mg tablet     Sig: Take 1 tablet (10 mg total) by mouth 3 (three) times a day     Dispense:  270 tablet     Refill:  0       History of Present Illness:  Areli Luicano is a 74 y.o. female who presents for a follow up office visit for medication refill and reevaluation. Her chronic pain remains well-controlled on current pain medication regimen consisting of hydrocodone-acetaminophen, pregabalin, and baclofen. The patient tolerates these medications well without side effects and reports that they reduce her overall pain by about 30%. She has no new complaints today.    Opioid contract date 1/22/24  Last UDS Date 07/08/2024  Norco last taken on 10/28/2024 AM    I have personally reviewed and/or updated the patient's past medical history, past surgical history, family history, social history, current medications, allergies, and vital signs today.     Review of Systems   Constitutional:  Negative for fever.   HENT:  Negative for sinus pain.    Eyes:  Negative for redness.   Respiratory:  Negative for shortness of breath.    Cardiovascular:  Negative for palpitations.   Gastrointestinal:  Negative for abdominal pain and nausea.   Endocrine: Negative for polydipsia.   Genitourinary:  Negative for difficulty urinating.   Musculoskeletal:  Positive for arthralgias and gait problem. Negative for myalgias.        Decreased ROM  Swelling-Knees, hands, hands thighs,   Pain in extremitys- hands and feet   Skin:  Negative for rash.   Neurological:  Positive for dizziness. Negative for seizures and headaches.        Memory loss   Psychiatric/Behavioral:  Negative for decreased  concentration. The patient is not nervous/anxious.    All other systems reviewed and are negative.      Patient Active Problem List   Diagnosis    Chronic pain syndrome    Cervical radiculopathy    Myofascial pain syndrome    Spondylosis of cervical region without myelopathy or radiculopathy    Fibromyalgia    Long-term current use of opiate analgesic    MGUS (monoclonal gammopathy of unknown significance)    Iron deficiency anemia following bariatric surgery    Light headedness    Primary osteoarthritis of right knee    Pseudogout of left knee    Lumbar radiculitis    Chronic bilateral low back pain with bilateral sciatica    Rotator cuff syndrome, left    Left shoulder pain    Dizziness    Other fatigue    Biceps tendinitis of left shoulder    Adhesive capsulitis of left shoulder    Hypoglycemia    Hypothyroidism due to Hashimoto's thyroiditis    Iron deficiency anemia, unspecified    GERD (gastroesophageal reflux disease)    Nicotine dependence with current use    Need for prophylactic vaccination against Streptococcus pneumoniae (pneumococcus)    Needs flu shot    Osteoarthritis    Postnasal drip    Encounter for screening for malignant neoplasm of lung in current smoker with 30 pack year history or greater    High coronary artery calcium score    Mass of upper outer quadrant of right breast    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)    History of Sarah-en-Y gastric bypass    Type 2 diabetes mellitus with hyperlipidemia  (HCC)       Past Medical History:   Diagnosis Date    Anemia     Anxiety     hx of panic attacks (now under control)     Arthritis     Asthma     resolved (no problems in a decade)     Baker's cyst of knee     Bronchitis     Bunion, left foot     Cervical pain     Chronic GERD     Chronic pain     Chronic pain disorder     Depression     Diabetes mellitus, type 2 (HCC)     Diabetic peripheral neuropathy (HCC)     Endometriosis     Fibromyalgia     Hyperlipidemia     Hypertension      Joint pain     Low back pain     Low back pain     Lung nodules     Macular degeneration     Pulmonary emphysema (HCC) 01/16/2020    Spondylosis     Spontaneous pneumothorax     Uterine cancer (HCC)        Past Surgical History:   Procedure Laterality Date    BACK SURGERY  1996    L5, S1- laser surgery fusion of c5 and c6     BREAST CYST EXCISION Right     x2 benign    CHOLECYSTECTOMY  2004    FOOT SURGERY Left 2005    fusion     FRACTURE SURGERY      GASTRIC BYPASS  2010    Dr. Oropeza     HYSTERECTOMY  1985    just uterus     KNEE ARTHROSCOPY      LAMINECTOMY      ORTHOPEDIC SURGERY      OVARIAN CYST REMOVAL  1975    PELVIC LAPAROSCOPY      ovaries (x10)     PLEURAL SCARIFICATION  1967    SHOULDER OPEN ROTATOR CUFF REPAIR Left 2008    TONSILLECTOMY      VAGINAL PROLAPSE REPAIR         Family History   Problem Relation Age of Onset    Hypertension Mother     Lung cancer Mother 53    Hypertension Father     Heart disease Father     Heart attack Father     Cancer Father 42    No Known Problems Sister     No Known Problems Sister     No Known Problems Sister     No Known Problems Maternal Grandmother     No Known Problems Maternal Grandfather     No Known Problems Paternal Grandmother     No Known Problems Paternal Grandfather     No Known Problems Daughter     No Known Problems Daughter     No Known Problems Daughter     Breast cancer Maternal Aunt 48    Breast cancer Paternal Aunt 43    Breast cancer Paternal Aunt 45    Breast cancer Cousin 40    Breast cancer Cousin 42        male       Social History     Occupational History    Not on file   Tobacco Use    Smoking status: Every Day     Current packs/day: 0.30     Average packs/day: 0.6 packs/day for 53.8 years (33.6 ttl pk-yrs)     Types: Cigarettes     Start date: 1971    Smokeless tobacco: Never   Vaping Use    Vaping status: Never Used   Substance and Sexual Activity    Alcohol use: Not Currently     Alcohol/week: 1.0 standard drink of alcohol     Types: 1  Shots of liquor per week     Comment: rarely    Drug use: No    Sexual activity: Not Currently     Partners: Male       Current Outpatient Medications on File Prior to Visit   Medication Sig    amLODIPine (NORVASC) 5 mg tablet TAKE 1 TABLET (5 MG TOTAL) BY MOUTH DAILY.    atorvastatin (LIPITOR) 10 mg tablet Take 1 tablet (10 mg total) by mouth daily    Calcium 600+D3 600-20 MG-MCG TABS TAKE 1 TABLET BY MOUTH TWICE A DAY WITH MEALS    cetirizine (ZyrTEC) 10 mg tablet TAKE 1 TABLET BY MOUTH EVERY DAY    cholecalciferol (VITAMIN D) 400 units/1 mL injections once a week    clotrimazole-betamethasone (LOTRISONE) 1-0.05 % cream APPLY TO AFFECTED AREA 3 TIMES A DAY    Cyanocobalamin (B-12) 1000 MCG TABS TAKE 1 TABLET BY MOUTH EVERY DAY    Diclofenac Sodium (VOLTAREN) 1 % APPLY 2 GRAMS TO AFFECTED AREA 4 TIMES A DAY    diphenhydrAMINE-PSE-APAP (BENADRYL ALLERGY/COLD PO) Take by mouth daily at bedtime    escitalopram (LEXAPRO) 5 mg tablet TAKE 1 TABLET (5 MG TOTAL) BY MOUTH DAILY.    famotidine (PEPCID) 20 mg tablet TAKE 1 TABLET (20 MG TOTAL) BY MOUTH 2 (TWO) TIMES A DAY PLEASE STOP TAGAMET    fexofenadine (ALLEGRA) 180 MG tablet Take 1 tablet (180 mg total) by mouth daily In the morning    ipratropium-albuterol (DUO-NEB) 0.5-2.5 mg/3 mL nebulizer solution Take 3 mL by nebulization every 8 (eight) hours as needed for wheezing or shortness of breath    levothyroxine 25 mcg tablet Take 1 tablet (25 mcg total) by mouth every other day    losartan (COZAAR) 25 mg tablet TAKE 1 TABLET (25 MG TOTAL) BY MOUTH DAILY.    magnesium Oxide (MAG-OX) 400 mg TABS Take 1 tablet (400 mg total) by mouth daily    nortriptyline (PAMELOR) 25 mg capsule TAKE 1 CAPSULE BY MOUTH EVERYDAY AT BEDTIME    omeprazole (PriLOSEC) 40 MG capsule TAKE 1 CAPSULE BY MOUTH 2 TIMES A DAY BEFORE MEALS.    Pediatric Multiple Vitamins (Childrens Chew Multivitamin) CHEW CHEW AND SWALLOW 1 TABLET BY MOUTH EVERY DAY    potassium chloride (MICRO-K) 10 MEQ CR capsule  TAKE 1 CAPSULE BY MOUTH EVERY DAY    sucralfate (CARAFATE) 1 g/10 mL suspension TAKE 10 ML (1 G TOTAL) BY MOUTH 4 TIMES A DAY    traZODone (DESYREL) 50 mg tablet TAKE 1 TABLET BY MOUTH EVERY DAY AT BEDTIME AS NEEDED    Trelegy Ellipta 100-62.5-25 MCG/ACT inhaler INHALE 1 PUFF DAILY RINSE MOUTH AFTER USE.    triamcinolone (KENALOG) 0.1 % cream APPLY TO AFFECTED AREA 2 OR 3 TIMES DAILY AS NEEDED    Vitamin A 2400 MCG (8000 UT) TABS TAKE 1 CAPSULE (8,000 UNITS TOTAL) BY MOUTH DAILY    [DISCONTINUED] baclofen 10 mg tablet Take 1 tablet (10 mg total) by mouth 3 (three) times a day    [DISCONTINUED] HYDROcodone-acetaminophen (NORCO)  mg per tablet Take 1 tablet by mouth every 4 (four) hours as needed for moderate pain Max Daily Amount: 6 tablets    [DISCONTINUED] HYDROcodone-acetaminophen (NORCO)  mg per tablet Take 1 tablet by mouth every 4 (four) hours as needed for moderate pain DO NOT FILL BEFORE 09/30/2024 Max Daily Amount: 6 tablets    [DISCONTINUED] pregabalin (LYRICA) 200 MG capsule Take 1 capsule (200 mg total) by mouth 3 (three) times a day    denosumab (PROLIA) 60 mg/mL Inject 1 mL (60 mg total) under the skin once for 1 dose    levothyroxine 50 mcg tablet TAKE 1 TABLET (50 MCG TOTAL) BY MOUTH EVERY OTHER DAY (Patient not taking: Reported on 10/28/2024)    nicotine (NICODERM CQ) 14 mg/24hr TD 24 hr patch Place 1 patch on the skin over 24 hours every 24 hours (Patient not taking: Reported on 8/30/2024)    nicotine (NICODERM CQ) 21 mg/24 hr TD 24 hr patch Place 1 patch on the skin over 24 hours every 24 hours (Patient not taking: Reported on 8/30/2024)    nicotine (NICODERM CQ) 7 mg/24hr TD 24 hr patch Place 1 patch on the skin over 24 hours every 24 hours (Patient not taking: Reported on 8/30/2024)    predniSONE 10 mg tablet Take 3 tabs daily with breakfast for 3 days then Take 2 tabs daily for 3 Days then take one tab daily for 3 days then stop.. (Patient not taking: Reported on 10/28/2024)     "vitamin A 2400 MCG (8000 UT) capsule TAKE 1 CAPSULE (8,000 UNITS TOTAL) BY MOUTH DAILY (Patient not taking: Reported on 8/30/2024)     Current Facility-Administered Medications on File Prior to Visit   Medication    cyanocobalamin injection 1,000 mcg    cyanocobalamin injection 1,000 mcg    cyanocobalamin injection 1,000 mcg    cyanocobalamin injection 1,000 mcg    cyanocobalamin injection 1,000 mcg    cyanocobalamin injection 1,000 mcg    cyanocobalamin injection 1,000 mcg    cyanocobalamin injection 1,000 mcg       Allergies   Allergen Reactions    Cephalosporins Hives    Erythromycin GI Intolerance    Fentanyl Itching     Occurred during surgery     Paxil [Paroxetine] Hives     After 2 weeks    Penicillins Itching    Pravastatin Myalgia    Statins Itching    Sulfa Antibiotics Diarrhea    Wellbutrin [Bupropion] Hives     After 2 weeks     Chantix [Varenicline] Rash and Other (See Comments)     Burning and itching    Marzena's Wort (Hypericum Perforatum) Rash       Physical Exam:    /68   Pulse 66   Ht 5' 5\" (1.651 m)   Wt 81.2 kg (179 lb)   LMP  (LMP Unknown)   BMI 29.79 kg/m²     Constitutional:normal, well developed, well nourished, alert, in no distress and non-toxic and no overt pain behavior.  Eyes:anicteric  HEENT:grossly intact  Neck:supple, symmetric, trachea midline and no masses   Pulmonary:even and unlabored  Cardiovascular:No edema or pitting edema present  Skin:Normal without rashes or lesions and well hydrated  Psychiatric:Mood and affect appropriate  Neurologic:Cranial Nerves II-XII grossly intact  Musculoskeletal: ambulates with a cane    "

## 2024-10-28 NOTE — TELEPHONE ENCOUNTER
Caller: Areli KIDD    Doctor: Roxanna JOYNER    Reason for call: Pt called in to see if she can  the paper script due to the pharmacies system is currently down . And cancel out the electronic script     Call back#: [phone]

## 2024-10-30 LAB
6MAM UR QL CFM: NEGATIVE NG/ML
7AMINOCLONAZEPAM UR QL CFM: NEGATIVE NG/ML
A-OH ALPRAZ UR QL CFM: NEGATIVE NG/ML
ACCEPTABLE CREAT UR QL: NORMAL MG/DL
ACCEPTIBLE SP GR UR QL: NORMAL
AMPHET UR QL CFM: NEGATIVE NG/ML
AMPHET UR QL CFM: NEGATIVE NG/ML
BUPRENORPHINE UR QL CFM: NEGATIVE NG/ML
BUTALBITAL UR QL CFM: NEGATIVE NG/ML
BZE UR QL CFM: NEGATIVE NG/ML
CODEINE UR QL CFM: NEGATIVE NG/ML
DESIPRAMINE UR QL CFM: NEGATIVE NG/ML
EDDP UR QL CFM: NEGATIVE NG/ML
ETHYL GLUCURONIDE UR QL CFM: NEGATIVE NG/ML
ETHYL SULFATE UR QL SCN: NEGATIVE NG/ML
FENTANYL UR QL CFM: NEGATIVE NG/ML
GLIADIN IGG SER IA-ACNC: NEGATIVE NG/ML
GLUCOSE 30M P 50 G LAC PO SERPL-MCNC: NEGATIVE NG/ML
HYDROCODONE UR QL CFM: NORMAL NG/ML
HYDROCODONE UR QL CFM: NORMAL NG/ML
HYDROMORPHONE UR QL CFM: NORMAL NG/ML
LORAZEPAM UR QL CFM: NEGATIVE NG/ML
MDMA UR QL CFM: NEGATIVE NG/ML
ME-PHENIDATE UR QL CFM: NEGATIVE NG/ML
MEPERIDINE UR QL CFM: NEGATIVE NG/ML
METHADONE UR QL CFM: NEGATIVE NG/ML
METHAMPHET UR QL CFM: NEGATIVE NG/ML
MORPHINE UR QL CFM: NEGATIVE NG/ML
MORPHINE UR QL CFM: NEGATIVE NG/ML
NITRITE UR QL: NORMAL UG/ML
NORBUPRENORPHINE UR QL CFM: NEGATIVE NG/ML
NORDIAZEPAM UR QL CFM: NEGATIVE NG/ML
NORFENTANYL UR QL CFM: NEGATIVE NG/ML
NORHYDROCODONE UR QL CFM: NORMAL NG/ML
NORHYDROCODONE UR QL CFM: NORMAL NG/ML
NORMEPERIDINE UR QL CFM: NEGATIVE NG/ML
NOROXYCODONE UR QL CFM: NEGATIVE NG/ML
OLANZAPINE QUANTIFICATION: NEGATIVE NG/ML
OPC-3373 QUANTIFICATION: NEGATIVE NG/ML
OXAZEPAM UR QL CFM: NEGATIVE NG/ML
OXYCODONE UR QL CFM: NEGATIVE NG/ML
OXYMORPHONE UR QL CFM: NEGATIVE NG/ML
OXYMORPHONE UR QL CFM: NEGATIVE NG/ML
PARA-FLUOROFENTANYL QUANTIFICATION: NORMAL NG/ML
PHENOBARB UR QL CFM: NEGATIVE NG/ML
RESULT ALL_PRESCRIBED MEDS AND SPECIAL INSTRUCTIONS: NORMAL
SECOBARBITAL UR QL CFM: NEGATIVE NG/ML
SL AMB 7-OH-MITRAGYNINE (KRATOM ALKALOID) QUANTIFICATION: NEGATIVE NG/ML
SL AMB ACETYL FENTANYL QUANTIFICATION: NORMAL NG/ML
SL AMB ACETYL NORFENTANYL QUANTIFICATION: NORMAL NG/ML
SL AMB ACRYL FENTANYL QUANTIFICATION: NORMAL NG/ML
SL AMB CARFENTANIL QUANTIFICATION: NORMAL NG/ML
SL AMB CLOZAPINE QUANTIFICATION: NEGATIVE NG/ML
SL AMB CTHC (MARIJUANA METABOLITE) QUANTIFICATION: NEGATIVE NG/ML
SL AMB DEXTROMETHORPHAN QUANTIFICATION: NEGATIVE NG/ML
SL AMB DEXTRORPHAN (DEXTROMETHORPHAN METABOLITE) QUANT: NEGATIVE NG/ML
SL AMB DEXTRORPHAN (DEXTROMETHORPHAN METABOLITE) QUANT: NEGATIVE NG/ML
SL AMB HALOPERIDOL  QUANTIFICATION: NEGATIVE NG/ML
SL AMB HALOPERIDOL METABOLITE QUANTIFICATION: NEGATIVE NG/ML
SL AMB HYDROXYRISPERIDONE QUANTIFICATION: NEGATIVE NG/ML
SL AMB N-DESMETHYL-TRAMADOL QUANTIFICATION: NEGATIVE NG/ML
SL AMB N-DESMETHYLCLOZAPINE QUANTIFICATION: NEGATIVE NG/ML
SL AMB NORQUETIAPINE QUANTIFICATION: NEGATIVE NG/ML
SL AMB PHENTERMINE QUANTIFICATION: NEGATIVE NG/ML
SL AMB QUETIAPINE QUANTIFICATION: NEGATIVE NG/ML
SL AMB RISPERIDONE QUANTIFICATION: NEGATIVE NG/ML
SL AMB RITALINIC ACID QUANTIFICATION: NEGATIVE NG/ML
SPECIMEN PH ACCEPTABLE UR: NORMAL
TAPENTADOL UR QL CFM: NEGATIVE NG/ML
TEMAZEPAM UR QL CFM: NEGATIVE NG/ML
TEMAZEPAM UR QL CFM: NEGATIVE NG/ML
TRAMADOL UR QL CFM: NEGATIVE NG/ML
URATE/CREAT 24H UR: NEGATIVE NG/ML

## 2024-11-04 ENCOUNTER — TELEPHONE (OUTPATIENT)
Dept: GASTROENTEROLOGY | Facility: MEDICAL CENTER | Age: 74
End: 2024-11-04

## 2024-11-04 DIAGNOSIS — K21.9 GASTROESOPHAGEAL REFLUX DISEASE WITHOUT ESOPHAGITIS: ICD-10-CM

## 2024-11-04 DIAGNOSIS — R12 FUNCTIONAL HEARTBURN: ICD-10-CM

## 2024-11-04 RX ORDER — NORTRIPTYLINE HYDROCHLORIDE 25 MG/1
25 CAPSULE ORAL
Qty: 90 CAPSULE | Refills: 1 | Status: SHIPPED | OUTPATIENT
Start: 2024-11-04

## 2024-11-04 NOTE — TELEPHONE ENCOUNTER
Called and spoke to the patient. Informed that we received the refill request from [facility] and our provider has resent the Nortriptyline prescription. Patient thanked us and wanted to reschedule her follow up not realizing she chose the day after makenzie. Rescheduled for the next available 5/13/24 and added to the waitlist. Thank you!

## 2024-11-04 NOTE — TELEPHONE ENCOUNTER
We received a faxed refill request for Nortiptyline HCL 25 MG capsults, qty 90, from patient's primary pharmacy, [id number] on [address]

## 2024-11-06 ENCOUNTER — OFFICE VISIT (OUTPATIENT)
Dept: CARDIOLOGY CLINIC | Facility: CLINIC | Age: 74
End: 2024-11-06
Payer: COMMERCIAL

## 2024-11-06 VITALS
HEIGHT: 65 IN | HEART RATE: 60 BPM | BODY MASS INDEX: 30.04 KG/M2 | WEIGHT: 180.3 LBS | DIASTOLIC BLOOD PRESSURE: 50 MMHG | SYSTOLIC BLOOD PRESSURE: 110 MMHG | OXYGEN SATURATION: 97 %

## 2024-11-06 DIAGNOSIS — R42 DIZZINESS: ICD-10-CM

## 2024-11-06 DIAGNOSIS — R53.83 OTHER FATIGUE: ICD-10-CM

## 2024-11-06 DIAGNOSIS — E78.5 TYPE 2 DIABETES MELLITUS WITH HYPERLIPIDEMIA  (HCC): ICD-10-CM

## 2024-11-06 DIAGNOSIS — E11.69 TYPE 2 DIABETES MELLITUS WITH HYPERLIPIDEMIA  (HCC): ICD-10-CM

## 2024-11-06 DIAGNOSIS — R07.89 ATYPICAL CHEST PAIN: Primary | ICD-10-CM

## 2024-11-06 DIAGNOSIS — F17.200 NICOTINE DEPENDENCE WITH CURRENT USE: ICD-10-CM

## 2024-11-06 DIAGNOSIS — R93.1 HIGH CORONARY ARTERY CALCIUM SCORE: ICD-10-CM

## 2024-11-06 PROCEDURE — 99214 OFFICE O/P EST MOD 30 MIN: CPT

## 2024-11-06 NOTE — ASSESSMENT & PLAN NOTE
Lab Results   Component Value Date    HGBA1C 5.2 08/26/2024   Last LDL IN AUGUST 2024 at 82  Continue on lipitor 10 mg daily

## 2024-11-06 NOTE — ASSESSMENT & PLAN NOTE
Patient is an active smoker, multiple pulmonary nodules-following up with pulmonary  She is thinking about stopping smoking    normal...

## 2024-11-06 NOTE — ASSESSMENT & PLAN NOTE
Reports intermittent random left sided chest pain, not affected by rest or activity. No other associated symptoms  She did not complete her RX stress test . States that she could not have her arm up  for 15 min.  Her symptoms do not sound anginal in nature. Will treat conservatively  She does have family history of CAD and coronary calcifications  Reinforced medication compliance, smoking cessation, heart healthy diet and activity as tolerated  If symptoms worsen, she will go to the ER/ call the office

## 2024-11-06 NOTE — PROGRESS NOTES
General Cardiology Note  Areli Luciano  1950  [id number]  [facility] [place]  [address]  [place] [address] [phone]    Referring Provider - No ref. provider found  Chief Complaint   Patient presents with    Follow-up     Assessment & Plan  Atypical chest pain  Reports intermittent random left sided chest pain, not affected by rest or activity. No other associated symptoms  She did not complete her RX stress test . States that she could not have her arm up  for 15 min.  Her symptoms do not sound anginal in nature. Will treat conservatively  She does have family history of CAD and coronary calcifications  Reinforced medication compliance, smoking cessation, heart healthy diet and activity as tolerated  If symptoms worsen, she will go to the ER/ call the office  High coronary artery calcium score  Chest CT 9/25/2023 demonstrated extensive coronary arterial calcifications  Lipids are controlled  Other fatigue  Fatigue is not worsening  She has gotten iron infusions and is following with her PCP  Dizziness  She reports some lightheadeness  Continue hydration  Nicotine dependence with current use  Patient is an active smoker, multiple pulmonary nodules-following up with pulmonary  She is thinking about stopping smoking   Type 2 diabetes mellitus with hyperlipidemia  (HCC)    Lab Results   Component Value Date    HGBA1C 5.2 08/26/2024   Last LDL IN AUGUST 2024 at 82  Continue on lipitor 10 mg daily         Will RTO in  6 months or sooner if necessary. Will call with any concerns.     Interval History: 74 y.o.  female  with PMH as below presents for 6 m f/u. Follows with Dr. Escobar    - April 2024 2 weeks ZIO No significant arrhythmias  - Echocardiogram 4/11/2024 demonstrated LVEF of 65%. G1DD. There was LAE.  There was mild AV leaflet sclerosis, mild AR.  Mild to moderate TR with PASP 44 mmHg   - Chest CT 9/25/2023 demonstrated extensive coronary arterial  calcifications.  There was no thoracic aortic aneurysm.    - Lipid profile 2024 improved: total cholesterol of 204 with an HDL of 70 and a calculated LDL of 82.    She saw EP in 2024. She was evaluated for lightheadedness, fatigue and exertional intolerance in the setting of bradycardia.  Dayan shows no significant arrhthymias   Saw Dr Escobar in 2024 and C/O of intermittent chest heaviness.  She did not complete her RX stress test . States that she could not have her arm up  for 15 min.    - Today,  continues to report occasional lightheadedness, when turning too fast. She continues with fatigue but not worsening. Still has random Left sided chest pressure, sometimes last half a day but not frequently. If she presses and massage her chest, the pain is relieved in an hour  She watches her salt, fat and sugar intake  Not very active, uses the cane. She reports back pain and knee pain  Patient is a long-term tobacco user.  She has smoked since the age of 21.  She smoked 1-1/2 pack of cigarettes per day but is now on half a pack.  She previously used Chantix successfully but then started smoking again.  She states that cigarettes are getting too expensive and she states that she will eventually quit smoking.  She has had gastric bypass.  In reference to family history she has 2 maternal cousins who  in their 40s from heart disease.  A maternal aunt had heart disease in her 70s.  Her father had heart disease and  at the age of 76.      Patient Active Problem List    Diagnosis Date Noted    Atypical chest pain 2024    Type 2 diabetes mellitus with hyperlipidemia  (HCC) 2024    History of Sarah-en-Y gastric bypass 2024    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) 2024    Mass of upper outer quadrant of right breast 2024    High coronary artery calcium score 2024    GERD (gastroesophageal reflux disease) 2024    Nicotine dependence with current  use 01/08/2024    Need for prophylactic vaccination against Streptococcus pneumoniae (pneumococcus) 01/08/2024    Needs flu shot 01/08/2024    Osteoarthritis 01/08/2024    Postnasal drip 01/08/2024    Encounter for screening for malignant neoplasm of lung in current smoker with 30 pack year history or greater 01/08/2024    Iron deficiency anemia, unspecified 06/28/2021    Hypothyroidism due to Hashimoto's thyroiditis 03/03/2021    Hypoglycemia 11/06/2020    Biceps tendinitis of left shoulder 10/20/2020    Adhesive capsulitis of left shoulder 10/20/2020    Dizziness 08/26/2020    Other fatigue 08/26/2020    Rotator cuff syndrome, left 07/21/2020    Left shoulder pain 07/21/2020    Chronic bilateral low back pain with bilateral sciatica 06/29/2020    Lumbar radiculitis     Primary osteoarthritis of right knee 05/11/2020    Pseudogout of left knee 05/11/2020    Light headedness 03/28/2019    MGUS (monoclonal gammopathy of unknown significance) 07/31/2018    Iron deficiency anemia following bariatric surgery 07/31/2018    Long-term current use of opiate analgesic 04/09/2018    Chronic pain syndrome 02/14/2018    Cervical radiculopathy 02/14/2018    Myofascial pain syndrome 02/14/2018    Spondylosis of cervical region without myelopathy or radiculopathy 02/14/2018    Fibromyalgia 02/14/2018     Past Medical History:   Diagnosis Date    Anemia     Anxiety     hx of panic attacks (now under control)     Arthritis     Asthma     resolved (no problems in a decade)     Baker's cyst of knee     Bronchitis     Bunion, left foot     Cervical pain     Chronic GERD     Chronic pain     Chronic pain disorder     Depression     Diabetes mellitus, type 2 (HCC)     Diabetic peripheral neuropathy (HCC)     Endometriosis     Fibromyalgia     Hyperlipidemia     Hypertension     Joint pain     Low back pain     Low back pain     Lung nodules     Macular degeneration     Pulmonary emphysema (HCC) 01/16/2020    Spondylosis     Spontaneous  pneumothorax     Uterine cancer (HCC)      Social History     Tobacco Use    Smoking status: Every Day     Current packs/day: 0.30     Average packs/day: 0.6 packs/day for 53.8 years (33.7 ttl pk-yrs)     Types: Cigarettes     Start date: 1971    Smokeless tobacco: Never   Vaping Use    Vaping status: Never Used   Substance Use Topics    Alcohol use: Not Currently     Alcohol/week: 1.0 standard drink of alcohol     Types: 1 Shots of liquor per week     Comment: rarely    Drug use: No      Family History   Problem Relation Age of Onset    Hypertension Mother     Lung cancer Mother 53    Hypertension Father     Heart disease Father     Heart attack Father     Cancer Father 42    No Known Problems Sister     No Known Problems Sister     No Known Problems Sister     No Known Problems Maternal Grandmother     No Known Problems Maternal Grandfather     No Known Problems Paternal Grandmother     No Known Problems Paternal Grandfather     No Known Problems Daughter     No Known Problems Daughter     No Known Problems Daughter     Breast cancer Maternal Aunt 48    Breast cancer Paternal Aunt 43    Breast cancer Paternal Aunt 45    Breast cancer Cousin 40    Breast cancer Cousin 42        male     Past Surgical History:   Procedure Laterality Date    BACK SURGERY  1996    L5, S1- laser surgery fusion of c5 and c6     BREAST CYST EXCISION Right     x2 benign    CHOLECYSTECTOMY  2004    FOOT SURGERY Left 2005    fusion     FRACTURE SURGERY      GASTRIC BYPASS  2010    Dr. Oropeza     HYSTERECTOMY  1985    just uterus     KNEE ARTHROSCOPY      LAMINECTOMY      ORTHOPEDIC SURGERY      OVARIAN CYST REMOVAL  1975    PELVIC LAPAROSCOPY      ovaries (x10)     PLEURAL SCARIFICATION  1967    SHOULDER OPEN ROTATOR CUFF REPAIR Left 2008    TONSILLECTOMY      VAGINAL PROLAPSE REPAIR         Current Outpatient Medications:     amLODIPine (NORVASC) 5 mg tablet, TAKE 1 TABLET (5 MG TOTAL) BY MOUTH DAILY., Disp: 90 tablet, Rfl: 1     atorvastatin (LIPITOR) 10 mg tablet, Take 1 tablet (10 mg total) by mouth daily, Disp: 90 tablet, Rfl: 3    baclofen 10 mg tablet, Take 1 tablet (10 mg total) by mouth 3 (three) times a day, Disp: 270 tablet, Rfl: 0    Calcium 600+D3 600-20 MG-MCG TABS, TAKE 1 TABLET BY MOUTH TWICE A DAY WITH MEALS, Disp: 180 tablet, Rfl: 4    cetirizine (ZyrTEC) 10 mg tablet, TAKE 1 TABLET BY MOUTH EVERY DAY, Disp: 90 tablet, Rfl: 2    cholecalciferol (VITAMIN D) 400 units/1 mL, injections once a week, Disp: , Rfl:     Cyanocobalamin (B-12) 1000 MCG TABS, TAKE 1 TABLET BY MOUTH EVERY DAY, Disp: 90 tablet, Rfl: 3    denosumab (PROLIA) 60 mg/mL, Inject 1 mL (60 mg total) under the skin once for 1 dose, Disp: 1 mL, Rfl: 0    escitalopram (LEXAPRO) 5 mg tablet, TAKE 1 TABLET (5 MG TOTAL) BY MOUTH DAILY., Disp: 90 tablet, Rfl: 1    famotidine (PEPCID) 20 mg tablet, TAKE 1 TABLET (20 MG TOTAL) BY MOUTH 2 (TWO) TIMES A DAY PLEASE STOP TAGAMET, Disp: 180 tablet, Rfl: 1    fexofenadine (ALLEGRA) 180 MG tablet, Take 1 tablet (180 mg total) by mouth daily In the morning, Disp: 90 tablet, Rfl: 3    HYDROcodone-acetaminophen (NORCO)  mg per tablet, Take 1 tablet by mouth every 4 (four) hours as needed for moderate pain DO NOT FILL BEFORE 11/25/2024 Max Daily Amount: 6 tablets, Disp: 130 tablet, Rfl: 0    ipratropium-albuterol (DUO-NEB) 0.5-2.5 mg/3 mL nebulizer solution, Take 3 mL by nebulization every 8 (eight) hours as needed for wheezing or shortness of breath, Disp: 270 mL, Rfl: 3    levothyroxine 25 mcg tablet, Take 1 tablet (25 mcg total) by mouth every other day, Disp: 45 tablet, Rfl: 3    levothyroxine 50 mcg tablet, TAKE 1 TABLET (50 MCG TOTAL) BY MOUTH EVERY OTHER DAY, Disp: 50 tablet, Rfl: 1    losartan (COZAAR) 25 mg tablet, TAKE 1 TABLET (25 MG TOTAL) BY MOUTH DAILY., Disp: 90 tablet, Rfl: 1    magnesium Oxide (MAG-OX) 400 mg TABS, Take 1 tablet (400 mg total) by mouth daily, Disp: 90 tablet, Rfl: 0    nortriptyline (PAMELOR)  25 mg capsule, Take 1 capsule (25 mg total) by mouth daily at bedtime, Disp: 90 capsule, Rfl: 1    omeprazole (PriLOSEC) 40 MG capsule, TAKE 1 CAPSULE BY MOUTH 2 TIMES A DAY BEFORE MEALS., Disp: 180 capsule, Rfl: 1    Pediatric Multiple Vitamins (Childrens Chew Multivitamin) CHEW, CHEW AND SWALLOW 1 TABLET BY MOUTH EVERY DAY, Disp: 100 tablet, Rfl: 1    potassium chloride (MICRO-K) 10 MEQ CR capsule, TAKE 1 CAPSULE BY MOUTH EVERY DAY, Disp: 90 capsule, Rfl: 1    pregabalin (LYRICA) 200 MG capsule, Take 1 capsule (200 mg total) by mouth 3 (three) times a day, Disp: 90 capsule, Rfl: 1    sucralfate (CARAFATE) 1 g/10 mL suspension, TAKE 10 ML (1 G TOTAL) BY MOUTH 4 TIMES A DAY, Disp: 3780 mL, Rfl: 1    traZODone (DESYREL) 50 mg tablet, TAKE 1 TABLET BY MOUTH EVERY DAY AT BEDTIME AS NEEDED, Disp: 90 tablet, Rfl: 1    Trelegy Ellipta 100-62.5-25 MCG/ACT inhaler, INHALE 1 PUFF DAILY RINSE MOUTH AFTER USE., Disp: 60 each, Rfl: 5    Vitamin A 2400 MCG (8000 UT) TABS, TAKE 1 CAPSULE (8,000 UNITS TOTAL) BY MOUTH DAILY, Disp: , Rfl:     clotrimazole-betamethasone (LOTRISONE) 1-0.05 % cream, APPLY TO AFFECTED AREA 3 TIMES A DAY, Disp: 60 g, Rfl: 1    Diclofenac Sodium (VOLTAREN) 1 %, APPLY 2 GRAMS TO AFFECTED AREA 4 TIMES A DAY, Disp: 200 g, Rfl: 0    diphenhydrAMINE-PSE-APAP (BENADRYL ALLERGY/COLD PO), Take by mouth daily at bedtime, Disp: , Rfl:     HYDROcodone-acetaminophen (NORCO)  mg per tablet, Take 1 tablet by mouth every 4 (four) hours as needed for moderate pain Max Daily Amount: 6 tablets, Disp: 130 tablet, Rfl: 0    nicotine (NICODERM CQ) 14 mg/24hr TD 24 hr patch, Place 1 patch on the skin over 24 hours every 24 hours (Patient not taking: Reported on 8/30/2024), Disp: 28 patch, Rfl: 0    nicotine (NICODERM CQ) 21 mg/24 hr TD 24 hr patch, Place 1 patch on the skin over 24 hours every 24 hours (Patient not taking: Reported on 8/30/2024), Disp: 28 patch, Rfl: 0    nicotine (NICODERM CQ) 7 mg/24hr TD 24 hr patch,  Place 1 patch on the skin over 24 hours every 24 hours (Patient not taking: Reported on 8/30/2024), Disp: 28 patch, Rfl: 0    predniSONE 10 mg tablet, Take 3 tabs daily with breakfast for 3 days then Take 2 tabs daily for 3 Days then take one tab daily for 3 days then stop.. (Patient not taking: Reported on 10/28/2024), Disp: 20 tablet, Rfl: 0    triamcinolone (KENALOG) 0.1 % cream, APPLY TO AFFECTED AREA 2 OR 3 TIMES DAILY AS NEEDED, Disp: , Rfl:     vitamin A 2400 MCG (8000 UT) capsule, TAKE 1 CAPSULE (8,000 UNITS TOTAL) BY MOUTH DAILY (Patient not taking: Reported on 8/30/2024), Disp: 90 capsule, Rfl: 2    Current Facility-Administered Medications:     cyanocobalamin injection 1,000 mcg, 1,000 mcg, Intramuscular, Q30 Days, Aleksandar Garduno MD, 1,000 mcg at 12/08/20 1118    cyanocobalamin injection 1,000 mcg, 1,000 mcg, Intramuscular, Q30 Days, Aleksandar Garduno MD, 1,000 mcg at 07/08/20 1036    cyanocobalamin injection 1,000 mcg, 1,000 mcg, Intramuscular, Q30 Days, Aleksandar Garduno MD, 1,000 mcg at 09/08/20 1210    cyanocobalamin injection 1,000 mcg, 1,000 mcg, Intramuscular, Q30 Days, Aleksandar Garduno MD    cyanocobalamin injection 1,000 mcg, 1,000 mcg, Intramuscular, Q30 Days, Aleksandar Garduno MD    cyanocobalamin injection 1,000 mcg, 1,000 mcg, Intramuscular, Q30 Days, Aleksandar Garduno MD, 1,000 mcg at 08/18/21 1019    cyanocobalamin injection 1,000 mcg, 1,000 mcg, Intramuscular, Q30 Days, Aleksandar Garduno MD, 1,000 mcg at 03/21/23 1117    cyanocobalamin injection 1,000 mcg, 1,000 mcg, Intramuscular, Q30 Days, Aleksandar Garduno MD, 1,000 mcg at 07/13/23 1004    Allergies   Allergen Reactions    Cephalosporins Hives    Erythromycin GI Intolerance    Fentanyl Itching     Occurred during surgery     Paxil [Paroxetine] Hives     After 2 weeks    Penicillins Itching    Pravastatin Myalgia    Statins Itching    Sulfa Antibiotics Diarrhea    Wellbutrin [Bupropion] Hives     After 2 weeks     Chantix [Varenicline] Rash and Other (See Comments)      "Burning and itching    Marzena's Wort (Hypericum Perforatum) Rash       Vitals:    11/06/24 1052   BP: 110/50   BP Location: Left arm   Patient Position: Sitting   Cuff Size: Large   Pulse: 60   SpO2: 97%   Weight: 81.8 kg (180 lb 4.8 oz)   Height: 5' 5\" (1.651 m)        Vitals:    11/06/24 1052   Weight: 81.8 kg (180 lb 4.8 oz)      Height: 5' 5\" (165.1 cm)   Body mass index is 30 kg/m².    Labs:     Chemistry        Component Value Date/Time    K 3.2 (L) 08/28/2024 0828    K 3.6 05/02/2023 0831     08/28/2024 0828     05/02/2023 0831    CO2 30 08/28/2024 0828    CO2 29 05/02/2023 0831    BUN 6 08/28/2024 0828    BUN 7 05/02/2023 0831    CREATININE 0.62 08/28/2024 0828        Component Value Date/Time    CALCIUM 9.5 08/28/2024 0828    CALCIUM 8.9 05/02/2023 0831    ALKPHOS 98 08/28/2024 0828    ALKPHOS 109 05/02/2023 0831    AST 25 08/28/2024 0828    AST 21 05/02/2023 0831    ALT 17 08/28/2024 0828    ALT 16 05/02/2023 0831          Lab Results   Component Value Date    HGBA1C 5.2 08/26/2024      No results found for: \"CHOL\"  Lab Results   Component Value Date    HDL 70 08/28/2024    HDL 62 05/28/2024    HDL 57 01/11/2024     Lab Results   Component Value Date    LDLCALC 82 08/28/2024    LDLCALC 76 05/28/2024    LDLCALC 122 (H) 01/11/2024     Lab Results   Component Value Date    TRIG 87 08/28/2024    TRIG 106 05/28/2024    TRIG 123 01/11/2024     No results found for: \"CHOLHDL\"    Imaging: No results found.      Review of Systems   All other systems reviewed and are negative.  Except as noted in HPI, is otherwise reviewed in detail and a 12 point review of systems is negative.      Physical Exam  Vitals reviewed.   Constitutional:       General: She is not in acute distress.     Appearance: Normal appearance. She is not diaphoretic.   HENT:      Head: Normocephalic and atraumatic.   Eyes:      General: No scleral icterus.     Extraocular Movements: Extraocular movements intact.      Pupils: Pupils are " equal, round, and reactive to light.   Cardiovascular:      Rate and Rhythm: Normal rate and regular rhythm.      Pulses: Normal pulses.           Radial pulses are 2+ on the right side and 2+ on the left side.      Heart sounds: S1 normal and S2 normal. Murmur heard.   Pulmonary:      Effort: Pulmonary effort is normal. No tachypnea or respiratory distress.      Breath sounds: Normal breath sounds. No wheezing or rales.   Abdominal:      General: Bowel sounds are normal. There is no distension.      Palpations: Abdomen is soft.   Musculoskeletal:      Right lower leg: No edema.      Left lower leg: No edema.   Skin:     General: Skin is warm and dry.      Capillary Refill: Capillary refill takes less than 2 seconds.   Neurological:      Mental Status: She is alert and oriented to person, place, and time.      Gait: Gait abnormal (uses a cane).   Psychiatric:         Mood and Affect: Mood normal.         Behavior: Behavior normal.          **Please Note: This note may have been constructed using a voice recognition system**     Thank you for the opportunity to participate in the care of this patient.   CARTER Prince

## 2024-11-06 NOTE — PATIENT INSTRUCTIONS
Continue current medications  Monitor your blood pressure and heart rate and keep a diary of your readings   Continue heart healthy diet by limiting saturated fat and added sugars  Caution with condiments.  Stay active and hydrated  Bring complete list of medications to your follow-up appointment.   Follow-up in 6 months or earlier if any concerns  Please call the office if you have any questions

## 2024-11-15 ENCOUNTER — APPOINTMENT (OUTPATIENT)
Dept: LAB | Facility: HOSPITAL | Age: 74
End: 2024-11-15
Payer: COMMERCIAL

## 2024-11-15 ENCOUNTER — TELEPHONE (OUTPATIENT)
Age: 74
End: 2024-11-15

## 2024-11-15 DIAGNOSIS — E06.3 HYPOTHYROIDISM DUE TO HASHIMOTO'S THYROIDITIS: ICD-10-CM

## 2024-11-15 DIAGNOSIS — F17.210 CIGARETTE SMOKER: ICD-10-CM

## 2024-11-15 DIAGNOSIS — I10 ESSENTIAL HYPERTENSION: ICD-10-CM

## 2024-11-15 DIAGNOSIS — J44.1 ACUTE EXACERBATION OF CHRONIC OBSTRUCTIVE PULMONARY DISEASE (COPD) (HCC): ICD-10-CM

## 2024-11-15 DIAGNOSIS — R53.83 OTHER FATIGUE: ICD-10-CM

## 2024-11-15 DIAGNOSIS — J43.9 PULMONARY EMPHYSEMA, UNSPECIFIED EMPHYSEMA TYPE (HCC): ICD-10-CM

## 2024-11-15 DIAGNOSIS — E08.44 DIABETES MELLITUS DUE TO UNDERLYING CONDITION WITH DIABETIC AMYOTROPHY, WITHOUT LONG-TERM CURRENT USE OF INSULIN (HCC): ICD-10-CM

## 2024-11-15 LAB
B BURGDOR IGG+IGM SER QL IA: NEGATIVE
BACTERIA UR QL AUTO: ABNORMAL /HPF
BILIRUB UR QL STRIP: NEGATIVE
CLARITY UR: ABNORMAL
COLOR UR: YELLOW
FERRITIN SERPL-MCNC: 382 NG/ML (ref 11–307)
GLUCOSE UR STRIP-MCNC: NEGATIVE MG/DL
HETEROPH AB SER QL: NEGATIVE
HGB UR QL STRIP.AUTO: NEGATIVE
KETONES UR STRIP-MCNC: NEGATIVE MG/DL
LEUKOCYTE ESTERASE UR QL STRIP: 500
NITRITE UR QL STRIP: NEGATIVE
NON-SQ EPI CELLS URNS QL MICRO: ABNORMAL /HPF
OTHER STN SPEC: ABNORMAL
PH UR STRIP.AUTO: 7 [PH]
PROT UR STRIP-MCNC: NEGATIVE MG/DL
RBC #/AREA URNS AUTO: ABNORMAL /HPF
SP GR UR STRIP.AUTO: 1 (ref 1–1.04)
T4 FREE SERPL-MCNC: 0.97 NG/DL (ref 0.61–1.12)
TSH SERPL DL<=0.05 MIU/L-ACNC: 2.61 UIU/ML (ref 0.45–4.5)
UROBILINOGEN UA: NEGATIVE MG/DL
WBC #/AREA URNS AUTO: ABNORMAL /HPF

## 2024-11-15 PROCEDURE — 81001 URINALYSIS AUTO W/SCOPE: CPT

## 2024-11-15 PROCEDURE — 86308 HETEROPHILE ANTIBODY SCREEN: CPT

## 2024-11-15 PROCEDURE — 84439 ASSAY OF FREE THYROXINE: CPT

## 2024-11-15 PROCEDURE — 86644 CMV ANTIBODY: CPT

## 2024-11-15 PROCEDURE — 86618 LYME DISEASE ANTIBODY: CPT

## 2024-11-15 PROCEDURE — 36415 COLL VENOUS BLD VENIPUNCTURE: CPT

## 2024-11-15 PROCEDURE — 82728 ASSAY OF FERRITIN: CPT

## 2024-11-15 PROCEDURE — 84443 ASSAY THYROID STIM HORMONE: CPT

## 2024-11-15 PROCEDURE — 86645 CMV ANTIBODY IGM: CPT

## 2024-11-15 RX ORDER — BENZONATATE 100 MG/1
100 CAPSULE ORAL 3 TIMES DAILY PRN
Qty: 30 CAPSULE | Refills: 0 | Status: SHIPPED | OUTPATIENT
Start: 2024-11-15

## 2024-11-15 RX ORDER — LEVOFLOXACIN 500 MG/1
500 TABLET, FILM COATED ORAL EVERY 24 HOURS
Qty: 10 TABLET | Refills: 0 | Status: SHIPPED | OUTPATIENT
Start: 2024-11-15 | End: 2024-11-25

## 2024-11-15 RX ORDER — PREDNISONE 10 MG/1
TABLET ORAL
Qty: 20 TABLET | Refills: 0 | Status: SHIPPED | OUTPATIENT
Start: 2024-11-15

## 2024-11-15 NOTE — TELEPHONE ENCOUNTER
Patient contacted the office this morning asking if pcp would be willing to call in a prescription for a cough/green mucus that she has been experiencing for the last 2 1/2-3 weeks. Does have SOB especially when walking too much. Feels she can do things just takes her longer to complete task(example walking in a store taking 15 minutes can take over an hour to complete). Also, mentioned about UTI which she frequently gets did not have one this month but states sometimes feels the burning like she is going to get one and doesn't start. I did relay pcp would most likely want her to be further evaluated but she states pcp usually calls in prescription for her w/o visit. [facility] [place]. Please contact patient back with an update, thank you.

## 2024-11-18 ENCOUNTER — RESULTS FOLLOW-UP (OUTPATIENT)
Dept: FAMILY MEDICINE CLINIC | Facility: CLINIC | Age: 74
End: 2024-11-18

## 2024-11-18 LAB
CMV IGG SERPL QL IA: NEGATIVE
CMV IGM SERPL QL IA: NEGATIVE

## 2024-11-18 NOTE — TELEPHONE ENCOUNTER
Spoke with the patient.  She states that she gets iron infusions and does not take OTC supplements.  Being managed by Hematology.

## 2024-11-18 NOTE — TELEPHONE ENCOUNTER
----- Message from Aleksandar Garduno MD sent at 11/16/2024  1:27 PM EST -----  Stop all FE supplements,. Repeat; feeritin level in 3 mos  ----- Message -----  From: Lab, Background User  Sent: 11/15/2024  10:24 AM EST  To: Aleksandar Garduno MD

## 2024-11-25 ENCOUNTER — TELEPHONE (OUTPATIENT)
Age: 74
End: 2024-11-25

## 2024-11-25 DIAGNOSIS — F11.20 UNCOMPLICATED OPIOID DEPENDENCE (HCC): ICD-10-CM

## 2024-11-25 DIAGNOSIS — G89.4 CHRONIC PAIN SYNDROME: ICD-10-CM

## 2024-11-25 DIAGNOSIS — Z79.891 LONG-TERM CURRENT USE OF OPIATE ANALGESIC: ICD-10-CM

## 2024-11-25 RX ORDER — HYDROCODONE BITARTRATE AND ACETAMINOPHEN 10; 325 MG/1; MG/1
1 TABLET ORAL EVERY 4 HOURS PRN
Qty: 130 TABLET | Refills: 0 | Status: SHIPPED | OUTPATIENT
Start: 2024-11-25 | End: 2024-11-25 | Stop reason: SDUPTHER

## 2024-11-25 RX ORDER — HYDROCODONE BITARTRATE AND ACETAMINOPHEN 10; 325 MG/1; MG/1
1 TABLET ORAL EVERY 4 HOURS PRN
Qty: 130 TABLET | Refills: 0 | Status: SHIPPED | OUTPATIENT
Start: 2024-11-25

## 2024-11-25 RX ORDER — HYDROCODONE BITARTRATE AND ACETAMINOPHEN 10; 325 MG/1; MG/1
1 TABLET ORAL EVERY 4 HOURS PRN
Qty: 130 TABLET | Refills: 0 | Status: CANCELLED | OUTPATIENT
Start: 2024-11-25

## 2024-11-25 NOTE — TELEPHONE ENCOUNTER
Caller: Pt    Doctor: Roxanna Gupta    Reason for call: Pt following up on medication refill and if it went to the pharmacy.     Call back#: [phone]

## 2024-11-25 NOTE — TELEPHONE ENCOUNTER
Caller: ly Singleton    Doctor: Ozzie    Reason for call: pt called stating that the norco script was sent to the wrong pharmacy.  [facility].  Pt stated she needs the script to go to Pharmacy: [facility] - [address] [place] [address],     Please resend to [facility]  Call back#: [phone]

## 2024-11-25 NOTE — TELEPHONE ENCOUNTER
Reason for call:   [x] Refill   [] Prior Auth  [] Other:     Office:   [] PCP/Provider -   [x] Specialty/Provider - Roxanna Gupta PA-C / [facility]    Medication: HYDROcodone-acetaminophen (NORCO)  mg per tablet /  Take 1 tablet by mouth every 4 (four) hours as needed for moderate pain Max Daily Amount: 6 tablets     [facility]: [facility] - [address] [place] [address],     Does the patient have enough for 3 days?   [] Yes   [x] No - Send as HP to POD

## 2024-11-25 NOTE — TELEPHONE ENCOUNTER
S/w pt. Pt was last seen on 10/28. 2 scripts were provided on that day for Hydrocodone. Pt advised that she saw her PCP since her appt with [facility] and they deleted her November script. Per pt this happens all the time when she sees her PCP in between refills. Per pt she is tolerating the medication and denies side effects.    Please advise- Can November script be resent today?

## 2024-11-25 NOTE — TELEPHONE ENCOUNTER
10/28/2024 10/28/2024 HYDROcodone BITARTRATE 10 MG ORAL TABLET/ACETAMINOPHEN 325 MG (Tablet) 130.0 22 325 MG-10 MG 59.09 DERIK ZAIDI [facility]  09/30/2024 08/30/2024 HYDROcodone BITARTRATE 10 MG ORAL TABLET/ACETAMINOPHEN 325 MG (Tablet) 130.0 22 325 MG-10 MG 59.09 DERIK ZAIDI [facility]

## 2024-11-26 ENCOUNTER — TELEPHONE (OUTPATIENT)
Age: 74
End: 2024-11-26

## 2024-11-26 NOTE — TELEPHONE ENCOUNTER
"Patient called to report that she finished antibiotics and prednisone on Sunday. Then last night, she \"aspirated\" her lungs. She states it has happened before and would like to know if Dr Garduno can prescribe something else because holiday is in a few days and wants to feel better. She would like a call back with his suggestion.  "

## 2024-11-27 DIAGNOSIS — J43.9 PULMONARY EMPHYSEMA, UNSPECIFIED EMPHYSEMA TYPE (HCC): ICD-10-CM

## 2024-11-27 DIAGNOSIS — F17.210 CIGARETTE SMOKER: ICD-10-CM

## 2024-11-27 DIAGNOSIS — J44.1 ACUTE EXACERBATION OF CHRONIC OBSTRUCTIVE PULMONARY DISEASE (COPD) (HCC): Primary | ICD-10-CM

## 2024-11-27 RX ORDER — CLINDAMYCIN HYDROCHLORIDE 300 MG/1
300 CAPSULE ORAL 2 TIMES DAILY WITH MEALS
Qty: 20 CAPSULE | Refills: 0 | Status: SHIPPED | OUTPATIENT
Start: 2024-11-27 | End: 2024-12-07

## 2024-11-27 RX ORDER — BENZONATATE 100 MG/1
100 CAPSULE ORAL 3 TIMES DAILY PRN
Qty: 30 CAPSULE | Refills: 0 | Status: SHIPPED | OUTPATIENT
Start: 2024-11-27

## 2024-11-27 RX ORDER — PREDNISONE 10 MG/1
TABLET ORAL
Qty: 20 TABLET | Refills: 0 | Status: SHIPPED | OUTPATIENT
Start: 2024-11-27

## 2024-12-03 ENCOUNTER — TELEPHONE (OUTPATIENT)
Age: 74
End: 2024-12-03

## 2024-12-03 NOTE — TELEPHONE ENCOUNTER
Patient states she just picked up her new script for the abx and she wants to make sure that clindamycin and doxycycline are not in the same category of medications because she had a bad reaction to doxycycline. Confirmed they are 2 different abx.

## 2024-12-03 NOTE — TELEPHONE ENCOUNTER
Patient calls to ask if there is an answer to her question from 11/26/24. I was able to inform the patient that [facility] [place] [facility] an [facility]. Patient had no further questions or concerns.

## 2024-12-05 ENCOUNTER — OFFICE VISIT (OUTPATIENT)
Dept: FAMILY MEDICINE CLINIC | Facility: CLINIC | Age: 74
End: 2024-12-05
Payer: COMMERCIAL

## 2024-12-05 ENCOUNTER — TELEPHONE (OUTPATIENT)
Age: 74
End: 2024-12-05

## 2024-12-05 VITALS
DIASTOLIC BLOOD PRESSURE: 80 MMHG | SYSTOLIC BLOOD PRESSURE: 132 MMHG | RESPIRATION RATE: 14 BRPM | TEMPERATURE: 98.2 F | HEART RATE: 78 BPM | HEIGHT: 65 IN | WEIGHT: 178 LBS | BODY MASS INDEX: 29.66 KG/M2 | OXYGEN SATURATION: 99 %

## 2024-12-05 DIAGNOSIS — E06.3 HYPOTHYROIDISM DUE TO HASHIMOTO'S THYROIDITIS: ICD-10-CM

## 2024-12-05 DIAGNOSIS — E11.8 TYPE II DIABETES MELLITUS WITH MANIFESTATIONS (HCC): ICD-10-CM

## 2024-12-05 DIAGNOSIS — J44.1 ACUTE EXACERBATION OF CHRONIC OBSTRUCTIVE PULMONARY DISEASE (COPD) (HCC): Primary | ICD-10-CM

## 2024-12-05 DIAGNOSIS — I10 ESSENTIAL HYPERTENSION: ICD-10-CM

## 2024-12-05 DIAGNOSIS — F17.210 CIGARETTE SMOKER: ICD-10-CM

## 2024-12-05 DIAGNOSIS — Z98.84 BARIATRIC SURGERY STATUS: ICD-10-CM

## 2024-12-05 DIAGNOSIS — R91.8 LUNG NODULES: ICD-10-CM

## 2024-12-05 DIAGNOSIS — E04.2 MULTIPLE THYROID NODULES: ICD-10-CM

## 2024-12-05 PROBLEM — E78.5 TYPE 2 DIABETES MELLITUS WITH HYPERLIPIDEMIA  (HCC): Status: RESOLVED | Noted: 2024-09-19 | Resolved: 2024-12-05

## 2024-12-05 PROBLEM — E11.69 TYPE 2 DIABETES MELLITUS WITH HYPERLIPIDEMIA  (HCC): Status: RESOLVED | Noted: 2024-09-19 | Resolved: 2024-12-05

## 2024-12-05 LAB
SL AMB  POCT GLUCOSE, UA: NORMAL
SL AMB LEUKOCYTE ESTERASE,UA: NORMAL
SL AMB POCT BILIRUBIN,UA: NORMAL
SL AMB POCT BLOOD,UA: NORMAL
SL AMB POCT CLARITY,UA: CLEAR
SL AMB POCT COLOR,UA: YELLOW
SL AMB POCT HEMOGLOBIN AIC: 5.5 (ref ?–6.5)
SL AMB POCT KETONES,UA: NORMAL
SL AMB POCT NITRITE,UA: NORMAL
SL AMB POCT PH,UA: 7
SL AMB POCT SPECIFIC GRAVITY,UA: 1.01
SL AMB POCT URINE PROTEIN: NORMAL
SL AMB POCT UROBILINOGEN: 0.2

## 2024-12-05 PROCEDURE — 96372 THER/PROPH/DIAG INJ SC/IM: CPT | Performed by: INTERNAL MEDICINE

## 2024-12-05 PROCEDURE — 99214 OFFICE O/P EST MOD 30 MIN: CPT | Performed by: INTERNAL MEDICINE

## 2024-12-05 PROCEDURE — 83036 HEMOGLOBIN GLYCOSYLATED A1C: CPT | Performed by: INTERNAL MEDICINE

## 2024-12-05 PROCEDURE — 81002 URINALYSIS NONAUTO W/O SCOPE: CPT | Performed by: INTERNAL MEDICINE

## 2024-12-05 RX ORDER — METHYLPREDNISOLONE SODIUM SUCCINATE 125 MG/2ML
125 INJECTION, POWDER, LYOPHILIZED, FOR SOLUTION INTRAMUSCULAR; INTRAVENOUS ONCE
Status: COMPLETED | OUTPATIENT
Start: 2024-12-05 | End: 2024-12-05

## 2024-12-05 RX ADMIN — METHYLPREDNISOLONE SODIUM SUCCINATE 125 MG: 125 INJECTION, POWDER, LYOPHILIZED, FOR SOLUTION INTRAMUSCULAR; INTRAVENOUS at 10:04

## 2024-12-05 NOTE — PROGRESS NOTES
Name: Areli Luciano      : 1950      MRN: [id number]  Encounter Provider: Aleksandar Garduno MD  Encounter Date: 2024   Encounter department: [facility] CARE [facility]  :  Assessment & Plan  Cigarette smoker    Orders:    POCT urine dip    Ferritin; Future    US kidney and bladder with pvr; Future    UA (URINE) with reflex to Scope; Future    Magnesium; Future    Essential hypertension    Orders:    POCT urine dip    Ferritin; Future    US kidney and bladder with pvr; Future    UA (URINE) with reflex to Scope; Future    Magnesium; Future    Bariatric surgery status         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)    Lab Results   Component Value Date    HGBA1C 5.5 2024   Stable  Continue same    Orders:    POCT hemoglobin A1c    Comprehensive metabolic panel; Future    CBC and differential; Future    Lipid Panel with Direct LDL reflex; Future    TSH, 3rd generation with Free T4 reflex; Future    POCT urine dip    Ferritin; Future    US kidney and bladder with pvr; Future    UA (URINE) with reflex to Scope; Future    Magnesium; Future    Hypothyroidism due to Hashimoto's thyroiditis    Orders:    POCT hemoglobin A1c    Comprehensive metabolic panel; Future    CBC and differential; Future    Lipid Panel with Direct LDL reflex; Future    TSH, 3rd generation with Free T4 reflex; Future    POCT urine dip    Ferritin; Future    US kidney and bladder with pvr; Future    UA (URINE) with reflex to Scope; Future    Magnesium; Future    Lung nodules    Orders:    POCT urine dip    Ferritin; Future    US kidney and bladder with pvr; Future    UA (URINE) with reflex to Scope; Future    Magnesium; Future    Multiple thyroid nodules    Orders:    POCT urine dip    Ferritin; Future    US kidney and bladder with pvr; Future    UA (URINE) with reflex to Scope; Future    Magnesium; Future    Acute exacerbation of chronic obstructive pulmonary disease (COPD) (HCC)  Continue; Clindamycin,  "tessalon, prednisone,  Orders:    methylPREDNISolone sodium succinate (Solu-MEDROL) injection 125 mg    Life style mod  RTC in 3 mos w blood work         History of Present Illness     74 Y O lady is here for Regular check Up, she has few symptoms, increased cold symptoms, since last 1-2 weeks, med list reviewed,...      Review of Systems   Constitutional:  Negative for chills, fatigue and fever.   HENT:  Positive for congestion, postnasal drip, sinus pressure, sinus pain and sore throat. Negative for facial swelling, trouble swallowing and voice change.    Eyes:  Negative for pain, discharge and visual disturbance.   Respiratory:  Positive for cough and wheezing. Negative for shortness of breath.    Cardiovascular:  Negative for chest pain, palpitations and leg swelling.   Gastrointestinal:  Negative for abdominal pain, blood in stool, constipation, diarrhea and nausea.   Endocrine: Negative for polydipsia, polyphagia and polyuria.   Genitourinary:  Negative for difficulty urinating, hematuria and urgency.   Musculoskeletal:  Negative for arthralgias and myalgias.   Skin:  Negative for rash.   Neurological:  Negative for dizziness, tremors, weakness and headaches.   Hematological:  Negative for adenopathy. Does not bruise/bleed easily.   Psychiatric/Behavioral:  Negative for dysphoric mood, sleep disturbance and suicidal ideas.           Objective   /80 (BP Location: Left arm, Patient Position: Sitting, Cuff Size: Standard)   Pulse 78   Temp 98.2 °F (36.8 °C) (Tympanic)   Resp 14   Ht 5' 5\" (1.651 m)   Wt 80.7 kg (178 lb)   LMP  (LMP Unknown)   SpO2 99%   BMI 29.62 kg/m²      Physical Exam  Constitutional:       General: She is not in acute distress.     Appearance: She is well-developed. She is not diaphoretic.   HENT:      Head: Normocephalic.      Right Ear: External ear normal.      Left Ear: External ear normal.      Nose: Nose normal.   Eyes:      General:         Right eye: No discharge.        "  Left eye: No discharge.      Conjunctiva/sclera: Conjunctivae normal.      Pupils: Pupils are equal, round, and reactive to light.   Neck:      Thyroid: No thyromegaly.      Trachea: No tracheal deviation.   Cardiovascular:      Rate and Rhythm: Normal rate and regular rhythm.      Heart sounds: Murmur heard.      No friction rub.   Pulmonary:      Effort: Pulmonary effort is normal. No respiratory distress.      Breath sounds: No stridor. Wheezing present. No rales.   Abdominal:      General: Bowel sounds are normal. There is no distension.      Palpations: Abdomen is soft.      Tenderness: There is no abdominal tenderness. There is no guarding.   Musculoskeletal:         General: Tenderness present. No deformity. Normal range of motion.      Cervical back: Normal range of motion and neck supple.   Lymphadenopathy:      Cervical: No cervical adenopathy.   Skin:     General: Skin is warm.      Coloration: Skin is not pale.      Findings: No erythema or rash.   Neurological:      Mental Status: She is alert and oriented to person, place, and time.      Cranial Nerves: No cranial nerve deficit.      Sensory: Sensory deficit present.      Coordination: Coordination normal.   Psychiatric:         Behavior: Behavior normal.

## 2024-12-05 NOTE — ASSESSMENT & PLAN NOTE
Lab Results   Component Value Date    HGBA1C 5.5 12/05/2024   Stable  Continue same    Orders:    POCT hemoglobin A1c    Comprehensive metabolic panel; Future    CBC and differential; Future    Lipid Panel with Direct LDL reflex; Future    TSH, 3rd generation with Free T4 reflex; Future    POCT urine dip    Ferritin; Future    US kidney and bladder with pvr; Future    UA (URINE) with reflex to Scope; Future    Magnesium; Future

## 2024-12-05 NOTE — ASSESSMENT & PLAN NOTE
Orders:    POCT hemoglobin A1c    Comprehensive metabolic panel; Future    CBC and differential; Future    Lipid Panel with Direct LDL reflex; Future    TSH, 3rd generation with Free T4 reflex; Future    POCT urine dip    Ferritin; Future    US kidney and bladder with pvr; Future    UA (URINE) with reflex to Scope; Future    Magnesium; Future

## 2024-12-05 NOTE — TELEPHONE ENCOUNTER
Patient called to find out A1C result. Told her that the Dr has not reviewed it yet, someone from the office will call her when he has.

## 2024-12-05 NOTE — PROGRESS NOTES
Diabetic Foot Exam    Patient's shoes and socks removed.    Right Foot/Ankle   Right Foot Inspection  Skin Exam: skin normal and skin intact. No dry skin, no warmth, no callus, no erythema, no maceration, no abnormal color, no pre-ulcer, no ulcer and no callus.     Toe Exam: ROM and strength within normal limits.     Sensory   Vibration: intact  Proprioception: intact  Monofilament testing: intact    Vascular  Capillary refills: < 3 seconds  The right DP pulse is 1+. The right PT pulse is 1+.     Left Foot/Ankle  Left Foot Inspection  Skin Exam: skin normal and skin intact. No dry skin, no warmth, no erythema, no maceration, normal color, no pre-ulcer, no ulcer and no callus.     Toe Exam: ROM and strength within normal limits.     Sensory   Vibration: intact  Proprioception: intact  Monofilament testing: intact    Vascular  Capillary refills: < 3 seconds  The left DP pulse is 1+. The left PT pulse is 1+.     Assign Risk Category  No deformity present  Loss of protective sensation  No weak pulses  Risk: 1

## 2024-12-12 ENCOUNTER — TELEPHONE (OUTPATIENT)
Dept: HEMATOLOGY ONCOLOGY | Facility: CLINIC | Age: 74
End: 2024-12-12

## 2024-12-12 NOTE — TELEPHONE ENCOUNTER
Message left on voicemail as a reminder to complete labs ordered before upcoming appointment with CARTER Magallanes

## 2024-12-16 ENCOUNTER — TELEPHONE (OUTPATIENT)
Age: 74
End: 2024-12-16

## 2024-12-16 NOTE — TELEPHONE ENCOUNTER
Patient called to say that her appt tomorrow was supposed to be a virtual.  Please call her back to confirm

## 2024-12-17 DIAGNOSIS — E03.9 HYPOTHYROIDISM, UNSPECIFIED TYPE: ICD-10-CM

## 2024-12-18 RX ORDER — LEVOTHYROXINE SODIUM 50 UG/1
50 TABLET ORAL EVERY OTHER DAY
Qty: 50 TABLET | Refills: 0 | Status: SHIPPED | OUTPATIENT
Start: 2024-12-18

## 2024-12-19 ENCOUNTER — TELEPHONE (OUTPATIENT)
Age: 74
End: 2024-12-19

## 2024-12-19 NOTE — TELEPHONE ENCOUNTER
"Recieed call from patient- update   States she finished abx 5 days ago mucus cleared up and restarted 2 days after finishing abx \"nasty green\" color Cough/ wheezing still persist   Not taking anything otc   Finished pred as well     Please advise  "

## 2024-12-20 NOTE — TELEPHONE ENCOUNTER
Patient refused ER and Urgent care and states that she can't afford this right now and was advised that  is out of the office until 1/6 and states that she will just have to deal without right now

## 2024-12-23 ENCOUNTER — NURSE TRIAGE (OUTPATIENT)
Age: 74
End: 2024-12-23

## 2024-12-23 ENCOUNTER — OFFICE VISIT (OUTPATIENT)
Dept: PAIN MEDICINE | Facility: MEDICAL CENTER | Age: 74
End: 2024-12-23
Payer: COMMERCIAL

## 2024-12-23 ENCOUNTER — OFFICE VISIT (OUTPATIENT)
Dept: URGENT CARE | Facility: MEDICAL CENTER | Age: 74
End: 2024-12-23
Payer: COMMERCIAL

## 2024-12-23 ENCOUNTER — APPOINTMENT (OUTPATIENT)
Dept: RADIOLOGY | Facility: MEDICAL CENTER | Age: 74
End: 2024-12-23
Payer: COMMERCIAL

## 2024-12-23 VITALS
HEART RATE: 55 BPM | SYSTOLIC BLOOD PRESSURE: 114 MMHG | TEMPERATURE: 96.2 F | HEIGHT: 65 IN | RESPIRATION RATE: 20 BRPM | BODY MASS INDEX: 29.72 KG/M2 | DIASTOLIC BLOOD PRESSURE: 52 MMHG | OXYGEN SATURATION: 95 % | WEIGHT: 178.4 LBS

## 2024-12-23 VITALS — HEIGHT: 65 IN | BODY MASS INDEX: 29.49 KG/M2 | WEIGHT: 177 LBS

## 2024-12-23 DIAGNOSIS — G89.29 CHRONIC BILATERAL LOW BACK PAIN WITH BILATERAL SCIATICA: ICD-10-CM

## 2024-12-23 DIAGNOSIS — Z79.891 LONG-TERM CURRENT USE OF OPIATE ANALGESIC: ICD-10-CM

## 2024-12-23 DIAGNOSIS — G89.4 CHRONIC PAIN SYNDROME: Primary | ICD-10-CM

## 2024-12-23 DIAGNOSIS — M79.18 MYOFASCIAL PAIN SYNDROME: ICD-10-CM

## 2024-12-23 DIAGNOSIS — M54.16 LUMBAR RADICULITIS: ICD-10-CM

## 2024-12-23 DIAGNOSIS — N30.01 ACUTE CYSTITIS WITH HEMATURIA: ICD-10-CM

## 2024-12-23 DIAGNOSIS — M54.42 CHRONIC BILATERAL LOW BACK PAIN WITH BILATERAL SCIATICA: ICD-10-CM

## 2024-12-23 DIAGNOSIS — R05.1 ACUTE COUGH: ICD-10-CM

## 2024-12-23 DIAGNOSIS — F11.20 UNCOMPLICATED OPIOID DEPENDENCE (HCC): ICD-10-CM

## 2024-12-23 DIAGNOSIS — M54.41 CHRONIC BILATERAL LOW BACK PAIN WITH BILATERAL SCIATICA: ICD-10-CM

## 2024-12-23 DIAGNOSIS — J22 LOWER RESPIRATORY INFECTION: Primary | ICD-10-CM

## 2024-12-23 LAB
SL AMB  POCT GLUCOSE, UA: NEGATIVE
SL AMB LEUKOCYTE ESTERASE,UA: ABNORMAL
SL AMB POCT BILIRUBIN,UA: NEGATIVE
SL AMB POCT BLOOD,UA: ABNORMAL
SL AMB POCT CLARITY,UA: CLEAR
SL AMB POCT COLOR,UA: ABNORMAL
SL AMB POCT KETONES,UA: NEGATIVE
SL AMB POCT NITRITE,UA: NEGATIVE
SL AMB POCT PH,UA: 8
SL AMB POCT SPECIFIC GRAVITY,UA: 1
SL AMB POCT URINE PROTEIN: ABNORMAL
SL AMB POCT UROBILINOGEN: NEGATIVE
VALID CONTROL: ABNORMAL

## 2024-12-23 PROCEDURE — 99214 OFFICE O/P EST MOD 30 MIN: CPT

## 2024-12-23 PROCEDURE — 99213 OFFICE O/P EST LOW 20 MIN: CPT

## 2024-12-23 PROCEDURE — 87086 URINE CULTURE/COLONY COUNT: CPT

## 2024-12-23 PROCEDURE — 71046 X-RAY EXAM CHEST 2 VIEWS: CPT

## 2024-12-23 PROCEDURE — 81002 URINALYSIS NONAUTO W/O SCOPE: CPT

## 2024-12-23 PROCEDURE — 87186 SC STD MICRODIL/AGAR DIL: CPT

## 2024-12-23 PROCEDURE — G2211 COMPLEX E/M VISIT ADD ON: HCPCS

## 2024-12-23 PROCEDURE — 87077 CULTURE AEROBIC IDENTIFY: CPT

## 2024-12-23 RX ORDER — HYDROCODONE BITARTRATE AND ACETAMINOPHEN 10; 325 MG/1; MG/1
1 TABLET ORAL EVERY 4 HOURS PRN
Qty: 130 TABLET | Refills: 0 | Status: SHIPPED | OUTPATIENT
Start: 2024-12-23

## 2024-12-23 RX ORDER — LEVOFLOXACIN 750 MG/1
750 TABLET, FILM COATED ORAL EVERY 24 HOURS
Qty: 7 TABLET | Refills: 0 | Status: SHIPPED | OUTPATIENT
Start: 2024-12-23 | End: 2024-12-30

## 2024-12-23 RX ORDER — BACLOFEN 10 MG/1
10 TABLET ORAL 3 TIMES DAILY
Qty: 270 TABLET | Refills: 0 | Status: SHIPPED | OUTPATIENT
Start: 2024-12-23

## 2024-12-23 RX ORDER — PREGABALIN 200 MG/1
200 CAPSULE ORAL 3 TIMES DAILY
Qty: 90 CAPSULE | Refills: 2 | Status: SHIPPED | OUTPATIENT
Start: 2024-12-23

## 2024-12-23 NOTE — TELEPHONE ENCOUNTER
"Patient called with complaints of UTI symptoms.  Symptoms started this morning and include burning with urination, frequency, urgency and only being able to urinate a small amount at a time.  Patient is requesting antibiotic and something for the burning.  No office appointments available, patient does not know if she could provide a urine specimen as she states that she is only able to urinate a small amt at a time.  Patient is asking if provider could send an antibiotic and something for the burning [place] [facility] [place] [address]    Please call back to patient to advise.      Reason for Disposition   Painful urination AND EITHER frequency or urgency    Answer Assessment - Initial Assessment Questions  1. SEVERITY: \"How bad is the pain?\"  (e.g., Scale 1-10; mild, moderate, or severe)      Patient unable to give a number for pain.    2. FREQUENCY: \"How many times have you had painful urination today?\"       Unsure- multiple times  3. PATTERN: \"Is pain present every time you urinate or just sometimes?\"       Yes  4. ONSET: \"When did the painful urination start?\"       This morning  5. FEVER: \"Do you have a fever?\" If Yes, ask: \"What is your temperature, how was it measured, and when did it start?\"      Denies  6. PAST UTI: \"Have you had a urine infection before?\" If Yes, ask: \"When was the last time?\" and \"What happened that time?\"       Yes, has h/o UTIs, normally treated with antibiotic and AZO  7. CAUSE: \"What do you think is causing the painful urination?\"  (e.g., UTI, scratch, Herpes sore)      UTI  8. OTHER SYMPTOMS: \"Do you have any other symptoms?\" (e.g., blood in urine, flank pain, genital sores, urgency, vaginal discharge)      Urinary frequency and urgency- only able to urinate a small amount at a time  9. PREGNANCY: \"Is there any chance you are pregnant?\" \"When was your last menstrual period?\"      N/A    Protocols used: Urination Pain - Female-Adult-[place]    "

## 2024-12-23 NOTE — PROGRESS NOTES
[facility]        NAME: Areli Luciano is a 74 y.o. female  : 1950    MRN: [id number]  DATE: 2024  TIME: 12:01 PM    Assessment and Plan   Lower respiratory infection [J22]  1. Lower respiratory infection  levofloxacin (LEVAQUIN) 750 mg tablet      2. Acute cystitis with hematuria  POCT urine dip    Urine culture      3. Acute cough  XR chest pa and lateral        Phone calls/office visits to PCP reviewed - patient prescribed course of Levaquin, prednisone, Tessalon on 11/15/24 then clindamycin, prednisone, Tessalon on 24.    CXR: Opacity in left lower lobe. Radiology to determine final read.  Urine Dip: Small leuks, trace protein, nh trace blood.  Urine culture sent out.    Recommend patient proceed to ER for further evaluation given CXR findings, failure of outpatient antibiotic treatment. Patient refused ER evaluation. Patient states she cannot go due to family obligations, she will go on [place] day. Risks discussed. AMA form signed. Prescribed course of Levaquin to cover for both respiratory and urinary tract infection.     Patient in office for extended time waiting to provide urine sample to evaluate for UTI.    Patient Instructions     By not proceeding to the ER you are putting yourself at risk for development of further symptoms, complications, death.  Contact your PCP as soon as possible to schedule follow up.    UTI Symptoms:  Prescribed course of Levaquin, take as directed.  Urine dip showed signs of potential UTI. Prescribed antibiotics, use as directed. Will send culture out to determine bacteria causing infection and susceptibility to antibiotics. May try over the counter Azo to decrease burning with urination, over the counter Tylenol or ibuprofen as directed on packaging as needed for pain. Drink plenty of fluids and follow proper urinary hygiene.    Cough:  Prescribed course of Levaquin, take as directed.  Fever/Pain: Over the counter Tylenol and/or Motrin as  "directed on packaging.  Cough: Mucinex can help to thin mucus, making it easier to cough up. Delsym or Robitussin can help to suppress the cough. Utilizing a vaporizer/humidifier may help, as well as increasing fluids.  Sore Throat: Salt water gargles with 1 teaspoon of salt dissolved in 6-8 oz of water, honey, drinking plenty of liquids, eating soft foods. If severe, can utilize OTC chloraseptic spray.  Nasal Congestion: Over the counter allergy medication like Claritin, Allegra or Zyrtec can help with nasal congestion and post nasal drip.  Over the counter saline or steroid nasal sprays like Flonase can help with nasal congestion and post nasal drip as well. Over the counter decongestants such as Sudafed may also help.  Upset Stomach: Drink plenty of fluids. May utilize Gatorade, Pedialyte, or other electrolyte solutions to supplement. Eat bland foods (ex: BRAT - bananas, rice, applesauce, toast) and slowly advance to other foods as tolerated.  Please note, if you have heart issues or high blood pressure, the cough/cold medication of choice is Coricidin. Talk to your doctor before trying any new medications.    Follow up with PCP in 3-5 days.  Proceed to  ER if symptoms worsen.    If tests are performed, our office will contact you with results only if changes need to made to the care plan discussed with you at the visit. You can review your full results on [facility] Mychart.    Chief Complaint     Chief Complaint   Patient presents with    Cough     Pt w/hx of bronchitis secondary to aspirating ~ 1 month ago began with green sputum again after completing ABX and Prednisone X2 courses.     Possible UTI     Began this AM urinary frequency , burning and urgency         History of Present Illness       Patient presents for evaluation of two complaints today - cough and UTI symptoms. States \"Before Thanksgiving I aspirated my lungs and it turned into an upper respiratory thing.\" She was placed on two different " antibiotics, two courses of steroids. She finished the course of antibiotics and steroids about one week ago. She notes a few days later she started again with cough productive of green mucus. This morning patient started with UTI symptoms. She notes burning on urination, urinary urgency but only urinating small amounts.    Cough  This is a new problem. The current episode started 1 to 4 weeks ago. The problem has been waxing and waning. The cough is Productive of sputum (green in color). Associated symptoms include headaches, postnasal drip, a sore throat (slight), shortness of breath and wheezing (noticed last night). Pertinent negatives include no chills, ear pain, eye redness, fever, myalgias, rash or rhinorrhea. She has tried nothing for the symptoms.   Urinary Tract Infection   This is a new problem. The current episode started today. The problem has been unchanged. The quality of the pain is described as burning (with urination). The pain is at a severity of 7/10. There has been no fever. Associated symptoms include frequency and urgency. Pertinent negatives include no chills, flank pain, hematuria, nausea or vomiting. She has tried nothing for the symptoms.       Review of Systems   Review of Systems   Constitutional:  Positive for appetite change (decreased). Negative for chills and fever.   HENT:  Positive for congestion, postnasal drip, sinus pressure, sinus pain, sore throat (slight) and voice change (slightly raspy). Negative for ear discharge, ear pain and rhinorrhea.    Eyes:  Negative for pain, discharge, redness and itching.   Respiratory:  Positive for cough, shortness of breath and wheezing (noticed last night). Negative for chest tightness.    Gastrointestinal:  Negative for abdominal pain, diarrhea, nausea and vomiting.   Genitourinary:  Positive for decreased urine volume (going more often, but smaller amounts), dysuria, frequency and urgency. Negative for flank pain, hematuria, vaginal  bleeding and vaginal discharge.   Musculoskeletal:  Negative for myalgias.   Skin:  Negative for rash.   Neurological:  Positive for headaches.         Current Medications       Current Outpatient Medications:     amLODIPine (NORVASC) 5 mg tablet, TAKE 1 TABLET (5 MG TOTAL) BY MOUTH DAILY., Disp: 90 tablet, Rfl: 1    atorvastatin (LIPITOR) 10 mg tablet, Take 1 tablet (10 mg total) by mouth daily, Disp: 90 tablet, Rfl: 3    baclofen 10 mg tablet, Take 1 tablet (10 mg total) by mouth 3 (three) times a day, Disp: 270 tablet, Rfl: 0    benzonatate (TESSALON PERLES) 100 mg capsule, Take 1 capsule (100 mg total) by mouth 3 (three) times a day as needed for cough, Disp: 30 capsule, Rfl: 0    Calcium 600+D3 600-20 MG-MCG TABS, TAKE 1 TABLET BY MOUTH TWICE A DAY WITH MEALS, Disp: 180 tablet, Rfl: 4    cetirizine (ZyrTEC) 10 mg tablet, TAKE 1 TABLET BY MOUTH EVERY DAY, Disp: 90 tablet, Rfl: 2    cholecalciferol (VITAMIN D) 400 units/1 mL, injections once a week, Disp: , Rfl:     clotrimazole-betamethasone (LOTRISONE) 1-0.05 % cream, APPLY TO AFFECTED AREA 3 TIMES A DAY, Disp: 60 g, Rfl: 1    Cyanocobalamin (B-12) 1000 MCG TABS, TAKE 1 TABLET BY MOUTH EVERY DAY, Disp: 90 tablet, Rfl: 3    Diclofenac Sodium (VOLTAREN) 1 %, APPLY 2 GRAMS TO AFFECTED AREA 4 TIMES A DAY, Disp: 200 g, Rfl: 0    diphenhydrAMINE-PSE-APAP (BENADRYL ALLERGY/COLD PO), Take by mouth daily at bedtime, Disp: , Rfl:     escitalopram (LEXAPRO) 5 mg tablet, TAKE 1 TABLET (5 MG TOTAL) BY MOUTH DAILY., Disp: 90 tablet, Rfl: 1    famotidine (PEPCID) 20 mg tablet, TAKE 1 TABLET (20 MG TOTAL) BY MOUTH 2 (TWO) TIMES A DAY PLEASE STOP TAGAMET, Disp: 180 tablet, Rfl: 1    fexofenadine (ALLEGRA) 180 MG tablet, Take 1 tablet (180 mg total) by mouth daily In the morning, Disp: 90 tablet, Rfl: 3    HYDROcodone-acetaminophen (NORCO)  mg per tablet, Take 1 tablet by mouth every 4 (four) hours as needed for moderate pain Max Daily Amount: 6 tablets, Disp: 130 tablet,  Rfl: 0    ipratropium-albuterol (DUO-NEB) 0.5-2.5 mg/3 mL nebulizer solution, Take 3 mL by nebulization every 8 (eight) hours as needed for wheezing or shortness of breath, Disp: 270 mL, Rfl: 3    levofloxacin (LEVAQUIN) 750 mg tablet, Take 1 tablet (750 mg total) by mouth every 24 hours for 7 days, Disp: 7 tablet, Rfl: 0    levothyroxine 25 mcg tablet, Take 1 tablet (25 mcg total) by mouth every other day, Disp: 45 tablet, Rfl: 3    levothyroxine 50 mcg tablet, TAKE 1 TABLET (50 MCG TOTAL) BY MOUTH EVERY OTHER DAY, Disp: 50 tablet, Rfl: 0    losartan (COZAAR) 25 mg tablet, TAKE 1 TABLET (25 MG TOTAL) BY MOUTH DAILY., Disp: 90 tablet, Rfl: 1    magnesium Oxide (MAG-OX) 400 mg TABS, Take 1 tablet (400 mg total) by mouth daily, Disp: 90 tablet, Rfl: 0    nortriptyline (PAMELOR) 25 mg capsule, Take 1 capsule (25 mg total) by mouth daily at bedtime, Disp: 90 capsule, Rfl: 1    omeprazole (PriLOSEC) 40 MG capsule, TAKE 1 CAPSULE BY MOUTH 2 TIMES A DAY BEFORE MEALS., Disp: 180 capsule, Rfl: 1    Pediatric Multiple Vitamins (Childrens Chew Multivitamin) CHEW, CHEW AND SWALLOW 1 TABLET BY MOUTH EVERY DAY, Disp: 100 tablet, Rfl: 1    potassium chloride (MICRO-K) 10 MEQ CR capsule, TAKE 1 CAPSULE BY MOUTH EVERY DAY, Disp: 90 capsule, Rfl: 1    pregabalin (LYRICA) 200 MG capsule, Take 1 capsule (200 mg total) by mouth 3 (three) times a day, Disp: 90 capsule, Rfl: 2    sucralfate (CARAFATE) 1 g/10 mL suspension, TAKE 10 ML (1 G TOTAL) BY MOUTH 4 TIMES A DAY, Disp: 3780 mL, Rfl: 1    traZODone (DESYREL) 50 mg tablet, TAKE 1 TABLET BY MOUTH EVERY DAY AT BEDTIME AS NEEDED, Disp: 90 tablet, Rfl: 1    Trelegy Ellipta 100-62.5-25 MCG/ACT inhaler, INHALE 1 PUFF DAILY RINSE MOUTH AFTER USE., Disp: 60 each, Rfl: 5    triamcinolone (KENALOG) 0.1 % cream, APPLY TO AFFECTED AREA 2 OR 3 TIMES DAILY AS NEEDED, Disp: , Rfl:     Vitamin A 2400 MCG (8000 UT) TABS, TAKE 1 CAPSULE (8,000 UNITS TOTAL) BY MOUTH DAILY, Disp: , Rfl:     denosumab  (PROLIA) 60 mg/mL, Inject 1 mL (60 mg total) under the skin once for 1 dose, Disp: 1 mL, Rfl: 0    HYDROcodone-acetaminophen (NORCO)  mg per tablet, Take 1 tablet by mouth every 4 (four) hours as needed for moderate pain For ongoing therapy. DO NOT FILL BEFORE: 01/20/2025 Max Daily Amount: 6 tablets (Patient not taking: Reported on 12/23/2024), Disp: 130 tablet, Rfl: 0    HYDROcodone-acetaminophen (NORCO)  mg per tablet, Take 1 tablet by mouth every 4 (four) hours as needed for moderate pain For ongoing therapy. DO NOT FILL BEFORE: 02/17/2025 Max Daily Amount: 6 tablets (Patient not taking: Reported on 12/23/2024), Disp: 130 tablet, Rfl: 0    predniSONE 10 mg tablet, Take 3 tabs daily with breakfast for 3 days then Take 2 tabs daily for 3 Days then take one tab daily for 3 days then stop.. (Patient not taking: Reported on 12/23/2024), Disp: 20 tablet, Rfl: 0    Current Facility-Administered Medications:     cyanocobalamin injection 1,000 mcg, 1,000 mcg, Intramuscular, Q30 Days, Aleksandar Garduno MD, 1,000 mcg at 12/08/20 1118    cyanocobalamin injection 1,000 mcg, 1,000 mcg, Intramuscular, Q30 Days, Aleksandar Garduno MD, 1,000 mcg at 07/08/20 1036    cyanocobalamin injection 1,000 mcg, 1,000 mcg, Intramuscular, Q30 Days, Aleksandar Garduno MD, 1,000 mcg at 09/08/20 1210    cyanocobalamin injection 1,000 mcg, 1,000 mcg, Intramuscular, Q30 Days, Aleksandar Garduno MD    cyanocobalamin injection 1,000 mcg, 1,000 mcg, Intramuscular, Q30 Days, Aleksandar Garduno MD    cyanocobalamin injection 1,000 mcg, 1,000 mcg, Intramuscular, Q30 Days, Aleksandar Garduno MD, 1,000 mcg at 08/18/21 1019    cyanocobalamin injection 1,000 mcg, 1,000 mcg, Intramuscular, Q30 Days, Aleksandar Garduno MD, 1,000 mcg at 03/21/23 1117    cyanocobalamin injection 1,000 mcg, 1,000 mcg, Intramuscular, Q30 Days, Aleksandar Garduno MD, 1,000 mcg at 07/13/23 1004    Current Allergies     Allergies as of 12/23/2024 - Reviewed 12/23/2024   Allergen Reaction Noted    Cephalosporins Hives  11/16/2016    Erythromycin GI Intolerance 01/16/2014    Fentanyl Itching 11/16/2016    Paxil [paroxetine] Hives 01/16/2014    Penicillins Itching 11/16/2016    Pravastatin Myalgia 02/22/2016    Statins Itching 11/16/2016    Sulfa antibiotics Diarrhea     Wellbutrin [bupropion] Hives 01/16/2014    Chantix [varenicline] Rash and Other (See Comments) 07/08/2024    Clindamycin Rash 12/23/2024    Jada's wort (hypericum perforatum) Rash 06/11/2018            The following portions of the patient's history were reviewed and updated as appropriate: allergies, current medications, past family history, past medical history, past social history, past surgical history and problem list.     Past Medical History:   Diagnosis Date    Anemia     Anxiety     hx of panic attacks (now under control)     Arthritis     Asthma     resolved (no problems in a decade)     Baker's cyst of knee     Bronchitis     Bunion, left foot     Cervical pain     Chronic GERD     Chronic pain     Chronic pain disorder     Depression     Diabetes mellitus, type 2 (HCC)     Diabetic peripheral neuropathy (HCC)     Endometriosis     Fibromyalgia     Hyperlipidemia     Hypertension     Joint pain     Low back pain     Low back pain     Lung nodules     Macular degeneration     Pulmonary emphysema (HCC) 01/16/2020    Spondylosis     Spontaneous pneumothorax     Uterine cancer (HCC)        Past Surgical History:   Procedure Laterality Date    BACK SURGERY  1996    L5, S1- laser surgery fusion of c5 and c6     BREAST CYST EXCISION Right     x2 benign    CHOLECYSTECTOMY  2004    FOOT SURGERY Left 2005    fusion     FRACTURE SURGERY      GASTRIC BYPASS  2010    Dr. Oropeza     HYSTERECTOMY  1985    just uterus     KNEE ARTHROSCOPY      LAMINECTOMY      ORTHOPEDIC SURGERY      OVARIAN CYST REMOVAL  1975    PELVIC LAPAROSCOPY      ovaries (x10)     PLEURAL SCARIFICATION  1967    SHOULDER OPEN ROTATOR CUFF REPAIR Left 2008    TONSILLECTOMY      VAGINAL PROLAPSE  "REPAIR         Family History   Problem Relation Age of Onset    Hypertension Mother     Lung cancer Mother 53    Hypertension Father     Heart disease Father     Heart attack Father     Cancer Father 42    No Known Problems Sister     No Known Problems Sister     No Known Problems Sister     No Known Problems Maternal Grandmother     No Known Problems Maternal Grandfather     No Known Problems Paternal Grandmother     No Known Problems Paternal Grandfather     No Known Problems Daughter     No Known Problems Daughter     No Known Problems Daughter     Breast cancer Maternal Aunt 48    Breast cancer Paternal Aunt 43    Breast cancer Paternal Aunt 45    Breast cancer Cousin 40    Breast cancer Cousin 42        male         Medications have been verified.        Objective   /52 Comment: manual  Pulse 55   Temp (!) 96.2 °F (35.7 °C) (Tympanic)   Resp 20   Ht 5' 5\" (1.651 m)   Wt 80.9 kg (178 lb 6.4 oz)   LMP  (LMP Unknown)   SpO2 95%   BMI 29.69 kg/m²        Physical Exam     Physical Exam  Vitals and nursing note reviewed.   Constitutional:       General: She is not in acute distress.     Appearance: Normal appearance. She is not ill-appearing.   HENT:      Right Ear: Tympanic membrane, ear canal and external ear normal.      Left Ear: Tympanic membrane, ear canal and external ear normal.      Nose: Congestion and rhinorrhea present.      Mouth/Throat:      Mouth: Mucous membranes are moist.      Pharynx: No oropharyngeal exudate or posterior oropharyngeal erythema.   Eyes:      General:         Right eye: No discharge.         Left eye: No discharge.      Extraocular Movements: Extraocular movements intact.   Cardiovascular:      Rate and Rhythm: Normal rate and regular rhythm.      Pulses: Normal pulses.      Heart sounds: Normal heart sounds.   Pulmonary:      Effort: Pulmonary effort is normal. No respiratory distress.      Breath sounds: No wheezing, rhonchi or rales.      Comments: Decreased breath " sounds left lower lobe.  Abdominal:      General: Abdomen is flat. Bowel sounds are normal. There is no distension.      Palpations: Abdomen is soft.      Tenderness: There is no abdominal tenderness. There is no guarding.   Musculoskeletal:      Cervical back: Neck supple.   Lymphadenopathy:      Cervical: No cervical adenopathy.   Skin:     General: Skin is warm and dry.   Neurological:      Mental Status: She is alert.

## 2024-12-23 NOTE — PROGRESS NOTES
Ace bandage applied to right knee. Attempted to place knee immobilizer. Pt did not tolerate keeping knee straight, pt requested having immobilizer to be removed and knee elevated with a blanket. ED MD made aware. Knee immobilizer to be sent up with pt to inpatient room.   Assessment:  1. Chronic pain syndrome    2. Myofascial pain syndrome    3. Chronic bilateral low back pain with bilateral sciatica    4. Uncomplicated opioid dependence (HCC)    5. Long-term current use of opiate analgesic    6. Lumbar radiculitis        Plan:  The patient remains clinically stable and her pain is well controlled on hydrocodone-acetaminophen 10/325 mg 1 tablet every 4-6 hours as needed. Three 1 month supplies of this medication were sent to the patient's pharmacy today with do not fill dates of today 12/23/2024, 1/20/2025, and 02/17/2025.   Continue pregabalin and baclofen as prescribed.  Refills of these medications were sent to patient's pharmacy today.  Advised patient she should be evaluated by [facility] for her UTI symptoms. States [place]CP [place] currently.   Follow-up in 12 weeks, or sooner if needed for medication refill and reevaluation.    There are risks associated with opioid medications, including dependence, addiction and tolerance. The patient understands and agrees to use these medications only as prescribed. Potential side effects of the medications include, but are not limited to, constipation, drowsiness, addiction, impaired judgment and risk of fatal overdose if not taken as prescribed. The patient was warned against driving while taking sedation medications.  Sharing medications is a felony. At this point in time, the patient is showing no signs of addiction, abuse, diversion or suicidal ideation.    [place] Prescription Drug Monitoring Program report was reviewed and was appropriate     My impressions and treatment recommendations were discussed in detail with the patient who verbalized understanding and had no further questions.  Discharge instructions were provided. I personally saw and examined the patient and I agree with the above discussed plan of care.    No orders of the defined types were placed in this encounter.    New Medications Ordered This Visit    Medications    HYDROcodone-acetaminophen (NORCO)  mg per tablet     Sig: Take 1 tablet by mouth every 4 (four) hours as needed for moderate pain Max Daily Amount: 6 tablets     Dispense:  130 tablet     Refill:  0    HYDROcodone-acetaminophen (NORCO)  mg per tablet     Sig: Take 1 tablet by mouth every 4 (four) hours as needed for moderate pain For ongoing therapy. DO NOT FILL BEFORE: 01/20/2025 Max Daily Amount: 6 tablets     Dispense:  130 tablet     Refill:  0    HYDROcodone-acetaminophen (NORCO)  mg per tablet     Sig: Take 1 tablet by mouth every 4 (four) hours as needed for moderate pain For ongoing therapy. DO NOT FILL BEFORE: 02/17/2025 Max Daily Amount: 6 tablets     Dispense:  130 tablet     Refill:  0    pregabalin (LYRICA) 200 MG capsule     Sig: Take 1 capsule (200 mg total) by mouth 3 (three) times a day     Dispense:  90 capsule     Refill:  2     .    baclofen 10 mg tablet     Sig: Take 1 tablet (10 mg total) by mouth 3 (three) times a day     Dispense:  270 tablet     Refill:  0       History of Present Illness:  Areli Luciano is a 74 y.o. female who presents for a follow up office visit for medication refill and reevaluation. Her chronic pain remains well-controlled on current pain medication regimen consisting of hydrocodone-acetaminophen, pregabalin, and baclofen. The patient tolerates these medications well without side effects and reports that they reduce her overall pain by about 25-30%. She has been having some increased pain recently but notes this is likely due to the cleaning/cooking she has been doing recently with her family visiting for the holidays.     She is having some UTI symptoms as well and plans on being evaluated [facility] right after this appointment.     Opioid contract date 1/22/24  Last UDS Date 10/30/24  Hydrocodone last taken on 12/23 AM    I have personally reviewed and/or updated the patient's past medical history, past surgical history,  family history, social history, current medications, allergies, and vital signs today.     Review of Systems   Respiratory:  Positive for shortness of breath.    Cardiovascular:  Negative for chest pain.   Gastrointestinal:  Negative for constipation, diarrhea, nausea and vomiting.   Musculoskeletal:  Positive for arthralgias, back pain, gait problem, myalgias and neck pain. Negative for joint swelling.   Skin:  Negative for rash.   Neurological:  Positive for dizziness. Negative for seizures and weakness.   Psychiatric/Behavioral:  Positive for decreased concentration.    All other systems reviewed and are negative.      Patient Active Problem List   Diagnosis    Chronic pain syndrome    Cervical radiculopathy    Myofascial pain syndrome    Spondylosis of cervical region without myelopathy or radiculopathy    Fibromyalgia    Long-term current use of opiate analgesic    MGUS (monoclonal gammopathy of unknown significance)    Iron deficiency anemia following bariatric surgery    Light headedness    Primary osteoarthritis of right knee    Pseudogout of left knee    Lumbar radiculitis    Chronic bilateral low back pain with bilateral sciatica    Rotator cuff syndrome, left    Left shoulder pain    Dizziness    Other fatigue    Biceps tendinitis of left shoulder    Adhesive capsulitis of left shoulder    Hypoglycemia    Hypothyroidism due to Hashimoto's thyroiditis    Iron deficiency anemia, unspecified    GERD (gastroesophageal reflux disease)    Nicotine dependence with current use    Need for prophylactic vaccination against Streptococcus pneumoniae (pneumococcus)    Needs flu shot    Osteoarthritis    Postnasal drip    Encounter for screening for malignant neoplasm of lung in current smoker with 30 pack year history or greater    High coronary artery calcium score    Mass of upper outer quadrant of right breast    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)    History of Sarah-en-Y gastric bypass    Atypical  chest pain       Past Medical History:   Diagnosis Date    Anemia     Anxiety     hx of panic attacks (now under control)     Arthritis     Asthma     resolved (no problems in a decade)     Baker's cyst of knee     Bronchitis     Bunion, left foot     Cervical pain     Chronic GERD     Chronic pain     Chronic pain disorder     Depression     Diabetes mellitus, type 2 (HCC)     Diabetic peripheral neuropathy (HCC)     Endometriosis     Fibromyalgia     Hyperlipidemia     Hypertension     Joint pain     Low back pain     Low back pain     Lung nodules     Macular degeneration     Pulmonary emphysema (HCC) 01/16/2020    Spondylosis     Spontaneous pneumothorax     Uterine cancer (HCC)        Past Surgical History:   Procedure Laterality Date    BACK SURGERY  1996    L5, S1- laser surgery fusion of c5 and c6     BREAST CYST EXCISION Right     x2 benign    CHOLECYSTECTOMY  2004    FOOT SURGERY Left 2005    fusion     FRACTURE SURGERY      GASTRIC BYPASS  2010    Dr. Oropeza     HYSTERECTOMY  1985    just uterus     KNEE ARTHROSCOPY      LAMINECTOMY      ORTHOPEDIC SURGERY      OVARIAN CYST REMOVAL  1975    PELVIC LAPAROSCOPY      ovaries (x10)     PLEURAL SCARIFICATION  1967    SHOULDER OPEN ROTATOR CUFF REPAIR Left 2008    TONSILLECTOMY      VAGINAL PROLAPSE REPAIR         Family History   Problem Relation Age of Onset    Hypertension Mother     Lung cancer Mother 53    Hypertension Father     Heart disease Father     Heart attack Father     Cancer Father 42    No Known Problems Sister     No Known Problems Sister     No Known Problems Sister     No Known Problems Maternal Grandmother     No Known Problems Maternal Grandfather     No Known Problems Paternal Grandmother     No Known Problems Paternal Grandfather     No Known Problems Daughter     No Known Problems Daughter     No Known Problems Daughter     Breast cancer Maternal Aunt 48    Breast cancer Paternal Aunt 43    Breast cancer Paternal Aunt 45    Breast  cancer Cousin 40    Breast cancer Cousin 42        male       Social History     Occupational History    Not on file   Tobacco Use    Smoking status: Every Day     Current packs/day: 0.30     Average packs/day: 0.6 packs/day for 54.0 years (33.7 ttl pk-yrs)     Types: Cigarettes     Start date: 1971    Smokeless tobacco: Never   Vaping Use    Vaping status: Never Used   Substance and Sexual Activity    Alcohol use: Not Currently     Alcohol/week: 1.0 standard drink of alcohol     Types: 1 Shots of liquor per week     Comment: rarely    Drug use: No    Sexual activity: Not Currently     Partners: Male       Current Outpatient Medications on File Prior to Visit   Medication Sig    amLODIPine (NORVASC) 5 mg tablet TAKE 1 TABLET (5 MG TOTAL) BY MOUTH DAILY.    atorvastatin (LIPITOR) 10 mg tablet Take 1 tablet (10 mg total) by mouth daily    benzonatate (TESSALON PERLES) 100 mg capsule Take 1 capsule (100 mg total) by mouth 3 (three) times a day as needed for cough    Calcium 600+D3 600-20 MG-MCG TABS TAKE 1 TABLET BY MOUTH TWICE A DAY WITH MEALS    cetirizine (ZyrTEC) 10 mg tablet TAKE 1 TABLET BY MOUTH EVERY DAY    cholecalciferol (VITAMIN D) 400 units/1 mL injections once a week    clotrimazole-betamethasone (LOTRISONE) 1-0.05 % cream APPLY TO AFFECTED AREA 3 TIMES A DAY    Diclofenac Sodium (VOLTAREN) 1 % APPLY 2 GRAMS TO AFFECTED AREA 4 TIMES A DAY    diphenhydrAMINE-PSE-APAP (BENADRYL ALLERGY/COLD PO) Take by mouth daily at bedtime    escitalopram (LEXAPRO) 5 mg tablet TAKE 1 TABLET (5 MG TOTAL) BY MOUTH DAILY.    famotidine (PEPCID) 20 mg tablet TAKE 1 TABLET (20 MG TOTAL) BY MOUTH 2 (TWO) TIMES A DAY PLEASE STOP TAGAMET    fexofenadine (ALLEGRA) 180 MG tablet Take 1 tablet (180 mg total) by mouth daily In the morning    ipratropium-albuterol (DUO-NEB) 0.5-2.5 mg/3 mL nebulizer solution Take 3 mL by nebulization every 8 (eight) hours as needed for wheezing or shortness of breath    levothyroxine 25 mcg tablet Take  1 tablet (25 mcg total) by mouth every other day    levothyroxine 50 mcg tablet TAKE 1 TABLET (50 MCG TOTAL) BY MOUTH EVERY OTHER DAY    losartan (COZAAR) 25 mg tablet TAKE 1 TABLET (25 MG TOTAL) BY MOUTH DAILY.    magnesium Oxide (MAG-OX) 400 mg TABS Take 1 tablet (400 mg total) by mouth daily    nortriptyline (PAMELOR) 25 mg capsule Take 1 capsule (25 mg total) by mouth daily at bedtime    omeprazole (PriLOSEC) 40 MG capsule TAKE 1 CAPSULE BY MOUTH 2 TIMES A DAY BEFORE MEALS.    Pediatric Multiple Vitamins (Childrens Chew Multivitamin) CHEW CHEW AND SWALLOW 1 TABLET BY MOUTH EVERY DAY    potassium chloride (MICRO-K) 10 MEQ CR capsule TAKE 1 CAPSULE BY MOUTH EVERY DAY    sucralfate (CARAFATE) 1 g/10 mL suspension TAKE 10 ML (1 G TOTAL) BY MOUTH 4 TIMES A DAY    traZODone (DESYREL) 50 mg tablet TAKE 1 TABLET BY MOUTH EVERY DAY AT BEDTIME AS NEEDED    Trelegy Ellipta 100-62.5-25 MCG/ACT inhaler INHALE 1 PUFF DAILY RINSE MOUTH AFTER USE.    triamcinolone (KENALOG) 0.1 % cream APPLY TO AFFECTED AREA 2 OR 3 TIMES DAILY AS NEEDED    Vitamin A 2400 MCG (8000 UT) TABS TAKE 1 CAPSULE (8,000 UNITS TOTAL) BY MOUTH DAILY    [DISCONTINUED] baclofen 10 mg tablet Take 1 tablet (10 mg total) by mouth 3 (three) times a day    [DISCONTINUED] HYDROcodone-acetaminophen (NORCO)  mg per tablet Take 1 tablet by mouth every 4 (four) hours as needed for moderate pain Max Daily Amount: 6 tablets    [DISCONTINUED] pregabalin (LYRICA) 200 MG capsule Take 1 capsule (200 mg total) by mouth 3 (three) times a day    Cyanocobalamin (B-12) 1000 MCG TABS TAKE 1 TABLET BY MOUTH EVERY DAY    denosumab (PROLIA) 60 mg/mL Inject 1 mL (60 mg total) under the skin once for 1 dose    predniSONE 10 mg tablet Take 3 tabs daily with breakfast for 3 days then Take 2 tabs daily for 3 Days then take one tab daily for 3 days then stop.. (Patient not taking: Reported on 12/23/2024)     Current Facility-Administered Medications on File Prior to Visit  "  Medication    cyanocobalamin injection 1,000 mcg    cyanocobalamin injection 1,000 mcg    cyanocobalamin injection 1,000 mcg    cyanocobalamin injection 1,000 mcg    cyanocobalamin injection 1,000 mcg    cyanocobalamin injection 1,000 mcg    cyanocobalamin injection 1,000 mcg    cyanocobalamin injection 1,000 mcg       Allergies   Allergen Reactions    Cephalosporins Hives    Erythromycin GI Intolerance    Fentanyl Itching     Occurred during surgery     Paxil [Paroxetine] Hives     After 2 weeks    Penicillins Itching    Pravastatin Myalgia    Statins Itching    Sulfa Antibiotics Diarrhea    Wellbutrin [Bupropion] Hives     After 2 weeks     Chantix [Varenicline] Rash and Other (See Comments)     Burning and itching    Clindamycin Rash    Marzena's Wort (Hypericum Perforatum) Rash       Physical Exam:    Ht 5' 5\" (1.651 m)   Wt 80.3 kg (177 lb) Comment: stated  LMP  (LMP Unknown)   BMI 29.45 kg/m²     Constitutional:normal, well developed, well nourished, alert, in no distress and non-toxic and no overt pain behavior.  Eyes:anicteric  HEENT:grossly intact  Neck:supple, symmetric, trachea midline and no masses   Pulmonary:even and unlabored  Cardiovascular:No edema or pitting edema present  Skin:Normal without rashes or lesions and well hydrated  Psychiatric:Mood and affect appropriate  Neurologic:Cranial Nerves II-XII grossly intact  Musculoskeletal:normal, ambulates with a cane.    "

## 2024-12-23 NOTE — PATIENT INSTRUCTIONS
By not proceeding to the ER you are putting yourself at risk for development of further symptoms, complications, death.  Contact your PCP as soon as possible to schedule follow up.    UTI Symptoms:  Prescribed course of Levaquin, take as directed.  Urine dip showed signs of potential UTI. Prescribed antibiotics, use as directed. Will send culture out to determine bacteria causing infection and susceptibility to antibiotics. May try over the counter Azo to decrease burning with urination, over the counter Tylenol or ibuprofen as directed on packaging as needed for pain. Drink plenty of fluids and follow proper urinary hygiene.    Cough:  Prescribed course of Levaquin, take as directed.  Fever/Pain: Over the counter Tylenol and/or Motrin as directed on packaging.  Cough: Mucinex can help to thin mucus, making it easier to cough up. Delsym or Robitussin can help to suppress the cough. Utilizing a vaporizer/humidifier may help, as well as increasing fluids.  Sore Throat: Salt water gargles with 1 teaspoon of salt dissolved in 6-8 oz of water, honey, drinking plenty of liquids, eating soft foods. If severe, can utilize OTC chloraseptic spray.  Nasal Congestion: Over the counter allergy medication like Claritin, Allegra or Zyrtec can help with nasal congestion and post nasal drip.  Over the counter saline or steroid nasal sprays like Flonase can help with nasal congestion and post nasal drip as well. Over the counter decongestants such as Sudafed may also help.  Upset Stomach: Drink plenty of fluids. May utilize Gatorade, Pedialyte, or other electrolyte solutions to supplement. Eat bland foods (ex: BRAT - bananas, rice, applesauce, toast) and slowly advance to other foods as tolerated.  Please note, if you have heart issues or high blood pressure, the cough/cold medication of choice is Coricidin. Talk to your doctor before trying any new medications.    Follow up with PCP in 3-5 days.  Proceed to  ER if symptoms  "worsen.    If tests are performed, our office will contact you with results only if changes need to made to the care plan discussed with you at the visit. You can review your full results on [facility] Mychart.    Patient Education     Urinary tract infection - Discharge instructions   The Basics   Written by the doctors and editors at [facility]   What are discharge instructions? -- Discharge instructions are information about how to take care of yourself after getting medical care for a health problem.  What is a urinary tract infection? -- A urinary tract infection (\"UTI\") is an infection that affects either the bladder or the kidneys (figure 1). A kidney infection is more serious, and can lead to other serious problems if it is not treated properly.  You need to take antibiotics to treat a UTI. It is important to take all of your antibiotics, even if you start to feel better.  How do I care for myself at home? -- Ask the doctor or nurse what you should do when you go home. Make sure that you understand exactly what you need to do to care for yourself. Ask questions if there is anything you do not understand.  You should also:   Take all of your medicines as instructed.   Take phenazopyridine (sample brand name: AZO Urinary Pain Relief) for the first day or so, if you choose. This is an over-the-counter medicine. It will help numb your bladder and decrease the urge to urinate. This medicine causes your urine and tears to look orange.   Take acetaminophen (sample brand name: Tylenol) if needed for pain.   Drink extra fluids. This can help prevent more bladder infections. If you have sex, these things might also help:   Urinate right afterward.   If you use birth control, use a form that does not contain spermicide.  When should I call the doctor? -- Call for advice if:   You have pain in your back, shoulder, or belly.   You have a fever, shaking chills, or sweats even though you are taking antibiotics.   You notice " more blood in your urine.   Your symptoms get worse or do not get better within 24 hours of starting antibiotics.   Your symptoms come back after finishing treatment.   You have any new or worrying symptoms.  All topics are updated as new evidence becomes available and our peer review process is complete.  This topic retrieved from [employer] on: Feb 26, 2024.  Topic 943587 Version 1.0  Release: 32.2.4 - C32.56  © 2024 UpToDate, Inc. and/or its affiliates. All rights reserved.  figure 1: Anatomy of the urinary tract     Urine is made by the kidneys. It passes from the kidneys into the bladder through 2 tubes called the ureters. Then, it leaves the bladder through another tube called the urethra.  Graphic 45307 Version 8.0  Consumer Information Use and Disclaimer   Disclaimer: This generalized information is a limited summary of diagnosis, treatment, and/or medication information. It is not meant to be comprehensive and should be used as a tool to help the user understand and/or assess potential diagnostic and treatment options. It does NOT include all information about conditions, treatments, medications, side effects, or risks that may apply to a specific patient. It is not intended to be medical advice or a substitute for the medical advice, diagnosis, or treatment of a health care provider based on the health care provider's examination and assessment of a patient's specific and unique circumstances. Patients must speak with a health care provider for complete information about their health, medical questions, and treatment options, including any risks or benefits regarding use of medications. This information does not endorse any treatments or medications as safe, effective, or approved for treating a specific patient. UpToDate, Inc. and its affiliates disclaim any warranty or liability relating to this information or the use thereof.The use of this information is governed by the Terms of Use, available at  https://www.wolterskluwer.com/en/know/clinical-effectiveness-terms. 2024© UpToDate, Inc. and its affiliates and/or licensors. All rights reserved.  Copyright   © 2024 UpToDate, Inc. and/or its affiliates. All rights reserved.    Patient Education     Aspiration Pneumonia Discharge Instructions   About this topic   Aspiration pneumonia is an infection in the lungs. Breathing food, vomit, or liquids into your lungs may cause this illness. This causes the lungs to make mucus and leads to infection. An absent or weak gag reflex is the most likely cause of this illness. Problems with swallowing food or drinks may also be a cause. This illness happens most often in older people. It is also common in people who cannot get out of bed or people who have had a stroke.       What care is needed at home?   Ask your doctor what you need to do when you go home. Make sure you ask questions if you do not understand what the doctor says. This way you will know what you need to do.  Learn to eat slowly and chew your food well before swallowing.  If swallowing is hard for you, ask if your food should be pureed. This makes the food easier to swallow. You may use a straw instead of spoon for eating food.  Take your drugs as ordered. Do not skip doses. Do not take any other drugs before talking with your doctor.  Drink at least 8 glasses of water each day unless told not to. This helps loosen your mucus so you can cough it up better.  Use a cool-mist humidifier and be sure to clean it every day.  Get lots of rest. If you have trouble sleeping at night, take naps at day time.  What follow-up care is needed?   Your doctor may ask you to make visits to the office to check on your progress. Be sure to keep these visits.  What drugs may be needed?   The doctor may order drugs to:  Fight an infection  Loosen mucus  Lower fever  Control coughing  Make the swelling in your airways and lungs go away  You may be given other drugs based on your  condition. These may include inhalers and steroids. Your doctor will give specific instructions about the drugs you may need to take.  Will physical activity be limited?   Get enough rest while recovering from your illness. You will need to stay home from work or school at least 24 hours after your fever has gone away and your cough has gotten better. Ask your doctor when you can return to your normal activities.  What problems could happen?   More trouble breathing  Fluid in your lungs  Low blood pressure  Cough with mucus that has blood in it or is green in color  Shock  Spread of infection to your bloodstream and other parts of your body  Respiratory failure  What can be done to prevent this health problem?   Learn to eat slowly and chew your food fully. Avoid actions that may lead to aspiration pneumonia. This includes:  Drinking too much beer, wine, and mixed drinks (alcohol)  Talking or laughing while eating  Smoking  When do I need to call the doctor?   Signs of a very bad reaction. These include wheezing; chest tightness; fever; itching; bad cough; blue skin color; seizures; or swelling of face, lips, tongue, or throat. Go to the ER right away.  Signs of infection. These include a fever of 100.4°F (38°C) or higher, chills, very bad sore throat, ear or sinus pain, cough, more sputum or change in color of sputum.  If you pass out or feel like you are going to pass out  Problems thinking clearly or change in mental status  Breathing problems get worse  Cough does not get better with your drugs  Cough up blood   You are not feeling better in 2 to 3 days or you are feeling worse  Teach Back: Helping You Understand   The Teach Back Method helps you understand the information we are giving you. The idea is simple. After talking with the staff, tell them in your own words what you were just told. This helps to make sure the staff has covered each thing clearly. It also helps to explain things that may have been a  bit confusing. Before going home, make sure you are able to do these:  I can tell you about my condition.  I can tell you if I need to make changes with how I eat or drink.  I can tell you what I will do if I have more trouble breathing or my cough does not get better.  Last Reviewed Date   2020-01-27  Consumer Information Use and Disclaimer   This generalized information is a limited summary of diagnosis, treatment, and/or medication information. It is not meant to be comprehensive and should be used as a tool to help the user understand and/or assess potential diagnostic and treatment options. It does NOT include all information about conditions, treatments, medications, side effects, or risks that may apply to a specific patient. It is not intended to be medical advice or a substitute for the medical advice, diagnosis, or treatment of a health care provider based on the health care provider's examination and assessment of a patient’s specific and unique circumstances. Patients must speak with a health care provider for complete information about their health, medical questions, and treatment options, including any risks or benefits regarding use of medications. This information does not endorse any treatments or medications as safe, effective, or approved for treating a specific patient. UpToDate, Inc. and its affiliates disclaim any warranty or liability relating to this information or the use thereof. The use of this information is governed by the Terms of Use, available at https://www.Inspro.com/en/know/clinical-effectiveness-terms   Copyright   Copyright © 2024 UpToDate, Inc. and its affiliates and/or licensors. All rights reserved.

## 2024-12-24 ENCOUNTER — TELEPHONE (OUTPATIENT)
Dept: HEMATOLOGY ONCOLOGY | Facility: CLINIC | Age: 74
End: 2024-12-24

## 2024-12-25 LAB — BACTERIA UR CULT: ABNORMAL

## 2024-12-26 ENCOUNTER — TELEPHONE (OUTPATIENT)
Dept: HEMATOLOGY ONCOLOGY | Facility: CLINIC | Age: 74
End: 2024-12-26

## 2024-12-26 NOTE — TELEPHONE ENCOUNTER
Spoke to patient this morning to remind her to complete labs.  Patient could not go in time.  We rescheduled the appointment until after the holiday.  She is aware to complete the labs prior to the appointment.

## 2025-01-07 ENCOUNTER — APPOINTMENT (OUTPATIENT)
Dept: LAB | Facility: HOSPITAL | Age: 75
End: 2025-01-07
Payer: COMMERCIAL

## 2025-01-07 DIAGNOSIS — Z98.84 HISTORY OF ROUX-EN-Y GASTRIC BYPASS: ICD-10-CM

## 2025-01-07 DIAGNOSIS — D50.8 IRON DEFICIENCY ANEMIA FOLLOWING BARIATRIC SURGERY: ICD-10-CM

## 2025-01-07 DIAGNOSIS — R91.8 LUNG NODULES: ICD-10-CM

## 2025-01-07 DIAGNOSIS — E06.3 HYPOTHYROIDISM DUE TO HASHIMOTO'S THYROIDITIS: ICD-10-CM

## 2025-01-07 DIAGNOSIS — K95.89 IRON DEFICIENCY ANEMIA FOLLOWING BARIATRIC SURGERY: ICD-10-CM

## 2025-01-07 DIAGNOSIS — I10 ESSENTIAL HYPERTENSION: ICD-10-CM

## 2025-01-07 DIAGNOSIS — E11.8 TYPE II DIABETES MELLITUS WITH MANIFESTATIONS (HCC): ICD-10-CM

## 2025-01-07 DIAGNOSIS — E53.8 B12 DEFICIENCY: ICD-10-CM

## 2025-01-07 DIAGNOSIS — F17.210 CIGARETTE SMOKER: ICD-10-CM

## 2025-01-07 DIAGNOSIS — R53.83 OTHER FATIGUE: ICD-10-CM

## 2025-01-07 DIAGNOSIS — D47.2 MGUS (MONOCLONAL GAMMOPATHY OF UNKNOWN SIGNIFICANCE): ICD-10-CM

## 2025-01-07 DIAGNOSIS — E04.2 MULTIPLE THYROID NODULES: ICD-10-CM

## 2025-01-07 LAB
ALBUMIN SERPL BCG-MCNC: 3.8 G/DL (ref 3.5–5)
ALP SERPL-CCNC: 100 U/L (ref 34–104)
ALT SERPL W P-5'-P-CCNC: 29 U/L (ref 7–52)
ANION GAP SERPL CALCULATED.3IONS-SCNC: 7 MMOL/L (ref 4–13)
AST SERPL W P-5'-P-CCNC: 32 U/L (ref 13–39)
BASOPHILS # BLD AUTO: 0.02 THOUSANDS/ΜL (ref 0–0.1)
BASOPHILS NFR BLD AUTO: 1 % (ref 0–1)
BILIRUB SERPL-MCNC: 0.39 MG/DL (ref 0.2–1)
BILIRUB UR QL STRIP: NEGATIVE
BUN SERPL-MCNC: 8 MG/DL (ref 5–25)
CALCIUM SERPL-MCNC: 9.1 MG/DL (ref 8.4–10.2)
CHLORIDE SERPL-SCNC: 98 MMOL/L (ref 96–108)
CHOLEST SERPL-MCNC: 184 MG/DL (ref ?–200)
CLARITY UR: CLEAR
CO2 SERPL-SCNC: 29 MMOL/L (ref 21–32)
COLOR UR: YELLOW
CREAT SERPL-MCNC: 0.65 MG/DL (ref 0.6–1.3)
EOSINOPHIL # BLD AUTO: 0.05 THOUSAND/ΜL (ref 0–0.61)
EOSINOPHIL NFR BLD AUTO: 1 % (ref 0–6)
ERYTHROCYTE [DISTWIDTH] IN BLOOD BY AUTOMATED COUNT: 13.3 % (ref 11.6–15.1)
FERRITIN SERPL-MCNC: 287 NG/ML (ref 11–307)
GFR SERPL CREATININE-BSD FRML MDRD: 87 ML/MIN/1.73SQ M
GLUCOSE SERPL-MCNC: 80 MG/DL (ref 65–140)
GLUCOSE UR STRIP-MCNC: NEGATIVE MG/DL
HCT VFR BLD AUTO: 38.4 % (ref 34.8–46.1)
HDLC SERPL-MCNC: 71 MG/DL
HGB BLD-MCNC: 12.4 G/DL (ref 11.5–15.4)
HGB UR QL STRIP.AUTO: NEGATIVE
IGA SERPL-MCNC: 283 MG/DL (ref 66–433)
IGG SERPL-MCNC: 469 MG/DL (ref 635–1741)
IGM SERPL-MCNC: 126 MG/DL (ref 45–281)
IMM GRANULOCYTES # BLD AUTO: 0.03 THOUSAND/UL (ref 0–0.2)
IMM GRANULOCYTES NFR BLD AUTO: 1 % (ref 0–2)
IRON SATN MFR SERPL: 26 % (ref 15–50)
IRON SERPL-MCNC: 77 UG/DL (ref 50–212)
KETONES UR STRIP-MCNC: NEGATIVE MG/DL
LDLC SERPL CALC-MCNC: 89 MG/DL (ref 0–100)
LEUKOCYTE ESTERASE UR QL STRIP: NEGATIVE
LYMPHOCYTES # BLD AUTO: 0.64 THOUSANDS/ΜL (ref 0.6–4.47)
LYMPHOCYTES NFR BLD AUTO: 16 % (ref 14–44)
MAGNESIUM SERPL-MCNC: 1.9 MG/DL (ref 1.9–2.7)
MCH RBC QN AUTO: 32.8 PG (ref 26.8–34.3)
MCHC RBC AUTO-ENTMCNC: 32.3 G/DL (ref 31.4–37.4)
MCV RBC AUTO: 102 FL (ref 82–98)
MONOCYTES # BLD AUTO: 0.37 THOUSAND/ΜL (ref 0.17–1.22)
MONOCYTES NFR BLD AUTO: 9 % (ref 4–12)
NEUTROPHILS # BLD AUTO: 2.81 THOUSANDS/ΜL (ref 1.85–7.62)
NEUTS SEG NFR BLD AUTO: 72 % (ref 43–75)
NITRITE UR QL STRIP: NEGATIVE
NRBC BLD AUTO-RTO: 0 /100 WBCS
PH UR STRIP.AUTO: 7 [PH]
PLATELET # BLD AUTO: 203 THOUSANDS/UL (ref 149–390)
PMV BLD AUTO: 9.5 FL (ref 8.9–12.7)
POTASSIUM SERPL-SCNC: 4.2 MMOL/L (ref 3.5–5.3)
PROT SERPL-MCNC: 6.3 G/DL (ref 6.4–8.4)
PROT UR STRIP-MCNC: NEGATIVE MG/DL
RBC # BLD AUTO: 3.78 MILLION/UL (ref 3.81–5.12)
SODIUM SERPL-SCNC: 134 MMOL/L (ref 135–147)
SP GR UR STRIP.AUTO: 1 (ref 1–1.04)
TIBC SERPL-MCNC: 298.2 UG/DL (ref 250–450)
TRANSFERRIN SERPL-MCNC: 213 MG/DL (ref 203–362)
TRIGL SERPL-MCNC: 120 MG/DL (ref ?–150)
TSH SERPL DL<=0.05 MIU/L-ACNC: 1.53 UIU/ML (ref 0.45–4.5)
UIBC SERPL-MCNC: 221 UG/DL (ref 155–355)
UROBILINOGEN UA: NEGATIVE MG/DL
VIT B12 SERPL-MCNC: 1300 PG/ML (ref 180–914)
WBC # BLD AUTO: 3.92 THOUSAND/UL (ref 4.31–10.16)

## 2025-01-07 PROCEDURE — 82728 ASSAY OF FERRITIN: CPT

## 2025-01-07 PROCEDURE — 82607 VITAMIN B-12: CPT

## 2025-01-07 PROCEDURE — 84165 PROTEIN E-PHORESIS SERUM: CPT

## 2025-01-07 PROCEDURE — 36415 COLL VENOUS BLD VENIPUNCTURE: CPT

## 2025-01-07 PROCEDURE — 83521 IG LIGHT CHAINS FREE EACH: CPT

## 2025-01-07 PROCEDURE — 83550 IRON BINDING TEST: CPT

## 2025-01-07 PROCEDURE — 85025 COMPLETE CBC W/AUTO DIFF WBC: CPT

## 2025-01-07 PROCEDURE — 80061 LIPID PANEL: CPT

## 2025-01-07 PROCEDURE — 83735 ASSAY OF MAGNESIUM: CPT

## 2025-01-07 PROCEDURE — 86334 IMMUNOFIX E-PHORESIS SERUM: CPT

## 2025-01-07 PROCEDURE — 82784 ASSAY IGA/IGD/IGG/IGM EACH: CPT

## 2025-01-07 PROCEDURE — 83540 ASSAY OF IRON: CPT

## 2025-01-07 PROCEDURE — 84443 ASSAY THYROID STIM HORMONE: CPT

## 2025-01-08 LAB
KAPPA LC FREE SER-MCNC: 35.7 MG/L (ref 3.3–19.4)
KAPPA LC FREE/LAMBDA FREE SER: 2.15 {RATIO} (ref 0.26–1.65)
LAMBDA LC FREE SERPL-MCNC: 16.6 MG/L (ref 5.7–26.3)

## 2025-01-09 LAB
ALBUMIN SERPL ELPH-MCNC: 3.53 G/DL (ref 3.2–5.1)
ALBUMIN SERPL ELPH-MCNC: 59.9 % (ref 48–70)
ALPHA1 GLOB SERPL ELPH-MCNC: 0.31 G/DL (ref 0.15–0.47)
ALPHA1 GLOB SERPL ELPH-MCNC: 5.2 % (ref 1.8–7)
ALPHA2 GLOB SERPL ELPH-MCNC: 0.68 G/DL (ref 0.42–1.04)
ALPHA2 GLOB SERPL ELPH-MCNC: 11.6 % (ref 5.9–14.9)
BETA GLOB ABNORMAL SERPL ELPH-MCNC: 0.38 G/DL (ref 0.31–0.57)
BETA1 GLOB SERPL ELPH-MCNC: 6.5 % (ref 4.7–7.7)
BETA2 GLOB SERPL ELPH-MCNC: 4.8 % (ref 3.1–7.9)
BETA2+GAMMA GLOB SERPL ELPH-MCNC: 0.28 G/DL (ref 0.2–0.58)
GAMMA GLOB ABNORMAL SERPL ELPH-MCNC: 0.71 G/DL (ref 0.4–1.66)
GAMMA GLOB SERPL ELPH-MCNC: 12 % (ref 6.9–22.3)
IGG/ALB SER: 1.49 {RATIO} (ref 1.1–1.8)
INTERPRETATION UR IFE-IMP: NORMAL
PROT PATTERN SERPL ELPH-IMP: ABNORMAL
PROT SERPL-MCNC: 5.9 G/DL (ref 6.4–8.2)

## 2025-01-09 PROCEDURE — 86334 IMMUNOFIX E-PHORESIS SERUM: CPT | Performed by: STUDENT IN AN ORGANIZED HEALTH CARE EDUCATION/TRAINING PROGRAM

## 2025-01-09 PROCEDURE — 84165 PROTEIN E-PHORESIS SERUM: CPT | Performed by: STUDENT IN AN ORGANIZED HEALTH CARE EDUCATION/TRAINING PROGRAM

## 2025-01-13 ENCOUNTER — TELEMEDICINE (OUTPATIENT)
Dept: HEMATOLOGY ONCOLOGY | Facility: CLINIC | Age: 75
End: 2025-01-13
Payer: COMMERCIAL

## 2025-01-13 DIAGNOSIS — Z98.84 HISTORY OF ROUX-EN-Y GASTRIC BYPASS: ICD-10-CM

## 2025-01-13 DIAGNOSIS — E53.8 B12 DEFICIENCY: ICD-10-CM

## 2025-01-13 DIAGNOSIS — D47.2 IGA GAMMOPATHY: ICD-10-CM

## 2025-01-13 DIAGNOSIS — D47.2 MGUS (MONOCLONAL GAMMOPATHY OF UNKNOWN SIGNIFICANCE): Primary | ICD-10-CM

## 2025-01-13 DIAGNOSIS — K95.89 IRON DEFICIENCY ANEMIA FOLLOWING BARIATRIC SURGERY: ICD-10-CM

## 2025-01-13 DIAGNOSIS — D50.8 IRON DEFICIENCY ANEMIA FOLLOWING BARIATRIC SURGERY: ICD-10-CM

## 2025-01-13 DIAGNOSIS — K90.89 OTHER SPECIFIED INTESTINAL MALABSORPTION: ICD-10-CM

## 2025-01-13 PROCEDURE — 99214 OFFICE O/P EST MOD 30 MIN: CPT | Performed by: NURSE PRACTITIONER

## 2025-01-13 RX ORDER — BLOOD SUGAR DIAGNOSTIC
STRIP MISCELLANEOUS 2 TIMES DAILY
COMMUNITY
Start: 2024-12-26

## 2025-01-13 NOTE — ASSESSMENT & PLAN NOTE
Patient CBC shows a WBC of 3.92, RBC 3.78, normal hemoglobin and hematocrit, .  We discussed RBC is slightly low however she is compensating with the macrocytosis.  Otherwise within normal limits.  Iron saturation 26% with a ferritin level of 287.  Patient did receive Feraheme 510 mg IV x 2 in September 2024.  Vitamin B12 1300.  For now we will check levels every 6 months and manage accordingly.  She is up-to-date on mammogram, ultrasound of the right breast is scheduled in the near future.  She has also had a colonoscopy in 2021.  We will see her in 1 year, patient verbalized understanding and agrees to the plan.    Orders:    CBC and differential; Standing    Comprehensive metabolic panel; Standing    IgG, IgA, IgM; Standing    Vitamin B12; Standing    Iron Panel (Includes Ferritin, Iron Sat%, Iron, and TIBC); Standing    Immunoglobulin free LT chains blood; Standing    Protein electrophoresis, serum; Standing

## 2025-01-13 NOTE — LETTER
2025     Aleksandar Garduno MD  [facility] [place] [id number]    Patient: Areli Luciano   YOB: 1950   Date of Visit: 2025       Dear Dr. Garduno:    Thank you for referring Areli Luciano to me for evaluation. Below are my notes for this consultation.    If you have questions, please do not hesitate to call me. I look forward to following your patient along with you.         Sincerely,        CARTER Porter        CC: No Recipients    CARTER Porter  2025  9:29 AM  Sign when Signing Visit  Virtual Regular Visit  Name: Areli Luciano      : 1950      MRN: [id number]  Encounter Provider: CARTER Porter  Encounter Date: 2025   Encounter department: [facility] [place]      Verification of patient location:  Patient is located at Home in the following state in which I hold an active license [place] :  Assessment & Plan  MGUS (monoclonal gammopathy of unknown significance)   Patient has a history of IgG monoclonal gammopathy since 2009 on observation.  Most recent myeloma panel from 2025 shows persistent monoclonal peak in the beta gamma region previously identified as IgA kappa.  Per immunofixation interpretation monoclonal immunoglobulins in the beta region can be accurately identified by strict dosimetry due to the other protein send medicate there.  We will request a UPEP with next labs.  Patient did have a UA that was negative for proteins.  Quantitative immunoglobulins show a normal IgA and IgM, IgG is decreased at 469.  Immunoglobulin light chains shows an elevated kappa 35.7, lambda normal ratio 2.15.  This is gradually increasing since 2016.  We we will get an updated bone scan.  Last bone imaging was done in .  We discussed patients with monoclonal gammopathy can progress to multiple myeloma approximately 1 %/year.  Will continue observation.    Orders:  •  CBC and differential; Standing  •   Comprehensive metabolic panel; Standing  •  IgG, IgA, IgM; Standing  •  Immunoglobulin free LT chains blood; Standing  •  Protein electrophoresis, serum; Standing  •  CT low dose whole body myeloma scan; Future  •  Protein electrophoresis, urine; Future    IgA gammopathy    History of Sarah-en-Y gastric bypass   Patient CBC shows a WBC of 3.92, RBC 3.78, normal hemoglobin and hematocrit, .  We discussed RBC is slightly low however she is compensating with the macrocytosis.  Otherwise within normal limits.  Iron saturation 26% with a ferritin level of 287.  Patient did receive Feraheme 510 mg IV x 2 in September 2024.  Vitamin B12 1300.  For now we will check levels every 6 months and manage accordingly.  She is up-to-date on mammogram, ultrasound of the right breast is scheduled in the near future.  She has also had a colonoscopy in 2021.  We will see her in 1 year, patient verbalized understanding and agrees to the plan.    Orders:  •  CBC and differential; Standing  •  Comprehensive metabolic panel; Standing  •  IgG, IgA, IgM; Standing  •  Vitamin B12; Standing  •  Iron Panel (Includes Ferritin, Iron Sat%, Iron, and TIBC); Standing  •  Immunoglobulin free LT chains blood; Standing  •  Protein electrophoresis, serum; Standing    Other specified intestinal malabsorption    B12 deficiency    Iron deficiency anemia following bariatric surgery       Encounter provider CARTER Porter    The patient was identified by name and date of birth. Areli Luciano was informed that this is a telemedicine visit and that the visit is being conducted through the [employer] platform. She agrees to proceed..  My office door was closed. No one else was in the room.  She acknowledged consent and understanding of privacy and security of the video platform. The patient has agreed to participate and understands they can discontinue the visit at any time.    Patient is aware this is a billable service.     History of Present  Illness   Patient is a 74-year-old female with a history of hypothyroid, current every day smoker, intermittent asthma, pulmonary emphysema, COPD, type 2 diabetes, osteoporosis, and gastric bypass surgery.  She also has a history of monoclonal gammopathy of unknown significance.  We have been following since 2009.  HPI  Review of Systems   Constitutional:  Positive for fatigue. Negative for activity change, appetite change, fever and unexpected weight change.   Respiratory:  Negative for cough and shortness of breath.    Cardiovascular:  Negative for chest pain and leg swelling.   Gastrointestinal:  Negative for abdominal pain, constipation, diarrhea and nausea.   Endocrine: Negative for cold intolerance and heat intolerance.   Musculoskeletal:  Positive for arthralgias and myalgias.   Skin: Negative.    Neurological:  Negative for dizziness, weakness and headaches.   Hematological:  Negative for adenopathy. Does not bruise/bleed easily.     Objective  LMP  (LMP Unknown)     Physical Exam  Constitutional:       Appearance: Normal appearance.   Musculoskeletal:         General: Normal range of motion.      Cervical back: Normal range of motion.   Neurological:      Mental Status: She is alert and oriented to person, place, and time.   Psychiatric:         Behavior: Behavior normal.     Visit Time  Total Visit Duration: 21 min

## 2025-01-13 NOTE — PROGRESS NOTES
Virtual Regular Visit  Name: Areli Luciano      : 1950      MRN: [id number]  Encounter Provider: CARTER Porter  Encounter Date: 2025   Encounter department: [facility] [place]      Verification of patient location:  Patient is located at Home in the following state in which I [place] an [place] license [place] :  Assessment & Plan  MGUS (monoclonal gammopathy of unknown significance)   Patient has a history of IgG monoclonal gammopathy since 2009 on observation.  Most recent myeloma panel from 2025 shows persistent monoclonal peak in the beta gamma region previously identified as IgA kappa.  Per immunofixation interpretation monoclonal immunoglobulins in the beta region can be accurately identified by strict dosimetry due to the other protein send medicate there.  We will request a UPEP with next labs.  Patient did have a UA that was negative for proteins.  Quantitative immunoglobulins show a normal IgA and IgM, IgG is decreased at 469.  Immunoglobulin light chains shows an elevated kappa 35.7, lambda normal ratio 2.15.  This is gradually increasing since 2016.  We we will get an updated bone scan.  Last bone imaging was done in .  We discussed patients with monoclonal gammopathy can progress to multiple myeloma approximately 1 %/year.  Will continue observation.    Orders:    CBC and differential; Standing    Comprehensive metabolic panel; Standing    IgG, IgA, IgM; Standing    Immunoglobulin free LT chains blood; Standing    Protein electrophoresis, serum; Standing    CT low dose whole body myeloma scan; Future    Protein electrophoresis, urine; Future    IgA gammopathy    History of Sarah-en-Y gastric bypass   Patient CBC shows a WBC of 3.92, RBC 3.78, normal hemoglobin and hematocrit, .  We discussed RBC is slightly low however she is compensating with the macrocytosis.  Otherwise within normal limits.  Iron saturation 26% with a ferritin level  of 287.  Patient did receive Feraheme 510 mg IV x 2 in September 2024.  Vitamin B12 1300.  For now we will check levels every 6 months and manage accordingly.  She is up-to-date on mammogram, ultrasound of the right breast is scheduled in the near future.  She has also had a colonoscopy in 2021.  We will see her in 1 year, patient verbalized understanding and agrees to the plan.    Orders:    CBC and differential; Standing    Comprehensive metabolic panel; Standing    IgG, IgA, IgM; Standing    Vitamin B12; Standing    Iron Panel (Includes Ferritin, Iron Sat%, Iron, and TIBC); Standing    Immunoglobulin free LT chains blood; Standing    Protein electrophoresis, serum; Standing    Other specified intestinal malabsorption    B12 deficiency    Iron deficiency anemia following bariatric surgery       Encounter provider CARTER Porter    The patient was identified by name and date of birth. Areli Luciano was informed that this is a telemedicine visit and that the visit is being conducted through the [employer] platform. She agrees to proceed..  My office door was closed. No one else was in the room.  She acknowledged consent and understanding of privacy and security of the video platform. The patient has agreed to participate and understands they can discontinue the visit at any time.    Patient is aware this is a billable service.     History of Present Illness    Patient is a 74-year-old female with a history of hypothyroid, current every day smoker, intermittent asthma, pulmonary emphysema, COPD, type 2 diabetes, osteoporosis, and gastric bypass surgery.  She also has a history of monoclonal gammopathy of unknown significance.  We have been following since 2009.  HPI  Review of Systems   Constitutional:  Positive for fatigue. Negative for activity change, appetite change, fever and unexpected weight change.   Respiratory:  Negative for cough and shortness of breath.    Cardiovascular:  Negative for chest pain  and leg swelling.   Gastrointestinal:  Negative for abdominal pain, constipation, diarrhea and nausea.   Endocrine: Negative for cold intolerance and heat intolerance.   Musculoskeletal:  Positive for arthralgias and myalgias.   Skin: Negative.    Neurological:  Negative for dizziness, weakness and headaches.   Hematological:  Negative for adenopathy. Does not bruise/bleed easily.     Objective   LMP  (LMP Unknown)     Physical Exam  Constitutional:       Appearance: Normal appearance.   Musculoskeletal:         General: Normal range of motion.      Cervical back: Normal range of motion.   Neurological:      Mental Status: She is alert and oriented to person, place, and time.   Psychiatric:         Behavior: Behavior normal.     Visit Time  Total Visit Duration: 21 min

## 2025-01-13 NOTE — ASSESSMENT & PLAN NOTE
Patient has a history of IgG monoclonal gammopathy since November 2009 on observation.  Most recent myeloma panel from 1/7/2025 shows persistent monoclonal peak in the beta gamma region previously identified as IgA kappa.  Per immunofixation interpretation monoclonal immunoglobulins in the beta region can be accurately identified by strict dosimetry due to the other protein send medicate there.  We will request a UPEP with next labs.  Patient did have a UA that was negative for proteins.  Quantitative immunoglobulins show a normal IgA and IgM, IgG is decreased at 469.  Immunoglobulin light chains shows an elevated kappa 35.7, lambda normal ratio 2.15.  This is gradually increasing since 2016.  We we will get an updated bone scan.  Last bone imaging was done in 2009.  We discussed patients with monoclonal gammopathy can progress to multiple myeloma approximately 1 %/year.  Will continue observation.    Orders:    CBC and differential; Standing    Comprehensive metabolic panel; Standing    IgG, IgA, IgM; Standing    Immunoglobulin free LT chains blood; Standing    Protein electrophoresis, serum; Standing    CT low dose whole body myeloma scan; Future    Protein electrophoresis, urine; Future

## 2025-01-13 NOTE — ASSESSMENT & PLAN NOTE
Orders:    CBC and differential; Standing    Iron Panel (Includes Ferritin, Iron Sat%, Iron, and TIBC); Standing

## 2025-01-24 ENCOUNTER — TELEPHONE (OUTPATIENT)
Age: 75
End: 2025-01-24

## 2025-01-24 DIAGNOSIS — K21.9 GASTROESOPHAGEAL REFLUX DISEASE WITHOUT ESOPHAGITIS: ICD-10-CM

## 2025-01-24 DIAGNOSIS — J44.1 ACUTE EXACERBATION OF CHRONIC OBSTRUCTIVE PULMONARY DISEASE (COPD) (HCC): Primary | ICD-10-CM

## 2025-01-24 RX ORDER — PROMETHAZINE HYDROCHLORIDE 6.25 MG/5ML
12.5 SYRUP ORAL EVERY 6 HOURS PRN
Qty: 118 ML | Refills: 0 | Status: SHIPPED | OUTPATIENT
Start: 2025-01-24

## 2025-01-24 RX ORDER — DOXYCYCLINE 100 MG/1
100 CAPSULE ORAL 2 TIMES DAILY WITH MEALS
Qty: 20 CAPSULE | Refills: 0 | Status: SHIPPED | OUTPATIENT
Start: 2025-01-24 | End: 2025-02-03

## 2025-01-24 RX ORDER — SUCRALFATE ORAL 1 G/10ML
SUSPENSION ORAL
Qty: 3780 ML | Refills: 1 | Status: SHIPPED | OUTPATIENT
Start: 2025-01-24

## 2025-01-24 NOTE — TELEPHONE ENCOUNTER
Patient called and scheduled an OVS for 1/28/25. Patient has been fighting a severe URI since Thanksgiving, off and on antibiotics and not getting better. Patient currently has a cough and green mucous/nasal congestion. Patient is requesting an antibiotic be called in now prior to the appointment. Please advise and return patients call at [phone].

## 2025-01-27 ENCOUNTER — VBI (OUTPATIENT)
Dept: ADMINISTRATIVE | Facility: OTHER | Age: 75
End: 2025-01-27

## 2025-01-27 ENCOUNTER — RA CDI HCC (OUTPATIENT)
Dept: OTHER | Facility: HOSPITAL | Age: 75
End: 2025-01-27

## 2025-01-27 NOTE — TELEPHONE ENCOUNTER
01/27/25 5:51 PM     Chart reviewed for Hemoglobin A1c was/were submitted to the patient's insurance.     Haroon Robertson MA   [facility]

## 2025-01-27 NOTE — PROGRESS NOTES
HCC coding opportunities       Chart reviewed, no opportunity found: CHART REVIEWED, NO OPPORTUNITY FOUND     E11.69, E78.5 previously reported - current labs WNL  Previously suggested E11.42  as on problem lsit - 12/5/24 foot exam with intact sensory exam.      Patients Insurance     Medicare Insurance: [employer]

## 2025-01-31 ENCOUNTER — OFFICE VISIT (OUTPATIENT)
Dept: FAMILY MEDICINE CLINIC | Facility: CLINIC | Age: 75
End: 2025-01-31
Payer: COMMERCIAL

## 2025-01-31 VITALS
TEMPERATURE: 98.7 F | SYSTOLIC BLOOD PRESSURE: 128 MMHG | HEART RATE: 76 BPM | OXYGEN SATURATION: 99 % | RESPIRATION RATE: 16 BRPM | DIASTOLIC BLOOD PRESSURE: 80 MMHG

## 2025-01-31 DIAGNOSIS — M81.8 IDIOPATHIC OSTEOPOROSIS: ICD-10-CM

## 2025-01-31 DIAGNOSIS — E06.3 HYPOTHYROIDISM DUE TO HASHIMOTO'S THYROIDITIS: ICD-10-CM

## 2025-01-31 DIAGNOSIS — F17.210 SMOKING GREATER THAN 20 PACK YEARS: ICD-10-CM

## 2025-01-31 DIAGNOSIS — R91.8 LUNG NODULES: ICD-10-CM

## 2025-01-31 DIAGNOSIS — I10 PRIMARY HYPERTENSION: ICD-10-CM

## 2025-01-31 DIAGNOSIS — E04.2 MULTIPLE THYROID NODULES: ICD-10-CM

## 2025-01-31 DIAGNOSIS — F17.210 CIGARETTE SMOKER: ICD-10-CM

## 2025-01-31 DIAGNOSIS — F17.200 NICOTINE DEPENDENCE WITH CURRENT USE: ICD-10-CM

## 2025-01-31 DIAGNOSIS — Z12.31 ENCOUNTER FOR SCREENING MAMMOGRAM FOR BREAST CANCER: ICD-10-CM

## 2025-01-31 DIAGNOSIS — J44.1 ACUTE EXACERBATION OF COPD WITH ASTHMA (HCC): ICD-10-CM

## 2025-01-31 DIAGNOSIS — E11.8 TYPE II DIABETES MELLITUS WITH MANIFESTATIONS (HCC): Primary | ICD-10-CM

## 2025-01-31 DIAGNOSIS — E78.00 PURE HYPERCHOLESTEROLEMIA: ICD-10-CM

## 2025-01-31 DIAGNOSIS — Z12.11 SCREEN FOR COLON CANCER: ICD-10-CM

## 2025-01-31 DIAGNOSIS — J43.8 OTHER EMPHYSEMA (HCC): ICD-10-CM

## 2025-01-31 LAB — SL AMB POCT HEMOGLOBIN AIC: 5.2 (ref ?–6.5)

## 2025-01-31 PROCEDURE — G2211 COMPLEX E/M VISIT ADD ON: HCPCS | Performed by: INTERNAL MEDICINE

## 2025-01-31 PROCEDURE — 99214 OFFICE O/P EST MOD 30 MIN: CPT | Performed by: INTERNAL MEDICINE

## 2025-01-31 PROCEDURE — 83036 HEMOGLOBIN GLYCOSYLATED A1C: CPT | Performed by: INTERNAL MEDICINE

## 2025-01-31 RX ORDER — IPRATROPIUM BROMIDE AND ALBUTEROL SULFATE 2.5; .5 MG/3ML; MG/3ML
3 SOLUTION RESPIRATORY (INHALATION) EVERY 8 HOURS PRN
Qty: 270 ML | Refills: 3 | Status: SHIPPED | OUTPATIENT
Start: 2025-01-31

## 2025-01-31 RX ORDER — ATORVASTATIN CALCIUM 10 MG/1
10 TABLET, FILM COATED ORAL DAILY
Qty: 90 TABLET | Refills: 3 | Status: SHIPPED | OUTPATIENT
Start: 2025-01-31

## 2025-01-31 NOTE — PROGRESS NOTES
Name: Areli Luciano      : 1950      MRN: [id number]  Encounter Provider: Aleksandar Garduno MD  Encounter Date: 2025   Encounter department: [facility] CARE [facility]  :  Assessment & Plan  Smoking greater than 20 pack years    Orders:    CT lung screening program; Future    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)    Lab Results   Component Value Date    HGBA1C 5.2 2025       Orders:    POCT hemoglobin A1c    Comprehensive metabolic panel; Future    CBC and differential; Future    Lipid Panel with Direct LDL reflex; Future    TSH, 3rd generation with Free T4 reflex; Future    Vitamin B12; Future    Vitamin D 25 hydroxy; Future    UA (URINE) with reflex to Scope; Future    Magnesium; Future    Ambulatory Referral to Otolaryngology; Future    Hypothyroidism due to Hashimoto's thyroiditis    Orders:    Vitamin B12; Future    Vitamin D 25 hydroxy; Future    UA (URINE) with reflex to Scope; Future    Magnesium; Future    Ambulatory Referral to Otolaryngology; Future    Lung nodules    Orders:    Vitamin B12; Future    Vitamin D 25 hydroxy; Future    UA (URINE) with reflex to Scope; Future    Magnesium; Future    Ambulatory Referral to Otolaryngology; Future    Multiple thyroid nodules    Orders:    Vitamin B12; Future    Vitamin D 25 hydroxy; Future    UA (URINE) with reflex to Scope; Future    Magnesium; Future    Ambulatory Referral to Otolaryngology; Future    Cigarette smoker         Other emphysema (HCC)         Primary hypertension    Orders:    atorvastatin (LIPITOR) 10 mg tablet; Take 1 tablet (10 mg total) by mouth daily    Nicotine dependence with current use    Orders:    atorvastatin (LIPITOR) 10 mg tablet; Take 1 tablet (10 mg total) by mouth daily    Pure hypercholesterolemia    Orders:    atorvastatin (LIPITOR) 10 mg tablet; Take 1 tablet (10 mg total) by mouth daily    Acute exacerbation of COPD with asthma (HCC)    Orders:    ipratropium-albuterol  (DUO-NEB) 0.5-2.5 mg/3 mL nebulizer solution; Take 3 mL by nebulization every 8 (eight) hours as needed for wheezing or shortness of breath    Idiopathic osteoporosis    Orders:    Vitamin B12; Future    Vitamin D 25 hydroxy; Future    UA (URINE) with reflex to Scope; Future    Magnesium; Future    Ambulatory Referral to Otolaryngology; Future    DXA bone density spine hip and pelvis; Future    Mammo screening bilateral w 3d and cad; Future    Quantiferon TB Gold Plus Assay; Future    H. pylori antigen, stool; Future    Cologuard    Ambulatory Referral to Obstetrics / Gynecology; Future    Ambulatory Referral to Dermatology; Future    Encounter for screening mammogram for breast cancer    Orders:    DXA bone density spine hip and pelvis; Future    Mammo screening bilateral w 3d and cad; Future    Quantiferon TB Gold Plus Assay; Future    H. pylori antigen, stool; Future    Cologuard    Ambulatory Referral to Obstetrics / Gynecology; Future    Ambulatory Referral to Dermatology; Future    Screen for colon cancer    Orders:    DXA bone density spine hip and pelvis; Future    Mammo screening bilateral w 3d and cad; Future    Quantiferon TB Gold Plus Assay; Future    H. pylori antigen, stool; Future    Cologuard    Ambulatory Referral to Obstetrics / Gynecology; Future    Ambulatory Referral to Dermatology; Future    Pt was advised to quit smoking  Life style mod  RTC in  3mos w Blood work       History of Present Illness   74 Y O Lady is here for Regular check up, she still smokes, she has few symptoms, recent blood work and med list reviewed,...      Review of Systems   Constitutional:  Negative for chills, fatigue and fever.   HENT:  Positive for postnasal drip. Negative for congestion, ear pain, facial swelling, sore throat, trouble swallowing and voice change.    Eyes:  Negative for pain, discharge and visual disturbance.   Respiratory:  Positive for cough. Negative for shortness of breath and wheezing.     Cardiovascular:  Negative for chest pain, palpitations and leg swelling.   Gastrointestinal:  Negative for abdominal pain, blood in stool, constipation, diarrhea, nausea and vomiting.   Endocrine: Negative for polydipsia, polyphagia and polyuria.   Genitourinary:  Negative for difficulty urinating, dysuria, hematuria and urgency.   Musculoskeletal:  Negative for arthralgias, back pain and myalgias.   Skin:  Negative for color change and rash.   Neurological:  Negative for dizziness, tremors, seizures, syncope, weakness and headaches.   Hematological:  Negative for adenopathy. Does not bruise/bleed easily.   Psychiatric/Behavioral:  Negative for dysphoric mood, sleep disturbance and suicidal ideas.    All other systems reviewed and are negative.      Objective   /80 (BP Location: Left arm, Patient Position: Sitting, Cuff Size: Standard)   Pulse 76   Temp 98.7 °F (37.1 °C) (Tympanic)   Resp 16   LMP  (LMP Unknown)   SpO2 99%      Physical Exam  Vitals and nursing note reviewed.   Constitutional:       General: She is not in acute distress.     Appearance: She is well-developed. She is not diaphoretic.   HENT:      Head: Normocephalic and atraumatic.      Right Ear: External ear normal.      Left Ear: External ear normal.      Nose: Nose normal.   Eyes:      General:         Right eye: No discharge.         Left eye: No discharge.      Conjunctiva/sclera: Conjunctivae normal.      Pupils: Pupils are equal, round, and reactive to light.   Neck:      Thyroid: No thyromegaly.      Trachea: No tracheal deviation.   Cardiovascular:      Rate and Rhythm: Normal rate and regular rhythm.      Heart sounds: Murmur heard.      No friction rub.   Pulmonary:      Effort: Pulmonary effort is normal. No respiratory distress.      Breath sounds: No stridor. Wheezing present. No rales.   Abdominal:      General: Bowel sounds are normal. There is no distension.      Palpations: Abdomen is soft.      Tenderness: There is no  abdominal tenderness. There is no guarding.   Musculoskeletal:         General: No swelling, tenderness or deformity. Normal range of motion.      Cervical back: Normal range of motion and neck supple.   Lymphadenopathy:      Cervical: No cervical adenopathy.   Skin:     General: Skin is warm and dry.      Capillary Refill: Capillary refill takes less than 2 seconds.      Coloration: Skin is not pale.      Findings: No erythema or rash.   Neurological:      Mental Status: She is alert and oriented to person, place, and time.      Cranial Nerves: No cranial nerve deficit.      Coordination: Coordination normal.      Comments: Pt uses a cane to support gait   Psychiatric:         Mood and Affect: Mood normal.         Behavior: Behavior normal.

## 2025-01-31 NOTE — ASSESSMENT & PLAN NOTE
Lab Results   Component Value Date    HGBA1C 5.2 01/31/2025       Orders:    POCT hemoglobin A1c    Comprehensive metabolic panel; Future    CBC and differential; Future    Lipid Panel with Direct LDL reflex; Future    TSH, 3rd generation with Free T4 reflex; Future    Vitamin B12; Future    Vitamin D 25 hydroxy; Future    UA (URINE) with reflex to Scope; Future    Magnesium; Future    Ambulatory Referral to Otolaryngology; Future

## 2025-01-31 NOTE — PATIENT INSTRUCTIONS

## 2025-01-31 NOTE — ASSESSMENT & PLAN NOTE
Orders:    Vitamin B12; Future    Vitamin D 25 hydroxy; Future    UA (URINE) with reflex to Scope; Future    Magnesium; Future    Ambulatory Referral to Otolaryngology; Future

## 2025-02-03 ENCOUNTER — TELEPHONE (OUTPATIENT)
Dept: FAMILY MEDICINE CLINIC | Facility: CLINIC | Age: 75
End: 2025-02-03

## 2025-02-03 DIAGNOSIS — F17.210 CIGARETTE SMOKER: ICD-10-CM

## 2025-02-03 DIAGNOSIS — J43.8 OTHER EMPHYSEMA (HCC): Primary | ICD-10-CM

## 2025-02-03 RX ORDER — ALBUTEROL SULFATE 90 UG/1
2 INHALANT RESPIRATORY (INHALATION) EVERY 6 HOURS PRN
Qty: 18 G | Refills: 3 | Status: SHIPPED | OUTPATIENT
Start: 2025-02-03

## 2025-02-03 NOTE — TELEPHONE ENCOUNTER
Patient called the RX Refill Line. Message is being forwarded to the office.     Patient is requesting a message be sent to the office regarding a medication no longer listed on active medication list.    albuterol (PROVENTIL HFA,VENTOLIN HFA) 90 mcg/act inhaler     Please review as patient has no medication remaining.     [facility] [id number] [facility] [place] - [address]  [address], [facility] [address]  Phone: [phone]  Fax: [phone]      Please contact patient at [phone]

## 2025-02-09 DIAGNOSIS — I10 ESSENTIAL HYPERTENSION: ICD-10-CM

## 2025-02-10 ENCOUNTER — TELEPHONE (OUTPATIENT)
Age: 75
End: 2025-02-10

## 2025-02-10 NOTE — TELEPHONE ENCOUNTER
Patient states she is still having green mucous and thinks her xrays of her chest were read wrong. She is asking for Dr. Garduno to give her a 2nd opinion.

## 2025-02-11 DIAGNOSIS — F17.210 CIGARETTE SMOKER: ICD-10-CM

## 2025-02-11 DIAGNOSIS — R06.02 SOB (SHORTNESS OF BREATH): ICD-10-CM

## 2025-02-11 DIAGNOSIS — R05.3 CHRONIC COUGH: Primary | ICD-10-CM

## 2025-02-11 RX ORDER — AMLODIPINE BESYLATE 5 MG/1
5 TABLET ORAL DAILY
Qty: 90 TABLET | Refills: 1 | Status: SHIPPED | OUTPATIENT
Start: 2025-02-11

## 2025-02-11 NOTE — TELEPHONE ENCOUNTER
Spoke with the patient.  She is going for a whole body CT scan on Friday.  I told her to make sure they send the report to Dr. Garduno as well.  She stated that she will.

## 2025-02-13 ENCOUNTER — HOSPITAL ENCOUNTER (OUTPATIENT)
Dept: CT IMAGING | Facility: HOSPITAL | Age: 75
End: 2025-02-13
Payer: COMMERCIAL

## 2025-02-13 ENCOUNTER — TELEPHONE (OUTPATIENT)
Dept: FAMILY MEDICINE CLINIC | Facility: CLINIC | Age: 75
End: 2025-02-13

## 2025-02-13 DIAGNOSIS — D47.2 IGA GAMMOPATHY: ICD-10-CM

## 2025-02-13 DIAGNOSIS — F17.210 CIGARETTE SMOKER: ICD-10-CM

## 2025-02-13 DIAGNOSIS — D47.2 MGUS (MONOCLONAL GAMMOPATHY OF UNKNOWN SIGNIFICANCE): ICD-10-CM

## 2025-02-13 DIAGNOSIS — K21.9 GASTROESOPHAGEAL REFLUX DISEASE WITHOUT ESOPHAGITIS: ICD-10-CM

## 2025-02-13 DIAGNOSIS — R05.3 CHRONIC COUGH: ICD-10-CM

## 2025-02-13 DIAGNOSIS — R06.02 SOB (SHORTNESS OF BREATH): ICD-10-CM

## 2025-02-13 DIAGNOSIS — R12 FUNCTIONAL HEARTBURN: ICD-10-CM

## 2025-02-13 PROCEDURE — 71250 CT THORAX DX C-: CPT

## 2025-02-13 PROCEDURE — 76497 UNLISTED CT PROCEDURE: CPT

## 2025-02-13 RX ORDER — NORTRIPTYLINE HYDROCHLORIDE 25 MG/1
25 CAPSULE ORAL
Qty: 90 CAPSULE | Refills: 1 | Status: SHIPPED | OUTPATIENT
Start: 2025-02-13

## 2025-02-14 ENCOUNTER — TELEPHONE (OUTPATIENT)
Age: 75
End: 2025-02-14

## 2025-02-14 DIAGNOSIS — F11.20 UNCOMPLICATED OPIOID DEPENDENCE (HCC): ICD-10-CM

## 2025-02-14 DIAGNOSIS — Z79.891 LONG-TERM CURRENT USE OF OPIATE ANALGESIC: ICD-10-CM

## 2025-02-14 DIAGNOSIS — G89.4 CHRONIC PAIN SYNDROME: ICD-10-CM

## 2025-02-14 RX ORDER — HYDROCODONE BITARTRATE AND ACETAMINOPHEN 10; 325 MG/1; MG/1
1 TABLET ORAL EVERY 4 HOURS PRN
Qty: 130 TABLET | Refills: 0 | Status: SHIPPED | OUTPATIENT
Start: 2025-02-17 | End: 2025-03-19

## 2025-02-14 NOTE — TELEPHONE ENCOUNTER
Pt requesting [facility] rx be resent to [facility] in [place]. Rx was DNF of 2/17/25 but cx'd by Dr. Garduno on 1/31/25 by accident. Pt last filled #130 tab on 1/20/25 and next appt is 3/11/25 with [facility].

## 2025-02-14 NOTE — TELEPHONE ENCOUNTER
Caller: ly Singleton    Doctor: Dr. Horne    Reason for call: pt went on to my chart and noticed that her script for her hydrocodone (norco) is not on file.  Pt believes that is was accidentally d/c by Dr. Garduno.  Pt is due to pick the medication up on 2/17. Can someone please re-send to the pharmacy below    Thank you!    Call back#: [phone]     [facility]  [place]  [address] [place] [address] [phone]

## 2025-02-18 DIAGNOSIS — J43.9 PULMONARY EMPHYSEMA, UNSPECIFIED EMPHYSEMA TYPE (HCC): ICD-10-CM

## 2025-02-18 RX ORDER — FLUTICASONE FUROATE, UMECLIDINIUM BROMIDE AND VILANTEROL TRIFENATATE 100; 62.5; 25 UG/1; UG/1; UG/1
1 POWDER RESPIRATORY (INHALATION) DAILY
Qty: 60 EACH | Refills: 5 | Status: SHIPPED | OUTPATIENT
Start: 2025-02-18

## 2025-02-19 ENCOUNTER — OFFICE VISIT (OUTPATIENT)
Dept: PULMONOLOGY | Facility: CLINIC | Age: 75
End: 2025-02-19
Payer: COMMERCIAL

## 2025-02-19 VITALS
SYSTOLIC BLOOD PRESSURE: 122 MMHG | OXYGEN SATURATION: 97 % | DIASTOLIC BLOOD PRESSURE: 62 MMHG | WEIGHT: 182 LBS | BODY MASS INDEX: 30.32 KG/M2 | HEART RATE: 73 BPM | HEIGHT: 65 IN | TEMPERATURE: 98.4 F

## 2025-02-19 DIAGNOSIS — Z12.2 ENCOUNTER FOR SCREENING FOR MALIGNANT NEOPLASM OF LUNG IN CURRENT SMOKER WITH 30 PACK YEAR HISTORY OR GREATER: ICD-10-CM

## 2025-02-19 DIAGNOSIS — J43.8 OTHER EMPHYSEMA (HCC): ICD-10-CM

## 2025-02-19 DIAGNOSIS — J45.40 MODERATE PERSISTENT ASTHMA WITHOUT COMPLICATION: Primary | ICD-10-CM

## 2025-02-19 DIAGNOSIS — F17.210 CIGARETTE SMOKER: ICD-10-CM

## 2025-02-19 DIAGNOSIS — K21.9 GASTROESOPHAGEAL REFLUX DISEASE, UNSPECIFIED WHETHER ESOPHAGITIS PRESENT: ICD-10-CM

## 2025-02-19 DIAGNOSIS — F17.200 ENCOUNTER FOR SCREENING FOR MALIGNANT NEOPLASM OF LUNG IN CURRENT SMOKER WITH 30 PACK YEAR HISTORY OR GREATER: ICD-10-CM

## 2025-02-19 DIAGNOSIS — F17.210 NICOTINE DEPENDENCE, CIGARETTES, UNCOMPLICATED: ICD-10-CM

## 2025-02-19 DIAGNOSIS — F17.200 NICOTINE DEPENDENCE WITH CURRENT USE: ICD-10-CM

## 2025-02-19 DIAGNOSIS — R09.82 POSTNASAL DRIP: ICD-10-CM

## 2025-02-19 PROCEDURE — G2211 COMPLEX E/M VISIT ADD ON: HCPCS | Performed by: INTERNAL MEDICINE

## 2025-02-19 PROCEDURE — 99214 OFFICE O/P EST MOD 30 MIN: CPT | Performed by: INTERNAL MEDICINE

## 2025-02-19 RX ORDER — ALBUTEROL SULFATE 90 UG/1
2 INHALANT RESPIRATORY (INHALATION) EVERY 6 HOURS PRN
Qty: 18 G | Refills: 3 | Status: SHIPPED | OUTPATIENT
Start: 2025-02-19

## 2025-02-19 RX ORDER — FLUTICASONE PROPIONATE 50 MCG
1 SPRAY, SUSPENSION (ML) NASAL DAILY
Qty: 48 G | Refills: 1 | Status: SHIPPED | OUTPATIENT
Start: 2025-02-19

## 2025-02-19 NOTE — ASSESSMENT & PLAN NOTE
I believe her symptoms are triggered most of the time due to GERD and aspiration.  Continue current management and follow with PCP/GI.

## 2025-02-19 NOTE — ASSESSMENT & PLAN NOTE
Encourage patient to quit smoking.  She could not tolerate Chantix due to rash.  Nicotine patch is not covered by her insurance.  Currently she is smoking 4 cigarettes/day and I told her that she can stop without any medications.  She will try.

## 2025-02-19 NOTE — PROGRESS NOTES
Progress note - Pulmonary Medicine   Areli Luciano 74 y.o. female MRN: [id number]       Impression & Plan:     Moderate persistent asthma without complication  Continue Trelegy and as needed albuterol.  Counseled to quit smoking.    GERD (gastroesophageal reflux disease)  I believe her symptoms are triggered most of the time due to GERD and aspiration.  Continue current management and follow with PCP/GI.    Nicotine dependence with current use  Encourage patient to quit smoking.  She could not tolerate Chantix due to rash.  Nicotine patch is not covered by her insurance.  Currently she is smoking 4 cigarettes/day and I told her that she can stop without any medications.  She will try.    Encounter for screening for malignant neoplasm of lung in current smoker with 30 pack year history or greater  I discussed with her that she is a candidate for lung cancer CT screening.     The following Shared Decision-Making points were covered:  Benefits of screening were discussed, including the rates of reduction in death from lung cancer and other causes.  Harms of screening were reviewed, including false positive tests, radiation exposure levels, risks of invasive procedures, risks of complications of screening, and risk of overdiagnosis.  I counseled on the importance of adherence to annual lung cancer LDCT screening, impact of co-morbidities, and ability or willingness to undergo diagnosis and treatment.  I counseled on the importance of maintaining abstinence as a former smoker or was counseled on the importance of smoking cessation if a current smoker    Review of Eligibility Criteria: She meets all of the criteria for Lung Cancer Screening.   She is 74 y.o.   She has 20 pack year tobacco history and is a current smoker or has quit within the past 15 years  She presents no signs or symptoms of lung cancer    After discussion, the patient decided to elect lung cancer screening.    Postnasal drip  Start  Flonase.      Return in about 1 year (around 2/19/2026).    Diagnoses and all orders for this visit:    Moderate persistent asthma without complication    Gastroesophageal reflux disease, unspecified whether esophagitis present    Encounter for screening for malignant neoplasm of lung in current smoker with 30 pack year history or greater  -     CT lung screening program; Future    Nicotine dependence with current use  -     CT lung screening program; Future    Postnasal drip  -     fluticasone (FLONASE) 50 mcg/act nasal spray; 1 spray into each nostril daily    Cigarette smoker  -     albuterol (ProAir HFA) 90 mcg/act inhaler; Inhale 2 puffs every 6 (six) hours as needed for wheezing    Other emphysema (HCC)  -     albuterol (ProAir HFA) 90 mcg/act inhaler; Inhale 2 puffs every 6 (six) hours as needed for wheezing    Nicotine dependence, cigarettes, uncomplicated  -     CT lung screening program; Future      ______________________________________________________________________    HPI:    Areli Luciano presents today for follow-up of asthma and intermittent cough.    Patient presents today having increased cough that she attributes mainly to recurrent aspiration and severe GERD.  Few months ago she had a major aspiration from her acid reflux and was treated for possible pneumonia.  She was offered to be admitted to the hospital but she declined.  Her cough improves then relapses quickly due to her acid reflux.  She follows with GI and she is on PPI, Pepcid and Carafate and she elevates the head of the bed well at night but continues to have symptoms.  She was told there is no other management option.  She had asthma diagnosed about 25-30 years ago.  She continues to smoke cigarettes but no COPD on her PFTs in the past 2 years.  She is on Trelegy and when she is off Trelegy she has worsening symptoms as she states.  Currently she has productive cough of clear sputum, denies fever chills or night sweats, denies  chest pain or tightness, denies wheezing.  She also has postnasal drip and she is willing to try Flonase at this time.      Current Medications:    Current Outpatient Medications:     albuterol (ProAir HFA) 90 mcg/act inhaler, Inhale 2 puffs every 6 (six) hours as needed for wheezing, Disp: 18 g, Rfl: 3    amLODIPine (NORVASC) 5 mg tablet, TAKE 1 TABLET (5 MG TOTAL) BY MOUTH DAILY., Disp: 90 tablet, Rfl: 1    atorvastatin (LIPITOR) 10 mg tablet, Take 1 tablet (10 mg total) by mouth daily, Disp: 90 tablet, Rfl: 3    baclofen 10 mg tablet, Take 1 tablet (10 mg total) by mouth 3 (three) times a day, Disp: 270 tablet, Rfl: 0    Calcium 600+D3 600-20 MG-MCG TABS, TAKE 1 TABLET BY MOUTH TWICE A DAY WITH MEALS, Disp: 180 tablet, Rfl: 4    cetirizine (ZyrTEC) 10 mg tablet, TAKE 1 TABLET BY MOUTH EVERY DAY, Disp: 90 tablet, Rfl: 2    cholecalciferol (VITAMIN D) 400 units/1 mL, injections once a week, Disp: , Rfl:     clotrimazole-betamethasone (LOTRISONE) 1-0.05 % cream, APPLY TO AFFECTED AREA 3 TIMES A DAY, Disp: 60 g, Rfl: 1    Cyanocobalamin (B-12) 1000 MCG TABS, TAKE 1 TABLET BY MOUTH EVERY DAY, Disp: 90 tablet, Rfl: 3    Diclofenac Sodium (VOLTAREN) 1 %, APPLY 2 GRAMS TO AFFECTED AREA 4 TIMES A DAY, Disp: 200 g, Rfl: 0    diphenhydrAMINE-PSE-APAP (BENADRYL ALLERGY/COLD PO), Take by mouth daily at bedtime, Disp: , Rfl:     escitalopram (LEXAPRO) 5 mg tablet, TAKE 1 TABLET (5 MG TOTAL) BY MOUTH DAILY., Disp: 90 tablet, Rfl: 1    famotidine (PEPCID) 20 mg tablet, TAKE 1 TABLET (20 MG TOTAL) BY MOUTH 2 (TWO) TIMES A DAY PLEASE STOP TAGAMET, Disp: 180 tablet, Rfl: 1    fexofenadine (ALLEGRA) 180 MG tablet, Take 1 tablet (180 mg total) by mouth daily In the morning, Disp: 90 tablet, Rfl: 3    HYDROcodone-acetaminophen (NORCO)  mg per tablet, Take 1 tablet by mouth every 4 (four) hours as needed for moderate pain Max Daily Amount: 6 tablets Do not start before February 17, 2025., Disp: 130 tablet, Rfl: 0     ipratropium-albuterol (DUO-NEB) 0.5-2.5 mg/3 mL nebulizer solution, Take 3 mL by nebulization every 8 (eight) hours as needed for wheezing or shortness of breath, Disp: 270 mL, Rfl: 3    levothyroxine 25 mcg tablet, Take 1 tablet (25 mcg total) by mouth every other day, Disp: 45 tablet, Rfl: 3    levothyroxine 50 mcg tablet, TAKE 1 TABLET (50 MCG TOTAL) BY MOUTH EVERY OTHER DAY, Disp: 50 tablet, Rfl: 0    losartan (COZAAR) 25 mg tablet, TAKE 1 TABLET (25 MG TOTAL) BY MOUTH DAILY., Disp: 90 tablet, Rfl: 1    magnesium Oxide (MAG-OX) 400 mg TABS, Take 1 tablet (400 mg total) by mouth daily, Disp: 90 tablet, Rfl: 0    naloxone (NARCAN) 4 mg/0.1 mL nasal spray, Administer 1 spray into a nostril. If no response after 2-3 minutes, give another dose in the other nostril using a new spray., Disp: 1 each, Rfl: 1    nortriptyline (PAMELOR) 25 mg capsule, TAKE 1 CAPSULE BY MOUTH DAILY AT BEDTIME, Disp: 90 capsule, Rfl: 1    omeprazole (PriLOSEC) 40 MG capsule, TAKE 1 CAPSULE BY MOUTH 2 TIMES A DAY BEFORE MEALS., Disp: 180 capsule, Rfl: 1    OneTouch Verio test strip, 2 (two) times a day, Disp: , Rfl:     Pediatric Multiple Vitamins (Childrens Chew Multivitamin) CHEW, CHEW AND SWALLOW 1 TABLET BY MOUTH EVERY DAY, Disp: 100 tablet, Rfl: 1    potassium chloride (MICRO-K) 10 MEQ CR capsule, TAKE 1 CAPSULE BY MOUTH EVERY DAY, Disp: 90 capsule, Rfl: 1    pregabalin (LYRICA) 200 MG capsule, Take 1 capsule (200 mg total) by mouth 3 (three) times a day, Disp: 90 capsule, Rfl: 2    promethazine (PHENERGAN) 12.5 mg/10 mL oral solution, Take 10 mL (12.5 mg total) by mouth every 6 (six) hours as needed (cough), Disp: 118 mL, Rfl: 0    sucralfate (CARAFATE) 1 g/10 mL suspension, TAKE 10 ML (1 G TOTAL) BY MOUTH 4 TIMES A DAY, Disp: 3780 mL, Rfl: 1    traZODone (DESYREL) 50 mg tablet, TAKE 1 TABLET BY MOUTH EVERY DAY AT BEDTIME AS NEEDED, Disp: 90 tablet, Rfl: 1    Trelegy Ellipta 100-62.5-25 MCG/ACT inhaler, INHALE 1 PUFF DAILY RINSE MOUTH  AFTER USE., Disp: 60 each, Rfl: 5    triamcinolone (KENALOG) 0.1 % cream, APPLY TO AFFECTED AREA 2 OR 3 TIMES DAILY AS NEEDED, Disp: , Rfl:     Vitamin A 2400 MCG (8000 UT) TABS, TAKE 1 CAPSULE (8,000 UNITS TOTAL) BY MOUTH DAILY, Disp: , Rfl:     denosumab (PROLIA) 60 mg/mL, Inject 1 mL (60 mg total) under the skin once for 1 dose, Disp: 1 mL, Rfl: 0    Current Facility-Administered Medications:     cyanocobalamin injection 1,000 mcg, 1,000 mcg, Intramuscular, Q30 Days, Aleksandar Garduno MD, 1,000 mcg at 12/08/20 1118    cyanocobalamin injection 1,000 mcg, 1,000 mcg, Intramuscular, Q30 Days, Aleksandar Garduno MD, 1,000 mcg at 07/08/20 1036    cyanocobalamin injection 1,000 mcg, 1,000 mcg, Intramuscular, Q30 Days, Aleksandar Garduno MD, 1,000 mcg at 09/08/20 1210    cyanocobalamin injection 1,000 mcg, 1,000 mcg, Intramuscular, Q30 Days, Aleksandar Garduno MD    cyanocobalamin injection 1,000 mcg, 1,000 mcg, Intramuscular, Q30 Days, Aleksandar Garduno MD    cyanocobalamin injection 1,000 mcg, 1,000 mcg, Intramuscular, Q30 Days, Aleksandar Garduno MD, 1,000 mcg at 08/18/21 1019    cyanocobalamin injection 1,000 mcg, 1,000 mcg, Intramuscular, Q30 Days, Aleksandar Garduno MD, 1,000 mcg at 03/21/23 1117    cyanocobalamin injection 1,000 mcg, 1,000 mcg, Intramuscular, Q30 Days, Aleksandar Garduno MD, 1,000 mcg at 07/13/23 1004    Review of Systems:  Review of Systems   Constitutional: Negative.    HENT: Negative.     Eyes: Negative.    Respiratory:  Positive for cough.    Cardiovascular: Negative.    Gastrointestinal: Negative.         Severe and recurrent GERD with aspiration   Endocrine: Negative.    Genitourinary: Negative.    Musculoskeletal: Negative.    Skin: Negative.    Allergic/Immunologic: Negative.    Neurological: Negative.    Hematological: Negative.    Psychiatric/Behavioral: Negative.       Aside from what is mentioned in the HPI, the review of systems is otherwise negative    Past medical history, surgical history, and family history were reviewed and  "updated as appropriate    Social history updates:  Social History     Tobacco Use   Smoking Status Every Day    Current packs/day: 0.30    Average packs/day: 0.6 packs/day for 54.1 years (33.7 ttl pk-yrs)    Types: Cigarettes    Start date: 1971   Smokeless Tobacco Never       PhysicalExamination:  Vitals:   /62 (BP Location: Left arm, Patient Position: Sitting, Cuff Size: Large)   Pulse 73   Temp 98.4 °F (36.9 °C) (Tympanic)   Ht 5' 5\" (1.651 m)   Wt 82.6 kg (182 lb)   LMP  (LMP Unknown)   SpO2 97%   BMI 30.29 kg/m²     Gen:  Comfortable on room air.  No conversational dyspnea  HEENT:  Conjugate gaze.  sclerae anicteric.  Oropharynx moist  Neck: Trachea is midline. No JVD. No adenopathy  Chest: Equal breath sounds and clear to auscultation bilaterally, no wheezing or crackles  Cardiac: S1-S2 regular. no murmur  Abdomen:  benign  Extremities: No edema  Neuro:  Normal speech and mentation    Diagnostic Data:  Labs:  I personally reviewed the most recent laboratory data pertinent to today's visit    Lab Results   Component Value Date    WBC 3.92 (L) 01/07/2025    HGB 12.4 01/07/2025    HCT 38.4 01/07/2025     (H) 01/07/2025     01/07/2025     Lab Results   Component Value Date    SODIUM 134 (L) 01/07/2025    K 4.2 01/07/2025    CO2 29 01/07/2025    CL 98 01/07/2025    BUN 8 01/07/2025    CREATININE 0.65 01/07/2025    CALCIUM 9.1 01/07/2025       PFT results:  The most recent pulmonary function tests were reviewed.  2024:  No obstructive airflow defect on spirometry   There is significant airway response with the administration of bronchodilator per ATS standards   Normal Lung volumes   Mildly reduced diffusion capacity   Flow volume loop is normal    Imaging:  I personally reviewed the images on the PAC system pertinent to today's visit  Recent chest x-ray reviewed in PACS: Clear lungs    Chest CT scan reviewed on PACS: No significant emphysematous changes, clear parenchyma except for tiny " nodule especially in the right apex with a cluster of nodules measuring together about 6 mm     Other studies:  EGD:IMPRESSION:  Small hiatal hernia  Otherwise normal esophagus. Proximal and distal biopsies were obtained   Significant amount of bile reflux in the gastric pouch. Dilated gastric pouch with areas of edema and erythema. Biopsied  Normal gj anastomosis  Normal jejunum. Biopsied        Echocardiogram: LVEF 65%, normal RV, RVSP/PASP is 45 mmHg        Gordy Robles MD

## 2025-02-19 NOTE — ASSESSMENT & PLAN NOTE
I discussed with her that she is a candidate for lung cancer CT screening.     The following Shared Decision-Making points were covered:  Benefits of screening were discussed, including the rates of reduction in death from lung cancer and other causes.  Harms of screening were reviewed, including false positive tests, radiation exposure levels, risks of invasive procedures, risks of complications of screening, and risk of overdiagnosis.  I counseled on the importance of adherence to annual lung cancer LDCT screening, impact of co-morbidities, and ability or willingness to undergo diagnosis and treatment.  I counseled on the importance of maintaining abstinence as a former smoker or was counseled on the importance of smoking cessation if a current smoker    Review of Eligibility Criteria: She meets all of the criteria for Lung Cancer Screening.   She is 74 y.o.   She has 20 pack year tobacco history and is a current smoker or has quit within the past 15 years  She presents no signs or symptoms of lung cancer    After discussion, the patient decided to elect lung cancer screening.

## 2025-03-04 DIAGNOSIS — K21.9 GASTROESOPHAGEAL REFLUX DISEASE WITHOUT ESOPHAGITIS: ICD-10-CM

## 2025-03-04 DIAGNOSIS — E87.6 HYPOKALEMIA: ICD-10-CM

## 2025-03-04 DIAGNOSIS — E83.42 HYPOMAGNESEMIA: ICD-10-CM

## 2025-03-05 RX ORDER — POTASSIUM CHLORIDE 750 MG/1
10 CAPSULE, EXTENDED RELEASE ORAL DAILY
Qty: 90 CAPSULE | Refills: 1 | Status: SHIPPED | OUTPATIENT
Start: 2025-03-05

## 2025-03-05 RX ORDER — OMEPRAZOLE 40 MG/1
CAPSULE, DELAYED RELEASE ORAL
Qty: 180 CAPSULE | Refills: 1 | Status: SHIPPED | OUTPATIENT
Start: 2025-03-05

## 2025-03-11 ENCOUNTER — OFFICE VISIT (OUTPATIENT)
Dept: PAIN MEDICINE | Facility: MEDICAL CENTER | Age: 75
End: 2025-03-11
Payer: COMMERCIAL

## 2025-03-11 ENCOUNTER — TELEPHONE (OUTPATIENT)
Dept: PAIN MEDICINE | Facility: CLINIC | Age: 75
End: 2025-03-11

## 2025-03-11 VITALS — HEIGHT: 65 IN | BODY MASS INDEX: 30.32 KG/M2 | WEIGHT: 182 LBS

## 2025-03-11 DIAGNOSIS — G89.29 CHRONIC BILATERAL LOW BACK PAIN WITH BILATERAL SCIATICA: ICD-10-CM

## 2025-03-11 DIAGNOSIS — G89.4 CHRONIC PAIN SYNDROME: ICD-10-CM

## 2025-03-11 DIAGNOSIS — M54.41 CHRONIC BILATERAL LOW BACK PAIN WITH BILATERAL SCIATICA: ICD-10-CM

## 2025-03-11 DIAGNOSIS — M79.18 MYOFASCIAL PAIN SYNDROME: ICD-10-CM

## 2025-03-11 DIAGNOSIS — F11.20 UNCOMPLICATED OPIOID DEPENDENCE (HCC): Primary | ICD-10-CM

## 2025-03-11 DIAGNOSIS — M54.42 CHRONIC BILATERAL LOW BACK PAIN WITH BILATERAL SCIATICA: ICD-10-CM

## 2025-03-11 DIAGNOSIS — Z79.891 LONG-TERM CURRENT USE OF OPIATE ANALGESIC: ICD-10-CM

## 2025-03-11 DIAGNOSIS — M54.16 LUMBAR RADICULITIS: ICD-10-CM

## 2025-03-11 PROCEDURE — 99214 OFFICE O/P EST MOD 30 MIN: CPT | Performed by: NURSE PRACTITIONER

## 2025-03-11 PROCEDURE — G2211 COMPLEX E/M VISIT ADD ON: HCPCS | Performed by: NURSE PRACTITIONER

## 2025-03-11 RX ORDER — PREGABALIN 200 MG/1
200 CAPSULE ORAL 3 TIMES DAILY
Qty: 90 CAPSULE | Refills: 2 | Status: SHIPPED | OUTPATIENT
Start: 2025-03-11

## 2025-03-11 RX ORDER — BACLOFEN 10 MG/1
10 TABLET ORAL 3 TIMES DAILY
Qty: 270 TABLET | Refills: 0 | Status: CANCELLED | OUTPATIENT
Start: 2025-03-11

## 2025-03-11 RX ORDER — HYDROCODONE BITARTRATE AND ACETAMINOPHEN 10; 325 MG/1; MG/1
1 TABLET ORAL EVERY 4 HOURS PRN
Qty: 130 TABLET | Refills: 0 | Status: SHIPPED | OUTPATIENT
Start: 2025-04-14

## 2025-03-11 RX ORDER — HYDROCODONE BITARTRATE AND ACETAMINOPHEN 10; 325 MG/1; MG/1
1 TABLET ORAL EVERY 4 HOURS PRN
Qty: 130 TABLET | Refills: 0 | Status: SHIPPED | OUTPATIENT
Start: 2025-03-17 | End: 2025-04-16

## 2025-03-11 NOTE — TELEPHONE ENCOUNTER
Caller: Areli    Doctor: Dr. Kocher    Reason for call: patient went to the store across the street and messed up walking to slow, she missed her appt today at 9:30 am just realized, I offered next available 3/31 2 pm she said no she needs to come in today to get her med refills can you please help her?    Call back#: [phone]

## 2025-03-11 NOTE — PROGRESS NOTES
Assessment:  1. Uncomplicated opioid dependence (HCC)    2. Chronic pain syndrome    3. Long-term current use of opiate analgesic    4. Myofascial pain syndrome    5. Lumbar radiculitis    6. Chronic bilateral low back pain with bilateral sciatica        Plan:  The patient remains clinically stable and her pain is well controlled on hydrocodone-acetaminophen 10/325 mg 1 tablet every 4-6 hours as needed. The patient's opioid scripts were sent to their pharmacy electronically and was given a 2 month supply of prescriptions with a Do Not Fill date(s) of 3/17/2025 and 4/14/2025.  Continue pregabalin and baclofen as prescribed. Refills of Lyrica were sent to patient's pharmacy today.  Physical therapy ordered  Follow-up in 8 weeks or sooner if needed for medication refill and re-evaluation    There are risks associated with opioid medications, including dependence, addiction and tolerance. The patient understands and agrees to use these medications only as prescribed. Potential side effects of the medications include, but are not limited to, constipation, drowsiness, addiction, impaired judgment and risk of fatal overdose if not taken as prescribed. The patient was warned against driving while taking sedation medications.  Sharing medications is a felony. At this point in time, the patient is showing no signs of addiction, abuse, diversion or suicidal ideation.    An opioid contract was reviewed with the patient.  The patient was made aware they are only to receive opioid medication from our office,  and must take the medication as prescribed.  If the medication is lost or stolen, it will not be replaced.  We also do not condone the use of illegal substances or alcohol with opioid medication.  Random urine drug screens and pill counts will also be performed at office visits. Lastly, the patient was informed that office visits are needed for refills.  Patient was agreeable and signed the contract.    A urine drug screen  was collected at today's office visit as part of our medication management protocol. The point of care testing results were appropriate for what was being prescribed. The specimen will be sent for confirmatory testing. The drug screen is medically necessary because the patient is either dependent on opioid medication or is being considered for opioid medication therapy and the results could impact ongoing or future treatment. The drug screen is to evaluate for the presences or absence of prescribed, non-prescribed, and/or illicit drugs/substances.    [place] Prescription Drug Monitoring Program report was reviewed and was appropriate.    My impressions and treatment recommendations were discussed in detail with the patient who verbalized understanding and had no further questions.  Discharge instructions were provided. I personally saw and examined the patient and I agree with the above discussed plan of care.    Orders Placed This Encounter   Procedures    MM ALL_Prescribed Meds and Special Instructions     [facility] Is ATORVASTATIN prescribed?:   Yes     [facility] Is ESCITALOPRAM prescribed?:   Yes     [facility] Is HYDROCODONE/APAP prescribed?:   Yes     [place] Is NALOXONE Prescribed?:   Yes     [place] Is NORTRIPTYLINE Prescribed?:   Yes     [facility] Is OMEPRAZOLE prescribed?:   Yes     [facility] Is PREGABALIN Prescribed?:   Yes     [facility] Is PROMETHAZINE prescribed?:   Yes     [facility] Is TRAZODONE prescribed?:   Yes    MM DT_Codeine Definitive Test    MM DT_Desipramine Definitive Test    MM DT_Dextromethorphan Definitive Test    MM Diazepam Definitive Test    MM DT_Ethyl Glucuronide/Ethyl Sulfate Definitive Test    MM DT_Fentanyl Definitive Test    MM DT_Heroin Definitive Test    MM DT_Hydrocodone Definitive Test    MM DT_Haloperidol Definitive Test    MM DT_Hydromorphone Definitive Test    MM DT_Alprazolam Definitive Test    MM DT_Kratom Definitive Test    MM DT_Levorphanol Definitive  Test    MM Lorazepam Definitive Test    MM DT_MDMA Definitive Test    MM DT_Meperidine Definitive Test    MM DT_Methadone Definitive Test    MM DT_Methamphetaine-d/l Isomers Definitive    MM DT_Methamphetamine Definitive Test    MM DT_Methylphenidate Definitive Test    MM DT_Morphine Definitive Test    MM DT_Amphetamine Definitive Test    MM DT_Olanzapine Definitive Test    MM DT_Oxazepam Definitive Test    MM DT_Oxycodone Definitive Test    MM DT_Oxymorphone Definitive Test    MM DT_Phenobarbital Definitive Test    MM DT_Phentermine Definitive Test    MM DT_Quetiapine Definitive Test    MM DT_Risperidone Definitive Test    MM DT_Secobarbital Definitive Test    MM DT_Tapentadol Definitive Test    MM DT_Aripiprazole Definitive Test    MM DT_Temazapam Definitive Test    MM DT_THC Definitive Test    MM DT_Tramadol Definitive Test    MM DT_Validity Creatinine    MM DT_Validity Oxidant    MM DT_Validity pH    MM DT_Validity Specific    MM DT_Buprenorphine Definitive Test    MM DT_Butalbital Definitive Test    MM DT_Clonazepam Definitive Test    MM DT_Clozapine Definitive Test    MM DT_Cocaine Definitive Test    Ambulatory Referral to Physical Therapy     Standing Status:   Future     Expiration Date:   3/11/2026     Referral Priority:   Routine     Referral Type:   Physical Therapy     Referral Reason:   Specialty Services Required     Requested Specialty:   Physical Therapy     Number of Visits Requested:   1     Expiration Date:   3/11/2026     New Medications Ordered This Visit   Medications    HYDROcodone-acetaminophen (NORCO)  mg per tablet     Sig: Take 1 tablet by mouth every 4 (four) hours as needed for severe pain Max Daily Amount: 6 tablets Do not start before March 17, 2025.     Dispense:  130 tablet     Refill:  0    HYDROcodone-acetaminophen (NORCO)  mg per tablet     Sig: Take 1 tablet by mouth every 4 (four) hours as needed for severe pain Max Daily Amount: 6 tablets Do not start before April  14, 2025.     Dispense:  130 tablet     Refill:  0    pregabalin (LYRICA) 200 MG capsule     Sig: Take 1 capsule (200 mg total) by mouth 3 (three) times a day     Dispense:  90 capsule     Refill:  2     .       History of Present Illness:  Areli Luciano is a 74 y.o. female who presents for a follow up office visit for medication refill and reevaluation. She notes her chronic pain remains well-controlled on her current pain medication regimen which consists of hydrocodone-acetaminophen, pregabalin, and baclofen. The patient notes she tolerates these medication well without side effects and reports that they reduce her pain overall by about 25%. She states she has been having increased pain due to a recent fall at home. She notes constant burning, throbbing, cramping, shooting pain. She rates her pain 8/10.    Patient notes she has not completed physical therapy in 3+ years.    Opioid contract date 03/11/25    Last UDS Date 10/24/24    Norco last taken on 3/11 am    I have personally reviewed and/or updated the patient's past medical history, past surgical history, family history, social history, current medications, allergies, and vital signs today.     Review of Systems   Respiratory:  Positive for shortness of breath.    Cardiovascular:  Negative for chest pain.   Gastrointestinal:  Positive for nausea. Negative for constipation, diarrhea and vomiting.   Musculoskeletal:  Positive for arthralgias, back pain, gait problem, joint swelling and neck pain. Negative for myalgias.   Skin:  Positive for rash.   Neurological:  Negative for dizziness, seizures and weakness.   Psychiatric/Behavioral:  Positive for decreased concentration.    All other systems reviewed and are negative.      Patient Active Problem List   Diagnosis    Chronic pain syndrome    Cervical radiculopathy    Myofascial pain syndrome    Spondylosis of cervical region without myelopathy or radiculopathy    Fibromyalgia    Long-term current use of  opiate analgesic    MGUS (monoclonal gammopathy of unknown significance)    Iron deficiency anemia following bariatric surgery    Light headedness    Primary osteoarthritis of right knee    Pseudogout of left knee    Lumbar radiculitis    Chronic bilateral low back pain with bilateral sciatica    Rotator cuff syndrome, left    Left shoulder pain    Dizziness    Other fatigue    Biceps tendinitis of left shoulder    Adhesive capsulitis of left shoulder    Hypoglycemia    Hypothyroidism due to Hashimoto's thyroiditis    Iron deficiency anemia, unspecified    GERD (gastroesophageal reflux disease)    Nicotine dependence with current use    Need for prophylactic vaccination against Streptococcus pneumoniae (pneumococcus)    Needs flu shot    Osteoarthritis    Postnasal drip    Encounter for screening for malignant neoplasm of lung in current smoker with 30 pack year history or greater    High coronary artery calcium score    Mass of upper outer quadrant of right breast    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)    History of Sarah-en-Y gastric bypass    Atypical chest pain    Moderate persistent asthma without complication       Past Medical History:   Diagnosis Date    Anemia     Anxiety     hx of panic attacks (now under control)     Arthritis     Asthma     resolved (no problems in a decade)     Baker's cyst of knee     Bronchitis     Bunion, left foot     Cervical pain     Chronic GERD     Chronic pain     Chronic pain disorder     Depression     Diabetes mellitus, type 2 (HCC)     Diabetic peripheral neuropathy (HCC)     Endometriosis     Fibromyalgia     Hyperlipidemia     Hypertension     Joint pain     Low back pain     Low back pain     Lung nodules     Macular degeneration     Pulmonary emphysema (HCC) 01/16/2020    Spondylosis     Spontaneous pneumothorax     Uterine cancer (HCC)        Past Surgical History:   Procedure Laterality Date    BACK SURGERY  1996    L5, S1- laser surgery fusion of c5  and c6     BREAST CYST EXCISION Right     x2 benign    CHOLECYSTECTOMY  2004    FOOT SURGERY Left 2005    fusion     FRACTURE SURGERY      GASTRIC BYPASS  2010    Dr. Oropeza     HYSTERECTOMY  1985    just uterus     KNEE ARTHROSCOPY      LAMINECTOMY      ORTHOPEDIC SURGERY      OVARIAN CYST REMOVAL  1975    PELVIC LAPAROSCOPY      ovaries (x10)     PLEURAL SCARIFICATION  1967    SHOULDER OPEN ROTATOR CUFF REPAIR Left 2008    TONSILLECTOMY      VAGINAL PROLAPSE REPAIR         Family History   Problem Relation Age of Onset    Hypertension Mother     Lung cancer Mother 53    Hypertension Father     Heart disease Father     Heart attack Father     Cancer Father 42    No Known Problems Sister     No Known Problems Sister     No Known Problems Sister     No Known Problems Maternal Grandmother     No Known Problems Maternal Grandfather     No Known Problems Paternal Grandmother     No Known Problems Paternal Grandfather     No Known Problems Daughter     No Known Problems Daughter     No Known Problems Daughter     Breast cancer Maternal Aunt 48    Breast cancer Paternal Aunt 43    Breast cancer Paternal Aunt 45    Breast cancer Cousin 40    Breast cancer Cousin 42        male       Social History     Occupational History    Not on file   Tobacco Use    Smoking status: Every Day     Current packs/day: 0.30     Average packs/day: 0.6 packs/day for 54.2 years (33.8 ttl pk-yrs)     Types: Cigarettes     Start date: 1971    Smokeless tobacco: Never    Tobacco comments:     3-4 cigarettes/daily   Vaping Use    Vaping status: Never Used   Substance and Sexual Activity    Alcohol use: Not Currently     Alcohol/week: 1.0 standard drink of alcohol     Types: 1 Shots of liquor per week     Comment: rarely    Drug use: No    Sexual activity: Not Currently     Partners: Male       Current Outpatient Medications on File Prior to Visit   Medication Sig    albuterol (ProAir HFA) 90 mcg/act inhaler Inhale 2 puffs every 6 (six) hours as  needed for wheezing    amLODIPine (NORVASC) 5 mg tablet TAKE 1 TABLET (5 MG TOTAL) BY MOUTH DAILY.    atorvastatin (LIPITOR) 10 mg tablet Take 1 tablet (10 mg total) by mouth daily    baclofen 10 mg tablet Take 1 tablet (10 mg total) by mouth 3 (three) times a day    Calcium 600+D3 600-20 MG-MCG TABS TAKE 1 TABLET BY MOUTH TWICE A DAY WITH MEALS    cetirizine (ZyrTEC) 10 mg tablet TAKE 1 TABLET BY MOUTH EVERY DAY    cholecalciferol (VITAMIN D) 400 units/1 mL injections once a week    clotrimazole-betamethasone (LOTRISONE) 1-0.05 % cream APPLY TO AFFECTED AREA 3 TIMES A DAY    Cyanocobalamin (B-12) 1000 MCG TABS TAKE 1 TABLET BY MOUTH EVERY DAY    Diclofenac Sodium (VOLTAREN) 1 % APPLY 2 GRAMS TO AFFECTED AREA 4 TIMES A DAY    diphenhydrAMINE-PSE-APAP (BENADRYL ALLERGY/COLD PO) Take by mouth daily at bedtime    escitalopram (LEXAPRO) 5 mg tablet TAKE 1 TABLET (5 MG TOTAL) BY MOUTH DAILY.    famotidine (PEPCID) 20 mg tablet TAKE 1 TABLET (20 MG TOTAL) BY MOUTH 2 (TWO) TIMES A DAY PLEASE STOP TAGAMET    fexofenadine (ALLEGRA) 180 MG tablet Take 1 tablet (180 mg total) by mouth daily In the morning    fluticasone (FLONASE) 50 mcg/act nasal spray 1 spray into each nostril daily    ipratropium-albuterol (DUO-NEB) 0.5-2.5 mg/3 mL nebulizer solution Take 3 mL by nebulization every 8 (eight) hours as needed for wheezing or shortness of breath    levothyroxine 25 mcg tablet Take 1 tablet (25 mcg total) by mouth every other day    levothyroxine 50 mcg tablet TAKE 1 TABLET (50 MCG TOTAL) BY MOUTH EVERY OTHER DAY    losartan (COZAAR) 25 mg tablet TAKE 1 TABLET (25 MG TOTAL) BY MOUTH DAILY.    magnesium Oxide (MAG-OX) 400 mg TABS Take 1 tablet (400 mg total) by mouth daily    nortriptyline (PAMELOR) 25 mg capsule TAKE 1 CAPSULE BY MOUTH DAILY AT BEDTIME    omeprazole (PriLOSEC) 40 MG capsule TAKE 1 CAPSULE BY MOUTH 2 TIMES A DAY BEFORE MEALS.    Pediatric Multiple Vitamins (Childrens Chew Multivitamin) CHEW CHEW AND SWALLOW 1  TABLET BY MOUTH EVERY DAY    potassium chloride (MICRO-K) 10 MEQ CR capsule TAKE 1 CAPSULE BY MOUTH EVERY DAY    promethazine (PHENERGAN) 12.5 mg/10 mL oral solution Take 10 mL (12.5 mg total) by mouth every 6 (six) hours as needed (cough)    sucralfate (CARAFATE) 1 g/10 mL suspension TAKE 10 ML (1 G TOTAL) BY MOUTH 4 TIMES A DAY    traZODone (DESYREL) 50 mg tablet TAKE 1 TABLET BY MOUTH EVERY DAY AT BEDTIME AS NEEDED    Trelegy Ellipta 100-62.5-25 MCG/ACT inhaler INHALE 1 PUFF DAILY RINSE MOUTH AFTER USE.    triamcinolone (KENALOG) 0.1 % cream APPLY TO AFFECTED AREA 2 OR 3 TIMES DAILY AS NEEDED    Vitamin A 2400 MCG (8000 UT) TABS TAKE 1 CAPSULE (8,000 UNITS TOTAL) BY MOUTH DAILY    [DISCONTINUED] HYDROcodone-acetaminophen (NORCO)  mg per tablet Take 1 tablet by mouth every 4 (four) hours as needed for moderate pain Max Daily Amount: 6 tablets Do not start before February 17, 2025.    [DISCONTINUED] pregabalin (LYRICA) 200 MG capsule Take 1 capsule (200 mg total) by mouth 3 (three) times a day    denosumab (PROLIA) 60 mg/mL Inject 1 mL (60 mg total) under the skin once for 1 dose    naloxone (NARCAN) 4 mg/0.1 mL nasal spray Administer 1 spray into a nostril. If no response after 2-3 minutes, give another dose in the other nostril using a new spray.    OneTouch Verio test strip 2 (two) times a day     Current Facility-Administered Medications on File Prior to Visit   Medication    cyanocobalamin injection 1,000 mcg    cyanocobalamin injection 1,000 mcg    cyanocobalamin injection 1,000 mcg    cyanocobalamin injection 1,000 mcg    cyanocobalamin injection 1,000 mcg    cyanocobalamin injection 1,000 mcg    cyanocobalamin injection 1,000 mcg    cyanocobalamin injection 1,000 mcg       Allergies   Allergen Reactions    Cephalosporins Hives    Erythromycin GI Intolerance    Fentanyl Itching     Occurred during surgery     Paxil [Paroxetine] Hives     After 2 weeks    Penicillins Itching    Pravastatin Myalgia     "Statins Itching    Sulfa Antibiotics Diarrhea    Wellbutrin [Bupropion] Hives     After 2 weeks     Chantix [Varenicline] Rash and Other (See Comments)     Burning and itching    Clindamycin Rash    Marzena's Wort (Hypericum Perforatum) Rash       Physical Exam:    Ht 5' 5\" (1.651 m)   Wt 82.6 kg (182 lb)   LMP  (LMP Unknown)   BMI 30.29 kg/m²     Constitutional:normal, well developed, well nourished, alert, in no distress and non-toxic and no overt pain behavior.  Eyes:anicteric  HEENT:grossly intact  Neck:supple, symmetric, trachea midline and no masses   Pulmonary:even and unlabored  Cardiovascular:No edema or pitting edema present  Psychiatric:Mood and affect appropriate  Neurologic:Cranial Nerves II-XII grossly intact  Musculoskeletal:normal, ambulates with a cane    Imaging    "

## 2025-03-13 DIAGNOSIS — R05.3 CHRONIC COUGH: ICD-10-CM

## 2025-03-13 DIAGNOSIS — F43.9 STRESS: ICD-10-CM

## 2025-03-14 RX ORDER — FAMOTIDINE 20 MG/1
20 TABLET, FILM COATED ORAL 2 TIMES DAILY
Qty: 180 TABLET | Refills: 1 | Status: SHIPPED | OUTPATIENT
Start: 2025-03-14

## 2025-03-14 RX ORDER — ESCITALOPRAM OXALATE 5 MG/1
5 TABLET ORAL DAILY
Qty: 90 TABLET | Refills: 1 | Status: SHIPPED | OUTPATIENT
Start: 2025-03-14

## 2025-03-27 ENCOUNTER — TELEPHONE (OUTPATIENT)
Dept: FAMILY MEDICINE CLINIC | Facility: CLINIC | Age: 75
End: 2025-03-27

## 2025-03-27 ENCOUNTER — TELEPHONE (OUTPATIENT)
Age: 75
End: 2025-03-27

## 2025-03-27 NOTE — TELEPHONE ENCOUNTER
Patient has apt on April 16.  She has b/w to do but is wondering if you want to add anything else:    Her temp is usually 97.6-97.8.     She gets a throbbing headache, her temp goes up to 98.5 and then she is uncontrollably lethargic.  She can sit in chair in afternoon and sleep until 2 am.  No other symptoms except HA.    Please advise

## 2025-04-03 DIAGNOSIS — G47.00 INSOMNIA, UNSPECIFIED TYPE: ICD-10-CM

## 2025-04-03 RX ORDER — TRAZODONE HYDROCHLORIDE 50 MG/1
50 TABLET ORAL
Qty: 90 TABLET | Refills: 1 | Status: SHIPPED | OUTPATIENT
Start: 2025-04-03

## 2025-04-08 ENCOUNTER — TELEPHONE (OUTPATIENT)
Age: 75
End: 2025-04-08

## 2025-04-08 DIAGNOSIS — F43.9 STRESS: Primary | ICD-10-CM

## 2025-04-08 DIAGNOSIS — G47.00 INSOMNIA, UNSPECIFIED TYPE: ICD-10-CM

## 2025-04-08 LAB — COLOGUARD RESULT REPORTABLE: NORMAL

## 2025-04-08 NOTE — TELEPHONE ENCOUNTER
Pt called today and asked if Dr. Garduno can please add a lab order for an tal barr so she can get it done with all her other orders. Please advise [phone] thank you.

## 2025-04-09 ENCOUNTER — APPOINTMENT (OUTPATIENT)
Dept: LAB | Facility: HOSPITAL | Age: 75
End: 2025-04-09
Payer: COMMERCIAL

## 2025-04-09 DIAGNOSIS — M81.8 IDIOPATHIC OSTEOPOROSIS: ICD-10-CM

## 2025-04-09 DIAGNOSIS — E04.2 MULTIPLE THYROID NODULES: ICD-10-CM

## 2025-04-09 DIAGNOSIS — R91.8 LUNG NODULES: ICD-10-CM

## 2025-04-09 DIAGNOSIS — Z12.31 ENCOUNTER FOR SCREENING MAMMOGRAM FOR BREAST CANCER: ICD-10-CM

## 2025-04-09 DIAGNOSIS — E06.3 HYPOTHYROIDISM DUE TO HASHIMOTO'S THYROIDITIS: ICD-10-CM

## 2025-04-09 DIAGNOSIS — G47.00 INSOMNIA, UNSPECIFIED TYPE: ICD-10-CM

## 2025-04-09 DIAGNOSIS — Z12.11 SCREEN FOR COLON CANCER: ICD-10-CM

## 2025-04-09 DIAGNOSIS — E11.8 TYPE II DIABETES MELLITUS WITH MANIFESTATIONS (HCC): ICD-10-CM

## 2025-04-09 DIAGNOSIS — F43.9 STRESS: ICD-10-CM

## 2025-04-09 LAB
25(OH)D3 SERPL-MCNC: 59.9 NG/ML (ref 30–100)
ALBUMIN SERPL BCG-MCNC: 3.8 G/DL (ref 3.5–5)
ALP SERPL-CCNC: 102 U/L (ref 34–104)
ALT SERPL W P-5'-P-CCNC: 30 U/L (ref 7–52)
ANION GAP SERPL CALCULATED.3IONS-SCNC: 8 MMOL/L (ref 4–13)
AST SERPL W P-5'-P-CCNC: 34 U/L (ref 13–39)
BASOPHILS # BLD AUTO: 0.02 THOUSANDS/ÂΜL (ref 0–0.1)
BASOPHILS NFR BLD AUTO: 1 % (ref 0–1)
BILIRUB SERPL-MCNC: 0.37 MG/DL (ref 0.2–1)
BILIRUB UR QL STRIP: NEGATIVE
BUN SERPL-MCNC: 6 MG/DL (ref 5–25)
CALCIUM SERPL-MCNC: 9.1 MG/DL (ref 8.4–10.2)
CHLORIDE SERPL-SCNC: 98 MMOL/L (ref 96–108)
CHOLEST SERPL-MCNC: 150 MG/DL (ref ?–200)
CLARITY UR: CLEAR
CO2 SERPL-SCNC: 28 MMOL/L (ref 21–32)
COLOR UR: NORMAL
CREAT SERPL-MCNC: 0.63 MG/DL (ref 0.6–1.3)
EOSINOPHIL # BLD AUTO: 0.11 THOUSAND/ÂΜL (ref 0–0.61)
EOSINOPHIL NFR BLD AUTO: 3 % (ref 0–6)
ERYTHROCYTE [DISTWIDTH] IN BLOOD BY AUTOMATED COUNT: 12.9 % (ref 11.6–15.1)
GFR SERPL CREATININE-BSD FRML MDRD: 88 ML/MIN/1.73SQ M
GLUCOSE P FAST SERPL-MCNC: 66 MG/DL (ref 65–99)
GLUCOSE UR STRIP-MCNC: NEGATIVE MG/DL
HCT VFR BLD AUTO: 36.9 % (ref 34.8–46.1)
HDLC SERPL-MCNC: 65 MG/DL
HGB BLD-MCNC: 12 G/DL (ref 11.5–15.4)
HGB UR QL STRIP.AUTO: NEGATIVE
IMM GRANULOCYTES # BLD AUTO: 0.02 THOUSAND/UL (ref 0–0.2)
IMM GRANULOCYTES NFR BLD AUTO: 1 % (ref 0–2)
KETONES UR STRIP-MCNC: NEGATIVE MG/DL
LDLC SERPL CALC-MCNC: 67 MG/DL (ref 0–100)
LEUKOCYTE ESTERASE UR QL STRIP: NEGATIVE
LYMPHOCYTES # BLD AUTO: 0.74 THOUSANDS/ÂΜL (ref 0.6–4.47)
LYMPHOCYTES NFR BLD AUTO: 21 % (ref 14–44)
MAGNESIUM SERPL-MCNC: 1.9 MG/DL (ref 1.9–2.7)
MCH RBC QN AUTO: 32.5 PG (ref 26.8–34.3)
MCHC RBC AUTO-ENTMCNC: 32.5 G/DL (ref 31.4–37.4)
MCV RBC AUTO: 100 FL (ref 82–98)
MONOCYTES # BLD AUTO: 0.38 THOUSAND/ÂΜL (ref 0.17–1.22)
MONOCYTES NFR BLD AUTO: 11 % (ref 4–12)
NEUTROPHILS # BLD AUTO: 2.25 THOUSANDS/ÂΜL (ref 1.85–7.62)
NEUTS SEG NFR BLD AUTO: 63 % (ref 43–75)
NITRITE UR QL STRIP: NEGATIVE
NRBC BLD AUTO-RTO: 0 /100 WBCS
PH UR STRIP.AUTO: 7 [PH]
PLATELET # BLD AUTO: 199 THOUSANDS/UL (ref 149–390)
PMV BLD AUTO: 9.8 FL (ref 8.9–12.7)
POTASSIUM SERPL-SCNC: 4.2 MMOL/L (ref 3.5–5.3)
PROT SERPL-MCNC: 6.5 G/DL (ref 6.4–8.4)
PROT UR STRIP-MCNC: NEGATIVE MG/DL
RBC # BLD AUTO: 3.69 MILLION/UL (ref 3.81–5.12)
SODIUM SERPL-SCNC: 134 MMOL/L (ref 135–147)
SP GR UR STRIP.AUTO: 1 (ref 1–1.04)
TRIGL SERPL-MCNC: 88 MG/DL (ref ?–150)
TSH SERPL DL<=0.05 MIU/L-ACNC: 2.43 UIU/ML (ref 0.45–4.5)
UROBILINOGEN UA: NEGATIVE MG/DL
VIT B12 SERPL-MCNC: 1510 PG/ML (ref 180–914)
WBC # BLD AUTO: 3.52 THOUSAND/UL (ref 4.31–10.16)

## 2025-04-09 PROCEDURE — 86645 CMV ANTIBODY IGM: CPT

## 2025-04-09 PROCEDURE — 82607 VITAMIN B-12: CPT

## 2025-04-09 PROCEDURE — 82306 VITAMIN D 25 HYDROXY: CPT

## 2025-04-09 PROCEDURE — 86644 CMV ANTIBODY: CPT

## 2025-04-09 PROCEDURE — 36415 COLL VENOUS BLD VENIPUNCTURE: CPT

## 2025-04-09 PROCEDURE — 80061 LIPID PANEL: CPT

## 2025-04-09 PROCEDURE — 83735 ASSAY OF MAGNESIUM: CPT

## 2025-04-09 PROCEDURE — 84443 ASSAY THYROID STIM HORMONE: CPT

## 2025-04-09 PROCEDURE — 85025 COMPLETE CBC W/AUTO DIFF WBC: CPT

## 2025-04-09 PROCEDURE — 86480 TB TEST CELL IMMUN MEASURE: CPT

## 2025-04-09 PROCEDURE — 80053 COMPREHEN METABOLIC PANEL: CPT

## 2025-04-09 NOTE — TELEPHONE ENCOUNTER
Called and spoke with pt letting her know it was ordered, while speaking to pt she wanted to let you know that this morning she drank coffee, hot tea, and some sweetened iced tea and when she remembered to check her sugar it was 70 so she then ate a banana she just wanted to make you aware.

## 2025-04-10 ENCOUNTER — RESULTS FOLLOW-UP (OUTPATIENT)
Dept: FAMILY MEDICINE CLINIC | Facility: CLINIC | Age: 75
End: 2025-04-10

## 2025-04-10 LAB
CMV IGG SERPL QL IA: NEGATIVE
CMV IGM SERPL QL IA: NEGATIVE
GAMMA INTERFERON BACKGROUND BLD IA-ACNC: 0.04 IU/ML
M TB IFN-G BLD-IMP: ABNORMAL
M TB IFN-G CD4+ BCKGRND COR BLD-ACNC: 0.02 IU/ML
M TB IFN-G CD4+ BCKGRND COR BLD-ACNC: 0.02 IU/ML
MITOGEN IGNF BCKGRD COR BLD-ACNC: 0.24 IU/ML

## 2025-04-12 DIAGNOSIS — Z98.84 BARIATRIC SURGERY STATUS: ICD-10-CM

## 2025-04-12 DIAGNOSIS — I10 ESSENTIAL HYPERTENSION: ICD-10-CM

## 2025-04-13 DIAGNOSIS — R09.82 POSTNASAL DRIP: ICD-10-CM

## 2025-04-14 RX ORDER — PEDIATRIC MULTIVITAMIN NO.17
1 TABLET,CHEWABLE ORAL DAILY
Qty: 100 TABLET | Refills: 1 | Status: SHIPPED | OUTPATIENT
Start: 2025-04-14

## 2025-04-14 RX ORDER — LOSARTAN POTASSIUM 25 MG/1
25 TABLET ORAL DAILY
Qty: 90 TABLET | Refills: 1 | Status: SHIPPED | OUTPATIENT
Start: 2025-04-14

## 2025-04-14 RX ORDER — FLUTICASONE PROPIONATE 50 MCG
SPRAY, SUSPENSION (ML) NASAL
Qty: 48 ML | Refills: 2 | Status: SHIPPED | OUTPATIENT
Start: 2025-04-14

## 2025-04-18 ENCOUNTER — TELEPHONE (OUTPATIENT)
Dept: GASTROENTEROLOGY | Facility: MEDICAL CENTER | Age: 75
End: 2025-04-18

## 2025-04-18 DIAGNOSIS — K21.9 GASTROESOPHAGEAL REFLUX DISEASE WITHOUT ESOPHAGITIS: ICD-10-CM

## 2025-04-18 RX ORDER — SUCRALFATE ORAL 1 G/10ML
1 SUSPENSION ORAL
Qty: 1200 ML | Refills: 1 | Status: SHIPPED | OUTPATIENT
Start: 2025-04-18

## 2025-04-18 NOTE — TELEPHONE ENCOUNTER
We received a refill request from the patient[facility]  for Sucralfate 1GM/ 10ML Suspension, QTY 3780.0. Forwarded to the provider to review/ advise.

## 2025-04-18 NOTE — TELEPHONE ENCOUNTER
Received call from patient requesting to discuss results of EBV lab she states she had drawn with other labs on 4/9   Please advise.

## 2025-04-21 ENCOUNTER — TELEPHONE (OUTPATIENT)
Age: 75
End: 2025-04-21

## 2025-04-21 DIAGNOSIS — N39.0 ACUTE UTI: Primary | ICD-10-CM

## 2025-04-21 RX ORDER — DOXYCYCLINE 100 MG/1
100 CAPSULE ORAL 2 TIMES DAILY WITH MEALS
Qty: 14 CAPSULE | Refills: 0 | Status: SHIPPED | OUTPATIENT
Start: 2025-04-21 | End: 2025-04-28

## 2025-04-21 NOTE — TELEPHONE ENCOUNTER
Patient called stating she began having UTI symptoms today (burning, stopping mid-flow) and is requesting a prescription be called into [facility] in [place]. Patient was offered and refused an appointment. Please advise and return patients call at [phone].

## 2025-04-28 ENCOUNTER — OFFICE VISIT (OUTPATIENT)
Dept: FAMILY MEDICINE CLINIC | Facility: CLINIC | Age: 75
End: 2025-04-28
Payer: COMMERCIAL

## 2025-04-28 VITALS
OXYGEN SATURATION: 98 % | WEIGHT: 180 LBS | BODY MASS INDEX: 29.99 KG/M2 | TEMPERATURE: 98.2 F | HEART RATE: 76 BPM | RESPIRATION RATE: 15 BRPM | SYSTOLIC BLOOD PRESSURE: 122 MMHG | HEIGHT: 65 IN | DIASTOLIC BLOOD PRESSURE: 74 MMHG

## 2025-04-28 DIAGNOSIS — I10 ESSENTIAL HYPERTENSION: ICD-10-CM

## 2025-04-28 DIAGNOSIS — M79.604 RIGHT LEG PAIN: ICD-10-CM

## 2025-04-28 DIAGNOSIS — R22.32 FOREARM MASS, LEFT: Primary | ICD-10-CM

## 2025-04-28 DIAGNOSIS — Z98.84 BARIATRIC SURGERY STATUS: ICD-10-CM

## 2025-04-28 PROCEDURE — 99214 OFFICE O/P EST MOD 30 MIN: CPT | Performed by: INTERNAL MEDICINE

## 2025-04-28 PROCEDURE — G2211 COMPLEX E/M VISIT ADD ON: HCPCS | Performed by: INTERNAL MEDICINE

## 2025-04-28 RX ORDER — LEVOFLOXACIN 500 MG/1
500 TABLET, FILM COATED ORAL EVERY 24 HOURS
Qty: 10 TABLET | Refills: 0 | Status: SHIPPED | OUTPATIENT
Start: 2025-04-28 | End: 2025-05-08

## 2025-04-28 NOTE — PROGRESS NOTES
Cardiology Follow Up    Areli Luciano  1950  [id number]  [facility] [place]  [address]  [place] [id number] [phone]    1. Primary hypertension        2. Pure hypercholesterolemia        3. Sinus bradycardia        4. Type 2 diabetes mellitus with hyperlipidemia  (HCC)        5. Nicotine dependence with current use          Interval History: Patient here for f/u.  Patient seen by EP service 3/20/2024.  She was evaluated for lightheadedness, fatigue and exertional intolerance in the setting of bradycardia.  Patient also complained to me of intermittent chest heaviness.  Zio patch ordered.  Echocardiogram 4/11/2024 demonstrated LVEF of 65%.  There was LAE.  There was mild AV leaflet sclerosis.  There was mild to moderate TR with a mild elevation in PASP.  Chest CT 9/25/2023 demonstrated extensive coronary arterial calcifications.  There was no Thoracic aortic aneurysm.  Patient is a long-term tobacco user.  She has smoked since the age of 21.  She smoked 1-1/2 ppd but is now on <1/2 ppd.  She previously used Chantix successfully but then started smoking again.  She has had gastric bypass.  Lipid profile 4/2025 demonstrated TC of 150 with an HDL of 65 and a calculated LDL of 67.  Patient is on Atorvastatin.  Echocardiogram 4/11/2024 demonstrated LVEF of 65%.  Mild AVRIL was noted.  There was LAE.  There was AV sclerosis with mild AI.  There was no significant functional valve disease.  Zio patch 4/24/2024 read by EP, who she followed with, with no significant arrhythmia noted.  Pharmacologic stress test 5/2/2024 ordered and not done by patient.  She could not have her arm up for 15 minutes.  She was seen by our CRNP 11/6/2024 and felt to have atypical CP.  She has had no CP or significant dyspnea.  Her vital signs are stable.  She has had issues with right leg discomfort and swelling and had a venous Doppler yesterday which is  pending.    Patient Active Problem List   Diagnosis   • Chronic pain syndrome   • Cervical radiculopathy   • Myofascial pain syndrome   • Spondylosis of cervical region without myelopathy or radiculopathy   • Fibromyalgia   • Long-term current use of opiate analgesic   • MGUS (monoclonal gammopathy of unknown significance)   • Iron deficiency anemia following bariatric surgery   • Light headedness   • Primary osteoarthritis of right knee   • Pseudogout of left knee   • Lumbar radiculitis   • Chronic bilateral low back pain with bilateral sciatica   • Rotator cuff syndrome, left   • Left shoulder pain   • Dizziness   • Other fatigue   • Biceps tendinitis of left shoulder   • Adhesive capsulitis of left shoulder   • Hypoglycemia   • Hypothyroidism due to Hashimoto's thyroiditis   • Iron deficiency anemia, unspecified   • GERD (gastroesophageal reflux disease)   • Nicotine dependence with current use   • Need for prophylactic vaccination against Streptococcus pneumoniae (pneumococcus)   • Needs flu shot   • Osteoarthritis   • Postnasal drip   • Encounter for screening for malignant neoplasm of lung in current smoker with 30 pack year history or greater   • High coronary artery calcium score   • Mass of upper outer quadrant of right breast   • 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)   • History of Sarah-en-Y gastric bypass   • Atypical chest pain   • Moderate persistent asthma without complication     Past Medical History:   Diagnosis Date   • Anemia    • Anxiety     hx of panic attacks (now under control)    • Arthritis    • Asthma     resolved (no problems in a decade)    • Baker's cyst of knee    • Bronchitis    • Bunion, left foot    • Cervical pain    • Chronic GERD    • Chronic pain    • Chronic pain disorder    • Depression    • Diabetes mellitus, type 2 (HCC)    • Diabetic peripheral neuropathy (HCC)    • Endometriosis    • Fibromyalgia    • Hyperlipidemia    • Hypertension    • Joint pain    • Low  back pain    • Low back pain    • Lung nodules    • Macular degeneration    • Pulmonary emphysema (HCC) 01/16/2020   • Spondylosis    • Spontaneous pneumothorax    • Uterine cancer (HCC)      Social History     Socioeconomic History   • Marital status:      Spouse name: Not on file   • Number of children: Not on file   • Years of education: Not on file   • Highest education level: Not on file   Occupational History   • Not on file   Tobacco Use   • Smoking status: Every Day     Current packs/day: 0.30     Average packs/day: 0.6 packs/day for 54.3 years (33.8 ttl pk-yrs)     Types: Cigarettes     Start date: 1971   • Smokeless tobacco: Never   • Tobacco comments:     3-4 cigarettes/daily   Vaping Use   • Vaping status: Never Used   Substance and Sexual Activity   • Alcohol use: Not Currently     Alcohol/week: 1.0 standard drink of alcohol     Types: 1 Shots of liquor per week     Comment: rarely   • Drug use: No   • Sexual activity: Not Currently     Partners: Male   Other Topics Concern   • Not on file   Social History Narrative   • Not on file     Social Drivers of Health     Financial Resource Strain: Low Risk  (6/9/2023)    Overall Financial Resource Strain (CARDIA)    • Difficulty of Paying Living Expenses: Not hard at all   Food Insecurity: No Food Insecurity (8/26/2024)    Nursing - Inadequate Food Risk Classification    • Worried About Running Out of Food in the Last Year: Never true    • Ran Out of Food in the Last Year: Never true    • Ran Out of Food in the Last Year: Not on file   Transportation Needs: No Transportation Needs (8/26/2024)    PRAPARE - Transportation    • Lack of Transportation (Medical): No    • Lack of Transportation (Non-Medical): No   Physical Activity: Inactive (9/8/2020)    Exercise Vital Sign    • Days of Exercise per Week: 0 days    • Minutes of Exercise per Session: 0 min   Stress: No Stress Concern Present (9/8/2020)    [employer] -  Occupational Stress Questionnaire    • Feeling of Stress : Not at all   Social Connections: Unknown (6/18/2024)    Received from [employer]    Social Connections    • How often do you feel lonely or isolated from those around you? (Adult - for ages 18 years and over): Not on file   Intimate Partner Violence: Not At Risk (9/8/2020)    Humiliation, Afraid, Rape, and Kick questionnaire    • Fear of Current or Ex-Partner: No    • Emotionally Abused: No    • Physically Abused: No    • Sexually Abused: No   Housing Stability: Low Risk  (8/26/2024)    Housing Stability Vital Sign    • Unable to Pay for Housing in the Last Year: No    • Number of Times Moved in the Last Year: 0    • Homeless in the Last Year: No      Family History   Problem Relation Age of Onset   • Hypertension Mother    • Lung cancer Mother 53   • Hypertension Father    • Heart disease Father    • Heart attack Father    • Cancer Father 42   • No Known Problems Sister    • No Known Problems Sister    • No Known Problems Sister    • No Known Problems Maternal Grandmother    • No Known Problems Maternal Grandfather    • No Known Problems Paternal Grandmother    • No Known Problems Paternal Grandfather    • No Known Problems Daughter    • No Known Problems Daughter    • No Known Problems Daughter    • Breast cancer Maternal Aunt 48   • Breast cancer Paternal Aunt 43   • Breast cancer Paternal Aunt 45   • Breast cancer Cousin 40   • Breast cancer Cousin 42        male     Past Surgical History:   Procedure Laterality Date   • BACK SURGERY  1996    L5, S1- laser surgery fusion of c5 and c6    • BREAST CYST EXCISION Right     x2 benign   • CHOLECYSTECTOMY  2004   • FOOT SURGERY Left 2005    fusion    • FRACTURE SURGERY     • GASTRIC BYPASS  2010    Dr. Oropeza    • HYSTERECTOMY  1985    just uterus    • KNEE ARTHROSCOPY     • LAMINECTOMY     • ORTHOPEDIC SURGERY     • OVARIAN CYST REMOVAL  1975   • PELVIC LAPAROSCOPY      ovaries (x10)    • PLEURAL SCARIFICATION   1967   • SHOULDER OPEN ROTATOR CUFF REPAIR Left 2008   • TONSILLECTOMY     • VAGINAL PROLAPSE REPAIR         Current Outpatient Medications:   •  albuterol (ProAir HFA) 90 mcg/act inhaler, Inhale 2 puffs every 6 (six) hours as needed for wheezing, Disp: 18 g, Rfl: 3  •  amLODIPine (NORVASC) 5 mg tablet, TAKE 1 TABLET (5 MG TOTAL) BY MOUTH DAILY., Disp: 90 tablet, Rfl: 1  •  atorvastatin (LIPITOR) 10 mg tablet, Take 1 tablet (10 mg total) by mouth daily, Disp: 90 tablet, Rfl: 3  •  baclofen 10 mg tablet, Take 1 tablet (10 mg total) by mouth 3 (three) times a day, Disp: 270 tablet, Rfl: 0  •  Calcium Carb-Cholecalciferol (Calcium 600+D3) 600-20 MG-MCG TABS, TAKE 1 TABLET BY MOUTH TWICE A DAY WITH MEALS, Disp: 180 tablet, Rfl: 1  •  cetirizine (ZyrTEC) 10 mg tablet, TAKE 1 TABLET BY MOUTH EVERY DAY, Disp: 90 tablet, Rfl: 2  •  cholecalciferol (VITAMIN D) 400 units/1 mL, injections once a week, Disp: , Rfl:   •  clotrimazole-betamethasone (LOTRISONE) 1-0.05 % cream, APPLY TO AFFECTED AREA 3 TIMES A DAY, Disp: 60 g, Rfl: 1  •  Cyanocobalamin (B-12) 1000 MCG TABS, TAKE 1 TABLET BY MOUTH EVERY DAY, Disp: 90 tablet, Rfl: 3  •  Diclofenac Sodium (VOLTAREN) 1 %, APPLY 2 GRAMS TO AFFECTED AREA 4 TIMES A DAY, Disp: 200 g, Rfl: 0  •  diphenhydrAMINE-PSE-APAP (BENADRYL ALLERGY/COLD PO), Take by mouth daily at bedtime, Disp: , Rfl:   •  escitalopram (LEXAPRO) 5 mg tablet, TAKE 1 TABLET (5 MG TOTAL) BY MOUTH DAILY., Disp: 90 tablet, Rfl: 1  •  famotidine (PEPCID) 20 mg tablet, TAKE 1 TABLET (20 MG TOTAL) BY MOUTH 2 (TWO) TIMES A DAY PLEASE STOP TAGAMET, Disp: 180 tablet, Rfl: 1  •  fexofenadine (ALLEGRA) 180 MG tablet, Take 1 tablet (180 mg total) by mouth daily In the morning, Disp: 90 tablet, Rfl: 3  •  fluticasone (FLONASE) 50 mcg/act nasal spray, SPRAY 1 SPRAY INTO EACH NOSTRIL EVERY DAY, Disp: 48 mL, Rfl: 2  •  [START ON 5/12/2025] HYDROcodone-acetaminophen (NORCO)  mg per tablet, Take 1 tablet by mouth every 4 (four)  hours as needed for severe pain Max Daily Amount: 6 tablets Do not start before May 12, 2025., Disp: 130 tablet, Rfl: 0  •  [START ON 6/9/2025] HYDROcodone-acetaminophen (NORCO)  mg per tablet, Take 1 tablet by mouth every 4 (four) hours as needed for severe pain Max Daily Amount: 6 tablets Do not start before June 9, 2025., Disp: 130 tablet, Rfl: 0  •  ipratropium-albuterol (DUO-NEB) 0.5-2.5 mg/3 mL nebulizer solution, Take 3 mL by nebulization every 8 (eight) hours as needed for wheezing or shortness of breath, Disp: 270 mL, Rfl: 3  •  levothyroxine 25 mcg tablet, Take 1 tablet (25 mcg total) by mouth every other day, Disp: 45 tablet, Rfl: 3  •  levothyroxine 50 mcg tablet, TAKE 1 TABLET (50 MCG TOTAL) BY MOUTH EVERY OTHER DAY, Disp: 50 tablet, Rfl: 1  •  losartan (COZAAR) 25 mg tablet, TAKE 1 TABLET (25 MG TOTAL) BY MOUTH DAILY., Disp: 90 tablet, Rfl: 1  •  magnesium Oxide (MAG-OX) 400 mg TABS, Take 1 tablet (400 mg total) by mouth daily, Disp: 90 tablet, Rfl: 0  •  naloxone (NARCAN) 4 mg/0.1 mL nasal spray, Administer 1 spray into a nostril. If no response after 2-3 minutes, give another dose in the other nostril using a new spray., Disp: 1 each, Rfl: 1  •  nortriptyline (PAMELOR) 25 mg capsule, TAKE 1 CAPSULE BY MOUTH DAILY AT BEDTIME, Disp: 90 capsule, Rfl: 1  •  omeprazole (PriLOSEC) 40 MG capsule, TAKE 1 CAPSULE BY MOUTH 2 TIMES A DAY BEFORE MEALS., Disp: 180 capsule, Rfl: 1  •  OneTouch Verio test strip, 2 (two) times a day, Disp: , Rfl:   •  Pediatric Multiple Vitamins (Childrens Chew Multivitamin) CHEW, CHEW AND SWALLOW 1 TABLET BY MOUTH EVERY DAY, Disp: 100 tablet, Rfl: 1  •  potassium chloride (MICRO-K) 10 MEQ CR capsule, TAKE 1 CAPSULE BY MOUTH EVERY DAY, Disp: 90 capsule, Rfl: 1  •  pregabalin (LYRICA) 200 MG capsule, Take 1 capsule (200 mg total) by mouth 3 (three) times a day, Disp: 90 capsule, Rfl: 2  •  promethazine (PHENERGAN) 12.5 mg/10 mL oral solution, Take 10 mL (12.5 mg total) by mouth  every 6 (six) hours as needed (cough), Disp: 118 mL, Rfl: 0  •  sucralfate (CARAFATE) 1 g/10 mL suspension, Take 10 mL (1 g total) by mouth 4 (four) times a day (with meals and at bedtime), Disp: 1200 mL, Rfl: 1  •  traZODone (DESYREL) 50 mg tablet, TAKE 1 TABLET BY MOUTH EVERY DAY AT BEDTIME AS NEEDED, Disp: 90 tablet, Rfl: 1  •  Trelegy Ellipta 100-62.5-25 MCG/ACT inhaler, INHALE 1 PUFF DAILY RINSE MOUTH AFTER USE., Disp: 60 each, Rfl: 5  •  triamcinolone (KENALOG) 0.1 % cream, APPLY TO AFFECTED AREA 2 OR 3 TIMES DAILY AS NEEDED, Disp: , Rfl:   •  Vitamin A 2400 MCG (8000 UT) TABS, TAKE 1 CAPSULE (8,000 UNITS TOTAL) BY MOUTH DAILY, Disp: , Rfl:   •  denosumab (PROLIA) 60 mg/mL, Inject 1 mL (60 mg total) under the skin once for 1 dose, Disp: 1 mL, Rfl: 0  •  levofloxacin (LEVAQUIN) 500 mg tablet, Take 1 tablet (500 mg total) by mouth every 24 hours for 10 days (Patient not taking: Reported on 5/7/2025), Disp: 10 tablet, Rfl: 0    Current Facility-Administered Medications:   •  cyanocobalamin injection 1,000 mcg, 1,000 mcg, Intramuscular, Q30 Days, Aleksandar Garduno MD, 1,000 mcg at 12/08/20 1118  •  cyanocobalamin injection 1,000 mcg, 1,000 mcg, Intramuscular, Q30 Days, Aleksandar Garduno MD, 1,000 mcg at 07/08/20 1036  •  cyanocobalamin injection 1,000 mcg, 1,000 mcg, Intramuscular, Q30 Days, Aleksandar Garduno MD, 1,000 mcg at 09/08/20 1210  •  cyanocobalamin injection 1,000 mcg, 1,000 mcg, Intramuscular, Q30 Days, Aleksandar Garduno MD  •  cyanocobalamin injection 1,000 mcg, 1,000 mcg, Intramuscular, Q30 Days, Aleksandar Garduno MD  •  cyanocobalamin injection 1,000 mcg, 1,000 mcg, Intramuscular, Q30 Days, Aleksandar Garduno MD, 1,000 mcg at 08/18/21 1019  •  cyanocobalamin injection 1,000 mcg, 1,000 mcg, Intramuscular, Q30 Days, Aleksandar Garduno MD, 1,000 mcg at 03/21/23 1117  •  cyanocobalamin injection 1,000 mcg, 1,000 mcg, Intramuscular, Q30 Days, Aleksandar Garduno MD, 1,000 mcg at 07/13/23 1004  Allergies   Allergen Reactions   • Cephalosporins Hives  "  • Erythromycin GI Intolerance   • Fentanyl Itching     Occurred during surgery    • Paxil [Paroxetine] Hives     After 2 weeks   • Penicillins Itching   • Pravastatin Myalgia   • Statins Itching   • Sulfa Antibiotics Diarrhea   • Wellbutrin [Bupropion] Hives     After 2 weeks    • Chantix [Varenicline] Rash and Other (See Comments)     Burning and itching   • Clindamycin Rash   • Marzena's Wort (Hypericum Perforatum) Rash     Labs:not applicable  Imaging: No results found.    Review of Systems:  Review of Systems   All other systems reviewed and are negative.    Physical Exam:  /60 (BP Location: Left arm, Patient Position: Sitting, Cuff Size: Standard)   Pulse 73   Ht 5' 5\" (1.651 m)   Wt 82.4 kg (181 lb 11.2 oz)   LMP  (LMP Unknown)   SpO2 99%   BMI 30.24 kg/m²   Physical Exam  Vitals reviewed.   Constitutional:       Appearance: She is well-developed.   HENT:      Head: Normocephalic and atraumatic.   Eyes:      Conjunctiva/sclera: Conjunctivae normal.   Cardiovascular:      Rate and Rhythm: Normal rate.      Heart sounds: Murmur heard.      Comments: S1-S2 with early peaking systolic murmur heard at the right upper sternal border  Pulmonary:      Effort: Pulmonary effort is normal.      Breath sounds: Normal breath sounds.   Musculoskeletal:      Cervical back: Normal range of motion and neck supple.   Skin:     General: Skin is warm and dry.   Neurological:      Mental Status: She is alert and oriented to person, place, and time.       Discussion/Summary:I will continue the patient's present medical regimen.  She is stable from a cardiac point of view.  I have asked the patient to call if there is a problem in the interim otherwise I will see the patient in six months time.  "
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
## 2025-04-29 ENCOUNTER — APPOINTMENT (OUTPATIENT)
Dept: LAB | Facility: HOSPITAL | Age: 75
End: 2025-04-29
Payer: COMMERCIAL

## 2025-04-29 ENCOUNTER — HOSPITAL ENCOUNTER (OUTPATIENT)
Dept: CT IMAGING | Facility: HOSPITAL | Age: 75
Discharge: HOME | End: 2025-04-29
Attending: INTERNAL MEDICINE
Payer: COMMERCIAL

## 2025-04-29 ENCOUNTER — HOSPITAL ENCOUNTER (OUTPATIENT)
Dept: RADIOLOGY | Facility: HOSPITAL | Age: 75
Discharge: HOME | End: 2025-04-29
Payer: COMMERCIAL

## 2025-04-29 DIAGNOSIS — M79.604 RIGHT LEG PAIN: ICD-10-CM

## 2025-04-29 DIAGNOSIS — K90.89 OTHER SPECIFIED INTESTINAL MALABSORPTION: ICD-10-CM

## 2025-04-29 DIAGNOSIS — R22.32 FOREARM MASS, LEFT: ICD-10-CM

## 2025-04-29 DIAGNOSIS — D50.8 IRON DEFICIENCY ANEMIA FOLLOWING BARIATRIC SURGERY: ICD-10-CM

## 2025-04-29 DIAGNOSIS — D47.2 MGUS (MONOCLONAL GAMMOPATHY OF UNKNOWN SIGNIFICANCE): ICD-10-CM

## 2025-04-29 DIAGNOSIS — D64.9 ANEMIA, UNSPECIFIED TYPE: Primary | ICD-10-CM

## 2025-04-29 DIAGNOSIS — E53.8 B12 DEFICIENCY: ICD-10-CM

## 2025-04-29 DIAGNOSIS — D47.2 IGA GAMMOPATHY: ICD-10-CM

## 2025-04-29 DIAGNOSIS — K95.89 IRON DEFICIENCY ANEMIA FOLLOWING BARIATRIC SURGERY: ICD-10-CM

## 2025-04-29 DIAGNOSIS — Z98.84 HISTORY OF ROUX-EN-Y GASTRIC BYPASS: ICD-10-CM

## 2025-04-29 LAB
BASOPHILS # BLD AUTO: 0.01 THOUSANDS/ÂΜL (ref 0–0.1)
BASOPHILS NFR BLD AUTO: 0 % (ref 0–1)
CRP SERPL QL: 5.5 MG/L
EOSINOPHIL # BLD AUTO: 0.07 THOUSAND/ÂΜL (ref 0–0.61)
EOSINOPHIL NFR BLD AUTO: 2 % (ref 0–6)
ERYTHROCYTE [DISTWIDTH] IN BLOOD BY AUTOMATED COUNT: 13.2 % (ref 11.6–15.1)
ERYTHROCYTE [SEDIMENTATION RATE] IN BLOOD: 21 MM/HOUR (ref 0–29)
HCT VFR BLD AUTO: 34.8 % (ref 34.8–46.1)
HGB BLD-MCNC: 11.4 G/DL (ref 11.5–15.4)
IMM GRANULOCYTES # BLD AUTO: 0.01 THOUSAND/UL (ref 0–0.2)
IMM GRANULOCYTES NFR BLD AUTO: 0 % (ref 0–2)
LYMPHOCYTES # BLD AUTO: 0.57 THOUSANDS/ÂΜL (ref 0.6–4.47)
LYMPHOCYTES NFR BLD AUTO: 15 % (ref 14–44)
MCH RBC QN AUTO: 32.4 PG (ref 26.8–34.3)
MCHC RBC AUTO-ENTMCNC: 32.8 G/DL (ref 31.4–37.4)
MCV RBC AUTO: 99 FL (ref 82–98)
MONOCYTES # BLD AUTO: 0.35 THOUSAND/ÂΜL (ref 0.17–1.22)
MONOCYTES NFR BLD AUTO: 9 % (ref 4–12)
NEUTROPHILS # BLD AUTO: 2.83 THOUSANDS/ÂΜL (ref 1.85–7.62)
NEUTS SEG NFR BLD AUTO: 74 % (ref 43–75)
NRBC BLD AUTO-RTO: 0 /100 WBCS
PLATELET # BLD AUTO: 201 THOUSANDS/UL (ref 149–390)
PMV BLD AUTO: 9.5 FL (ref 8.9–12.7)
RBC # BLD AUTO: 3.52 MILLION/UL (ref 3.81–5.12)
WBC # BLD AUTO: 3.84 THOUSAND/UL (ref 4.31–10.16)

## 2025-04-29 PROCEDURE — 73201 CT UPPER EXTREMITY W/DYE: CPT

## 2025-04-29 PROCEDURE — 73562 X-RAY EXAM OF KNEE 3: CPT

## 2025-04-29 PROCEDURE — 85025 COMPLETE CBC W/AUTO DIFF WBC: CPT

## 2025-04-29 PROCEDURE — 86140 C-REACTIVE PROTEIN: CPT

## 2025-04-29 PROCEDURE — 73590 X-RAY EXAM OF LOWER LEG: CPT

## 2025-04-29 PROCEDURE — 85652 RBC SED RATE AUTOMATED: CPT

## 2025-04-29 PROCEDURE — 36415 COLL VENOUS BLD VENIPUNCTURE: CPT

## 2025-04-29 RX ADMIN — IOHEXOL 90 ML: 350 INJECTION, SOLUTION INTRAVENOUS at 11:26

## 2025-04-30 ENCOUNTER — PATIENT MESSAGE (OUTPATIENT)
Dept: FAMILY MEDICINE CLINIC | Facility: CLINIC | Age: 75
End: 2025-04-30

## 2025-04-30 DIAGNOSIS — R53.83 OTHER FATIGUE: ICD-10-CM

## 2025-04-30 DIAGNOSIS — M79.7 FIBROMYALGIA: ICD-10-CM

## 2025-04-30 DIAGNOSIS — M81.8 IDIOPATHIC OSTEOPOROSIS: ICD-10-CM

## 2025-04-30 DIAGNOSIS — M15.9 OSTEOARTHRITIS OF MULTIPLE JOINTS, UNSPECIFIED OSTEOARTHRITIS TYPE: ICD-10-CM

## 2025-04-30 DIAGNOSIS — G89.4 CHRONIC PAIN SYNDROME: Primary | ICD-10-CM

## 2025-05-01 ENCOUNTER — RESULTS FOLLOW-UP (OUTPATIENT)
Dept: FAMILY MEDICINE CLINIC | Facility: CLINIC | Age: 75
End: 2025-05-01

## 2025-05-01 DIAGNOSIS — M79.604 RIGHT LEG PAIN: Primary | ICD-10-CM

## 2025-05-01 RX ORDER — DIPHENHYDRAMINE HCL 25 MG
1 TABLET,DISINTEGRATING ORAL 2 TIMES DAILY WITH MEALS
Qty: 180 TABLET | Refills: 1 | Status: SHIPPED | OUTPATIENT
Start: 2025-05-01

## 2025-05-01 NOTE — TELEPHONE ENCOUNTER
----- Message from Aleksandar Garduno MD sent at 4/29/2025  7:01 PM EDT -----  Continue meds as Prescribed, for cellulitis,. Also; hematology consult ASAP  ----- Message -----  From: Interface, Radiology Results In  Sent: 4/29/2025  12:28 PM EDT  To: Aleksandar Garduno MD

## 2025-05-01 NOTE — TELEPHONE ENCOUNTER
----- Message from Aleksandar Garduno MD sent at 4/29/2025  6:59 PM EDT -----  Hematology consult  ----- Message -----  From: Lab, Background User  Sent: 4/29/2025  11:43 AM EDT  To: Aleksandar Garduno MD

## 2025-05-03 ENCOUNTER — PATIENT MESSAGE (OUTPATIENT)
Dept: FAMILY MEDICINE CLINIC | Facility: CLINIC | Age: 75
End: 2025-05-03

## 2025-05-05 ENCOUNTER — OFFICE VISIT (OUTPATIENT)
Dept: PAIN MEDICINE | Facility: MEDICAL CENTER | Age: 75
End: 2025-05-05
Payer: COMMERCIAL

## 2025-05-05 VITALS — BODY MASS INDEX: 29.99 KG/M2 | HEIGHT: 65 IN | WEIGHT: 180 LBS

## 2025-05-05 DIAGNOSIS — G89.29 CHRONIC BILATERAL LOW BACK PAIN WITH BILATERAL SCIATICA: Primary | ICD-10-CM

## 2025-05-05 DIAGNOSIS — M54.41 CHRONIC BILATERAL LOW BACK PAIN WITH BILATERAL SCIATICA: Primary | ICD-10-CM

## 2025-05-05 DIAGNOSIS — G89.4 CHRONIC PAIN SYNDROME: ICD-10-CM

## 2025-05-05 DIAGNOSIS — Z79.891 LONG-TERM CURRENT USE OF OPIATE ANALGESIC: ICD-10-CM

## 2025-05-05 DIAGNOSIS — M54.42 CHRONIC BILATERAL LOW BACK PAIN WITH BILATERAL SCIATICA: Primary | ICD-10-CM

## 2025-05-05 DIAGNOSIS — M79.18 MYOFASCIAL PAIN SYNDROME: ICD-10-CM

## 2025-05-05 DIAGNOSIS — F11.20 UNCOMPLICATED OPIOID DEPENDENCE (HCC): ICD-10-CM

## 2025-05-05 PROCEDURE — G2211 COMPLEX E/M VISIT ADD ON: HCPCS

## 2025-05-05 PROCEDURE — 99214 OFFICE O/P EST MOD 30 MIN: CPT

## 2025-05-05 RX ORDER — PREGABALIN 200 MG/1
200 CAPSULE ORAL 3 TIMES DAILY
Qty: 90 CAPSULE | Refills: 2 | Status: CANCELLED | OUTPATIENT
Start: 2025-05-05

## 2025-05-05 RX ORDER — HYDROCODONE BITARTRATE AND ACETAMINOPHEN 10; 325 MG/1; MG/1
1 TABLET ORAL EVERY 4 HOURS PRN
Qty: 130 TABLET | Refills: 0 | Status: SHIPPED | OUTPATIENT
Start: 2025-05-12

## 2025-05-05 RX ORDER — HYDROCODONE BITARTRATE AND ACETAMINOPHEN 10; 325 MG/1; MG/1
1 TABLET ORAL EVERY 4 HOURS PRN
Qty: 130 TABLET | Refills: 0 | Status: SHIPPED | OUTPATIENT
Start: 2025-06-09

## 2025-05-05 RX ORDER — PREGABALIN 200 MG/1
200 CAPSULE ORAL 3 TIMES DAILY
Qty: 90 CAPSULE | Refills: 2 | Status: SHIPPED | OUTPATIENT
Start: 2025-05-05

## 2025-05-05 NOTE — PROGRESS NOTES
Assessment:  1. Chronic bilateral low back pain with bilateral sciatica    2. Uncomplicated opioid dependence (HCC)    3. Chronic pain syndrome    4. Long-term current use of opiate analgesic    5. Myofascial pain syndrome        Plan:  The patient remains clinically stable and her pain is well-controlled on hydrocodone-acetaminophen 10/325 mg 1 tablet every 4-6 hours as needed.  The patient's opioid scripts were sent to their pharmacy electronically and patient was given a 2-month supply of prescriptions with do not fill dates of 5/12/2025 and 6/9/2025.    Continue the use of pregabalin and baclofen as prescribed.  Patient notes she tolerates these medications well without side effects.  Refills of Lyrica were sent to patient's pharmacy today.    I strongly encourage patient to begin attending formal physical therapy sessions for lumbar strengthening and stretching.  Referral to physical therapy ordered on 3/11/2025.    Follow-up in 8 weeks for medication refill and reevaluation.    Complete risks and benefits including bleeding, infection, tissue reaction, nerve injury and allergic reaction were discussed. The approach was demonstrated using models and literature was provided. Verbal and written consent was obtained.    There are risks associated with opioid medications, including dependence, addiction and tolerance. The patient understands and agrees to use these medications only as prescribed. Potential side effects of the medications include, but are not limited to, constipation, drowsiness, addiction, impaired judgment and risk of fatal overdose if not taken as prescribed. The patient was warned against driving while taking sedation medications.  Sharing medications is a felony. At this point in time, the patient is showing no signs of addiction, abuse, diversion or suicidal ideation.      [place] Prescription Drug Monitoring Program report was reviewed and was appropriate       History of Present  Illness:  The patient is a 74 y.o. female who presents for a follow up office visit for medication refill and reevaluation.  Patient notes her chronic pain remains well-controlled on her current pain medication regimen which consist of hydrocodone-acetaminophen, pregabalin, and baclofen.  Patient states she tolerates these medications well without side effects.  She notes that this medication regimen reduces her pain overall by about 25 to 30%. She notes she has been having increased pain recently due to recently having to perform increased housework and home renovations.  Patient describes her current pain as a constant burning, dull, aching, sharp, throbbing, cramping, pressure-like, and shooting pain.  She rates her current pain today 9/10 on a numeric rating scale.    Patient notes she has not completed physical therapy in 3+ years.  She states she has yet to attend formal physical therapy sessions since her last visit on 3/11/2025 due to frequent oncology visits and home renovations.    Opioid contract date 3/11/2025  Last UDS date 3/11/2025  Norco last taken on 5/5/2025 AM    I have personally reviewed and/or updated the patient's past medical history, past surgical history, family history, social history, current medications, allergies, and vital signs today.     Review of Systems  Review of Systems   Constitutional:  Negative for unexpected weight change.   HENT:  Negative for hearing loss.    Eyes:  Negative for visual disturbance.   Respiratory:  Negative for shortness of breath.    Cardiovascular:  Negative for leg swelling.   Gastrointestinal:  Positive for nausea. Negative for constipation.   Endocrine: Negative for polyuria.   Genitourinary:  Negative for difficulty urinating.   Musculoskeletal:  Positive for gait problem. Negative for joint swelling and myalgias.        Joint stiffness  Swelling- hands, knees, feet  Pain in extremity-  hands, feet   Skin:  Negative for rash.   Neurological:  Positive  for dizziness. Negative for weakness and headaches.   Psychiatric/Behavioral:  Negative for decreased concentration.    All other systems reviewed and are negative.        Past Medical History:   Diagnosis Date    Anemia     Anxiety     hx of panic attacks (now under control)     Arthritis     Asthma     resolved (no problems in a decade)     Baker's cyst of knee     Bronchitis     Bunion, left foot     Cervical pain     Chronic GERD     Chronic pain     Chronic pain disorder     Depression     Diabetes mellitus, type 2 (HCC)     Diabetic peripheral neuropathy (HCC)     Endometriosis     Fibromyalgia     Hyperlipidemia     Hypertension     Joint pain     Low back pain     Low back pain     Lung nodules     Macular degeneration     Pulmonary emphysema (HCC) 01/16/2020    Spondylosis     Spontaneous pneumothorax     Uterine cancer (HCC)        Past Surgical History:   Procedure Laterality Date    BACK SURGERY  1996    L5, S1- laser surgery fusion of c5 and c6     BREAST CYST EXCISION Right     x2 benign    CHOLECYSTECTOMY  2004    FOOT SURGERY Left 2005    fusion     FRACTURE SURGERY      GASTRIC BYPASS  2010    Dr. Oropeza     HYSTERECTOMY  1985    just uterus     KNEE ARTHROSCOPY      LAMINECTOMY      ORTHOPEDIC SURGERY      OVARIAN CYST REMOVAL  1975    PELVIC LAPAROSCOPY      ovaries (x10)     PLEURAL SCARIFICATION  1967    SHOULDER OPEN ROTATOR CUFF REPAIR Left 2008    TONSILLECTOMY      VAGINAL PROLAPSE REPAIR         Family History   Problem Relation Age of Onset    Hypertension Mother     Lung cancer Mother 53    Hypertension Father     Heart disease Father     Heart attack Father     Cancer Father 42    No Known Problems Sister     No Known Problems Sister     No Known Problems Sister     No Known Problems Maternal Grandmother     No Known Problems Maternal Grandfather     No Known Problems Paternal Grandmother     No Known Problems Paternal Grandfather     No Known Problems Daughter     No Known Problems  Daughter     No Known Problems Daughter     Breast cancer Maternal Aunt 48    Breast cancer Paternal Aunt 43    Breast cancer Paternal Aunt 45    Breast cancer Cousin 40    Breast cancer Cousin 42        male       Social History     Occupational History    Not on file   Tobacco Use    Smoking status: Every Day     Current packs/day: 0.30     Average packs/day: 0.6 packs/day for 54.3 years (33.8 ttl pk-yrs)     Types: Cigarettes     Start date: 1971    Smokeless tobacco: Never    Tobacco comments:     3-4 cigarettes/daily   Vaping Use    Vaping status: Never Used   Substance and Sexual Activity    Alcohol use: Not Currently     Alcohol/week: 1.0 standard drink of alcohol     Types: 1 Shots of liquor per week     Comment: rarely    Drug use: No    Sexual activity: Not Currently     Partners: Male         Current Outpatient Medications:     albuterol (ProAir HFA) 90 mcg/act inhaler, Inhale 2 puffs every 6 (six) hours as needed for wheezing, Disp: 18 g, Rfl: 3    amLODIPine (NORVASC) 5 mg tablet, TAKE 1 TABLET (5 MG TOTAL) BY MOUTH DAILY., Disp: 90 tablet, Rfl: 1    atorvastatin (LIPITOR) 10 mg tablet, Take 1 tablet (10 mg total) by mouth daily, Disp: 90 tablet, Rfl: 3    baclofen 10 mg tablet, Take 1 tablet (10 mg total) by mouth 3 (three) times a day, Disp: 270 tablet, Rfl: 0    Calcium Carb-Cholecalciferol (Calcium 600+D3) 600-20 MG-MCG TABS, TAKE 1 TABLET BY MOUTH TWICE A DAY WITH MEALS, Disp: 180 tablet, Rfl: 1    cetirizine (ZyrTEC) 10 mg tablet, TAKE 1 TABLET BY MOUTH EVERY DAY, Disp: 90 tablet, Rfl: 2    cholecalciferol (VITAMIN D) 400 units/1 mL, injections once a week, Disp: , Rfl:     clotrimazole-betamethasone (LOTRISONE) 1-0.05 % cream, APPLY TO AFFECTED AREA 3 TIMES A DAY, Disp: 60 g, Rfl: 1    Cyanocobalamin (B-12) 1000 MCG TABS, TAKE 1 TABLET BY MOUTH EVERY DAY, Disp: 90 tablet, Rfl: 3    Diclofenac Sodium (VOLTAREN) 1 %, APPLY 2 GRAMS TO AFFECTED AREA 4 TIMES A DAY, Disp: 200 g, Rfl: 0     diphenhydrAMINE-PSE-APAP (BENADRYL ALLERGY/COLD PO), Take by mouth daily at bedtime, Disp: , Rfl:     escitalopram (LEXAPRO) 5 mg tablet, TAKE 1 TABLET (5 MG TOTAL) BY MOUTH DAILY., Disp: 90 tablet, Rfl: 1    famotidine (PEPCID) 20 mg tablet, TAKE 1 TABLET (20 MG TOTAL) BY MOUTH 2 (TWO) TIMES A DAY PLEASE STOP TAGAMET, Disp: 180 tablet, Rfl: 1    fexofenadine (ALLEGRA) 180 MG tablet, Take 1 tablet (180 mg total) by mouth daily In the morning, Disp: 90 tablet, Rfl: 3    fluticasone (FLONASE) 50 mcg/act nasal spray, SPRAY 1 SPRAY INTO EACH NOSTRIL EVERY DAY, Disp: 48 mL, Rfl: 2    [START ON 5/12/2025] HYDROcodone-acetaminophen (NORCO)  mg per tablet, Take 1 tablet by mouth every 4 (four) hours as needed for severe pain Max Daily Amount: 6 tablets Do not start before May 12, 2025., Disp: 130 tablet, Rfl: 0    [START ON 6/9/2025] HYDROcodone-acetaminophen (NORCO)  mg per tablet, Take 1 tablet by mouth every 4 (four) hours as needed for severe pain Max Daily Amount: 6 tablets Do not start before June 9, 2025., Disp: 130 tablet, Rfl: 0    ipratropium-albuterol (DUO-NEB) 0.5-2.5 mg/3 mL nebulizer solution, Take 3 mL by nebulization every 8 (eight) hours as needed for wheezing or shortness of breath, Disp: 270 mL, Rfl: 3    levofloxacin (LEVAQUIN) 500 mg tablet, Take 1 tablet (500 mg total) by mouth every 24 hours for 10 days, Disp: 10 tablet, Rfl: 0    levothyroxine 25 mcg tablet, Take 1 tablet (25 mcg total) by mouth every other day, Disp: 45 tablet, Rfl: 3    levothyroxine 50 mcg tablet, TAKE 1 TABLET (50 MCG TOTAL) BY MOUTH EVERY OTHER DAY, Disp: 50 tablet, Rfl: 0    losartan (COZAAR) 25 mg tablet, TAKE 1 TABLET (25 MG TOTAL) BY MOUTH DAILY., Disp: 90 tablet, Rfl: 1    magnesium Oxide (MAG-OX) 400 mg TABS, Take 1 tablet (400 mg total) by mouth daily, Disp: 90 tablet, Rfl: 0    naloxone (NARCAN) 4 mg/0.1 mL nasal spray, Administer 1 spray into a nostril. If no response after 2-3 minutes, give another dose in  the other nostril using a new spray., Disp: 1 each, Rfl: 1    nortriptyline (PAMELOR) 25 mg capsule, TAKE 1 CAPSULE BY MOUTH DAILY AT BEDTIME, Disp: 90 capsule, Rfl: 1    omeprazole (PriLOSEC) 40 MG capsule, TAKE 1 CAPSULE BY MOUTH 2 TIMES A DAY BEFORE MEALS., Disp: 180 capsule, Rfl: 1    OneTouch Verio test strip, 2 (two) times a day, Disp: , Rfl:     Pediatric Multiple Vitamins (Childrens Chew Multivitamin) CHEW, CHEW AND SWALLOW 1 TABLET BY MOUTH EVERY DAY, Disp: 100 tablet, Rfl: 1    potassium chloride (MICRO-K) 10 MEQ CR capsule, TAKE 1 CAPSULE BY MOUTH EVERY DAY, Disp: 90 capsule, Rfl: 1    pregabalin (LYRICA) 200 MG capsule, Take 1 capsule (200 mg total) by mouth 3 (three) times a day, Disp: 90 capsule, Rfl: 2    promethazine (PHENERGAN) 12.5 mg/10 mL oral solution, Take 10 mL (12.5 mg total) by mouth every 6 (six) hours as needed (cough), Disp: 118 mL, Rfl: 0    sucralfate (CARAFATE) 1 g/10 mL suspension, Take 10 mL (1 g total) by mouth 4 (four) times a day (with meals and at bedtime), Disp: 1200 mL, Rfl: 1    traZODone (DESYREL) 50 mg tablet, TAKE 1 TABLET BY MOUTH EVERY DAY AT BEDTIME AS NEEDED, Disp: 90 tablet, Rfl: 1    Trelegy Ellipta 100-62.5-25 MCG/ACT inhaler, INHALE 1 PUFF DAILY RINSE MOUTH AFTER USE., Disp: 60 each, Rfl: 5    triamcinolone (KENALOG) 0.1 % cream, APPLY TO AFFECTED AREA 2 OR 3 TIMES DAILY AS NEEDED, Disp: , Rfl:     Vitamin A 2400 MCG (8000 UT) TABS, TAKE 1 CAPSULE (8,000 UNITS TOTAL) BY MOUTH DAILY, Disp: , Rfl:     denosumab (PROLIA) 60 mg/mL, Inject 1 mL (60 mg total) under the skin once for 1 dose, Disp: 1 mL, Rfl: 0    Current Facility-Administered Medications:     cyanocobalamin injection 1,000 mcg, 1,000 mcg, Intramuscular, Q30 Days, Aleksandar Garduno MD, 1,000 mcg at 12/08/20 1118    cyanocobalamin injection 1,000 mcg, 1,000 mcg, Intramuscular, Q30 Days, Aleksandar Garduno MD, 1,000 mcg at 07/08/20 1036    cyanocobalamin injection 1,000 mcg, 1,000 mcg, Intramuscular, Q30 Days, Aleksandar  "MD Laureen, 1,000 mcg at 09/08/20 1210    cyanocobalamin injection 1,000 mcg, 1,000 mcg, Intramuscular, Q30 Days, Aleksandar Garduno MD    cyanocobalamin injection 1,000 mcg, 1,000 mcg, Intramuscular, Q30 Days, Aleksandar Garduno MD    cyanocobalamin injection 1,000 mcg, 1,000 mcg, Intramuscular, Q30 Days, Aleksandar Garduno MD, 1,000 mcg at 08/18/21 1019    cyanocobalamin injection 1,000 mcg, 1,000 mcg, Intramuscular, Q30 Days, Aleksandar Garduno MD, 1,000 mcg at 03/21/23 1117    cyanocobalamin injection 1,000 mcg, 1,000 mcg, Intramuscular, Q30 Days, Aleksandar Garduno MD, 1,000 mcg at 07/13/23 1004    Allergies   Allergen Reactions    Cephalosporins Hives    Erythromycin GI Intolerance    Fentanyl Itching     Occurred during surgery     Paxil [Paroxetine] Hives     After 2 weeks    Penicillins Itching    Pravastatin Myalgia    Statins Itching    Sulfa Antibiotics Diarrhea    Wellbutrin [Bupropion] Hives     After 2 weeks     Chantix [Varenicline] Rash and Other (See Comments)     Burning and itching    Clindamycin Rash    Marzena's Wort (Hypericum Perforatum) Rash       Physical Exam:    Ht 5' 5\" (1.651 m)   Wt 81.6 kg (180 lb)   LMP  (LMP Unknown)   BMI 29.95 kg/m²     Constitutional:normal, well developed, well nourished, alert, in no distress and non-toxic and no overt pain behavior.  Eyes:anicteric  HEENT:grossly intact  Neck:supple, symmetric, trachea midline and no masses   Pulmonary:even and unlabored  Cardiovascular:No edema or pitting edema present  Skin:Normal without rashes or lesions and well hydrated  Psychiatric:Mood and affect appropriate  Neurologic:Cranial Nerves II-XII grossly intact  Musculoskeletal:normal, ambulates with a cane    Imaging  No orders to display       No orders of the defined types were placed in this encounter.        "

## 2025-05-06 NOTE — PROGRESS NOTES
Name: Areli Luciano      : 1950      MRN: [id number]  Encounter Provider: CARTER Porter  Encounter Date: 2025   Encounter department: [facility] [place]  :  Assessment & Plan  Anemia, unspecified type   Hemoglobin 11.4 with most recent blood work.  Given patient's history of bariatric surgery and MGUS we will request the following labs now previously anticipated for 2026.  Contact her with the results and we will manage accordingly.  If iron levels are low we will make arrangements for iron infusions.    Orders:    Ambulatory Referral to Hematology / Oncology    Immunoglobulin free LT chains blood; Future    Protein electrophoresis, serum; Future    IgG, IgA, IgM; Future    Iron Panel (Includes Ferritin, Iron Sat%, Iron, and TIBC); Future    Pain and swelling of right lower leg   Patient has been wearing a right leg brace for right knee pain.  She had been following with orthopedic medicine, last seen in 2023.  She has been referred to orthopedics for possible surgical evaluation.  Patient states has worsened in the right leg moving from the knee down to the ankle.  She reports pain and swelling in the calf yesterday.  On physical exam there is no redness noted.  Concern for DVT.  Will check a Doppler study and contact patient with results.  If negative recommend she follow with orthopedics or PCP.    Orders:    VAS upper limb venous duplex scan, unilateral/limited; Future    MGUS (monoclonal gammopathy of unknown significance)   History of IgA kappa monoclonal gammopathy.  Previous immunofixation was unable to accurately measure by densitometry secondary to other proteins that mitigate there.  Observation.  Orders:    Immunoglobulin free LT chains blood; Future    Protein electrophoresis, serum; Future    IgG, IgA, IgM; Future    IgA gammopathy    Orders:    Immunoglobulin free LT chains blood; Future    Protein electrophoresis, serum; Future    IgG,  IgA, IgM; Future    History of Sarah-en-Y gastric bypass    Orders:    Iron Panel (Includes Ferritin, Iron Sat%, Iron, and TIBC); Future    Iron deficiency anemia following bariatric surgery    Orders:    Iron Panel (Includes Ferritin, Iron Sat%, Iron, and TIBC); Future        No follow-ups on file.    History of Present Illness   Chief Complaint   Patient presents with    Follow-up    Patient presents today after having updated CBC, WBC 3.84, RBC 3.52, hemoglobin decreased to 11.4, MCV 99 otherwise normal CBC and differential.  She also reports having significant pain in the right lower extremity she associated to wearing a brace.  She states that pain has worsened in the calf she is reporting swelling.    Pertinent Medical History     05/07/25:  Patient is a 74-year-old female with a history of hypothyroid, current every day smoker, intermittent asthma, pulmonary emphysema, COPD, type 2 diabetes, osteoporosis, and gastric bypass surgery in 2010.  She also has a history of monoclonal gammopathy of unknown significance initially diagnosed in 2009.  Patient was lost to follow-up until 2016, on observation since then.  She also has a history of iron deficiency secondary to bariatric surgery and required Feraheme infusions, most recent in September 2024.     Review of Systems   Constitutional:  Positive for fatigue. Negative for activity change, appetite change, fever and unexpected weight change.   Respiratory:  Negative for cough and shortness of breath.    Cardiovascular:  Negative for chest pain and leg swelling.   Gastrointestinal:  Negative for abdominal pain, constipation, diarrhea and nausea.   Endocrine: Negative for cold intolerance and heat intolerance.   Musculoskeletal:  Positive for arthralgias and myalgias.        Leg pain   Skin: Negative.    Neurological:  Negative for dizziness, weakness and headaches.   Hematological:  Negative for adenopathy. Does not bruise/bleed easily.         Objective   BP  "132/76 (BP Location: Left arm, Patient Position: Sitting, Cuff Size: Adult)   Pulse 68   Temp 97.9 °F (36.6 °C) (Temporal)   Resp 18   Ht 5' 5\" (1.651 m)   Wt 83.5 kg (184 lb)   LMP  (LMP Unknown)   SpO2 98%   BMI 30.62 kg/m²     Physical Exam  Vitals reviewed.   Constitutional:       Appearance: Normal appearance. She is well-developed.   HENT:      Head: Normocephalic and atraumatic.   Eyes:      Conjunctiva/sclera: Conjunctivae normal.      Pupils: Pupils are equal, round, and reactive to light.   Pulmonary:      Effort: Pulmonary effort is normal. No respiratory distress.   Musculoskeletal:         General: Normal range of motion.      Cervical back: Normal range of motion.   Lymphadenopathy:      Cervical: No cervical adenopathy.   Skin:     General: Skin is dry.   Neurological:      Mental Status: She is alert and oriented to person, place, and time.   Psychiatric:         Behavior: Behavior normal.     Labs: I have reviewed the following labs:  Results for orders placed or performed in visit on 04/29/25   Sedimentation rate, automated   Result Value Ref Range    Sed Rate 21 0 - 29 mm/hour   Result Value Ref Range    CRP 5.5 (H) <3.0 mg/L   CBC and differential   Result Value Ref Range    WBC 3.84 (L) 4.31 - 10.16 Thousand/uL    RBC 3.52 (L) 3.81 - 5.12 Million/uL    Hemoglobin 11.4 (L) 11.5 - 15.4 g/dL    Hematocrit 34.8 34.8 - 46.1 %    MCV 99 (H) 82 - 98 fL    MCH 32.4 26.8 - 34.3 pg    MCHC 32.8 31.4 - 37.4 g/dL    RDW 13.2 11.6 - 15.1 %    MPV 9.5 8.9 - 12.7 fL    Platelets 201 149 - 390 Thousands/uL    nRBC 0 /100 WBCs    Segmented % 74 43 - 75 %    Immature Grans % 0 0 - 2 %    Lymphocytes % 15 14 - 44 %    Monocytes % 9 4 - 12 %    Eosinophils Relative 2 0 - 6 %    Basophils Relative 0 0 - 1 %    Absolute Neutrophils 2.83 1.85 - 7.62 Thousands/µL    Absolute Immature Grans 0.01 0.00 - 0.20 Thousand/uL    Absolute Lymphocytes 0.57 (L) 0.60 - 4.47 Thousands/µL    Absolute Monocytes 0.35 0.17 - " 1.22 Thousand/µL    Eosinophils Absolute 0.07 0.00 - 0.61 Thousand/µL    Basophils Absolute 0.01 0.00 - 0.10 Thousands/µL     *Note: Due to a large number of results and/or encounters for the requested time period, some results have not been displayed. A complete set of results can be found in Results Review.

## 2025-05-07 ENCOUNTER — HOSPITAL ENCOUNTER (OUTPATIENT)
Dept: NON INVASIVE DIAGNOSTICS | Facility: HOSPITAL | Age: 75
Discharge: HOME | End: 2025-05-07
Attending: NURSE PRACTITIONER
Payer: COMMERCIAL

## 2025-05-07 ENCOUNTER — OFFICE VISIT (OUTPATIENT)
Dept: HEMATOLOGY ONCOLOGY | Facility: CLINIC | Age: 75
End: 2025-05-07
Attending: INTERNAL MEDICINE
Payer: COMMERCIAL

## 2025-05-07 VITALS
DIASTOLIC BLOOD PRESSURE: 76 MMHG | SYSTOLIC BLOOD PRESSURE: 132 MMHG | BODY MASS INDEX: 30.66 KG/M2 | TEMPERATURE: 97.9 F | WEIGHT: 184 LBS | HEIGHT: 65 IN | OXYGEN SATURATION: 98 % | RESPIRATION RATE: 18 BRPM | HEART RATE: 68 BPM

## 2025-05-07 DIAGNOSIS — M79.661 PAIN AND SWELLING OF RIGHT LOWER LEG: Primary | ICD-10-CM

## 2025-05-07 DIAGNOSIS — D64.9 ANEMIA, UNSPECIFIED TYPE: ICD-10-CM

## 2025-05-07 DIAGNOSIS — Z98.84 HISTORY OF ROUX-EN-Y GASTRIC BYPASS: ICD-10-CM

## 2025-05-07 DIAGNOSIS — D47.2 MGUS (MONOCLONAL GAMMOPATHY OF UNKNOWN SIGNIFICANCE): ICD-10-CM

## 2025-05-07 DIAGNOSIS — K95.89 IRON DEFICIENCY ANEMIA FOLLOWING BARIATRIC SURGERY: ICD-10-CM

## 2025-05-07 DIAGNOSIS — M79.89 PAIN AND SWELLING OF RIGHT LOWER LEG: ICD-10-CM

## 2025-05-07 DIAGNOSIS — D47.2 IGA GAMMOPATHY: ICD-10-CM

## 2025-05-07 DIAGNOSIS — D50.8 IRON DEFICIENCY ANEMIA FOLLOWING BARIATRIC SURGERY: ICD-10-CM

## 2025-05-07 DIAGNOSIS — E03.9 HYPOTHYROIDISM, UNSPECIFIED TYPE: ICD-10-CM

## 2025-05-07 DIAGNOSIS — M79.661 PAIN AND SWELLING OF RIGHT LOWER LEG: ICD-10-CM

## 2025-05-07 DIAGNOSIS — M79.89 PAIN AND SWELLING OF RIGHT LOWER LEG: Primary | ICD-10-CM

## 2025-05-07 PROCEDURE — 93971 EXTREMITY STUDY: CPT

## 2025-05-07 PROCEDURE — 99214 OFFICE O/P EST MOD 30 MIN: CPT | Performed by: NURSE PRACTITIONER

## 2025-05-07 PROCEDURE — G2211 COMPLEX E/M VISIT ADD ON: HCPCS | Performed by: NURSE PRACTITIONER

## 2025-05-07 RX ORDER — LEVOTHYROXINE SODIUM 50 UG/1
50 TABLET ORAL EVERY OTHER DAY
Qty: 50 TABLET | Refills: 1 | Status: SHIPPED | OUTPATIENT
Start: 2025-05-07

## 2025-05-07 NOTE — LETTER
May 7, 2025     Aleksandar Garduno MD  [facility] [address] [place] [id number]    Patient: Areli Luciano   YOB: 1950   Date of Visit: 2025       Dear Dr. Aleksandar Garduno MD:    Thank you for referring Areli Luciano to me for evaluation. Below are my notes for this consultation.    If you have questions, please do not hesitate to call me. I look forward to following your patient along with you.         Sincerely,        CARTER Porter        CC: No Recipients    CARTER Porter  2025 11:06 AM  Sign when Signing Visit  Name: Areli Luciano      : 1950      MRN: [id number]  Encounter Provider: CARTER Porter  Encounter Date: 2025   Encounter department: [facility] [place]  :  Assessment & Plan  Anemia, unspecified type   Hemoglobin 11.4 with most recent blood work.  Given patient's history of bariatric surgery and MGUS we will request the following labs now previously anticipated for 2026.  Contact her with the results and we will manage accordingly.  If iron levels are low we will make arrangements for iron infusions.    Orders:  •  Ambulatory Referral to Hematology / Oncology  •  Immunoglobulin free LT chains blood; Future  •  Protein electrophoresis, serum; Future  •  IgG, IgA, IgM; Future  •  Iron Panel (Includes Ferritin, Iron Sat%, Iron, and TIBC); Future    Pain and swelling of right lower leg   Patient has been wearing a right leg brace for right knee pain.  She had been following with orthopedic medicine, last seen in 2023.  She has been referred to orthopedics for possible surgical evaluation.  Patient states has worsened in the right leg moving from the knee down to the ankle.  She reports pain and swelling in the calf yesterday.  On physical exam there is no redness noted.  Concern for DVT.  Will check a Doppler study and contact patient with results.  If negative recommend she follow with orthopedics or PCP.    Orders:  •   VAS upper limb venous duplex scan, unilateral/limited; Future    MGUS (monoclonal gammopathy of unknown significance)   History of IgA kappa monoclonal gammopathy.  Previous immunofixation was unable to accurately measure by densitometry secondary to other proteins that mitigate there.  Observation.  Orders:  •  Immunoglobulin free LT chains blood; Future  •  Protein electrophoresis, serum; Future  •  IgG, IgA, IgM; Future    IgA gammopathy    Orders:  •  Immunoglobulin free LT chains blood; Future  •  Protein electrophoresis, serum; Future  •  IgG, IgA, IgM; Future    History of Sarah-en-Y gastric bypass    Orders:  •  Iron Panel (Includes Ferritin, Iron Sat%, Iron, and TIBC); Future    Iron deficiency anemia following bariatric surgery    Orders:  •  Iron Panel (Includes Ferritin, Iron Sat%, Iron, and TIBC); Future        No follow-ups on file.    History of Present Illness  Chief Complaint   Patient presents with   • Follow-up    Patient presents today after having updated CBC, WBC 3.84, RBC 3.52, hemoglobin decreased to 11.4, MCV 99 otherwise normal CBC and differential.  She also reports having significant pain in the right lower extremity she associated to wearing a brace.  She states that pain has worsened in the calf she is reporting swelling.    Pertinent Medical History    05/07/25:  Patient is a 74-year-old female with a history of hypothyroid, current every day smoker, intermittent asthma, pulmonary emphysema, COPD, type 2 diabetes, osteoporosis, and gastric bypass surgery in 2010.  She also has a history of monoclonal gammopathy of unknown significance initially diagnosed in 2009.  Patient was lost to follow-up until 2016, on observation since then.  She also has a history of iron deficiency secondary to bariatric surgery and required Feraheme infusions, most recent in September 2024.     Review of Systems   Constitutional:  Positive for fatigue. Negative for activity change, appetite change, fever and  "unexpected weight change.   Respiratory:  Negative for cough and shortness of breath.    Cardiovascular:  Negative for chest pain and leg swelling.   Gastrointestinal:  Negative for abdominal pain, constipation, diarrhea and nausea.   Endocrine: Negative for cold intolerance and heat intolerance.   Musculoskeletal:  Positive for arthralgias and myalgias.        Leg pain   Skin: Negative.    Neurological:  Negative for dizziness, weakness and headaches.   Hematological:  Negative for adenopathy. Does not bruise/bleed easily.         Objective  /76 (BP Location: Left arm, Patient Position: Sitting, Cuff Size: Adult)   Pulse 68   Temp 97.9 °F (36.6 °C) (Temporal)   Resp 18   Ht 5' 5\" (1.651 m)   Wt 83.5 kg (184 lb)   LMP  (LMP Unknown)   SpO2 98%   BMI 30.62 kg/m²     Physical Exam  Vitals reviewed.   Constitutional:       Appearance: Normal appearance. She is well-developed.   HENT:      Head: Normocephalic and atraumatic.   Eyes:      Conjunctiva/sclera: Conjunctivae normal.      Pupils: Pupils are equal, round, and reactive to light.   Pulmonary:      Effort: Pulmonary effort is normal. No respiratory distress.   Musculoskeletal:         General: Normal range of motion.      Cervical back: Normal range of motion.   Lymphadenopathy:      Cervical: No cervical adenopathy.   Skin:     General: Skin is dry.   Neurological:      Mental Status: She is alert and oriented to person, place, and time.   Psychiatric:         Behavior: Behavior normal.     Labs: I have reviewed the following labs:  Results for orders placed or performed in visit on 04/29/25   Sedimentation rate, automated   Result Value Ref Range    Sed Rate 21 0 - 29 mm/hour   Result Value Ref Range    CRP 5.5 (H) <3.0 mg/L   CBC and differential   Result Value Ref Range    WBC 3.84 (L) 4.31 - 10.16 Thousand/uL    RBC 3.52 (L) 3.81 - 5.12 Million/uL    Hemoglobin 11.4 (L) 11.5 - 15.4 g/dL    Hematocrit 34.8 34.8 - 46.1 %    MCV 99 (H) 82 - 98 fL "    MCH 32.4 26.8 - 34.3 pg    MCHC 32.8 31.4 - 37.4 g/dL    RDW 13.2 11.6 - 15.1 %    MPV 9.5 8.9 - 12.7 fL    Platelets 201 149 - 390 Thousands/uL    nRBC 0 /100 WBCs    Segmented % 74 43 - 75 %    Immature Grans % 0 0 - 2 %    Lymphocytes % 15 14 - 44 %    Monocytes % 9 4 - 12 %    Eosinophils Relative 2 0 - 6 %    Basophils Relative 0 0 - 1 %    Absolute Neutrophils 2.83 1.85 - 7.62 Thousands/µL    Absolute Immature Grans 0.01 0.00 - 0.20 Thousand/uL    Absolute Lymphocytes 0.57 (L) 0.60 - 4.47 Thousands/µL    Absolute Monocytes 0.35 0.17 - 1.22 Thousand/µL    Eosinophils Absolute 0.07 0.00 - 0.61 Thousand/µL    Basophils Absolute 0.01 0.00 - 0.10 Thousands/µL     *Note: Due to a large number of results and/or encounters for the requested time period, some results have not been displayed. A complete set of results can be found in Results Review.

## 2025-05-07 NOTE — ASSESSMENT & PLAN NOTE
History of IgA kappa monoclonal gammopathy.  Previous immunofixation was unable to accurately measure by densitometry secondary to other proteins that mitigate there.  Observation.  Orders:    Immunoglobulin free LT chains blood; Future    Protein electrophoresis, serum; Future    IgG, IgA, IgM; Future

## 2025-05-08 ENCOUNTER — OFFICE VISIT (OUTPATIENT)
Dept: CARDIOLOGY CLINIC | Facility: CLINIC | Age: 75
End: 2025-05-08
Payer: COMMERCIAL

## 2025-05-08 ENCOUNTER — TELEPHONE (OUTPATIENT)
Dept: HEMATOLOGY ONCOLOGY | Facility: CLINIC | Age: 75
End: 2025-05-08

## 2025-05-08 VITALS
OXYGEN SATURATION: 99 % | HEIGHT: 65 IN | SYSTOLIC BLOOD PRESSURE: 116 MMHG | DIASTOLIC BLOOD PRESSURE: 60 MMHG | BODY MASS INDEX: 30.27 KG/M2 | WEIGHT: 181.7 LBS | HEART RATE: 73 BPM

## 2025-05-08 DIAGNOSIS — E78.00 PURE HYPERCHOLESTEROLEMIA: ICD-10-CM

## 2025-05-08 DIAGNOSIS — E11.69 TYPE 2 DIABETES MELLITUS WITH HYPERLIPIDEMIA  (HCC): ICD-10-CM

## 2025-05-08 DIAGNOSIS — R00.1 SINUS BRADYCARDIA: ICD-10-CM

## 2025-05-08 DIAGNOSIS — I10 PRIMARY HYPERTENSION: Primary | ICD-10-CM

## 2025-05-08 DIAGNOSIS — F17.200 NICOTINE DEPENDENCE WITH CURRENT USE: ICD-10-CM

## 2025-05-08 DIAGNOSIS — E78.5 TYPE 2 DIABETES MELLITUS WITH HYPERLIPIDEMIA  (HCC): ICD-10-CM

## 2025-05-08 PROCEDURE — 99214 OFFICE O/P EST MOD 30 MIN: CPT | Performed by: INTERNAL MEDICINE

## 2025-05-08 PROCEDURE — 93971 EXTREMITY STUDY: CPT | Performed by: SURGERY

## 2025-05-08 NOTE — TELEPHONE ENCOUNTER
Inform patient Teri is out of the office, I am one of her colleagues covering for her today.  I wanted to let her know that her her venous duplex was negative for blood clot.  She can follow-up with her orthopedic doctor.  Patient asked if she should get the blood work Teri ordered, recommended she go ahead and get the blood work as there is no additional labs to be added to since the ultrasound was negative for a blood clot.  Patient voiced understanding.

## 2025-05-09 ENCOUNTER — TELEPHONE (OUTPATIENT)
Age: 75
End: 2025-05-09

## 2025-05-09 NOTE — TELEPHONE ENCOUNTER
Patient called back because she got cut off from the previous person she was talking tro. States she finished her abx this past Tuesday. There is improvement, no redness or warmth, minor swelling but the area remains hard to touch. Patient would like to know if she needs another round of abx. Please f/u with patient.

## 2025-05-09 NOTE — TELEPHONE ENCOUNTER
Patient called and stated she finished the levofloxacin (LEVAQUIN) 500 mg tablet; medication that was prescribed on 4/28/2025 but there is still a small lump on her forearm. Please call her to advise.

## 2025-05-10 LAB — COLOGUARD RESULT REPORTABLE: NORMAL

## 2025-05-11 DIAGNOSIS — K21.9 GASTROESOPHAGEAL REFLUX DISEASE WITHOUT ESOPHAGITIS: ICD-10-CM

## 2025-05-12 RX ORDER — SUCRALFATE ORAL 1 G/10ML
1 SUSPENSION ORAL
Qty: 3600 ML | Refills: 1 | Status: SHIPPED | OUTPATIENT
Start: 2025-05-12 | End: 2025-05-13 | Stop reason: SDUPTHER

## 2025-05-13 ENCOUNTER — OFFICE VISIT (OUTPATIENT)
Dept: GASTROENTEROLOGY | Facility: MEDICAL CENTER | Age: 75
End: 2025-05-13
Payer: COMMERCIAL

## 2025-05-13 VITALS
HEIGHT: 65 IN | OXYGEN SATURATION: 99 % | BODY MASS INDEX: 30.16 KG/M2 | SYSTOLIC BLOOD PRESSURE: 128 MMHG | TEMPERATURE: 97.6 F | DIASTOLIC BLOOD PRESSURE: 76 MMHG | HEART RATE: 100 BPM | WEIGHT: 181 LBS

## 2025-05-13 DIAGNOSIS — K30 DELAYED GASTRIC EMPTYING: Primary | ICD-10-CM

## 2025-05-13 DIAGNOSIS — K21.9 GASTROESOPHAGEAL REFLUX DISEASE WITHOUT ESOPHAGITIS: ICD-10-CM

## 2025-05-13 DIAGNOSIS — Z98.84 STATUS POST BARIATRIC SURGERY: ICD-10-CM

## 2025-05-13 PROCEDURE — 99214 OFFICE O/P EST MOD 30 MIN: CPT | Performed by: NURSE PRACTITIONER

## 2025-05-13 RX ORDER — SUCRALFATE ORAL 1 G/10ML
1 SUSPENSION ORAL
Qty: 3600 ML | Refills: 3 | Status: SHIPPED | OUTPATIENT
Start: 2025-05-13

## 2025-05-13 RX ORDER — OMEPRAZOLE 40 MG/1
40 CAPSULE, DELAYED RELEASE ORAL
Qty: 60 CAPSULE | Refills: 5 | Status: SHIPPED | OUTPATIENT
Start: 2025-05-13

## 2025-05-13 RX ORDER — SUCRALFATE 1 G/1
1 TABLET ORAL 4 TIMES DAILY
Qty: 120 TABLET | Refills: 0 | Status: SHIPPED | OUTPATIENT
Start: 2025-05-13

## 2025-05-13 NOTE — ASSESSMENT & PLAN NOTE
Her PCP stopped her omeprazole.  She is currently taking famotidine 20 mg twice daily and Carafate 1 g AC and nightly.  She alternates between Carafate pills and liquid depending upon when she is out of the house.  Reports heartburn and reflux has worsened since stopping PPI.  Reports daily ongoing burning in her esophagus.  Still getting intermittent bouts of epigastric pain postprandially.  Will restart PPI.  Instructed to take PPI before breakfast and supper and famotidine in the afternoon and before bedtime.  She also knows when to take Carafate.  If symptoms do not improve we will recommend EGD.  Instructed patient to contact office in a few weeks with update of symptoms.  Orders:    sucralfate (CARAFATE) 1 g/10 mL suspension; Take 10 mL (1 g total) by mouth 4 (four) times a day (with meals and at bedtime)    sucralfate (CARAFATE) 1 g tablet; Take 1 tablet (1 g total) by mouth 4 (four) times a day    omeprazole (PriLOSEC) 40 MG capsule; Take 1 capsule (40 mg total) by mouth 2 (two) times a day before meals  Follow-up in 6 months or sooner if needed

## 2025-05-13 NOTE — PROGRESS NOTES
Name: Areli Luciano      : 1950      MRN: [id number]  Encounter Provider: CARTER Page  Encounter Date: 2025   Encounter department: [facility] [place]  :  Assessment & Plan  Gastroesophageal reflux disease without esophagitis  Her PCP stopped her omeprazole.  She is currently taking famotidine 20 mg twice daily and Carafate 1 g AC and nightly.  She alternates between Carafate pills and liquid depending upon when she is out of the house.  Reports heartburn and reflux has worsened since stopping PPI.  Reports daily ongoing burning in her esophagus.  Still getting intermittent bouts of epigastric pain postprandially.  Will restart PPI.  Instructed to take PPI before breakfast and supper and famotidine in the afternoon and before bedtime.  She also knows when to take Carafate.  If symptoms do not improve we will recommend EGD.  Instructed patient to contact office in a few weeks with update of symptoms.  Orders:    sucralfate (CARAFATE) 1 g/10 mL suspension; Take 10 mL (1 g total) by mouth 4 (four) times a day (with meals and at bedtime)    sucralfate (CARAFATE) 1 g tablet; Take 1 tablet (1 g total) by mouth 4 (four) times a day    omeprazole (PriLOSEC) 40 MG capsule; Take 1 capsule (40 mg total) by mouth 2 (two) times a day before meals  Follow-up in 6 months or sooner if needed  Delayed gastric emptying  She underwent gastric emptying study showing mild delayed gastric emptying at the 3h idalia 63% was empty (normal greater than 70%) and at the 4h idalia 83% was empty (normal is greater than 90%).  Reglan was discussed with her including possible EPS side effects.  She has not taken it as of yet.  Will consider if she needs to in the future.    Consider Reglan if pain does not improve       Status post bariatric surgery  History of gastric bypass surgery .  Has been seen by bariatrics and revision not recommended.           History of Present Illness    Areli Luciano is a 74 y.o. female who presents for follow-up.  She was last seen by Dr. Jaiyeola 4/24 for GERD, delayed gastric emptying and history of gastric bypass.    HPI    She has a history of chronic reflux and regurgitation symptoms underwent EGD in 2022 showing significant bile reflux into her dilated gastric pouch with no evidence of esophagitis. She then underwent GI fluoroscopy exam showing small hiatal hernia, gastroesophageal reflux otherwise normal postoperative changes. Manometry and pH testing showed normal motility and normal acid exposure time consistent with hypersensitive reflux. She has followed up with bariatric surgery who did not recommend revising her gastric bypass. After her last office visit she underwent a gastric emptying study showing delayed gastric emptying which may be contributed by her use of regular narcotic medicines for her chronic pain.  Reglan was discussed with patient at that time including possible EPS side effects..  If no improvement of symptoms consider repeat EGD.  She is followed by Cora xavier for her anemia.  She does have a history of MGUS.    Interval history: Her PCP stopped her omeprazole.  She is currently taking famotidine 20 mg twice daily and Carafate 1 g AC and nightly.  She alternates between Carafate pills and liquid depending upon when she is out of the house.  Reports heartburn and reflux has worsened since stopping PPI.  Reports daily ongoing burning in her esophagus.  Still getting intermittent bouts of epigastric pain postprandially.  BMs brown and formed daily.  Occasionally gets bouts of loose stools but cannot pinpoint triggers.    Labs 4/25-CMP normal other than sodium 134, hemoglobin 11.4 with MCV 99, platelets 201, WBC's 3.84.  CRP 5.5      Prior EGD/colonoscopy   was unremarkable she was recommended no additional screening colonoscopies given her age     2014 colonoscopy showed 2 polyps, diverticulosis and hemorrhoids she was  recommended to repeat in 5 years.  Pathology is not available for review  She reportedly had an EGD in 2014 which was normal, however procedure report is not available for review  2022 EGD showing significant bile reflux into her dilated gastric pouch and no evidence of esophagitis. She then underwent upper GI fluoroscopy exam showing hernia, gastroesophageal reflux otherwise normal postoperative changes. Subsequent manometry and pH testing demonstrated normal esophageal motility and normal acid exposure time consistent with hypersensitive reflux.          Review of Systems   Constitutional:  Negative for chills and fever.   HENT:  Negative for ear pain and sore throat.    Eyes:  Negative for pain and visual disturbance.   Respiratory:  Negative for cough and shortness of breath.    Cardiovascular:  Negative for chest pain and palpitations.   Gastrointestinal:  Positive for abdominal pain. Negative for vomiting.   Genitourinary:  Negative for dysuria and hematuria.   Musculoskeletal:  Negative for arthralgias and back pain.   Skin:  Negative for color change and rash.   Neurological:  Negative for seizures and syncope.   All other systems reviewed and are negative.   A complete review of systems is negative other than that noted above in the HPI.      Current Outpatient Medications   Medication Sig Dispense Refill    albuterol (ProAir HFA) 90 mcg/act inhaler Inhale 2 puffs every 6 (six) hours as needed for wheezing 18 g 3    amLODIPine (NORVASC) 5 mg tablet TAKE 1 TABLET (5 MG TOTAL) BY MOUTH DAILY. 90 tablet 1    atorvastatin (LIPITOR) 10 mg tablet Take 1 tablet (10 mg total) by mouth daily 90 tablet 3    baclofen 10 mg tablet Take 1 tablet (10 mg total) by mouth 3 (three) times a day 270 tablet 0    Calcium Carb-Cholecalciferol (Calcium 600+D3) 600-20 MG-MCG TABS TAKE 1 TABLET BY MOUTH TWICE A DAY WITH MEALS 180 tablet 1    cetirizine (ZyrTEC) 10 mg tablet TAKE 1 TABLET BY MOUTH EVERY DAY 90 tablet 2     cholecalciferol (VITAMIN D) 400 units/1 mL injections once a week      clotrimazole-betamethasone (LOTRISONE) 1-0.05 % cream APPLY TO AFFECTED AREA 3 TIMES A DAY 60 g 1    Cyanocobalamin (B-12) 1000 MCG TABS TAKE 1 TABLET BY MOUTH EVERY DAY 90 tablet 3    Diclofenac Sodium (VOLTAREN) 1 % APPLY 2 GRAMS TO AFFECTED AREA 4 TIMES A  g 0    diphenhydrAMINE-PSE-APAP (BENADRYL ALLERGY/COLD PO) Take by mouth daily at bedtime      escitalopram (LEXAPRO) 5 mg tablet TAKE 1 TABLET (5 MG TOTAL) BY MOUTH DAILY. 90 tablet 1    famotidine (PEPCID) 20 mg tablet TAKE 1 TABLET (20 MG TOTAL) BY MOUTH 2 (TWO) TIMES A DAY PLEASE STOP TAGAMET 180 tablet 1    fexofenadine (ALLEGRA) 180 MG tablet Take 1 tablet (180 mg total) by mouth daily In the morning 90 tablet 3    fluticasone (FLONASE) 50 mcg/act nasal spray SPRAY 1 SPRAY INTO EACH NOSTRIL EVERY DAY 48 mL 2    HYDROcodone-acetaminophen (NORCO)  mg per tablet Take 1 tablet by mouth every 4 (four) hours as needed for severe pain Max Daily Amount: 6 tablets Do not start before May 12, 2025. 130 tablet 0    [START ON 6/9/2025] HYDROcodone-acetaminophen (NORCO)  mg per tablet Take 1 tablet by mouth every 4 (four) hours as needed for severe pain Max Daily Amount: 6 tablets Do not start before June 9, 2025. 130 tablet 0    ipratropium-albuterol (DUO-NEB) 0.5-2.5 mg/3 mL nebulizer solution Take 3 mL by nebulization every 8 (eight) hours as needed for wheezing or shortness of breath 270 mL 3    levothyroxine 25 mcg tablet Take 1 tablet (25 mcg total) by mouth every other day 45 tablet 3    levothyroxine 50 mcg tablet TAKE 1 TABLET (50 MCG TOTAL) BY MOUTH EVERY OTHER DAY 50 tablet 1    losartan (COZAAR) 25 mg tablet TAKE 1 TABLET (25 MG TOTAL) BY MOUTH DAILY. 90 tablet 1    magnesium Oxide (MAG-OX) 400 mg TABS Take 1 tablet (400 mg total) by mouth daily 90 tablet 0    naloxone (NARCAN) 4 mg/0.1 mL nasal spray Administer 1 spray into a nostril. If no response after 2-3  minutes, give another dose in the other nostril using a new spray. 1 each 1    nortriptyline (PAMELOR) 25 mg capsule TAKE 1 CAPSULE BY MOUTH DAILY AT BEDTIME 90 capsule 1    omeprazole (PriLOSEC) 40 MG capsule TAKE 1 CAPSULE BY MOUTH 2 TIMES A DAY BEFORE MEALS. 180 capsule 1    OneTouch Verio test strip 2 (two) times a day      Pediatric Multiple Vitamins (Childrens Chew Multivitamin) CHEW CHEW AND SWALLOW 1 TABLET BY MOUTH EVERY  tablet 1    potassium chloride (MICRO-K) 10 MEQ CR capsule TAKE 1 CAPSULE BY MOUTH EVERY DAY 90 capsule 1    pregabalin (LYRICA) 200 MG capsule Take 1 capsule (200 mg total) by mouth 3 (three) times a day 90 capsule 2    promethazine (PHENERGAN) 12.5 mg/10 mL oral solution Take 10 mL (12.5 mg total) by mouth every 6 (six) hours as needed (cough) 118 mL 0    sucralfate (CARAFATE) 1 g/10 mL suspension TAKE 10 ML (1 G TOTAL) BY MOUTH 4 (FOUR) TIMES A DAY (WITH MEALS AND AT BEDTIME) 3600 mL 1    traZODone (DESYREL) 50 mg tablet TAKE 1 TABLET BY MOUTH EVERY DAY AT BEDTIME AS NEEDED 90 tablet 1    Trelegy Ellipta 100-62.5-25 MCG/ACT inhaler INHALE 1 PUFF DAILY RINSE MOUTH AFTER USE. 60 each 5    triamcinolone (KENALOG) 0.1 % cream APPLY TO AFFECTED AREA 2 OR 3 TIMES DAILY AS NEEDED      Vitamin A 2400 MCG (8000 UT) TABS TAKE 1 CAPSULE (8,000 UNITS TOTAL) BY MOUTH DAILY      denosumab (PROLIA) 60 mg/mL Inject 1 mL (60 mg total) under the skin once for 1 dose 1 mL 0     Current Facility-Administered Medications   Medication Dose Route Frequency Provider Last Rate Last Admin    cyanocobalamin injection 1,000 mcg  1,000 mcg Intramuscular Q30 Days Aleksandar Garduno MD   1,000 mcg at 12/08/20 1118    cyanocobalamin injection 1,000 mcg  1,000 mcg Intramuscular Q30 Days Aleksandar Garduno MD   1,000 mcg at 07/08/20 1036    cyanocobalamin injection 1,000 mcg  1,000 mcg Intramuscular Q30 Days Aleksandar Garduno MD   1,000 mcg at 09/08/20 1210    cyanocobalamin injection 1,000 mcg  1,000 mcg Intramuscular Q30 Days  Aleksandar Garduno MD        cyanocobalamin injection 1,000 mcg  1,000 mcg Intramuscular Q30 Days Aleksandar Garduno MD        cyanocobalamin injection 1,000 mcg  1,000 mcg Intramuscular Q30 Days Aleksandar Garduno MD   1,000 mcg at 08/18/21 1019    cyanocobalamin injection 1,000 mcg  1,000 mcg Intramuscular Q30 Days Aleksandar Garduno MD   1,000 mcg at 03/21/23 1117    cyanocobalamin injection 1,000 mcg  1,000 mcg Intramuscular Q30 Days Aleksandar Garduno MD   1,000 mcg at 07/13/23 1004     Objective   LMP  (LMP Unknown)     Physical Exam  Vitals and nursing note reviewed.   Constitutional:       General: She is not in acute distress.     Appearance: She is well-developed.   HENT:      Head: Normocephalic and atraumatic.   Eyes:      Conjunctiva/sclera: Conjunctivae normal.   Cardiovascular:      Rate and Rhythm: Normal rate and regular rhythm.      Heart sounds: No murmur heard.  Pulmonary:      Effort: Pulmonary effort is normal. No respiratory distress.      Breath sounds: Normal breath sounds.   Abdominal:      General: Bowel sounds are normal.      Palpations: Abdomen is soft.      Tenderness: There is no abdominal tenderness.   Musculoskeletal:         General: No swelling.      Cervical back: Neck supple.   Skin:     General: Skin is warm and dry.      Capillary Refill: Capillary refill takes less than 2 seconds.   Neurological:      Mental Status: She is alert and oriented to person, place, and time.   Psychiatric:         Mood and Affect: Mood normal.            Lab Results: I personally reviewed relevant lab results.       Results for orders placed during the hospital encounter of 06/27/22    EGD    Impression  Small hiatal hernia  Otherwise normal esophagus. Proximal and distal biopsies were obtained    Significant amount of bile reflux in the gastric pouch. Dilated gastric pouch with areas of edema and erythema. Biopsied  Normal gj anastomosis  Normal jejunum. Biopsied    RECOMMENDATION:  Await pathology results  Continue high dose  ppi  Obtain upper GI barium swallow  Proceed with colonoscopy        Diana M Jaiyeola, MD         worsening/sudden onset

## 2025-05-20 DIAGNOSIS — J30.9 ALLERGIC RHINITIS, UNSPECIFIED SEASONALITY, UNSPECIFIED TRIGGER: ICD-10-CM

## 2025-05-20 RX ORDER — CETIRIZINE HYDROCHLORIDE 10 MG/1
10 TABLET ORAL DAILY
Qty: 90 TABLET | Refills: 2 | Status: SHIPPED | OUTPATIENT
Start: 2025-05-20

## 2025-05-27 ENCOUNTER — TELEPHONE (OUTPATIENT)
Dept: HEMATOLOGY ONCOLOGY | Facility: CLINIC | Age: 75
End: 2025-05-27

## 2025-05-27 NOTE — TELEPHONE ENCOUNTER
Called and reviewed labs with patient.  No further workup at this time.  Iron saturation was 21%, ferritin level was not done.  Monoclonal gammopathy labs are consistent with IgA kappa, continue observation.  Patient has follow-up appointment in November and will repeat labs at that time.

## 2025-05-30 DIAGNOSIS — K21.9 GASTROESOPHAGEAL REFLUX DISEASE WITHOUT ESOPHAGITIS: ICD-10-CM

## 2025-06-02 RX ORDER — SUCRALFATE 1 G/1
1 TABLET ORAL 4 TIMES DAILY
Qty: 120 TABLET | Refills: 5 | Status: SHIPPED | OUTPATIENT
Start: 2025-06-02

## 2025-06-07 DIAGNOSIS — K21.9 GASTROESOPHAGEAL REFLUX DISEASE WITHOUT ESOPHAGITIS: ICD-10-CM

## 2025-06-09 ENCOUNTER — TELEPHONE (OUTPATIENT)
Age: 75
End: 2025-06-09

## 2025-06-09 RX ORDER — OMEPRAZOLE 40 MG/1
CAPSULE, DELAYED RELEASE ORAL
Qty: 180 CAPSULE | Refills: 1 | Status: SHIPPED | OUTPATIENT
Start: 2025-06-09

## 2025-06-09 NOTE — TELEPHONE ENCOUNTER
PA for HYDRO-10/325SUBMITTED to [employer]     via  [id number]  [x][facility] [employer]  []Surescripts-Case ID #     []Availity-Auth ID #  NDC #    []Faxed to plan   []Other website    []Phone call Case ID #      [x]PA sent as URGENT    All office notes, labs and other pertaining documents and studies sent. Clinical questions answered. Awaiting determination from insurance company.     Turnaround time for your insurance to make a decision on your Prior Authorization can take 7-21 business days.

## 2025-06-09 NOTE — TELEPHONE ENCOUNTER
Insurance company:  [employer]    Member ID: [id number]  Insurance company phone:  [phone]      Hydrocodone 10- 325 mg   1 tab every 4 hours PRN needs prior auth

## 2025-06-09 NOTE — PROGRESS NOTES
Rheumatology Initial Outpatient Visit  Name: Areli Luciano      : 1950      MRN: [id number]  Encounter Provider: Ellen Castaneda MD  Encounter Date: 2025   Encounter department: [facility] [address]  :  Assessment & Plan  Generalized osteoarthritis  74-year-old female who presents for further evaluation of polyarthralgia.  Patient reports about 20 years ago being diagnosed with rheumatoid arthritis although was never on any specific treatment as the previous rheumatologist retired.  Her main concern today is whether or not she has rheumatoid arthritis or osteoarthritis.  Her current description of her joint pains is not consistent with an inflammatory arthritis and her exam/imaging findings are more suggestive of primary generalized osteoarthritis.  Discussed with patient that we can obtain x-rays of her bilateral hands which can help further characterize if findings are more consistent with osteoarthritis versus longstanding untreated rheumatoid arthritis.  We will also obtain RF and CCP.  I did advise patient that a low titer rheumatoid factor can be present in the absence of rheumatoid arthritis, such as in patients who are active smokers, so a low positive rheumatoid factor in and of itself can  not make the diagnosis of rheumatoid arthritis.  Follow-up pending results.  Orders:    Cyclic citrul peptide antibody, IgG; Future    RHEUMATOID FACTOR; Future    XR hand 3+ vw right; Future    XR hand 3+ vw left; Future        History of Present Illness   HPI    History of Present Illness  The patient is a 74-year-old female who is referred for further evaluation of joint pain.    She has been experiencing generalized malaise for an extended period. She was diagnosed with fibromyalgia and was informed of having rheumatoid arthritis approximately 20 years ago by a rheumatologist. However, a recent consultation with a pulmonologist suggested a transition from rheumatoid arthritis to a  non-specific form of arthritis without any diagnostic tests.     She experiences severe swelling in her knuckles and hands, intermittent left hip discomfort, and lower back issues. Despite being a candidate for surgery, her bone density precludes this option. She also requires knee surgery due to bone-on-bone contact. Pain is also present in her left shoulder, which had rotator cuff surgery years ago. Joint pain intensifies with increased activity, leading her to rest and elevate her legs. She reports no disfigurement but notes changes in her fingers. Knee pain worsens with prolonged walking, while hand pain improves with use but worsens with inactivity. She has not sought further treatment for her rheumatoid arthritis. Joint pain is managed with hydrocodone and Lyrica, although the latter seems ineffective even at maximum dosage.    Neuropathy is characterized by numbness, tingling, and burning sensations in her feet, attributed to diabetes following gastric bypass surgery. She now suffers from hypoglycemia. Recently, new neuropathies have developed, described as being pricked with a hot needle.    She has a history of asthma, which has been dormant for several years. However, she experienced a lung aspiration incident two days prior to Thanksgiving, leading to persistent lung congestion until her recent visit where her lungs were clear. The pulmonologist attributed her symptoms to asthma rather than a lung condition.    Excessive daytime sleepiness is reported, often falling asleep during reading sessions, and general lethargy.    PAST SURGICAL HISTORY:  Rotator cuff surgery on the left shoulder.  Gastric bypass surgery.    FAMILY HISTORY  Her grandson has type 1 diabetes and is being evaluated for a secondary autoimmune disease. Her granddaughter has severe unresponsive celiac disease and is also being evaluated for a secondary autoimmune disease.    Review of Systems  Complete ROS conducted as per HPI.    Cough+  Shortness of breath  Postnasal drip  Intermittent rash on chest, believes related to Chantix  In addition, denies:  Fever  Photosensitive rash  Recurrent oral ulcers  Chest pain  Pleurisy  Raynaud's  Joint issues other than noted above      Objective   LMP  (LMP Unknown)      Physical Exam  Physical Exam  Constitutional: well appearing, no acute distress  HEENT: normocephalic, sclera clear, no visible oral or nasal ulcers  Neck: supple, no palpable cervical adenopathy  CV: regular rate and rhythm, no murmur  Pulm: normal respiratory effort, lungs clear to auscultation b/l  Skin: no rashes, no skin thickening  Extremities: warm and well perfused, no edema  MSK: Chronic OA changes of the bilateral hands    Labs and Imaging  I have personally reviewed pertinent labs and imaging.

## 2025-06-10 NOTE — TELEPHONE ENCOUNTER
Caller: Mora Alonso    Doctor: Dr. Lalita GARDUNO    Reason for call: calling with auth. On hydrocodone It was approved and valid until 06/10/2026    Call back#: [phone]

## 2025-06-11 ENCOUNTER — CONSULT (OUTPATIENT)
Age: 75
End: 2025-06-11
Attending: INTERNAL MEDICINE
Payer: COMMERCIAL

## 2025-06-11 VITALS
HEART RATE: 57 BPM | SYSTOLIC BLOOD PRESSURE: 112 MMHG | OXYGEN SATURATION: 96 % | HEIGHT: 65 IN | BODY MASS INDEX: 48.82 KG/M2 | TEMPERATURE: 98 F | WEIGHT: 293 LBS | DIASTOLIC BLOOD PRESSURE: 70 MMHG

## 2025-06-11 DIAGNOSIS — M15.9 GENERALIZED OSTEOARTHRITIS: Primary | ICD-10-CM

## 2025-06-11 PROCEDURE — 99204 OFFICE O/P NEW MOD 45 MIN: CPT | Performed by: STUDENT IN AN ORGANIZED HEALTH CARE EDUCATION/TRAINING PROGRAM

## 2025-06-16 ENCOUNTER — HOSPITAL ENCOUNTER (OUTPATIENT)
Dept: RADIOLOGY | Facility: HOSPITAL | Age: 75
Discharge: HOME | End: 2025-06-16
Payer: COMMERCIAL

## 2025-06-16 ENCOUNTER — APPOINTMENT (OUTPATIENT)
Dept: LAB | Facility: HOSPITAL | Age: 75
End: 2025-06-16
Payer: COMMERCIAL

## 2025-06-16 DIAGNOSIS — M15.9 GENERALIZED OSTEOARTHRITIS: ICD-10-CM

## 2025-06-16 DIAGNOSIS — Z98.84 BARIATRIC SURGERY STATUS: ICD-10-CM

## 2025-06-16 DIAGNOSIS — D64.9 ANEMIA, UNSPECIFIED TYPE: ICD-10-CM

## 2025-06-16 DIAGNOSIS — E87.6 HYPOKALEMIA: Primary | ICD-10-CM

## 2025-06-16 DIAGNOSIS — R53.83 OTHER FATIGUE: ICD-10-CM

## 2025-06-16 LAB — RHEUMATOID FACT SERPL-ACNC: 11 IU/ML

## 2025-06-16 PROCEDURE — 86200 CCP ANTIBODY: CPT

## 2025-06-16 PROCEDURE — 36415 COLL VENOUS BLD VENIPUNCTURE: CPT

## 2025-06-16 PROCEDURE — 86431 RHEUMATOID FACTOR QUANT: CPT

## 2025-06-16 PROCEDURE — 73130 X-RAY EXAM OF HAND: CPT

## 2025-06-18 ENCOUNTER — OFFICE VISIT (OUTPATIENT)
Dept: OBGYN CLINIC | Facility: CLINIC | Age: 75
End: 2025-06-18
Attending: INTERNAL MEDICINE

## 2025-06-18 VITALS — DIASTOLIC BLOOD PRESSURE: 60 MMHG | SYSTOLIC BLOOD PRESSURE: 124 MMHG | BODY MASS INDEX: 31.15 KG/M2 | WEIGHT: 187.2 LBS

## 2025-06-18 DIAGNOSIS — Z13.31 POSITIVE DEPRESSION SCREENING: ICD-10-CM

## 2025-06-18 DIAGNOSIS — Z12.4 SCREENING FOR CERVICAL CANCER: ICD-10-CM

## 2025-06-18 DIAGNOSIS — Z12.39 ENCOUNTER FOR BREAST CANCER SCREENING USING NON-MAMMOGRAM MODALITY: ICD-10-CM

## 2025-06-18 DIAGNOSIS — Z01.419 ENCOUNTER FOR GYNECOLOGICAL EXAMINATION WITHOUT ABNORMAL FINDING: Primary | ICD-10-CM

## 2025-06-18 PROCEDURE — G0101 CA SCREEN;PELVIC/BREAST EXAM: HCPCS | Performed by: NURSE PRACTITIONER

## 2025-06-18 NOTE — PROGRESS NOTES
ANNUAL GYNECOLOGICAL EXAMINATION    Areli Luciano is a 74 y.o. female who presents today for annual GYN exam.  Her last pap smear was performed prior to  hysterectomy and result was WNL per patient.  She reports no history of abnormal pap smears in her past.  Her last mammogram was performed 2024 and noted R breast probably benign cyst.  She had colon cancer screening performed 2022 via colonoscopy and no repeat is recommended.  She had HIV screening performed 2017 and it was negative.  She reports menses as absent since  hysterectomy.  Her general medical history has been reviewed and she reports it as follows:    Past Medical History:   Diagnosis Date    Anxiety     Arthritis     Asthma     Herrera's cyst of knee     Chronic GERD     Chronic pain disorder     Depression     Diabetes mellitus, type 2 (HCC)     Diabetic peripheral neuropathy (HCC)     Endometriosis     Fibromyalgia     Hyperlipidemia     Hypertension     Lung nodules     Macular degeneration     Pulmonary emphysema (HCC)     Spondylosis     Spontaneous pneumothorax      Past Surgical History:   Procedure Laterality Date    BACK SURGERY      L5, S1- laser surgery fusion of c5 and c6     BREAST CYST EXCISION Right     x2 benign    CHOLECYSTECTOMY  2004    FOOT SURGERY Left 2005    fusion     FRACTURE SURGERY      GASTRIC BYPASS      Dr. Oropeza     KNEE ARTHROSCOPY      LAMINECTOMY      LAPAROSCOPIC HYSTERECTOMY      for AUB    ORTHOPEDIC SURGERY      OVARIAN CYST REMOVAL      PELVIC LAPAROSCOPY      ovaries (x10)     PLEURAL SCARIFICATION      SHOULDER OPEN ROTATOR CUFF REPAIR Left     TONSILLECTOMY      VAGINAL PROLAPSE REPAIR       OB History          3    Para   3    Term   3       0    AB   0    Living   3         SAB   0    IAB   0    Ectopic   0    Multiple   0    Live Births   3               Social History     Tobacco Use    Smoking status: Every Day     Current packs/day: 0.25      Types: Cigarettes    Smokeless tobacco: Never   Vaping Use    Vaping status: Never Used   Substance Use Topics    Alcohol use: Yes     Comment: couple times/year    Drug use: Never     Social History     Substance and Sexual Activity   Sexual Activity Not Currently    Partners: Male    Birth control/protection: Female Sterilization, Post-menopausal     Cancer-related family history includes Breast cancer (age of onset: 40) in her cousin; Breast cancer (age of onset: 42) in her cousin; Breast cancer (age of onset: 43) in her paternal aunt; Breast cancer (age of onset: 45) in her paternal aunt; Breast cancer (age of onset: 48) in her maternal aunt; Cancer (age of onset: 42) in her father; Lung cancer (age of onset: 53) in her mother; Ovarian cancer in her maternal aunt. There is no history of Colon cancer.    Current Outpatient Medications   Medication Instructions    albuterol (ProAir HFA) 90 mcg/act inhaler 2 puffs, Inhalation, Every 6 hours PRN    amLODIPine (NORVASC) 5 mg, Oral, Daily    atorvastatin (LIPITOR) 10 mg, Oral, Daily    baclofen 10 mg, Oral, 3 times daily    Calcium Carb-Cholecalciferol (Calcium 600+D3) 600-20 MG-MCG TABS 1 tablet, Oral, 2 times daily with meals    cetirizine (ZYRTEC) 10 mg, Oral, Daily    cholecalciferol (VITAMIN D) 400 units/1 mL     clotrimazole-betamethasone (LOTRISONE) 1-0.05 % cream Topical, 3 times daily, To the affected area    Cyanocobalamin (B-12) 1000 MCG TABS 1 tablet, Oral, Daily    denosumab (PROLIA) 60 mg, Subcutaneous, Once    Diclofenac Sodium (VOLTAREN) 1 % APPLY 2 GRAMS TO AFFECTED AREA 4 TIMES A DAY    diphenhydrAMINE-PSE-APAP (BENADRYL ALLERGY/COLD PO) Daily at bedtime    escitalopram (LEXAPRO) 5 mg, Oral, Daily    famotidine (PEPCID) 20 mg, Oral, 2 times daily, Please Stop Tagamet    fexofenadine (ALLEGRA) 180 mg, Oral, Daily, In the morning    fluticasone (FLONASE) 50 mcg/act nasal spray SPRAY 1 SPRAY INTO EACH NOSTRIL EVERY DAY    HYDROcodone-acetaminophen  (NORCO)  mg per tablet 1 tablet, Oral, Every 4 hours PRN    HYDROcodone-acetaminophen (NORCO)  mg per tablet 1 tablet, Oral, Every 4 hours PRN    ipratropium-albuterol (DUO-NEB) 0.5-2.5 mg/3 mL nebulizer solution 3 mL, Nebulization, Every 8 hours PRN    levothyroxine 25 mcg, Oral, Every other day    levothyroxine 50 mcg, Oral, Every other day    losartan (COZAAR) 25 mg, Oral, Daily    magnesium Oxide (MAG-OX) 400 mg, Oral, Daily    naloxone (NARCAN) 4 mg/0.1 mL nasal spray Administer 1 spray into a nostril. If no response after 2-3 minutes, give another dose in the other nostril using a new spray.    nortriptyline (PAMELOR) 25 mg, Oral, Daily at bedtime    omeprazole (PriLOSEC) 40 MG capsule TAKE 1 CAPSULE BY MOUTH 2 TIMES A DAY BEFORE MEALS.    OneTouch Verio test strip 2 times daily    Pediatric Multiple Vitamins (Childrens Chew Multivitamin) CHEW 1 tablet, Oral, Daily, Chew and swallow    potassium chloride (MICRO-K) 10 MEQ CR capsule 10 mEq, Oral, Daily    pregabalin (LYRICA) 200 mg, Oral, 3 times daily    promethazine (PHENERGAN) 12.5 mg, Oral, Every 6 hours PRN    sucralfate (CARAFATE) 1 g, Oral, 4 times daily (with meals and at bedtime)    sucralfate (CARAFATE) 1 g, Oral, 4 times daily    traZODone (DESYREL) 50 mg, Oral, Daily at bedtime PRN    Trelegy Ellipta 100-62.5-25 MCG/ACT inhaler 1 puff, Inhalation, Daily, Rinse mouth after use.    triamcinolone (KENALOG) 0.1 % cream     Vitamin A 8,000 Units, Oral, Daily       Review of Systems:  Review of Systems   Constitutional: Negative.    Genitourinary:  Negative for pelvic pain, vaginal bleeding, vaginal discharge and vaginal pain.       Physical Exam:  /60 (Patient Position: Sitting, Cuff Size: Standard)   Wt 84.9 kg (187 lb 3.2 oz)   LMP  (LMP Unknown)   BMI 31.15 kg/m²   Physical Exam  Constitutional:       General: She is not in acute distress.  Genitourinary:      No lesions in the vagina.      Vulva exam comments: Atrophic changes to  labia minora.      Vaginal cuff intact.     No vaginal discharge, erythema or bleeding.      Anterior vaginal prolapse present.     Severe vaginal atrophy present.     Cervix is absent.      Uterus is absent.   Abdominal:      Palpations: Abdomen is soft.      Tenderness: There is no abdominal tenderness.     Neurological:      Mental Status: She is alert.     Skin:     General: Skin is warm and dry.   Vitals reviewed.       Assessment/Plan:   1. Normal well-woman GYN exam.  2. Cervical cancer screening:  Cervix surgically absent.  Pap smear not indicated.  She has not received HPV vaccine series and is beyond the age of recommended administration.   3. STD screening:  Patient declines.   4. Breast cancer screening:  Normal breast exam.  Continue with mammogram/ultrasound as already scheduled for 6/24/2025.  Reviewed breast self-awareness.   5. Colon cancer screening:  Up to date.   6. Depression Screening: Patient's depression screening was assessed with a PHQ-2 score of 5. Their PHQ-9 score was 17. Patient with underlying depression and was advised to continue current medications as prescribed.    7. BMI Counseling: Body mass index is 31.15 kg/m². Discussed the patient's BMI with her. The BMI is above normal. Nutrition recommendations include reducing portion sizes and decreasing overall calorie intake.   8. Tobacco Cessation Counseling: Tobacco cessation counseling and education was provided. The patient is sincerely urged to quit consumption of tobacco. She is not ready to quit tobacco. The numerous health risks of tobacco consumption were discussed. If she decides to quit, there are a number of helpful adjunctive aids, and she can see me to discuss nicotine replacement therapy, chantix, or bupropion anytime in the future.   9. Return to office for any GYN concerns in the future.

## 2025-06-18 NOTE — PATIENT INSTRUCTIONS
Thank you for your confidence in our team.   We appreciate you and welcome your feedback.   If you receive a survey from us, please take a few moments to let us know how we are doing.   Sincerely,  CARTER Livingston       OBESITY     Obesity is defined as a body mass index (BMI) which is greater than 30. Your Body mass index is 31.15 kg/m²..    The risks of obesity include  many health problems, such as injuries or physical disability. You may need tests to check for the following:  Diabetes     High blood pressure or high cholesterol     Heart disease     Gallbladder or liver disease     Cancer of the colon, breast, prostate, liver, or kidney     Sleep apnea     Arthritis or gout    Seek care immediately if:   You have a severe headache, confusion, or difficulty speaking.     You have weakness on one side of your body.     You have chest pain, sweating, or shortness of breath.    Contact your healthcare provider if:   You have symptoms of gallbladder or liver disease, such as pain in your upper abdomen.    You have knee or hip pain and discomfort while walking.     You have symptoms of diabetes, such as intense hunger and thirst, and frequent urination.     You have symptoms of sleep apnea, such as snoring or daytime sleepiness.     You have questions or concerns about your condition or care.    Treatment for obesity  focuses on helping you lose weight to improve your health. Even a small decrease in BMI can reduce the risk for many health problems. Your healthcare provider will help you set a weight-loss goal.  Lifestyle changes  are the first step in treating obesity. These include making healthy food choices and getting regular physical activity. Your healthcare provider may suggest a weight-loss program that involves coaching, education, and therapy.     Medicine  may help you lose weight when it is used with a healthy diet and physical activity.     Surgery  can help you lose weight if you are very  obese and have other health problems. There are several types of weight-loss surgery. Ask your healthcare provider for more information.    Be successful losing weight:   Set small, realistic goals.  An example of a small goal is to walk for 20 minutes 5 days a week. Anther goal is to lose 5% of your body weight.    Tell friends, family members, and coworkers about your goals  and ask for their support. Ask a friend to lose weight with you, or join a weight-loss support group.    Identify foods or triggers that may cause you to overeat , and find ways to avoid them. Remove tempting high-calorie foods from your home and workplace. Place a bowl of fresh fruit on your kitchen counter. If stress causes you to eat, then find other ways to cope with stress.     Keep a diary to track what you eat and drink.  Also write down how many minutes of physical activity you do each day. Weigh yourself once a week and record it in your diary.    Eating changes:  You will need to eat 500 to 1,000 fewer calories each day than you currently eat to lose 1 to 2 pounds a week. The following changes will help you cut calories:  Eat smaller portions.  Use small plates, no larger than 9 inches in diameter. Fill your plate half full of fruits and vegetables. Measure your food using measuring cups until you know what a serving size looks like.     Eat 3 meals and 1 or 2 snacks each day.  Plan your meals in advance. Cook and eat at home most of the time. Eat slowly.     Eat fruits and vegetables at every meal.  They are low in calories and high in fiber, which makes you feel full. Do not add butter, margarine, or cream sauce to vegetables. Use herbs to season steamed vegetables.     Eat less fat and fewer fried foods.  Eat more baked or grilled chicken and fish. These protein sources are lower in calories and fat than red meat. Limit fast food. Dress your salads with olive oil and vinegar instead of bottled dressing.     Limit the amount of  sugar you eat.  Do not drink sugary beverages. Limit alcohol.    Activity changes:  Physical activity is good for your body in many ways. It helps you burn calories and build strong muscles. It decreases stress and depression, and improves your mood. It can also help you sleep better. Talk to your healthcare provider before you begin an exercise program.  Exercise for at least 30 minutes 5 days a week.  Start slowly. Set aside time each day for physical activity that you enjoy and that is convenient for you. It is best to do both weight training and an activity that increases your heart rate, such as walking, bicycling, or swimming.     Find ways to be more active.  Do yard work and housecleaning. Walk up the stairs instead of using elevators. Spend your leisure time going to events that require walking, such as outdoor festivals or fairs. This extra physical activity can help you lose weight and keep it off.    Follow up with your primary healthcare provider as directed.  You may need to meet with a dietitian. Write down your questions so you remember to ask them during your visits.        Cigarette Smoking and Your Health     What are the risks to my health if I smoke tobacco?  Nicotine and other chemicals found in tobacco damage every cell in your body. Even if you are a light smoker, you have an increased risk for cancer, heart disease, and lung disease. If you are pregnant or have diabetes, smoking increases your risk for complications.     What are the benefits to my health if I stop smoking?   You decrease respiratory symptoms such as coughing, wheezing, and shortness of breath.     You reduce your risk for cancers of the lung, mouth, throat, kidney, bladder, pancreas, stomach, and cervix. If you already have cancer, you increase the benefits of chemotherapy. You also reduce your risk for cancer returning or a second cancer from developing.     You reduce your risk for heart disease, blood clots, heart  attack, and stroke.     You reduce your risk for lung infections, and diseases such as pneumonia, asthma, chronic bronchitis, and emphysema.    Your circulation improves. More oxygen can be delivered to your body. If you have diabetes, you lower your risk for complications, such as kidney, artery, and eye diseases. You also lower your risk for nerve damage. Nerve damage can lead to amputations, poor vision, and blindness.    You improve your body's ability to heal and to fight infections.    What are the health benefits to others if I stop smoking?  Tobacco is harmful to nonsmokers who breathe in your secondhand smoke. The following are ways the health of others around you may improve when you stop smoking:  You lower the risks for lung cancer and heart disease in nonsmoking adults.     If you are pregnant, you lower the risk for miscarriage, early delivery, low birth weight, and stillbirth. You also lower your baby's risk for SIDS, obesity, developmental delay, and neurobehavioral problems, such as ADHD.     If you have children, you lower their risk for ear infections, colds, pneumonia, bronchitis, and asthma.    How to Stop Smoking     You will improve your health and the health of others around you  if you stop smoking. Your risk for heart and lung disease, cancer, stroke, heart attack, and vision problems will also decrease. You can benefit from quitting no matter how long you have smoked.  PREPARE to stop smoking.  Nicotine is a highly addictive drug found in cigarettes. Withdrawal symptoms can happen when you stop smoking and make it hard to quit. These include anxiety, depression, irritability, trouble sleeping, and increased appetite. You increase your chances of success if you PREPARE to quit.    Set a quit date.  Pick a date that is within the next 2 weeks. Do not pick a day that you think may be stressful or busy. Write down the day or [place] it on your calender.     Tell friends and family that you plan  to quit.  Explain that you may have withdrawal symptoms when you try to quit. Ask them to support you. They may be able to encourage you and help reduce your stress to make it easier for you to quit.    Make a list of your reasons for quitting.  Put the list somewhere you will see it every day, such as your refrigerator. You can look at the list when you have a craving.     Remove all tobacco and nicotine products from your home, car, and workplace.  Also, remove anything else that will tempt you to smoke, such as lighters, matches, or ashtrays. Clean your car, home, and places at work that smell like smoke. The smell of smoke can trigger a craving.     Identify triggers that make you want to smoke.  This may include activities, feelings, or people. Also write down 1 way you can deal with each of your triggers. For example, if you want to smoke as soon as you wake up, plan another activity during this time, such as exercise.     Make a plan for how you will quit.  Learn about the tools that can help you quit, such as medicine, counseling, or nicotine replacement therapy. Choose at least 2 options to help you quit.      Tools to help you stop smoking:     Counseling  from a trained healthcare provider can provide you with support and skills to quit smoking. The provider will also teach you to manage your withdrawal symptoms and cravings. You may receive counseling from one counselor, in group therapy, or through phone therapy called a quit line.     Nicotine replacement therapy (NRT)  such as nicotine patches, gum, or lozenges may help reduce your nicotine cravings. You may get these without a doctor's order. Do not use e-cigarettes or smokeless tobacco in place of cigarettes or to help you quit. They still contain nicotine.    Prescription medicines  such as nasal sprays or nicotine inhalers may help reduce your withdrawal symptoms. Other medicines may also be used to reduce your urge to smoke. Ask your healthcare  provider about these medicines. You may need to start certain medicines 2 weeks before your quit date for them to work well.     Hypnosis  is a practice that helps guide you through thoughts and feelings. Hypnosis may help decrease your cravings and make you more willing to quit.     Acupuncture therapy  uses very thin needles to balance energy channels in the body. This is thought to help decrease cravings and symptoms of nicotine withdrawal.     Support groups  let you talk to others who are trying to quit or have already quit. It may be helpful to speak with others about how they quit.      Manage your cravings:     Avoid situations, people, and places that tempt you to smoke.  Go to nonsmoking places, such as libraries or restaurants. Understand what tempts you and try to avoid these things.    Keep your hands busy.  Hold things such as a stress ball or pen.     Put candy or toothpicks in your mouth.  Keep lollipops, sugarless gum, or toothpicks with you at all times.     Do not have alcohol or caffeine.  These drinks may tempt you to smoke. Drink healthy liquids such as water or juice instead.    Reward yourself when you resist your cravings.  Rewards will motivate you and help you stay positive.     Do an activity that distracts you from your craving.  Examples include going for a walk, exercising, or cleaning.      Prevent weight gain after you quit:  You may gain a few pounds after you quit smoking. It is healthier for you to gain a few pounds than to continue to smoke. The following can help you prevent weight gain:    Eat healthy foods.  These include fruits, vegetables, whole-grain breads, low-fat dairy products, beans, lean meats, and fish. Eat healthy snacks, such as low-fat yogurt, if you get hungry between meals.     Drink water before, during, and between meals.  This will make your stomach feel full and help prevent you from overeating. Ask your healthcare provider how much liquid to drink each day  and which liquids are best for you.    Exercise.  Take a walk or do some kind of exercise every day. Ask your healthcare provider what exercise is right for you. This may help reduce your cravings and reduce stress.

## 2025-06-20 LAB — CCP AB SER IA-ACNC: 1 (ref ?–10)

## 2025-06-23 ENCOUNTER — RESULTS FOLLOW-UP (OUTPATIENT)
Age: 75
End: 2025-06-23

## 2025-06-24 ENCOUNTER — HOSPITAL ENCOUNTER (OUTPATIENT)
Dept: MAMMOGRAPHY | Facility: CLINIC | Age: 75
Discharge: HOME | End: 2025-06-24
Payer: COMMERCIAL

## 2025-06-24 VITALS — BODY MASS INDEX: 31.19 KG/M2 | WEIGHT: 187.2 LBS | HEIGHT: 65 IN

## 2025-06-24 DIAGNOSIS — R92.8 ABNORMAL MAMMOGRAM: ICD-10-CM

## 2025-06-24 PROCEDURE — G0279 TOMOSYNTHESIS, MAMMO: HCPCS

## 2025-06-24 PROCEDURE — 77066 DX MAMMO INCL CAD BI: CPT

## 2025-06-24 PROCEDURE — 76642 ULTRASOUND BREAST LIMITED: CPT

## 2025-06-25 ENCOUNTER — RESULTS FOLLOW-UP (OUTPATIENT)
Dept: FAMILY MEDICINE CLINIC | Facility: CLINIC | Age: 75
End: 2025-06-25

## 2025-06-25 ENCOUNTER — OFFICE VISIT (OUTPATIENT)
Dept: PAIN MEDICINE | Facility: MEDICAL CENTER | Age: 75
End: 2025-06-25
Payer: COMMERCIAL

## 2025-06-25 VITALS — HEIGHT: 65 IN | BODY MASS INDEX: 30.32 KG/M2 | WEIGHT: 182 LBS

## 2025-06-25 DIAGNOSIS — G89.4 CHRONIC PAIN SYNDROME: ICD-10-CM

## 2025-06-25 DIAGNOSIS — M54.42 CHRONIC BILATERAL LOW BACK PAIN WITH BILATERAL SCIATICA: ICD-10-CM

## 2025-06-25 DIAGNOSIS — M54.41 CHRONIC BILATERAL LOW BACK PAIN WITH BILATERAL SCIATICA: ICD-10-CM

## 2025-06-25 DIAGNOSIS — G89.29 CHRONIC BILATERAL LOW BACK PAIN WITH BILATERAL SCIATICA: ICD-10-CM

## 2025-06-25 DIAGNOSIS — M54.16 LUMBAR RADICULITIS: ICD-10-CM

## 2025-06-25 DIAGNOSIS — F11.20 UNCOMPLICATED OPIOID DEPENDENCE (HCC): Primary | ICD-10-CM

## 2025-06-25 DIAGNOSIS — Z79.891 LONG-TERM CURRENT USE OF OPIATE ANALGESIC: ICD-10-CM

## 2025-06-25 DIAGNOSIS — M79.18 MYOFASCIAL PAIN SYNDROME: ICD-10-CM

## 2025-06-25 DIAGNOSIS — M25.562 LEFT KNEE PAIN, UNSPECIFIED CHRONICITY: ICD-10-CM

## 2025-06-25 PROCEDURE — G2211 COMPLEX E/M VISIT ADD ON: HCPCS

## 2025-06-25 PROCEDURE — 99214 OFFICE O/P EST MOD 30 MIN: CPT

## 2025-06-25 RX ORDER — PREGABALIN 200 MG/1
200 CAPSULE ORAL 3 TIMES DAILY
Qty: 90 CAPSULE | Refills: 2 | Status: SHIPPED | OUTPATIENT
Start: 2025-06-25

## 2025-06-25 RX ORDER — HYDROCODONE BITARTRATE AND ACETAMINOPHEN 10; 325 MG/1; MG/1
1 TABLET ORAL EVERY 4 HOURS PRN
Qty: 130 TABLET | Refills: 0 | Status: SHIPPED | OUTPATIENT
Start: 2025-07-07 | End: 2025-06-25

## 2025-06-25 RX ORDER — HYDROCODONE BITARTRATE AND ACETAMINOPHEN 10; 325 MG/1; MG/1
1 TABLET ORAL EVERY 4 HOURS PRN
Qty: 130 TABLET | Refills: 0 | Status: SHIPPED | OUTPATIENT
Start: 2025-08-04

## 2025-06-25 RX ORDER — HYDROCODONE BITARTRATE AND ACETAMINOPHEN 10; 325 MG/1; MG/1
1 TABLET ORAL EVERY 4 HOURS PRN
Qty: 130 TABLET | Refills: 0 | Status: SHIPPED | OUTPATIENT
Start: 2025-08-04 | End: 2025-06-25

## 2025-06-25 RX ORDER — HYDROCODONE BITARTRATE AND ACETAMINOPHEN 10; 325 MG/1; MG/1
1 TABLET ORAL EVERY 4 HOURS PRN
Qty: 130 TABLET | Refills: 0 | Status: SHIPPED | OUTPATIENT
Start: 2025-07-07

## 2025-06-25 NOTE — PROGRESS NOTES
Name: Areli Luciano      : 1950      MRN: [id number]  Encounter Provider: Mary Rios PA-C  Encounter Date: 2025   Encounter department: [facility]  :  Assessment & Plan  Uncomplicated opioid dependence (HCC)    Orders:    MM ALL_Prescribed Meds and Special Instructions    MM DT_Codeine Definitive Test    MM DT_Desipramine Definitive Test    MM DT_Dextromethorphan Definitive Test    MM Diazepam Definitive Test    MM DT_Ethyl Glucuronide/Ethyl Sulfate Definitive Test    MM DT_Fentanyl Definitive Test    MM DT_Heroin Definitive Test    MM DT_Hydrocodone Definitive Test    MM DT_Haloperidol Definitive Test    MM DT_Hydromorphone Definitive Test    MM DT_Alprazolam Definitive Test    MM DT_Kratom Definitive Test    MM DT_Levorphanol Definitive Test    MM Lorazepam Definitive Test    MM DT_MDMA Definitive Test    MM DT_Meperidine Definitive Test    MM DT_Methadone Definitive Test    MM DT_Methamphetaine-d/l Isomers Definitive    MM DT_Methamphetamine Definitive Test    MM DT_Methylphenidate Definitive Test    MM DT_Morphine Definitive Test    MM DT_Amphetamine Definitive Test    MM DT_Olanzapine Definitive Test    MM DT_Oxazepam Definitive Test    MM DT_Oxycodone Definitive Test    MM DT_Oxymorphone Definitive Test    MM DT_Phenobarbital Definitive Test    MM DT_Phentermine Definitive Test    MM DT_Quetiapine Definitive Test    MM DT_Risperidone Definitive Test    MM DT_Secobarbital Definitive Test    MM DT_Tapentadol Definitive Test    MM DT_Aripiprazole Definitive Test    MM DT_Temazapam Definitive Test    MM DT_THC Definitive Test    MM DT_Tramadol Definitive Test    MM DT_Validity Creatinine    MM DT_Validity Oxidant    MM DT_Validity pH    MM DT_Validity Specific    MM DT_Buprenorphine Definitive Test    MM DT_Butalbital Definitive Test    MM DT_Clonazepam Definitive Test    MM DT_Clozapine Definitive Test    MM DT_Cocaine Definitive Test    HYDROcodone-acetaminophen (NORCO)   mg per tablet; Take 1 tablet by mouth every 4 (four) hours as needed for severe pain Max Daily Amount: 6 tablets Do not start before July 7, 2025.    HYDROcodone-acetaminophen (NORCO)  mg per tablet; Take 1 tablet by mouth every 4 (four) hours as needed for severe pain Max Daily Amount: 6 tablets Do not start before August 4, 2025.    Chronic pain syndrome    Orders:    MM ALL_Prescribed Meds and Special Instructions    MM DT_Codeine Definitive Test    MM DT_Desipramine Definitive Test    MM DT_Dextromethorphan Definitive Test    MM Diazepam Definitive Test    MM DT_Ethyl Glucuronide/Ethyl Sulfate Definitive Test    MM DT_Fentanyl Definitive Test    MM DT_Heroin Definitive Test    MM DT_Hydrocodone Definitive Test    MM DT_Haloperidol Definitive Test    MM DT_Hydromorphone Definitive Test    MM DT_Alprazolam Definitive Test    MM DT_Kratom Definitive Test    MM DT_Levorphanol Definitive Test    MM Lorazepam Definitive Test    MM DT_MDMA Definitive Test    MM DT_Meperidine Definitive Test    MM DT_Methadone Definitive Test    MM DT_Methamphetaine-d/l Isomers Definitive    MM DT_Methamphetamine Definitive Test    MM DT_Methylphenidate Definitive Test    MM DT_Morphine Definitive Test    MM DT_Amphetamine Definitive Test    MM DT_Olanzapine Definitive Test    MM DT_Oxazepam Definitive Test    MM DT_Oxycodone Definitive Test    MM DT_Oxymorphone Definitive Test    MM DT_Phenobarbital Definitive Test    MM DT_Phentermine Definitive Test    MM DT_Quetiapine Definitive Test    MM DT_Risperidone Definitive Test    MM DT_Secobarbital Definitive Test    MM DT_Tapentadol Definitive Test    MM DT_Aripiprazole Definitive Test    MM DT_Temazapam Definitive Test    MM DT_THC Definitive Test    MM DT_Tramadol Definitive Test    MM DT_Validity Creatinine    MM DT_Validity Oxidant    MM DT_Validity pH    MM DT_Validity Specific    MM DT_Buprenorphine Definitive Test    MM DT_Butalbital Definitive Test    MM DT_Clonazepam  Definitive Test    MM DT_Clozapine Definitive Test    MM DT_Cocaine Definitive Test    Ambulatory referral to Physical Therapy; Future    HYDROcodone-acetaminophen (NORCO)  mg per tablet; Take 1 tablet by mouth every 4 (four) hours as needed for severe pain Max Daily Amount: 6 tablets Do not start before July 7, 2025.    HYDROcodone-acetaminophen (NORCO)  mg per tablet; Take 1 tablet by mouth every 4 (four) hours as needed for severe pain Max Daily Amount: 6 tablets Do not start before August 4, 2025.    Long-term current use of opiate analgesic    Orders:    MM ALL_Prescribed Meds and Special Instructions    MM DT_Codeine Definitive Test    MM DT_Desipramine Definitive Test    MM DT_Dextromethorphan Definitive Test    MM Diazepam Definitive Test    MM DT_Ethyl Glucuronide/Ethyl Sulfate Definitive Test    MM DT_Fentanyl Definitive Test    MM DT_Heroin Definitive Test    MM DT_Hydrocodone Definitive Test    MM DT_Haloperidol Definitive Test    MM DT_Hydromorphone Definitive Test    MM DT_Alprazolam Definitive Test    MM DT_Kratom Definitive Test    MM DT_Levorphanol Definitive Test    MM Lorazepam Definitive Test    MM DT_MDMA Definitive Test    MM DT_Meperidine Definitive Test    MM DT_Methadone Definitive Test    MM DT_Methamphetaine-d/l Isomers Definitive    MM DT_Methamphetamine Definitive Test    MM DT_Methylphenidate Definitive Test    MM DT_Morphine Definitive Test    MM DT_Amphetamine Definitive Test    MM DT_Olanzapine Definitive Test    MM DT_Oxazepam Definitive Test    MM DT_Oxycodone Definitive Test    MM DT_Oxymorphone Definitive Test    MM DT_Phenobarbital Definitive Test    MM DT_Phentermine Definitive Test    MM DT_Quetiapine Definitive Test    MM DT_Risperidone Definitive Test    MM DT_Secobarbital Definitive Test    MM DT_Tapentadol Definitive Test    MM DT_Aripiprazole Definitive Test    MM DT_Temazapam Definitive Test    MM DT_THC Definitive Test    MM DT_Tramadol Definitive Test    MM  DT_Validity Creatinine    MM DT_Validity Oxidant    MM DT_Validity pH    MM DT_Validity Specific    MM DT_Buprenorphine Definitive Test    MM DT_Butalbital Definitive Test    MM DT_Clonazepam Definitive Test    MM DT_Clozapine Definitive Test    MM DT_Cocaine Definitive Test    HYDROcodone-acetaminophen (NORCO)  mg per tablet; Take 1 tablet by mouth every 4 (four) hours as needed for severe pain Max Daily Amount: 6 tablets Do not start before July 7, 2025.    HYDROcodone-acetaminophen (NORCO)  mg per tablet; Take 1 tablet by mouth every 4 (four) hours as needed for severe pain Max Daily Amount: 6 tablets Do not start before August 4, 2025.    Chronic bilateral low back pain with bilateral sciatica    Orders:    pregabalin (LYRICA) 200 MG capsule; Take 1 capsule (200 mg total) by mouth 3 (three) times a day    Ambulatory referral to Physical Therapy; Future    HYDROcodone-acetaminophen (NORCO)  mg per tablet; Take 1 tablet by mouth every 4 (four) hours as needed for severe pain Max Daily Amount: 6 tablets Do not start before July 7, 2025.    HYDROcodone-acetaminophen (NORCO)  mg per tablet; Take 1 tablet by mouth every 4 (four) hours as needed for severe pain Max Daily Amount: 6 tablets Do not start before August 4, 2025.    Myofascial pain syndrome    Orders:    Ambulatory referral to Physical Therapy; Future    Left knee pain, unspecified chronicity    Orders:    Ambulatory referral to Physical Therapy; Future    Lumbar radiculitis    Orders:    Ambulatory referral to Physical Therapy; Future      Patient reports that her pain remains able and well-controlled on hydrocodone-acetaminophen 10/325 mg 1 tablet every 4-6 hours as needed.  Patient states she tolerates this medication well without side effects.  The patient's opioid scripts are sent to their pharmacy electronically and patient was given a 2-month supply of prescriptions with do not fill dates of 7/7/2025 and 8/4/2025.    Continue  the use of pregabalin and baclofen as prescribed.  Patient states she tolerates these medications well without side effects.  Refills of pregabalin were sent to patient's pharmacy today.  No refills of baclofen needed at this time.    I strongly encourage patient to begin attending formal physical therapy sessions for lumbar strengthening and stretching.  Patient requested aqua therapy.  Referral to aqua therapy ordered.    I discussed with patient that I recommend obtaining a left knee x-ray to further assess her left knee pain post fall.  Patient declined at this time.    Follow-up in 8 weeks for medication refill and reevaluation, or sooner if needed.    There are risks associated with opioid medications, including dependence, addiction and tolerance. The patient understands and agrees to use these medications only as prescribed. Potential side effects of the medications include, but are not limited to, constipation, drowsiness, addiction, impaired judgment and risk of fatal overdose if not taken as prescribed. The patient was warned against driving while taking sedation medications.  Sharing medications is a felony. At this point in time, the patient is showing no signs of addiction, abuse, diversion or suicidal ideation.  A urine drug screen was collected at today's office visit as part of our medication management protocol. The point of care testing results were appropriate for what was being prescribed. The specimen will be sent for confirmatory testing. The drug screen is medically necessary because the patient is either dependent on opioid medication or is being considered for opioid medication therapy and the results could impact ongoing or future treatment. The drug screen is to evaluate for the presences or absence of prescribed, non-prescribed, and/or illicit drugs/substances.  [place] Prescription Drug Monitoring Program report was reviewed and was appropriate      My impressions and treatment  recommendations were discussed in detail with the patient who verbalized understanding and had no further questions.  Discharge instructions were provided. I personally saw and examined the patient and I agree with the above discussed plan of care.    History of Present Illness     Areli Luciano is a 74 y.o. female who presents for a follow up office visit for medication refill and reevaluation.  Patient notes her chronic pain remains well-controlled on her current pain medication regimen which consists of hydrocodone-acetaminophen 10/325 mg 1 tablet every 4-6 hours as needed for severe pain, pregabalin 200 mg 3 times daily, and baclofen 10 mg 3 times daily as needed.  Patient states she tolerates these medications well without side effects.  Patient notes that this medication regimen reduces her overall pain by about 30%.    Patient does note recent increase in pain post fall.  Patient notes about 1 month ago she fell at home directly onto her left knee.  Denies head strike and LOC.  Patient states she has yet to be evaluated for left knee pain.  Patient notes her left knee pain is exacerbated with walking and knee flexion.  She describes her current pain as a constant burning, dull, aching, sharp, throbbing, pressure-like, and shooting pain.  She rates her pain today 8/10 on the numeric rating scale.  Patient states she does not currently attend physical therapy sessions.    Opioid contract date 03/11/25  Last UDS Date: 06/25/25  Percocet last taken on 6/25 AM    Review of Systems   Respiratory:  Negative for shortness of breath.    Cardiovascular:  Negative for chest pain.   Gastrointestinal:  Positive for nausea. Negative for constipation, diarrhea and vomiting.   Musculoskeletal:  Positive for arthralgias, back pain, gait problem, joint swelling and myalgias.   Skin:  Negative for rash.   Neurological:  Positive for dizziness. Negative for seizures and weakness.   Psychiatric/Behavioral:  Positive for  "decreased concentration.    All other systems reviewed and are negative.      Medical History Reviewed by provider this encounter:     .  Medications Ordered Prior to Encounter[1]      Objective   Ht 5' 5\" (1.651 m)   Wt 82.6 kg (182 lb)   LMP  (LMP Unknown)   BMI 30.29 kg/m²      Pain Score:   8  Physical Exam  Constitutional: normal, well developed, well nourished, alert, in no distress and non-toxic and no overt pain behavior.  Eyes: anicteric  HEENT: grossly intact  Neck: supple, symmetric, trachea midline and no masses   Pulmonary: even and unlabored  Cardiovascular: No edema or pitting edema present  Skin: Normal without rashes or lesions and well hydrated  Psychiatric: Mood and affect appropriate  Neurologic: Cranial Nerves II-XII grossly intact  Musculoskeletal: normal, ambulates with a cane, tenderness to palpation along anterior left knee    Radiology Results Review : No pertinent imaging studies reviewed.         [1]   Current Outpatient Medications on File Prior to Visit   Medication Sig Dispense Refill    albuterol (ProAir HFA) 90 mcg/act inhaler Inhale 2 puffs every 6 (six) hours as needed for wheezing 18 g 3    amLODIPine (NORVASC) 5 mg tablet TAKE 1 TABLET (5 MG TOTAL) BY MOUTH DAILY. 90 tablet 1    atorvastatin (LIPITOR) 10 mg tablet Take 1 tablet (10 mg total) by mouth daily 90 tablet 3    baclofen 10 mg tablet Take 1 tablet (10 mg total) by mouth 3 (three) times a day 270 tablet 0    Calcium Carb-Cholecalciferol (Calcium 600+D3) 600-20 MG-MCG TABS TAKE 1 TABLET BY MOUTH TWICE A DAY WITH MEALS 180 tablet 1    cetirizine (ZyrTEC) 10 mg tablet TAKE 1 TABLET BY MOUTH EVERY DAY 90 tablet 2    cholecalciferol (VITAMIN D) 400 units/1 mL       clotrimazole-betamethasone (LOTRISONE) 1-0.05 % cream APPLY TO AFFECTED AREA 3 TIMES A DAY 60 g 1    Cyanocobalamin (B-12) 1000 MCG TABS TAKE 1 TABLET BY MOUTH EVERY DAY 90 tablet 3    Diclofenac Sodium (VOLTAREN) 1 % APPLY 2 GRAMS TO AFFECTED AREA 4 TIMES A DAY " 200 g 0    diphenhydrAMINE-PSE-APAP (BENADRYL ALLERGY/COLD PO) Take by mouth daily at bedtime      escitalopram (LEXAPRO) 5 mg tablet TAKE 1 TABLET (5 MG TOTAL) BY MOUTH DAILY. 90 tablet 1    famotidine (PEPCID) 20 mg tablet TAKE 1 TABLET (20 MG TOTAL) BY MOUTH 2 (TWO) TIMES A DAY PLEASE STOP TAGAMET 180 tablet 1    fexofenadine (ALLEGRA) 180 MG tablet Take 1 tablet (180 mg total) by mouth daily In the morning 90 tablet 3    fluticasone (FLONASE) 50 mcg/act nasal spray SPRAY 1 SPRAY INTO EACH NOSTRIL EVERY DAY 48 mL 2    ipratropium-albuterol (DUO-NEB) 0.5-2.5 mg/3 mL nebulizer solution Take 3 mL by nebulization every 8 (eight) hours as needed for wheezing or shortness of breath 270 mL 3    levothyroxine 25 mcg tablet Take 1 tablet (25 mcg total) by mouth every other day 45 tablet 3    levothyroxine 50 mcg tablet TAKE 1 TABLET (50 MCG TOTAL) BY MOUTH EVERY OTHER DAY 50 tablet 1    losartan (COZAAR) 25 mg tablet TAKE 1 TABLET (25 MG TOTAL) BY MOUTH DAILY. 90 tablet 1    magnesium Oxide (MAG-OX) 400 mg TABS Take 1 tablet (400 mg total) by mouth daily 90 tablet 0    nortriptyline (PAMELOR) 25 mg capsule TAKE 1 CAPSULE BY MOUTH DAILY AT BEDTIME 90 capsule 1    omeprazole (PriLOSEC) 40 MG capsule TAKE 1 CAPSULE BY MOUTH 2 TIMES A DAY BEFORE MEALS. 180 capsule 1    Pediatric Multiple Vitamins (Childrens Chew Multivitamin) CHEW CHEW AND SWALLOW 1 TABLET BY MOUTH EVERY  tablet 1    potassium chloride (MICRO-K) 10 MEQ CR capsule TAKE 1 CAPSULE BY MOUTH EVERY DAY 90 capsule 1    promethazine (PHENERGAN) 12.5 mg/10 mL oral solution Take 10 mL (12.5 mg total) by mouth every 6 (six) hours as needed (cough) 118 mL 0    sucralfate (CARAFATE) 1 g tablet TAKE 1 TABLET BY MOUTH FOUR TIMES A  tablet 5    sucralfate (CARAFATE) 1 g/10 mL suspension Take 10 mL (1 g total) by mouth 4 (four) times a day (with meals and at bedtime) 3600 mL 3    traZODone (DESYREL) 50 mg tablet TAKE 1 TABLET BY MOUTH EVERY DAY AT BEDTIME AS  NEEDED 90 tablet 1    Trelegy Ellipta 100-62.5-25 MCG/ACT inhaler INHALE 1 PUFF DAILY RINSE MOUTH AFTER USE. 60 each 5    triamcinolone (KENALOG) 0.1 % cream       Vitamin A 2400 MCG (8000 UT) TABS Take 8,000 Units by mouth daily 90 tablet 1    [DISCONTINUED] HYDROcodone-acetaminophen (NORCO)  mg per tablet Take 1 tablet by mouth every 4 (four) hours as needed for severe pain Max Daily Amount: 6 tablets Do not start before June 9, 2025. 130 tablet 0    [DISCONTINUED] pregabalin (LYRICA) 200 MG capsule Take 1 capsule (200 mg total) by mouth 3 (three) times a day 90 capsule 2    denosumab (PROLIA) 60 mg/mL Inject 1 mL (60 mg total) under the skin once for 1 dose 1 mL 0    naloxone (NARCAN) 4 mg/0.1 mL nasal spray Administer 1 spray into a nostril. If no response after 2-3 minutes, give another dose in the other nostril using a new spray. 1 each 1    OneTouch Verio test strip in the morning and in the evening.      [DISCONTINUED] HYDROcodone-acetaminophen (NORCO)  mg per tablet Take 1 tablet by mouth every 4 (four) hours as needed for severe pain Max Daily Amount: 6 tablets Do not start before May 12, 2025. 130 tablet 0     Current Facility-Administered Medications on File Prior to Visit   Medication Dose Route Frequency Provider Last Rate Last Admin    cyanocobalamin injection 1,000 mcg  1,000 mcg Intramuscular Q30 Days Aleksandar Garduno MD   1,000 mcg at 12/08/20 1118    cyanocobalamin injection 1,000 mcg  1,000 mcg Intramuscular Q30 Days Aleksandar Garduno MD   1,000 mcg at 07/08/20 1036    cyanocobalamin injection 1,000 mcg  1,000 mcg Intramuscular Q30 Days Aleksandar Garduno MD   1,000 mcg at 09/08/20 1210    cyanocobalamin injection 1,000 mcg  1,000 mcg Intramuscular Q30 Days Aleksandar Garduno MD        cyanocobalamin injection 1,000 mcg  1,000 mcg Intramuscular Q30 Days Aleksandar Garduno MD        cyanocobalamin injection 1,000 mcg  1,000 mcg Intramuscular Q30 Days Aleksandar Garduno MD   1,000 mcg at 08/18/21 1019    cyanocobalamin  injection 1,000 mcg  1,000 mcg Intramuscular Q30 Days Aleksandar Garduno MD   1,000 mcg at 03/21/23 1117    cyanocobalamin injection 1,000 mcg  1,000 mcg Intramuscular Q30 Days Aleksandar Garduno MD   1,000 mcg at 07/13/23 1004

## 2025-06-25 NOTE — ASSESSMENT & PLAN NOTE
Orders:    MM ALL_Prescribed Meds and Special Instructions    MM DT_Codeine Definitive Test    MM DT_Desipramine Definitive Test    MM DT_Dextromethorphan Definitive Test    MM Diazepam Definitive Test    MM DT_Ethyl Glucuronide/Ethyl Sulfate Definitive Test    MM DT_Fentanyl Definitive Test    MM DT_Heroin Definitive Test    MM DT_Hydrocodone Definitive Test    MM DT_Haloperidol Definitive Test    MM DT_Hydromorphone Definitive Test    MM DT_Alprazolam Definitive Test    MM DT_Kratom Definitive Test    MM DT_Levorphanol Definitive Test    MM Lorazepam Definitive Test    MM DT_MDMA Definitive Test    MM DT_Meperidine Definitive Test    MM DT_Methadone Definitive Test    MM DT_Methamphetaine-d/l Isomers Definitive    MM DT_Methamphetamine Definitive Test    MM DT_Methylphenidate Definitive Test    MM DT_Morphine Definitive Test    MM DT_Amphetamine Definitive Test    MM DT_Olanzapine Definitive Test    MM DT_Oxazepam Definitive Test    MM DT_Oxycodone Definitive Test    MM DT_Oxymorphone Definitive Test    MM DT_Phenobarbital Definitive Test    MM DT_Phentermine Definitive Test    MM DT_Quetiapine Definitive Test    MM DT_Risperidone Definitive Test    MM DT_Secobarbital Definitive Test    MM DT_Tapentadol Definitive Test    MM DT_Aripiprazole Definitive Test    MM DT_Temazapam Definitive Test    MM DT_THC Definitive Test    MM DT_Tramadol Definitive Test    MM DT_Validity Creatinine    MM DT_Validity Oxidant    MM DT_Validity pH    MM DT_Validity Specific    MM DT_Buprenorphine Definitive Test    MM DT_Butalbital Definitive Test    MM DT_Clonazepam Definitive Test    MM DT_Clozapine Definitive Test    MM DT_Cocaine Definitive Test    HYDROcodone-acetaminophen (NORCO)  mg per tablet; Take 1 tablet by mouth every 4 (four) hours as needed for severe pain Max Daily Amount: 6 tablets Do not start before July 7, 2025.    HYDROcodone-acetaminophen (NORCO)  mg per tablet; Take 1 tablet by mouth every 4 (four)  hours as needed for severe pain Max Daily Amount: 6 tablets Do not start before August 4, 2025.

## 2025-06-25 NOTE — TELEPHONE ENCOUNTER
----- Message from Aleksandar Garduno MD sent at 6/25/2025  7:48 AM EDT -----  [facility] Breast surgery....  ----- Message -----  From: Melyssa Stallings DO  Sent: 6/24/2025  12:23 PM EDT  To: Aleksandar Garduno MD

## 2025-06-25 NOTE — ASSESSMENT & PLAN NOTE
Orders:    pregabalin (LYRICA) 200 MG capsule; Take 1 capsule (200 mg total) by mouth 3 (three) times a day    Ambulatory referral to Physical Therapy; Future    HYDROcodone-acetaminophen (NORCO)  mg per tablet; Take 1 tablet by mouth every 4 (four) hours as needed for severe pain Max Daily Amount: 6 tablets Do not start before July 7, 2025.    HYDROcodone-acetaminophen (NORCO)  mg per tablet; Take 1 tablet by mouth every 4 (four) hours as needed for severe pain Max Daily Amount: 6 tablets Do not start before August 4, 2025.

## 2025-06-25 NOTE — ASSESSMENT & PLAN NOTE
Orders:    MM ALL_Prescribed Meds and Special Instructions    MM DT_Codeine Definitive Test    MM DT_Desipramine Definitive Test    MM DT_Dextromethorphan Definitive Test    MM Diazepam Definitive Test    MM DT_Ethyl Glucuronide/Ethyl Sulfate Definitive Test    MM DT_Fentanyl Definitive Test    MM DT_Heroin Definitive Test    MM DT_Hydrocodone Definitive Test    MM DT_Haloperidol Definitive Test    MM DT_Hydromorphone Definitive Test    MM DT_Alprazolam Definitive Test    MM DT_Kratom Definitive Test    MM DT_Levorphanol Definitive Test    MM Lorazepam Definitive Test    MM DT_MDMA Definitive Test    MM DT_Meperidine Definitive Test    MM DT_Methadone Definitive Test    MM DT_Methamphetaine-d/l Isomers Definitive    MM DT_Methamphetamine Definitive Test    MM DT_Methylphenidate Definitive Test    MM DT_Morphine Definitive Test    MM DT_Amphetamine Definitive Test    MM DT_Olanzapine Definitive Test    MM DT_Oxazepam Definitive Test    MM DT_Oxycodone Definitive Test    MM DT_Oxymorphone Definitive Test    MM DT_Phenobarbital Definitive Test    MM DT_Phentermine Definitive Test    MM DT_Quetiapine Definitive Test    MM DT_Risperidone Definitive Test    MM DT_Secobarbital Definitive Test    MM DT_Tapentadol Definitive Test    MM DT_Aripiprazole Definitive Test    MM DT_Temazapam Definitive Test    MM DT_THC Definitive Test    MM DT_Tramadol Definitive Test    MM DT_Validity Creatinine    MM DT_Validity Oxidant    MM DT_Validity pH    MM DT_Validity Specific    MM DT_Buprenorphine Definitive Test    MM DT_Butalbital Definitive Test    MM DT_Clonazepam Definitive Test    MM DT_Clozapine Definitive Test    MM DT_Cocaine Definitive Test    Ambulatory referral to Physical Therapy; Future    HYDROcodone-acetaminophen (NORCO)  mg per tablet; Take 1 tablet by mouth every 4 (four) hours as needed for severe pain Max Daily Amount: 6 tablets Do not start before July 7, 2025.    HYDROcodone-acetaminophen (NORCO)  mg  per tablet; Take 1 tablet by mouth every 4 (four) hours as needed for severe pain Max Daily Amount: 6 tablets Do not start before August 4, 2025.

## 2025-06-30 LAB
6MAM UR QL CFM: NEGATIVE NG/ML
7AMINOCLONAZEPAM UR QL CFM: NEGATIVE NG/ML
A-OH ALPRAZ UR QL CFM: NEGATIVE NG/ML
ACCEPTABLE CREAT UR QL: NORMAL MG/DL
ACCEPTIBLE SP GR UR QL: NORMAL
AMPHET UR QL CFM: NEGATIVE NG/ML
AMPHET UR QL CFM: NEGATIVE NG/ML
BUPRENORPHINE UR QL CFM: NEGATIVE NG/ML
BUTALBITAL UR QL CFM: NEGATIVE NG/ML
BZE UR QL CFM: NEGATIVE NG/ML
CODEINE UR QL CFM: NEGATIVE NG/ML
DESIPRAMINE UR QL CFM: NEGATIVE NG/ML
EDDP UR QL CFM: NEGATIVE NG/ML
ETHYL GLUCURONIDE UR QL CFM: ABNORMAL NG/ML
ETHYL SULFATE UR QL SCN: NEGATIVE NG/ML
FENTANYL UR QL CFM: NEGATIVE NG/ML
GLIADIN IGG SER IA-ACNC: NEGATIVE NG/ML
GLUCOSE 30M P 50 G LAC PO SERPL-MCNC: NEGATIVE NG/ML
HYDROCODONE UR QL CFM: NORMAL NG/ML
HYDROCODONE UR QL CFM: NORMAL NG/ML
HYDROMORPHONE UR QL CFM: NORMAL NG/ML
LORAZEPAM UR QL CFM: NEGATIVE NG/ML
MDMA UR QL CFM: NEGATIVE NG/ML
ME-PHENIDATE UR QL CFM: NEGATIVE NG/ML
MEPERIDINE UR QL CFM: NEGATIVE NG/ML
METHADONE UR QL CFM: NEGATIVE NG/ML
METHAMPHET UR QL CFM: NEGATIVE NG/ML
MORPHINE UR QL CFM: NEGATIVE NG/ML
MORPHINE UR QL CFM: NEGATIVE NG/ML
NITRITE UR QL: NORMAL UG/ML
NORBUPRENORPHINE UR QL CFM: NEGATIVE NG/ML
NORDIAZEPAM UR QL CFM: NEGATIVE NG/ML
NORFENTANYL UR QL CFM: NEGATIVE NG/ML
NORHYDROCODONE UR QL CFM: NORMAL NG/ML
NORHYDROCODONE UR QL CFM: NORMAL NG/ML
NORMEPERIDINE UR QL CFM: NEGATIVE NG/ML
NOROXYCODONE UR QL CFM: NEGATIVE NG/ML
OLANZAPINE QUANTIFICATION: NEGATIVE NG/ML
OPC-3373 QUANTIFICATION: NEGATIVE NG/ML
OXAZEPAM UR QL CFM: NEGATIVE NG/ML
OXYCODONE UR QL CFM: NEGATIVE NG/ML
OXYMORPHONE UR QL CFM: NEGATIVE NG/ML
OXYMORPHONE UR QL CFM: NEGATIVE NG/ML
PARA-FLUOROFENTANYL QUANTIFICATION: NORMAL NG/ML
PHENOBARB UR QL CFM: NEGATIVE NG/ML
RESULT ALL_PRESCRIBED MEDS AND SPECIAL INSTRUCTIONS: NORMAL
SECOBARBITAL UR QL CFM: NEGATIVE NG/ML
SL AMB 7-OH-MITRAGYNINE (KRATOM ALKALOID) QUANTIFICATION: NEGATIVE NG/ML
SL AMB ACETYL FENTANYL QUANTIFICATION: NORMAL NG/ML
SL AMB ACETYL NORFENTANYL QUANTIFICATION: NORMAL NG/ML
SL AMB ACRYL FENTANYL QUANTIFICATION: NORMAL NG/ML
SL AMB CARFENTANIL QUANTIFICATION: NORMAL NG/ML
SL AMB CLOZAPINE QUANTIFICATION: NEGATIVE NG/ML
SL AMB CTHC (MARIJUANA METABOLITE) QUANTIFICATION: NEGATIVE NG/ML
SL AMB DEXTROMETHORPHAN QUANTIFICATION: NEGATIVE NG/ML
SL AMB DEXTRORPHAN (DEXTROMETHORPHAN METABOLITE) QUANT: NEGATIVE NG/ML
SL AMB DEXTRORPHAN (DEXTROMETHORPHAN METABOLITE) QUANT: NEGATIVE NG/ML
SL AMB HALOPERIDOL  QUANTIFICATION: NEGATIVE NG/ML
SL AMB HALOPERIDOL METABOLITE QUANTIFICATION: NEGATIVE NG/ML
SL AMB HYDROXYRISPERIDONE QUANTIFICATION: NEGATIVE NG/ML
SL AMB N-DESMETHYL-TRAMADOL QUANTIFICATION: NEGATIVE NG/ML
SL AMB N-DESMETHYLCLOZAPINE QUANTIFICATION: NEGATIVE NG/ML
SL AMB NORQUETIAPINE QUANTIFICATION: NEGATIVE NG/ML
SL AMB PHENTERMINE QUANTIFICATION: NEGATIVE NG/ML
SL AMB QUETIAPINE QUANTIFICATION: NEGATIVE NG/ML
SL AMB RISPERIDONE QUANTIFICATION: NEGATIVE NG/ML
SL AMB RITALINIC ACID QUANTIFICATION: NEGATIVE NG/ML
SPECIMEN PH ACCEPTABLE UR: NORMAL
TAPENTADOL UR QL CFM: NEGATIVE NG/ML
TEMAZEPAM UR QL CFM: NEGATIVE NG/ML
TEMAZEPAM UR QL CFM: NEGATIVE NG/ML
TRAMADOL UR QL CFM: NEGATIVE NG/ML
URATE/CREAT 24H UR: NEGATIVE NG/ML

## 2025-07-06 DIAGNOSIS — K21.9 GASTROESOPHAGEAL REFLUX DISEASE WITHOUT ESOPHAGITIS: ICD-10-CM

## 2025-07-07 ENCOUNTER — TELEPHONE (OUTPATIENT)
Age: 75
End: 2025-07-07

## 2025-07-07 RX ORDER — SUCRALFATE 1 G/1
1 TABLET ORAL 4 TIMES DAILY
Qty: 360 TABLET | Refills: 1 | Status: SHIPPED | OUTPATIENT
Start: 2025-07-07

## 2025-07-07 NOTE — TELEPHONE ENCOUNTER
Patient called for appointment due to knee pain and twisted her knee.    Dr Garduno on vacation.  [facility] and call her [facility]opedic doctor.

## 2025-07-08 ENCOUNTER — TELEPHONE (OUTPATIENT)
Dept: PAIN MEDICINE | Facility: MEDICAL CENTER | Age: 75
End: 2025-07-08

## 2025-07-08 ENCOUNTER — TELEPHONE (OUTPATIENT)
Age: 75
End: 2025-07-08

## 2025-07-08 NOTE — TELEPHONE ENCOUNTER
PA for  HYDROcodone-acetaminophen (NORCO)  mg SUBMITTED to      via    [x]CMM-KEY:  [id number]  []Surescripts-Case ID #    []Availity-Auth ID #  NDC #    []Faxed to plan   []Other website    [x]Phone call Case ID #  [id number]   6/10/25-6/10/26    [x]PA sent as URGENT    All office notes, labs and other pertaining documents and studies sent. Clinical questions answered. Awaiting determination from insurance company.     Turnaround time for your insurance to make a decision on your Prior Authorization can take 7-21 business days.

## 2025-07-08 NOTE — TELEPHONE ENCOUNTER
Patient called the [facility] stating a new prior auth is needed for the hydrocodone. The one that was last done was only valid for 4/10/25-6/10/25    HYDROcodone-acetaminophen (NORCO)  mg per tablet

## 2025-07-09 NOTE — TELEPHONE ENCOUNTER
PA for HYDRO-ACET  MG  APPROVED     Date(s) approved 6-10-25 6/10/26    Case #[id number]     Patient advised by          []NATIONSPLAYhart Message  [x]Phone call   []LMOM  []L/M to call office as no active Communication consent on file  []Unable to leave detailed message as VM not approved on Communication consent       Pharmacy advised by    []Fax  [x]Phone call  []Secure Chat    Specialty Pharmacy    []      Approval letter scanned into Media No

## 2025-07-14 ENCOUNTER — APPOINTMENT (OUTPATIENT)
Age: 75
End: 2025-07-14
Attending: ORTHOPAEDIC SURGERY
Payer: COMMERCIAL

## 2025-07-14 ENCOUNTER — OFFICE VISIT (OUTPATIENT)
Age: 75
End: 2025-07-14
Attending: INTERNAL MEDICINE
Payer: COMMERCIAL

## 2025-07-14 DIAGNOSIS — M17.11 PRIMARY OSTEOARTHRITIS OF RIGHT KNEE: Primary | ICD-10-CM

## 2025-07-14 DIAGNOSIS — M25.561 RIGHT KNEE PAIN, UNSPECIFIED CHRONICITY: ICD-10-CM

## 2025-07-14 DIAGNOSIS — Z01.89 ENCOUNTER FOR LOWER EXTREMITY COMPARISON IMAGING STUDY: ICD-10-CM

## 2025-07-14 PROCEDURE — 73564 X-RAY EXAM KNEE 4 OR MORE: CPT

## 2025-07-14 PROCEDURE — 99214 OFFICE O/P EST MOD 30 MIN: CPT | Performed by: ORTHOPAEDIC SURGERY

## 2025-07-14 PROCEDURE — 73560 X-RAY EXAM OF KNEE 1 OR 2: CPT

## 2025-07-14 NOTE — PROGRESS NOTES
:  Assessment & Plan  Primary osteoarthritis of right knee  Reviewed today's physical exam findings and x-ray findings with patient at time of visit  Discussed with patient that today's physical exam is most consistent with advanced osteoarthritic change of the right knee  Discussed that she should discontinue use of her lateral  as her x-rays exhibit complete lateral joint space loss  Hinged knee brace provided at time of visit for added support with weightbearing activity   Patient was offered but declined corticosteroid injections for relief of pain and inflammation  Referral provided to aquatic therapy for 4 to 6 weeks to work on range of motion in preparation for consideration for knee replacement candidacy  Should be seen for follow-up in 4 weeks, for reevaluation and likely referral to joints for TKA consultation  Should any questions or concerns arise she can contact the office  Patient expresses understanding and is in agreement with this treatment plan  Orders:    XR knee 4+ vw left injury; Future    Ambulatory Referral to Orthopedic Surgery    Ambulatory Referral to Physical Therapy; Future    Brace    Encounter for lower extremity comparison imaging study    Orders:    XR knee 4+ vw left injury; Future    Brace      REASON FOR VISIT:  Chief Complaint   Patient presents with    Right Knee - Pain     Pushed recliner couple inches, and felt a sharp pain radiating up and down the leg.         HISTORY OF PRESENT ILLNESS:  RIGHT knee pain for several years. No acute injury or trauma. Pain located medially and laterally.  Pain worse with activity or stairs. Patient reports grinding with knee flexion and extension, occasional catching, and giving way. She also reports associated swelling with activity. She denies any bruising, numbness, or tingling. Although she does not express numbness and tingling to the right knee, she does have a past medical history that includes distal neuropathy secondary to to  diabetes. She is currently on prescription hydrocodone for multiple pathologies including C-spine pathology, lumbar spine pathology, and hip pathology. She rates her overall pain today at 8-9/10. She reports being diagnosed with osteoarthritis of the right knee some 15+ years ago, and has been using a medial  brace since that time. Past treatments have included corticosteroid injections, Visco injections, and physical therapy with minimal improvement.      Past Medical History[1]     Past Surgical History[2]    Social History     Socioeconomic History    Marital status: Single     Spouse name: Not on file    Number of children: Not on file    Years of education: Not on file    Highest education level: Not on file   Occupational History    Not on file   Tobacco Use    Smoking status: Every Day     Current packs/day: 0.25     Types: Cigarettes    Smokeless tobacco: Never   Vaping Use    Vaping status: Never Used   Substance and Sexual Activity    Alcohol use: Yes     Comment: couple times/year    Drug use: Never    Sexual activity: Not Currently     Partners: Male     Birth control/protection: Female Sterilization, Post-menopausal   Other Topics Concern    Not on file   Social History Narrative    Not on file     Social Drivers of Health     Financial Resource Strain: Low Risk  (6/18/2025)    Overall Financial Resource Strain (CARDIA)     Difficulty of Paying Living Expenses: Not hard at all   Food Insecurity: No Food Insecurity (6/18/2025)    Hunger Vital Sign     Worried About Running Out of Food in the Last Year: Never true     Ran Out of Food in the Last Year: Never true   Transportation Needs: No Transportation Needs (6/18/2025)    PRAPARE - Transportation     Lack of Transportation (Medical): No     Lack of Transportation (Non-Medical): No   Physical Activity: Inactive (9/8/2020)    Exercise Vital Sign     Days of Exercise per Week: 0 days     Minutes of Exercise per Session: 0 min   Stress: No Stress  Concern Present (9/8/2020)    [employer] - Occupational Stress Questionnaire     Feeling of Stress : Not at all   Social Connections: Unknown (6/18/2024)    Received from [employer]     How often do you feel lonely or isolated from those around you? (Adult - for ages 18 years and over): Not on file   Intimate Partner Violence: Not At Risk (9/8/2020)    Humiliation, Afraid, Rape, and Kick questionnaire     Fear of Current or Ex-Partner: No     Emotionally Abused: No     Physically Abused: No     Sexually Abused: No   Housing Stability: Low Risk  (6/18/2025)    Housing Stability Vital Sign     Unable to Pay for Housing in the Last Year: No     Number of Times Moved in the Last Year: 0     Homeless in the Last Year: No       MEDICATIONS:  Current Medications[3]    ALLERGIES:  Allergies[4]      MAJOR FINDINGS:  Areli Luciano is pleasant, healthy appearing, and in no acute distress.     RIGHT knee  Skin intact, ROM 0-20-90  No effusion  Lateral patella tracking   Positive patella apprehension  Joint line tenderness: Medial and lateral, distal hamstring tenderness medially and laterally, pes anserine bursa tenderness   Miguel test: deferred due to arthritis  Anterior drawer: negative, Lachman: stable, Posterior drawer: negative   Stable to varus/valgus  Sensation intact sp/dp/t  Strength intact 5/5 dorsiflexion/plantarflexion/SLR   Extremity wwp  No calf pain    LEFT knee  Skin intact, ROM 0-115   Stable to varus/valgus  Sensation intact sp/dp/t  Strength intact 5/5 dorsiflexion/plantarflexion/SLR   Extremity wwp  No calf pain    IMAGING  Attending Physician has personally reviewed pertinent imaging in PACS, impression is as follows:    Review of radiographic series taken 7/14/2025 of the right knee shows end-stage osteoarthritic change of the anterior, lateral, and medial compartments. There is complete joint space loss of the anterior and lateral compartments with  proximal tibial erosion of the lateral compartment. Large osteophyte formation of the anterior compartment, posterior aspect of the medial and lateral femoral condyles, and of the medial compartment. No sign of acute osseous abnormality or malalignment present.     Scribe Attestation      I,:  Gab Alston am acting as a scribe while in the presence of the attending physician.:       I,:  Messi Terry MD personally performed the services described in this documentation    as scribed in my presence.:              CC:  MD Laureen Wilson Sami, MD        [1]   Past Medical History:  Diagnosis Date    Anxiety     Arthritis     Asthma     Herrera's cyst of knee     Chronic GERD     Chronic pain disorder     Depression     Diabetes mellitus, type 2 (HCC)     Diabetic peripheral neuropathy (HCC)     Endometriosis     Fibromyalgia     Hyperlipidemia     Hypertension     Lung nodules     Macular degeneration     Pulmonary emphysema (HCC)     Spondylosis     Spontaneous pneumothorax    [2]   Past Surgical History:  Procedure Laterality Date    BACK SURGERY  1996    L5, S1- laser surgery fusion of c5 and c6     BREAST CYST EXCISION Right     x2 benign    CHOLECYSTECTOMY  2004    FOOT SURGERY Left 2005    fusion     FRACTURE SURGERY      GASTRIC BYPASS  2010    Dr. Oropeza     KNEE ARTHROSCOPY      LAMINECTOMY      LAPAROSCOPIC HYSTERECTOMY  1985    for AUB    ORTHOPEDIC SURGERY      OVARIAN CYST REMOVAL  1975    PELVIC LAPAROSCOPY      ovaries (x10)     PLEURAL SCARIFICATION  1967    SHOULDER OPEN ROTATOR CUFF REPAIR Left 2008    TONSILLECTOMY      VAGINAL PROLAPSE REPAIR  2012   [3]   Current Outpatient Medications:     albuterol (ProAir HFA) 90 mcg/act inhaler, Inhale 2 puffs every 6 (six) hours as needed for wheezing, Disp: 18 g, Rfl: 3    amLODIPine (NORVASC) 5 mg tablet, TAKE 1 TABLET (5 MG TOTAL) BY MOUTH DAILY., Disp: 90 tablet, Rfl: 1    atorvastatin (LIPITOR) 10 mg tablet, Take 1 tablet (10 mg total) by mouth  daily, Disp: 90 tablet, Rfl: 3    baclofen 10 mg tablet, Take 1 tablet (10 mg total) by mouth 3 (three) times a day, Disp: 270 tablet, Rfl: 0    Calcium Carb-Cholecalciferol (Calcium 600+D3) 600-20 MG-MCG TABS, TAKE 1 TABLET BY MOUTH TWICE A DAY WITH MEALS, Disp: 180 tablet, Rfl: 1    cetirizine (ZyrTEC) 10 mg tablet, TAKE 1 TABLET BY MOUTH EVERY DAY, Disp: 90 tablet, Rfl: 2    cholecalciferol (VITAMIN D) 400 units/1 mL, , Disp: , Rfl:     clotrimazole-betamethasone (LOTRISONE) 1-0.05 % cream, APPLY TO AFFECTED AREA 3 TIMES A DAY, Disp: 60 g, Rfl: 1    Cyanocobalamin (B-12) 1000 MCG TABS, TAKE 1 TABLET BY MOUTH EVERY DAY, Disp: 90 tablet, Rfl: 3    denosumab (PROLIA) 60 mg/mL, Inject 1 mL (60 mg total) under the skin once for 1 dose, Disp: 1 mL, Rfl: 0    Diclofenac Sodium (VOLTAREN) 1 %, APPLY 2 GRAMS TO AFFECTED AREA 4 TIMES A DAY, Disp: 200 g, Rfl: 0    diphenhydrAMINE-PSE-APAP (BENADRYL ALLERGY/COLD PO), Take by mouth daily at bedtime, Disp: , Rfl:     escitalopram (LEXAPRO) 5 mg tablet, TAKE 1 TABLET (5 MG TOTAL) BY MOUTH DAILY., Disp: 90 tablet, Rfl: 1    famotidine (PEPCID) 20 mg tablet, TAKE 1 TABLET (20 MG TOTAL) BY MOUTH 2 (TWO) TIMES A DAY PLEASE STOP TAGAMET, Disp: 180 tablet, Rfl: 1    fexofenadine (ALLEGRA) 180 MG tablet, Take 1 tablet (180 mg total) by mouth daily In the morning, Disp: 90 tablet, Rfl: 3    fluticasone (FLONASE) 50 mcg/act nasal spray, SPRAY 1 SPRAY INTO EACH NOSTRIL EVERY DAY, Disp: 48 mL, Rfl: 2    HYDROcodone-acetaminophen (NORCO)  mg per tablet, Take 1 tablet by mouth every 4 (four) hours as needed for severe pain Max Daily Amount: 6 tablets Do not start before July 7, 2025., Disp: 130 tablet, Rfl: 0    [START ON 8/4/2025] HYDROcodone-acetaminophen (NORCO)  mg per tablet, Take 1 tablet by mouth every 4 (four) hours as needed for severe pain Max Daily Amount: 6 tablets Do not start before August 4, 2025., Disp: 130 tablet, Rfl: 0    ipratropium-albuterol (DUO-NEB)  0.5-2.5 mg/3 mL nebulizer solution, Take 3 mL by nebulization every 8 (eight) hours as needed for wheezing or shortness of breath, Disp: 270 mL, Rfl: 3    levothyroxine 25 mcg tablet, Take 1 tablet (25 mcg total) by mouth every other day, Disp: 45 tablet, Rfl: 3    levothyroxine 50 mcg tablet, TAKE 1 TABLET (50 MCG TOTAL) BY MOUTH EVERY OTHER DAY, Disp: 50 tablet, Rfl: 1    losartan (COZAAR) 25 mg tablet, TAKE 1 TABLET (25 MG TOTAL) BY MOUTH DAILY., Disp: 90 tablet, Rfl: 1    magnesium Oxide (MAG-OX) 400 mg TABS, Take 1 tablet (400 mg total) by mouth daily, Disp: 90 tablet, Rfl: 0    naloxone (NARCAN) 4 mg/0.1 mL nasal spray, Administer 1 spray into a nostril. If no response after 2-3 minutes, give another dose in the other nostril using a new spray., Disp: 1 each, Rfl: 1    nortriptyline (PAMELOR) 25 mg capsule, TAKE 1 CAPSULE BY MOUTH DAILY AT BEDTIME, Disp: 90 capsule, Rfl: 1    omeprazole (PriLOSEC) 40 MG capsule, TAKE 1 CAPSULE BY MOUTH 2 TIMES A DAY BEFORE MEALS., Disp: 180 capsule, Rfl: 1    OneTouch Verio test strip, in the morning and in the evening., Disp: , Rfl:     Pediatric Multiple Vitamins (Childrens Chew Multivitamin) CHEW, CHEW AND SWALLOW 1 TABLET BY MOUTH EVERY DAY, Disp: 100 tablet, Rfl: 1    potassium chloride (MICRO-K) 10 MEQ CR capsule, TAKE 1 CAPSULE BY MOUTH EVERY DAY, Disp: 90 capsule, Rfl: 1    pregabalin (LYRICA) 200 MG capsule, Take 1 capsule (200 mg total) by mouth 3 (three) times a day, Disp: 90 capsule, Rfl: 2    promethazine (PHENERGAN) 12.5 mg/10 mL oral solution, Take 10 mL (12.5 mg total) by mouth every 6 (six) hours as needed (cough), Disp: 118 mL, Rfl: 0    sucralfate (CARAFATE) 1 g tablet, TAKE 1 TABLET BY MOUTH FOUR TIMES A DAY, Disp: 360 tablet, Rfl: 1    sucralfate (CARAFATE) 1 g/10 mL suspension, Take 10 mL (1 g total) by mouth 4 (four) times a day (with meals and at bedtime), Disp: 3600 mL, Rfl: 3    traZODone (DESYREL) 50 mg tablet, TAKE 1 TABLET BY MOUTH EVERY DAY AT  BEDTIME AS NEEDED, Disp: 90 tablet, Rfl: 1    Trelegy Ellipta 100-62.5-25 MCG/ACT inhaler, INHALE 1 PUFF DAILY RINSE MOUTH AFTER USE., Disp: 60 each, Rfl: 5    triamcinolone (KENALOG) 0.1 % cream, , Disp: , Rfl:     Vitamin A 2400 MCG (8000 UT) TABS, Take 8,000 Units by mouth daily, Disp: 90 tablet, Rfl: 1    Current Facility-Administered Medications:     cyanocobalamin injection 1,000 mcg, 1,000 mcg, Intramuscular, Q30 Days, Aleksandar Garduno MD, 1,000 mcg at 12/08/20 1118    cyanocobalamin injection 1,000 mcg, 1,000 mcg, Intramuscular, Q30 Days, Aleksandar Garduno MD, 1,000 mcg at 07/08/20 1036    cyanocobalamin injection 1,000 mcg, 1,000 mcg, Intramuscular, Q30 Days, Aleksandar Garduno MD, 1,000 mcg at 09/08/20 1210    cyanocobalamin injection 1,000 mcg, 1,000 mcg, Intramuscular, Q30 Days, Aleksandar Garduno MD    cyanocobalamin injection 1,000 mcg, 1,000 mcg, Intramuscular, Q30 Days, Aleksandar Garduno MD    cyanocobalamin injection 1,000 mcg, 1,000 mcg, Intramuscular, Q30 Days, Aleksandar Garduno MD, 1,000 mcg at 08/18/21 1019    cyanocobalamin injection 1,000 mcg, 1,000 mcg, Intramuscular, Q30 Days, Aleksandar Garduno MD, 1,000 mcg at 03/21/23 1117    cyanocobalamin injection 1,000 mcg, 1,000 mcg, Intramuscular, Q30 Days, Aleksandar Garduno MD, 1,000 mcg at 07/13/23 1004  [4]   Allergies  Allergen Reactions    Cephalosporins Hives    Erythromycin GI Intolerance    Fentanyl Itching     Occurred during surgery     Paxil [Paroxetine] Hives     After 2 weeks    Penicillins Itching    Pravastatin Myalgia    Statins Itching    Sulfa Antibiotics Diarrhea    Wellbutrin [Bupropion] Hives     After 2 weeks     Chantix [Varenicline] Rash and Other (See Comments)     Burning and itching    Clindamycin Rash    Marzena's Wort (Hypericum Perforatum) Rash

## 2025-07-14 NOTE — ASSESSMENT & PLAN NOTE
Reviewed today's physical exam findings and x-ray findings with patient at time of visit  Discussed with patient that today's physical exam is most consistent with advanced osteoarthritic change of the right knee  Discussed that she should discontinue use of her lateral  as her x-rays exhibit complete lateral joint space loss  Hinged knee brace provided at time of visit for added support with weightbearing activity   Patient was offered but declined corticosteroid injections for relief of pain and inflammation  Referral provided to aquatic therapy for 4 to 6 weeks to work on range of motion in preparation for consideration for knee replacement candidacy  Should be seen for follow-up in 4 weeks, for reevaluation and likely referral to joints for TKA consultation  Should any questions or concerns arise she can contact the office  Patient expresses understanding and is in agreement with this treatment plan  Orders:    XR knee 4+ vw left injury; Future    Ambulatory Referral to Orthopedic Surgery    Ambulatory Referral to Physical Therapy; Future    Brace

## 2025-07-18 ENCOUNTER — TELEPHONE (OUTPATIENT)
Age: 75
End: 2025-07-18

## 2025-07-18 DIAGNOSIS — E11.8 TYPE II DIABETES MELLITUS WITH MANIFESTATIONS (HCC): Primary | ICD-10-CM

## 2025-07-18 DIAGNOSIS — Z98.84 BARIATRIC SURGERY STATUS: ICD-10-CM

## 2025-07-18 DIAGNOSIS — N39.0 ACUTE UTI: Primary | ICD-10-CM

## 2025-07-18 RX ORDER — NITROFURANTOIN 25; 75 MG/1; MG/1
100 CAPSULE ORAL
Qty: 20 CAPSULE | Refills: 0 | Status: SHIPPED | OUTPATIENT
Start: 2025-07-18

## 2025-07-18 RX ORDER — DOXYCYCLINE 100 MG/1
100 CAPSULE ORAL
Qty: 14 CAPSULE | Refills: 0 | Status: SHIPPED | OUTPATIENT
Start: 2025-07-18 | End: 2025-07-25

## 2025-07-18 NOTE — TELEPHONE ENCOUNTER
Caller: patient    Doctor: Dr. Terry     Reason for call: Patient was seen on 7/14 for her R knee and a brace was given to her, patient states she feels the brace is not the right size and is to small for her knee, feels the plates in the brace are not sitting in right place  Please advise     Call back#: [phone]

## 2025-07-18 NOTE — TELEPHONE ENCOUNTER
Patient reports sx of UTI to include bladder leakage, frequency, odor pain and chills, denies fever. Sx began approximately 3 am today and have progressively gotten worse. No available appointments with provider the rest of the day. She suffers from frequent UTIs. Patient asking for antibiotics, she was last on doxycyline and pyridium in April and reports this helped quickly.  Verified pharmacy on file is accurate. She thanks us for our help!

## 2025-07-18 NOTE — TELEPHONE ENCOUNTER
CC: SOC    Do you have any history of skin cancer? Yes: right upper lip, right medial cheek  Does anyone in your family have a history of skin cancer? Yes: Mother  Do you wear sunscreen? Yes: spf 15   Do you bleed or bruise easily? Yes: warfarin  Denies known Latex allergy or symptoms of Latex sensitivity.  Medications reviewed and updated.    Verbal permission granted by patient to leave a detailed message with medical information on voicemail at phone number given? Yes at  Cell  [phone] (mobile)      Spot of concern: nose and right breast   Patient asking for antibiotics, she was last on doxycyline and pyridium in April and reports this helped quickly. This is what patient is asking for. Please advise

## 2025-07-21 ENCOUNTER — OFFICE VISIT (OUTPATIENT)
Age: 75
End: 2025-07-21
Payer: COMMERCIAL

## 2025-07-21 DIAGNOSIS — M17.11 PRIMARY OSTEOARTHRITIS OF RIGHT KNEE: Primary | ICD-10-CM

## 2025-07-21 PROCEDURE — 99213 OFFICE O/P EST LOW 20 MIN: CPT | Performed by: ORTHOPAEDIC SURGERY

## 2025-07-21 RX ORDER — BLOOD SUGAR DIAGNOSTIC
STRIP MISCELLANEOUS 2 TIMES DAILY
Qty: 100 STRIP | Refills: 3 | Status: SHIPPED | OUTPATIENT
Start: 2025-07-21

## 2025-07-21 NOTE — ASSESSMENT & PLAN NOTE
RIGHT knee advanced arthritis with lateral joint space loss  Tried  brace without relief  Will readjust short hinged brace today, she has more comfort with this  Will be setting up PT  Discussed possible injections but not really interested in this right now  Discussed possible knee replacement if needed    Follow up 1 month

## 2025-07-21 NOTE — PROGRESS NOTES
:  Assessment & Plan  Primary osteoarthritis of right knee      RIGHT knee advanced arthritis with lateral joint space loss  Tried  brace without relief  Will readjust short hinged brace today, she has more comfort with this  Will be setting up PT  Discussed possible injections but not really interested in this right now  Discussed possible knee replacement if needed    Follow up 1 month               REASON FOR VISIT:  Chief Complaint   Patient presents with    Right Knee - Follow-up, Pain, Swelling     Pushed recliner couple inches, and felt a sharp pain shooting up to groin area.  - thinks brace is too small       Interval hx 7/21/25:   Does like the new brace but has had some issues with the fitting.       HISTORY OF PRESENT ILLNESS:  RIGHT knee pain for several years. No acute injury or trauma. Pain located medially and laterally.  Pain worse with activity or stairs. Patient reports grinding with knee flexion and extension, occasional catching, and giving way. She also reports associated swelling with activity. She denies any bruising, numbness, or tingling. Although she does not express numbness and tingling to the right knee, she does have a past medical history that includes distal neuropathy secondary to to diabetes. She is currently on prescription hydrocodone for multiple pathologies including C-spine pathology, lumbar spine pathology, and hip pathology. She rates her overall pain today at 8-9/10. She reports being diagnosed with osteoarthritis of the right knee some 15+ years ago, and has been using a medial  brace since that time. Past treatments have included corticosteroid injections, Visco injections, and physical therapy with minimal improvement.      Past Medical History[1]     Past Surgical History[2]    Social History     Socioeconomic History    Marital status: Single     Spouse name: Not on file    Number of children: Not on file    Years of education: Not on file    Highest  education level: Not on file   Occupational History    Not on file   Tobacco Use    Smoking status: Every Day     Current packs/day: 0.25     Types: Cigarettes    Smokeless tobacco: Never   Vaping Use    Vaping status: Never Used   Substance and Sexual Activity    Alcohol use: Yes     Comment: couple times/year    Drug use: Never    Sexual activity: Not Currently     Partners: Male     Birth control/protection: Female Sterilization, Post-menopausal   Other Topics Concern    Not on file   Social History Narrative    Not on file     Social Drivers of Health     Financial Resource Strain: Low Risk  (6/18/2025)    Overall Financial Resource Strain (CARDIA)     Difficulty of Paying Living Expenses: Not hard at all   Food Insecurity: No Food Insecurity (6/18/2025)    Hunger Vital Sign     Worried About Running Out of Food in the Last Year: Never true     Ran Out of Food in the Last Year: Never true   Transportation Needs: No Transportation Needs (6/18/2025)    PRAPARE - Transportation     Lack of Transportation (Medical): No     Lack of Transportation (Non-Medical): No   Physical Activity: Inactive (9/8/2020)    Exercise Vital Sign     Days of Exercise per Week: 0 days     Minutes of Exercise per Session: 0 min   Stress: No Stress Concern Present (9/8/2020)    [employer] [place] [employer] - Occupational Stress Questionnaire     Feeling of Stress : Not at all   Social Connections: Unknown (6/18/2024)    Received from [employer]     How often do you feel lonely or isolated from those around you? (Adult - for ages 18 years and over): Not on file   Intimate Partner Violence: Not At Risk (9/8/2020)    Humiliation, Afraid, Rape, and Kick questionnaire     Fear of Current or Ex-Partner: No     Emotionally Abused: No     Physically Abused: No     Sexually Abused: No   Housing Stability: Low Risk  (6/18/2025)    Housing Stability Vital Sign     Unable to Pay for Housing in the Last Year: No      Number of Times Moved in the Last Year: 0     Homeless in the Last Year: No       MEDICATIONS:  Current Medications[3]    ALLERGIES:  Allergies[4]      MAJOR FINDINGS:  Areli Luciano is pleasant, healthy appearing, and in no acute distress.     RIGHT knee  Skin intact, ROM 0-20-90  No effusion  Lateral patella tracking   Positive patella apprehension  Joint line tenderness: Medial and lateral, distal hamstring tenderness medially and laterally, pes anserine bursa tenderness   Miguel test: deferred due to arthritis  Anterior drawer: negative, Lachman: stable, Posterior drawer: negative   Stable to varus/valgus  Sensation intact sp/dp/t  Strength intact 5/5 dorsiflexion/plantarflexion/SLR   Extremity wwp  No calf pain        IMAGING  Attending Physician has personally reviewed pertinent imaging in PACS, impression is as follows:    Review of radiographic series taken 7/14/2025 of the right knee shows end-stage osteoarthritic change of the anterior, lateral, and medial compartments. There is complete joint space loss of the anterior and lateral compartments with proximal tibial erosion of the lateral compartment. Large osteophyte formation of the anterior compartment, posterior aspect of the medial and lateral femoral condyles, and of the medial compartment. No sign of acute osseous abnormality or malalignment present.         CC:  Aleksandar Garduno MD No ref. provider found          [1]   Past Medical History:  Diagnosis Date    Anxiety     Arthritis     Asthma     Herrera's cyst of knee     Chronic GERD     Chronic pain disorder     Depression     Diabetes mellitus, type 2 (HCC)     Diabetic peripheral neuropathy (HCC)     Endometriosis     Fibromyalgia     Hyperlipidemia     Hypertension     Lung nodules     Macular degeneration     Pulmonary emphysema (HCC)     Spondylosis     Spontaneous pneumothorax    [2]   Past Surgical History:  Procedure Laterality Date    BACK SURGERY  1996    L5, S1- laser surgery fusion of  c5 and c6     BREAST CYST EXCISION Right     x2 benign    CHOLECYSTECTOMY  2004    FOOT SURGERY Left 2005    fusion     FRACTURE SURGERY      GASTRIC BYPASS  2010    Dr. Oropeza     KNEE ARTHROSCOPY      LAMINECTOMY      LAPAROSCOPIC HYSTERECTOMY  1985    for AUB    ORTHOPEDIC SURGERY      OVARIAN CYST REMOVAL  1975    PELVIC LAPAROSCOPY      ovaries (x10)     PLEURAL SCARIFICATION  1967    SHOULDER OPEN ROTATOR CUFF REPAIR Left 2008    TONSILLECTOMY      VAGINAL PROLAPSE REPAIR  2012   [3]   Current Outpatient Medications:     albuterol (ProAir HFA) 90 mcg/act inhaler, Inhale 2 puffs every 6 (six) hours as needed for wheezing, Disp: 18 g, Rfl: 3    amLODIPine (NORVASC) 5 mg tablet, TAKE 1 TABLET (5 MG TOTAL) BY MOUTH DAILY., Disp: 90 tablet, Rfl: 1    atorvastatin (LIPITOR) 10 mg tablet, Take 1 tablet (10 mg total) by mouth daily, Disp: 90 tablet, Rfl: 3    baclofen 10 mg tablet, Take 1 tablet (10 mg total) by mouth 3 (three) times a day, Disp: 270 tablet, Rfl: 0    Calcium Carb-Cholecalciferol (Calcium 600+D3) 600-20 MG-MCG TABS, TAKE 1 TABLET BY MOUTH TWICE A DAY WITH MEALS, Disp: 180 tablet, Rfl: 1    cetirizine (ZyrTEC) 10 mg tablet, TAKE 1 TABLET BY MOUTH EVERY DAY, Disp: 90 tablet, Rfl: 2    cholecalciferol (VITAMIN D) 400 units/1 mL, , Disp: , Rfl:     clotrimazole-betamethasone (LOTRISONE) 1-0.05 % cream, APPLY TO AFFECTED AREA 3 TIMES A DAY, Disp: 60 g, Rfl: 1    Cyanocobalamin (B-12) 1000 MCG TABS, TAKE 1 TABLET BY MOUTH EVERY DAY, Disp: 90 tablet, Rfl: 3    denosumab (PROLIA) 60 mg/mL, Inject 1 mL (60 mg total) under the skin once for 1 dose, Disp: 1 mL, Rfl: 0    Diclofenac Sodium (VOLTAREN) 1 %, APPLY 2 GRAMS TO AFFECTED AREA 4 TIMES A DAY, Disp: 200 g, Rfl: 0    diphenhydrAMINE-PSE-APAP (BENADRYL ALLERGY/COLD PO), Take by mouth daily at bedtime, Disp: , Rfl:     doxycycline hyclate (VIBRAMYCIN) 100 mg capsule, Take 1 capsule (100 mg total) by mouth 2 (two) times daily after meals for 7 days, Disp: 14  capsule, Rfl: 0    escitalopram (LEXAPRO) 5 mg tablet, TAKE 1 TABLET (5 MG TOTAL) BY MOUTH DAILY., Disp: 90 tablet, Rfl: 1    famotidine (PEPCID) 20 mg tablet, TAKE 1 TABLET (20 MG TOTAL) BY MOUTH 2 (TWO) TIMES A DAY PLEASE STOP TAGAMET, Disp: 180 tablet, Rfl: 1    fexofenadine (ALLEGRA) 180 MG tablet, Take 1 tablet (180 mg total) by mouth daily In the morning, Disp: 90 tablet, Rfl: 3    fluticasone (FLONASE) 50 mcg/act nasal spray, SPRAY 1 SPRAY INTO EACH NOSTRIL EVERY DAY, Disp: 48 mL, Rfl: 2    [START ON 8/4/2025] HYDROcodone-acetaminophen (NORCO)  mg per tablet, Take 1 tablet by mouth every 4 (four) hours as needed for severe pain Max Daily Amount: 6 tablets Do not start before August 4, 2025., Disp: 130 tablet, Rfl: 0    ipratropium-albuterol (DUO-NEB) 0.5-2.5 mg/3 mL nebulizer solution, Take 3 mL by nebulization every 8 (eight) hours as needed for wheezing or shortness of breath, Disp: 270 mL, Rfl: 3    levothyroxine 25 mcg tablet, Take 1 tablet (25 mcg total) by mouth every other day, Disp: 45 tablet, Rfl: 3    levothyroxine 50 mcg tablet, TAKE 1 TABLET (50 MCG TOTAL) BY MOUTH EVERY OTHER DAY, Disp: 50 tablet, Rfl: 1    losartan (COZAAR) 25 mg tablet, TAKE 1 TABLET (25 MG TOTAL) BY MOUTH DAILY., Disp: 90 tablet, Rfl: 1    magnesium Oxide (MAG-OX) 400 mg TABS, Take 1 tablet (400 mg total) by mouth daily, Disp: 90 tablet, Rfl: 0    naloxone (NARCAN) 4 mg/0.1 mL nasal spray, Administer 1 spray into a nostril. If no response after 2-3 minutes, give another dose in the other nostril using a new spray., Disp: 1 each, Rfl: 1    nitrofurantoin (MACROBID) 100 mg capsule, Take 1 capsule (100 mg total) by mouth 2 (two) times daily after meals, Disp: 20 capsule, Rfl: 0    nortriptyline (PAMELOR) 25 mg capsule, TAKE 1 CAPSULE BY MOUTH DAILY AT BEDTIME, Disp: 90 capsule, Rfl: 1    omeprazole (PriLOSEC) 40 MG capsule, TAKE 1 CAPSULE BY MOUTH 2 TIMES A DAY BEFORE MEALS., Disp: 180 capsule, Rfl: 1    OneTouch Verio test  strip, TEST BLOOD SUGAR TWICE DAILY, Disp: 100 strip, Rfl: 3    Pediatric Multiple Vitamins (Childrens Chew Multivitamin) CHEW, CHEW AND SWALLOW 1 TABLET BY MOUTH EVERY DAY, Disp: 100 tablet, Rfl: 1    potassium chloride (MICRO-K) 10 MEQ CR capsule, TAKE 1 CAPSULE BY MOUTH EVERY DAY, Disp: 90 capsule, Rfl: 1    pregabalin (LYRICA) 200 MG capsule, Take 1 capsule (200 mg total) by mouth 3 (three) times a day, Disp: 90 capsule, Rfl: 2    promethazine (PHENERGAN) 12.5 mg/10 mL oral solution, Take 10 mL (12.5 mg total) by mouth every 6 (six) hours as needed (cough), Disp: 118 mL, Rfl: 0    sucralfate (CARAFATE) 1 g tablet, TAKE 1 TABLET BY MOUTH FOUR TIMES A DAY, Disp: 360 tablet, Rfl: 1    sucralfate (CARAFATE) 1 g/10 mL suspension, Take 10 mL (1 g total) by mouth 4 (four) times a day (with meals and at bedtime), Disp: 3600 mL, Rfl: 3    traZODone (DESYREL) 50 mg tablet, TAKE 1 TABLET BY MOUTH EVERY DAY AT BEDTIME AS NEEDED, Disp: 90 tablet, Rfl: 1    Trelegy Ellipta 100-62.5-25 MCG/ACT inhaler, INHALE 1 PUFF DAILY RINSE MOUTH AFTER USE., Disp: 60 each, Rfl: 5    triamcinolone (KENALOG) 0.1 % cream, , Disp: , Rfl:     Vitamin A 2400 MCG (8000 UT) TABS, Take 8,000 Units by mouth daily, Disp: 90 tablet, Rfl: 1    Current Facility-Administered Medications:     cyanocobalamin injection 1,000 mcg, 1,000 mcg, Intramuscular, Q30 Days, Aleksandar Garduno MD, 1,000 mcg at 12/08/20 1118    cyanocobalamin injection 1,000 mcg, 1,000 mcg, Intramuscular, Q30 Days, Aleksandar Garduno MD, 1,000 mcg at 07/08/20 1036    cyanocobalamin injection 1,000 mcg, 1,000 mcg, Intramuscular, Q30 Days, Aleksandar Garduno MD, 1,000 mcg at 09/08/20 1210    cyanocobalamin injection 1,000 mcg, 1,000 mcg, Intramuscular, Q30 Days, Aleksandar Garduno MD    cyanocobalamin injection 1,000 mcg, 1,000 mcg, Intramuscular, Q30 Days, Aleksandar Garduno MD    cyanocobalamin injection 1,000 mcg, 1,000 mcg, Intramuscular, Q30 Days, Aleksandar Garduno MD, 1,000 mcg at 08/18/21 1019    cyanocobalamin  injection 1,000 mcg, 1,000 mcg, Intramuscular, Q30 Days, Aleksandar Garduno MD, 1,000 mcg at 03/21/23 1117    cyanocobalamin injection 1,000 mcg, 1,000 mcg, Intramuscular, Q30 Days, Aleksandar Garduno MD, 1,000 mcg at 07/13/23 1004  [4]   Allergies  Allergen Reactions    Cephalosporins Hives    Erythromycin GI Intolerance    Fentanyl Itching     Occurred during surgery     Paxil [Paroxetine] Hives     After 2 weeks    Penicillins Itching    Pravastatin Myalgia    Statins Itching    Sulfa Antibiotics Diarrhea    Wellbutrin [Bupropion] Hives     After 2 weeks     Chantix [Varenicline] Rash and Other (See Comments)     Burning and itching    Clindamycin Rash    Marzena's Wort (Hypericum Perforatum) Rash

## 2025-07-29 ENCOUNTER — TELEPHONE (OUTPATIENT)
Age: 75
End: 2025-07-29

## 2025-07-29 DIAGNOSIS — F17.210 CIGARETTE SMOKER: ICD-10-CM

## 2025-07-29 DIAGNOSIS — N39.0 ACUTE UTI: ICD-10-CM

## 2025-07-29 DIAGNOSIS — R35.0 URINARY FREQUENCY: Primary | ICD-10-CM

## 2025-07-31 ENCOUNTER — TELEPHONE (OUTPATIENT)
Age: 75
End: 2025-07-31

## 2025-08-05 ENCOUNTER — OFFICE VISIT (OUTPATIENT)
Dept: FAMILY MEDICINE CLINIC | Facility: CLINIC | Age: 75
End: 2025-08-05
Payer: COMMERCIAL

## 2025-08-05 VITALS
OXYGEN SATURATION: 98 % | WEIGHT: 180 LBS | TEMPERATURE: 98 F | BODY MASS INDEX: 29.99 KG/M2 | DIASTOLIC BLOOD PRESSURE: 64 MMHG | SYSTOLIC BLOOD PRESSURE: 118 MMHG | HEIGHT: 65 IN | RESPIRATION RATE: 15 BRPM | HEART RATE: 58 BPM

## 2025-08-05 DIAGNOSIS — F17.210 CIGARETTE SMOKER: Primary | ICD-10-CM

## 2025-08-05 DIAGNOSIS — R09.82 POSTNASAL DRIP: ICD-10-CM

## 2025-08-05 DIAGNOSIS — Z98.84 BARIATRIC SURGERY STATUS: ICD-10-CM

## 2025-08-05 DIAGNOSIS — E06.3 HYPOTHYROIDISM DUE TO HASHIMOTO'S THYROIDITIS: ICD-10-CM

## 2025-08-05 DIAGNOSIS — G62.9 NEUROPATHY: ICD-10-CM

## 2025-08-05 DIAGNOSIS — N39.498 OTHER URINARY INCONTINENCE: ICD-10-CM

## 2025-08-05 DIAGNOSIS — R10.13 EPIGASTRIC PAIN: ICD-10-CM

## 2025-08-05 DIAGNOSIS — H53.8 BLURRED VISION, BILATERAL: ICD-10-CM

## 2025-08-05 DIAGNOSIS — K21.9 GASTROESOPHAGEAL REFLUX DISEASE WITHOUT ESOPHAGITIS: ICD-10-CM

## 2025-08-05 DIAGNOSIS — E11.8 TYPE II DIABETES MELLITUS WITH MANIFESTATIONS (HCC): ICD-10-CM

## 2025-08-05 DIAGNOSIS — E04.2 MULTIPLE THYROID NODULES: ICD-10-CM

## 2025-08-05 DIAGNOSIS — Z12.31 SCREENING MAMMOGRAM FOR BREAST CANCER: ICD-10-CM

## 2025-08-05 DIAGNOSIS — M81.8 IDIOPATHIC OSTEOPOROSIS: ICD-10-CM

## 2025-08-05 LAB — SL AMB POCT HEMOGLOBIN AIC: 5.1 (ref ?–6.5)

## 2025-08-05 PROCEDURE — 99214 OFFICE O/P EST MOD 30 MIN: CPT | Performed by: INTERNAL MEDICINE

## 2025-08-05 PROCEDURE — G2211 COMPLEX E/M VISIT ADD ON: HCPCS | Performed by: INTERNAL MEDICINE

## 2025-08-05 PROCEDURE — 83036 HEMOGLOBIN GLYCOSYLATED A1C: CPT | Performed by: INTERNAL MEDICINE

## 2025-08-05 RX ORDER — SUCRALFATE ORAL 1 G/10ML
1 SUSPENSION ORAL
Qty: 3600 ML | Refills: 4 | Status: SHIPPED | OUTPATIENT
Start: 2025-08-05 | End: 2025-11-03

## 2025-08-05 RX ORDER — FLUTICASONE PROPIONATE 50 MCG
SPRAY, SUSPENSION (ML) NASAL
Qty: 24 ML | Refills: 6 | Status: SHIPPED | OUTPATIENT
Start: 2025-08-05

## 2025-08-05 RX ORDER — PANTOPRAZOLE SODIUM 40 MG/1
40 TABLET, DELAYED RELEASE ORAL DAILY
Qty: 90 TABLET | Refills: 3 | Status: SHIPPED | OUTPATIENT
Start: 2025-08-05

## 2025-08-05 RX ORDER — FOLIC ACID 1 MG/1
1 TABLET ORAL DAILY
Qty: 100 TABLET | Refills: 3 | Status: SHIPPED | OUTPATIENT
Start: 2025-08-05

## 2025-08-05 RX ORDER — LEVOTHYROXINE SODIUM 25 UG/1
25 TABLET ORAL EVERY OTHER DAY
Qty: 45 TABLET | Refills: 1 | Status: SHIPPED | OUTPATIENT
Start: 2025-08-05

## 2025-08-06 DIAGNOSIS — I10 ESSENTIAL HYPERTENSION: ICD-10-CM

## 2025-08-06 RX ORDER — AMLODIPINE BESYLATE 5 MG/1
5 TABLET ORAL DAILY
Qty: 90 TABLET | Refills: 1 | Status: SHIPPED | OUTPATIENT
Start: 2025-08-06

## 2025-08-13 ENCOUNTER — TELEPHONE (OUTPATIENT)
Age: 75
End: 2025-08-13

## 2025-08-19 ENCOUNTER — OFFICE VISIT (OUTPATIENT)
Dept: PAIN MEDICINE | Facility: MEDICAL CENTER | Age: 75
End: 2025-08-19
Payer: COMMERCIAL

## 2025-08-19 ENCOUNTER — TELEPHONE (OUTPATIENT)
Age: 75
End: 2025-08-19

## 2025-08-19 VITALS — BODY MASS INDEX: 30.46 KG/M2 | HEIGHT: 65 IN | WEIGHT: 182.8 LBS

## 2025-08-19 DIAGNOSIS — Z79.891 LONG-TERM CURRENT USE OF OPIATE ANALGESIC: ICD-10-CM

## 2025-08-19 DIAGNOSIS — G89.29 CHRONIC BILATERAL LOW BACK PAIN WITH BILATERAL SCIATICA: ICD-10-CM

## 2025-08-19 DIAGNOSIS — M54.41 CHRONIC BILATERAL LOW BACK PAIN WITH BILATERAL SCIATICA: ICD-10-CM

## 2025-08-19 DIAGNOSIS — M54.42 CHRONIC BILATERAL LOW BACK PAIN WITH BILATERAL SCIATICA: ICD-10-CM

## 2025-08-19 DIAGNOSIS — F11.20 UNCOMPLICATED OPIOID DEPENDENCE (HCC): ICD-10-CM

## 2025-08-19 DIAGNOSIS — G89.4 CHRONIC PAIN SYNDROME: Primary | ICD-10-CM

## 2025-08-19 PROCEDURE — G2211 COMPLEX E/M VISIT ADD ON: HCPCS

## 2025-08-19 PROCEDURE — 99214 OFFICE O/P EST MOD 30 MIN: CPT

## 2025-08-19 RX ORDER — PREGABALIN 200 MG/1
200 CAPSULE ORAL 3 TIMES DAILY
Qty: 90 CAPSULE | Refills: 1 | Status: SHIPPED | OUTPATIENT
Start: 2025-08-19

## 2025-08-19 RX ORDER — HYDROCODONE BITARTRATE AND ACETAMINOPHEN 10; 325 MG/1; MG/1
1 TABLET ORAL EVERY 4 HOURS PRN
Qty: 130 TABLET | Refills: 0 | Status: SHIPPED | OUTPATIENT
Start: 2025-09-29

## 2025-08-19 RX ORDER — HYDROCODONE BITARTRATE AND ACETAMINOPHEN 10; 325 MG/1; MG/1
1 TABLET ORAL EVERY 4 HOURS PRN
Qty: 130 TABLET | Refills: 0 | Status: SHIPPED | OUTPATIENT
Start: 2025-09-01